# Patient Record
Sex: MALE | Race: WHITE | NOT HISPANIC OR LATINO | Employment: OTHER | URBAN - NONMETROPOLITAN AREA
[De-identification: names, ages, dates, MRNs, and addresses within clinical notes are randomized per-mention and may not be internally consistent; named-entity substitution may affect disease eponyms.]

---

## 2017-02-09 ENCOUNTER — APPOINTMENT (OUTPATIENT)
Dept: GENERAL RADIOLOGY | Facility: HOSPITAL | Age: 57
End: 2017-02-09

## 2017-02-09 ENCOUNTER — HOSPITAL ENCOUNTER (INPATIENT)
Facility: HOSPITAL | Age: 57
LOS: 2 days | Discharge: HOME | End: 2017-02-12
Attending: EMERGENCY MEDICINE | Admitting: INTERNAL MEDICINE

## 2017-02-09 DIAGNOSIS — R00.0 TACHYCARDIA: Primary | ICD-10-CM

## 2017-02-09 PROBLEM — B18.2 CHRONIC HEPATITIS C VIRUS INFECTION (HCC): Chronic | Status: ACTIVE | Noted: 2017-02-09

## 2017-02-09 PROBLEM — B18.2 CHRONIC HEPATITIS C VIRUS INFECTION (HCC): Status: ACTIVE | Noted: 2017-02-09

## 2017-02-09 LAB
ALBUMIN SERPL-MCNC: 3.5 G/DL (ref 3.5–5)
ALBUMIN SERPL-MCNC: 3.8 G/DL (ref 3.5–5)
ALBUMIN/GLOB SERPL: 0.9 G/DL (ref 1–2)
ALBUMIN/GLOB SERPL: 0.9 G/DL (ref 1–2)
ALP SERPL-CCNC: 135 U/L (ref 38–126)
ALP SERPL-CCNC: 149 U/L (ref 38–126)
ALT SERPL W P-5'-P-CCNC: 52 U/L (ref 13–69)
ALT SERPL W P-5'-P-CCNC: 54 U/L (ref 13–69)
ANION GAP SERPL CALCULATED.3IONS-SCNC: 10.8 MMOL/L
ANION GAP SERPL CALCULATED.3IONS-SCNC: 9.2 MMOL/L
AST SERPL-CCNC: 58 U/L (ref 15–46)
AST SERPL-CCNC: 65 U/L (ref 15–46)
BASOPHILS # BLD AUTO: 0.01 10*3/MM3 (ref 0–0.2)
BASOPHILS # BLD AUTO: 0.01 10*3/MM3 (ref 0–0.2)
BASOPHILS NFR BLD AUTO: 0.1 % (ref 0–2.5)
BASOPHILS NFR BLD AUTO: 0.2 % (ref 0–2.5)
BILIRUB SERPL-MCNC: 2.2 MG/DL (ref 0.2–1.3)
BILIRUB SERPL-MCNC: 2.6 MG/DL (ref 0.2–1.3)
BUN BLD-MCNC: 12 MG/DL (ref 7–20)
BUN BLD-MCNC: 13 MG/DL (ref 7–20)
BUN/CREAT SERPL: 18.6 (ref 6.3–21.9)
BUN/CREAT SERPL: 20 (ref 6.3–21.9)
CALCIUM SPEC-SCNC: 8.6 MG/DL (ref 8.4–10.2)
CALCIUM SPEC-SCNC: 9.1 MG/DL (ref 8.4–10.2)
CHLORIDE SERPL-SCNC: 103 MMOL/L (ref 98–107)
CHLORIDE SERPL-SCNC: 105 MMOL/L (ref 98–107)
CK MB SERPL-CCNC: 3.62 NG/ML (ref 0.2–2.4)
CK MB SERPL-RTO: 8 % (ref 0–2)
CK SERPL-CCNC: 45 U/L (ref 30–170)
CO2 SERPL-SCNC: 26 MMOL/L (ref 26–30)
CO2 SERPL-SCNC: 29 MMOL/L (ref 26–30)
CREAT BLD-MCNC: 0.6 MG/DL (ref 0.6–1.3)
CREAT BLD-MCNC: 0.7 MG/DL (ref 0.6–1.3)
D-DIMER, QUANTITATIVE (MAD,POW, STR): 728 NG/ML (FEU) (ref 0–500)
DEPRECATED RDW RBC AUTO: 50.3 FL (ref 37–54)
DEPRECATED RDW RBC AUTO: 52.8 FL (ref 37–54)
DIGOXIN SERPL-MCNC: <0.4 NG/ML (ref 0.8–2)
EOSINOPHIL # BLD AUTO: 0.25 10*3/MM3 (ref 0–0.7)
EOSINOPHIL # BLD AUTO: 0.29 10*3/MM3 (ref 0–0.7)
EOSINOPHIL NFR BLD AUTO: 3.5 % (ref 0–7)
EOSINOPHIL NFR BLD AUTO: 5.4 % (ref 0–7)
ERYTHROCYTE [DISTWIDTH] IN BLOOD BY AUTOMATED COUNT: 13.5 % (ref 11.5–14.5)
ERYTHROCYTE [DISTWIDTH] IN BLOOD BY AUTOMATED COUNT: 13.9 % (ref 11.5–14.5)
GFR SERPL CREATININE-BSD FRML MDRD: 116 ML/MIN/1.73
GFR SERPL CREATININE-BSD FRML MDRD: 138 ML/MIN/1.73
GLOBULIN UR ELPH-MCNC: 4.1 GM/DL
GLOBULIN UR ELPH-MCNC: 4.4 GM/DL
GLUCOSE BLD-MCNC: 176 MG/DL (ref 74–98)
GLUCOSE BLD-MCNC: 187 MG/DL (ref 74–98)
HCT VFR BLD AUTO: 36.1 % (ref 42–52)
HCT VFR BLD AUTO: 38.7 % (ref 42–52)
HGB BLD-MCNC: 12.2 G/DL (ref 14–18)
HGB BLD-MCNC: 13.3 G/DL (ref 14–18)
HOLD SPECIMEN: NORMAL
HOLD SPECIMEN: NORMAL
IMM GRANULOCYTES # BLD: 0.04 10*3/MM3 (ref 0–0.06)
IMM GRANULOCYTES # BLD: 0.05 10*3/MM3 (ref 0–0.06)
IMM GRANULOCYTES NFR BLD: 0.7 % (ref 0–0.6)
IMM GRANULOCYTES NFR BLD: 0.7 % (ref 0–0.6)
LYMPHOCYTES # BLD AUTO: 1.26 10*3/MM3 (ref 0.6–3.4)
LYMPHOCYTES # BLD AUTO: 1.44 10*3/MM3 (ref 0.6–3.4)
LYMPHOCYTES NFR BLD AUTO: 17.5 % (ref 10–50)
LYMPHOCYTES NFR BLD AUTO: 26.9 % (ref 10–50)
MCH RBC QN AUTO: 34.4 PG (ref 27–31)
MCH RBC QN AUTO: 35.4 PG (ref 27–31)
MCHC RBC AUTO-ENTMCNC: 33.8 G/DL (ref 30–37)
MCHC RBC AUTO-ENTMCNC: 34.4 G/DL (ref 30–37)
MCV RBC AUTO: 101.7 FL (ref 80–94)
MCV RBC AUTO: 102.9 FL (ref 80–94)
MONOCYTES # BLD AUTO: 0.27 10*3/MM3 (ref 0–0.9)
MONOCYTES # BLD AUTO: 0.38 10*3/MM3 (ref 0–0.9)
MONOCYTES NFR BLD AUTO: 5 % (ref 0–12)
MONOCYTES NFR BLD AUTO: 5.3 % (ref 0–12)
NEUTROPHILS # BLD AUTO: 3.31 10*3/MM3 (ref 2–6.9)
NEUTROPHILS # BLD AUTO: 5.24 10*3/MM3 (ref 2–6.9)
NEUTROPHILS NFR BLD AUTO: 61.8 % (ref 37–80)
NEUTROPHILS NFR BLD AUTO: 72.9 % (ref 37–80)
NRBC BLD MANUAL-RTO: 0 /100 WBC (ref 0–0)
NRBC BLD MANUAL-RTO: 0 /100 WBC (ref 0–0)
NT-PROBNP SERPL-MCNC: 2640 PG/ML (ref 0–125)
PLATELET # BLD AUTO: 84 10*3/MM3 (ref 130–400)
PLATELET # BLD AUTO: 99 10*3/MM3 (ref 130–400)
PMV BLD AUTO: 10.3 FL (ref 6–12)
PMV BLD AUTO: 10.4 FL (ref 6–12)
POTASSIUM BLD-SCNC: 3.2 MMOL/L (ref 3.5–5.1)
POTASSIUM BLD-SCNC: 3.8 MMOL/L (ref 3.5–5.1)
PROT SERPL-MCNC: 7.6 G/DL (ref 6.3–8.2)
PROT SERPL-MCNC: 8.2 G/DL (ref 6.3–8.2)
RBC # BLD AUTO: 3.55 10*6/MM3 (ref 4.7–6.1)
RBC # BLD AUTO: 3.76 10*6/MM3 (ref 4.7–6.1)
SODIUM BLD-SCNC: 137 MMOL/L (ref 137–145)
SODIUM BLD-SCNC: 139 MMOL/L (ref 137–145)
TROPONIN I SERPL-MCNC: 0.03 NG/ML (ref 0–0.03)
TROPONIN I SERPL-MCNC: 0.03 NG/ML (ref 0–0.03)
TROPONIN I SERPL-MCNC: 0.04 NG/ML (ref 0–0.03)
TSH SERPL DL<=0.05 MIU/L-ACNC: 1.01 MIU/ML (ref 0.47–4.68)
WBC NRBC COR # BLD: 5.36 10*3/MM3 (ref 4.8–10.8)
WBC NRBC COR # BLD: 7.19 10*3/MM3 (ref 4.8–10.8)
WHOLE BLOOD HOLD SPECIMEN: NORMAL
WHOLE BLOOD HOLD SPECIMEN: NORMAL

## 2017-02-09 PROCEDURE — 93010 ELECTROCARDIOGRAM REPORT: CPT | Performed by: INTERNAL MEDICINE

## 2017-02-09 PROCEDURE — 25010000002 AMIODARONE IN DEXTROSE 5% 360 MG/200ML SOLUTION: Performed by: INTERNAL MEDICINE

## 2017-02-09 PROCEDURE — 99291 CRITICAL CARE FIRST HOUR: CPT

## 2017-02-09 PROCEDURE — 85025 COMPLETE CBC W/AUTO DIFF WBC: CPT | Performed by: INTERNAL MEDICINE

## 2017-02-09 PROCEDURE — 96375 TX/PRO/DX INJ NEW DRUG ADDON: CPT

## 2017-02-09 PROCEDURE — 25010000002 ADENOSINE PER 6 MG: Performed by: EMERGENCY MEDICINE

## 2017-02-09 PROCEDURE — 25010000002 HYDROMORPHONE PER 4 MG: Performed by: HOSPITALIST

## 2017-02-09 PROCEDURE — 93005 ELECTROCARDIOGRAM TRACING: CPT | Performed by: EMERGENCY MEDICINE

## 2017-02-09 PROCEDURE — 80053 COMPREHEN METABOLIC PANEL: CPT | Performed by: INTERNAL MEDICINE

## 2017-02-09 PROCEDURE — 96365 THER/PROPH/DIAG IV INF INIT: CPT

## 2017-02-09 PROCEDURE — 84484 ASSAY OF TROPONIN QUANT: CPT | Performed by: EMERGENCY MEDICINE

## 2017-02-09 PROCEDURE — 96361 HYDRATE IV INFUSION ADD-ON: CPT

## 2017-02-09 PROCEDURE — 85025 COMPLETE CBC W/AUTO DIFF WBC: CPT | Performed by: EMERGENCY MEDICINE

## 2017-02-09 PROCEDURE — 84443 ASSAY THYROID STIM HORMONE: CPT | Performed by: INTERNAL MEDICINE

## 2017-02-09 PROCEDURE — 85379 FIBRIN DEGRADATION QUANT: CPT | Performed by: INTERNAL MEDICINE

## 2017-02-09 PROCEDURE — 71010 HC CHEST PA OR AP: CPT

## 2017-02-09 PROCEDURE — 93005 ELECTROCARDIOGRAM TRACING: CPT | Performed by: INTERNAL MEDICINE

## 2017-02-09 PROCEDURE — 36415 COLL VENOUS BLD VENIPUNCTURE: CPT

## 2017-02-09 PROCEDURE — 25010000002 DIGOXIN PER 500 MCG: Performed by: INTERNAL MEDICINE

## 2017-02-09 PROCEDURE — 25010000002 AMIODARONE IN DEXTROSE 5% 150 MG/100ML SOLUTION: Performed by: INTERNAL MEDICINE

## 2017-02-09 PROCEDURE — 94799 UNLISTED PULMONARY SVC/PX: CPT

## 2017-02-09 PROCEDURE — 96372 THER/PROPH/DIAG INJ SC/IM: CPT

## 2017-02-09 PROCEDURE — G0378 HOSPITAL OBSERVATION PER HR: HCPCS

## 2017-02-09 PROCEDURE — 82553 CREATINE MB FRACTION: CPT | Performed by: EMERGENCY MEDICINE

## 2017-02-09 PROCEDURE — 25010000002 AMIODARONE IN DEXTROSE 5% 150 MG/100ML SOLUTION: Performed by: EMERGENCY MEDICINE

## 2017-02-09 PROCEDURE — 83880 ASSAY OF NATRIURETIC PEPTIDE: CPT | Performed by: INTERNAL MEDICINE

## 2017-02-09 PROCEDURE — 99223 1ST HOSP IP/OBS HIGH 75: CPT | Performed by: INTERNAL MEDICINE

## 2017-02-09 PROCEDURE — 96376 TX/PRO/DX INJ SAME DRUG ADON: CPT

## 2017-02-09 PROCEDURE — 87081 CULTURE SCREEN ONLY: CPT | Performed by: HOSPITALIST

## 2017-02-09 PROCEDURE — 80053 COMPREHEN METABOLIC PANEL: CPT | Performed by: EMERGENCY MEDICINE

## 2017-02-09 PROCEDURE — 25010000002 AMIODARONE IN DEXTROSE 5% 360 MG/200ML SOLUTION

## 2017-02-09 PROCEDURE — 96366 THER/PROPH/DIAG IV INF ADDON: CPT

## 2017-02-09 PROCEDURE — 82550 ASSAY OF CK (CPK): CPT | Performed by: EMERGENCY MEDICINE

## 2017-02-09 PROCEDURE — 25010000002 ENOXAPARIN PER 10 MG: Performed by: INTERNAL MEDICINE

## 2017-02-09 PROCEDURE — 25010000002 AMIODARONE IN DEXTROSE 5% 360 MG/200ML SOLUTION: Performed by: EMERGENCY MEDICINE

## 2017-02-09 PROCEDURE — 80162 ASSAY OF DIGOXIN TOTAL: CPT | Performed by: INTERNAL MEDICINE

## 2017-02-09 RX ORDER — ATORVASTATIN CALCIUM 40 MG/1
40 TABLET, FILM COATED ORAL NIGHTLY
Status: DISCONTINUED | OUTPATIENT
Start: 2017-02-09 | End: 2017-02-12 | Stop reason: HOSPADM

## 2017-02-09 RX ORDER — HYDROCODONE BITARTRATE AND ACETAMINOPHEN 5; 325 MG/1; MG/1
1 TABLET ORAL EVERY 4 HOURS PRN
Status: DISCONTINUED | OUTPATIENT
Start: 2017-02-09 | End: 2017-02-12 | Stop reason: HOSPADM

## 2017-02-09 RX ORDER — DIGOXIN 0.25 MG/ML
0.5 INJECTION INTRAMUSCULAR; INTRAVENOUS ONCE
Status: COMPLETED | OUTPATIENT
Start: 2017-02-09 | End: 2017-02-09

## 2017-02-09 RX ORDER — DILTIAZEM HYDROCHLORIDE 240 MG/1
240 CAPSULE, COATED, EXTENDED RELEASE ORAL DAILY
Status: ON HOLD | COMMUNITY
End: 2017-02-10 | Stop reason: ALTCHOICE

## 2017-02-09 RX ORDER — DILTIAZEM HYDROCHLORIDE 5 MG/ML
30 INJECTION INTRAVENOUS ONCE
Status: DISCONTINUED | OUTPATIENT
Start: 2017-02-09 | End: 2017-02-09

## 2017-02-09 RX ORDER — DULOXETIN HYDROCHLORIDE 30 MG/1
30 CAPSULE, DELAYED RELEASE ORAL NIGHTLY
Status: ON HOLD | COMMUNITY
End: 2017-04-11

## 2017-02-09 RX ORDER — ASPIRIN 325 MG
325 TABLET ORAL DAILY
Status: ON HOLD | COMMUNITY
End: 2017-02-10 | Stop reason: ALTCHOICE

## 2017-02-09 RX ORDER — AMLODIPINE BESYLATE 5 MG/1
2.5 TABLET ORAL
Status: DISCONTINUED | OUTPATIENT
Start: 2017-02-09 | End: 2017-02-10

## 2017-02-09 RX ORDER — PREDNISONE 20 MG/1
10 TABLET ORAL DAILY PRN
Status: ON HOLD | COMMUNITY
End: 2017-06-21

## 2017-02-09 RX ORDER — ASPIRIN 81 MG/1
81 TABLET ORAL DAILY
Status: DISCONTINUED | OUTPATIENT
Start: 2017-02-09 | End: 2017-02-12 | Stop reason: HOSPADM

## 2017-02-09 RX ORDER — METOPROLOL SUCCINATE 100 MG/1
100 TABLET, EXTENDED RELEASE ORAL EVERY 24 HOURS
Status: DISCONTINUED | OUTPATIENT
Start: 2017-02-09 | End: 2017-02-12 | Stop reason: HOSPADM

## 2017-02-09 RX ORDER — LOSARTAN POTASSIUM AND HYDROCHLOROTHIAZIDE 12.5; 5 MG/1; MG/1
1 TABLET ORAL EVERY 24 HOURS
Status: DISCONTINUED | OUTPATIENT
Start: 2017-02-09 | End: 2017-02-12 | Stop reason: HOSPADM

## 2017-02-09 RX ORDER — ALLOPURINOL 300 MG/1
300 TABLET ORAL DAILY
Status: ON HOLD | COMMUNITY
End: 2017-02-10 | Stop reason: ALTCHOICE

## 2017-02-09 RX ORDER — ADENOSINE 3 MG/ML
6 INJECTION, SOLUTION INTRAVENOUS ONCE
Status: COMPLETED | OUTPATIENT
Start: 2017-02-09 | End: 2017-02-09

## 2017-02-09 RX ORDER — ACETAMINOPHEN 325 MG/1
650 TABLET ORAL ONCE
Status: COMPLETED | OUTPATIENT
Start: 2017-02-09 | End: 2017-02-09

## 2017-02-09 RX ORDER — ADENOSINE 3 MG/ML
18 INJECTION, SOLUTION INTRAVENOUS ONCE
Status: DISCONTINUED | OUTPATIENT
Start: 2017-02-09 | End: 2017-02-11

## 2017-02-09 RX ORDER — ADENOSINE 3 MG/ML
12 INJECTION, SOLUTION INTRAVENOUS ONCE
Status: COMPLETED | OUTPATIENT
Start: 2017-02-09 | End: 2017-02-09

## 2017-02-09 RX ORDER — SODIUM CHLORIDE 0.9 % (FLUSH) 0.9 %
1-10 SYRINGE (ML) INJECTION AS NEEDED
Status: DISCONTINUED | OUTPATIENT
Start: 2017-02-09 | End: 2017-02-12 | Stop reason: HOSPADM

## 2017-02-09 RX ORDER — LANOLIN ALCOHOL/MO/W.PET/CERES
6 CREAM (GRAM) TOPICAL NIGHTLY
Status: DISCONTINUED | OUTPATIENT
Start: 2017-02-09 | End: 2017-02-12 | Stop reason: HOSPADM

## 2017-02-09 RX ORDER — DIGOXIN 0.25 MG/ML
500 INJECTION INTRAMUSCULAR; INTRAVENOUS EVERY 6 HOURS
Status: DISCONTINUED | OUTPATIENT
Start: 2017-02-09 | End: 2017-02-10

## 2017-02-09 RX ORDER — DILTIAZEM HYDROCHLORIDE 5 MG/ML
20 INJECTION INTRAVENOUS ONCE
Status: COMPLETED | OUTPATIENT
Start: 2017-02-09 | End: 2017-02-09

## 2017-02-09 RX ADMIN — METOPROLOL SUCCINATE 100 MG: 100 TABLET, EXTENDED RELEASE ORAL at 17:46

## 2017-02-09 RX ADMIN — AMIODARONE HYDROCHLORIDE 1 MG/MIN: 1.8 INJECTION, SOLUTION INTRAVENOUS at 22:43

## 2017-02-09 RX ADMIN — DILTIAZEM HYDROCHLORIDE 20 MG: 5 INJECTION INTRAVENOUS at 12:43

## 2017-02-09 RX ADMIN — LOSARTAN POTASSIUM AND HYDROCHLOROTHIAZIDE 1 TABLET: 50; 12.5 TABLET, FILM COATED ORAL at 17:46

## 2017-02-09 RX ADMIN — ADENOSINE 6 MG: 3 INJECTION, SOLUTION INTRAVENOUS at 15:48

## 2017-02-09 RX ADMIN — ENOXAPARIN SODIUM 100 MG: 100 INJECTION SUBCUTANEOUS at 17:46

## 2017-02-09 RX ADMIN — HYDROCODONE BITARTRATE AND ACETAMINOPHEN 1 TABLET: 5; 325 TABLET ORAL at 20:48

## 2017-02-09 RX ADMIN — HYDROMORPHONE HYDROCHLORIDE 0.5 MG: 1 INJECTION, SOLUTION INTRAMUSCULAR; INTRAVENOUS; SUBCUTANEOUS at 23:54

## 2017-02-09 RX ADMIN — AMIODARONE HYDROCHLORIDE 150 MG: 1.5 INJECTION, SOLUTION INTRAVENOUS at 14:32

## 2017-02-09 RX ADMIN — ATORVASTATIN CALCIUM 40 MG: 40 TABLET, FILM COATED ORAL at 20:41

## 2017-02-09 RX ADMIN — AMIODARONE HYDROCHLORIDE 150 MG: 1.5 INJECTION, SOLUTION INTRAVENOUS at 17:48

## 2017-02-09 RX ADMIN — ADENOSINE 12 MG: 3 INJECTION, SOLUTION INTRAVENOUS at 15:52

## 2017-02-09 RX ADMIN — ADENOSINE 6 MG: 3 INJECTION, SOLUTION INTRAVENOUS at 15:49

## 2017-02-09 RX ADMIN — HYDROMORPHONE HYDROCHLORIDE 0.5 MG: 1 INJECTION, SOLUTION INTRAMUSCULAR; INTRAVENOUS; SUBCUTANEOUS at 19:51

## 2017-02-09 RX ADMIN — ASPIRIN 81 MG: 81 TABLET, COATED ORAL at 17:47

## 2017-02-09 RX ADMIN — MELATONIN TAB 3 MG 6 MG: 3 TAB at 20:41

## 2017-02-09 RX ADMIN — DIGOXIN 0.5 MG: 0.25 INJECTION INTRAMUSCULAR; INTRAVENOUS at 17:47

## 2017-02-09 RX ADMIN — AMIODARONE HYDROCHLORIDE 1 MG/MIN: 1.8 INJECTION, SOLUTION INTRAVENOUS at 15:00

## 2017-02-09 RX ADMIN — SODIUM CHLORIDE 250 ML: 9 INJECTION, SOLUTION INTRAVENOUS at 12:43

## 2017-02-09 RX ADMIN — DIGOXIN 500 MCG: 0.25 INJECTION INTRAMUSCULAR; INTRAVENOUS at 22:04

## 2017-02-09 RX ADMIN — ACETAMINOPHEN 650 MG: 325 TABLET, FILM COATED ORAL at 13:01

## 2017-02-09 RX ADMIN — AMIODARONE HYDROCHLORIDE 1.5 MG/MIN: 1.8 INJECTION, SOLUTION INTRAVENOUS at 18:03

## 2017-02-09 NOTE — H&P
Patient Care Team:  SHADI Hi as PCP - General    Chief complaint foot pain    Subjective   This is a 56-year-old white male patient with apparently known coronary artery disease who was sent to the emergency room on the advice of home health nurses who when taking his vital signs noticed that he was tachycardic.  On arrival in the emergency room the patient was noted to have a rapid wide complex regular tachycardia at 150-160 bpm.  He was hypertensive as well.  The patient denied having chest pain or shortness of breath.  There was no orthopnea PND.  He does have swelling of his feet and ankles due to ongoing infection.  He was complaining of pain in his feet and toes.  He had no dizziness palpitations or syncope.  The patient indicates that he was unaware that his heart was out of rhythm.  He has no prior history of documented supraventricular arrhythmia.  The patient is known to have coronary artery disease per his report and indicates that he had a stent placed in his heart 30 years ago at the age of 26.  This was done at [facility] but he has no recollection of the name of his cardiologist or the events leading up to or after his cardiac procedure.  He also has previously had an echocardiogram with an ejection fraction of 45-50%.  He has no history of stroke or TIA.  He has a history of hypertension as well as hypercholesterolemia.  He has a history of gout.  He has a history of infection involving his bilateral lower extremities particularly the distal aspect of the feet and numerous toes.  Complains of intense pain in his feet and toes bilaterally.  He indicates home health nursing has been trying to provide wound care but he does not have a primary care provider.  He is unaware of what physician ordered home health care.  The patient was initially given a bolus of IV diltiazem which did not affect his heart rate.  He was subsequently given a bolus of IV amiodarone at 150 mg  followed by 1 mg/m drip which had no effect on his heart rate over a one-hour period.  Only the patient was administered 6 mg of adenosine followed by 6 mg of adenosine followed by 12 mg of adenosine and which finally demonstrated AV node blockade and subsequent underlying flutter waves.  His initial troponin was negative.  There were no ischemic ST-T wave changes on his 12-lead electrocardiogram however, this is extremely difficult to interpret in the setting of rapid atrial flutter.  The patient indicates that he smokes one half to one pack of cigarettes per day but is trying to quit.  He denies drinking alcohol or using illicit substances.  His family history is strongly positive for premature coronary disease.  Hypertension   Pertinent negatives include no chest pain, headaches, neck pain, palpitations or shortness of breath.       Review of Systems   Constitutional: Positive for activity change. Negative for appetite change, chills, diaphoresis, fatigue, fever and unexpected weight change.   HENT: Negative for congestion, dental problem, ear pain, facial swelling, hearing loss, mouth sores, nosebleeds, postnasal drip, rhinorrhea, sinus pressure, sore throat, tinnitus and trouble swallowing.    Eyes: Negative for photophobia, pain, discharge, redness and itching.   Respiratory: Negative for apnea, cough, choking, chest tightness, shortness of breath, wheezing and stridor.    Cardiovascular: Negative for chest pain, palpitations and leg swelling.   Gastrointestinal: Negative for abdominal distention, abdominal pain, anal bleeding, blood in stool, constipation, diarrhea, nausea, rectal pain and vomiting.   Endocrine: Negative for cold intolerance, heat intolerance, polydipsia, polyphagia and polyuria.   Genitourinary: Negative for decreased urine volume, dysuria, flank pain, frequency, hematuria and urgency.   Musculoskeletal: Positive for joint swelling. Negative for arthralgias, back pain, gait problem,  myalgias, neck pain and neck stiffness.   Skin: Positive for color change and wound. Negative for pallor and rash.   Allergic/Immunologic: Negative for environmental allergies, food allergies and immunocompromised state.   Neurological: Negative for dizziness, tremors, seizures, syncope, facial asymmetry, weakness, light-headedness, numbness and headaches.   Hematological: Negative for adenopathy. Does not bruise/bleed easily.   Psychiatric/Behavioral: Negative for agitation, behavioral problems, confusion, decreased concentration, dysphoric mood, hallucinations and suicidal ideas. The patient is not nervous/anxious.           Past Medical History   Diagnosis Date   • Anemia    • Anxiety    • Arthritis    • Atrial fibrillation    • CAD (coronary artery disease)    • COPD (chronic obstructive pulmonary disease)    • CVA (cerebral infarction)    • Depression    • DVT (deep venous thrombosis)    • Hyperlipidemia    • Hypertension    • Thrombocytopenia      Past Surgical History   Procedure Laterality Date   • Total shoulder replacement Left    • Total hip arthroplasty Left      History reviewed. No pertinent family history.  Social History   Substance Use Topics   • Smoking status: Current Every Day Smoker     Packs/day: 1.00     Years: 30.00     Types: Cigarettes   • Smokeless tobacco: None   • Alcohol use No       Objective      Vital Signs  Temp:  [98.5 °F (36.9 °C)] 98.5 °F (36.9 °C)  Heart Rate:  [163-168] 163  Resp:  [20-22] 22  BP: (138-143)/() 142/100    Physical Exam   Constitutional: He is oriented to person, place, and time. He appears well-developed and well-nourished. No distress.   HENT:   Head: Normocephalic and atraumatic.   Mouth/Throat: No oropharyngeal exudate.   Eyes: EOM are normal. Pupils are equal, round, and reactive to light.   Neck: Normal range of motion. Neck supple. No JVD present. No tracheal deviation present. No thyromegaly present.   Cardiovascular: Regular rhythm, normal heart  sounds and intact distal pulses.  Exam reveals no gallop and no friction rub.    No murmur heard.  Pulmonary/Chest: Effort normal and breath sounds normal. No stridor. No respiratory distress. He has no wheezes. He has no rales. He exhibits no tenderness.   Abdominal: Soft. Bowel sounds are normal. He exhibits no distension and no mass. There is no tenderness. There is no guarding.   Musculoskeletal: Normal range of motion. He exhibits edema and tenderness. He exhibits no deformity.   Lymphadenopathy:     He has no cervical adenopathy.   Neurological: He is alert and oriented to person, place, and time. He has normal reflexes. He displays normal reflexes. No cranial nerve deficit. He exhibits normal muscle tone. Coordination normal.   Skin: Skin is warm and dry. Rash noted. He is not diaphoretic. There is erythema. No pallor.   Psychiatric: He has a normal mood and affect. His behavior is normal. Judgment and thought content normal.       Results Review:    I reviewed the patient's new clinical results.      Assessment/Plan     Active Problems:    Tachycardia   new-onset atrial flutter with rapid ventricular response and rate related right bundle branch block.  Review of the patient's previous 12-lead electrocardiogram demonstrated an incomplete right bundle branch block.  The presence of a right  BBB at heart rates greater than 150 bpm is indicative of a rate related bundle branch block.  Administration of IV adenosine bolus finally achieving AV node blockade confirms the presence of underlying flutter waves.  This explains why the patient was so difficult to initially rate control.  Amazingly the patient was completely asymptomatic despite heart rates of 150-160 bpm.  He has a chads 2 vascular score of 3.  He will go for require long-term anticoagulation.  We will continue his IV amiodarone with an additional bolus and increased drip rate.  In addition we will start IV Cardizem bolus and drip.  In addition we will  give him a single one-time IV dose of digoxin.  We will initiate a long-acting cardioselective beta blocker.  He will initially be started on Lovenox 1 mg/kg subcutaneous every 12 hours and will subsequently be transitioned to oral anticoagulation.   Coronary artery disease-native vessels without angina pectoris.  The patient had no angina despite heart rates of 160 bpm.  The patient has the unsubstantiated report of a previous coronary stent 30 years ago at [facility] in [facility].  These records are unavailable.   Mild chronic left ventricular systolic heart failure.  The patient's last ejection fraction was 45-50%.  He will be of no benefit to repeat an echocardiogram until his heart rate is less than 100 bpm.  He is currently New York Heart Association functional class I.  His heart failure appears to be well compensated despite very rapid atrial flutter.  He will be maintained on a fluid and sodium restricted diet.  He does not require loop diuretic therapy at this point.  If his ejection fraction is demonstrated to be less than 40% on repeat echocardiogram later in the hospitalization we will consider adding spironolactone.   Infection of his bilateral lower extremities feet and toes.  This may be a complication of gout.  There appears to be a bacterial infection.  I last the hospitalist to treat this aspect of his illness.  He may require consultation from the infectious disease specialist as well as wound care.  I will defer these decisions to the hospitalist.   Ongoing tobacco abuse.  The patient has been counseled regarding the essential need to discontinue cigarette smoking.    Assessment & Plan    I discussed the patients findings and my recommendations with patient    Kevin Landa MD  02/09/17  4:36 PM    Time: Critical care 45 min

## 2017-02-09 NOTE — ED PROVIDER NOTES
Subjective   History of Present Illness   56M w/ hx of afib, CAD, CHF w/ ef 40% sent from his home health nurse for further evaluation of dated heart rate and blood pressure.  Patient is at home in his usual state of health when his home health nurse noticed this.  He states that he has no symptoms currently.  He does state that he has not had his diltiazem for the last 3 days because he ran out and has not been refilled yet.    Review of Systems   Cardiovascular: Positive for palpitations.   All other systems reviewed and are negative.      Past Medical History   Diagnosis Date   • Anemia    • Anxiety    • Arthritis    • Atrial fibrillation    • CAD (coronary artery disease)    • COPD (chronic obstructive pulmonary disease)    • CVA (cerebral infarction)    • Depression    • DVT (deep venous thrombosis)    • Hyperlipidemia    • Hypertension    • Thrombocytopenia        No Known Allergies    No past surgical history on file.    No family history on file.    Social History     Social History   • Marital status: Single     Spouse name: N/A   • Number of children: N/A   • Years of education: N/A     Social History Main Topics   • Smoking status: Not on file   • Smokeless tobacco: Not on file   • Alcohol use Not on file   • Drug use: Not on file   • Sexual activity: Not on file     Other Topics Concern   • Not on file     Social History Narrative   • No narrative on file           Objective   Physical Exam   Constitutional: He is oriented to person, place, and time. He appears well-developed and well-nourished. No distress.   HENT:   Head: Normocephalic and atraumatic.   Mouth/Throat: Oropharynx is clear and moist.   Eyes: Conjunctivae are normal. No scleral icterus.   Neck: Normal range of motion. Neck supple. No tracheal deviation present.   Cardiovascular: Regular rhythm, normal heart sounds and intact distal pulses.  Exam reveals no gallop and no friction rub.    No murmur heard.  Tachycardic, regular    Pulmonary/Chest: Effort normal and breath sounds normal. No stridor. No respiratory distress. He has no wheezes. He has no rales. He exhibits no tenderness.   Abdominal: Soft. He exhibits no distension and no mass. There is no tenderness. There is no rebound and no guarding.   Musculoskeletal: Normal range of motion. He exhibits edema (On pitting edema bilateral lower surgeries.  Does have some erythema of the toes of the left lower extremity.). He exhibits no deformity.   Neurological: He is alert and oriented to person, place, and time.   Skin: Skin is warm and dry. No rash noted. He is not diaphoretic. No erythema. No pallor.   Psychiatric: He has a normal mood and affect. His behavior is normal.   Nursing note and vitals reviewed.      Procedures         ED Course  ED Course                  MDM   56-year-old male with history of heart failure here with tachycardia, hypertension in the setting of not been out of diltiazem 3-4 days.  Asymptomatic from this.  It is not entirely clear why he is on diltiazem however I suspect that he had some sort of a tachycardic rhythm that is been controlled with diltiazem and is now reappearing in the setting of being off diltiazem.  EKG shows sinus tachycardia or at least junctional tachycardia with slight right bundle-branch pattern and a heart rate of 165.  Does not appear to be ischemic.  Plan for labs, chest x-ray, small dose of IV fluids, bolus of diltiazem and reassessment.     EKG: Junctional tachycardia vs atrial fibrillation vs sinus tachycardia w/ RBBB vs less likely VT w/ retrograde p-waves. Does not appear ischemic.     1:41 PM Labs remarkable for elevated CK / CKMB and normal troponin. No response to diltiazem 20mg iv x1. Paged cardiology and sent copy of ekg, discussed case and awaiting recommendations. Pt remains asymptomatic.   2:20 PM After discussion with the cardiologist, will give bolus of amiodarone, and start on a drip.  4:38 PM Cardiologist came to  attempt chemical cardioversion w/ adenosine 6mg, 6mg, 12mg which was unsuccessful. Possible sawtooth waves seen w/ 12mg bolus. At this time, pt being admitted to ICU under cardiology for further management.     Final diagnoses:   Tachycardia            Adrian Palmer MD  02/09/17 0693

## 2017-02-10 ENCOUNTER — APPOINTMENT (OUTPATIENT)
Dept: CARDIOLOGY | Facility: HOSPITAL | Age: 57
End: 2017-02-10
Attending: INTERNAL MEDICINE

## 2017-02-10 ENCOUNTER — ANESTHESIA EVENT (OUTPATIENT)
Dept: ICU | Facility: HOSPITAL | Age: 57
End: 2017-02-10

## 2017-02-10 ENCOUNTER — ANESTHESIA (OUTPATIENT)
Dept: ICU | Facility: HOSPITAL | Age: 57
End: 2017-02-10

## 2017-02-10 PROBLEM — I25.10 CORONARY ARTERY DISEASE INVOLVING NATIVE CORONARY ARTERY OF NATIVE HEART WITHOUT ANGINA PECTORIS: Status: ACTIVE | Noted: 2017-02-10

## 2017-02-10 PROBLEM — I10 ESSENTIAL HYPERTENSION: Status: ACTIVE | Noted: 2017-02-10

## 2017-02-10 PROBLEM — I48.0 PAROXYSMAL ATRIAL FIBRILLATION (HCC): Status: ACTIVE | Noted: 2017-02-10

## 2017-02-10 LAB
ANION GAP SERPL CALCULATED.3IONS-SCNC: 7.5 MMOL/L
BH CV ECHO MEAS - % IVS THICK: 100 %
BH CV ECHO MEAS - % LVPW THICK: 11.5 %
BH CV ECHO MEAS - AO ACC SLOPE: 1246 CM/SEC^2
BH CV ECHO MEAS - AO ACC TIME: 0.07 SEC
BH CV ECHO MEAS - AO ROOT AREA (BSA CORRECTED): 1.6
BH CV ECHO MEAS - AO ROOT AREA: 9.1 CM^2
BH CV ECHO MEAS - AO ROOT DIAM: 3.4 CM
BH CV ECHO MEAS - BSA(HAYCOCK): 2.3 M^2
BH CV ECHO MEAS - BSA: 2.1 M^2
BH CV ECHO MEAS - BZI_BMI: 34.2 KILOGRAMS/M^2
BH CV ECHO MEAS - BZI_METRIC_HEIGHT: 172.7 CM
BH CV ECHO MEAS - BZI_METRIC_WEIGHT: 102.1 KG
BH CV ECHO MEAS - EDV(CUBED): 151.7 ML
BH CV ECHO MEAS - EDV(TEICH): 137.3 ML
BH CV ECHO MEAS - EF(CUBED): 76 %
BH CV ECHO MEAS - EF(TEICH): 67.5 %
BH CV ECHO MEAS - ESV(CUBED): 36.5 ML
BH CV ECHO MEAS - ESV(TEICH): 44.7 ML
BH CV ECHO MEAS - FS: 37.8 %
BH CV ECHO MEAS - IVS/LVPW: 0.88
BH CV ECHO MEAS - IVSD: 1 CM
BH CV ECHO MEAS - IVSS: 2.1 CM
BH CV ECHO MEAS - LA DIMENSION: 4.8 CM
BH CV ECHO MEAS - LA/AO: 1.4
BH CV ECHO MEAS - LV MASS(C)D: 229.4 GRAMS
BH CV ECHO MEAS - LV MASS(C)DI: 106.8 GRAMS/M^2
BH CV ECHO MEAS - LV MASS(C)S: 217.9 GRAMS
BH CV ECHO MEAS - LV MASS(C)SI: 101.4 GRAMS/M^2
BH CV ECHO MEAS - LV MAX PG: 3.8 MMHG
BH CV ECHO MEAS - LV MEAN PG: 1.7 MMHG
BH CV ECHO MEAS - LV V1 MAX: 97.1 CM/SEC
BH CV ECHO MEAS - LV V1 MEAN: 56.9 CM/SEC
BH CV ECHO MEAS - LV V1 VTI: 17.2 CM
BH CV ECHO MEAS - LVIDD: 5.3 CM
BH CV ECHO MEAS - LVIDS: 3.3 CM
BH CV ECHO MEAS - LVOT AREA (M): 3.5 CM^2
BH CV ECHO MEAS - LVOT AREA: 3.6 CM^2
BH CV ECHO MEAS - LVOT DIAM: 2.1 CM
BH CV ECHO MEAS - LVPWD: 1.2 CM
BH CV ECHO MEAS - LVPWS: 1.3 CM
BH CV ECHO MEAS - MV A MAX VEL: 81.4 CM/SEC
BH CV ECHO MEAS - MV E MAX VEL: 146.8 CM/SEC
BH CV ECHO MEAS - MV E/A: 1.8
BH CV ECHO MEAS - MV MAX PG: 7.2 MMHG
BH CV ECHO MEAS - MV MEAN PG: 2.4 MMHG
BH CV ECHO MEAS - MV V2 MAX: 134.5 CM/SEC
BH CV ECHO MEAS - MV V2 MEAN: 70.4 CM/SEC
BH CV ECHO MEAS - MV V2 VTI: 23.3 CM
BH CV ECHO MEAS - MVA(VTI): 2.7 CM^2
BH CV ECHO MEAS - PA ACC SLOPE: 1608 CM/SEC^2
BH CV ECHO MEAS - PA ACC TIME: 0.06 SEC
BH CV ECHO MEAS - PA MAX PG: 3.6 MMHG
BH CV ECHO MEAS - PA MEAN PG: 1.7 MMHG
BH CV ECHO MEAS - PA PR(ACCEL): 52.1 MMHG
BH CV ECHO MEAS - PA V2 MAX: 94.4 CM/SEC
BH CV ECHO MEAS - PA V2 MEAN: 59.8 CM/SEC
BH CV ECHO MEAS - PA V2 VTI: 20.8 CM
BH CV ECHO MEAS - SI(CUBED): 53.6 ML/M^2
BH CV ECHO MEAS - SI(LVOT): 28.9 ML/M^2
BH CV ECHO MEAS - SI(TEICH): 43.1 ML/M^2
BH CV ECHO MEAS - SV(CUBED): 115.2 ML
BH CV ECHO MEAS - SV(LVOT): 62.1 ML
BH CV ECHO MEAS - SV(TEICH): 92.6 ML
BH CV ECHO MEAS - TR MAX VEL: 213.5 CM/SEC
BUN BLD-MCNC: 13 MG/DL (ref 7–20)
BUN/CREAT SERPL: 16.3 (ref 6.3–21.9)
CALCIUM SPEC-SCNC: 8.6 MG/DL (ref 8.4–10.2)
CHLORIDE SERPL-SCNC: 105 MMOL/L (ref 98–107)
CO2 SERPL-SCNC: 29 MMOL/L (ref 26–30)
CREAT BLD-MCNC: 0.8 MG/DL (ref 0.6–1.3)
DEPRECATED RDW RBC AUTO: 51.8 FL (ref 37–54)
ERYTHROCYTE [DISTWIDTH] IN BLOOD BY AUTOMATED COUNT: 13.8 % (ref 11.5–14.5)
GFR SERPL CREATININE-BSD FRML MDRD: 99 ML/MIN/1.73
GLUCOSE BLD-MCNC: 168 MG/DL (ref 74–98)
HCT VFR BLD AUTO: 37.5 % (ref 42–52)
HGB BLD-MCNC: 12.8 G/DL (ref 14–18)
LV EF 2D ECHO EST: 50 %
MCH RBC QN AUTO: 35.1 PG (ref 27–31)
MCHC RBC AUTO-ENTMCNC: 34.1 G/DL (ref 30–37)
MCV RBC AUTO: 102.7 FL (ref 80–94)
PLATELET # BLD AUTO: 109 10*3/MM3 (ref 130–400)
PMV BLD AUTO: 11.1 FL (ref 6–12)
POTASSIUM BLD-SCNC: 3.5 MMOL/L (ref 3.5–5.1)
RBC # BLD AUTO: 3.65 10*6/MM3 (ref 4.7–6.1)
SODIUM BLD-SCNC: 138 MMOL/L (ref 137–145)
WBC NRBC COR # BLD: 7.9 10*3/MM3 (ref 4.8–10.8)

## 2017-02-10 PROCEDURE — 25010000002 PROPOFOL 10 MG/ML EMULSION: Performed by: NURSE ANESTHETIST, CERTIFIED REGISTERED

## 2017-02-10 PROCEDURE — 94799 UNLISTED PULMONARY SVC/PX: CPT

## 2017-02-10 PROCEDURE — 93306 TTE W/DOPPLER COMPLETE: CPT

## 2017-02-10 PROCEDURE — 93306 TTE W/DOPPLER COMPLETE: CPT | Performed by: INTERNAL MEDICINE

## 2017-02-10 PROCEDURE — 99231 SBSQ HOSP IP/OBS SF/LOW 25: CPT | Performed by: HOSPITALIST

## 2017-02-10 PROCEDURE — 80048 BASIC METABOLIC PNL TOTAL CA: CPT | Performed by: INTERNAL MEDICINE

## 2017-02-10 PROCEDURE — 93005 ELECTROCARDIOGRAM TRACING: CPT | Performed by: INTERNAL MEDICINE

## 2017-02-10 PROCEDURE — 5A2204Z RESTORATION OF CARDIAC RHYTHM, SINGLE: ICD-10-PCS | Performed by: INTERNAL MEDICINE

## 2017-02-10 PROCEDURE — 96376 TX/PRO/DX INJ SAME DRUG ADON: CPT

## 2017-02-10 PROCEDURE — 25010000002 HYDROMORPHONE PER 4 MG: Performed by: HOSPITALIST

## 2017-02-10 PROCEDURE — 93010 ELECTROCARDIOGRAM REPORT: CPT | Performed by: INTERNAL MEDICINE

## 2017-02-10 PROCEDURE — 25010000002 DIGOXIN PER 500 MCG: Performed by: INTERNAL MEDICINE

## 2017-02-10 PROCEDURE — 25010000002 AMIODARONE IN DEXTROSE 5% 360 MG/200ML SOLUTION

## 2017-02-10 PROCEDURE — 96372 THER/PROPH/DIAG INJ SC/IM: CPT

## 2017-02-10 PROCEDURE — 85027 COMPLETE CBC AUTOMATED: CPT | Performed by: INTERNAL MEDICINE

## 2017-02-10 PROCEDURE — 25010000002 ENOXAPARIN PER 10 MG: Performed by: INTERNAL MEDICINE

## 2017-02-10 PROCEDURE — 99291 CRITICAL CARE FIRST HOUR: CPT | Performed by: INTERNAL MEDICINE

## 2017-02-10 RX ORDER — PROPOFOL 10 MG/ML
VIAL (ML) INTRAVENOUS AS NEEDED
Status: DISCONTINUED | OUTPATIENT
Start: 2017-02-10 | End: 2017-02-10 | Stop reason: HOSPADM

## 2017-02-10 RX ORDER — GABAPENTIN 400 MG/1
400 CAPSULE ORAL 3 TIMES DAILY
Refills: 0 | COMMUNITY
Start: 2016-12-09 | End: 2017-06-17

## 2017-02-10 RX ORDER — COLCHICINE 0.6 MG/1
0.6 TABLET ORAL DAILY
Refills: 1 | COMMUNITY
Start: 2016-11-26 | End: 2017-06-17

## 2017-02-10 RX ADMIN — MELATONIN TAB 3 MG 6 MG: 3 TAB at 20:02

## 2017-02-10 RX ADMIN — HYDROCODONE BITARTRATE AND ACETAMINOPHEN 1 TABLET: 5; 325 TABLET ORAL at 15:41

## 2017-02-10 RX ADMIN — ASPIRIN 81 MG: 81 TABLET, COATED ORAL at 08:51

## 2017-02-10 RX ADMIN — LOSARTAN POTASSIUM AND HYDROCHLOROTHIAZIDE 1 TABLET: 50; 12.5 TABLET, FILM COATED ORAL at 17:13

## 2017-02-10 RX ADMIN — HYDROMORPHONE HYDROCHLORIDE 0.5 MG: 1 INJECTION, SOLUTION INTRAMUSCULAR; INTRAVENOUS; SUBCUTANEOUS at 19:26

## 2017-02-10 RX ADMIN — ATORVASTATIN CALCIUM 40 MG: 40 TABLET, FILM COATED ORAL at 20:02

## 2017-02-10 RX ADMIN — ENOXAPARIN SODIUM 100 MG: 100 INJECTION SUBCUTANEOUS at 05:24

## 2017-02-10 RX ADMIN — AMIODARONE HYDROCHLORIDE 1 MG/MIN: 1.8 INJECTION, SOLUTION INTRAVENOUS at 05:14

## 2017-02-10 RX ADMIN — PROPOFOL 50 MG: 10 INJECTION, EMULSION INTRAVENOUS at 07:59

## 2017-02-10 RX ADMIN — HYDROCODONE BITARTRATE AND ACETAMINOPHEN 1 TABLET: 5; 325 TABLET ORAL at 02:38

## 2017-02-10 RX ADMIN — DIGOXIN 500 MCG: 0.25 INJECTION INTRAMUSCULAR; INTRAVENOUS at 03:54

## 2017-02-10 RX ADMIN — HYDROCODONE BITARTRATE AND ACETAMINOPHEN 1 TABLET: 5; 325 TABLET ORAL at 10:17

## 2017-02-10 RX ADMIN — HYDROMORPHONE HYDROCHLORIDE 0.5 MG: 1 INJECTION, SOLUTION INTRAMUSCULAR; INTRAVENOUS; SUBCUTANEOUS at 05:24

## 2017-02-10 RX ADMIN — HYDROCODONE BITARTRATE AND ACETAMINOPHEN 1 TABLET: 5; 325 TABLET ORAL at 20:07

## 2017-02-10 RX ADMIN — HYDROMORPHONE HYDROCHLORIDE 0.5 MG: 1 INJECTION, SOLUTION INTRAMUSCULAR; INTRAVENOUS; SUBCUTANEOUS at 15:41

## 2017-02-10 RX ADMIN — ENOXAPARIN SODIUM 100 MG: 100 INJECTION SUBCUTANEOUS at 17:12

## 2017-02-10 RX ADMIN — HYDROMORPHONE HYDROCHLORIDE 0.5 MG: 1 INJECTION, SOLUTION INTRAMUSCULAR; INTRAVENOUS; SUBCUTANEOUS at 10:17

## 2017-02-10 NOTE — ANESTHESIA POSTPROCEDURE EVALUATION
Patient: Vasquez Marie    Procedure Summary     Date Anesthesia Start Anesthesia Stop Room / Location    02/10/17 0750         Procedure Diagnosis Scheduled Providers Provider    cardioversion No diagnosis on file.  Luciano Garcia CRNA          Anesthesia Type: MAC  Last vitals  BP      Temp      Pulse     Resp      SpO2        Post Anesthesia Care and Evaluation    Patient location during evaluation: ICU  Patient participation: complete - patient participated  Level of consciousness: awake and alert  Pain score: 0  Pain management: satisfactory to patient  Airway patency: patent  Anesthetic complications: No anesthetic complications  PONV Status: none  Cardiovascular status: acceptable and hemodynamically stable  Respiratory status: acceptable  Hydration status: acceptable

## 2017-02-10 NOTE — PLAN OF CARE
Problem: Pain, Chronic (Adult)  Goal: Identify Related Risk Factors and Signs and Symptoms  Outcome: Ongoing (interventions implemented as appropriate)    02/10/17 0457   Pain, Chronic   Related Risk Factors (Chronic Pain) nerve injury   Signs and Symptoms (Chronic Pain) verbalization of pain/discomfort for a prolonged time period;verbalization of pain descriptors       Goal: Acceptable Pain Control/Comfort Level  Outcome: Ongoing (interventions implemented as appropriate)    02/10/17 0457   Pain, Chronic (Adult)   Acceptable Pain Control/Comfort Level making progress toward outcome

## 2017-02-10 NOTE — PROCEDURES
Procedures      Synchronized external cardioversion    Indication: Atrial flutter    Procedure:  Informed consent was obtained.  The anesthesiology service provided conscious sedation.  Defibrillation patches were placed in the anterior posterior position.  The patient received 200 J of synchronized biphasic cardioverting energy.  The patient successfully cardioverted to normal sinus rhythm.  There were no other complications.    Recommendations:  Discontinue IV digoxin  Discontinue IV diltiazem  Continue IV Cardizem at 0.5 mg/m for an additional 12-24 hours  Continue low molecular weight heparin for the time being.  We will reinitiate Coumadin therapy within the next 24-48 hours.  Continue to adjust antihypertension and congestive heart failure medications.

## 2017-02-10 NOTE — PLAN OF CARE
Problem: Patient Care Overview (Adult)  Goal: Adult Individualization and Mutuality  Outcome: Ongoing (interventions implemented as appropriate)    02/10/17 0456   Individualization   Patient Specific Preferences likes Sprite zero         Problem: Arrhythmia/Dysrhythmia (Symptomatic) (Adult)  Goal: Signs and Symptoms of Listed Potential Problems Will be Absent or Manageable (Arrhythmia/Dysrhythmia)  Outcome: Ongoing (interventions implemented as appropriate)    02/10/17 0456   Arrhythmia/Dysrhythmia (Symptomatic)   Problems Assessed (Arrhythmia/Dysrhythmia) all   Problems Present (Arrhythmia/Dysrhythmia) electrophysiological conduction defect

## 2017-02-10 NOTE — PROGRESS NOTES
"[facility]-Cardiology Progress note     LOS: 0 days   Patient Care Team:  SHADI Hi as PCP - General    Chief Complaint:  Chest pain    Subjective     Interval History:   Overnight the patient had an abrupt improvement in his rate control.  However he did not cardiovert and remains in atrial flutter.  Surprisingly he has no cardiovascular symptoms specifically denying a sense of tachycardia or palpitations.  He indicates that he feels no different with a heart rate of 70 versus a heart rate of 160.  Patient Complaints: none  Patient Denies:  Chest pain shortness of breath orthopnea PND or palpitations  History taken from: patient    Review of Systems:   All systems were reviewed       Objective     Vital Sign Min/Max for last 24 hours  Temp  Min: 97.7 °F (36.5 °C)  Max: 99 °F (37.2 °C)   BP  Min: 93/73  Max: 151/106   Pulse  Min: 68  Max: 168   Resp  Min: 7  Max: 22   SpO2  Min: 94 %  Max: 100 %   Flow (L/min)  Min: 2  Max: 2   Weight  Min: 225 lb 1 oz (102 kg)  Max: 230 lb (104 kg)     Flowsheet Rows         First Filed Value    Admission Height  68\" (172.7 cm) Documented at 02/09/2017 1227    Admission Weight  230 lb (104 kg) Documented at 02/09/2017 1227          Physical Exam:     General Appearance:    Alert, cooperative, in no acute distress   Head:    Normocephalic, without obvious abnormality, atraumatic   Eyes:            Lids and lashes normal, conjunctivae and sclerae normal, no   icterus, no pallor, corneas clear, PERRLA   Ears:    Ears appear intact with no abnormalities noted   Throat:   No oral lesions, no thrush, oral mucosa moist   Neck:   No adenopathy, supple, trachea midline, no thyromegaly, no   carotid bruit, no JVD   Back:     No kyphosis present, no scoliosis present, no skin lesions,      erythema or scars, no tenderness to percussion or                   palpation,   range of motion normal   Lungs:     Clear to auscultation,respirations regular, even and                  unlabored "    Heart:    Regular rhythm and normal rate, normal S1 and S2, no            murmur, no gallop, no rub, no click   Chest Wall:    No abnormalities observed   Abdomen:     Normal bowel sounds, no masses, no organomegaly, soft        non-tender, non-distended, no guarding, no rebound                tenderness   Rectal:     Deferred   Extremities:   Moves all extremities well, no edema, no cyanosis, no             redness   Pulses:   Pulses palpable and equal bilaterally   Skin:   No bleeding, bruising or rash   Lymph nodes:   No palpable adenopathy   Neurologic:   Cranial nerves 2 - 12 grossly intact, sensation intact, DTR       present and equal bilaterally        Results Review:     I reviewed the patient's new clinical results.        Results from last 7 days  Lab Units 02/10/17  0436   SODIUM mmol/L 138   POTASSIUM mmol/L 3.5   CHLORIDE mmol/L 105   TOTAL CO2 mmol/L 29.0   BUN mg/dL 13   CREATININE mg/dL 0.80   GLUCOSE mg/dL 168*   CALCIUM mg/dL 8.6       Results from last 7 days  Lab Units 02/10/17  0436 02/09/17  1742 02/09/17  1237   WBC 10*3/mm3 7.90 5.36 7.19   HEMOGLOBIN g/dL 12.8* 12.2* 13.3*   HEMATOCRIT % 37.5* 36.1* 38.7*   PLATELETS 10*3/mm3 109* 84* 99*         Physical Exam    Medication Review: yes    Assessment/Plan     Principal Problem:    Paroxysmal atrial fibrillation  Active Problems:    Tachycardia- due to atrial flutter with right bundle branch block    Coronary artery disease involving native coronary artery of native heart without angina pectoris- no evidence of recurrent ischemic heart disease.  His serial cardiac enzymes remained negative.    Essential hypertension- improved blood pressure control  Paroxysmal atrial flutter- the patient achieved adequate rate control overnight but surprisingly did not cardiovert.  We have elected to proceed with synchronized external cardioversion this morning which was successful.  We will discontinue his IV digoxin and IV diltiazem.  We will not restart  oral diltiazem.  We will use long-acting metoprolol XL for both rate control and congestive heart failure measures.  We will review his echocardiogram later today.  We will continue subcutaneous Lovenox for the time being.  We will restart his warfarin anticoagulation later in this hospitalization.    Severe gout with possible secondary infection.  There is tissue breakdown.  The patient is being followed by the hospitalist service for this.            Kevin Landa MD  02/10/17  8:11 AM      Time: Critical care 60 min

## 2017-02-10 NOTE — PLAN OF CARE
Problem: Patient Care Overview (Adult)  Goal: Plan of Care Review  Outcome: Ongoing (interventions implemented as appropriate)    02/09/17 1936   Coping/Psychosocial Response Interventions   Plan Of Care Reviewed With patient       Goal: Adult Individualization and Mutuality  Outcome: Ongoing (interventions implemented as appropriate)    Problem: Arrhythmia/Dysrhythmia (Symptomatic) (Adult)  Goal: Signs and Symptoms of Listed Potential Problems Will be Absent or Manageable (Arrhythmia/Dysrhythmia)  Outcome: Ongoing (interventions implemented as appropriate)

## 2017-02-10 NOTE — PROGRESS NOTES
"Adult Nutrition  Assessment/PES    Patient Name:  Vasquez Marie  YOB: 1960  MRN: [id number]  Admit Date:  2/9/2017    Assessment Date:  2/10/2017        Reason for Assessment       02/10/17 1557    Reason for Assessment    Reason For Assessment/Visit admission assessment;identified at risk by screening criteria    Identified At Risk By Screening Criteria large or nonhealing wound, burn or pressure ulcer;weight status;BMI    Diagnosis Diagnosis    Skin Non healing wound   Wounds to bilateral extremities notes per NSG.                 Anthropometrics       02/10/17 1559    Anthropometrics    Height Method Stated    Height 172.7 cm (68\")    Weight Method Bed scale    Weight 103 kg (227 lb 1.2 oz)    Ideal Body Weight (IBW)    Ideal Body Weight (IBW), Male (kg) 70.89    % Ideal Body Weight 145.6    Body Mass Index (BMI)    BMI (kg/m2) 34.6    BMI Grade 30 - 34.9- obesity - grade I            Labs/Tests/Procedures/Meds       02/10/17 1602    Labs/Tests/Procedures/Meds    Labs/Tests Review Reviewed   High: Glucose    Medication Review Reviewed, pertinent              Estimated/Assessed Needs       02/10/17 1603    Calculation Measurements    Weight Used For Calculations 83.8 kg (184 lb 12.3 oz)    Height Used for Calculations 1.727 m (5' 8\")    Estimated/Assessed Energy Needs    Energy Need Method [place] or    Age 56    [facility] 1632.6    [facility]  Confined to bed  1.2    Estimated Kcal Range  ~7402-3290 Kcal    Estimated/Assessed Protein Needs    Weight Used for Protein Calculation 70.9 kg (156 lb 4.6 oz)    Protein (gm/kg) 2.5    2.5 Gm Protein (gm) 177.22    Estimated Protein Range ~106.34 - 177.22 gm    Estimated/Assessed Fluid Needs    Fluid Need Method RDA method    RDA Method (mL)  2100   ~6402-2681 ml/day    Estimated/Assessed Vitamin Needs    Vitamin Requirements Vitamin C   Vitamin C 500 mg BID and MVI with minerals daily          " "  Nutrition Prescription Ordered       02/10/17 1614    Nutrition Prescription PO    Current PO Diet Regular    Common Modifiers Cardiac            Evaluation of Received Nutrient/Fluid Intake       02/10/17 1615    Calculation Measurements    Weight Used For Calculations 83.8 kg (184 lb 12.3 oz)    Height Used for Calculations 1.727 m (5' 8\")    PO Evaluation    Number of Days PO Intake Evaluated 1 day    Number of Meals 2    % PO Intake 100              Problem/Interventions:        Problem 1       02/10/17 1616    Nutrition Diagnoses Problem 1    Problem 1 Increased Nutrient Needs    Macronutrient Kcal;Protein    Micronutrient Vitamin C    Etiology (related to) Medical Diagnosis    Skin Non healing wound   Wounds noted per NSG    Signs/Symptoms (evidenced by) Other (comment)   increased nutrient needs for wound healing                    Intervention Goal       02/10/17 1626    Intervention Goal    General Maintain nutrition    PO Meet estimated needs            Nutrition Intervention       02/10/17 1626    Nutrition Intervention    RD/Tech Action Encourage intake;Supplement provided;Follow Tx progress            Nutrition Prescription       02/10/17 1626    Nutrition Prescription PO    PO Prescription Begin/change supplement    Supplement Ensure Complete;Lon    Supplement Frequency 2 times a day;3 times a day    Other Orders    Supplement Vitamin mineral supplement   Vitamin C 500 mg BID            Education/Evaluation       02/10/17 1627    Education    Education Will Instruct as appropriate    Monitor/Evaluation    Monitor Per protocol;PO intake;Supplement intake;Pertinent labs;Weight        Comments:  Rec. #1: Continue with current diet; Encouraging p.o. Intake. Rec. #2: Consider adding MVI with minerals daily and Vitamin C 500 mg BID to promote wound healing. Pt. Receiving nutritional supplements Lon BID and Ensure TID. RD to follow pt. Consult RD PRN.     Electronically signed by:  Flori Lowery, " RD  02/10/17 4:28 PM

## 2017-02-10 NOTE — PROGRESS NOTES
Patient: Vasquez Marie  * No surgery found *  Anesthesia type: [unfilled]    Patient location: ICU  Last vitals:   Vitals:    02/10/17 1203   BP: 133/82   Pulse:    Resp:    Temp:    SpO2:      Level of consciousness: awake, alert and oriented    Post-anesthesia pain: adequate analgesia  Airway patency: patent  Respiratory: unassisted, spontaneous ventilation, room air  Cardiovascular: stable and blood pressure at baseline  Hydration: euvolemic    Anesthetic complications: no   Pt. Doing well after cardioversion earlier today.

## 2017-02-10 NOTE — CONSULTS
Hospitalist Consultation    Referring Provider:   No ref. provider found    Reason for Consultation:  Medical management    Subjective     Chief complaint   Chief Complaint   Patient presents with   • Rapid Heart Rate     Home Health nurse called ambulance for rapid heartrate and htn   • Hypertension       History of present illness:    Patient is 56-year-old white male with multiple medical problems as listed below in the past medical history.  Patient was sent to the emergency room by the home health nurse when they noticed that he had  tachycardia of 150-160.  In the emergency room Dr. Landa was consulted who admitted the patient after appropriate treatment for initially what appears to be supraventricular arrhythmia and later on the it appears that he may have A. Fib/flutter  also underlying it.  We were consulted to manage noncardiac issues including COPD history of stroke as well as pain of both lower extremities left more than the right.  Past Medical History   Diagnosis Date   • Anemia    • Anxiety    • Arthritis    • Atrial fibrillation    • CAD (coronary artery disease)    • COPD (chronic obstructive pulmonary disease)    • CVA (cerebral infarction)    • Depression    • DVT (deep venous thrombosis)    • Hyperlipidemia    • Hypertension    • Thrombocytopenia        Past Surgical History   Procedure Laterality Date   • Total shoulder replacement Left    • Total hip arthroplasty Left        History reviewed. No pertinent family history.      Social History   Substance Use Topics   • Smoking status: Current Every Day Smoker     Packs/day: 1.00     Years: 30.00     Types: Cigarettes   • Smokeless tobacco: None   • Alcohol use No     Prescriptions Prior to Admission   Medication Sig Dispense Refill Last Dose   • allopurinol (ZYLOPRIM) 300 MG tablet Take 300 mg by mouth Daily.      • aspirin 325 MG tablet Take 325 mg by mouth Daily.      • diltiaZEM CD (CARDIZEM CD) 240 MG 24 hr capsule Take 240 mg by mouth  "Daily.      • DULoxetine (CYMBALTA) 30 MG capsule Take 30 mg by mouth Every Night.      • predniSONE (DELTASONE) 20 MG tablet Take 20 mg by mouth Daily.        Allergies:  Review of patient's allergies indicates no known allergies.    Review of Systems  Constitutional: Negative for fever. Negative for chills, diaphoresis, positive fatigue.  HENT: Negative for congestion and hearing loss.   Eyes: Negative for redness and visual disturbance.   Respiratory: negative for shortness of breath. Negative for chest pain . Negative for cough and chest tightness.   Cardiovascular: Negative for chest pain and palpitations.  He never knew that he had such a fast heart rate until nurses came and evaluated him.  Gastrointestinal: Negative for abdominal distention, abdominal pain and blood in stool.  Did complain that day he was nauseous and had vomited one time and off and on had diarrhea as well.  Endocrine: Negative for cold intolerance and heat intolerance.   Genitourinary: Negative for difficulty urinating, dysuria and frequency.   Musculoskeletal: Negative for arthralgias, back pain and myalgias.   Skin: Negative for color change, rash and wound.  He has some issues with the left foot that was noted and that subsided.  He is complaining of pain along with superficial wound that are noted.  He did mention that he is being treated with antibiotics.  Neurological: Negative for syncope, weakness and headaches.   Hematological: Negative for adenopathy. Does  bruise/bleed easily.   Psychiatric/Behavioral: Negative for confusion. The patient is not nervous/anxious.     Objective     Vital Signs  Visit Vitals   • /97   • Pulse (!) 147   • Temp 99 °F (37.2 °C) (Oral)   • Resp (!) 7   • Ht 68\" (172.7 cm)   • Wt 225 lb 1 oz (102 kg)   • SpO2 97%   • BMI 34.22 kg/m2                 Intake/Output Summary (Last 24 hours) at 02/09/17 1904  Last data filed at 02/09/17 1803   Gross per 24 hour   Intake    572 ml   Output      0 ml   Net "    572 ml       Physical Exam:     General Appearance:   Alert, cooperative, in no acute distress.     Head:   Normocephalic, without obvious abnormality, atraumatic.     Eyes:       Normal, conjunctivae and sclerae, no icterus, no pallor, corneas clear, PERRLA        Throat:   Oral mucosa dry      Neck:  No adenopathy, supple, trachea midline, no thyromegaly, no carotid bruit, no JVD      Back:   No CVA tenderness on Percussion.     Lungs:    he does not have any significant rhonchi or wheezing.        Heart:   Regular rhythm and normal rate, normal S1 and S2. tachycardia noted        Abdomen:   Obese. Normal bowel sounds, no masses, no organomegaly, soft non-tender, non-distended, no guarding, no rebound tenderness        Extremities:  Moves all extremities, no edema, no cyanosis, no redness.     Pulses:  Pulses weak but palpable     Skin:  No bleeding, or rash I did notice some bruising        Neurologic:  Cranial nerves grossly intact, move all extremities             Results Review:  Results for orders placed or performed during the hospital encounter of 02/09/17   Comprehensive Metabolic Panel   Result Value Ref Range    Glucose 187 (H) 74 - 98 mg/dL    BUN 13 7 - 20 mg/dL    Creatinine 0.70 0.60 - 1.30 mg/dL    Sodium 139 137 - 145 mmol/L    Potassium 3.8 3.5 - 5.1 mmol/L    Chloride 103 98 - 107 mmol/L    CO2 29.0 26.0 - 30.0 mmol/L    Calcium 9.1 8.4 - 10.2 mg/dL    Total Protein 8.2 6.3 - 8.2 g/dL    Albumin 3.80 3.50 - 5.00 g/dL    ALT (SGPT) 54 13 - 69 U/L    AST (SGOT) 65 (H) 15 - 46 U/L    Alkaline Phosphatase 149 (H) 38 - 126 U/L    Total Bilirubin 2.6 (H) 0.2 - 1.3 mg/dL    eGFR Non African Amer 116 >60 mL/min/1.73    Globulin 4.4 gm/dL    A/G Ratio 0.9 (L) 1.0 - 2.0 g/dL    BUN/Creatinine Ratio 18.6 6.3 - 21.9    Anion Gap 10.8 mmol/L   CK   Result Value Ref Range    Creatine Kinase 45 30 - 170 U/L   Troponin   Result Value Ref Range    Troponin I 0.034 0.000 - 0.034 ng/mL   CK-MB   Result Value Ref  Range    CKMB 3.62 (C) 0.20 - 2.40 ng/mL   CBC Auto Differential   Result Value Ref Range    WBC 7.19 4.80 - 10.80 10*3/mm3    RBC 3.76 (L) 4.70 - 6.10 10*6/mm3    Hemoglobin 13.3 (L) 14.0 - 18.0 g/dL    Hematocrit 38.7 (L) 42.0 - 52.0 %    .9 (H) 80.0 - 94.0 fL    MCH 35.4 (H) 27.0 - 31.0 pg    MCHC 34.4 30.0 - 37.0 g/dL    RDW 13.9 11.5 - 14.5 %    RDW-SD 52.8 37.0 - 54.0 fl    MPV 10.4 6.0 - 12.0 fL    Platelets 99 (L) 130 - 400 10*3/mm3    Neutrophil % 72.9 37.0 - 80.0 %    Lymphocyte % 17.5 10.0 - 50.0 %    Monocyte % 5.3 0.0 - 12.0 %    Eosinophil % 3.5 0.0 - 7.0 %    Basophil % 0.1 0.0 - 2.5 %    Immature Grans % 0.7 (H) 0.0 - 0.6 %    Neutrophils, Absolute 5.24 2.00 - 6.90 10*3/mm3    Lymphocytes, Absolute 1.26 0.60 - 3.40 10*3/mm3    Monocytes, Absolute 0.38 0.00 - 0.90 10*3/mm3    Eosinophils, Absolute 0.25 0.00 - 0.70 10*3/mm3    Basophils, Absolute 0.01 0.00 - 0.20 10*3/mm3    Immature Grans, Absolute 0.05 0.00 - 0.06 10*3/mm3    nRBC 0.0 0.0 - 0.0 /100 WBC   CK-MB Index   Result Value Ref Range    CK-MB Index 8.0 (H) 0.0 - 2.0 %   Troponin   Result Value Ref Range    Troponin I 0.035 (H) 0.000 - 0.034 ng/mL   Comprehensive Metabolic Panel   Result Value Ref Range    Glucose 176 (H) 74 - 98 mg/dL    BUN 12 7 - 20 mg/dL    Creatinine 0.60 0.60 - 1.30 mg/dL    Sodium 137 137 - 145 mmol/L    Potassium 3.2 (L) 3.5 - 5.1 mmol/L    Chloride 105 98 - 107 mmol/L    CO2 26.0 26.0 - 30.0 mmol/L    Calcium 8.6 8.4 - 10.2 mg/dL    Total Protein 7.6 6.3 - 8.2 g/dL    Albumin 3.50 3.50 - 5.00 g/dL    ALT (SGPT) 52 13 - 69 U/L    AST (SGOT) 58 (H) 15 - 46 U/L    Alkaline Phosphatase 135 (H) 38 - 126 U/L    Total Bilirubin 2.2 (H) 0.2 - 1.3 mg/dL    eGFR Non African Amer 138 >60 mL/min/1.73    Globulin 4.1 gm/dL    A/G Ratio 0.9 (L) 1.0 - 2.0 g/dL    BUN/Creatinine Ratio 20.0 6.3 - 21.9    Anion Gap 9.2 mmol/L   BNP   Result Value Ref Range    proBNP 2640.0 (C) 0.0 - 125.0 pg/mL   D-dimer, Quantitative   Result  Value Ref Range    D-Dimer, Quantitative 728 (C) 0 - 500 ng/mL (FEU)   CBC Auto Differential   Result Value Ref Range    WBC 5.36 4.80 - 10.80 10*3/mm3    RBC 3.55 (L) 4.70 - 6.10 10*6/mm3    Hemoglobin 12.2 (L) 14.0 - 18.0 g/dL    Hematocrit 36.1 (L) 42.0 - 52.0 %    .7 (H) 80.0 - 94.0 fL    MCH 34.4 (H) 27.0 - 31.0 pg    MCHC 33.8 30.0 - 37.0 g/dL    RDW 13.5 11.5 - 14.5 %    RDW-SD 50.3 37.0 - 54.0 fl    MPV 10.3 6.0 - 12.0 fL    Platelets 84 (L) 130 - 400 10*3/mm3    Neutrophil % 61.8 37.0 - 80.0 %    Lymphocyte % 26.9 10.0 - 50.0 %    Monocyte % 5.0 0.0 - 12.0 %    Eosinophil % 5.4 0.0 - 7.0 %    Basophil % 0.2 0.0 - 2.5 %    Immature Grans % 0.7 (H) 0.0 - 0.6 %    Neutrophils, Absolute 3.31 2.00 - 6.90 10*3/mm3    Lymphocytes, Absolute 1.44 0.60 - 3.40 10*3/mm3    Monocytes, Absolute 0.27 0.00 - 0.90 10*3/mm3    Eosinophils, Absolute 0.29 0.00 - 0.70 10*3/mm3    Basophils, Absolute 0.01 0.00 - 0.20 10*3/mm3    Immature Grans, Absolute 0.04 0.00 - 0.06 10*3/mm3    nRBC 0.0 0.0 - 0.0 /100 WBC   Light Blue Top   Result Value Ref Range    Extra Tube hold for add-on    Lavender Top   Result Value Ref Range    Extra Tube hold for add-on    Gold Top - SST   Result Value Ref Range    Extra Tube Hold for add-ons.    Green Top (No Gel)   Result Value Ref Range    Extra Tube Hold for add-ons.      Imaging Results (last 72 hours)     Procedure Component Value Units Date/Time    XR Chest 1 View [[id number]] Collected:  02/09/17 1307     Updated:  02/09/17 1310    Narrative:       PORTABLE CHEST X-RAY     CLINICAL HISTORY: tachycardia     COMPARISON: February 25, 2016.     FINDINGS: Portable AP view of the chest was obtained with overlying  monitor leads in place. The lungs are well inflated. There are fibrotic  changes, much greater on the right side compared to the left. These  appear stable and chronic. There is no convincing evidence of active air  space disease process otherwise.     No edema or pleural fluid.  Normal heart size. There are vascular type  calculi.           Impression:          Stable appearance of the chest by portable radiography.  PA and lateral  views of the thorax are suggested for complete evaluation.           This report was finalized on 2/9/2017 1:08 PM by Aron Muñiz MD.              adenosine 18 mg Intravenous Once   aspirin 81 mg Oral Daily   atorvastatin 40 mg Oral Nightly   digoxin 500 mcg Intravenous Q6H   enoxaparin 100 mg Subcutaneous Q12H   losartan-hydrochlorothiazide 1 tablet Oral Q24H   melatonin 6 mg Oral Nightly   metoprolol succinate  mg Oral Q24H       amiodarone 0.5-1.5 mg/min Last Rate: 1.5 mg/min (02/09/17 1803)       Assessment/Plan   COPD: Currently he appears to be doing fairly well the does not appear to have any needs for nebulizer.  FOOT PAIN: He still complaining of foot pain go ahead and start Vicodin 5/325 one every 4 hours, if needed we'll increase the dose.  FOOT INFECTION: Patient did say that he's been taking some antibiotics not sure what it is we'll try to get more information from home health and see if he can make any sense out of for what was being done at home so it can be continued.  TACHYCARDIA / AFIB/AFLUTTER: Dr. Landa has seen and evaluated the patient is being treated by him.  CAD: Does not have any acute issues at this point I will continue to monitor.  HYPERTENSION: Blood pressure is slightly elevated to his home medications will be restarted and if needed may need to further adjust.  I will go ahead and give 1 dose of amlodipine 2.5 mg now beside his home medications.  I will leave it up to Dr. Landa to further adjust as needed.  DYSLIPIDEMIA: Treated  Hepatitis C: It has been noted in the chart that he is positive for hep C not sure if anything further that has been done.      Details discussed with the patient.    Rest as ordered    Benjamin Azul MD  02/09/17  7:04 PM

## 2017-02-10 NOTE — ANESTHESIA PREPROCEDURE EVALUATION
Anesthesia Evaluation     Patient summary reviewed and Nursing notes reviewed   NPO Status: > 8 hours   Airway   Mallampati: III  TM distance: <3 FB  Neck ROM: full  possible difficult intubation  Dental          Pulmonary - normal exam   (+) COPD,   Cardiovascular     ECG reviewed  Rhythm: regular  Rate: normal    (+) hypertension, CAD, dysrhythmias Atrial Flutter,       Neuro/Psych  (+) psychiatric history Anxiety,    GI/Hepatic/Renal/Endo    (+)  hepatitis, liver disease,     Musculoskeletal     Abdominal   (+) obese,    Substance History      OB/GYN          Other                                    Anesthesia Plan    ASA 3     MAC   total IV anesthesia  intravenous induction   Anesthetic plan and risks discussed with patient.    Plan discussed with attending.

## 2017-02-10 NOTE — PLAN OF CARE
Problem: Patient Care Overview (Adult)  Goal: Plan of Care Review  Outcome: Ongoing (interventions implemented as appropriate)    02/10/17 8415   Coping/Psychosocial Response Interventions   Plan Of Care Reviewed With patient   Patient Care Overview   Progress improving       Goal: Adult Individualization and Mutuality  Outcome: Ongoing (interventions implemented as appropriate)    Problem: Arrhythmia/Dysrhythmia (Symptomatic) (Adult)  Goal: Signs and Symptoms of Listed Potential Problems Will be Absent or Manageable (Arrhythmia/Dysrhythmia)  Outcome: Ongoing (interventions implemented as appropriate)    Problem: Pain, Chronic (Adult)  Goal: Identify Related Risk Factors and Signs and Symptoms  Outcome: Ongoing (interventions implemented as appropriate)  Goal: Acceptable Pain Control/Comfort Level  Outcome: Ongoing (interventions implemented as appropriate)

## 2017-02-11 LAB
CRP SERPL-MCNC: 3.7 MG/DL (ref 0–1)
DEPRECATED RDW RBC AUTO: 54.9 FL (ref 37–54)
EOSINOPHIL # BLD MANUAL: 0.24 10*3/MM3 (ref 0–0.7)
EOSINOPHIL NFR BLD MANUAL: 4 % (ref 0–7)
ERYTHROCYTE [DISTWIDTH] IN BLOOD BY AUTOMATED COUNT: 14.4 % (ref 11.5–14.5)
ERYTHROCYTE [SEDIMENTATION RATE] IN BLOOD: 50 MM/HR (ref 0–15)
FERRITIN SERPL-MCNC: 191 NG/ML (ref 17.9–464)
HCT VFR BLD AUTO: 36.4 % (ref 42–52)
HGB BLD-MCNC: 12.6 G/DL (ref 14–18)
INR PPP: 1.21 (ref 0.9–1.1)
LYMPHOCYTES # BLD MANUAL: 1.44 10*3/MM3 (ref 0.6–3.4)
LYMPHOCYTES NFR BLD MANUAL: 24 % (ref 10–50)
LYMPHOCYTES NFR BLD MANUAL: 6 % (ref 0–12)
MACROCYTES BLD QL SMEAR: NORMAL
MCH RBC QN AUTO: 36.2 PG (ref 27–31)
MCHC RBC AUTO-ENTMCNC: 34.6 G/DL (ref 30–37)
MCV RBC AUTO: 104.6 FL (ref 80–94)
MONOCYTES # BLD AUTO: 0.36 10*3/MM3 (ref 0–0.9)
NEUTROPHILS # BLD AUTO: 3.97 10*3/MM3 (ref 2–6.9)
NEUTROPHILS NFR BLD MANUAL: 62 % (ref 37–80)
NEUTS BAND NFR BLD MANUAL: 4 % (ref 0–6)
PLATELET # BLD AUTO: 111 10*3/MM3 (ref 130–400)
PMV BLD AUTO: 11.5 FL (ref 6–12)
PROTHROMBIN TIME: 13.3 SECONDS (ref 9.3–12.1)
RBC # BLD AUTO: 3.48 10*6/MM3 (ref 4.7–6.1)
RETICS #: 0.12 10*6/MM3 (ref 0.02–0.13)
RETICS/RBC NFR AUTO: 3.49 % (ref 0.5–1.5)
SMALL PLATELETS BLD QL SMEAR: NORMAL
WBC MORPH BLD: NORMAL
WBC NRBC COR # BLD: 6.01 10*3/MM3 (ref 4.8–10.8)

## 2017-02-11 PROCEDURE — 82728 ASSAY OF FERRITIN: CPT | Performed by: HOSPITALIST

## 2017-02-11 PROCEDURE — 25010000002 LORAZEPAM PER 2 MG: Performed by: INTERNAL MEDICINE

## 2017-02-11 PROCEDURE — 85045 AUTOMATED RETICULOCYTE COUNT: CPT | Performed by: HOSPITALIST

## 2017-02-11 PROCEDURE — 63710000001 PREDNISONE PER 5 MG: Performed by: INTERNAL MEDICINE

## 2017-02-11 PROCEDURE — 85007 BL SMEAR W/DIFF WBC COUNT: CPT | Performed by: HOSPITALIST

## 2017-02-11 PROCEDURE — 82607 VITAMIN B-12: CPT | Performed by: HOSPITALIST

## 2017-02-11 PROCEDURE — 99231 SBSQ HOSP IP/OBS SF/LOW 25: CPT | Performed by: HOSPITALIST

## 2017-02-11 PROCEDURE — 85651 RBC SED RATE NONAUTOMATED: CPT | Performed by: HOSPITALIST

## 2017-02-11 PROCEDURE — 25010000002 HYDROMORPHONE PER 4 MG: Performed by: HOSPITALIST

## 2017-02-11 PROCEDURE — 86140 C-REACTIVE PROTEIN: CPT | Performed by: HOSPITALIST

## 2017-02-11 PROCEDURE — 82746 ASSAY OF FOLIC ACID SERUM: CPT | Performed by: HOSPITALIST

## 2017-02-11 PROCEDURE — 25010000002 ENOXAPARIN PER 10 MG: Performed by: INTERNAL MEDICINE

## 2017-02-11 PROCEDURE — 85610 PROTHROMBIN TIME: CPT | Performed by: INTERNAL MEDICINE

## 2017-02-11 PROCEDURE — 96372 THER/PROPH/DIAG INJ SC/IM: CPT

## 2017-02-11 PROCEDURE — 96376 TX/PRO/DX INJ SAME DRUG ADON: CPT

## 2017-02-11 PROCEDURE — 99231 SBSQ HOSP IP/OBS SF/LOW 25: CPT | Performed by: INTERNAL MEDICINE

## 2017-02-11 PROCEDURE — 96375 TX/PRO/DX INJ NEW DRUG ADDON: CPT

## 2017-02-11 PROCEDURE — 85027 COMPLETE CBC AUTOMATED: CPT | Performed by: HOSPITALIST

## 2017-02-11 RX ORDER — WARFARIN SODIUM 5 MG/1
5 TABLET ORAL
Status: DISCONTINUED | OUTPATIENT
Start: 2017-02-11 | End: 2017-02-12 | Stop reason: HOSPADM

## 2017-02-11 RX ORDER — GABAPENTIN 400 MG/1
400 CAPSULE ORAL 3 TIMES DAILY
Status: DISCONTINUED | OUTPATIENT
Start: 2017-02-11 | End: 2017-02-12 | Stop reason: HOSPADM

## 2017-02-11 RX ORDER — LORAZEPAM 2 MG/ML
1 INJECTION INTRAMUSCULAR EVERY 4 HOURS PRN
Status: DISCONTINUED | OUTPATIENT
Start: 2017-02-11 | End: 2017-02-12 | Stop reason: HOSPADM

## 2017-02-11 RX ORDER — COLCHICINE 0.6 MG/1
0.6 TABLET ORAL DAILY
Status: DISCONTINUED | OUTPATIENT
Start: 2017-02-11 | End: 2017-02-12 | Stop reason: HOSPADM

## 2017-02-11 RX ORDER — PREDNISONE 20 MG/1
20 TABLET ORAL DAILY
Status: DISCONTINUED | OUTPATIENT
Start: 2017-02-11 | End: 2017-02-12 | Stop reason: HOSPADM

## 2017-02-11 RX ORDER — DULOXETIN HYDROCHLORIDE 30 MG/1
30 CAPSULE, DELAYED RELEASE ORAL NIGHTLY
Status: DISCONTINUED | OUTPATIENT
Start: 2017-02-11 | End: 2017-02-12 | Stop reason: HOSPADM

## 2017-02-11 RX ORDER — AMIODARONE HYDROCHLORIDE 200 MG/1
200 TABLET ORAL
Status: DISCONTINUED | OUTPATIENT
Start: 2017-02-11 | End: 2017-02-12 | Stop reason: HOSPADM

## 2017-02-11 RX ADMIN — HYDROCODONE BITARTRATE AND ACETAMINOPHEN 1 TABLET: 5; 325 TABLET ORAL at 09:04

## 2017-02-11 RX ADMIN — HYDROMORPHONE HYDROCHLORIDE 0.5 MG: 1 INJECTION, SOLUTION INTRAMUSCULAR; INTRAVENOUS; SUBCUTANEOUS at 18:53

## 2017-02-11 RX ADMIN — GABAPENTIN 400 MG: 400 CAPSULE ORAL at 16:42

## 2017-02-11 RX ADMIN — HYDROCODONE BITARTRATE AND ACETAMINOPHEN 1 TABLET: 5; 325 TABLET ORAL at 05:14

## 2017-02-11 RX ADMIN — HYDROMORPHONE HYDROCHLORIDE 0.5 MG: 1 INJECTION, SOLUTION INTRAMUSCULAR; INTRAVENOUS; SUBCUTANEOUS at 00:52

## 2017-02-11 RX ADMIN — HYDROMORPHONE HYDROCHLORIDE: 1 INJECTION, SOLUTION INTRAMUSCULAR; INTRAVENOUS; SUBCUTANEOUS at 13:33

## 2017-02-11 RX ADMIN — MELATONIN TAB 3 MG 6 MG: 3 TAB at 21:17

## 2017-02-11 RX ADMIN — LORAZEPAM 1 MG: 2 INJECTION INTRAMUSCULAR; INTRAVENOUS at 22:07

## 2017-02-11 RX ADMIN — HYDROCODONE BITARTRATE AND ACETAMINOPHEN 1 TABLET: 5; 325 TABLET ORAL at 17:50

## 2017-02-11 RX ADMIN — PREDNISONE 20 MG: 20 TABLET ORAL at 16:44

## 2017-02-11 RX ADMIN — HYDROCODONE BITARTRATE AND ACETAMINOPHEN 1 TABLET: 5; 325 TABLET ORAL at 00:52

## 2017-02-11 RX ADMIN — ENOXAPARIN SODIUM 100 MG: 100 INJECTION SUBCUTANEOUS at 05:14

## 2017-02-11 RX ADMIN — HYDROMORPHONE HYDROCHLORIDE: 1 INJECTION, SOLUTION INTRAMUSCULAR; INTRAVENOUS; SUBCUTANEOUS at 08:45

## 2017-02-11 RX ADMIN — ATORVASTATIN CALCIUM 40 MG: 40 TABLET, FILM COATED ORAL at 21:17

## 2017-02-11 RX ADMIN — LOSARTAN POTASSIUM AND HYDROCHLOROTHIAZIDE 1 TABLET: 50; 12.5 TABLET, FILM COATED ORAL at 16:42

## 2017-02-11 RX ADMIN — ENOXAPARIN SODIUM 100 MG: 100 INJECTION SUBCUTANEOUS at 16:42

## 2017-02-11 RX ADMIN — ASPIRIN 81 MG: 81 TABLET, COATED ORAL at 08:46

## 2017-02-11 RX ADMIN — GABAPENTIN 400 MG: 400 CAPSULE ORAL at 21:17

## 2017-02-11 RX ADMIN — AMIODARONE HYDROCHLORIDE 200 MG: 200 TABLET ORAL at 13:42

## 2017-02-11 RX ADMIN — WARFARIN SODIUM 5 MG: 5 TABLET ORAL at 17:42

## 2017-02-11 RX ADMIN — METOPROLOL SUCCINATE 100 MG: 100 TABLET, EXTENDED RELEASE ORAL at 16:43

## 2017-02-11 RX ADMIN — DULOXETINE HYDROCHLORIDE 30 MG: 30 CAPSULE, DELAYED RELEASE ORAL at 21:17

## 2017-02-11 RX ADMIN — COLCHICINE 0.6 MG: 0.6 TABLET, FILM COATED ORAL at 16:41

## 2017-02-11 NOTE — PROGRESS NOTES
"[facility]-Cardiology Progress note     LOS: 1 day   Patient Care Team:  SHADI Hi as PCP - General    Chief Complaint:  Foot pain    Subjective     Interval History:     Patient Complaints: none  Patient Denies:  Chest pain, shortness of breath, orthopnea, PND, palpitations, syncope  History taken from: patient    Review of Systems:   All systems were reviewed and negative       Objective     Vital Sign Min/Max for last 24 hours  Temp  Min: 97.8 °F (36.6 °C)  Max: 98.5 °F (36.9 °C)   BP  Min: 100/69  Max: 146/93   Pulse  Min: 57  Max: 82   Resp  Min: 16  Max: 20   SpO2  Min: 73 %  Max: 99 %   Flow (L/min)  Min: 2  Max: 2   Weight  Min: 226 lb 6.4 oz (103 kg)  Max: 227 lb 1.2 oz (103 kg)     Flowsheet Rows         First Filed Value    Admission Height  68\" (172.7 cm) Documented at 02/09/2017 1227    Admission Weight  230 lb (104 kg) Documented at 02/09/2017 1227          Physical Exam:     General Appearance:    Alert, cooperative, in no acute distress   Head:    Normocephalic, without obvious abnormality, atraumatic   Eyes:            Lids and lashes normal, conjunctivae and sclerae normal, no   icterus, no pallor, corneas clear, PERRLA   Ears:    Ears appear intact with no abnormalities noted   Throat:   No oral lesions, no thrush, oral mucosa moist   Neck:   No adenopathy, supple, trachea midline, no thyromegaly, no   carotid bruit, no JVD   Back:     No kyphosis present, no scoliosis present, no skin lesions,      erythema or scars, no tenderness to percussion or                   palpation,   range of motion normal   Lungs:     Clear to auscultation,respirations regular, even and                  unlabored    Heart:    Regular rhythm and normal rate, normal S1 and S2, no            murmur, no gallop, no rub, no click   Chest Wall:    No abnormalities observed   Abdomen:     Normal bowel sounds, no masses, no organomegaly, soft        non-tender, non-distended, no guarding, no rebound                " tenderness   Rectal:     Deferred   Extremities:   Moves all extremities well, no edema, no cyanosis, no             redness   Pulses:   Pulses palpable and equal bilaterally   Skin:   No bleeding, bruising or rash   Lymph nodes:   No palpable adenopathy   Neurologic:   Cranial nerves 2 - 12 grossly intact, sensation intact, DTR       present and equal bilaterally        Results Review:     I reviewed the patient's new clinical results.        Results from last 7 days  Lab Units 02/10/17  0436   SODIUM mmol/L 138   POTASSIUM mmol/L 3.5   CHLORIDE mmol/L 105   TOTAL CO2 mmol/L 29.0   BUN mg/dL 13   CREATININE mg/dL 0.80   GLUCOSE mg/dL 168*   CALCIUM mg/dL 8.6       Results from last 7 days  Lab Units 02/10/17  0436 02/09/17  1742 02/09/17  1237   WBC 10*3/mm3 7.90 5.36 7.19   HEMOGLOBIN g/dL 12.8* 12.2* 13.3*   HEMATOCRIT % 37.5* 36.1* 38.7*   PLATELETS 10*3/mm3 109* 84* 99*           Echo EF Estimated  Lab Results   Component Value Date    ECHOEFEST 50 02/10/2017       Physical Exam    Medication Review: yes    Assessment/Plan     Principal Problem:    Paroxysmal atrial fibrillation  Active Problems:    Tachycardia    Coronary artery disease involving native coronary artery of native heart without angina pectoris    Essential hypertension   has been no recurrence of his atrial flutter since cardioversion.  We will initiate oral amiodarone therapy today.  He transferred to the telemetry unit.  We will restart his Coumadin therapy today.  We will overlap Coumadin with his low molecular weight heparin for 48 hours.  I anticipate discharge to home Monday.        Kevin Landa MD  02/11/17  1:19 PM

## 2017-02-11 NOTE — PLAN OF CARE
Problem: Patient Care Overview (Adult)  Goal: Plan of Care Review  Outcome: Ongoing (interventions implemented as appropriate)    02/11/17 0256   Coping/Psychosocial Response Interventions   Plan Of Care Reviewed With patient   Patient Care Overview   Progress improving       Goal: Adult Individualization and Mutuality  Outcome: Ongoing (interventions implemented as appropriate)    Problem: Arrhythmia/Dysrhythmia (Symptomatic) (Adult)  Goal: Signs and Symptoms of Listed Potential Problems Will be Absent or Manageable (Arrhythmia/Dysrhythmia)  Outcome: Outcome(s) achieved Date Met:  02/11/17 02/11/17 0256   Arrhythmia/Dysrhythmia (Symptomatic)   Problems Assessed (Arrhythmia/Dysrhythmia) all   Problems Present (Arrhythmia/Dysrhythmia) none         Problem: Pain, Chronic (Adult)  Goal: Identify Related Risk Factors and Signs and Symptoms  Outcome: Ongoing (interventions implemented as appropriate)    02/11/17 0256   Pain, Chronic   Related Risk Factors (Chronic Pain) traumatic injury   Signs and Symptoms (Chronic Pain) pacing/restlessness;verbalization of pain/discomfort for a prolonged time period       Goal: Acceptable Pain Control/Comfort Level  Outcome: Ongoing (interventions implemented as appropriate)    02/11/17 0256   Pain, Chronic (Adult)   Acceptable Pain Control/Comfort Level making progress toward outcome

## 2017-02-11 NOTE — PLAN OF CARE
Problem: Patient Care Overview (Adult)  Goal: Plan of Care Review  Outcome: Ongoing (interventions implemented as appropriate)    02/11/17 1529   Coping/Psychosocial Response Interventions   Plan Of Care Reviewed With patient   Patient Care Overview   Progress improving       Goal: Adult Individualization and Mutuality  Outcome: Ongoing (interventions implemented as appropriate)    Problem: Pain, Chronic (Adult)  Goal: Identify Related Risk Factors and Signs and Symptoms  Outcome: Ongoing (interventions implemented as appropriate)  Goal: Acceptable Pain Control/Comfort Level  Outcome: Ongoing (interventions implemented as appropriate)

## 2017-02-11 NOTE — PLAN OF CARE
Problem: Patient Care Overview (Adult)  Goal: Plan of Care Review    02/11/17 1554   Coping/Psychosocial Response Interventions   Plan Of Care Reviewed With patient   Patient Care Overview   Progress improving   Outcome Evaluation   Outcome Summary/Follow up Plan pt transfer from ICU to tele this afternoon

## 2017-02-12 VITALS
SYSTOLIC BLOOD PRESSURE: 128 MMHG | OXYGEN SATURATION: 96 % | HEIGHT: 68 IN | HEART RATE: 69 BPM | DIASTOLIC BLOOD PRESSURE: 73 MMHG | WEIGHT: 226.2 LBS | TEMPERATURE: 98.4 F | RESPIRATION RATE: 16 BRPM | BODY MASS INDEX: 34.28 KG/M2

## 2017-02-12 PROBLEM — D69.6 THROMBOCYTOPENIA (HCC): Status: ACTIVE | Noted: 2017-02-12

## 2017-02-12 PROBLEM — D64.9 ANEMIA: Status: ACTIVE | Noted: 2017-02-12

## 2017-02-12 LAB
FOLATE SERPL-MCNC: 12.4 NG/ML
INR PPP: 1.18 (ref 0.9–1.1)
MRSA SPEC QL CULT: NORMAL
PROTHROMBIN TIME: 12.9 SECONDS (ref 9.3–12.1)
VIT B12 BLD-MCNC: 677 PG/ML (ref 239–931)

## 2017-02-12 PROCEDURE — 94799 UNLISTED PULMONARY SVC/PX: CPT

## 2017-02-12 PROCEDURE — 63710000001 PREDNISONE PER 5 MG: Performed by: INTERNAL MEDICINE

## 2017-02-12 PROCEDURE — 25010000002 ENOXAPARIN PER 10 MG: Performed by: INTERNAL MEDICINE

## 2017-02-12 PROCEDURE — 96372 THER/PROPH/DIAG INJ SC/IM: CPT

## 2017-02-12 PROCEDURE — 99238 HOSP IP/OBS DSCHRG MGMT 30/<: CPT | Performed by: INTERNAL MEDICINE

## 2017-02-12 PROCEDURE — 85610 PROTHROMBIN TIME: CPT | Performed by: INTERNAL MEDICINE

## 2017-02-12 RX ORDER — AMIODARONE HYDROCHLORIDE 200 MG/1
200 TABLET ORAL
Qty: 30 TABLET | Refills: 5 | Status: ON HOLD | OUTPATIENT
Start: 2017-02-12 | End: 2017-04-11

## 2017-02-12 RX ORDER — METOPROLOL SUCCINATE 100 MG/1
100 TABLET, EXTENDED RELEASE ORAL EVERY 24 HOURS
Qty: 30 TABLET | Refills: 6 | Status: SHIPPED | OUTPATIENT
Start: 2017-02-12 | End: 2017-04-11 | Stop reason: HOSPADM

## 2017-02-12 RX ORDER — ASPIRIN 81 MG/1
81 TABLET ORAL DAILY
Qty: 30 TABLET | Refills: 6 | Status: ON HOLD | OUTPATIENT
Start: 2017-02-12 | End: 2017-04-11

## 2017-02-12 RX ORDER — LOSARTAN POTASSIUM AND HYDROCHLOROTHIAZIDE 12.5; 5 MG/1; MG/1
1 TABLET ORAL EVERY 24 HOURS
Qty: 30 TABLET | Refills: 6 | Status: ON HOLD | OUTPATIENT
Start: 2017-02-12 | End: 2017-04-11

## 2017-02-12 RX ORDER — WARFARIN SODIUM 5 MG/1
TABLET ORAL
Qty: 30 TABLET | Refills: 5 | Status: SHIPPED | OUTPATIENT
Start: 2017-02-12 | End: 2017-04-11 | Stop reason: HOSPADM

## 2017-02-12 RX ORDER — HYDROCODONE BITARTRATE AND ACETAMINOPHEN 5; 325 MG/1; MG/1
1 TABLET ORAL EVERY 4 HOURS PRN
Qty: 30 TABLET | Refills: 0 | Status: SHIPPED | OUTPATIENT
Start: 2017-02-12 | End: 2017-02-19

## 2017-02-12 RX ORDER — LANOLIN ALCOHOL/MO/W.PET/CERES
6 CREAM (GRAM) TOPICAL NIGHTLY
Qty: 30 TABLET | Refills: 6 | Status: ON HOLD | OUTPATIENT
Start: 2017-02-12 | End: 2017-04-11

## 2017-02-12 RX ORDER — CEFUROXIME AXETIL 250 MG/1
250 TABLET ORAL 2 TIMES DAILY
Qty: 12 TABLET | Refills: 0 | Status: ON HOLD | OUTPATIENT
Start: 2017-02-12 | End: 2017-04-11

## 2017-02-12 RX ORDER — ATORVASTATIN CALCIUM 40 MG/1
40 TABLET, FILM COATED ORAL NIGHTLY
Qty: 30 TABLET | Refills: 6 | Status: ON HOLD | OUTPATIENT
Start: 2017-02-12 | End: 2017-04-11

## 2017-02-12 RX ADMIN — GABAPENTIN 400 MG: 400 CAPSULE ORAL at 08:36

## 2017-02-12 RX ADMIN — AMIODARONE HYDROCHLORIDE 200 MG: 200 TABLET ORAL at 08:36

## 2017-02-12 RX ADMIN — PREDNISONE 20 MG: 20 TABLET ORAL at 08:36

## 2017-02-12 RX ADMIN — COLCHICINE 0.6 MG: 0.6 TABLET, FILM COATED ORAL at 08:36

## 2017-02-12 RX ADMIN — ASPIRIN 81 MG: 81 TABLET, COATED ORAL at 08:36

## 2017-02-12 RX ADMIN — ENOXAPARIN SODIUM 100 MG: 100 INJECTION SUBCUTANEOUS at 06:23

## 2017-02-12 NOTE — DISCHARGE INSTR - LAB
Call for appointment Monday morning for a follow up appointment in one  Week with Dr. Miranda Casanova

## 2017-02-12 NOTE — PLAN OF CARE
Problem: Patient Care Overview (Adult)  Goal: Plan of Care Review  Outcome: Ongoing (interventions implemented as appropriate)    02/12/17 0513   Coping/Psychosocial Response Interventions   Plan Of Care Reviewed With patient   Patient Care Overview   Progress improving   Outcome Evaluation   Outcome Summary/Follow up Plan TELE SINUS RHYTHM       Goal: Discharge Needs Assessment  Outcome: Ongoing (interventions implemented as appropriate)    Problem: Pain, Chronic (Adult)  Goal: Identify Related Risk Factors and Signs and Symptoms  Outcome: Ongoing (interventions implemented as appropriate)  Goal: Acceptable Pain Control/Comfort Level  Outcome: Ongoing (interventions implemented as appropriate)    02/12/17 0513   Pain, Chronic (Adult)   Acceptable Pain Control/Comfort Level other (see comments)         02/12/17 0513   Pain, Chronic (Adult)   Acceptable Pain Control/Comfort Level other (s       02/12/17 0513   Pain, Chronic (Adult)   Acceptable Pain Control/Comfort Level other (see comments)  (CONTINUES TO NEED PAIN MEDICATION)   ee comments)

## 2017-02-12 NOTE — PROGRESS NOTES
INPATIENT PROGRESS NOTE    Date of Admission: 2/9/2017  Length of Stay: 2  Primary Care Physician: SHADI Galvan    Subjective   Chief Complaint: Tachycardia, medical management  HPI: Patient is 56-year-old white male with multiple medical problems as listed below in the past medical history.  Patient was sent to the emergency room by the home health nurse when they noticed that he had  tachycardia of 150-160.  In the emergency room Dr. Landa was consulted who admitted the patient after appropriate treatment for initially what appears to be supraventricular arrhythmia and later on the it appears that he may have A. Fib/flutter  also underlying it.  We were consulted to manage noncardiac issues including COPD history of stroke as well as pain of both lower extremities left more than the right.   Medical History         Interval History:  02/12/17 Patient seen and examined.  Chart reviewed.  No new complaints.  Eating okay, pain controlled.  No n/v/d.    Review Of Systems:   Review of Systems  Otherwise complete ROS is negative except as mentioned above    Objective      Temp:  [98.3 °F (36.8 °C)-100 °F (37.8 °C)] 98.4 °F (36.9 °C)  Heart Rate:  [69-82] 69  Resp:  [16-19] 16  BP: (127-130)/(72-80) 128/73  Gen: Alert, appropriate, pleasant and interactive  HEENT: EOMI, ATNC, MMM  Neck: Supple  Heart: S1S2, RRR  Lungs: CTA bilaterally, no wheezes, rales, or rhonchi  Abdomen: Soft, NTND, BS+  Extremities: Warm, well-perfused, + pulses  Skin: P/W/D  Neuro: A/O x3, speech clear        Results Review:    I have reviewed the labs, radiology results and diagnostic studies.      Results from last 7 days  Lab Units 02/11/17  1320   WBC 10*3/mm3 6.01   HEMOGLOBIN g/dL 12.6*   PLATELETS 10*3/mm3 111*       Results from last 7 days  Lab Units 02/10/17  0436   SODIUM mmol/L 138   POTASSIUM mmol/L 3.5   TOTAL CO2 mmol/L 29.0   CREATININE mg/dL 0.80   GLUCOSE mg/dL 168*       Culture Data:               MRSA SCREEN  CX   Date Value Ref Range Status   02/09/2017   Final    No Methicillin Resistant Staphylococcus aureus isolated                    I have reviewed the medications.      Assessment/Plan     Assessment/Problem List  Principal Problem:    Paroxysmal atrial fibrillation  Active Problems:    Tachycardia    Coronary artery disease involving native coronary artery of native heart without angina pectoris    Essential hypertension    Anemia    Thrombocytopenia    Plan      COPD: Currently he appears to be doing fairly well the does not appear to have any needs for nebulizer.  FOOT PAIN: He still complaining of foot pain go ahead and start Vicodin 5/325 one every 4 hours, if needed we'll increase the dose.  FOOT INFECTION: Patient did say that he's been taking some antibiotics not sure what it is we'll try to get more information from home health and see if he can make any sense out of for what was being done at home so it can be continued.  TACHYCARDIA / AFIB/AFLUTTER: Dr. Landa has seen and evaluated the patient is being treated by him.  CAD: Does not have any acute issues at this point I will continue to monitor.  HYPERTENSION: Blood pressure is slightly elevated to his home medications will be restarted and if needed may need to further adjust.  I will go ahead and give 1 dose of amlodipine 2.5 mg now beside his home medications.  I will leave it up to Dr. Landa to further adjust as needed.  DYSLIPIDEMIA: Treated  Hepatitis C: It has been noted in the chart that he is positive for hep C not sure if anything further that has been done.       AM labs  Antibiotics  Supportive Care         Artie Tesfaye MD 02/10/17 4:55 PM

## 2017-02-12 NOTE — PROGRESS NOTES
[facility]-Cardiology Discharge Summary    Date of Discharge:  2/12/2017    Discharge Diagnosis:   Atrial flutter with rapid ventricular response  Coronary artery disease-native vessels without angina pectoris  Chronic left ventricular systolic heart failure  Ischemic cardiomyopathy  Essential hypertension  Hypercholesterolemia  Gout  Presenting Problem/History of Present Illness  Tachycardia [R00.0]  Tachycardia [R00.0]        Hospital Course  Patient is a 56 y.o. male presented with rapid heart rate.  The patient was initially being evaluated by home healthcare providing wound care for severe gout.  When his vital signs were taken and it was realized that his heart rate was over 120 bpm.  The patient was unaware that his heart was out of rhythm experiencing no palpitations sense of tachycardia, chest discomfort shortness of breath or dizziness.  [facility] where he was found to be in atrial flutter with a rapid ventricular response.  Initial attempts at rate control with IV Cardizem was unsuccessful.  The patient was started on IV amiodarone in addition to IV Cardizem without improvement.  He was given IV adenosine demonstrating classic flutter waves.  The patient underwent successful synchronized external cardioversion the following morning after failure to rate control with a combination of IV amiodarone and IV diltiazem.  He continued to be loaded on amiodarone both orally and intravenously.  He had no recurrence of atrial arrhythmia.  The patient has a history of previous myocardial infarction.  An echocardiogram was performed which showed an ejection fraction of 50%.  His basal inferior wall was akinetic.  There were no valvular abnormalities.    Procedures Performed    02/10 0805 Note By: Kevin Landa MD    Consults:   Consults     Date and Time Order Name Status Description    2/9/2017 1635 Inpatient Consult to Hospitalist Completed           Pertinent Test  Results: cardiac graphics: Echocardiogram: LVEF 50%      Echo EF Estimated  Lab Results   Component Value Date    ECHOEFEST 50 02/10/2017         Condition on Discharge:  Stable and improved      Vital Signs  Temp:  [98.3 °F (36.8 °C)-100 °F (37.8 °C)] 98.4 °F (36.9 °C)  Heart Rate:  [69-82] 69  Resp:  [16-19] 16  BP: (127-130)/(72-80) 128/73    Physical Exam:     General Appearance:    Alert, cooperative, in no acute distress   Head:    Normocephalic, without obvious abnormality, atraumatic   Eyes:            Lids and lashes normal, conjunctivae and sclerae normal, no   icterus, no pallor, corneas clear, PERRLA   Ears:    Ears appear intact with no abnormalities noted   Throat:   No oral lesions, no thrush, oral mucosa moist   Neck:   No adenopathy, supple, trachea midline, no thyromegaly, no     carotid bruit, no JVD   Back:     No kyphosis present, no scoliosis present, no skin lesions,       erythema or scars, no tenderness to percussion or                   palpation,   range of motion normal   Lungs:     Clear to auscultation,respirations regular, even and                   unlabored    Heart:    Regular rhythm and normal rate, normal S1 and S2, no            murmur, no gallop, no rub, no click   Breast Exam:    Deferred   Abdomen:     Normal bowel sounds, no masses, no organomegaly, soft        non-tender, non-distended, no guarding, no rebound                 tenderness   Genitalia:    Deferred   Extremities:   Moves all extremities well, no edema, no cyanosis, no              redness   Pulses:   Pulses palpable and equal bilaterally   Skin:   No bleeding, bruising or rash   Lymph nodes:   No palpable adenopathy   Neurologic:   Cranial nerves 2 - 12 grossly intact, sensation intact, DTR        present and equal bilaterally       Discharge Disposition  Home or Self Care    Discharge Medications   Vasquez Marie   [place] Medication Instructions MARYURI:[id number]    Printed on:02/12/17 1047   Medication  Information                      amiodarone (PACERONE) 200 MG tablet  Take 1 tablet by mouth Daily.             aspirin 81 MG EC tablet  Take 1 tablet by mouth Daily.             atorvastatin (LIPITOR) 40 MG tablet  Take 1 tablet by mouth Every Night.             cefuroxime (CEFTIN) 250 MG tablet  Take 1 tablet by mouth 2 (Two) Times a Day.             colchicine 0.6 MG tablet  Take 0.6 mg by mouth Daily.             DULoxetine (CYMBALTA) 30 MG capsule  Take 30 mg by mouth Every Night.             gabapentin (NEURONTIN) 400 MG capsule  Take 400 mg by mouth 3 (Three) Times a Day.             HYDROcodone-acetaminophen (NORCO) 5-325 MG per tablet  Take 1 tablet by mouth Every 4 (Four) Hours As Needed for moderate pain (4-6) for up to 7 days.             losartan-hydrochlorothiazide (HYZAAR) 50-12.5 MG per tablet  Take 1 tablet by mouth Daily.             melatonin 3 MG tablet  Take 2 tablets by mouth Every Night.             metoprolol succinate XL (TOPROL-XL) 100 MG 24 hr tablet  Take 1 tablet by mouth Daily.             predniSONE (DELTASONE) 20 MG tablet  Take 20 mg by mouth Daily.             warfarin (COUMADIN) 5 MG tablet  5 mg orally once per day after 5 pm. PT/INR to be checked by home health service on Wednesday and results to my office in [facility].                 Discharge Diet:  cardiac    Activity at Discharge:  as tolerated    Follow-up Appointments  The patient will follow-up in my outpatient cardiology clinic on Friday February 18.  He is to call our office tomorrow morning and arrange for this follow-up appointment.    Test Results Pending at Discharge  The patient will have a PT/INR drawn on Wednesday, February 15 by the home health nursing service that is providing his wound care.  The results will be sent to our office for Coumadin dose adjustment.     Kevin Landa MD  02/12/17  10:47 AM    Time: Discharge 25 min

## 2017-02-12 NOTE — PROGRESS NOTES
INPATIENT PROGRESS NOTE    Date of Admission: 2/9/2017  Length of Stay: 2  Primary Care Physician: Miranda Casanova, SHADI    Subjective   Chief Complaint:   HPI:    Interval History:  02/11/17 - Patient seen and examined, chart reviewed.  He is doing well with no new complaints.  Denies chest pain, fever, soa, n/v/d.    Review Of Systems:   Review of Systems  Otherwise complete ROS is negative except as mentioned above    Objective      Temp:  [98.3 °F (36.8 °C)-100 °F (37.8 °C)] 98.3 °F (36.8 °C)  Heart Rate:  [71-82] 81  Resp:  [16-20] 18  BP: (109-130)/(53-85) 127/80  Gen: Alert, appropriate, pleasant and interactive  HEENT: perr, ATNC, MMM  Neck: Supple  Heart: S1S2, irreg irreg   Lungs: CTA bilaterally, no wheezes, rales, or rhonchi  Abdomen: Soft, NTND, BS+  Extremities: Warm, well-perfused, + pulses  Skin: P/W/D  Neuro: A/O x3, speech clear        Results Review:    I have reviewed the labs, radiology results and diagnostic studies.      Results from last 7 days  Lab Units 02/11/17  1320   WBC 10*3/mm3 6.01   HEMOGLOBIN g/dL 12.6*   PLATELETS 10*3/mm3 111*       Results from last 7 days  Lab Units 02/10/17  0436   SODIUM mmol/L 138   POTASSIUM mmol/L 3.5   TOTAL CO2 mmol/L 29.0   CREATININE mg/dL 0.80   GLUCOSE mg/dL 168*       Culture Data:         I have reviewed the medications.    Scheduled Meds:  amiodarone 200 mg Oral Q24H   aspirin 81 mg Oral Daily   atorvastatin 40 mg Oral Nightly   colchicine 0.6 mg Oral Daily   DULoxetine 30 mg Oral Nightly   enoxaparin 100 mg Subcutaneous Q12H   gabapentin 400 mg Oral TID   losartan-hydrochlorothiazide 1 tablet Oral Q24H   melatonin 6 mg Oral Nightly   metoprolol succinate  mg Oral Q24H   predniSONE 20 mg Oral Daily   warfarin 5 mg Oral Daily     Continuous Infusions:   PRN Meds:.HYDROcodone-acetaminophen  •  HYDROmorphone  •  LORazepam  •  sodium chloride    Assessment/Plan     Assessment/Problem List  Principal Problem:    Paroxysmal atrial  fibrillation  Active Problems:    Tachycardia    Coronary artery disease involving native coronary artery of native heart without angina pectoris    Essential hypertension    Plan  Pt converted and had a few PVCs on tele but no other issues. Will continue current management with amiodarone.    Patient is noted to have a macrocytic anemia and thrombocytopenia.  Work-up has been initiated.    Artie Tesfaye MD 02/11/17 11:49 AM

## 2017-02-12 NOTE — NURSING NOTE
2130 PT ANXIOUS,REFUSING TO TAKE MEDICATION,DESPITE TWO RNS REASSURING PT.  DR SILVERIO NOTIFIED AND TO ROOM TO SEE PT.ORDER NOTED FOR ATIVAN IV  2239 PT MUCH MORE RELAXED,TOOK PO MEDICATIONS.  WILL CONTINUE TO ASSESS.

## 2017-02-15 PROBLEM — M06.9 RHEUMATOID ARTHRITIS (HCC): Status: ACTIVE | Noted: 2017-02-15

## 2017-02-15 PROBLEM — G25.81 RLS (RESTLESS LEGS SYNDROME): Status: ACTIVE | Noted: 2017-02-15

## 2017-02-15 PROBLEM — J45.909 ASTHMA: Status: ACTIVE | Noted: 2017-02-15

## 2017-02-15 PROBLEM — M10.9 GOUT: Status: ACTIVE | Noted: 2017-02-15

## 2017-02-15 PROBLEM — I50.9 CONGESTIVE HEART DISEASE (HCC): Status: ACTIVE | Noted: 2017-02-15

## 2017-02-15 PROBLEM — J44.9 COPD (CHRONIC OBSTRUCTIVE PULMONARY DISEASE) (HCC): Status: ACTIVE | Noted: 2017-02-15

## 2017-02-15 PROBLEM — Z72.0 TOBACCO ABUSE: Status: ACTIVE | Noted: 2017-02-15

## 2017-02-15 PROBLEM — F41.8 DEPRESSION WITH ANXIETY: Status: ACTIVE | Noted: 2017-02-15

## 2017-02-15 PROBLEM — E78.5 HYPERLIPIDEMIA: Status: ACTIVE | Noted: 2017-02-15

## 2017-02-15 PROBLEM — K21.9 ESOPHAGEAL REFLUX: Status: ACTIVE | Noted: 2017-02-15

## 2017-02-16 ENCOUNTER — LAB REQUISITION (OUTPATIENT)
Dept: LAB | Facility: HOSPITAL | Age: 57
End: 2017-02-16

## 2017-02-16 DIAGNOSIS — Z86.73 PERSONAL HISTORY OF TRANSIENT ISCHEMIC ATTACK (TIA), AND CEREBRAL INFARCTION WITHOUT RESIDUAL DEFICITS: ICD-10-CM

## 2017-02-16 LAB
INR PPP: 2.38 (ref 0.9–1.1)
PROTHROMBIN TIME: 26.1 SECONDS (ref 9.3–12.1)

## 2017-02-16 PROCEDURE — 85610 PROTHROMBIN TIME: CPT | Performed by: INTERNAL MEDICINE

## 2017-02-17 ENCOUNTER — TELEPHONE (OUTPATIENT)
Dept: CARDIOLOGY | Facility: CLINIC | Age: 57
End: 2017-02-17

## 2017-02-17 NOTE — TELEPHONE ENCOUNTER
[facility] comes to the patients home to see them. They are his PCP. They will monitor the INR if this is ok with you and make the appropriate changes.

## 2017-02-23 ENCOUNTER — LAB REQUISITION (OUTPATIENT)
Dept: LAB | Facility: HOSPITAL | Age: 57
End: 2017-02-23

## 2017-02-23 DIAGNOSIS — Z86.73 PERSONAL HISTORY OF TRANSIENT ISCHEMIC ATTACK (TIA), AND CEREBRAL INFARCTION WITHOUT RESIDUAL DEFICITS: ICD-10-CM

## 2017-02-23 LAB
INR PPP: 6.55 (ref 0.9–1.1)
PROTHROMBIN TIME: 71.8 SECONDS (ref 9.3–12.1)

## 2017-02-23 PROCEDURE — 85610 PROTHROMBIN TIME: CPT | Performed by: INTERNAL MEDICINE

## 2017-03-24 ENCOUNTER — LAB REQUISITION (OUTPATIENT)
Dept: LAB | Facility: HOSPITAL | Age: 57
End: 2017-03-24

## 2017-03-24 DIAGNOSIS — N39.0 URINARY TRACT INFECTION: ICD-10-CM

## 2017-03-24 LAB
BILIRUB UR QL STRIP: NEGATIVE
CLARITY UR: CLEAR
COLOR UR: YELLOW
GLUCOSE UR STRIP-MCNC: ABNORMAL MG/DL
HGB UR QL STRIP.AUTO: NEGATIVE
KETONES UR QL STRIP: NEGATIVE
LEUKOCYTE ESTERASE UR QL STRIP.AUTO: NEGATIVE
NITRITE UR QL STRIP: NEGATIVE
PH UR STRIP.AUTO: 5.5 [PH] (ref 5–8)
PROT UR QL STRIP: NEGATIVE
SP GR UR STRIP: 1.02 (ref 1–1.03)
UROBILINOGEN UR QL STRIP: ABNORMAL

## 2017-03-24 PROCEDURE — 81003 URINALYSIS AUTO W/O SCOPE: CPT | Performed by: INTERNAL MEDICINE

## 2017-04-07 ENCOUNTER — APPOINTMENT (OUTPATIENT)
Dept: GENERAL RADIOLOGY | Facility: HOSPITAL | Age: 57
End: 2017-04-07

## 2017-04-07 ENCOUNTER — HOSPITAL ENCOUNTER (EMERGENCY)
Facility: HOSPITAL | Age: 57
Discharge: ANOTHER HOSPITAL | End: 2017-04-07
Attending: EMERGENCY MEDICINE | Admitting: EMERGENCY MEDICINE

## 2017-04-07 ENCOUNTER — HOSPITAL ENCOUNTER (INPATIENT)
Facility: HOSPITAL | Age: 57
LOS: 4 days | Discharge: HOME HEALTH | End: 2017-04-11
Attending: INTERNAL MEDICINE | Admitting: INTERNAL MEDICINE

## 2017-04-07 VITALS
BODY MASS INDEX: 36.37 KG/M2 | SYSTOLIC BLOOD PRESSURE: 167 MMHG | TEMPERATURE: 98.8 F | RESPIRATION RATE: 20 BRPM | WEIGHT: 240 LBS | OXYGEN SATURATION: 97 % | HEART RATE: 151 BPM | HEIGHT: 68 IN | DIASTOLIC BLOOD PRESSURE: 129 MMHG

## 2017-04-07 DIAGNOSIS — Z74.09 IMPAIRED FUNCTIONAL MOBILITY, BALANCE, GAIT, AND ENDURANCE: Primary | ICD-10-CM

## 2017-04-07 DIAGNOSIS — I10 POORLY-CONTROLLED HYPERTENSION: ICD-10-CM

## 2017-04-07 DIAGNOSIS — I48.92 ATRIAL FLUTTER WITH RAPID VENTRICULAR RESPONSE (HCC): Primary | ICD-10-CM

## 2017-04-07 PROBLEM — I48.3 TYPICAL ATRIAL FLUTTER (HCC): Status: ACTIVE | Noted: 2017-04-07

## 2017-04-07 LAB
ALBUMIN SERPL-MCNC: 4.3 G/DL (ref 3.5–5)
ALBUMIN/GLOB SERPL: 1 G/DL (ref 1–2)
ALP SERPL-CCNC: 120 U/L (ref 38–126)
ALT SERPL W P-5'-P-CCNC: 78 U/L (ref 13–69)
ANION GAP SERPL CALCULATED.3IONS-SCNC: 15.9 MMOL/L
AST SERPL-CCNC: 77 U/L (ref 15–46)
BASOPHILS # BLD AUTO: 0.03 10*3/MM3 (ref 0–0.2)
BASOPHILS NFR BLD AUTO: 0.4 % (ref 0–2.5)
BILIRUB SERPL-MCNC: 1.3 MG/DL (ref 0.2–1.3)
BUN BLD-MCNC: 9 MG/DL (ref 7–20)
BUN/CREAT SERPL: 10 (ref 6.3–21.9)
CALCIUM SPEC-SCNC: 9.5 MG/DL (ref 8.4–10.2)
CHLORIDE SERPL-SCNC: 104 MMOL/L (ref 98–107)
CK MB SERPL-CCNC: 3.4 NG/ML (ref 0.2–2.4)
CK MB SERPL-RTO: 2.6 % (ref 0–2)
CK SERPL-CCNC: 129 U/L (ref 30–170)
CO2 SERPL-SCNC: 27 MMOL/L (ref 26–30)
CREAT BLD-MCNC: 0.9 MG/DL (ref 0.6–1.3)
DEPRECATED RDW RBC AUTO: 57.3 FL (ref 37–54)
EOSINOPHIL # BLD AUTO: 0.36 10*3/MM3 (ref 0–0.7)
EOSINOPHIL NFR BLD AUTO: 4.8 % (ref 0–7)
ERYTHROCYTE [DISTWIDTH] IN BLOOD BY AUTOMATED COUNT: 14.7 % (ref 11.5–14.5)
GFR SERPL CREATININE-BSD FRML MDRD: 87 ML/MIN/1.73
GLOBULIN UR ELPH-MCNC: 4.5 GM/DL
GLUCOSE BLD-MCNC: 151 MG/DL (ref 74–98)
HCT VFR BLD AUTO: 38.4 % (ref 42–52)
HGB BLD-MCNC: 13.1 G/DL (ref 14–18)
HOLD SPECIMEN: NORMAL
HOLD SPECIMEN: NORMAL
IMM GRANULOCYTES # BLD: 0.05 10*3/MM3 (ref 0–0.06)
IMM GRANULOCYTES NFR BLD: 0.7 % (ref 0–0.6)
INR PPP: 1.19 (ref 0.9–1.1)
LYMPHOCYTES # BLD AUTO: 1.43 10*3/MM3 (ref 0.6–3.4)
LYMPHOCYTES NFR BLD AUTO: 19.1 % (ref 10–50)
MAGNESIUM SERPL-MCNC: 1.4 MG/DL (ref 1.6–2.3)
MCH RBC QN AUTO: 35.7 PG (ref 27–31)
MCHC RBC AUTO-ENTMCNC: 34.1 G/DL (ref 30–37)
MCV RBC AUTO: 104.6 FL (ref 80–94)
MONOCYTES # BLD AUTO: 0.49 10*3/MM3 (ref 0–0.9)
MONOCYTES NFR BLD AUTO: 6.6 % (ref 0–12)
NEUTROPHILS # BLD AUTO: 5.11 10*3/MM3 (ref 2–6.9)
NEUTROPHILS NFR BLD AUTO: 68.4 % (ref 37–80)
NRBC BLD MANUAL-RTO: 0 /100 WBC (ref 0–0)
PLATELET # BLD AUTO: 106 10*3/MM3 (ref 130–400)
PMV BLD AUTO: 11 FL (ref 6–12)
POTASSIUM BLD-SCNC: 3.9 MMOL/L (ref 3.5–5.1)
PROT SERPL-MCNC: 8.8 G/DL (ref 6.3–8.2)
PROTHROMBIN TIME: 13 SECONDS (ref 9.3–12.1)
RBC # BLD AUTO: 3.67 10*6/MM3 (ref 4.7–6.1)
SODIUM BLD-SCNC: 143 MMOL/L (ref 137–145)
TROPONIN I SERPL-MCNC: 0.02 NG/ML (ref 0–0.03)
TSH SERPL DL<=0.05 MIU/L-ACNC: 2.25 MIU/ML (ref 0.47–4.68)
WBC NRBC COR # BLD: 7.47 10*3/MM3 (ref 4.8–10.8)
WHOLE BLOOD HOLD SPECIMEN: NORMAL
WHOLE BLOOD HOLD SPECIMEN: NORMAL

## 2017-04-07 PROCEDURE — 99285 EMERGENCY DEPT VISIT HI MDM: CPT

## 2017-04-07 PROCEDURE — 71010 HC CHEST PA OR AP: CPT

## 2017-04-07 PROCEDURE — 82550 ASSAY OF CK (CPK): CPT | Performed by: EMERGENCY MEDICINE

## 2017-04-07 PROCEDURE — 96375 TX/PRO/DX INJ NEW DRUG ADDON: CPT

## 2017-04-07 PROCEDURE — 83735 ASSAY OF MAGNESIUM: CPT | Performed by: EMERGENCY MEDICINE

## 2017-04-07 PROCEDURE — 80053 COMPREHEN METABOLIC PANEL: CPT | Performed by: EMERGENCY MEDICINE

## 2017-04-07 PROCEDURE — 99222 1ST HOSP IP/OBS MODERATE 55: CPT | Performed by: HOSPITALIST

## 2017-04-07 PROCEDURE — 96376 TX/PRO/DX INJ SAME DRUG ADON: CPT

## 2017-04-07 PROCEDURE — 85610 PROTHROMBIN TIME: CPT | Performed by: EMERGENCY MEDICINE

## 2017-04-07 PROCEDURE — 93005 ELECTROCARDIOGRAM TRACING: CPT | Performed by: EMERGENCY MEDICINE

## 2017-04-07 PROCEDURE — 84443 ASSAY THYROID STIM HORMONE: CPT | Performed by: PHYSICIAN ASSISTANT

## 2017-04-07 PROCEDURE — 85025 COMPLETE CBC W/AUTO DIFF WBC: CPT | Performed by: EMERGENCY MEDICINE

## 2017-04-07 PROCEDURE — 84484 ASSAY OF TROPONIN QUANT: CPT | Performed by: EMERGENCY MEDICINE

## 2017-04-07 PROCEDURE — 82553 CREATINE MB FRACTION: CPT | Performed by: EMERGENCY MEDICINE

## 2017-04-07 PROCEDURE — 96361 HYDRATE IV INFUSION ADD-ON: CPT

## 2017-04-07 PROCEDURE — 25010000003 CEFTRIAXONE PER 250 MG: Performed by: PHYSICIAN ASSISTANT

## 2017-04-07 PROCEDURE — 96365 THER/PROPH/DIAG IV INF INIT: CPT

## 2017-04-07 PROCEDURE — 99222 1ST HOSP IP/OBS MODERATE 55: CPT | Performed by: INTERNAL MEDICINE

## 2017-04-07 RX ORDER — ATORVASTATIN CALCIUM 40 MG/1
40 TABLET, FILM COATED ORAL NIGHTLY
Status: DISCONTINUED | OUTPATIENT
Start: 2017-04-07 | End: 2017-04-11 | Stop reason: HOSPADM

## 2017-04-07 RX ORDER — DILTIAZEM HYDROCHLORIDE 5 MG/ML
10 INJECTION INTRAVENOUS ONCE
Status: COMPLETED | OUTPATIENT
Start: 2017-04-07 | End: 2017-04-07

## 2017-04-07 RX ORDER — SODIUM CHLORIDE 0.9 % (FLUSH) 0.9 %
1-10 SYRINGE (ML) INJECTION AS NEEDED
Status: DISCONTINUED | OUTPATIENT
Start: 2017-04-07 | End: 2017-04-11 | Stop reason: HOSPADM

## 2017-04-07 RX ORDER — SODIUM CHLORIDE 0.9 % (FLUSH) 0.9 %
10 SYRINGE (ML) INJECTION AS NEEDED
Status: DISCONTINUED | OUTPATIENT
Start: 2017-04-07 | End: 2017-04-07 | Stop reason: HOSPADM

## 2017-04-07 RX ORDER — SODIUM CHLORIDE 9 MG/ML
125 INJECTION, SOLUTION INTRAVENOUS CONTINUOUS
Status: DISCONTINUED | OUTPATIENT
Start: 2017-04-07 | End: 2017-04-07 | Stop reason: HOSPADM

## 2017-04-07 RX ORDER — GABAPENTIN 400 MG/1
400 CAPSULE ORAL 3 TIMES DAILY
Status: DISCONTINUED | OUTPATIENT
Start: 2017-04-07 | End: 2017-04-08

## 2017-04-07 RX ORDER — METOPROLOL SUCCINATE 100 MG/1
100 TABLET, EXTENDED RELEASE ORAL EVERY 24 HOURS
Status: DISCONTINUED | OUTPATIENT
Start: 2017-04-08 | End: 2017-04-11

## 2017-04-07 RX ORDER — DULOXETIN HYDROCHLORIDE 30 MG/1
30 CAPSULE, DELAYED RELEASE ORAL NIGHTLY
Status: DISCONTINUED | OUTPATIENT
Start: 2017-04-07 | End: 2017-04-11 | Stop reason: HOSPADM

## 2017-04-07 RX ORDER — HYDROCODONE BITARTRATE AND ACETAMINOPHEN 7.5; 325 MG/1; MG/1
1 TABLET ORAL ONCE
Status: COMPLETED | OUTPATIENT
Start: 2017-04-07 | End: 2017-04-07

## 2017-04-07 RX ORDER — CEFTRIAXONE SODIUM 1 G/50ML
1 INJECTION, SOLUTION INTRAVENOUS EVERY 24 HOURS
Status: DISCONTINUED | OUTPATIENT
Start: 2017-04-07 | End: 2017-04-11 | Stop reason: HOSPADM

## 2017-04-07 RX ORDER — PREDNISONE 20 MG/1
20 TABLET ORAL DAILY
Status: DISCONTINUED | OUTPATIENT
Start: 2017-04-08 | End: 2017-04-11 | Stop reason: HOSPADM

## 2017-04-07 RX ORDER — COLCHICINE 0.6 MG/1
0.6 TABLET ORAL DAILY
Status: DISCONTINUED | OUTPATIENT
Start: 2017-04-08 | End: 2017-04-11 | Stop reason: HOSPADM

## 2017-04-07 RX ORDER — AMIODARONE HYDROCHLORIDE 200 MG/1
200 TABLET ORAL
Status: DISCONTINUED | OUTPATIENT
Start: 2017-04-08 | End: 2017-04-11 | Stop reason: HOSPADM

## 2017-04-07 RX ORDER — ASPIRIN 81 MG/1
81 TABLET ORAL DAILY
Status: DISCONTINUED | OUTPATIENT
Start: 2017-04-08 | End: 2017-04-11 | Stop reason: HOSPADM

## 2017-04-07 RX ORDER — LOSARTAN POTASSIUM AND HYDROCHLOROTHIAZIDE 12.5; 5 MG/1; MG/1
1 TABLET ORAL EVERY 24 HOURS
Status: DISCONTINUED | OUTPATIENT
Start: 2017-04-08 | End: 2017-04-11 | Stop reason: HOSPADM

## 2017-04-07 RX ORDER — DILTIAZEM HCL/D5W 125 MG/125
5-15 PLASTIC BAG, INJECTION (ML) INTRAVENOUS
Status: DISCONTINUED | OUTPATIENT
Start: 2017-04-07 | End: 2017-04-08

## 2017-04-07 RX ORDER — OXYCODONE HYDROCHLORIDE AND ACETAMINOPHEN 5; 325 MG/1; MG/1
1 TABLET ORAL EVERY 6 HOURS PRN
Status: DISCONTINUED | OUTPATIENT
Start: 2017-04-07 | End: 2017-04-11 | Stop reason: HOSPADM

## 2017-04-07 RX ADMIN — RIVAROXABAN 20 MG: 20 TABLET, FILM COATED ORAL at 21:02

## 2017-04-07 RX ADMIN — Medication 10 MG/HR: at 19:52

## 2017-04-07 RX ADMIN — OXYCODONE AND ACETAMINOPHEN 1 TABLET: 5; 325 TABLET ORAL at 19:51

## 2017-04-07 RX ADMIN — GABAPENTIN 400 MG: 400 CAPSULE ORAL at 20:58

## 2017-04-07 RX ADMIN — DILTIAZEM HYDROCHLORIDE 10 MG: 5 INJECTION INTRAVENOUS at 14:08

## 2017-04-07 RX ADMIN — ATORVASTATIN CALCIUM 40 MG: 40 TABLET, FILM COATED ORAL at 20:57

## 2017-04-07 RX ADMIN — DILTIAZEM HYDROCHLORIDE 10 MG/HR: 5 INJECTION INTRAVENOUS at 14:46

## 2017-04-07 RX ADMIN — SODIUM CHLORIDE 125 ML/HR: 9 INJECTION, SOLUTION INTRAVENOUS at 14:41

## 2017-04-07 RX ADMIN — HYDROCODONE BITARTRATE AND ACETAMINOPHEN 1 TABLET: 7.5; 325 TABLET ORAL at 14:23

## 2017-04-07 RX ADMIN — DULOXETINE HYDROCHLORIDE 30 MG: 30 CAPSULE, DELAYED RELEASE ORAL at 20:58

## 2017-04-07 RX ADMIN — DILTIAZEM HYDROCHLORIDE 10 MG: 5 INJECTION INTRAVENOUS at 14:41

## 2017-04-07 RX ADMIN — CEFTRIAXONE SODIUM 1 G: 1 INJECTION, SOLUTION INTRAVENOUS at 19:51

## 2017-04-07 RX ADMIN — DILTIAZEM HYDROCHLORIDE 10 MG: 5 INJECTION INTRAVENOUS at 13:41

## 2017-04-07 RX ADMIN — SODIUM CHLORIDE 1000 ML: 9 INJECTION, SOLUTION INTRAVENOUS at 13:36

## 2017-04-07 NOTE — H&P
"Electrophysiology History & Physical    Vasquez Marie  N642/1  [id number]  1960    DATE OF ADMISSION: 4/7/2017    Marilyn K. Vermeesch, MD  Primary Cardiologist: Kevin Landa MD      Chief complaint: aflutter    History of Present Illness     Patient is a 56 year old male with a hx of CAD s/p stent at 26 and atrial flutter that has been transferred to [facility] from [facility] for aflutter w/ RVR. He was initially seen by Dr. Landa in early February when home health found his HR to be elevated and sent him to ER and was found to be in flutter. He was placed on an amiodarone gtt and underwent ECV with conversion to SR. He was discharged home on PO amiodarone. He was sent back to the ER today when his home health nurse found his BP to be very elevated and HR in 150s. Patient is asymptomatic in that he cannot feel any palpitations. He reports he has been feeling a headache and \"swimming\" in his head. His main complaint is that he has burning feeling in his feet and pain in his right shoulder. He told me he has been diagnosed with rheumatoid arthritis but no one treats him for it. Was receiving home health for wound management secondary to gout. Patient stated he has had stents placed in past, one when he was 26. Previous notes in chart have hx of stent in 2009 to occluded RCA. He has not had any chest pain.   Patient is having a lot of pain in his right UE with ecchymosis throughout and wounds on LE.   He can tell that his heart rate is increased his biggest complaint is pain in his arm and even legs.  Question will even diagnosed recently with rheumatoid arthritis however not being treated for this at the present time.  He does have gout which seems to be a majority of his issues in his lower extremity.  Has been treated for a bacterial infection in the lower extremity and currently seems to be on Ceftin.    Past Medical History:   Diagnosis Date   • Abdominal pain     History of epigastric abdominal " tenderness. Differentials include peptic ulcer disease,   pancreatobiliary disease.   • Abnormal LFTs    • Anemia    • Anxiety    • Arthritis    • Atrial fibrillation    • CAD (coronary artery disease)    • Calf cramp    • COPD (chronic obstructive pulmonary disease)    • CVA (cerebral infarction)    • Depression    • DVT (deep venous thrombosis)    • Electrocution    • Fatigue    • Hepatitis C    • History of allergy    • History of blood transfusion    • Hyperlipidemia    • Hypertension    • Leg pain    • Loss of appetite    • Stroke syndrome    • Tachycardia    • Thrombocytopenia    • Thrombophlebitis of deep femoral vein    • Vision problems          Past Surgical History:   Procedure Laterality Date   • ANKLE SURGERY     • CORONARY STENT PLACEMENT     • TONSILLECTOMY     • TOTAL HIP ARTHROPLASTY Left    • TOTAL SHOULDER REPLACEMENT Left        Prescriptions Prior to Admission   Medication Sig Dispense Refill Last Dose   • amiodarone (PACERONE) 200 MG tablet Take 1 tablet by mouth Daily. 30 tablet 5    • aspirin 81 MG EC tablet Take 1 tablet by mouth Daily. 30 tablet 6    • atorvastatin (LIPITOR) 40 MG tablet Take 1 tablet by mouth Every Night. 30 tablet 6    • cefuroxime (CEFTIN) 250 MG tablet Take 1 tablet by mouth 2 (Two) Times a Day. 12 tablet 0    • colchicine 0.6 MG tablet Take 0.6 mg by mouth Daily.  1 2/12/2017   • DULoxetine (CYMBALTA) 30 MG capsule Take 30 mg by mouth Every Night.   2/12/2017   • gabapentin (NEURONTIN) 400 MG capsule Take 400 mg by mouth 3 (Three) Times a Day.  0 2/12/2017   • losartan-hydrochlorothiazide (HYZAAR) 50-12.5 MG per tablet Take 1 tablet by mouth Daily. 30 tablet 6    • melatonin 3 MG tablet Take 2 tablets by mouth Every Night. 30 tablet 6    • metoprolol succinate XL (TOPROL-XL) 100 MG 24 hr tablet Take 1 tablet by mouth Daily. 30 tablet 6    • predniSONE (DELTASONE) 20 MG tablet Take 20 mg by mouth Daily.   2/12/2017   • warfarin (COUMADIN) 5 MG tablet 5 mg orally once  per day after 5 pm. PT/INR to be checked by home health service on Wednesday and results to my office in [facility]. 30 tablet 5        Current Meds  Current Facility-Administered Medications on File Prior to Encounter   Medication Dose Route Frequency Provider Last Rate Last Dose   • [COMPLETED] diltiaZEM (CARDIZEM) injection 10 mg  10 mg Intravenous Once Terrell Bowman MD   10 mg at 04/07/17 1341   • [COMPLETED] diltiaZEM (CARDIZEM) injection 10 mg  10 mg Intravenous Once Kyree Moncada PA-C   10 mg at 04/07/17 1408   • [COMPLETED] diltiaZEM (CARDIZEM) injection 10 mg  10 mg Intravenous Once Terrell Bowman MD   10 mg at 04/07/17 1441   • [COMPLETED] HYDROcodone-acetaminophen (NORCO) 7.5-325 MG per tablet 1 tablet  1 tablet Oral Once Terrell Bowman MD   1 tablet at 04/07/17 1423   • [COMPLETED] sodium chloride 0.9 % bolus 1,000 mL  1,000 mL Intravenous Once Kyree Moncada PA-C   Stopped at 04/07/17 1454   • [DISCONTINUED] diltiaZEM (CARDIZEM) 1 mg/mL in sodium chloride 0.9 % 125 mL infusion  5-15 mg/hr Intravenous Titrated Terrell Bowman MD   Stopped at 04/07/17 1610   • [DISCONTINUED] sodium chloride 0.9 % flush 10 mL  10 mL Intravenous PRN Terrell Bowman MD       • [DISCONTINUED] sodium chloride 0.9 % infusion  125 mL/hr Intravenous Continuous Terrell Bowman MD   Stopped at 04/07/17 1610     Current Outpatient Prescriptions on File Prior to Encounter   Medication Sig Dispense Refill   • amiodarone (PACERONE) 200 MG tablet Take 1 tablet by mouth Daily. 30 tablet 5   • aspirin 81 MG EC tablet Take 1 tablet by mouth Daily. 30 tablet 6   • atorvastatin (LIPITOR) 40 MG tablet Take 1 tablet by mouth Every Night. 30 tablet 6   • cefuroxime (CEFTIN) 250 MG tablet Take 1 tablet by mouth 2 (Two) Times a Day. 12 tablet 0   • colchicine 0.6 MG tablet Take 0.6 mg by mouth Daily.  1   • DULoxetine (CYMBALTA) 30 MG capsule Take 30 mg by mouth Every Night.     • gabapentin (NEURONTIN) 400 MG  capsule Take 400 mg by mouth 3 (Three) Times a Day.  0   • losartan-hydrochlorothiazide (HYZAAR) 50-12.5 MG per tablet Take 1 tablet by mouth Daily. 30 tablet 6   • melatonin 3 MG tablet Take 2 tablets by mouth Every Night. 30 tablet 6   • metoprolol succinate XL (TOPROL-XL) 100 MG 24 hr tablet Take 1 tablet by mouth Daily. 30 tablet 6   • predniSONE (DELTASONE) 20 MG tablet Take 20 mg by mouth Daily.     • warfarin (COUMADIN) 5 MG tablet 5 mg orally once per day after 5 pm. PT/INR to be checked by home health service on Wednesday and results to my office in [facility] 30 tablet 5         Social History     Social History   • Marital status: Single     Spouse name: N/A   • Number of children: N/A   • Years of education: N/A     Occupational History   • Not on file.     Social History Main Topics   • Smoking status: Current Every Day Smoker     Packs/day: 1.00     Years: 30.00     Types: Cigarettes   • Smokeless tobacco: Not on file   • Alcohol use No   • Drug use: No   • Sexual activity: Defer     Other Topics Concern   • Not on file     Social History Narrative   • No narrative on file     Family History   Problem Relation Age of Onset   • Breast cancer Mother    • Colon cancer Sister    • Heart attack Other    • Arthritis Other    • Diabetes Other    • Hypertension Other    • Kidney disease Other    • Stroke Other          REVIEW OF SYSTEMS:   14 point ROS was performed and is negative except as outlined in HPI           Objective:     Vitals:    04/07/17 1721 04/07/17 1735 04/07/17 1744   BP: (!) 159/114 (!) 168/110    BP Location: Right arm Left arm    Pulse: 115 (!) 129    Resp: 18     Temp: 98.5 °F (36.9 °C)     TempSrc: Oral     Weight:   234 lb 5 oz (106 kg)     Body mass index is 35.63 kg/(m^2).      General Appearance:    Alert, cooperative, in no acute distress. + pain   Head:    Normocephalic, without obvious abnormality, atraumatic   Eyes:            Lids and lashes normal, conjunctivae and sclerae  normal, no   icterus, no pallor, corneas clear, PERRLA   Ears:    Ears appear intact with no abnormalities noted   Throat:   No oral lesions, no thrush, oral mucosa moist   Neck:   No adenopathy, supple, trachea midline, no thyromegaly, no   carotid bruit, no JVD   Back:     No kyphosis present, no scoliosis present, no skin lesions, erythema or scars, no tenderness to percussion or palpation, range of motion normal   Lungs:     Clear to auscultation,respirations regular, even and unlabored    Heart:    tachycardic, normal S1 and S2, no murmur, no gallop, no rub, no click   Chest Wall:    No abnormalities observed   Abdomen:     Normal bowel sounds, no masses, no organomegaly, soft        non-tender, non-distended, no guarding, no rebound  tenderness   Extremities:   Moves all extremities well, no edema, no cyanosis, no redness   Pulses:   Pulses palpable and equal bilaterally. Normal radial, carotid, femoral, dorsalis pedis and posterior tibial pulses bilaterally. Normal abdominal aorta   Skin:   Multiple skin tears on bilateral LE and blister on left 3rd toe    Right arm pain with some swelling.  Lower extremity chronic changes with a wound noted in his right shin and also on his 2 toes on the left foot which are wrapped.             Lab Review:      Results Review:     I reviewed the patient's new clinical results.      Results from last 7 days  Lab Units 04/07/17  1318   WBC 10*3/mm3 7.47   HEMOGLOBIN g/dL 13.1*   HEMATOCRIT % 38.4*   PLATELETS 10*3/mm3 106*       Results from last 7 days  Lab Units 04/07/17  1318   SODIUM mmol/L 143   POTASSIUM mmol/L 3.9   CHLORIDE mmol/L 104   TOTAL CO2 mmol/L 27.0   BUN mg/dL 9   CREATININE mg/dL 0.90   CALCIUM mg/dL 9.5   BILIRUBIN mg/dL 1.3   ALK PHOS U/L 120   ALT (SGPT) U/L 78*   AST (SGOT) U/L 77*   GLUCOSE mg/dL 151*       Results from last 7 days  Lab Units 04/07/17  1318   SODIUM mmol/L 143   POTASSIUM mmol/L 3.9   CHLORIDE mmol/L 104   TOTAL CO2 mmol/L 27.0   BUN  mg/dL 9   CREATININE mg/dL 0.90   GLUCOSE mg/dL 151*   CALCIUM mg/dL 9.5       Results from last 7 days  Lab Units 04/07/17  1318   INR  1.19*     No results found for: TROPONINT    Results from last 7 days  Lab Units 04/07/17  1318   TSH mIU/mL 2.250       EKG: typical appearing atrial flutter 130s    I personally viewed and interpreted the patient's EKG/Telemetry data    Assessment:    Active Problems:    Coronary artery disease involving native coronary artery of native heart without angina pectoris    Essential hypertension    Tobacco abuse    Typical atrial flutter       Plan:       1. Typical appearing atrial flutter w/ recurrence despite amiodarone and seems to be asymptomatic presently with some SOB at times but uncertain if he is going in and out of Aflutter.   -will continue PO amio and start on cardizem gtt for rate control  - INR was subtherapeutic- will DC coumadin and start Xarelto 20mg daily as patient has normal Cr.   - plan will be for TATIANNA/ECV on Monday with continuation of Amiodarone  - LVEF 50% 2/2017   - I think in the long term the patient may benefit from a EP study plus or minus ablation of this typical appearing atrial flutter however I think we need to take care of the other acute illnesses including his severe pain in his right upper arm and also lower extremity wounds/bacterial infection.    2. HTN, elevated   - continue home meds and monitor.  I think a lot of the patient's blood pressure issues currently is due to him having significant amount of pain in his right upper arm.    3. CAD s/p PCI, stable. No anginal symptoms   - will likely benefit from ischemic eval once in SR     4. PVD w/ hx of bilateral stents  - check ABIs     5. Tobacco abuse   - counseled on cessation     6. LE ulcerations secondary to severe gout / bacterial infection in LE  - will consult hospitalists and wound care for recommendations.  - Will change Ceftin to Rocephin IV for now and have the hospitalist team help  us with treatment of what seems to be a bacterial infection or lower extremities.    7.  Pain noted in his right upper extremity and also lower extremities.  This seems to be his primary complaint.     Scribed for Seb Quintero DO by Maral Sosa PA-C. 4/7/2017  5:46 PM      JANIE Lopez  [facility]  04/07/17  5:46 PM     I, Seb Quintero, personally performed the services described in this documentation as scribed by the above named individual in my presence, and it is both accurate and complete.  4/7/2017  6:21 PM    Seb Quintero DO  6:21 PM  04/07/17        EMR Dragon/Transcription disclaimer:  Much of this encounter note is an electronic transcription/translation of spoken language to printed text. Electronic translation of spoken language may permit erroneous, or at times, nonsensical words or phrases to be inadvertently transcribed. Although I have reviewed the note for such errors, some may still exist.

## 2017-04-07 NOTE — ED PROVIDER NOTES
Subjective   HPI Comments: 56-year-old male sent here by his home health nurse for evaluation of elevated blood pressure to 200/120 at home.  He denies feeling his heart race but was noted to be tachycardic in the 150 range upon arrival here.  He is complaining of being mildly short of breath.  No chest pain or pressure.  No headache.  No illness currently other than a productive cough leading to posttussive vomiting.  Also complains of joint pain all over his body.  Has bruising to all of these extremities after a recent fall, he was evaluated for this fall and no fractures were noted.  Aggravating factors: None.  Alleviating factors: None.  Treatment prior to arrival: None.  The patient has not taken his medications in the past few days related to the pharmacy not bringing them to him, according to his report.  He is supposed to be on metoprolol and Coumadin.    I have reviewed the patient's old records.  He has history of atrial flutter with a rate-related right bundle-branch block per Dr. Landa's records. Left ventricular ejection fraction of 45-50%.      History provided by:  Patient and medical records  History limited by: The patient is a poor historian.   used: No        Review of Systems   Constitutional: Negative.    HENT: Negative.    Eyes: Negative.    Respiratory: Positive for cough.    Cardiovascular: Negative.  Negative for chest pain.        Elevated blood pressure   Gastrointestinal: Positive for nausea and vomiting. Negative for abdominal pain.   Endocrine: Negative.    Genitourinary: Negative for dysuria.   Musculoskeletal: Positive for arthralgias and joint swelling.   Skin: Negative.    Allergic/Immunologic: Negative.    Neurological: Negative.    Hematological: Negative.    Psychiatric/Behavioral: Negative.    All other systems reviewed and are negative.      Past Medical History:   Diagnosis Date   • Abdominal pain     History of epigastric abdominal tenderness.  Differentials include peptic ulcer disease,   pancreatobiliary disease.   • Abnormal LFTs    • Anemia    • Anxiety    • Arthritis    • Atrial fibrillation    • CAD (coronary artery disease)    • Calf cramp    • COPD (chronic obstructive pulmonary disease)    • CVA (cerebral infarction)    • Depression    • DVT (deep venous thrombosis)    • Electrocution    • Fatigue    • Hepatitis C    • History of allergy    • History of blood transfusion    • Hyperlipidemia    • Hypertension    • Leg pain    • Loss of appetite    • Stroke syndrome    • Tachycardia    • Thrombocytopenia    • Thrombophlebitis of deep femoral vein    • Vision problems        No Known Allergies    Past Surgical History:   Procedure Laterality Date   • ANKLE SURGERY     • CORONARY STENT PLACEMENT     • TONSILLECTOMY     • TOTAL HIP ARTHROPLASTY Left    • TOTAL SHOULDER REPLACEMENT Left        Family History   Problem Relation Age of Onset   • Breast cancer Mother    • Colon cancer Sister    • Heart attack Other    • Arthritis Other    • Diabetes Other    • Hypertension Other    • Kidney disease Other    • Stroke Other        Social History     Social History   • Marital status: Single     Spouse name: N/A   • Number of children: N/A   • Years of education: N/A     Social History Main Topics   • Smoking status: Current Every Day Smoker     Packs/day: 1.00     Years: 30.00     Types: Cigarettes   • Smokeless tobacco: None   • Alcohol use No   • Drug use: No   • Sexual activity: Defer     Other Topics Concern   • None     Social History Narrative   • None           Objective   Physical Exam   Constitutional: He is oriented to person, place, and time. He appears well-developed and well-nourished. No distress.   HENT:   Head: Normocephalic and atraumatic.   Right Ear: External ear normal.   Left Ear: External ear normal.   Eyes: EOM are normal. Pupils are equal, round, and reactive to light.   Neck: Normal range of motion. Neck supple.   Cardiovascular:  Regular rhythm and normal heart sounds.    The patient is tachycardic with a heart rate in the 150 range.   Pulmonary/Chest: Effort normal and breath sounds normal. No stridor. He has no wheezes. He exhibits no tenderness.   Abdominal: Soft. He exhibits no distension. There is no tenderness. There is no rebound and no guarding.   Musculoskeletal: Normal range of motion. He exhibits no edema.   Neurological: He is alert and oriented to person, place, and time.   Skin: Skin is warm and dry. No rash noted. He is not diaphoretic.   Psychiatric: He has a normal mood and affect. His behavior is normal. Judgment and thought content normal.   Nursing note and vitals reviewed.      ECG 12 Lead    Date/Time: 4/7/2017 2:02 PM  Performed by: PADMINI BARBER  Authorized by: SHELIA MULLEN   Comments: Probable atrial flutter with a right bundle-branch block.  Heart rate of 153.  Nonspecific ST and T-wave changes.  No ST elevation.  Rare PVC.               ED Course  ED Course   Comment By Time   Syeda:  Will be in to see the patient.  He is leaving [facility]. He'll likely need to be transferred to an EP doctor at [facility]. Padmini Barber PA-C 04/07 1440   Dr. Landa: In the room seeing the patient.  Transfer to Dr. Quintero in [place].  Cardizem drip at 10 mg per hour. Padmini Barber PA-C 04/07 1451                  MDM  Number of Diagnoses or Management Options  Atrial flutter with rapid ventricular response: new and requires workup  Poorly-controlled hypertension: new and requires workup  Diagnosis management comments: Patient still in atrial flutter at a rate of 150 despite 2 boluses of Cardizem and a Cardizem drip.  Seen by Dr. Landa and he requested transfer to [facility] with Dr. Quintero.  Refractory atrial flutter.       Amount and/or Complexity of Data Reviewed  Clinical lab tests: reviewed and ordered  Tests in the radiology section of CPT®: ordered and reviewed  Tests in the medicine section of CPT®:  ordered and reviewed  Decide to obtain previous medical records or to obtain history from someone other than the patient: yes  Discuss the patient with other providers: yes    Risk of Complications, Morbidity, and/or Mortality  Presenting problems: moderate  Diagnostic procedures: low  Management options: moderate    Patient Progress  Patient progress: stable      Final diagnoses:   Atrial flutter with rapid ventricular response   Poorly-controlled hypertension            Kyree Moncada PA-C  04/07/17 1458

## 2017-04-08 ENCOUNTER — APPOINTMENT (OUTPATIENT)
Dept: CARDIOLOGY | Facility: HOSPITAL | Age: 57
End: 2017-04-08

## 2017-04-08 PROBLEM — L03.90 CELLULITIS: Status: ACTIVE | Noted: 2017-04-08

## 2017-04-08 LAB
ALBUMIN SERPL-MCNC: 3.5 G/DL (ref 3.2–4.8)
ALBUMIN/GLOB SERPL: 1 G/DL (ref 1.5–2.5)
ALP SERPL-CCNC: 87 U/L (ref 25–100)
ALT SERPL W P-5'-P-CCNC: 53 U/L (ref 7–40)
ANION GAP SERPL CALCULATED.3IONS-SCNC: 6 MMOL/L (ref 3–11)
AST SERPL-CCNC: 59 U/L (ref 0–33)
BILIRUB SERPL-MCNC: 1.1 MG/DL (ref 0.3–1.2)
BUN BLD-MCNC: 6 MG/DL (ref 9–23)
BUN/CREAT SERPL: 8.6 (ref 7–25)
CALCIUM SPEC-SCNC: 8.4 MG/DL (ref 8.7–10.4)
CHLORIDE SERPL-SCNC: 104 MMOL/L (ref 99–109)
CO2 SERPL-SCNC: 26 MMOL/L (ref 20–31)
CREAT BLD-MCNC: 0.7 MG/DL (ref 0.6–1.3)
DEPRECATED RDW RBC AUTO: 56.4 FL (ref 37–54)
ERYTHROCYTE [DISTWIDTH] IN BLOOD BY AUTOMATED COUNT: 14.9 % (ref 11.3–14.5)
FOLATE SERPL-MCNC: >24 NG/ML (ref 3.2–20)
GFR SERPL CREATININE-BSD FRML MDRD: 116 ML/MIN/1.73
GLOBULIN UR ELPH-MCNC: 3.6 GM/DL
GLUCOSE BLD-MCNC: 190 MG/DL (ref 70–100)
HBA1C MFR BLD: 6.2 % (ref 4.8–5.6)
HCT VFR BLD AUTO: 34.4 % (ref 38.9–50.9)
HGB BLD-MCNC: 11.4 G/DL (ref 13.1–17.5)
MAGNESIUM SERPL-MCNC: 1.3 MG/DL (ref 1.3–2.7)
MCH RBC QN AUTO: 34.3 PG (ref 27–31)
MCHC RBC AUTO-ENTMCNC: 33.1 G/DL (ref 32–36)
MCV RBC AUTO: 103.6 FL (ref 80–99)
PLATELET # BLD AUTO: 95 10*3/MM3 (ref 150–450)
PMV BLD AUTO: 10.9 FL (ref 6–12)
POTASSIUM BLD-SCNC: 3.3 MMOL/L (ref 3.5–5.5)
POTASSIUM BLD-SCNC: 5.4 MMOL/L (ref 3.5–5.5)
PROT SERPL-MCNC: 7.1 G/DL (ref 5.7–8.2)
RBC # BLD AUTO: 3.32 10*6/MM3 (ref 4.2–5.76)
SODIUM BLD-SCNC: 136 MMOL/L (ref 132–146)
VIT B12 BLD-MCNC: 1009 PG/ML (ref 211–911)
WBC NRBC COR # BLD: 5.94 10*3/MM3 (ref 3.5–10.8)

## 2017-04-08 PROCEDURE — 80053 COMPREHEN METABOLIC PANEL: CPT | Performed by: PHYSICIAN ASSISTANT

## 2017-04-08 PROCEDURE — 93971 EXTREMITY STUDY: CPT | Performed by: INTERNAL MEDICINE

## 2017-04-08 PROCEDURE — 25010000003 CEFTRIAXONE PER 250 MG: Performed by: PHYSICIAN ASSISTANT

## 2017-04-08 PROCEDURE — 93010 ELECTROCARDIOGRAM REPORT: CPT | Performed by: INTERNAL MEDICINE

## 2017-04-08 PROCEDURE — 83036 HEMOGLOBIN GLYCOSYLATED A1C: CPT | Performed by: PHYSICIAN ASSISTANT

## 2017-04-08 PROCEDURE — 25010000002 MAGNESIUM SULFATE PER 500 MG OF MAGNESIUM: Performed by: PHYSICIAN ASSISTANT

## 2017-04-08 PROCEDURE — 93971 EXTREMITY STUDY: CPT

## 2017-04-08 PROCEDURE — 97162 PT EVAL MOD COMPLEX 30 MIN: CPT

## 2017-04-08 PROCEDURE — 82746 ASSAY OF FOLIC ACID SERUM: CPT | Performed by: NURSE PRACTITIONER

## 2017-04-08 PROCEDURE — 93005 ELECTROCARDIOGRAM TRACING: CPT | Performed by: INTERNAL MEDICINE

## 2017-04-08 PROCEDURE — 84132 ASSAY OF SERUM POTASSIUM: CPT | Performed by: INTERNAL MEDICINE

## 2017-04-08 PROCEDURE — 99233 SBSQ HOSP IP/OBS HIGH 50: CPT | Performed by: INTERNAL MEDICINE

## 2017-04-08 PROCEDURE — 99232 SBSQ HOSP IP/OBS MODERATE 35: CPT | Performed by: INTERNAL MEDICINE

## 2017-04-08 PROCEDURE — 83735 ASSAY OF MAGNESIUM: CPT | Performed by: PHYSICIAN ASSISTANT

## 2017-04-08 PROCEDURE — 85027 COMPLETE CBC AUTOMATED: CPT | Performed by: PHYSICIAN ASSISTANT

## 2017-04-08 PROCEDURE — 93925 LOWER EXTREMITY STUDY: CPT

## 2017-04-08 PROCEDURE — 93925 LOWER EXTREMITY STUDY: CPT | Performed by: INTERNAL MEDICINE

## 2017-04-08 PROCEDURE — 63710000001 PREDNISONE PER 1 MG: Performed by: PHYSICIAN ASSISTANT

## 2017-04-08 PROCEDURE — 82607 VITAMIN B-12: CPT | Performed by: NURSE PRACTITIONER

## 2017-04-08 RX ORDER — NICOTINE 21 MG/24HR
1 PATCH, TRANSDERMAL 24 HOURS TRANSDERMAL EVERY 24 HOURS
Status: DISCONTINUED | OUTPATIENT
Start: 2017-04-08 | End: 2017-04-11 | Stop reason: HOSPADM

## 2017-04-08 RX ORDER — POTASSIUM CHLORIDE 750 MG/1
40 CAPSULE, EXTENDED RELEASE ORAL AS NEEDED
Status: DISCONTINUED | OUTPATIENT
Start: 2017-04-08 | End: 2017-04-11 | Stop reason: HOSPADM

## 2017-04-08 RX ORDER — CHOLECALCIFEROL (VITAMIN D3) 125 MCG
5 CAPSULE ORAL NIGHTLY PRN
Status: DISCONTINUED | OUTPATIENT
Start: 2017-04-08 | End: 2017-04-11 | Stop reason: HOSPADM

## 2017-04-08 RX ORDER — GABAPENTIN 300 MG/1
600 CAPSULE ORAL 3 TIMES DAILY
Status: DISCONTINUED | OUTPATIENT
Start: 2017-04-08 | End: 2017-04-11 | Stop reason: HOSPADM

## 2017-04-08 RX ORDER — MAGNESIUM SULFATE HEPTAHYDRATE 40 MG/ML
2 INJECTION, SOLUTION INTRAVENOUS AS NEEDED
Status: DISCONTINUED | OUTPATIENT
Start: 2017-04-08 | End: 2017-04-11 | Stop reason: HOSPADM

## 2017-04-08 RX ORDER — POTASSIUM CHLORIDE 1.5 G/1.77G
40 POWDER, FOR SOLUTION ORAL AS NEEDED
Status: DISCONTINUED | OUTPATIENT
Start: 2017-04-08 | End: 2017-04-11 | Stop reason: HOSPADM

## 2017-04-08 RX ORDER — DOXYCYCLINE HYCLATE 100 MG/1
100 CAPSULE ORAL EVERY 12 HOURS SCHEDULED
Status: DISCONTINUED | OUTPATIENT
Start: 2017-04-08 | End: 2017-04-11 | Stop reason: HOSPADM

## 2017-04-08 RX ORDER — MAGNESIUM SULFATE HEPTAHYDRATE 40 MG/ML
4 INJECTION, SOLUTION INTRAVENOUS AS NEEDED
Status: DISCONTINUED | OUTPATIENT
Start: 2017-04-08 | End: 2017-04-11 | Stop reason: HOSPADM

## 2017-04-08 RX ADMIN — Medication 15 MG/HR: at 11:15

## 2017-04-08 RX ADMIN — Medication 5 MG: at 01:34

## 2017-04-08 RX ADMIN — PREDNISONE 20 MG: 20 TABLET ORAL at 09:29

## 2017-04-08 RX ADMIN — METOPROLOL SUCCINATE 100 MG: 100 TABLET, EXTENDED RELEASE ORAL at 09:28

## 2017-04-08 RX ADMIN — NICOTINE 1 PATCH: 21 PATCH, EXTENDED RELEASE TRANSDERMAL at 14:25

## 2017-04-08 RX ADMIN — COLCHICINE 0.6 MG: 0.6 TABLET, FILM COATED ORAL at 09:29

## 2017-04-08 RX ADMIN — RIVAROXABAN 20 MG: 20 TABLET, FILM COATED ORAL at 18:28

## 2017-04-08 RX ADMIN — POTASSIUM CHLORIDE 40 MEQ: 750 CAPSULE, EXTENDED RELEASE ORAL at 09:39

## 2017-04-08 RX ADMIN — DOXYCYCLINE HYCLATE 100 MG: 100 CAPSULE ORAL at 19:44

## 2017-04-08 RX ADMIN — MAGNESIUM SULFATE HEPTAHYDRATE 6 G: 500 INJECTION, SOLUTION INTRAMUSCULAR; INTRAVENOUS at 12:00

## 2017-04-08 RX ADMIN — DOXYCYCLINE HYCLATE 100 MG: 100 CAPSULE ORAL at 09:29

## 2017-04-08 RX ADMIN — DOXYCYCLINE HYCLATE 100 MG: 100 CAPSULE ORAL at 01:36

## 2017-04-08 RX ADMIN — CEFTRIAXONE SODIUM 1 G: 1 INJECTION, SOLUTION INTRAVENOUS at 19:44

## 2017-04-08 RX ADMIN — OXYCODONE AND ACETAMINOPHEN 1 TABLET: 5; 325 TABLET ORAL at 14:27

## 2017-04-08 RX ADMIN — GABAPENTIN 600 MG: 300 CAPSULE ORAL at 19:43

## 2017-04-08 RX ADMIN — DULOXETINE HYDROCHLORIDE 30 MG: 30 CAPSULE, DELAYED RELEASE ORAL at 19:44

## 2017-04-08 RX ADMIN — AMIODARONE HYDROCHLORIDE 200 MG: 200 TABLET ORAL at 09:28

## 2017-04-08 RX ADMIN — GABAPENTIN 600 MG: 300 CAPSULE ORAL at 16:18

## 2017-04-08 RX ADMIN — ASPIRIN 81 MG: 81 TABLET, COATED ORAL at 09:29

## 2017-04-08 RX ADMIN — OXYCODONE AND ACETAMINOPHEN 1 TABLET: 5; 325 TABLET ORAL at 09:38

## 2017-04-08 RX ADMIN — LOSARTAN POTASSIUM AND HYDROCHLOROTHIAZIDE 1 TABLET: 12.5; 5 TABLET ORAL at 09:29

## 2017-04-08 RX ADMIN — GABAPENTIN 400 MG: 400 CAPSULE ORAL at 09:29

## 2017-04-08 RX ADMIN — POTASSIUM CHLORIDE 40 MEQ: 750 CAPSULE, EXTENDED RELEASE ORAL at 16:14

## 2017-04-08 RX ADMIN — ATORVASTATIN CALCIUM 40 MG: 40 TABLET, FILM COATED ORAL at 19:43

## 2017-04-08 RX ADMIN — OXYCODONE AND ACETAMINOPHEN 1 TABLET: 5; 325 TABLET ORAL at 03:25

## 2017-04-08 NOTE — PROGRESS NOTES
"      HOSPITALIST DAILY PROGRESS NOTE    Chief Complaint: arm pain, leg pain    Subjective   SUBJECTIVE/OVERNIGHT EVENTS   Sitting on edge of bed. C/O leg pain and arm pain. No CP.    Review of Systems:  Gen-no fevers, no chills  CV-no chest pain, no palpitations  Resp-no cough, no dyspnea  GI-no N/V/D, no abd pain    Objective   OBJECTIVE   I have reviewed the vital signs.  /85  Pulse 75  Temp 98.3 °F (36.8 °C) (Oral)   Resp 20  Ht 68\" (172.7 cm)  Wt 234 lb 5 oz (106 kg)  SpO2 97%  BMI 35.63 kg/m2    Physical Exam:  Gen-no acute distress, chronically ill appearing  CV-tachy, irr, no murmur  Resp-CTAB, no wheezes  Abd-soft, NT, ND, +BS  Ext-changes c/w chronic arterial disease. bilateral LE with hair loss and toenail thickening.  Neuro-A&Ox3, no focal deficits  Psych-appropriate mood    Results:  I have reviewed the labs, culture data, radiology results, and diagnostic studies.      Results from last 7 days  Lab Units 04/08/17  0810 04/07/17  1318   WBC 10*3/mm3 5.94 7.47   HEMOGLOBIN g/dL 11.4* 13.1*   HEMATOCRIT % 34.4* 38.4*   PLATELETS 10*3/mm3 95* 106*       Results from last 7 days  Lab Units 04/08/17  0810   SODIUM mmol/L 136   POTASSIUM mmol/L 3.3*   CHLORIDE mmol/L 104   TOTAL CO2 mmol/L 26.0   BUN mg/dL 6*   CREATININE mg/dL 0.70   GLUCOSE mg/dL 190*   CALCIUM mg/dL 8.4*     Radiology Results: CXR personally reviewed with no acute infiltrate. Agree with interpretation.    I have reviewed the medications.      Assessment/Plan   ASSESSMENT/PLAN    Principal Problem:    Cellulitis  Active Problems:    Coronary artery disease involving native coronary artery of native heart without angina pectoris    Essential hypertension    Tobacco abuse    Typical atrial flutter    Atrial flutter with rapid ventricular response    Plan:  --Will continue antibiotics for LE cellulitis. Can transition to omnicef and doxy at time of d/c to complete 14 days abx.  --Check CASSIE's today. He was told previously that he " had a severe blockage that needed addressed. Discussed importance of tobacco cessation at length.  --His neuropathy is likely due to his severe PAD. Other studies including A1C, TSH, B12 unremarkable. Increase gabapentin today.  --Atrial fibrillation mgmt per cardiology. Planning for TATIANNA w/ DCCV on Monday.  --Labs in am.    Alexandria Zuñiga II, DO  04/08/17  12:07 PM

## 2017-04-08 NOTE — PROGRESS NOTES
[facility] at [facility]  Cardiovascular Progress Note  Vasquez Marie  [id number]  [id number]  1960    DATE OF ADMISSION: 4/7/2017  DATE OF FOLLOW UP:  4/8/17    Marilyn K. Vermeesch, MD    Subjective:     Patient ID: Vasquez Marie is a 56 y.o. male.    Chief Complaint: aflutter f/u     No Known Allergies    Current Facility-Administered Medications:   •  amiodarone (PACERONE) tablet 200 mg, 200 mg, Oral, Q24H, JANIE Hilario  •  aspirin EC tablet 81 mg, 81 mg, Oral, Daily, JANIE Hilario  •  atorvastatin (LIPITOR) tablet 40 mg, 40 mg, Oral, Nightly, JANIE Hilario, 40 mg at 04/07/17 2057  •  cefTRIAXone (ROCEPHIN) IVPB 1 g, 1 g, Intravenous, Q24H, JANIE Hilario, 1 g at 04/07/17 1951  •  colchicine tablet 0.6 mg, 0.6 mg, Oral, Daily, JANIE Hilario  •  diltiaZEM (CARDIZEM) 125mg/125 mL infusion, 5-15 mg/hr, Intravenous, Titrated, JANIE Hilario, Last Rate: 10 mL/hr at 04/07/17 1952, 10 mg/hr at 04/07/17 1952  •  diltiazem (CARDIZEM) bolus from bag 1 mg/mL 10 mg, 10 mg, Intravenous, Once, Seb Quintero DO, Stopped at 04/07/17 1945  •  doxycycline (VIBRAMYCIN) capsule 100 mg, 100 mg, Oral, Q12H, Iona Akbar MD, 100 mg at 04/08/17 0136  •  DULoxetine (CYMBALTA) DR capsule 30 mg, 30 mg, Oral, Nightly, JANIE Hilario, 30 mg at 04/07/17 2058  •  gabapentin (NEURONTIN) capsule 400 mg, 400 mg, Oral, TID, JANIE Hilario, 400 mg at 04/07/17 2058  •  losartan-hydrochlorothiazide (HYZAAR) 50-12.5 MG per tablet 1 tablet, 1 tablet, Oral, Q24H, JANIE Hilario  •  melatonin tablet 5 mg, 5 mg, Oral, Nightly PRN, Iona Akbar MD, 5 mg at 04/08/17 0134  •  metoprolol succinate XL (TOPROL-XL) 24 hr tablet 100 mg, 100 mg, Oral, Q24H, JANIE Hilario  •  oxyCODONE-acetaminophen (PERCOCET) 5-325 MG per tablet 1 tablet, 1 tablet, Oral, Q6H PRN, Seb Quintero DO, 1 tablet at 04/08/17 0325  •  predniSONE (DELTASONE) tablet 20 mg, 20 mg, Oral, Daily,  JANIE Hilario  •  rivaroxaban (XARELTO) tablet 20 mg, 20 mg, Oral, Daily With Dinner, JANIE Hilario, 20 mg at 04/07/17 2102  •  sodium chloride 0.9 % flush 1-10 mL, 1-10 mL, Intravenous, PRN, JANIE Hilario    History of Present Illness  Pain is better controlled today. Still in Aflutter. More comfortable today      ROS   14 point ROS negative except as outlined in problem list, HPI and other parts of the note.    Procedures       Objective:       Vitals:    04/08/17 0025 04/08/17 0401 04/08/17 0700 04/08/17 0800   BP: (!) 161/101 (!) 148/111 127/90 144/69   BP Location: Right arm Right arm     Patient Position: Lying Lying     Pulse: 96 84 (!) 152 106   Resp: 20 20     Temp: 97.9 °F (36.6 °C) 98.3 °F (36.8 °C)     TempSrc: Oral Oral     SpO2: 95% 94% 93% 96%   Weight:           Intake/Output Summary (Last 24 hours) at 04/08/17 0912  Last data filed at 04/08/17 0647   Gross per 24 hour   Intake                0 ml   Output              350 ml   Net             -350 ml       GENERAL: Well-developed, well-nourished patient in no acute distress.  HEENT: Normocephalic, atraumatic, PERRLA. Moist mucous membranes.  NECK: No JVD present at 30°. No carotid bruits auscultated.  LUNGS: Clear to auscultation.  CARDIOVASCULAR: reg tachy No murmurs, gallops or rubs noted.   ABDOMEN: Soft, nontender. Positive bowel sounds.  MUSCULOSKELETAL: No gross deformities. No clubbing, cyanosis  EXT: pulses intact, no swelling  SKIN: Pink, warm  Neuro: Nonfocal exam. Gait intact  LE lesions noted and right UE arm pain improving    The patient's old records including ambulatory rhythm recordings (ECGs, Holter/event monitor) were reviewed and discussed.      Lab Review:     Results from last 7 days  Lab Units 04/07/17  1318   SODIUM mmol/L 143   POTASSIUM mmol/L 3.9   CHLORIDE mmol/L 104   TOTAL CO2 mmol/L 27.0   BUN mg/dL 9   CREATININE mg/dL 0.90   GLUCOSE mg/dL 151*   CALCIUM mg/dL 9.5       Results from last 7 days  Lab Units  04/07/17  1318   CK TOTAL U/L 129   TROPONIN I ng/mL 0.023   CK MB INDEX % 2.6*       Results from last 7 days  Lab Units 04/08/17  0810 04/07/17  1318   WBC 10*3/mm3 5.94 7.47   HEMOGLOBIN g/dL 11.4* 13.1*   HEMATOCRIT % 34.4* 38.4*   PLATELETS 10*3/mm3 95* 106*       Results from last 7 days  Lab Units 04/07/17  1318   INR  1.19*           Results from last 7 days  Lab Units 04/07/17  1318   MAGNESIUM mg/dL 1.4*                 Assessment & Plan:     1. Typical appearing atrial flutter w/ recurrence despite amiodarone and seems to be asymptomatic presently with some SOB at times but uncertain if he is going in and out of Aflutter.   - will continue PO amio and start on cardizem gtt for rate control  - INR was subtherapeutic-  DC'd coumadin and start Xarelto 20mg daily as patient has normal Cr.   - plan will be for TATIANNA and then EP study / RFA vs ECV with continuation of Amiodarone. Depends on cellulitis. I think the patient would benefit from a ablation but have to make sure no active infections.   - LVEF 50% 2/2017   - mag replacement      2. HTN, elevated   - continue home meds and monitor.  I think a lot of the patient's blood pressure issues currently is due to him having significant amount of pain in his right upper arm.     3. CAD s/p PCI, stable. No anginal symptoms   - will likely benefit from ischemic eval once in SR      4. PVD w/ hx of bilateral stents  - check ABIs      5. Tobacco abuse   - counseled on cessation      6. LE ulcerations secondary to severe gout / bacterial infection in LE  - hospitalists have evaluated and recommended doxycycline for cellulitis   - we appreciate their assistance and recommendations.   - Seems medicine thinks infection is stable with only mild cellulited.      7.  Pain noted in his right upper extremity and also lower extremities. This seems to be his primary complaint. Venous duplex has been ordered. Pain improved on pain medications       Appreciate IM consult    Maral  JANIE Chambers  04/08/17  9:12 AM    I, Seb Quintero, have reviewed the note in full and agree with all aspects of the above including physical exam, assessment, labs and plan with changes made accordingly. Face to Face Time was spent with the patient.    Seb Quintero DO  04/08/17  11:01 AM        EMR Dragon/Transcription disclaimer:  Much of this encounter note is an electronic transcription/translation of spoken language to printed text. Electronic translation of spoken language may permit erroneous, or at times, nonsensical words or phrases to be inadvertently transcribed. Although I have reviewed the note for such errors, some may still exist.

## 2017-04-08 NOTE — PROGRESS NOTES
Acute Care - Physical Therapy Initial Evaluation  [facility]     Patient Name: Vasquez Marie  : 1960  MRN: [id number]  Today's Date: 2017   Onset of Illness/Injury or Date of Surgery Date: 17  Date of Referral to PT: 17  Referring Physician: DO Ingrid      Admit Date: 2017     Visit Dx:    ICD-10-CM ICD-9-CM   1. Impaired functional mobility, balance, gait, and endurance Z74.09 V49.89     Patient Active Problem List   Diagnosis   • Tachycardia   • Chronic hepatitis C virus infection   • Coronary artery disease involving native coronary artery of native heart without angina pectoris   • Paroxysmal atrial fibrillation   • Essential hypertension   • Anemia   • Thrombocytopenia   • Asthma   • Depression with anxiety   • Esophageal reflux   • Hyperlipidemia   • Tobacco abuse   • COPD (chronic obstructive pulmonary disease)   • Gout   • Congestive heart disease   • RLS (restless legs syndrome)   • Rheumatoid arthritis   • Typical atrial flutter   • Atrial flutter with rapid ventricular response   • Cellulitis     Past Medical History:   Diagnosis Date   • Abdominal pain     History of epigastric abdominal tenderness. Differentials include peptic ulcer disease,   pancreatobiliary disease.   • Abnormal LFTs    • Anemia    • Anxiety    • Arthritis    • Atrial fibrillation    • CAD (coronary artery disease)    • Calf cramp    • COPD (chronic obstructive pulmonary disease)    • CVA (cerebral infarction)    • Depression    • DVT (deep venous thrombosis)    • Electrocution    • Fatigue    • Hepatitis C    • History of allergy    • History of blood transfusion    • Hyperlipidemia    • Hypertension    • Leg pain    • Loss of appetite    • Stroke syndrome    • Tachycardia    • Thrombocytopenia    • Thrombophlebitis of deep femoral vein    • Vision problems      Past Surgical History:   Procedure Laterality Date   • ANKLE SURGERY     • CORONARY STENT PLACEMENT     • TONSILLECTOMY     • TOTAL HIP  ARTHROPLASTY Left    • TOTAL SHOULDER REPLACEMENT Left           PT ASSESSMENT (last 72 hours)      PT Evaluation       04/08/17 1323 04/08/17 1156    Rehab Evaluation    Document Type evaluation  -DR     Subjective Information agree to therapy;complains of;weakness;fatigue;pain  -DR     Evaluation Not Performed  patient unavailable for evaluation   Pt unavailable for treatment at 11:30am due to imaging in room; will follow up as time permits.  -DR    Patient Effort, Rehab Treatment good  -DR     Symptoms Noted Comment pt reports he recently fractured multiple toes in LLE; hypersensitive to touch lower LLE and foot  -DR     General Information    Patient Profile Review yes  -DR     Onset of Illness/Injury or Date of Surgery Date 04/07/17  -     Referring Physician DO Ingrid  -     General Observations Pt supine in bed upon entry of PT. Maintenance in room due to pt's telemetry and monitor not working correctly at this time.  -DR     Pertinent History Of Current Problem Pt transferred from [facility] to [facility] 4/7 to receive HLOC. He is being monitored for aflutter with RVR; reports he had a stent placed when he was 26. Per MD note pt has mild bilateral LE cellulitis.  -DR     Precautions/Limitations fall precautions  -DR     Prior Level of Function independent:;all household mobility;community mobility  -DR     Equipment Currently Used at Home cane, quad;walker, standard;wheelchair;grab bar;shower chair  -DR     Plans/Goals Discussed With patient;agreed upon  -DR     Risks Reviewed patient:;LOB;dizziness;nausea/vomiting;increased discomfort;change in vital signs  -DR     Benefits Reviewed patient:;improve function;increase independence;increase strength;decrease pain;increase balance  -DR     Living Environment    Lives With alone  -DR     Living Arrangements apartment  -DR     Home Accessibility bed and bath on same level;tub/shower is not walk in  -DR     Stair Railings at Home none  -DR     Living Environment  Comment Pt reports there are 2 flights of stairs to apartment, but he is able to use elevator. PTA he was not driving and his neighbors were helping him getting groceries.  -     Clinical Impression    Date of Referral to PT 04/08/17  -     PT Diagnosis Impaired functional mobility, gait and balance  -     Patient/Family Goals Statement return to PLOF  -DR     Rehab Potential good, to achieve stated therapy goals  -     Vital Signs    Pre Systolic BP Rehab --   telemetry/monitor not working; maintenance in room  -DR     Pre Patient Position Supine  -DR     Intra Patient Position Standing  -DR     Post Patient Position Sitting  -DR     Pain Assessment    Pain Assessment Urena-Vora FACES  -DR     Urena-Vora FACES Pain Rating 4  -DR     Pain Score 4  -DR     Post Pain Score 4  -DR     Pain Type Acute pain  -DR     Pain Location Leg  -DR     Pain Orientation Right;Left  -DR     Pain Intervention(s) Repositioned;Ambulation/increased activity  -     Cognitive Assessment/Intervention    Current Cognitive/Communication Assessment functional  -DR     Orientation Status oriented x 4  -DR     Follows Commands/Answers Questions able to follow single-step instructions;75% of the time;needs cueing;needs increased time;needs repetition  -DR     Personal Safety mild impairment;decreased awareness, need for assist;decreased awareness, need for safety;decreased insight to deficits;impulsive  -DR     Personal Safety Interventions fall prevention program maintained;gait belt;nonskid shoes/slippers when out of bed;elopement precautions initiated  -     ROM (Range of Motion)    General ROM no range of motion deficits identified  -     MMT (Manual Muscle Testing)    General MMT Assessment lower extremity strength deficits identified;upper extremity strength deficits identified  -     General MMT Assessment Detail Pt hypersensitive to touch lower LLE. He demonstrated 4/5 strength grossly BLE and 4-/5 BUE strength during  functional mobility tasks.  -DR     Bed Mobility, Assessment/Treatment    Bed Mobility, Assistive Device head of bed elevated;bed rails  -DR     Bed Mob, Supine to Sit, Ralls supervision required  -DR     Bed Mob, Sit to Supine, Ralls supervision required  -DR     Transfer Assessment/Treatment    Transfers, Sit-Stand Ralls contact guard assist;verbal cues required  -DR     Transfers, Stand-Sit Ralls contact guard assist;verbal cues required  -DR     Transfers, Sit-Stand-Sit, Assist Device rolling walker  -DR     Transfer, Safety Issues impulsivity;balance decreased during turns;step length decreased;sequencing ability decreased  -DR     Transfer, Impairments strength decreased;impaired balance;coordination impaired  -DR     Transfer, Comment VC for safety due to impulsivity  -DR     Gait Assessment/Treatment    Gait, Ralls Level contact guard assist;verbal cues required  -DR     Gait, Assistive Device rolling walker  -DR     Gait, Distance (Feet) 20  -DR     Gait, Gait Pattern Analysis swing-to gait  -DR     Gait, Gait Deviations bilateral:;michael decreased;step length decreased;toe-to-floor clearance decreased;weight-shifting ability decreased  -DR     Gait, Safety Issues balance decreased during turns;sequencing ability decreased;weight-shifting ability decreased;loses balance backward;step length decreased  -DR     Gait, Impairments strength decreased;impaired balance;coordination impaired  -DR     Gait, Comment VC to slow down due to impulsivity.  -DR     Motor Skills/Interventions    Additional Documentation Balance Skills Training (Group)  -     Balance Skills Training    Sitting-Level of Assistance Contact guard  -     Sitting-Balance Support Right upper extremity supported;Left upper extremity supported;Feet supported  -     Sitting-Balance Activities Lateral lean;Forward lean  -     Standing-Level of Assistance Contact guard  -     Static Standing Balance  Support assistive device  -     Standing-Balance Activities Weight Shift A-P;Weight Shift R-L  -DR     Sensory Assessment/Intervention    Light Touch LLE;RLE  -     LLE Light Touch mild impairment  -     RLCLARISSA Light Touch mild impairment  -     Positioning and Restraints    Pre-Treatment Position in bed  -     Post Treatment Position chair  -     In Chair notified nsg;reclined;call light within reach;encouraged to call for assist;exit alarm on;legs elevated  -       04/07/17 2141 04/07/17 2134    General Information    Equipment Currently Used at Home  cane, straight;wheelchair;walker, standard  -LM    Living Environment    Lives With alone  -LM     Living Arrangements apartment  -LM     Home Accessibility bed and bath on same level  -LM     Stair Railings at Home none  -LM     Type of Financial/Environmental Concern unemployed  -LM     Transportation Available public transportation  -LM       User Key  (r) = Recorded By, (t) = Taken By, (c) = Cosigned By    Initials Name Provider Type    LM Kelli Rocha RN Registered Nurse    DR Alen Cerna, PT Physical Therapist          Physical Therapy Education     Title: PT OT SLP Therapies (Active)     Topic: Physical Therapy (Active)     Point: Mobility training (Active)    Learning Progress Summary    Learner Readiness Method Response Comment Documented by Status   Patient Acceptance E NR   04/08/17 1400 Active               Point: Body mechanics (Active)    Learning Progress Summary    Learner Readiness Method Response Comment Documented by Status   Patient Acceptance E NR   04/08/17 1400 Active               Point: Precautions (Active)    Learning Progress Summary    Learner Readiness Method Response Comment Documented by Status   Patient Acceptance E NR   04/08/17 1400 Active                      User Key     Initials Effective Dates Name Provider Type Discipline     09/06/16 -  Alen Cerna, PT Physical Therapist PT                PT  Recommendation and Plan  Anticipated Discharge Disposition: skilled nursing facility (pending progression during inpatient stay)  PT Frequency: daily  Plan of Care Review  Plan Of Care Reviewed With: patient  Outcome Summary/Follow up Plan: Pt demonstrates decreased independence and safety re: functional mobility, gait and balance. He presents with evolving symptoms during PT evaluation today and would benefit from further skilled IPPT to improve functional independence. Recommend SNF stay upon discharge from facility pending progression during inpatient stay.          IP PT Goals       04/08/17 1400          Bed Mobility PT LTG    Bed Mobility PT LTG, Date Established 04/08/17  -DR      Bed Mobility PT LTG, Time to Achieve 2 wks  -DR      Bed Mobility PT LTG, Activity Type all bed mobility  -DR      Bed Mobility PT LTG, Breeding Level independent  -DR      Transfer Training PT LTG    Transfer Training PT LTG, Date Established 04/08/17  -DR      Transfer Training PT LTG, Time to Achieve 2 wks  -DR      Transfer Training PT LTG, Activity Type bed to chair /chair to bed;sit to stand/stand to sit  -DR      Transfer Training PT LTG, Breeding Level conditional independence  -DR      Transfer Training PT LTG, Assist Device cane, straight  -DR      Gait Training PT LTG    Gait Training Goal PT LTG, Date Established 04/08/17  -DR      Gait Training Goal PT LTG, Time to Achieve 2 wks  -DR      Gait Training Goal PT LTG, Breeding Level conditional independence  -DR      Gait Training Goal PT LTG, Assist Device cane, straight  -DR      Gait Training Goal PT LTG, Distance to Achieve 400  -DR        User Key  (r) = Recorded By, (t) = Taken By, (c) = Cosigned By    Initials Name Provider Type    DR Alen Cerna, PT Physical Therapist                Outcome Measures       04/08/17 1323          How much help from another person do you currently need...    Turning from your back to your side while in flat bed without  using bedrails? 3  -DR      Moving from lying on back to sitting on the side of a flat bed without bedrails? 3  -DR      Moving to and from a bed to a chair (including a wheelchair)? 3  -DR      Standing up from a chair using your arms (e.g., wheelchair, bedside chair)? 3  -DR      Climbing 3-5 steps with a railing? 1  -DR      To walk in hospital room? 3  -DR      AM-PAC 6 Clicks Score 16  -DR      Functional Assessment    Outcome Measure Options AM-PAC 6 Clicks Basic Mobility (PT)  -DR        User Key  (r) = Recorded By, (t) = Taken By, (c) = Cosigned By    Initials Name Provider Type    DR Alen Cerna, PT Physical Therapist           Time Calculation:         PT Charges       04/08/17 1404          Time Calculation    Start Time 1323  -DR      PT Received On 04/08/17  -      PT Goal Re-Cert Due Date 04/18/17  -        User Key  (r) = Recorded By, (t) = Taken By, (c) = Cosigned By    Initials Name Provider Type    DR Alen Cerna, PT Physical Therapist          Therapy Charges for Today     Code Description Service Date Service Provider Modifiers Qty    30407921328 HC PT EVAL MOD COMPLEXITY 4 4/8/2017 Alen Cerna, PT GP 1          PT G-Codes  Outcome Measure Options: AM-[facility] 6 Clicks Basic Mobility (PT)      Alen Cerna, PT  4/8/2017

## 2017-04-08 NOTE — PLAN OF CARE
Problem: Patient Care Overview (Adult)  Goal: Plan of Care Review  Outcome: Ongoing (interventions implemented as appropriate)  Aflutter rate in 110's decreased today to 60's and 40's, cardizem dc, getting magnesium and potassium replacements, pain r arm better this afternoon, up in chair with walker with PT    04/08/17 1400 04/08/17 1700   Coping/Psychosocial Response Interventions   Plan Of Care Reviewed With patient --    Patient Care Overview   Progress --  progress towards functional goals is fair       Goal: Adult Individualization and Mutuality  Outcome: Ongoing (interventions implemented as appropriate)  Goal: Discharge Needs Assessment  Outcome: Ongoing (interventions implemented as appropriate)    Problem: Cardiac Output, Decreased (Adult)  Goal: Identify Related Risk Factors and Signs and Symptoms  Outcome: Ongoing (interventions implemented as appropriate)  Goal: Adequate Cardiac Output/Effective Tissue Perfusion  Outcome: Ongoing (interventions implemented as appropriate)    Problem: Fall Risk (Adult)  Goal: Identify Related Risk Factors and Signs and Symptoms  Outcome: Ongoing (interventions implemented as appropriate)  Goal: Absence of Falls  Outcome: Ongoing (interventions implemented as appropriate)

## 2017-04-08 NOTE — PLAN OF CARE
Problem: Cardiac Output, Decreased (Adult)  Goal: Identify Related Risk Factors and Signs and Symptoms  Outcome: Ongoing (interventions implemented as appropriate)    04/07/17 2125   Cardiac Output, Decreased   Cardiac Output, Decreased: Related Risk Factors heart rate altered;heart rhythm altered   Signs and Symptoms (Cardiac Output Decreased) heart rate/rhythm alteration;fatigue       Goal: Adequate Cardiac Output/Effective Tissue Perfusion  Outcome: Ongoing (interventions implemented as appropriate)    Problem: Fall Risk (Adult)  Goal: Identify Related Risk Factors and Signs and Symptoms  Outcome: Ongoing (interventions implemented as appropriate)  Goal: Absence of Falls  Outcome: Ongoing (interventions implemented as appropriate)

## 2017-04-08 NOTE — PLAN OF CARE
Problem: Patient Care Overview (Adult)  Goal: Plan of Care Review  Outcome: Ongoing (interventions implemented as appropriate)    04/08/17 1400   Coping/Psychosocial Response Interventions   Plan Of Care Reviewed With patient   Outcome Evaluation   Outcome Summary/Follow up Plan Pt demonstrates decreased independence and safety re: functional mobility, gait and balance. He presents with evolving symptoms during PT evaluation today and would benefit from further skilled IPPT to improve functional independence. Recommend SNF stay upon discharge from facility pending progression during inpatient stay.         Problem: Inpatient Physical Therapy  Goal: Bed Mobility Goal LTG- PT  Outcome: Ongoing (interventions implemented as appropriate)    04/08/17 1400   Bed Mobility PT LTG   Bed Mobility PT LTG, Date Established 04/08/17   Bed Mobility PT LTG, Time to Achieve 2 wks   Bed Mobility PT LTG, Activity Type all bed mobility   Bed Mobility PT LTG, Manter Level independent       Goal: Transfer Training Goal 1 LTG- PT  Outcome: Ongoing (interventions implemented as appropriate)    04/08/17 1400   Transfer Training PT LTG   Transfer Training PT LTG, Date Established 04/08/17   Transfer Training PT LTG, Time to Achieve 2 wks   Transfer Training PT LTG, Activity Type bed to chair /chair to bed;sit to stand/stand to sit   Transfer Training PT LTG, Manter Level conditional independence   Transfer Training PT LTG, Assist Device cane, straight       Goal: Gait Training Goal LTG- PT  Outcome: Ongoing (interventions implemented as appropriate)    04/08/17 1400   Gait Training PT LTG   Gait Training Goal PT LTG, Date Established 04/08/17   Gait Training Goal PT LTG, Time to Achieve 2 wks   Gait Training Goal PT LTG, Manter Level conditional independence   Gait Training Goal PT LTG, Assist Device cane, straight   Gait Training Goal PT LTG, Distance to Achieve 400

## 2017-04-08 NOTE — CONSULTS
"Hospital Medicine Consult    Date of Admission: 4/7/2017  Date of Consult: 04/07/17    Primary Care Physician: Marilyn K. Vermeesch, MD  Referring Physician: Dr. Quintero    Chief complaint/Reason for consultation: medical mgmt of multi- morbidities    Subjective     History of Present Illness    Pt's main complaint is of pain Bilateral upper and lower extremities. Recently treated for BLE infection with abx Ceftin and epsom soaks (presumed cellulitis). Pt states the swelling in his feet is resolved but the burning, tingling, and feeling like his feet are asleep persists and has for a couple of years. Does not live with nor had contact with cats, dogs, or livestock. No outdoor or hunting exposure.     BLE: numerous scratch marks; Hemosiderian staining   + smoker  Not diabetic (labs on admission --- hyperglycemia, glucosuria)  Hx DVT (remote) - arm and both legs  Hx CAD with stents, first one at age 23    Review of Systems   Constitutional: Positive for activity change, chills and fever.   HENT: Negative for trouble swallowing.    Eyes: Negative.    Respiratory: Positive for cough, chest tightness and shortness of breath. Negative for apnea.    Cardiovascular: Positive for leg swelling.   Gastrointestinal: Negative for abdominal distention, abdominal pain, constipation, diarrhea, nausea and vomiting.   Endocrine: Negative for polyuria.   Genitourinary: Negative.  Negative for decreased urine volume.   Musculoskeletal: Positive for back pain, gait problem, joint swelling, myalgias and neck pain.        \"My legs and arms hurt so bad\". Pt c/o BLE burning, tingling, \"my feet are always asleep\", stabbing pain. States this has been going on for \"a couple of years\".    Skin: Positive for wound (bilateral upper and lower extremities).   Neurological: Positive for weakness. Negative for light-headedness, numbness and headaches.   Hematological: Bruises/bleeds easily.   Psychiatric/Behavioral: Positive for sleep disturbance " (difficulty sleeping at night). Negative for agitation and behavioral problems.      Otherwise complete ROS is negative except as mentioned above.    Past Medical History:  has a past medical history of Abdominal pain; Abnormal LFTs; Anemia; Anxiety; Arthritis; Atrial fibrillation; CAD (coronary artery disease); Calf cramp; COPD (chronic obstructive pulmonary disease); CVA (cerebral infarction); Depression; DVT (deep venous thrombosis); Electrocution; Fatigue; Hepatitis C; History of allergy; History of blood transfusion; Hyperlipidemia; Hypertension; Leg pain; Loss of appetite; Stroke syndrome; Tachycardia; Thrombocytopenia; Thrombophlebitis of deep femoral vein; and Vision problems.    Past Surgical History:  has a past surgical history that includes Total shoulder replacement (Left); Total hip arthroplasty (Left); Ankle surgery; Coronary stent placement; and Tonsillectomy.    Family History: family history includes Arthritis in his other; Breast cancer in his mother; Colon cancer in his sister; Diabetes in his other; Heart attack in his other; Hypertension in his other; Kidney disease in his other; Stroke in his other.    Social History:  reports that he has been smoking Cigarettes.  He has a 30.00 pack-year smoking history. He does not have any smokeless tobacco history on file. He reports that he does not drink alcohol or use illicit drugs.    Medications: Scheduled Meds:  [START ON 4/8/2017] amiodarone 200 mg Oral Q24H   [START ON 4/8/2017] aspirin 81 mg Oral Daily   atorvastatin 40 mg Oral Nightly   ceftriaxone 1 g Intravenous Q24H   [START ON 4/8/2017] colchicine 0.6 mg Oral Daily   diltiazem 10 mg Intravenous Once   DULoxetine 30 mg Oral Nightly   gabapentin 400 mg Oral TID   [START ON 4/8/2017] losartan-hydrochlorothiazide 1 tablet Oral Q24H   [START ON 4/8/2017] metoprolol succinate  mg Oral Q24H   [START ON 4/8/2017] predniSONE 20 mg Oral Daily   rivaroxaban 20 mg Oral Daily With Dinner      Continuous Infusions:  diltiaZEM 5-15 mg/hr Last Rate: 10 mg/hr (04/07/17 1952)     PRN Meds:.oxyCODONE-acetaminophen  •  sodium chloride  No Known Allergies    Objective    Physical Exam:   Vital Signs have been reviewed  Physical Exam   Constitutional: He is oriented to person, place, and time. He appears well-developed and well-nourished. No distress.   HENT:   Head: Normocephalic.   Eyes: Right eye exhibits no discharge. Left eye exhibits no discharge. No scleral icterus.   Neck: No tracheal deviation present.   Cardiovascular: An irregularly irregular rhythm present. Tachycardia present.  Exam reveals no gallop and no friction rub.    No murmur heard.  Palpable Right pedal pulse  ? Intermittent Left pedal pulse - pt states he has a known circulation problem in this leg and recently has an ultrasound on it ordered by his PCP, MD2U. Pt does not know results.    Abdominal: Soft. Bowel sounds are normal. He exhibits no distension. There is no tenderness.   Musculoskeletal: Normal range of motion. He exhibits tenderness (all extremities). He exhibits no edema or deformity.   Neurological: He is alert and oriented to person, place, and time.   Skin: Skin is warm. He is not diaphoretic.   Skin tear RUE  Multiple scratch marks on BLE  RLE scratch that is bleeding  BLE Hemosiderian staining  BLE normotemp        Results Reviewed:  I have personally reviewed current lab, radiology, and data and agree with results.    Results from last 7 days  Lab Units 04/07/17  1318   WBC 10*3/mm3 7.47   HEMOGLOBIN g/dL 13.1*   PLATELETS 10*3/mm3 106*       Results from last 7 days  Lab Units 04/07/17  1318   SODIUM mmol/L 143   POTASSIUM mmol/L 3.9   TOTAL CO2 mmol/L 27.0   BUN mg/dL 9   CREATININE mg/dL 0.90   GLUCOSE mg/dL 151*   CALCIUM mg/dL 9.5       Active Problems:    Coronary artery disease involving native coronary artery of native heart without angina pectoris    Essential hypertension    Tobacco abuse    Typical atrial  flutter    Atrial flutter with rapid ventricular response  Bicytopenia  Glucosuria  Elevated transaminase (Hx chronic Hep C)  Hyperglycemia  Hypomagnesemia  Anemia, macrocytic, hyperchromic  Thrombocytopenia  Obesity  Chronic pain 2/2 RA - shoulders, legs, elbows  ? Hx Venous Stasis      Assessment/Plan   Assessment & Plan   - assess blood sugars ac and hs -- may need postprandial too -- no SSI at this time -- A1c already ordered  - B12, folate add to labs already ordered  - wound care consult (alreay ordered)  - continue to monitor liver enzymes  - nicotine patch/smoking cessation education  - ? ID consult  - ? LLE doppler vs refer to cardiology for peripheral angiogram with runoff    I discussed the patients findings and my recommendations with: the patient.  SHADI Sanchez  04/07/17  8:17 PM        Brief Attending Note       I have seen and examined the patient, performing an independent face-to-face diagnostic evaluation with plan of care reviewed and developed with the advanced practice clinician (APC).      Brief Summary Statement/HPI:     Mr. Marie is a 56-year-old  gentleman with multiple past medical history, including  peripheral neuropathy (denies history of diabetes), chronic lower extremity cellulitis, chronic pain, obesity, CAD with stents, tobacco abuse, and atrial flutter, who is was transferred here from [facility] in [place] [id number] to cardiology service for evaluation of atrial flutter with RVR.  IM is consulted for medical management and evaluation of lower extremity infection.  Patient reports that he's been having chronic wounds over the right first to third toe ulceration, which had been managed by home health nurse.  He denies any fevers or chills.  No drainage is from the wound.  He has chronic pain, but it is not any worse than usual.    Attending Physical Exam:  General Assessment: No acute cardiopulmonary distress. Well developed and well nourished.    CVS: Irregularly  irregular, tachycardic, S1S2 normal    Resp: CTAB, no adventitious sound    Abd: soft, NT, ND, normal BS, no guarding or peritoneal signs    Ext: both lower extremities are mildly edematous, there is excoriation marks throughout both lower extremities, there is mild erythema associated with the excoriation marks, there is also chronic shallow ulceration over the dorsal aspect of his right first through third toe, no associated erythema or significant drainages.There is also minor abrasions on the right upper extremity.    Neuro: Nonfocal    Skin: W/D/I. No rash.    Psych: Affect is appropriate      Brief Assessment/Plan :   this is a 56-year-old  gentleman with significant cardiac history as mentioned above, he was transferred here under the cardiologist service from [facility] in [place] for evaluation of atrial flutter with RVR.  Currently he is on diltiazem drip and rate is controlled.  He denies any chest pain or shortness of air.  He has no history of diabetes per patient report, however, he does have peripheral neuropathy and is on gabapentin.  I reviewed the case with APRN and discussed panel care with patient.  I will put him on doxycycline for mild cellulitis of his lower extremity, associated with excoriations.  The shallow ulcerations on top of his toes are relatively stable and had been cared for by his home health nurse.  I will consult wound care for further evaluation.  Will check A1c to rule out diabetes.  Continue gabapentin and Cymbalta for pain.  Percocet was added earlier by primary team.  I don't see the need to order any duplex of the lower extremity, doubt DVT at this time.  Patient has good pedal pulse and both feet appears well perfused.  Will defer any further workup for peripheral arterial occlusive disease to cardiologist.  Will add duo neb when necessary.  No signs of any COPD exacerbation.  Tobacco cessation advised.  Patient was also counseled.      See above for further  detailed assessment and plan developed with APC which I have reviewed     Iona Akbar MD  04/08/17  12:04 AM       Consults

## 2017-04-08 NOTE — PLAN OF CARE
Problem: Cardiac Output, Decreased (Adult)  Goal: Adequate Cardiac Output/Effective Tissue Perfusion  Outcome: Ongoing (interventions implemented as appropriate)    Problem: Fall Risk (Adult)  Goal: Absence of Falls  Outcome: Ongoing (interventions implemented as appropriate)

## 2017-04-09 LAB — MAGNESIUM SERPL-MCNC: 2.1 MG/DL (ref 1.3–2.7)

## 2017-04-09 PROCEDURE — 93010 ELECTROCARDIOGRAM REPORT: CPT | Performed by: INTERNAL MEDICINE

## 2017-04-09 PROCEDURE — 25010000002 LORAZEPAM PER 2 MG: Performed by: HOSPITALIST

## 2017-04-09 PROCEDURE — 83735 ASSAY OF MAGNESIUM: CPT | Performed by: INTERNAL MEDICINE

## 2017-04-09 PROCEDURE — 25010000003 CEFTRIAXONE PER 250 MG: Performed by: PHYSICIAN ASSISTANT

## 2017-04-09 PROCEDURE — 63710000001 PREDNISONE PER 1 MG: Performed by: PHYSICIAN ASSISTANT

## 2017-04-09 PROCEDURE — 93005 ELECTROCARDIOGRAM TRACING: CPT | Performed by: PHYSICIAN ASSISTANT

## 2017-04-09 PROCEDURE — 99233 SBSQ HOSP IP/OBS HIGH 50: CPT | Performed by: INTERNAL MEDICINE

## 2017-04-09 PROCEDURE — 99232 SBSQ HOSP IP/OBS MODERATE 35: CPT | Performed by: INTERNAL MEDICINE

## 2017-04-09 RX ORDER — LORAZEPAM 2 MG/ML
0.5 INJECTION INTRAMUSCULAR ONCE
Status: COMPLETED | OUTPATIENT
Start: 2017-04-09 | End: 2017-04-09

## 2017-04-09 RX ADMIN — OXYCODONE AND ACETAMINOPHEN 1 TABLET: 5; 325 TABLET ORAL at 22:19

## 2017-04-09 RX ADMIN — ASPIRIN 81 MG: 81 TABLET, COATED ORAL at 09:01

## 2017-04-09 RX ADMIN — OXYCODONE AND ACETAMINOPHEN 1 TABLET: 5; 325 TABLET ORAL at 10:36

## 2017-04-09 RX ADMIN — ATORVASTATIN CALCIUM 40 MG: 40 TABLET, FILM COATED ORAL at 22:18

## 2017-04-09 RX ADMIN — OXYCODONE AND ACETAMINOPHEN 1 TABLET: 5; 325 TABLET ORAL at 16:46

## 2017-04-09 RX ADMIN — NICOTINE 1 PATCH: 21 PATCH, EXTENDED RELEASE TRANSDERMAL at 10:00

## 2017-04-09 RX ADMIN — GABAPENTIN 600 MG: 300 CAPSULE ORAL at 16:46

## 2017-04-09 RX ADMIN — AMIODARONE HYDROCHLORIDE 200 MG: 200 TABLET ORAL at 09:01

## 2017-04-09 RX ADMIN — COLCHICINE 0.6 MG: 0.6 TABLET, FILM COATED ORAL at 09:00

## 2017-04-09 RX ADMIN — LOSARTAN POTASSIUM AND HYDROCHLOROTHIAZIDE 1 TABLET: 12.5; 5 TABLET ORAL at 09:00

## 2017-04-09 RX ADMIN — OXYCODONE AND ACETAMINOPHEN 1 TABLET: 5; 325 TABLET ORAL at 00:11

## 2017-04-09 RX ADMIN — METOPROLOL SUCCINATE 100 MG: 100 TABLET, EXTENDED RELEASE ORAL at 09:00

## 2017-04-09 RX ADMIN — RIVAROXABAN 20 MG: 20 TABLET, FILM COATED ORAL at 16:45

## 2017-04-09 RX ADMIN — LORAZEPAM 0.5 MG: 2 INJECTION, SOLUTION INTRAMUSCULAR; INTRAVENOUS at 22:20

## 2017-04-09 RX ADMIN — GABAPENTIN 600 MG: 300 CAPSULE ORAL at 22:18

## 2017-04-09 RX ADMIN — GABAPENTIN 600 MG: 300 CAPSULE ORAL at 09:00

## 2017-04-09 RX ADMIN — CEFTRIAXONE SODIUM 1 G: 1 INJECTION, SOLUTION INTRAVENOUS at 22:18

## 2017-04-09 RX ADMIN — DOXYCYCLINE HYCLATE 100 MG: 100 CAPSULE ORAL at 22:36

## 2017-04-09 RX ADMIN — DOXYCYCLINE HYCLATE 100 MG: 100 CAPSULE ORAL at 09:00

## 2017-04-09 RX ADMIN — DULOXETINE HYDROCHLORIDE 30 MG: 30 CAPSULE, DELAYED RELEASE ORAL at 22:19

## 2017-04-09 RX ADMIN — PREDNISONE 20 MG: 20 TABLET ORAL at 09:01

## 2017-04-09 RX ADMIN — OXYCODONE AND ACETAMINOPHEN 1 TABLET: 5; 325 TABLET ORAL at 04:25

## 2017-04-09 NOTE — PROGRESS NOTES
"      HOSPITALIST DAILY PROGRESS NOTE    Chief Complaint: leg pain    Subjective   SUBJECTIVE/OVERNIGHT EVENTS   Up in chair. No chest pain. Still with leg pain.    Review of Systems:  Gen-no fevers, no chills  CV-no chest pain, no palpitations  Resp-no cough, no dyspnea  GI-no N/V/D, no abd pain    Objective   OBJECTIVE   I have reviewed the vital signs.  /96  Pulse 76  Temp 97.7 °F (36.5 °C) (Oral)   Resp 18  Ht 68\" (172.7 cm)  Wt 234 lb 5 oz (106 kg)  SpO2 95%  BMI 35.63 kg/m2    Physical Exam:  Gen-no acute distress, chronically ill appearing  CV-RRR, no murmur  Resp-CTAB, no wheezes  Abd-soft, NT, ND, +BS  Ext-changes c/w chronic arterial disease. bilateral LE with hair loss and toenail thickening.  Neuro-A&Ox3, no focal deficits  Psych-appropriate mood    Results:  I have reviewed the labs, culture data, radiology results, and diagnostic studies.      Results from last 7 days  Lab Units 04/08/17  0810 04/07/17  1318   WBC 10*3/mm3 5.94 7.47   HEMOGLOBIN g/dL 11.4* 13.1*   HEMATOCRIT % 34.4* 38.4*   PLATELETS 10*3/mm3 95* 106*       Results from last 7 days  Lab Units 04/08/17 2015 04/08/17  0810   SODIUM mmol/L  --  136   POTASSIUM mmol/L 5.4 3.3*   CHLORIDE mmol/L  --  104   TOTAL CO2 mmol/L  --  26.0   BUN mg/dL  --  6*   CREATININE mg/dL  --  0.70   GLUCOSE mg/dL  --  190*   CALCIUM mg/dL  --  8.4*     CASSIE's pending  Venous duplex without clot.    I have reviewed the medications.      Assessment/Plan   ASSESSMENT/PLAN    Principal Problem:    Cellulitis  Active Problems:    Coronary artery disease involving native coronary artery of native heart without angina pectoris    Essential hypertension    Tobacco abuse    Typical atrial flutter    Atrial flutter with rapid ventricular response    Plan:  --Will continue antibiotics for LE cellulitis. Can transition to omnicef and doxy at time of d/c to complete 14 days abx which would be treatment through 4/21.  --CASSIE's pending. He was told previously " that he had a severe blockage that needed addressed. Discussed importance of tobacco cessation at length.  --His neuropathy is likely due to his severe PAD. Other studies including A1C, TSH, B12 unremarkable. Gabapentin increased.  --Atrial fibrillation mgmt per cardiology. Planning for TATIANNA w/ DCCV on Monday.    Alexandria Zuñiga II,   04/09/17  12:30 PM

## 2017-04-09 NOTE — PLAN OF CARE
Problem: Patient Care Overview (Adult)  Goal: Plan of Care Review  Outcome: Ongoing (interventions implemented as appropriate)  Goal: Adult Individualization and Mutuality  Outcome: Ongoing (interventions implemented as appropriate)  Goal: Discharge Needs Assessment  Outcome: Ongoing (interventions implemented as appropriate)    Problem: Cardiac Output, Decreased (Adult)  Goal: Identify Related Risk Factors and Signs and Symptoms  Outcome: Ongoing (interventions implemented as appropriate)  Goal: Adequate Cardiac Output/Effective Tissue Perfusion  Outcome: Ongoing (interventions implemented as appropriate)    Problem: Fall Risk (Adult)  Goal: Identify Related Risk Factors and Signs and Symptoms  Outcome: Ongoing (interventions implemented as appropriate)  Goal: Absence of Falls  Outcome: Ongoing (interventions implemented as appropriate)

## 2017-04-09 NOTE — PROGRESS NOTES
[facility]  Cardiovascular Progress Note  Vasquez Marie  [id number]  [id number]  1960    DATE OF ADMISSION: 4/7/2017  DATE OF FOLLOW UP:  4/9/17    Marilyn K. Vermeesch, MD    Subjective:     Patient ID: Vasquez Marie is a 56 y.o. male.    Chief Complaint: aflutter f/u     No Known Allergies    Current Facility-Administered Medications:   •  amiodarone (PACERONE) tablet 200 mg, 200 mg, Oral, Q24H, JANIE Hilario, 200 mg at 04/08/17 0928  •  aspirin EC tablet 81 mg, 81 mg, Oral, Daily, JANIE Hilario, 81 mg at 04/08/17 0929  •  atorvastatin (LIPITOR) tablet 40 mg, 40 mg, Oral, Nightly, JANIE Hilario, 40 mg at 04/08/17 1943  •  cefTRIAXone (ROCEPHIN) IVPB 1 g, 1 g, Intravenous, Q24H, JANIE Hilario, 1 g at 04/08/17 1944  •  colchicine tablet 0.6 mg, 0.6 mg, Oral, Daily, JANIE Hilario, 0.6 mg at 04/08/17 0929  •  diltiazem (CARDIZEM) bolus from bag 1 mg/mL 10 mg, 10 mg, Intravenous, Once, Seb Quintero DO, Stopped at 04/07/17 1945  •  doxycycline (VIBRAMYCIN) capsule 100 mg, 100 mg, Oral, Q12H, Iona Akbar MD, 100 mg at 04/08/17 1944  •  DULoxetine (CYMBALTA) DR capsule 30 mg, 30 mg, Oral, Nightly, JANIE Hilario, 30 mg at 04/08/17 1944  •  gabapentin (NEURONTIN) capsule 600 mg, 600 mg, Oral, TID, Alexandria Zuñiga II, DO, 600 mg at 04/08/17 1943  •  losartan-hydrochlorothiazide (HYZAAR) 50-12.5 MG per tablet 1 tablet, 1 tablet, Oral, Q24H, JANIE Hilario, 1 tablet at 04/08/17 0929  •  Magnesium Sulfate 2 gram Bolus, followed by 8 gram infusion (total Mg dose 10 grams)- Mg less than or equal to 1mg/dL, 2 g, Intravenous, PRN **OR** Magnesium Sulfate 6 gram Infusion (2 gm x 3) -Mg 1.1 -1.5 mg/dL, 2 g, Intravenous, PRN **OR** magnesium sulfate 4 gram infusion- Mg 1.6-1.9 mg/dL, 4 g, Intravenous, PRN, JANIE Hilario  •  melatonin tablet 5 mg, 5 mg, Oral, Nightly PRN, Iona Akbar MD, 5 mg at 04/08/17 0134  •  metoprolol  succinate XL (TOPROL-XL) 24 hr tablet 100 mg, 100 mg, Oral, Q24H, JANIE Hilario, 100 mg at 04/08/17 0928  •  nicotine (NICODERM CQ) 21 MG/24HR patch 1 patch, 1 patch, Transdermal, Q24H, Seb Ingrid, DO, 1 patch at 04/08/17 1425  •  oxyCODONE-acetaminophen (PERCOCET) 5-325 MG per tablet 1 tablet, 1 tablet, Oral, Q6H PRN, Seb Ingrid, DO, 1 tablet at 04/09/17 0425  •  potassium chloride (KLOR-CON) packet 40 mEq, 40 mEq, Oral, PRN, JANIE Hilario  •  potassium chloride (MICRO-K) CR capsule 40 mEq, 40 mEq, Oral, PRN, JANIE Hilario, 40 mEq at 04/08/17 1614  •  predniSONE (DELTASONE) tablet 20 mg, 20 mg, Oral, Daily, JANIE Hilario, 20 mg at 04/08/17 0929  •  rivaroxaban (XARELTO) tablet 20 mg, 20 mg, Oral, Daily With Dinner, JANIE Hilario, 20 mg at 04/08/17 1828  •  sodium chloride 0.9 % flush 1-10 mL, 1-10 mL, Intravenous, PRN, JANIE Hilario    History of Present Illness    Main complaint is pain in feet and bleeding from small skin tears.     ROS   14 point ROS negative except as outlined in problem list, HPI and other parts of the note.    Procedures       Objective:       Vitals:    04/08/17 1850 04/08/17 1920 04/09/17 0005 04/09/17 0410   BP:  112/83 140/96 124/81   BP Location:  Right arm     Patient Position:  Sitting     Pulse: 112 77 66 74   Resp:  20 18 20   Temp:  98.3 °F (36.8 °C) 98 °F (36.7 °C) 98.3 °F (36.8 °C)   TempSrc:  Oral Oral Oral   SpO2:       Weight:       Height:           Intake/Output Summary (Last 24 hours) at 04/09/17 0814  Last data filed at 04/09/17 0410   Gross per 24 hour   Intake              480 ml   Output             1200 ml   Net             -720 ml       GENERAL: Well-developed, well-nourished patient in no acute distress.  HEENT: Normocephalic, atraumatic, PERRLA. Moist mucous membranes.  NECK: No JVD present at 30°. No carotid bruits auscultated.  LUNGS: Clear to auscultation.  CARDIOVASCULAR: reg No murmurs, gallops or rubs noted.   ABDOMEN: Soft,  nontender. Positive bowel sounds.  MUSCULOSKELETAL: No gross deformities. No clubbing, cyanosis  EXT: pulses intact, no swelling  SKIN: Pink, warm, ecchymosis noted UE LE  Neuro: Nonfocal exam. Gait intact    The patient's old records including ambulatory rhythm recordings (ECGs, Holter/event monitor) were reviewed and discussed.      Lab Review:     Results from last 7 days  Lab Units 04/08/17 2015 04/08/17  0810 04/07/17  1318   SODIUM mmol/L  --  136 143   POTASSIUM mmol/L 5.4 3.3* 3.9   CHLORIDE mmol/L  --  104 104   TOTAL CO2 mmol/L  --  26.0 27.0   BUN mg/dL  --  6* 9   CREATININE mg/dL  --  0.70 0.90   GLUCOSE mg/dL  --  190* 151*   CALCIUM mg/dL  --  8.4* 9.5       Results from last 7 days  Lab Units 04/07/17  1318   CK TOTAL U/L 129   TROPONIN I ng/mL 0.023   CK MB INDEX % 2.6*       Results from last 7 days  Lab Units 04/08/17  0810 04/07/17  1318   WBC 10*3/mm3 5.94 7.47   HEMOGLOBIN g/dL 11.4* 13.1*   HEMATOCRIT % 34.4* 38.4*   PLATELETS 10*3/mm3 95* 106*       Results from last 7 days  Lab Units 04/07/17  1318   INR  1.19*           Results from last 7 days  Lab Units 04/09/17  0620   MAGNESIUM mg/dL 2.1     Tele: Aflutter with rates controlled.         Assessment & Plan:     1. Typical appearing atrial flutter w/ recurrence despite amiodarone and seems to be asymptomatic presently with some SOB at times but uncertain if he is going in and out of Aflutter.   - will continue PO amio and off cardizem gtt now  - INR was subtherapeutic- DC'd coumadin and started Xarelto 20mg daily as patient has normal Cr.   - LVEF 50% 2/2017   - Plan for TATIANNA tomorrow + ECV. NPO after midnight.  We'll set the patient up for an outpatient EP study plus or minus ablation once lower extremity ulcerations/bacterial infection treated.  If he continues main sinus rhythm and may even just continue to monitor unless recurrent in nature.  He wants to def try medical management still prior to any more invasive therapy.      2. HTN,  elevated   - continue home meds and monitor. Now improved with pain taken care of      3. CAD s/p PCI, stable. No anginal symptoms   - Consider ischemia eval and follow up with Dr Landa      4. PVD w/ hx of bilateral stents  - arterial duplex of LEs completed yesterday. Awaiting results       5. Tobacco abuse   - counseled on cessation       6. LE ulcerations secondary to severe gout / bacterial infection in LE  - hospitalists have evaluated and recommended doxycycline and omnicef for cellulitis   - we appreciate their assistance and recommendations.   - Seems medicine thinks infection is stable with only mild cellulited.       7. Pain noted in his right upper extremity and also lower extremities. This seems to be his primary complaint. Venous duplex final pending but seems to be normal. Pain improved on pain medications     Possible DC tomorrow after TATIANNA, ECV and will continue amiodarone     JNAIE Lopez  04/09/17  8:14 AM    I, Seb Quintero, have reviewed the note in full and agree with all aspects of the above including physical exam, assessment, labs and plan with changes made accordingly. Face to Face Time was spent with the patient.    Seb Quintero DO  04/09/17  1:33 PM        EMR Dragon/Transcription disclaimer:  Much of this encounter note is an electronic transcription/translation of spoken language to printed text. Electronic translation of spoken language may permit erroneous, or at times, nonsensical words or phrases to be inadvertently transcribed. Although I have reviewed the note for such errors, some may still exist.

## 2017-04-10 ENCOUNTER — APPOINTMENT (OUTPATIENT)
Dept: CT IMAGING | Facility: HOSPITAL | Age: 57
End: 2017-04-10

## 2017-04-10 ENCOUNTER — APPOINTMENT (OUTPATIENT)
Dept: CARDIOLOGY | Facility: HOSPITAL | Age: 57
End: 2017-04-10

## 2017-04-10 LAB
BH CV UPPER VENOUS LEFT SUBCLAVIAN AUGMENT: NORMAL
BH CV UPPER VENOUS LEFT SUBCLAVIAN COMPRESS: NORMAL
BH CV UPPER VENOUS LEFT SUBCLAVIAN PHASIC: NORMAL
BH CV UPPER VENOUS LEFT SUBCLAVIAN SPONT: NORMAL
BH CV UPPER VENOUS RIGHT AXILLARY AUGMENT: NORMAL
BH CV UPPER VENOUS RIGHT AXILLARY COMPRESS: NORMAL
BH CV UPPER VENOUS RIGHT AXILLARY PHASIC: NORMAL
BH CV UPPER VENOUS RIGHT AXILLARY SPONT: NORMAL
BH CV UPPER VENOUS RIGHT BASILIC UPPER COMPRESS: NORMAL
BH CV UPPER VENOUS RIGHT BRACHIAL COMPRESS: NORMAL
BH CV UPPER VENOUS RIGHT CEPHALIC UPPER COMPRESS: NORMAL
BH CV UPPER VENOUS RIGHT INTERNAL JUGULAR AUGMENT: NORMAL
BH CV UPPER VENOUS RIGHT INTERNAL JUGULAR COMPRESS: NORMAL
BH CV UPPER VENOUS RIGHT INTERNAL JUGULAR PHASIC: NORMAL
BH CV UPPER VENOUS RIGHT INTERNAL JUGULAR SPONT: NORMAL
BH CV UPPER VENOUS RIGHT SUBCLAVIAN AUGMENT: NORMAL
BH CV UPPER VENOUS RIGHT SUBCLAVIAN COMPRESS: NORMAL
BH CV UPPER VENOUS RIGHT SUBCLAVIAN PHASIC: NORMAL
BH CV UPPER VENOUS RIGHT SUBCLAVIAN SPONT: NORMAL
BILIRUB UR QL STRIP: NEGATIVE
CLARITY UR: CLEAR
COLOR UR: YELLOW
DIST ATA PSV RIGHT: 45.8 CM/SEC
DIST POP A PSV LEFT: 17.1 CM/SEC
DIST POP A PSV RIGHT: -44.3 CM/SEC
DIST SFA PSV LEFT: -77.7 CM/SEC
DIST SFA PSV RIGHT: -97.6 CM/SEC
GLUCOSE UR STRIP-MCNC: NEGATIVE MG/DL
HGB UR QL STRIP.AUTO: NEGATIVE
KETONES UR QL STRIP: NEGATIVE
LEFT ANT TIBIAL SYS PSV: 19.9 CM/S
LEFT CFA PROX SYS PSV: 48 CM/SEC
LEFT PERONEAL SYS PSV: 21.6 CM/S
LEFT POPLITEAL DIST SYS PSV: 17 CM/S
LEFT POPLITEAL PROX SYS PSV: 38 CM/S
LEFT POST TIBIAL SYS PSV: 24.4 CM/S
LEFT PROFUNDA SYS PSV: 78.9 CM/S
LEFT SUPER FEMORAL DIST SYS PSV: 77.7 CM/S
LEFT SUPER FEMORAL MID SYS PSV: 31.9 CM/S
LEFT SUPER FEMORAL PROX SYS PSV: 40.5 CM/S
LEUKOCYTE ESTERASE UR QL STRIP.AUTO: NEGATIVE
MID ATA PSV LEFT: 10.9 CM/SEC
MID ATA PSV RIGHT: -24 CM/SEC
MID PERO A PSV LEFT: 21.6 CM/SEC
MID SFA PSV LEFT: -31.9 CM/SEC
MID SFA PSV RIGHT: -84.4 CM/SEC
NITRITE UR QL STRIP: NEGATIVE
PH UR STRIP.AUTO: 5.5 [PH] (ref 5–8)
PROT UR QL STRIP: NEGATIVE
PROX ATA PSV LEFT: 19.9 CM/SEC
PROX ATA PSV RIGHT: -181 CM/SEC
PROX PFA PSV LEFT: -78.9 CM/SEC
PROX PFA PSV RIGHT: -70 CM/SEC
PROX POP A PSV LEFT: 38 CM/SEC
PROX POP A PSV RIGHT: 86 CM/SEC
PROX PTA PSV LEFT: 24.4 CM/SEC
PROX PTA PSV RIGHT: -24.4 CM/SEC
PROX SFA PSV LEFT: 40.5 CM/SEC
PROX SFA PSV RIGHT: 106 CM/SEC
RIGHT ANT TIBEAL SYS PSV: 131 CM/S
RIGHT CFA PROX SYS PSV: 51 CM/SEC
RIGHT POPLITEAL DIST SYS PSV: 44.3 CM/S
RIGHT POPLITEAL PROX SYS PSV: 86 CM/S
RIGHT POST TIBIAL SYS PSV: 24.4 CM/S
RIGHT PROFUNDA SYS PSV: 70 CM/S
RIGHT SUPER FEMORAL DIST SYS PSV: 97.6 CM/S
RIGHT SUPER FEMORAL MID SYS PSV: 84 CM/S
RIGHT SUPER FEMORAL PROX SYS PSV: 106 CM/S
SP GR UR STRIP: 1.01 (ref 1–1.03)
UROBILINOGEN UR QL STRIP: NORMAL

## 2017-04-10 PROCEDURE — 81003 URINALYSIS AUTO W/O SCOPE: CPT | Performed by: INTERNAL MEDICINE

## 2017-04-10 PROCEDURE — 63710000001 PREDNISONE PER 1 MG: Performed by: PHYSICIAN ASSISTANT

## 2017-04-10 PROCEDURE — 25010000002 LORAZEPAM PER 2 MG: Performed by: HOSPITALIST

## 2017-04-10 PROCEDURE — 99233 SBSQ HOSP IP/OBS HIGH 50: CPT | Performed by: INTERNAL MEDICINE

## 2017-04-10 PROCEDURE — 99232 SBSQ HOSP IP/OBS MODERATE 35: CPT | Performed by: INTERNAL MEDICINE

## 2017-04-10 PROCEDURE — 70450 CT HEAD/BRAIN W/O DYE: CPT

## 2017-04-10 PROCEDURE — 25010000003 CEFTRIAXONE PER 250 MG: Performed by: PHYSICIAN ASSISTANT

## 2017-04-10 RX ORDER — LORAZEPAM 2 MG/ML
0.5 INJECTION INTRAMUSCULAR
Status: DISCONTINUED | OUTPATIENT
Start: 2017-04-10 | End: 2017-04-11 | Stop reason: HOSPADM

## 2017-04-10 RX ORDER — LORAZEPAM 0.5 MG/1
0.5 TABLET ORAL
Status: DISCONTINUED | OUTPATIENT
Start: 2017-04-10 | End: 2017-04-11 | Stop reason: HOSPADM

## 2017-04-10 RX ORDER — LORAZEPAM 2 MG/ML
1 INJECTION INTRAMUSCULAR EVERY 4 HOURS PRN
Status: DISCONTINUED | OUTPATIENT
Start: 2017-04-10 | End: 2017-04-10

## 2017-04-10 RX ORDER — LORAZEPAM 2 MG/ML
0.5 INJECTION INTRAMUSCULAR ONCE
Status: COMPLETED | OUTPATIENT
Start: 2017-04-10 | End: 2017-04-10

## 2017-04-10 RX ORDER — THIAMINE MONONITRATE (VIT B1) 100 MG
100 TABLET ORAL DAILY
Status: DISCONTINUED | OUTPATIENT
Start: 2017-04-10 | End: 2017-04-11 | Stop reason: HOSPADM

## 2017-04-10 RX ORDER — LORAZEPAM 2 MG/ML
1 INJECTION INTRAMUSCULAR
Status: DISCONTINUED | OUTPATIENT
Start: 2017-04-10 | End: 2017-04-11 | Stop reason: HOSPADM

## 2017-04-10 RX ORDER — LORAZEPAM 1 MG/1
1 TABLET ORAL
Status: DISCONTINUED | OUTPATIENT
Start: 2017-04-10 | End: 2017-04-11 | Stop reason: HOSPADM

## 2017-04-10 RX ADMIN — OXYCODONE AND ACETAMINOPHEN 1 TABLET: 5; 325 TABLET ORAL at 15:46

## 2017-04-10 RX ADMIN — AMIODARONE HYDROCHLORIDE 200 MG: 200 TABLET ORAL at 08:12

## 2017-04-10 RX ADMIN — ASPIRIN 81 MG: 81 TABLET, COATED ORAL at 08:12

## 2017-04-10 RX ADMIN — NICOTINE 1 PATCH: 21 PATCH, EXTENDED RELEASE TRANSDERMAL at 08:12

## 2017-04-10 RX ADMIN — LOSARTAN POTASSIUM AND HYDROCHLOROTHIAZIDE 1 TABLET: 12.5; 5 TABLET ORAL at 08:13

## 2017-04-10 RX ADMIN — LORAZEPAM 0.5 MG: 2 INJECTION, SOLUTION INTRAMUSCULAR; INTRAVENOUS at 05:35

## 2017-04-10 RX ADMIN — OXYCODONE AND ACETAMINOPHEN 1 TABLET: 5; 325 TABLET ORAL at 05:34

## 2017-04-10 RX ADMIN — LORAZEPAM 1 MG: 1 TABLET ORAL at 20:30

## 2017-04-10 RX ADMIN — GABAPENTIN 600 MG: 300 CAPSULE ORAL at 20:30

## 2017-04-10 RX ADMIN — CEFTRIAXONE SODIUM 1 G: 1 INJECTION, SOLUTION INTRAVENOUS at 20:30

## 2017-04-10 RX ADMIN — PREDNISONE 20 MG: 20 TABLET ORAL at 08:12

## 2017-04-10 RX ADMIN — OXYCODONE AND ACETAMINOPHEN 1 TABLET: 5; 325 TABLET ORAL at 21:32

## 2017-04-10 RX ADMIN — ATORVASTATIN CALCIUM 40 MG: 40 TABLET, FILM COATED ORAL at 20:30

## 2017-04-10 RX ADMIN — DOXYCYCLINE HYCLATE 100 MG: 100 CAPSULE ORAL at 08:12

## 2017-04-10 RX ADMIN — Medication 100 MG: at 15:46

## 2017-04-10 RX ADMIN — GABAPENTIN 600 MG: 300 CAPSULE ORAL at 08:12

## 2017-04-10 RX ADMIN — METOPROLOL SUCCINATE 100 MG: 100 TABLET, EXTENDED RELEASE ORAL at 08:12

## 2017-04-10 RX ADMIN — COLCHICINE 0.6 MG: 0.6 TABLET, FILM COATED ORAL at 08:12

## 2017-04-10 RX ADMIN — DOXYCYCLINE HYCLATE 100 MG: 100 CAPSULE ORAL at 20:30

## 2017-04-10 RX ADMIN — DULOXETINE HYDROCHLORIDE 30 MG: 30 CAPSULE, DELAYED RELEASE ORAL at 20:30

## 2017-04-10 RX ADMIN — GABAPENTIN 600 MG: 300 CAPSULE ORAL at 15:46

## 2017-04-10 RX ADMIN — RIVAROXABAN 20 MG: 20 TABLET, FILM COATED ORAL at 17:24

## 2017-04-10 NOTE — PROGRESS NOTES
Discharge Planning Assessment  [facility]     Patient Name: Vasquez Marie  MRN: [id number]  Today's Date: 4/10/2017    Admit Date: 4/7/2017          Discharge Needs Assessment       04/10/17 1500    Living Environment    Lives With alone    Living Arrangements apartment    Provides Primary Care For no one, unable/limited ability to care for self    Quality Of Family Relationships unable to assess    Able to Return to Prior Living Arrangements yes    Discharge Needs Assessment    Concerns To Be Addressed discharge planning concerns    Readmission Within The Last 30 Days no previous admission in last 30 days    Outpatient/Agency/Support Group Needs homecare agency (specify level of care)   Skilled nursing for dxs rheumatoid arthritis, COPD, chronic pain, falls, ETOH abuse    Community Agency Name(S) [facility], [phone]    Equipment Currently Used at Home cane, straight;shower chair;grab bar;walker, rolling;wheelchair    Transportation Available agency transportation    Discharge Disposition still a patient    Discharge Contact Information if Applicable Maral Marie, sister, [phone]            Discharge Plan       04/10/17 1504    Case Management/Social Work Plan    Plan Home with home health    Patient/Family In Agreement With Plan yes    Additional Comments Met with patient in the room to initiate discharge planning. Patient lives alone in an apartment with elevator access in [facility]. He is independent with ADLs and uses a wheelchair, rolling walker, and cane for mobility. Patient has home health for skilled nursing through [facility] in [place] and will need an order to resume home health at discharge. Please advise if PT/OT will need to be added to his home health. Patient will require help with transportation, and MSW will see if patient qualifies for Medicaid transportation. If not, CM will provide a taxi voucher. CM will continue to follow.         Discharge Placement     No  information found        Expected Discharge Date and Time     Expected Discharge Date Expected Discharge Time    Apr 11, 2017               Demographic Summary       04/10/17 1454    Referral Information    Referral Source admission list    Reason For Consult discharge planning    Record Reviewed history and physical;medical record    Contact Information    Permission Granted to Share Information With ;family/designee   sisterMaral    Primary Care Physician Information    Name Marilyn Vermeesch            Functional Status       04/10/17 1459    Employment/Financial    Employment/Finance Comments Medical and rx coverage through [employer]; additional medical coverage through [place] Medicaid; states his RA meds can be expensive; uses [facility] in [place]      04/10/17 1455    Functional Status Prior    Ambulation 1-->assistive equipment    Transferring 1-->assistive equipment    Toileting 1-->assistive equipment    Bathing 1-->assistive equipment    Eating 0-->independent    Communication 0-->understands/communicates without difficulty    Swallowing 0-->swallows foods/liquids without difficulty            Psychosocial     None            Abuse/Neglect     None            Legal     None            Substance Abuse     None            Patient Forms     None          Marisa Gutiérrez

## 2017-04-10 NOTE — PROGRESS NOTES
"      HOSPITALIST DAILY PROGRESS NOTE    Chief Complaint: leg pain    Subjective   SUBJECTIVE/OVERNIGHT EVENTS   In bed, no new complaints.   Per RN, he was a bit confused yesterday but became acute agitated and aggressive overnight.  Required ativan for safety.   Admits to Etoh    Review of Systems:  Gen-no fevers, no chills  CV-no chest pain, no palpitations  Resp-no cough, no dyspnea  GI-no N/V/D, no abd pain    Objective   OBJECTIVE   I have reviewed the vital signs.  /97  Pulse 103  Temp 97.7 °F (36.5 °C) (Oral)   Resp 18  Ht 68\" (172.7 cm)  Wt 234 lb 5 oz (106 kg)  SpO2 95%  BMI 35.63 kg/m2    Physical Exam:  Gen-no acute distress, chronically ill appearing, alert, NAD  CV-RRR, no murmur  Resp-CTAB, no wheezes  Abd-soft, NT, ND, +BS  Ext-changes c/w chronic arterial disease. bilateral LE with hair loss and toenail thickening.  Neuro-A&Ox3 currently, no focal deficits, no tremor noted  Psych- psychomotor agitation, ?anxiety, slightly hostile.     Results:  I have reviewed the labs, culture data, radiology results, and diagnostic studies.      Results from last 7 days  Lab Units 04/08/17  0810 04/07/17  1318   WBC 10*3/mm3 5.94 7.47   HEMOGLOBIN g/dL 11.4* 13.1*   HEMATOCRIT % 34.4* 38.4*   PLATELETS 10*3/mm3 95* 106*       Results from last 7 days  Lab Units 04/08/17 2015 04/08/17  0810   SODIUM mmol/L  --  136   POTASSIUM mmol/L 5.4 3.3*   CHLORIDE mmol/L  --  104   TOTAL CO2 mmol/L  --  26.0   BUN mg/dL  --  6*   CREATININE mg/dL  --  0.70   GLUCOSE mg/dL  --  190*   CALCIUM mg/dL  --  8.4*     CASSIE's pending  Venous duplex without clot.    I have reviewed the medications.      Assessment/Plan   ASSESSMENT/PLAN    Principal Problem:    Cellulitis  Active Problems:    Coronary artery disease involving native coronary artery of native heart without angina pectoris    Essential hypertension    Tobacco abuse    Typical atrial flutter    Atrial flutter with rapid ventricular response    Plan:  Le " cellulitis  --Will continue antibiotics  -- Can transition to omnicef and doxy at time of d/c to complete 14 days abx which would be treatment through 4/21    Agitation/delirium, acute  -- DDx is broad and includes etoh withdrawal as well as sundowning and hepatic enceph  -- treat prn for etoh withdrawal  -- day 3 hosp  -- CIWA protocol, add thiamine  -- check ammonia in AM    PAD.  -- left aorto-iliac and R distal LE disease by art US  -- He was told previously that he had a severe blockage that needed addressed.   -- partner discussed importance of tobacco cessation at length  -- defer timing of further workup to cardiol svc    neuropathy.  --His neuropathy is likely due to his severe PAD.   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 liver clinic is appropriate  -- Other studies including A1C, TSH, B12 unremarkable. Gabapentin increased.    Atrial fibrillation   -- mgmt per cardiology. Planning for TATIANNA w/ DCCV on Monday.    Probable cirrhosis by CT 4/16  -- hep C by history  -- chronic thrombocytopenia    R lung base nodule 7mm in 4/16  -- CXR stable  -- needs CT chest if he hasn't had one as outpt lately.  - will try to find out.    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

## 2017-04-10 NOTE — NURSING NOTE
RN notified [facility] of increased confusion and agitation overnight and this morning and concern of possible alcohol withdrawal symptoms noticed by night shift RN. CT of head ordered to rule out any neuro changes. Hospitalist, Dr. Gonzalez on unit and also made aware, will assess pt and place orders. Pt in bed with bed alarm on and safety precautions in place. Will monitor closely.

## 2017-04-10 NOTE — PROGRESS NOTES
[facility]  Cardiovascular Progress Note  Vasquez Marie  [id number]  [id number]  [unfilled]  1960    DATE OF ADMISSION: 4/7/2017  DATE OF FOLLOW UP:  4/10/17    Marilyn K. Vermeesch, MD    Subjective:     Patient ID: Vasquez Marie is a 56 y.o. male.    Chief Complaint: aflutter f/u     No Known Allergies    Current Facility-Administered Medications:   •  amiodarone (PACERONE) tablet 200 mg, 200 mg, Oral, Q24H, JANIE Hilario, 200 mg at 04/10/17 0812  •  aspirin EC tablet 81 mg, 81 mg, Oral, Daily, JANIE Hilario, 81 mg at 04/10/17 0812  •  atorvastatin (LIPITOR) tablet 40 mg, 40 mg, Oral, Nightly, JANIE Hilario, 40 mg at 04/09/17 2218  •  cefTRIAXone (ROCEPHIN) IVPB 1 g, 1 g, Intravenous, Q24H, JANIE Hilario, 1 g at 04/09/17 2218  •  colchicine tablet 0.6 mg, 0.6 mg, Oral, Daily, JANIE Hilario, 0.6 mg at 04/10/17 0812  •  diltiazem (CARDIZEM) bolus from bag 1 mg/mL 10 mg, 10 mg, Intravenous, Once, Seb Quintero DO, Stopped at 04/07/17 1945  •  doxycycline (VIBRAMYCIN) capsule 100 mg, 100 mg, Oral, Q12H, Iona Akbar MD, 100 mg at 04/10/17 0812  •  DULoxetine (CYMBALTA) DR capsule 30 mg, 30 mg, Oral, Nightly, JANIE Hilario, 30 mg at 04/09/17 2219  •  gabapentin (NEURONTIN) capsule 600 mg, 600 mg, Oral, TID, Alexandria Zuñiga II, DO, 600 mg at 04/10/17 0812  •  losartan-hydrochlorothiazide (HYZAAR) 50-12.5 MG per tablet 1 tablet, 1 tablet, Oral, Q24H, JANIE Hilario, 1 tablet at 04/10/17 0813  •  Magnesium Sulfate 2 gram Bolus, followed by 8 gram infusion (total Mg dose 10 grams)- Mg less than or equal to 1mg/dL, 2 g, Intravenous, PRN **OR** Magnesium Sulfate 6 gram Infusion (2 gm x 3) -Mg 1.1 -1.5 mg/dL, 2 g, Intravenous, PRN **OR** magnesium sulfate 4 gram infusion- Mg 1.6-1.9 mg/dL, 4 g, Intravenous, PRN, JANIE Hilario  •  melatonin tablet 5 mg, 5 mg, Oral, Nightly PRN, Iona Akbar MD, 5 mg at 04/08/17 0414  •   metoprolol succinate XL (TOPROL-XL) 24 hr tablet 100 mg, 100 mg, Oral, Q24H, JANIE Hilario, 100 mg at 04/10/17 0812  •  nicotine (NICODERM CQ) 21 MG/24HR patch 1 patch, 1 patch, Transdermal, Q24H, Seb Ingrid, DO, 1 patch at 04/10/17 0812  •  oxyCODONE-acetaminophen (PERCOCET) 5-325 MG per tablet 1 tablet, 1 tablet, Oral, Q6H PRN, Seb Ingrid, DO, 1 tablet at 04/10/17 0534  •  potassium chloride (KLOR-CON) packet 40 mEq, 40 mEq, Oral, PRN, JANIE Hilario  •  potassium chloride (MICRO-K) CR capsule 40 mEq, 40 mEq, Oral, PRN, JANIE Hilario, 40 mEq at 04/08/17 1614  •  predniSONE (DELTASONE) tablet 20 mg, 20 mg, Oral, Daily, JANIE Hilario, 20 mg at 04/10/17 0812  •  rivaroxaban (XARELTO) tablet 20 mg, 20 mg, Oral, Daily With Dinner, JANIE Hilario, 20 mg at 04/09/17 1645  •  sodium chloride 0.9 % flush 1-10 mL, 1-10 mL, Intravenous, PRN, JANIE Hilario    History of Present Illness    Patient very confused and hard to arouse. Nursing reports he has been this way throughout the night.     When i saw patient less confused but was agitated yesterday PM and given ativan.     ROS   14 point ROS negative except as outlined in problem list, HPI and other parts of the note.    Procedures       Objective:       Vitals:    04/09/17 2020 04/10/17 0440 04/10/17 0720 04/10/17 0812   BP: 131/87 163/93 139/97 139/97   BP Location: Right arm Right arm     Patient Position: Sitting Lying     Pulse: 92 72 89 103   Resp: 18 20 18    Temp: 97.9 °F (36.6 °C) 98.1 °F (36.7 °C) 97.7 °F (36.5 °C)    TempSrc: Oral Oral Oral    SpO2:       Weight:       Height:           Intake/Output Summary (Last 24 hours) at 04/10/17 0917  Last data filed at 04/09/17 1812   Gross per 24 hour   Intake              480 ml   Output                0 ml   Net              480 ml       GENERAL: Well-developed, well-nourished patient in no acute distress.  HEENT: Normocephalic, atraumatic, PERRLA. Moist mucous membranes.  NECK: No JVD  present at 30°. No carotid bruits auscultated.  LUNGS: Clear to auscultation.  CARDIOVASCULAR: irreg irreg No murmurs, gallops or rubs noted.   ABDOMEN: Soft, nontender. Positive bowel sounds.  MUSCULOSKELETAL: No gross deformities. No clubbing, cyanosis  EXT: pulses intact, no swelling  SKIN: Pink, warm  Neuro: Nonfocal exam. Gait intact  Multiple bruising and bleeding wounds on LE.  Ecchymosis    The patient's old records including ambulatory rhythm recordings (ECGs, Holter/event monitor) were reviewed and discussed.      Lab Review:     Results from last 7 days  Lab Units 04/08/17 2015 04/08/17 0810 04/07/17  1318   SODIUM mmol/L  --  136 143   POTASSIUM mmol/L 5.4 3.3* 3.9   CHLORIDE mmol/L  --  104 104   TOTAL CO2 mmol/L  --  26.0 27.0   BUN mg/dL  --  6* 9   CREATININE mg/dL  --  0.70 0.90   GLUCOSE mg/dL  --  190* 151*   CALCIUM mg/dL  --  8.4* 9.5       Results from last 7 days  Lab Units 04/07/17  1318   CK TOTAL U/L 129   TROPONIN I ng/mL 0.023   CK MB INDEX % 2.6*       Results from last 7 days  Lab Units 04/08/17  0810 04/07/17  1318   WBC 10*3/mm3 5.94 7.47   HEMOGLOBIN g/dL 11.4* 13.1*   HEMATOCRIT % 34.4* 38.4*   PLATELETS 10*3/mm3 95* 106*       Results from last 7 days  Lab Units 04/07/17  1318   INR  1.19*           Results from last 7 days  Lab Units 04/09/17  0620   MAGNESIUM mg/dL 2.1     Tele: Aflutter         Assessment & Plan:     1. Typical appearing atrial flutter w/ recurrence despite amiodarone and seems to be asymptomatic presently with some SOB at times but uncertain if he is going in and out of Aflutter.   - will continue PO amio and off cardizem gtt now  - INR was subtherapeutic- DC'd coumadin and started Xarelto 20mg daily as patient has normal Cr.   - LVEF 50% 2/2017   - Plan was for TATIANNA/ECV tomorrow with patient having confusion and being aggressive overnight. Medicine following.    - Long run consideration of EP study plus or minus ablation of this typical appearing atrial  flutter. Patient wants conservative management with meds now.     2. AMS- questionable CVA vs Withdrawals   - CT brain negative. PRN ativan for possible withdrawal.  Patient does consume significant alcohol a daily basis.  - Appreciate medicine help      2. HTN, elevated   - continue home meds and monitor. Now improved with pain taken care of      3. CAD s/p PCI, stable. No anginal symptoms   - Consider ischemia eval and follow up with Dr Landa      4. PVD w/ hx of bilateral stents  - arterial duplex of LEs completed yesterday. Awaiting results       5. Tobacco abuse   - counseled on cessation       6. LE ulcerations secondary to severe gout / bacterial infection in LE  - hospitalists have evaluated and recommended doxycycline and omnicef for cellulitis   - we appreciate their assistance and recommendations.   - Seems medicine thinks infection is stable with only mild cellulited.       7. Pain noted in his right upper extremity and also lower extremities. This seems to be his primary complaint. Venous duplex final pending but seems to be normal. Pain improved on pain          JANIE Lopez  04/10/17  9:17 AM    I, Seb Quintero, have reviewed the note in full and agree with all aspects of the above including physical exam, assessment, labs and plan with changes made accordingly. Face to Face Time was spent with the patient.    Seb Quintero DO  04/10/17  11:17 AM        EMR Dragon/Transcription disclaimer:  Much of this encounter note is an electronic transcription/translation of spoken language to printed text. Electronic translation of spoken language may permit erroneous, or at times, nonsensical words or phrases to be inadvertently transcribed. Although I have reviewed the note for such errors, some may still exist.

## 2017-04-11 ENCOUNTER — APPOINTMENT (OUTPATIENT)
Dept: CARDIOLOGY | Facility: HOSPITAL | Age: 57
End: 2017-04-11

## 2017-04-11 VITALS
SYSTOLIC BLOOD PRESSURE: 124 MMHG | HEART RATE: 79 BPM | BODY MASS INDEX: 35.51 KG/M2 | RESPIRATION RATE: 16 BRPM | DIASTOLIC BLOOD PRESSURE: 91 MMHG | TEMPERATURE: 98.6 F | HEIGHT: 68 IN | WEIGHT: 234.31 LBS | OXYGEN SATURATION: 98 %

## 2017-04-11 LAB
AMMONIA BLD-SCNC: 40 UMOL/L (ref 19–60)
FOLATE SERPL-MCNC: 18.94 NG/ML (ref 3.2–20)

## 2017-04-11 PROCEDURE — 92960 CARDIOVERSION ELECTRIC EXT: CPT

## 2017-04-11 PROCEDURE — 82746 ASSAY OF FOLIC ACID SERUM: CPT | Performed by: INTERNAL MEDICINE

## 2017-04-11 PROCEDURE — 92960 CARDIOVERSION ELECTRIC EXT: CPT | Performed by: INTERNAL MEDICINE

## 2017-04-11 PROCEDURE — 93005 ELECTROCARDIOGRAM TRACING: CPT | Performed by: INTERNAL MEDICINE

## 2017-04-11 PROCEDURE — 63710000001 PREDNISONE PER 1 MG: Performed by: PHYSICIAN ASSISTANT

## 2017-04-11 PROCEDURE — 82140 ASSAY OF AMMONIA: CPT | Performed by: INTERNAL MEDICINE

## 2017-04-11 PROCEDURE — 93325 DOPPLER ECHO COLOR FLOW MAPG: CPT | Performed by: INTERNAL MEDICINE

## 2017-04-11 PROCEDURE — 93312 ECHO TRANSESOPHAGEAL: CPT

## 2017-04-11 PROCEDURE — 93325 DOPPLER ECHO COLOR FLOW MAPG: CPT

## 2017-04-11 PROCEDURE — 99232 SBSQ HOSP IP/OBS MODERATE 35: CPT | Performed by: INTERNAL MEDICINE

## 2017-04-11 PROCEDURE — 93320 DOPPLER ECHO COMPLETE: CPT | Performed by: INTERNAL MEDICINE

## 2017-04-11 PROCEDURE — 93312 ECHO TRANSESOPHAGEAL: CPT | Performed by: INTERNAL MEDICINE

## 2017-04-11 PROCEDURE — 93320 DOPPLER ECHO COMPLETE: CPT

## 2017-04-11 PROCEDURE — 5A2204Z RESTORATION OF CARDIAC RHYTHM, SINGLE: ICD-10-PCS | Performed by: INTERNAL MEDICINE

## 2017-04-11 PROCEDURE — 93321 DOPPLER ECHO F-UP/LMTD STD: CPT | Performed by: PHYSICIAN ASSISTANT

## 2017-04-11 PROCEDURE — 93312 ECHO TRANSESOPHAGEAL: CPT | Performed by: PHYSICIAN ASSISTANT

## 2017-04-11 PROCEDURE — 25010000002 MIDAZOLAM PER 1 MG: Performed by: INTERNAL MEDICINE

## 2017-04-11 RX ORDER — LOSARTAN POTASSIUM AND HYDROCHLOROTHIAZIDE 12.5; 5 MG/1; MG/1
1 TABLET ORAL EVERY 24 HOURS
Qty: 30 TABLET | Refills: 6 | Status: SHIPPED | OUTPATIENT
Start: 2017-04-11 | End: 2017-05-30 | Stop reason: SDUPTHER

## 2017-04-11 RX ORDER — CEFUROXIME AXETIL 250 MG/1
250 TABLET ORAL 2 TIMES DAILY
Qty: 20 TABLET | Refills: 0 | Status: SHIPPED | OUTPATIENT
Start: 2017-04-11 | End: 2017-04-11

## 2017-04-11 RX ORDER — ASPIRIN 81 MG/1
81 TABLET ORAL DAILY
Qty: 30 TABLET | Refills: 6 | Status: SHIPPED | OUTPATIENT
Start: 2017-04-11 | End: 2017-05-30 | Stop reason: SDUPTHER

## 2017-04-11 RX ORDER — MIDAZOLAM HYDROCHLORIDE 1 MG/ML
INJECTION INTRAMUSCULAR; INTRAVENOUS
Status: COMPLETED | OUTPATIENT
Start: 2017-04-11 | End: 2017-04-11

## 2017-04-11 RX ORDER — DOXYCYCLINE HYCLATE 100 MG/1
100 TABLET, DELAYED RELEASE ORAL 2 TIMES DAILY
Qty: 20 TABLET | Refills: 0 | Status: SHIPPED | OUTPATIENT
Start: 2017-04-11 | End: 2017-04-21

## 2017-04-11 RX ORDER — AMIODARONE HYDROCHLORIDE 200 MG/1
200 TABLET ORAL
Qty: 30 TABLET | Refills: 5 | Status: SHIPPED | OUTPATIENT
Start: 2017-04-11 | End: 2017-05-30 | Stop reason: SDUPTHER

## 2017-04-11 RX ORDER — DOXYCYCLINE HYCLATE 100 MG/1
100 TABLET, DELAYED RELEASE ORAL 2 TIMES DAILY
Qty: 20 TABLET | Refills: 0 | Status: SHIPPED | OUTPATIENT
Start: 2017-04-11 | End: 2017-04-11

## 2017-04-11 RX ORDER — NALOXONE HYDROCHLORIDE 0.4 MG/ML
INJECTION, SOLUTION INTRAMUSCULAR; INTRAVENOUS; SUBCUTANEOUS
Status: DISCONTINUED
Start: 2017-04-11 | End: 2017-04-11 | Stop reason: WASHOUT

## 2017-04-11 RX ORDER — FLUMAZENIL 0.1 MG/ML
INJECTION INTRAVENOUS
Status: DISCONTINUED
Start: 2017-04-11 | End: 2017-04-11 | Stop reason: WASHOUT

## 2017-04-11 RX ORDER — CEFUROXIME AXETIL 250 MG/1
250 TABLET ORAL 2 TIMES DAILY
Qty: 20 TABLET | Refills: 0 | Status: SHIPPED | OUTPATIENT
Start: 2017-04-11 | End: 2017-04-21

## 2017-04-11 RX ORDER — LANOLIN ALCOHOL/MO/W.PET/CERES
6 CREAM (GRAM) TOPICAL NIGHTLY
Qty: 30 TABLET | Refills: 6 | Status: SHIPPED | OUTPATIENT
Start: 2017-04-11 | End: 2017-07-03

## 2017-04-11 RX ORDER — ATORVASTATIN CALCIUM 40 MG/1
40 TABLET, FILM COATED ORAL NIGHTLY
Qty: 30 TABLET | Refills: 6 | Status: SHIPPED | OUTPATIENT
Start: 2017-04-11 | End: 2017-05-30 | Stop reason: SDUPTHER

## 2017-04-11 RX ORDER — MIDAZOLAM HYDROCHLORIDE 1 MG/ML
INJECTION INTRAMUSCULAR; INTRAVENOUS
Status: DISCONTINUED
Start: 2017-04-11 | End: 2017-04-11 | Stop reason: HOSPADM

## 2017-04-11 RX ORDER — DULOXETIN HYDROCHLORIDE 30 MG/1
30 CAPSULE, DELAYED RELEASE ORAL NIGHTLY
Qty: 30 CAPSULE | Refills: 1 | Status: SHIPPED | OUTPATIENT
Start: 2017-04-11 | End: 2017-06-29

## 2017-04-11 RX ADMIN — METHOHEXITAL SODIUM 30 MG: 500 INJECTION, POWDER, LYOPHILIZED, FOR SOLUTION INTRAMUSCULAR; INTRAVENOUS; RECTAL at 09:33

## 2017-04-11 RX ADMIN — Medication 100 MG: at 08:01

## 2017-04-11 RX ADMIN — DOXYCYCLINE HYCLATE 100 MG: 100 CAPSULE ORAL at 08:00

## 2017-04-11 RX ADMIN — ASPIRIN 81 MG: 81 TABLET, COATED ORAL at 08:01

## 2017-04-11 RX ADMIN — METOPROLOL SUCCINATE 100 MG: 100 TABLET, EXTENDED RELEASE ORAL at 08:00

## 2017-04-11 RX ADMIN — OXYCODONE AND ACETAMINOPHEN 1 TABLET: 5; 325 TABLET ORAL at 03:30

## 2017-04-11 RX ADMIN — MIDAZOLAM HYDROCHLORIDE 2 MG: 1 INJECTION, SOLUTION INTRAMUSCULAR; INTRAVENOUS at 09:26

## 2017-04-11 RX ADMIN — NICOTINE 1 PATCH: 21 PATCH, EXTENDED RELEASE TRANSDERMAL at 08:01

## 2017-04-11 RX ADMIN — LORAZEPAM 1 MG: 1 TABLET ORAL at 01:29

## 2017-04-11 RX ADMIN — LOSARTAN POTASSIUM AND HYDROCHLOROTHIAZIDE 1 TABLET: 12.5; 5 TABLET ORAL at 08:00

## 2017-04-11 RX ADMIN — PREDNISONE 20 MG: 20 TABLET ORAL at 08:00

## 2017-04-11 RX ADMIN — METHOHEXITAL SODIUM 40 MG: 500 INJECTION, POWDER, LYOPHILIZED, FOR SOLUTION INTRAMUSCULAR; INTRAVENOUS; RECTAL at 09:27

## 2017-04-11 RX ADMIN — GABAPENTIN 600 MG: 300 CAPSULE ORAL at 08:00

## 2017-04-11 RX ADMIN — COLCHICINE 0.6 MG: 0.6 TABLET, FILM COATED ORAL at 08:00

## 2017-04-11 RX ADMIN — AMIODARONE HYDROCHLORIDE 200 MG: 200 TABLET ORAL at 08:00

## 2017-04-11 RX ADMIN — OXYCODONE AND ACETAMINOPHEN 1 TABLET: 5; 325 TABLET ORAL at 11:44

## 2017-04-11 NOTE — PROGRESS NOTES
Continued Stay Note  [facility]     Patient Name: Vasquez Marie  MRN: [id number]  Today's Date: 4/11/2017    Admit Date: 4/7/2017          Discharge Plan       04/11/17 1351    Case Management/Social Work Plan    Additional Comments [place] summary [place] [facility]. CM will follow.               Discharge Codes     None        Expected Discharge Date and Time     Expected Discharge Date Expected Discharge Time    Apr 11, 2017             Gena Gonzalez RN

## 2017-04-11 NOTE — PROGRESS NOTES
Continued Stay Note  [facility]     Patient Name: Vasquez Marie  MRN: [id number]  Today's Date: 4/11/2017    Admit Date: 4/7/2017          Discharge Plan       04/11/17 1501    Case Management/Social Work Plan    Additional Comments SW called Medicaid transportation [phone] and pt does qualify for transportation. SW set up transportation as everything had been arranged and pt is ready for discharge. [employer] transportation is set to transport pt home today and pt and pt's nurse have been updated of this.       04/11/17 1351    Case Management/Social Work Plan    Additional Comments DC summary faxed to [facility]. CM will follow.               Discharge Codes     None        Expected Discharge Date and Time     Expected Discharge Date Expected Discharge Time    Apr 11, 2017             DEISY Overton

## 2017-04-11 NOTE — PROGRESS NOTES
[facility] at [facility]  Cardiovascular Progress Note  Vasquez Marie  N642/1  [id number]  1960    DATE OF ADMISSION: 4/7/2017  DATE OF FOLLOW UP:  4/11/17    Marilyn K. Vermeesch, MD    Subjective:     Patient ID: Vasquez Marie is a 56 y.o. male.    Chief Complaint: aflutter f/u     No Known Allergies    Current Facility-Administered Medications:   •  amiodarone (PACERONE) tablet 200 mg, 200 mg, Oral, Q24H, JANIE Hilario, 200 mg at 04/11/17 0800  •  aspirin EC tablet 81 mg, 81 mg, Oral, Daily, JANIE Hilario, 81 mg at 04/11/17 0801  •  atorvastatin (LIPITOR) tablet 40 mg, 40 mg, Oral, Nightly, JANIE Hilario, 40 mg at 04/10/17 2030  •  cefTRIAXone (ROCEPHIN) IVPB 1 g, 1 g, Intravenous, Q24H, JANIE Hilario, 1 g at 04/10/17 2030  •  colchicine tablet 0.6 mg, 0.6 mg, Oral, Daily, JANIE Hilario, 0.6 mg at 04/11/17 0800  •  diltiazem (CARDIZEM) bolus from bag 1 mg/mL 10 mg, 10 mg, Intravenous, Once, Seb Quintero DO, Stopped at 04/07/17 1945  •  doxycycline (VIBRAMYCIN) capsule 100 mg, 100 mg, Oral, Q12H, Iona Akbar MD, 100 mg at 04/11/17 0800  •  DULoxetine (CYMBALTA) DR capsule 30 mg, 30 mg, Oral, Nightly, JANIE Hilario, 30 mg at 04/10/17 2030  •  gabapentin (NEURONTIN) capsule 600 mg, 600 mg, Oral, TID, Alexandria Zuñiga II, DO, 600 mg at 04/11/17 0800  •  LORazepam (ATIVAN) tablet 0.5 mg, 0.5 mg, Oral, Q2H PRN **OR** LORazepam (ATIVAN) injection 0.5 mg, 0.5 mg, Intravenous, Q2H PRN **OR** LORazepam (ATIVAN) tablet 1 mg, 1 mg, Oral, Q1H PRN, 1 mg at 04/11/17 0129 **OR** LORazepam (ATIVAN) injection 1 mg, 1 mg, Intravenous, Q1H PRN **OR** LORazepam (ATIVAN) injection 1 mg, 1 mg, Intravenous, Q15 Min PRN **OR** LORazepam (ATIVAN) injection 1 mg, 1 mg, Intramuscular, Q15 Min PRN, Fide Gonzalez MD  •  losartan-hydrochlorothiazide (HYZAAR) 50-12.5 MG per tablet 1 tablet, 1 tablet, Oral, Q24H, JANIE Hilario, 1 tablet at 04/11/17 0800  •   Magnesium Sulfate 2 gram Bolus, followed by 8 gram infusion (total Mg dose 10 grams)- Mg less than or equal to 1mg/dL, 2 g, Intravenous, PRN **OR** Magnesium Sulfate 6 gram Infusion (2 gm x 3) -Mg 1.1 -1.5 mg/dL, 2 g, Intravenous, PRN **OR** magnesium sulfate 4 gram infusion- Mg 1.6-1.9 mg/dL, 4 g, Intravenous, PRN, JANIE Hilario  •  melatonin tablet 5 mg, 5 mg, Oral, Nightly PRN, Iona Akbar MD, 5 mg at 04/08/17 0134  •  methohexital (BREVITAL SODIUM) injection, , , Code / Trauma / Sedation Medication, Quang Geiger MD, 30 mg at 04/11/17 0933  •  metoprolol succinate XL (TOPROL-XL) 24 hr tablet 100 mg, 100 mg, Oral, Q24H, JANIE Hilario, 100 mg at 04/11/17 0800  •  midazolam (VERSED) 2 MG/2ML injection  - ADS Override Pull, , , ,   •  nicotine (NICODERM CQ) 21 MG/24HR patch 1 patch, 1 patch, Transdermal, Q24H, Seb Fieldal, DO, 1 patch at 04/11/17 0801  •  oxyCODONE-acetaminophen (PERCOCET) 5-325 MG per tablet 1 tablet, 1 tablet, Oral, Q6H PRN, Seb Montesggal, DO, 1 tablet at 04/11/17 1144  •  potassium chloride (KLOR-CON) packet 40 mEq, 40 mEq, Oral, PRN, JANIE Hilario  •  potassium chloride (MICRO-K) CR capsule 40 mEq, 40 mEq, Oral, PRN, JANIE Hilario, 40 mEq at 04/08/17 1614  •  predniSONE (DELTASONE) tablet 20 mg, 20 mg, Oral, Daily, JANIE Hilario, 20 mg at 04/11/17 0800  •  rivaroxaban (XARELTO) tablet 20 mg, 20 mg, Oral, Daily With Dinner, JANIE Hilario, 20 mg at 04/10/17 1724  •  sodium chloride 0.9 % flush 1-10 mL, 1-10 mL, Intravenous, PRN, JANIE Hilario  •  thiamine (VITAMIN B-1) tablet 100 mg, 100 mg, Oral, Daily, Fide Gonzalez MD, 100 mg at 04/11/17 0801    History of Present Illness    Confusion has resolved today. Ready to go home. No complaints.     S/p TATIANNA and ECV now in NSR    ROS   14 point ROS negative except as outlined in problem list, HPI and other parts of the note.    Procedures       Objective:       Vitals:    04/11/17 1045 04/11/17 1100 04/11/17 1115  04/11/17 1145   BP: 99/78 112/74 110/82 124/91   BP Location:    Left arm   Patient Position:    Sitting   Pulse: 75 74 73 79   Resp: 14 14 16    Temp:       TempSrc:       SpO2: 95% 96% 97% 98%   Weight:       Height:           Intake/Output Summary (Last 24 hours) at 04/11/17 1237  Last data filed at 04/11/17 0000   Gross per 24 hour   Intake              480 ml   Output             1500 ml   Net            -1020 ml       GENERAL: Well-developed, well-nourished patient in no acute distress.  HEENT: Normocephalic, atraumatic, PERRLA. Moist mucous membranes.  NECK: No JVD present at 30°. No carotid bruits auscultated.  LUNGS: Clear to auscultation.  CARDIOVASCULAR: Heart has a regular rate and rhythm. No murmurs, gallops or rubs noted.   ABDOMEN: Soft, nontender. Positive bowel sounds.  MUSCULOSKELETAL: No gross deformities. No clubbing, cyanosis  EXT: pulses intact, no swelling  SKIN: Pink, warm  Neuro: Nonfocal exam. Gait intact  Ecchymosis throughout UE, LE. Lesions in LE    The patient's old records including ambulatory rhythm recordings (ECGs, Holter/event monitor) were reviewed and discussed.      Lab Review:     Results from last 7 days  Lab Units 04/08/17  2015 04/08/17  0810 04/07/17  1318   SODIUM mmol/L  --  136 143   POTASSIUM mmol/L 5.4 3.3* 3.9   CHLORIDE mmol/L  --  104 104   TOTAL CO2 mmol/L  --  26.0 27.0   BUN mg/dL  --  6* 9   CREATININE mg/dL  --  0.70 0.90   GLUCOSE mg/dL  --  190* 151*   CALCIUM mg/dL  --  8.4* 9.5       Results from last 7 days  Lab Units 04/07/17  1318   CK TOTAL U/L 129   TROPONIN I ng/mL 0.023   CK MB INDEX % 2.6*       Results from last 7 days  Lab Units 04/08/17  0810 04/07/17  1318   WBC 10*3/mm3 5.94 7.47   HEMOGLOBIN g/dL 11.4* 13.1*   HEMATOCRIT % 34.4* 38.4*   PLATELETS 10*3/mm3 95* 106*       Results from last 7 days  Lab Units 04/07/17  1318   INR  1.19*           Results from last 7 days  Lab Units 04/09/17  0620   MAGNESIUM mg/dL 2.1     Tele: NORA           Assessment & Plan:     1. Typical appearing atrial flutter w/ recurrence despite amiodarone and seems to be asymptomatic presently with some SOB at times but uncertain if he is going in and out of Aflutter.   - s/p TATIANNA and ECV now in sinus  - will continue PO amio and add Metoprolol 25 mg BID  - INR was subtherapeutic- DC'd coumadin and started Xarelto 20mg daily as patient has normal Cr.   - LVEF 50% 2/2017   - Long run consideration of EP study plus or minus ablation of this typical appearing atrial flutter. Patient wants conservative management with meds now per his request     2. AMS- ruled out CVA, likely withdrawals   - CT brain negative. PRN ativan for possible withdrawal. Patient does consume significant alcohol a daily basis.  - Appreciate medicine help  - Now back to baseline.       2. HTN, elevated   - continue home meds and monitor. Now improved with pain taken care of      3. CAD s/p PCI, stable. No anginal symptoms   - Consider ischemia eval and follow up with Dr Landa      4. PVD w/ hx of bilateral stents  · Severe left inflow ( aorto-iliac) disease.  · Severe distal right lower extremity trifurcation disease         5. Tobacco abuse   - counseled on cessation       6. LE ulcerations secondary to severe gout / bacterial infection in LE  - hospitalists have evaluated and recommended doxycycline and omnicef for cellulitis   - we appreciate their assistance and recommendations.   - Seems medicine thinks infection is stable with only mild cellulitus       7. Pain noted in his right upper extremity and also lower extremities. This seems to be his primary complaint. Venous duplex final pending but seems to be normal. Pain improved on pain      Ok to DC home today if no issues later this afternoon. FOllow up with Syeda in 3-4 weeks and with me in 8-10 weeks.     Seb Quintero DO  04/11/17  12:37 PM        EMR Dragon/Transcription disclaimer:  Much of this encounter note is an electronic  transcription/translation of spoken language to printed text. Electronic translation of spoken language may permit erroneous, or at times, nonsensical words or phrases to be inadvertently transcribed. Although I have reviewed the note for such errors, some may still exist.

## 2017-04-11 NOTE — SIGNIFICANT NOTE
D/C teaching, scripts and paperwork provided to pt. Verbalized understanding. [facility] pharmacy coordinated with pt home pharmacy to have meds delivered to pt at home today. No s/sx distress, transported out to car via w/c and transport. Pt belongings taken with him.

## 2017-04-11 NOTE — PLAN OF CARE
Problem: Patient Care Overview (Adult)  Goal: Plan of Care Review  Outcome: Outcome(s) achieved Date Met:  04/11/17  Goal: Adult Individualization and Mutuality  Outcome: Outcome(s) achieved Date Met:  04/11/17  Goal: Discharge Needs Assessment  Outcome: Outcome(s) achieved Date Met:  04/11/17    Problem: Cardiac Output, Decreased (Adult)  Goal: Identify Related Risk Factors and Signs and Symptoms  Outcome: Outcome(s) achieved Date Met:  04/11/17  Goal: Adequate Cardiac Output/Effective Tissue Perfusion  Outcome: Outcome(s) achieved Date Met:  04/11/17    Problem: Fall Risk (Adult)  Goal: Identify Related Risk Factors and Signs and Symptoms  Outcome: Outcome(s) achieved Date Met:  04/11/17  Goal: Absence of Falls  Outcome: Outcome(s) achieved Date Met:  04/11/17

## 2017-04-11 NOTE — DISCHARGE PLACEMENT REQUEST
"Vasquez Jefferson (56 y.o. Male)     Date of Birth Social Security Number Address Home Phone MRN    1960  [address]  [address]  [place] [id number] [phone] [id number]    Jain Marital Status          Unknown Single       Admission Date Admission Type Admitting Provider Attending Provider Department, Room/Bed    4/7/17 Elective Ingrid, Seb, Seb Calderon,  [facility] 6B, N642/1    Discharge Date Discharge Disposition Discharge Destination         Home or Self Care             Attending Provider: Seb Quintero DO     Allergies:  No Known Allergies    Isolation:  None   Infection:  None   Code Status:  FULL    Ht:  68\" (172.7 cm)   Wt:  234 lb 5 oz (106 kg)    Admission Cmt:  None   Principal Problem:  Cellulitis [L03.90]                 Active Insurance as of 4/7/2017     Primary Coverage     Payor Plan Insurance Group Employer/Plan Group    HUMANA HUMANA [id number]     Payor Plan Address Payor Plan Phone Number Effective From Effective To    [address] [address] [phone] 1/1/2017     [place] [address]       Subscriber Name Subscriber Birth Date Member ID       VASQUEZ JEFFERSON 1960 [id number]           Secondary Coverage     Payor Plan Insurance Group Employer/Plan Group    [place]      Payor Plan Address Payor Plan Phone Number Effective From Effective To    [address] [phone] 7/1/2016     [place] [address]       Subscriber Name Subscriber Birth Date Member ID       VASQUEZ JEFFERSON 1960 [id number]                 Emergency Contacts      (Rel.) Home Phone Work Phone Mobile Phone    Maral Jefferson (Sister) [phone] -- --              Discharge Summary     No notes of this type exist for this encounter.        "

## 2017-04-11 NOTE — PROGRESS NOTES
"      HOSPITALIST DAILY PROGRESS NOTE    Chief Complaint: leg pain    Subjective   SUBJECTIVE/OVERNIGHT EVENTS   In bed, no new complaints.  less agitated per RN  No complaints.  Asking if he is going to get \"medicine to get me through to the 28th\" when he sees a doctor in [place].  I explained I only prescribe his antibiotics    Review of Systems:  Gen-no fevers, no chills  CV-no chest pain, no palpitations  Resp-no cough, no dyspnea  GI-no N/V/D, no abd pain    Objective   OBJECTIVE   I have reviewed the vital signs.  /91 (BP Location: Left arm, Patient Position: Sitting)  Pulse 79  Temp 98.6 °F (37 °C) (Oral)   Resp 16  Ht 68\" (172.7 cm)  Wt 234 lb 5 oz (106 kg)  SpO2 98%  BMI 35.63 kg/m2    Physical Exam:  Gen-no acute distress, chronically ill appearing, alert, NAD  CV-RRR, no murmur  Resp-CTAB, no wheezes  Abd-soft, NT, ND, +BS  Ext-changes c/w chronic arterial disease. bilateral LE with hair loss and toenail thickening. Dressing on R shin not removed.  Neuro-A&Ox3 currently, no focal deficits, no tremor noted  Psych- psychomotor agitation, ?anxiety, slightly hostile.     Results:  I have reviewed the labs, culture data, radiology results, and diagnostic studies.      Results from last 7 days  Lab Units 04/08/17  0810 04/07/17  1318   WBC 10*3/mm3 5.94 7.47   HEMOGLOBIN g/dL 11.4* 13.1*   HEMATOCRIT % 34.4* 38.4*   PLATELETS 10*3/mm3 95* 106*       Results from last 7 days  Lab Units 04/08/17 2015 04/08/17  0810   SODIUM mmol/L  --  136   POTASSIUM mmol/L 5.4 3.3*   CHLORIDE mmol/L  --  104   TOTAL CO2 mmol/L  --  26.0   BUN mg/dL  --  6*   CREATININE mg/dL  --  0.70   GLUCOSE mg/dL  --  190*   CALCIUM mg/dL  --  8.4*     CASSIE's pending  Venous duplex without clot.    I have reviewed the medications.      Assessment/Plan   ASSESSMENT/PLAN    Principal Problem:    Cellulitis  Active Problems:    Coronary artery disease involving native coronary artery of native heart without angina pectoris    " Essential hypertension    Tobacco abuse    Typical atrial flutter    Atrial flutter with rapid ventricular response    Plan:  Le cellulitis  --Will continue antibiotics through 4/21 oral.       Agitation/delirium, acute  -- bettter  -- cd be etoh withdrawal vs sundowning    PAD.  -- left aorto-iliac and R distal LE disease by art US  -- He was told previously that he had a severe blockage that needed addressed.   -- partner discussed importance of tobacco cessation at length  -- defer timing of further workup to cardiol svc    neuropathy.  --His neuropathy is likely due to his severe PAD.   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 clinic is appropriate  -- Other studies including A1C, TSH, B12 unremarkable. Gabapentin increased.    Atrial fibrillation   -- mgmt per cardiology. Planning for TATIANNA w/ DCCV on Monday.    Probable cirrhosis by CT 4/16  -- hep C by history  -- chronic thrombocytopenia    R lung base nodule 7mm in 4/16  -- CXR stable  -- needs CT chest eventually  to follow up if he hasn't had one as outpt lately.    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

## 2017-04-11 NOTE — DISCHARGE INSTR - APPOINTMENTS
You will be called with a follow-up appointment to see Dr. Quintero in 6-8 weeks.   Seb Quintero, DO      Cardiology  Cardiac Electrophysiology [phone] [address] [address]

## 2017-04-12 LAB
BH CV ECHO MEAS - BSA(HAYCOCK): 2.3 M^2
BH CV ECHO MEAS - BSA: 2.2 M^2
BH CV ECHO MEAS - BZI_BMI: 35.6 KILOGRAMS/M^2
BH CV ECHO MEAS - BZI_METRIC_HEIGHT: 172.7 CM
BH CV ECHO MEAS - BZI_METRIC_WEIGHT: 106.1 KG
LV EF 2D ECHO EST: 55 %

## 2017-04-12 NOTE — THERAPY DISCHARGE NOTE
Acute Care - Physical Therapy Discharge Summary  [facility]       Patient Name: Vasquez Marie  : 1960  MRN: [id number]    Today's Date: 2017  Onset of Illness/Injury or Date of Surgery Date: 17    Date of Referral to PT: 17  Referring Physician: DO Ingrid      Admit Date: 2017      PT Recommendation and Plan    Visit Dx:    ICD-10-CM ICD-9-CM   1. Impaired functional mobility, balance, gait, and endurance Z74.09 V49.89                       IP PT Goals       17 1400          Bed Mobility PT LTG    Bed Mobility PT LTG, Date Established 17  -DR      Bed Mobility PT LTG, Time to Achieve 2 wks  -DR      Bed Mobility PT LTG, Activity Type all bed mobility  -DR      Bed Mobility PT LTG, Turner Level independent  -DR      Transfer Training PT LTG    Transfer Training PT LTG, Date Established 17  -DR      Transfer Training PT LTG, Time to Achieve 2 wks  -DR      Transfer Training PT LTG, Activity Type bed to chair /chair to bed;sit to stand/stand to sit  -DR      Transfer Training PT LTG, Turner Level conditional independence  -DR      Transfer Training PT LTG, Assist Device cane, straight  -DR      Gait Training PT LTG    Gait Training Goal PT LTG, Date Established 17  -DR      Gait Training Goal PT LTG, Time to Achieve 2 wks  -DR      Gait Training Goal PT LTG, Turner Level conditional independence  -DR      Gait Training Goal PT LTG, Assist Device cane, straight  -DR      Gait Training Goal PT LTG, Distance to Achieve 400  -DR        User Key  (r) = Recorded By, (t) = Taken By, (c) = Cosigned By    Initials Name Provider Type    DR Alen Cerna, PT Physical Therapist            Goals Status: Treatment plan discontinued secondary to discharge from acute facility.  PT Discharge Summary  Reason for Discharge: Discharge from facility  Outcomes Achieved: Refer to plan of care for updates on goals achieved  Discharge Destination: Home with home  health      Francesca Patel, PT   4/11/2017

## 2017-04-13 ENCOUNTER — TRANSITIONAL CARE MANAGEMENT TELEPHONE ENCOUNTER (OUTPATIENT)
Dept: INTERNAL MEDICINE | Facility: CLINIC | Age: 57
End: 2017-04-13

## 2017-04-20 NOTE — DISCHARGE SUMMARY
"Date of Discharge:  4/20/2017  Date of Admit: 4/7/2017    Marilyn K. Vermeesch, MD      Discharge Diagnosis:Principal Problem:    Cellulitis  Active Problems:    Coronary artery disease involving native coronary artery of native heart without angina pectoris    Essential hypertension    Tobacco abuse    Typical atrial flutter    Atrial flutter with rapid ventricular response    HPI:     Patient is a 56 year old male with a hx of CAD s/p stent at 26 and atrial flutter that has been transferred to [facility] from [place] for aflutter w/ RVR. He was initially seen by Dr. Landa in early February when home health found his HR to be elevated and sent him to ER and was found to be in flutter. He was placed on an amiodarone gtt and underwent ECV with conversion to SR. He was discharged home on PO amiodarone. He was sent back to the ER today when his home health nurse found his BP to be very elevated and HR in 150s. Patient is asymptomatic in that he cannot feel any palpitations. He reports he has been feeling a headache and \"swimming\" in his head. His main complaint is that he has burning feeling in his feet and pain in his right shoulder. He told me he has been diagnosed with rheumatoid arthritis but no one treats him for it. Was receiving home health for wound management secondary to gout. Patient stated he has had stents placed in past, one when he was 26. Previous notes in chart have hx of stent in 2009 to occluded RCA. He has not had any chest pain.   Patient is having a lot of pain in his right UE with ecchymosis throughout and wounds on LE.   He can tell that his heart rate is increased his biggest complaint is pain in his arm and even legs. Question will even diagnosed recently with rheumatoid arthritis however not being treated for this at the present time. He does have gout which seems to be a majority of his issues in his lower extremity.  Has been treated for a bacterial infection in the lower extremity and " currently seems to be on Ceftin.    Hospital Course:     Patient was admitted on Friday evening and placed on cardizem gtt for rate control. His INR was subtherapeutic so coumadin was discontinued and he was placed on Xarelto. Cellulitis was noted on bilateral LEs and hospitalists were consulted to assist with antibiotics. We also had arterial dopplers completed where were within normal limits. He was scheduled for TATIANNA/ECV on Monday but had AMS. Code 19 was called and CT head was negative. He was treated for alcohol withdrawals and AMS did resolved. TATIANNA/ECV was completed on Tuesday and he was discharged home on PO amio and Xarelto for a/c. Instructed to follow-up as listed below.     Procedures Performed  TATIANNA  ECV        Consults     Date and Time Order Name Status Description    4/7/2017 1819 Inpatient Consult to Hospitalist Completed     4/7/2017 1434 Cardiology (on-call MD unless specified) Completed           Discharge Physical Exam:    General Appearance No acute distress   Neck No adenopathy, supple, trachea midline, no thyromegaly, no carotid bruit, no JVD   Lungs Clear to auscultation,respirations regular, even and unlabored   Heart Regular rhythm and normal rate, normal S1 and S2, no murmur, no gallop, no rub, no click   Chest wall No abnormalities observed   Abdomen Normal bowel sounds, no masses, no hepatomegaly, soft   Extremities Moves all extremities well, no edema, no cyanosis, no redness   Neurological Alert and oriented x 3     Discharge Medications   Vasquez Marie   Home Medication Instructions MRAYURI:[id number]    Printed on:04/20/17 1251   Medication Information                      amiodarone (PACERONE) 200 MG tablet  Take 1 tablet by mouth Daily.             aspirin 81 MG EC tablet  Take 1 tablet by mouth Daily.             atorvastatin (LIPITOR) 40 MG tablet  Take 1 tablet by mouth Every Night.             cefuroxime (CEFTIN) 250 MG tablet  Take 1 tablet by mouth 2 (Two) Times a Day for  10 days.             colchicine 0.6 MG tablet  Take 0.6 mg by mouth Daily.             doxycycline (DORYX) 100 MG enteric coated tablet  Take 1 tablet by mouth 2 (Two) Times a Day for 10 days.             DULoxetine (CYMBALTA) 30 MG capsule  Take 1 capsule by mouth Every Night.             gabapentin (NEURONTIN) 400 MG capsule  Take 400 mg by mouth 3 (Three) Times a Day.             losartan-hydrochlorothiazide (HYZAAR) 50-12.5 MG per tablet  Take 1 tablet by mouth Daily.             melatonin 3 MG tablet  Take 2 tablets by mouth Every Night.             metoprolol tartrate (LOPRESSOR) 25 MG tablet  Take 1 tablet by mouth Every 12 (Twelve) Hours.             predniSONE (DELTASONE) 20 MG tablet  Take 20 mg by mouth Daily.             rivaroxaban (XARELTO) 20 MG tablet  Take 1 tablet by mouth Daily With Dinner.                 Discharge Diet: regular cardiac     Activity at Discharge: as tolerated     Discharge disposition: home    Condition on Discharge: stable    Follow-up Appointments  Future Appointments  Date Time Provider Department Center   5/5/2017 10:00 AM Kevin Landa MD [facility] None     Additional Instructions for the Follow-ups that You Need to Schedule     Discharge Follow-up with Specified Provider    As directed    To:  Kevin Landa   Follow Up Details:  2-4 weeks       Discharge Follow-up with Specified Provider    As directed    To:  Seb Quintero   Follow Up Details:  6-8 weeks                Scribed for Seb Quintero DO by Maral Sosa PA-C. 4/20/2017  12:55 PM       JANIE Lopez  04/20/17  12:51 PM    30 min spent in reviewing records, discussion and examination of the patient and discussion with other members of the patient's medical team.     I, Seb Quintero, personally performed the services described in this documentation as scribed by the above named individual in my presence, and it is both accurate and complete.  4/20/2017  1:35 PM    Seb Quintero DO  1:35  PM  04/20/17        EMR Dragon/Transcription disclaimer:  Much of this encounter note is an electronic transcription/translation of spoken language to printed text. Electronic translation of spoken language may permit erroneous, or at times, nonsensical words or phrases to be inadvertently transcribed. Although I have reviewed the note for such errors, some may still exist.

## 2017-04-21 ENCOUNTER — HOSPITAL ENCOUNTER (EMERGENCY)
Facility: HOSPITAL | Age: 57
Discharge: HOME | End: 2017-04-21
Attending: STUDENT IN AN ORGANIZED HEALTH CARE EDUCATION/TRAINING PROGRAM | Admitting: STUDENT IN AN ORGANIZED HEALTH CARE EDUCATION/TRAINING PROGRAM

## 2017-04-21 ENCOUNTER — APPOINTMENT (OUTPATIENT)
Dept: GENERAL RADIOLOGY | Facility: HOSPITAL | Age: 57
End: 2017-04-21

## 2017-04-21 VITALS
RESPIRATION RATE: 19 BRPM | BODY MASS INDEX: 34.86 KG/M2 | WEIGHT: 230 LBS | DIASTOLIC BLOOD PRESSURE: 81 MMHG | HEART RATE: 52 BPM | OXYGEN SATURATION: 98 % | TEMPERATURE: 98.6 F | HEIGHT: 68 IN | SYSTOLIC BLOOD PRESSURE: 124 MMHG

## 2017-04-21 DIAGNOSIS — T14.8XXA SKIN AVULSION: Primary | ICD-10-CM

## 2017-04-21 PROCEDURE — 99283 EMERGENCY DEPT VISIT LOW MDM: CPT

## 2017-04-21 PROCEDURE — 73630 X-RAY EXAM OF FOOT: CPT

## 2017-04-21 RX ORDER — LIDOCAINE HYDROCHLORIDE 10 MG/ML
10 INJECTION, SOLUTION INFILTRATION; PERINEURAL ONCE
Status: COMPLETED | OUTPATIENT
Start: 2017-04-21 | End: 2017-04-21

## 2017-04-21 RX ADMIN — LIDOCAINE HYDROCHLORIDE 10 ML: 10 INJECTION, SOLUTION INFILTRATION; PERINEURAL at 12:30

## 2017-04-21 NOTE — ED PROVIDER NOTES
Subjective   HPI Comments: 56-year-old male presents to emergency department with left great toe injury.  He states he injured it today while in the bathroom.  He had a scab came off in the shower and is now bleeding.  He is on Xarelto for coronary disease and irregular heart rate.  Had difficulty controlling the bleeding so he came in via EMS.      History provided by:  Patient   used: No        Review of Systems   Musculoskeletal:        Left great toe injury with bleeding   All other systems reviewed and are negative.      Past Medical History:   Diagnosis Date   • Abdominal pain     History of epigastric abdominal tenderness. Differentials include peptic ulcer disease,   pancreatobiliary disease.   • Abnormal LFTs    • Anemia    • Anxiety    • Arthritis    • Asthma    • Atrial fibrillation    • CAD (coronary artery disease)    • Calf cramp    • CKD (chronic kidney disease)    • COPD (chronic obstructive pulmonary disease)    • CVA (cerebral infarction)    • Depression    • DVT (deep venous thrombosis)    • Electrocution    • Fatigue    • Hepatitis C    • History of allergy    • History of blood transfusion    • Hyperlipidemia    • Hypertension    • Leg pain    • Loss of appetite    • PUD (peptic ulcer disease)    • Stroke syndrome    • Tachycardia    • Thrombocytopenia    • Thrombophlebitis of deep femoral vein    • Vision problems        No Known Allergies    Past Surgical History:   Procedure Laterality Date   • ANKLE SURGERY     • CARDIAC SURGERY     • CORONARY STENT PLACEMENT     • TONSILLECTOMY     • TOTAL HIP ARTHROPLASTY Left    • TOTAL SHOULDER REPLACEMENT Left        Family History   Problem Relation Age of Onset   • Breast cancer Mother    • Colon cancer Sister    • Heart attack Other    • Arthritis Other    • Diabetes Other    • Hypertension Other    • Kidney disease Other    • Stroke Other    • Heart disease Father        Social History     Social History   • Marital status:  Single     Spouse name: N/A   • Number of children: N/A   • Years of education: N/A     Social History Main Topics   • Smoking status: Current Every Day Smoker     Packs/day: 1.00     Years: 30.00     Types: Cigarettes   • Smokeless tobacco: None   • Alcohol use No   • Drug use: No   • Sexual activity: Defer     Other Topics Concern   • None     Social History Narrative           Objective   Physical Exam   Constitutional: He is oriented to person, place, and time. He appears well-developed and well-nourished.   HENT:   Head: Normocephalic and atraumatic.   Left Ear: External ear normal.   Eyes: EOM are normal. Pupils are equal, round, and reactive to light.   Neck: Normal range of motion.   Cardiovascular: Normal rate and regular rhythm.    Pulmonary/Chest: Effort normal and breath sounds normal.   Abdominal: Soft. Bowel sounds are normal.   Musculoskeletal: Normal range of motion.   Neurological: He is alert and oriented to person, place, and time.   Skin:   Left great toe abrasion on the medial side with bleeding   Psychiatric: He has a normal mood and affect. His behavior is normal. Judgment and thought content normal.   Nursing note and vitals reviewed.      Laceration Repair  Date/Time: 4/21/2017 12:45 PM  Performed by: LUIS ADAMS JR  Authorized by: SUHA VENTURA   Consent: Verbal consent obtained.  Body area: lower extremity  Location details: left big toe  Laceration length: 1 cm  Vascular damage: no  Irrigation solution: saline  Irrigation method: syringe  Amount of cleaning: standard  Skin closure: glue and Steri-Strips  Patient tolerance: Patient tolerated the procedure well with no immediate complications               ED Course  ED Course    bleeding stopped with direct pressure.  I secured the wound with Dermabond and Steri-Strips              MDM    Final diagnoses:   Skin avulsion            Luis Adams Jr., PA-C  04/21/17 9896

## 2017-05-30 ENCOUNTER — OFFICE VISIT (OUTPATIENT)
Dept: CARDIOLOGY | Facility: CLINIC | Age: 57
End: 2017-05-30

## 2017-05-30 VITALS
HEIGHT: 68 IN | BODY MASS INDEX: 34.86 KG/M2 | SYSTOLIC BLOOD PRESSURE: 100 MMHG | WEIGHT: 230 LBS | TEMPERATURE: 97.4 F | DIASTOLIC BLOOD PRESSURE: 60 MMHG | HEART RATE: 63 BPM | RESPIRATION RATE: 16 BRPM | OXYGEN SATURATION: 98 %

## 2017-05-30 DIAGNOSIS — I25.110 CORONARY ARTERY DISEASE INVOLVING NATIVE CORONARY ARTERY OF NATIVE HEART WITH UNSTABLE ANGINA PECTORIS (HCC): ICD-10-CM

## 2017-05-30 DIAGNOSIS — I63.9 CEREBROVASCULAR ACCIDENT (CVA), UNSPECIFIED MECHANISM (HCC): ICD-10-CM

## 2017-05-30 DIAGNOSIS — I48.0 PAROXYSMAL ATRIAL FIBRILLATION (HCC): Primary | ICD-10-CM

## 2017-05-30 PROCEDURE — 99214 OFFICE O/P EST MOD 30 MIN: CPT | Performed by: INTERNAL MEDICINE

## 2017-05-30 PROCEDURE — 93000 ELECTROCARDIOGRAM COMPLETE: CPT | Performed by: INTERNAL MEDICINE

## 2017-05-30 RX ORDER — ATORVASTATIN CALCIUM 40 MG/1
40 TABLET, FILM COATED ORAL NIGHTLY
Qty: 30 TABLET | Refills: 11 | Status: SHIPPED | OUTPATIENT
Start: 2017-05-30 | End: 2017-06-29 | Stop reason: SDUPTHER

## 2017-05-30 RX ORDER — LOSARTAN POTASSIUM AND HYDROCHLOROTHIAZIDE 12.5; 5 MG/1; MG/1
1 TABLET ORAL EVERY 24 HOURS
Qty: 30 TABLET | Refills: 11 | Status: SHIPPED | OUTPATIENT
Start: 2017-05-30 | End: 2017-06-29

## 2017-05-30 RX ORDER — AMIODARONE HYDROCHLORIDE 200 MG/1
200 TABLET ORAL
Qty: 30 TABLET | Refills: 11 | Status: SHIPPED | OUTPATIENT
Start: 2017-05-30 | End: 2017-09-11 | Stop reason: HOSPADM

## 2017-05-30 RX ORDER — ASPIRIN 81 MG/1
81 TABLET ORAL DAILY
Qty: 30 TABLET | Refills: 11 | Status: SHIPPED | OUTPATIENT
Start: 2017-05-30 | End: 2017-06-26 | Stop reason: HOSPADM

## 2017-06-14 ENCOUNTER — APPOINTMENT (OUTPATIENT)
Dept: NUCLEAR MEDICINE | Facility: HOSPITAL | Age: 57
End: 2017-06-14
Attending: INTERNAL MEDICINE

## 2017-06-14 ENCOUNTER — HOSPITAL ENCOUNTER (OUTPATIENT)
Dept: NUCLEAR MEDICINE | Facility: HOSPITAL | Age: 57
Discharge: HOME | End: 2017-06-14
Attending: INTERNAL MEDICINE

## 2017-06-15 ENCOUNTER — APPOINTMENT (OUTPATIENT)
Dept: NUCLEAR MEDICINE | Facility: HOSPITAL | Age: 57
End: 2017-06-15
Attending: INTERNAL MEDICINE

## 2017-06-15 ENCOUNTER — APPOINTMENT (OUTPATIENT)
Dept: GENERAL RADIOLOGY | Facility: HOSPITAL | Age: 57
End: 2017-06-15
Attending: INTERNAL MEDICINE

## 2017-06-19 ENCOUNTER — APPOINTMENT (OUTPATIENT)
Dept: ULTRASOUND IMAGING | Facility: HOSPITAL | Age: 57
End: 2017-06-19

## 2017-06-19 ENCOUNTER — HOSPITAL ENCOUNTER (INPATIENT)
Facility: HOSPITAL | Age: 57
LOS: 5 days | Discharge: HOME HEALTH | End: 2017-06-26
Attending: EMERGENCY MEDICINE | Admitting: INTERNAL MEDICINE

## 2017-06-19 ENCOUNTER — APPOINTMENT (OUTPATIENT)
Dept: GENERAL RADIOLOGY | Facility: HOSPITAL | Age: 57
End: 2017-06-19

## 2017-06-19 ENCOUNTER — APPOINTMENT (OUTPATIENT)
Dept: MRI IMAGING | Facility: HOSPITAL | Age: 57
End: 2017-06-19

## 2017-06-19 DIAGNOSIS — S92.502A CLOSED FRACTURE OF FOURTH TOE OF LEFT FOOT, INITIAL ENCOUNTER: Primary | ICD-10-CM

## 2017-06-19 DIAGNOSIS — D64.9 ANEMIA, UNSPECIFIED TYPE: ICD-10-CM

## 2017-06-19 DIAGNOSIS — Z74.09 IMPAIRED MOBILITY AND ADLS: ICD-10-CM

## 2017-06-19 DIAGNOSIS — L03.116 CELLULITIS OF FOOT, LEFT: ICD-10-CM

## 2017-06-19 DIAGNOSIS — I73.9 PERIPHERAL VASCULAR DISEASE OF LOWER EXTREMITY (HCC): ICD-10-CM

## 2017-06-19 DIAGNOSIS — Z78.9 IMPAIRED MOBILITY AND ADLS: ICD-10-CM

## 2017-06-19 DIAGNOSIS — Z74.09 IMPAIRED FUNCTIONAL MOBILITY, BALANCE, GAIT, AND ENDURANCE: ICD-10-CM

## 2017-06-19 DIAGNOSIS — Z72.0 TOBACCO ABUSE: ICD-10-CM

## 2017-06-19 LAB
ABO GROUP BLD: NORMAL
ABO GROUP BLD: NORMAL
ALBUMIN SERPL-MCNC: 3.5 G/DL (ref 3.5–5)
ALBUMIN/GLOB SERPL: 0.7 G/DL (ref 1–2)
ALP SERPL-CCNC: 124 U/L (ref 38–126)
ALT SERPL W P-5'-P-CCNC: 32 U/L (ref 13–69)
ANION GAP SERPL CALCULATED.3IONS-SCNC: 14.5 MMOL/L
AST SERPL-CCNC: 69 U/L (ref 15–46)
BASOPHILS # BLD AUTO: 0.01 10*3/MM3 (ref 0–0.2)
BASOPHILS NFR BLD AUTO: 0.1 % (ref 0–2.5)
BILIRUB SERPL-MCNC: 1.1 MG/DL (ref 0.2–1.3)
BLD GP AB SCN SERPL QL: NEGATIVE
BUN BLD-MCNC: 14 MG/DL (ref 7–20)
BUN/CREAT SERPL: 11.7 (ref 6.3–21.9)
CALCIUM SPEC-SCNC: 9.2 MG/DL (ref 8.4–10.2)
CHLORIDE SERPL-SCNC: 105 MMOL/L (ref 98–107)
CK MB SERPL-CCNC: 1.97 NG/ML (ref 0.2–2.4)
CO2 SERPL-SCNC: 29 MMOL/L (ref 26–30)
CREAT BLD-MCNC: 1.2 MG/DL (ref 0.6–1.3)
DEPRECATED RDW RBC AUTO: 52.5 FL (ref 37–54)
EOSINOPHIL # BLD AUTO: 1.1 10*3/MM3 (ref 0–0.7)
EOSINOPHIL NFR BLD AUTO: 14 % (ref 0–7)
ERYTHROCYTE [DISTWIDTH] IN BLOOD BY AUTOMATED COUNT: 15.7 % (ref 11.5–14.5)
GFR SERPL CREATININE-BSD FRML MDRD: 62 ML/MIN/1.73
GLOBULIN UR ELPH-MCNC: 4.8 GM/DL
GLUCOSE BLD-MCNC: 110 MG/DL (ref 74–98)
HCT VFR BLD AUTO: 28.1 % (ref 42–52)
HGB BLD-MCNC: 8.8 G/DL (ref 14–18)
IMM GRANULOCYTES # BLD: 0.02 10*3/MM3 (ref 0–0.06)
IMM GRANULOCYTES NFR BLD: 0.3 % (ref 0–0.6)
LYMPHOCYTES # BLD AUTO: 1.5 10*3/MM3 (ref 0.6–3.4)
LYMPHOCYTES NFR BLD AUTO: 19 % (ref 10–50)
MCH RBC QN AUTO: 28.5 PG (ref 27–31)
MCHC RBC AUTO-ENTMCNC: 31.3 G/DL (ref 30–37)
MCV RBC AUTO: 90.9 FL (ref 80–94)
MONOCYTES # BLD AUTO: 0.53 10*3/MM3 (ref 0–0.9)
MONOCYTES NFR BLD AUTO: 6.7 % (ref 0–12)
NEUTROPHILS # BLD AUTO: 4.72 10*3/MM3 (ref 2–6.9)
NEUTROPHILS NFR BLD AUTO: 59.9 % (ref 37–80)
NRBC BLD MANUAL-RTO: 0 /100 WBC (ref 0–0)
PLATELET # BLD AUTO: 201 10*3/MM3 (ref 130–400)
PMV BLD AUTO: 9.9 FL (ref 6–12)
POTASSIUM BLD-SCNC: 3.5 MMOL/L (ref 3.5–5.1)
PROT SERPL-MCNC: 8.3 G/DL (ref 6.3–8.2)
RBC # BLD AUTO: 3.09 10*6/MM3 (ref 4.7–6.1)
RH BLD: POSITIVE
RH BLD: POSITIVE
SODIUM BLD-SCNC: 145 MMOL/L (ref 137–145)
TROPONIN I SERPL-MCNC: <0.012 NG/ML (ref 0–0.03)
WBC NRBC COR # BLD: 7.88 10*3/MM3 (ref 4.8–10.8)

## 2017-06-19 PROCEDURE — 86901 BLOOD TYPING SEROLOGIC RH(D): CPT | Performed by: INTERNAL MEDICINE

## 2017-06-19 PROCEDURE — 86850 RBC ANTIBODY SCREEN: CPT | Performed by: INTERNAL MEDICINE

## 2017-06-19 PROCEDURE — G0378 HOSPITAL OBSERVATION PER HR: HCPCS

## 2017-06-19 PROCEDURE — 25010000002 LORAZEPAM PER 2 MG: Performed by: NURSE PRACTITIONER

## 2017-06-19 PROCEDURE — 73630 X-RAY EXAM OF FOOT: CPT

## 2017-06-19 PROCEDURE — 86920 COMPATIBILITY TEST SPIN: CPT

## 2017-06-19 PROCEDURE — 82553 CREATINE MB FRACTION: CPT | Performed by: PHYSICIAN ASSISTANT

## 2017-06-19 PROCEDURE — 73718 MRI LOWER EXTREMITY W/O DYE: CPT

## 2017-06-19 PROCEDURE — 86900 BLOOD TYPING SEROLOGIC ABO: CPT | Performed by: INTERNAL MEDICINE

## 2017-06-19 PROCEDURE — 25010000002 HYDROCORTISONE SODIUM SUCCINATE 100 MG RECONSTITUTED SOLUTION: Performed by: INTERNAL MEDICINE

## 2017-06-19 PROCEDURE — 93005 ELECTROCARDIOGRAM TRACING: CPT | Performed by: PHYSICIAN ASSISTANT

## 2017-06-19 PROCEDURE — 80053 COMPREHEN METABOLIC PANEL: CPT | Performed by: PHYSICIAN ASSISTANT

## 2017-06-19 PROCEDURE — 99219 PR INITIAL OBSERVATION CARE/DAY 50 MINUTES: CPT | Performed by: NURSE PRACTITIONER

## 2017-06-19 PROCEDURE — 93926 LOWER EXTREMITY STUDY: CPT

## 2017-06-19 PROCEDURE — 86901 BLOOD TYPING SEROLOGIC RH(D): CPT

## 2017-06-19 PROCEDURE — 84484 ASSAY OF TROPONIN QUANT: CPT | Performed by: PHYSICIAN ASSISTANT

## 2017-06-19 PROCEDURE — 63710000001 PREDNISONE PER 1 MG: Performed by: NURSE PRACTITIONER

## 2017-06-19 PROCEDURE — 25010000002 DIPHENHYDRAMINE PER 50 MG: Performed by: INTERNAL MEDICINE

## 2017-06-19 PROCEDURE — 99285 EMERGENCY DEPT VISIT HI MDM: CPT

## 2017-06-19 PROCEDURE — 85025 COMPLETE CBC W/AUTO DIFF WBC: CPT | Performed by: PHYSICIAN ASSISTANT

## 2017-06-19 PROCEDURE — 25010000002 MORPHINE PER 10 MG: Performed by: NURSE PRACTITIONER

## 2017-06-19 PROCEDURE — 93971 EXTREMITY STUDY: CPT

## 2017-06-19 PROCEDURE — 86900 BLOOD TYPING SEROLOGIC ABO: CPT

## 2017-06-19 RX ORDER — ONDANSETRON 2 MG/ML
4 INJECTION INTRAMUSCULAR; INTRAVENOUS EVERY 6 HOURS PRN
Status: DISCONTINUED | OUTPATIENT
Start: 2017-06-19 | End: 2017-06-22

## 2017-06-19 RX ORDER — ASPIRIN 81 MG/1
81 TABLET ORAL DAILY
Status: DISCONTINUED | OUTPATIENT
Start: 2017-06-19 | End: 2017-06-19

## 2017-06-19 RX ORDER — NICOTINE 21 MG/24HR
1 PATCH, TRANSDERMAL 24 HOURS TRANSDERMAL EVERY 24 HOURS
Status: DISCONTINUED | OUTPATIENT
Start: 2017-06-19 | End: 2017-06-26 | Stop reason: HOSPADM

## 2017-06-19 RX ORDER — POLYETHYLENE GLYCOL 3350 17 G/17G
17 POWDER, FOR SOLUTION ORAL DAILY PRN
COMMUNITY
End: 2017-06-26 | Stop reason: HOSPADM

## 2017-06-19 RX ORDER — SODIUM CHLORIDE 9 MG/ML
50 INJECTION, SOLUTION INTRAVENOUS CONTINUOUS
Status: DISCONTINUED | OUTPATIENT
Start: 2017-06-19 | End: 2017-06-21

## 2017-06-19 RX ORDER — PREDNISONE 20 MG/1
20 TABLET ORAL DAILY
Status: DISCONTINUED | OUTPATIENT
Start: 2017-06-19 | End: 2017-06-20

## 2017-06-19 RX ORDER — ONDANSETRON 4 MG/1
4 TABLET, ORALLY DISINTEGRATING ORAL ONCE
Status: COMPLETED | OUTPATIENT
Start: 2017-06-19 | End: 2017-06-19

## 2017-06-19 RX ORDER — LORAZEPAM 2 MG/ML
1 INJECTION INTRAMUSCULAR
Status: DISCONTINUED | OUTPATIENT
Start: 2017-06-19 | End: 2017-06-22

## 2017-06-19 RX ORDER — LORAZEPAM 2 MG/ML
2 INJECTION INTRAMUSCULAR
Status: DISCONTINUED | OUTPATIENT
Start: 2017-06-19 | End: 2017-06-22

## 2017-06-19 RX ORDER — PANTOPRAZOLE SODIUM 40 MG/1
40 TABLET, DELAYED RELEASE ORAL
Status: DISCONTINUED | OUTPATIENT
Start: 2017-06-19 | End: 2017-06-20

## 2017-06-19 RX ORDER — ACETAMINOPHEN 325 MG/1
650 TABLET ORAL EVERY 4 HOURS PRN
Status: DISCONTINUED | OUTPATIENT
Start: 2017-06-19 | End: 2017-06-22 | Stop reason: SDUPTHER

## 2017-06-19 RX ORDER — ONDANSETRON 2 MG/ML
4 INJECTION INTRAMUSCULAR; INTRAVENOUS EVERY 6 HOURS PRN
Status: DISCONTINUED | OUTPATIENT
Start: 2017-06-19 | End: 2017-06-19 | Stop reason: SDUPTHER

## 2017-06-19 RX ORDER — FOLIC ACID 1 MG/1
1 TABLET ORAL DAILY
Status: DISCONTINUED | OUTPATIENT
Start: 2017-06-19 | End: 2017-06-26 | Stop reason: HOSPADM

## 2017-06-19 RX ORDER — HYDROXYCHLOROQUINE SULFATE 200 MG/1
200 TABLET, FILM COATED ORAL DAILY
COMMUNITY
End: 2017-06-29

## 2017-06-19 RX ORDER — LORAZEPAM 0.5 MG/1
1 TABLET ORAL
Status: DISCONTINUED | OUTPATIENT
Start: 2017-06-19 | End: 2017-06-22

## 2017-06-19 RX ORDER — ONDANSETRON 4 MG/1
4 TABLET, ORALLY DISINTEGRATING ORAL EVERY 6 HOURS PRN
Status: DISCONTINUED | OUTPATIENT
Start: 2017-06-19 | End: 2017-06-22

## 2017-06-19 RX ORDER — LOSARTAN POTASSIUM AND HYDROCHLOROTHIAZIDE 12.5; 5 MG/1; MG/1
1 TABLET ORAL DAILY
Status: DISCONTINUED | OUTPATIENT
Start: 2017-06-19 | End: 2017-06-20

## 2017-06-19 RX ORDER — CLOPIDOGREL BISULFATE 75 MG/1
75 TABLET ORAL DAILY
Status: DISCONTINUED | OUTPATIENT
Start: 2017-06-19 | End: 2017-06-26 | Stop reason: HOSPADM

## 2017-06-19 RX ORDER — MORPHINE SULFATE 2 MG/ML
1 INJECTION, SOLUTION INTRAMUSCULAR; INTRAVENOUS EVERY 4 HOURS PRN
Status: DISCONTINUED | OUTPATIENT
Start: 2017-06-19 | End: 2017-06-26 | Stop reason: HOSPADM

## 2017-06-19 RX ORDER — SODIUM CHLORIDE 0.9 % (FLUSH) 0.9 %
1-10 SYRINGE (ML) INJECTION AS NEEDED
Status: DISCONTINUED | OUTPATIENT
Start: 2017-06-19 | End: 2017-06-26 | Stop reason: HOSPADM

## 2017-06-19 RX ORDER — AMIODARONE HYDROCHLORIDE 200 MG/1
200 TABLET ORAL
Status: DISCONTINUED | OUTPATIENT
Start: 2017-06-19 | End: 2017-06-26 | Stop reason: HOSPADM

## 2017-06-19 RX ORDER — ACETAMINOPHEN 325 MG/1
650 TABLET ORAL EVERY 4 HOURS PRN
Status: DISCONTINUED | OUTPATIENT
Start: 2017-06-19 | End: 2017-06-19 | Stop reason: SDUPTHER

## 2017-06-19 RX ORDER — ATORVASTATIN CALCIUM 40 MG/1
40 TABLET, FILM COATED ORAL NIGHTLY
Status: DISCONTINUED | OUTPATIENT
Start: 2017-06-19 | End: 2017-06-26 | Stop reason: HOSPADM

## 2017-06-19 RX ORDER — ALBUTEROL SULFATE 90 UG/1
2 AEROSOL, METERED RESPIRATORY (INHALATION) EVERY 4 HOURS PRN
COMMUNITY
End: 2017-09-25 | Stop reason: SDUPTHER

## 2017-06-19 RX ORDER — OXYCODONE HYDROCHLORIDE AND ACETAMINOPHEN 5; 325 MG/1; MG/1
1 TABLET ORAL ONCE
Status: COMPLETED | OUTPATIENT
Start: 2017-06-19 | End: 2017-06-19

## 2017-06-19 RX ORDER — HYDROCODONE BITARTRATE AND ACETAMINOPHEN 5; 325 MG/1; MG/1
1 TABLET ORAL EVERY 4 HOURS PRN
Status: DISCONTINUED | OUTPATIENT
Start: 2017-06-19 | End: 2017-06-20

## 2017-06-19 RX ORDER — PANTOPRAZOLE SODIUM 40 MG/1
40 TABLET, DELAYED RELEASE ORAL
Status: DISCONTINUED | OUTPATIENT
Start: 2017-06-20 | End: 2017-06-19

## 2017-06-19 RX ORDER — LORAZEPAM 0.5 MG/1
2 TABLET ORAL
Status: DISCONTINUED | OUTPATIENT
Start: 2017-06-19 | End: 2017-06-22

## 2017-06-19 RX ORDER — SODIUM CHLORIDE 0.9 % (FLUSH) 0.9 %
10 SYRINGE (ML) INJECTION AS NEEDED
Status: DISCONTINUED | OUTPATIENT
Start: 2017-06-19 | End: 2017-06-26 | Stop reason: HOSPADM

## 2017-06-19 RX ORDER — NALOXONE HCL 0.4 MG/ML
0.4 VIAL (ML) INJECTION
Status: DISCONTINUED | OUTPATIENT
Start: 2017-06-19 | End: 2017-06-26 | Stop reason: HOSPADM

## 2017-06-19 RX ORDER — ONDANSETRON 4 MG/1
4 TABLET, FILM COATED ORAL EVERY 6 HOURS PRN
Status: DISCONTINUED | OUTPATIENT
Start: 2017-06-19 | End: 2017-06-22

## 2017-06-19 RX ORDER — MULTIPLE VITAMINS W/ MINERALS TAB 9MG-400MCG
1 TAB ORAL DAILY
Status: DISCONTINUED | OUTPATIENT
Start: 2017-06-19 | End: 2017-06-26 | Stop reason: HOSPADM

## 2017-06-19 RX ORDER — FAMOTIDINE 10 MG/ML
20 INJECTION, SOLUTION INTRAVENOUS ONCE
Status: COMPLETED | OUTPATIENT
Start: 2017-06-19 | End: 2017-06-19

## 2017-06-19 RX ORDER — LANOLIN ALCOHOL/MO/W.PET/CERES
6 CREAM (GRAM) TOPICAL NIGHTLY
Status: DISCONTINUED | OUTPATIENT
Start: 2017-06-19 | End: 2017-06-26 | Stop reason: HOSPADM

## 2017-06-19 RX ORDER — THIAMINE MONONITRATE (VIT B1) 100 MG
100 TABLET ORAL DAILY
Status: DISCONTINUED | OUTPATIENT
Start: 2017-06-19 | End: 2017-06-26 | Stop reason: HOSPADM

## 2017-06-19 RX ORDER — COLCHICINE 0.6 MG/1
0.6 TABLET ORAL DAILY
COMMUNITY
End: 2017-08-17 | Stop reason: SDUPTHER

## 2017-06-19 RX ORDER — DIPHENHYDRAMINE HYDROCHLORIDE 50 MG/ML
50 INJECTION INTRAMUSCULAR; INTRAVENOUS ONCE
Status: COMPLETED | OUTPATIENT
Start: 2017-06-19 | End: 2017-06-19

## 2017-06-19 RX ORDER — DULOXETIN HYDROCHLORIDE 30 MG/1
30 CAPSULE, DELAYED RELEASE ORAL NIGHTLY
Status: DISCONTINUED | OUTPATIENT
Start: 2017-06-19 | End: 2017-06-26 | Stop reason: HOSPADM

## 2017-06-19 RX ADMIN — MORPHINE SULFATE 1 MG: 2 INJECTION, SOLUTION INTRAMUSCULAR; INTRAVENOUS at 23:46

## 2017-06-19 RX ADMIN — Medication 100 MG: at 18:27

## 2017-06-19 RX ADMIN — DULOXETINE HYDROCHLORIDE 30 MG: 30 CAPSULE, DELAYED RELEASE ORAL at 21:32

## 2017-06-19 RX ADMIN — HYDROCODONE BITARTRATE AND ACETAMINOPHEN 1 TABLET: 5; 325 TABLET ORAL at 21:32

## 2017-06-19 RX ADMIN — SODIUM CHLORIDE 600 MG: 9 INJECTION, SOLUTION INTRAVENOUS at 22:00

## 2017-06-19 RX ADMIN — ATORVASTATIN CALCIUM 40 MG: 40 TABLET, FILM COATED ORAL at 21:32

## 2017-06-19 RX ADMIN — MULTIPLE VITAMINS W/ MINERALS TAB 1 TABLET: TAB at 18:27

## 2017-06-19 RX ADMIN — CLOPIDOGREL BISULFATE 75 MG: 75 TABLET ORAL at 21:49

## 2017-06-19 RX ADMIN — MELATONIN TAB 3 MG 6 MG: 3 TAB at 21:32

## 2017-06-19 RX ADMIN — SODIUM CHLORIDE 125 ML/HR: 9 INJECTION, SOLUTION INTRAVENOUS at 13:38

## 2017-06-19 RX ADMIN — LORAZEPAM 2 MG: 2 INJECTION INTRAMUSCULAR; INTRAVENOUS at 21:32

## 2017-06-19 RX ADMIN — ONDANSETRON 4 MG: 4 TABLET, ORALLY DISINTEGRATING ORAL at 13:26

## 2017-06-19 RX ADMIN — OXYCODONE HYDROCHLORIDE AND ACETAMINOPHEN 1 TABLET: 5; 325 TABLET ORAL at 13:27

## 2017-06-19 RX ADMIN — SODIUM CHLORIDE 600 MG: 9 INJECTION, SOLUTION INTRAVENOUS at 14:25

## 2017-06-19 RX ADMIN — PREDNISONE 20 MG: 20 TABLET ORAL at 18:27

## 2017-06-19 RX ADMIN — FAMOTIDINE 20 MG: 10 INJECTION, SOLUTION INTRAVENOUS at 21:49

## 2017-06-19 RX ADMIN — METOPROLOL TARTRATE 25 MG: 25 TABLET ORAL at 21:49

## 2017-06-19 RX ADMIN — AMIODARONE HYDROCHLORIDE 200 MG: 200 TABLET ORAL at 18:27

## 2017-06-19 RX ADMIN — RIVAROXABAN 20 MG: 10 TABLET, FILM COATED ORAL at 18:27

## 2017-06-19 RX ADMIN — DIPHENHYDRAMINE HYDROCHLORIDE 50 MG: 50 INJECTION, SOLUTION INTRAMUSCULAR; INTRAVENOUS at 21:48

## 2017-06-19 RX ADMIN — FOLIC ACID 1 MG: 1 TABLET ORAL at 18:27

## 2017-06-19 RX ADMIN — LOSARTAN POTASSIUM AND HYDROCHLOROTHIAZIDE 1 TABLET: 50; 12.5 TABLET, FILM COATED ORAL at 18:27

## 2017-06-19 RX ADMIN — MORPHINE SULFATE 1 MG: 2 INJECTION, SOLUTION INTRAMUSCULAR; INTRAVENOUS at 18:37

## 2017-06-19 RX ADMIN — HYDROCORTISONE SODIUM SUCCINATE 100 MG: 100 INJECTION, POWDER, FOR SOLUTION INTRAMUSCULAR; INTRAVENOUS at 21:48

## 2017-06-19 RX ADMIN — PANTOPRAZOLE SODIUM 40 MG: 40 TABLET, DELAYED RELEASE ORAL at 19:30

## 2017-06-19 RX ADMIN — SODIUM CHLORIDE 125 ML/HR: 9 INJECTION, SOLUTION INTRAVENOUS at 18:33

## 2017-06-19 RX ADMIN — ASPIRIN 81 MG: 81 TABLET, COATED ORAL at 18:27

## 2017-06-19 RX ADMIN — NICOTINE 1 PATCH: 14 PATCH TRANSDERMAL at 21:49

## 2017-06-19 NOTE — ED NOTES
DR. ANGELES WAS CALLED AT 1333 AND TRANSFERRED TO [facility] AT THIS TIME.      Elias Servin  06/19/17 6296

## 2017-06-19 NOTE — H&P
[facility]      Name:  Vasquez Marie   Age:  56 y.o.  Sex:  male  :  1960  MRN:  [id number]   Visit Number:  [id number]  Admission Date:  2017  Date Of Service:  17  Primary Care Physician:  Marilyn K. Vermeesch, MD    Chief Complaint:   LLE pain with ischemic toe      History Of Presenting Illness:    This is a 56-year-old male with history significant for coronary artery disease status post stent placement, atrial fibrillation/flutter on anticoagulation, recent GI bleed status post EGD, peripheral vascular disease, hypertension, who presented to the emergency room with complaints of left lower extremity pain.  According to the patient almost 2 months ago he started having pain in his left lower extremity.  Patient was recently admitted to [facility] in [place] for atrial flutter with RVR.  He was placed on Cardizem drip for rate control.  He was already on Coumadin at that time however he was noted to be subtherapeutic.  The Coumadin was discontinued and he was placed on Xarelto.  He did undergo TATIANNA with electrocardioversion during that admission and discharged home on amiodarone and Xarelto in 2017.  That admission as well he was also noted to have cellulitis of his bilateral lower extremities hospitalist were consulted to assist with antibiotic management.  It was felt his neuropathy was likely secondary to peripheral arterial disease.  Hospitalist at that time recommended to transition him to Omnicef and doxycycline to complete a 14 day of antibiotic.  He did recommend that he follow up and have his arterial disease addressed.  However in May 2017 he was sent to  for GI bleed.  According to the patient he was having bright red blood in his stools.  He did undergo an upper endoscopy during that time for which he had a single nonbleeding angiectasia of the duodenum for which they recommended continuing PPI for 8 weeks  twice a day and continued recommended capsule endoscopy as an outpatient to evaluate for small intestinal angioectasias.  Patient has not had this arranged.  I have reviewed the admission from [facility].  Patient did have evidence of alcohol withdrawal while he was at [facility].  He also was noted on ultrasound of the abdomen to have evidence of cirrhosis of his liver.  He has a documented history of heavy alcohol use.  We will place him on telemetry to monitor.    According to the patient since his discharge and may have been doing fairly well but he continued to have left lower extremity pain redness and swelling.  He came into the emergency room on the date of admission for further evaluation.  Venous duplex was done which did not show any evidence of DVT.  Arterial duplex was done which showed occlusion of the distal superficial femoral artery with reconstitution of flow at the popliteal artery.  X-ray was also done in the emergency room which did show fracture of the head of the fourth proximal phalanx.  Patient was started on IV antibiotic's in the emergency room in the form of clindamycin and will be admitted to the hospitalist service.  Dr. Storm was consulted for interventional management.  We will get a consult with Dr. Dr. Campbell since his third digit does appear to be ischemic and I not sure that intervention at this point would restore any flow to this toe.  Possible amputation may be needed.      Review Of Systems:     General ROS: Patient denies any fevers, chills or loss of consciousness. Complains of generalized weakness.   Psychological ROS: Denies any hallucinations and delusions.  Ophthalmic ROS: Denies any diplopia or transient loss of vision.  ENT ROS: Denies sore throat, nasal congestion or ear pain.   Allergy and Immunology ROS: Denies rash or itching.  Hematological and Lymphatic ROS: Denies neck swelling or easy bleeding.  Endocrine ROS: Denies any recent unintentional weight gain  or loss.  Breast ROS: Denies any pain or swelling.  Respiratory ROS: Denies cough or shortness of breath.   Cardiovascular ROS: Denies chest pain or palpitations. No history of exertional chest pain.   Gastrointestinal ROS: Denies nausea and vomiting. Denies any abdominal pain. No diarrhea.   Genito-Urinary ROS: Denies dysuria or hematuria.  Musculoskeletal ROS: Complains of chronic back pain. No muscle pain. No calf pain.  LLE pain, erythema, swelling.   Neurological ROS: Denies any focal weakness. No loss of consciousness. Denies any numbness. Denies neck pain.   Dermatological ROS: Denies any redness or pruritis.     Past Medical History:    Past Medical History:   Diagnosis Date   • Abdominal pain     History of epigastric abdominal tenderness. Differentials include peptic ulcer disease,   pancreatobiliary disease.   • Abnormal LFTs    • Anemia    • Anxiety    • Arthritis    • Asthma    • Atrial fibrillation    • CAD (coronary artery disease)    • Calf cramp    • CKD (chronic kidney disease)    • COPD (chronic obstructive pulmonary disease)    • CVA (cerebral infarction)    • Depression    • DVT (deep venous thrombosis)    • Electrocution    • Fatigue    • Hepatitis C    • History of allergy    • History of blood transfusion    • Hyperlipidemia    • Hypertension    • Leg pain    • Loss of appetite    • PUD (peptic ulcer disease)    • Stroke syndrome    • Tachycardia    • Thrombocytopenia    • Thrombophlebitis of deep femoral vein    • Vision problems         Past Surgical history:    Past Surgical History:   Procedure Laterality Date   • ANKLE SURGERY     • CARDIAC SURGERY     • CORONARY STENT PLACEMENT     • TONSILLECTOMY     • TOTAL HIP ARTHROPLASTY Left    • TOTAL SHOULDER REPLACEMENT Left        Social History:    Social History     Social History   • Marital status: Single     Spouse name: N/A   • Number of children: N/A   • Years of education: N/A     Occupational History   • Not on file.     Social  History Main Topics   • Smoking status: Current Every Day Smoker     Packs/day: 1.00     Years: 30.00     Types: Cigarettes   • Smokeless tobacco: Not on file   • Alcohol use No   • Drug use: No   • Sexual activity: Defer     Other Topics Concern   • Not on file     Social History Narrative         Family History:    Family History   Problem Relation Age of Onset   • Breast cancer Mother    • Colon cancer Sister    • Heart attack Other    • Arthritis Other    • Diabetes Other    • Hypertension Other    • Kidney disease Other    • Stroke Other    • Heart disease Father        Allergies:      Review of patient's allergies indicates no known allergies.    Home Medications:    Prior to Admission Medications     Prescriptions Last Dose Informant Patient Reported? Taking?    amiodarone (PACERONE) 200 MG tablet   No No    Take 1 tablet by mouth Daily.    aspirin 81 MG EC tablet   No No    Take 1 tablet by mouth Daily.    atorvastatin (LIPITOR) 40 MG tablet   No No    Take 1 tablet by mouth Every Night.    DULoxetine (CYMBALTA) 30 MG capsule   No No    Take 1 capsule by mouth Every Night.    losartan-hydrochlorothiazide (HYZAAR) 50-12.5 MG per tablet   No No    Take 1 tablet by mouth Daily.    melatonin 3 MG tablet   No No    Take 2 tablets by mouth Every Night.    metoprolol tartrate (LOPRESSOR) 25 MG tablet   No No    Take 1 tablet by mouth Every 12 (Twelve) Hours.    predniSONE (DELTASONE) 20 MG tablet   Yes No    Take 20 mg by mouth Daily.    rivaroxaban (XARELTO) 20 MG tablet   No No    Take 1 tablet by mouth Daily With Dinner.               Vital Signs:    Temp:  [98 °F (36.7 °C)] 98 °F (36.7 °C)  Heart Rate:  [89] 89  Resp:  [16] 16  BP: (135)/(75) 135/75    Last 3 weights    06/19/17  1130   Weight: 220 lb (99.8 kg)       Body mass index is 33.45 kg/(m^2).    Physical Exam:      General Appearance:    Alert and cooperative, not in any acute distress.   Head:    Atraumatic and normocephalic, without obvious  abnormality.   Eyes:            PERRLA, conjunctivae and sclerae normal, no Icterus. No pallor. Extra-occular movements are within normal limits.   Ears:    Ears appear intact with no abnormalities noted.   Throat:   No oral lesions, no thrush, oral mucosa moist.   Neck:   Supple, trachea midline, no thyromegaly, no carotid bruit.   Respiratory:    Lungs clear, no rales, rhonchi or wheezing noted    Cardiac:    Normal S1 and S2, no murmur, no gallop, no rub. No JVD     Abdomen:     Normal bowel sounds, no masses, no organomegaly. Soft        non-tender, non-distended, no guarding, no rebound                tenderness   Extremities:   Moves all extremities well, no edema, no cyanosis, no             Clubbing.  LLE has erythema and edema.  His 3rd digit is completely necrotic with dry gangrene and the tip of the 4th digit is nectrotic as well.     Pulses:   Pulses palpable and equal bilaterally   Skin:   No bleeding, bruising or rash, no skin breakdown, pink, warm, dry, erythema and necrotic 3rd digit and tip of 4th digit.     Lymph nodes:   No palpable adenopathy   Psychiatric/Behavior:     Mood normal,    Neurologic:   Cranial nerves 2 - 12 grossly intact, sensation intact, Motor power is normal and equal bilaterally.             Labs:    Lab Results (last 24 hours)     Procedure Component Value Units Date/Time    CBC & Differential [[id number]] Collected:  06/19/17 1338    Specimen:  Blood Updated:  06/19/17 1401    Narrative:       The following orders were created for panel order CBC & Differential.  Procedure                               Abnormality         Status                     ---------                               -----------         ------                     CBC Auto Differential[[id number]]         Abnormal            Final result                 Please view results for these tests on the individual orders.    CBC Auto Differential [[id number]]  (Abnormal) Collected:  06/19/17 1338    Specimen:  Blood  Updated:  06/19/17 1401     WBC 7.88 10*3/mm3      RBC 3.09 (L) 10*6/mm3      Hemoglobin 8.8 (L) g/dL      Hematocrit 28.1 (L) %      MCV 90.9 fL      MCH 28.5 pg      MCHC 31.3 g/dL      RDW 15.7 (H) %      RDW-SD 52.5 fl      MPV 9.9 fL      Platelets 201 10*3/mm3      Neutrophil % 59.9 %      Lymphocyte % 19.0 %      Monocyte % 6.7 %      Eosinophil % 14.0 (H) %      Basophil % 0.1 %      Immature Grans % 0.3 %      Neutrophils, Absolute 4.72 10*3/mm3      Lymphocytes, Absolute 1.50 10*3/mm3      Monocytes, Absolute 0.53 10*3/mm3      Eosinophils, Absolute 1.10 (H) 10*3/mm3      Basophils, Absolute 0.01 10*3/mm3      Immature Grans, Absolute 0.02 10*3/mm3      nRBC 0.0 /100 WBC     Comprehensive Metabolic Panel [[id number]]  (Abnormal) Collected:  06/19/17 1338    Specimen:  Blood Updated:  06/19/17 1415     Glucose 110 (H) mg/dL      BUN 14 mg/dL      Creatinine 1.20 mg/dL      Sodium 145 mmol/L      Potassium 3.5 mmol/L      Chloride 105 mmol/L      CO2 29.0 mmol/L      Calcium 9.2 mg/dL      Total Protein 8.3 (H) g/dL      Albumin 3.50 g/dL      ALT (SGPT) 32 U/L      AST (SGOT) 69 (H) U/L      Alkaline Phosphatase 124 U/L      Total Bilirubin 1.1 mg/dL      eGFR Non African Amer 62 mL/min/1.73      Globulin 4.8 gm/dL      A/G Ratio 0.7 (L) g/dL      BUN/Creatinine Ratio 11.7     Anion Gap 14.5 mmol/L     Narrative:       Abnormal estimated GFR should be followed by more specific studies to confirm end stage chronic renal disease. The equation used for calculation may not be accurate for patients less than 19 years old, greater than 70 years old, patients at extremes of weight, malnutrition, or with acute renal dysfunction.    CK-MB [[id number]]  (Normal) Collected:  06/19/17 1338    Specimen:  Blood Updated:  06/19/17 1415     CKMB 1.97 ng/mL     Troponin [[id number]]  (Normal) Collected:  06/19/17 1338    Specimen:  Blood Updated:  06/19/17 1415     Troponin I <0.012 ng/mL     Narrative:       Normal Patient Upper  Reference Limit (URL) (99th Percentile)=0.03 ng/mL   Non-AMI Illness Reference Limit=0.03-0.11 ng/mL   AMI Confirmation=0.12 ng/mL and above          Radiology:    Imaging Results (last 72 hours)     Procedure Component Value Units Date/Time    XR Foot 3+ View Left [[id number]] Collected:  06/19/17 1246     Updated:  06/19/17 1251    Narrative:       PROCEDURE: XR FOOT 3+ VW LEFT-     History: pain     COMPARISON:April 21, 2017.     FINDINGS:  A 3 view exam demonstrates postsurgical changes with a wire  spanning the bases of the fourth and fifth metatarsals. There is a  fracture of the head of the fourth proximal phalanx. Advanced  degenerative changes are present at the tibiotalar and subtalar joints.  There is also joint space narrowing and osteophyte formation in the mid  foot. Diffuse soft tissue swelling is present.           Impression:       Fracture of the head of the fourth proximal phalanx.                 This report was finalized on 6/19/2017 12:48 PM by Padmini Swan M.D..    US Venous Doppler Lower Extremity Left (duplex) [[id number]] Collected:  06/19/17 1249     Updated:  06/19/17 1301    Narrative:       PROCEDURE: US VENOUS DOPPLER LOWER EXTREMITY LEFT (DUPLEX)-     HISTORY: pain     PROCEDURE: Multiple transverse and longitudinal scans were performed of  the femoropopliteal deep venous system, with augmentation and  compression maneuvers.     FINDINGS: Normal phasic flow was noted in the visualized deep venous  system. No intraluminal increased echogenicity is noted to suggest  thrombus. There is normal compression and augmentation of the venous  structures. No abnormal venous collaterals are seen. No venous reflux is  demonstrated.       Impression:       No evidence of deep venous thrombosis.     This report was finalized on 6/19/2017 12:49 PM by Padmini Swan M.D..    US Arterial Doppler Lower Extremity Left [[id number]] Collected:  06/19/17 1312     Updated:  06/19/17 1316    Narrative:        PROCEDURE: US ARTERIAL DOPPLER LOWER EXTREMITY  LEFT-     HISTORY: pain     PROCEDURE: Sonographic images of the left lower extremity was obtained  with color flow and pulse Doppler.     FINDINGS:      LEFT LOWER EXTREMITY.      Velocities cm/sec :      CFA: 84  SFA Mid: 92   SFA Dist: 0  POP: 39  PT: 31  JULI: 23  CASSIE: Not obtained  Waveforms are monophasic.          Impression:       Occlusion of the distal superficial femoral artery with  reconstitution of flow at the popliteal artery.     This report was finalized on 6/19/2017 1:14 PM by Padmini Swan M.D..          Assessment and Plan:  1.  Left lower extremity cellulitis:  Patient will be started on IV antibiotic in the form of Clindamycin  2.  Dry Gangrenous ischemic third digit of left lower extremity-Will get MRI to rule out osteomyelitis.  Dr. Campbell with podiatry has been consulted.  3.  Anemia-likely secondary acute blood loss.  Patient was recently admitted to  at the end of May, where he underwent an upper endoscopy for an upper GI bleed.  His hemoglobin may be a residual from that admission we'll try to get the records from .  Will check occult blood stool.  Patient denies any evidence of overt bleeding at this time.  4.  Alcohol abuse-patient has a history of alcohol abuse.  He did recently have withdrawals noted at [facility] when he was admitted there.  Will place him on alcohol withdrawal protocol.  5.  PVD- Dr. Storm with cardiology has been consulted for further evaluation.  6. Hypertension- Continue home meds  7.  Atrial flutter-patient is on anticoagulation which we will continue.  8.  Coronary artery disease status post stent placement-continue medical management  9.  Nicotine abuse-smoking cessation counseling      Mallory Orozco, APRN  06/19/17  3:59 PM

## 2017-06-19 NOTE — CONSULTS
Referring Provider: Mallory Escudero.  Reason for Consultation: Limb ischemia of the left foot.    Patient Care Team:  Marilyn K Vermeesch, MD as PCP - General (Internal Medicine)      History of present illness:  Middle-aged gentleman with known peripheral vascular disease has been having pain in his left lower extremity has been unable to sleep at night.  Has dusky is discoloration of the third toe at his been persistently getting worse.  Patient wanted to get it out the pain got so severe that he could not handle it anymore hence he came into the emergency room.  Was noted to have a cold left lower extremity subsequently has been admitted.    Abdomen the patient along.  Of time he is been noncompliant with respect to his nicotine intake.    Review of Systems   Pertinent items are noted in HPI  Review of Systems      History  #1 Baseline EKG sinus rhythm NE interval of 176 ms right bundle branch block old inferior myocardial infarction.    #2 LV function assessment EF 70% with mild LVH 2011.  Year 46% nuclear 2/13.    #3 coronary artery disease-1 stent in his heart pudding 2009 no further follow-up.  Last evaluation Lexiscan cardiac stress test to/13 fixed inferior and inferolateral defect with some reversibility along the edges cardiac catheterization 3/13 occluded RCA with left-to-right collaterals rest normal.    #4 Deep Vein Thrombosis-Recurrent of Both Lower Extremities and the Left Arm on Coumadin Therapy.    #5 Device Therapy-As an Optional Retrievable Filter since 2012.    #6 Aneurysm-Small Infrarenal.    #7 Peripheral Vascular Disease-SMART Stent to the Left SFA [facility] Angiogram 3/13 Stents Deployed to the Common Iliac External Iliac and Common Femoral Artery Bilaterally with Left SFA Ostial 90% Status Post Superior Stent 6 x 40 Mm Placed.  Last arterial Doppler done at [facility] for/17 review reveals occlusion of the left distal popliteal with minimal flow below.  Occluded  right posterior tibial.  Reduced velocities in both the common femoral suggestive of aorto iliac disease.    #8 Anticoagulation Therapy on Coumadin for Recurrent DVT Subsequent GI Bleed Has Been Presently Titrated on to Normal Oral Anticoagulant Drugs.    #9 Dyslipidemia #10 Hypertension    #12 Chronic Renal Insufficiency.    #13 Active Nicotine Abuse but    #14 Noncompliance but    #15 Chronic Venous Insufficiency.    #16 Paroxysmal Atrial Fibrillation Status Post Electrical Cardioversion.    #17 GI bleed status post therapy at [facility] no further details known.    #18 diabetes mellitus.    Personal History: Smokes up to One Pack per Day Used To Be a Smoker up to 3 Packs per day.  Drinks alcohol on a regular basis.  Gets drunk quite often.  No drug abuse.  No stress anxiety or depression function status of the patient is very limited.    Family history noncontributory     review of symptoms patient no cough cold fever chills nausea vomiting diarrhea and abdominal pain loss of consciousness PND orthopnea stroke weakness joint swelling rest of the review of symptoms are negative.      Past Surgical History:   Procedure Laterality Date   • ANKLE SURGERY     • CARDIAC SURGERY     • CORONARY STENT PLACEMENT     • TONSILLECTOMY     • TOTAL HIP ARTHROPLASTY Left    • TOTAL SHOULDER REPLACEMENT Left    , Family History   Problem Relation Age of Onset   • Breast cancer Mother    • Colon cancer Sister    • Heart attack Other    • Arthritis Other    • Diabetes Other    • Hypertension Other    • Kidney disease Other    • Stroke Other    • Heart disease Father    , Social History   Substance Use Topics   • Smoking status: Current Every Day Smoker     Packs/day: 1.00     Years: 30.00     Types: Cigarettes   • Smokeless tobacco: None   • Alcohol use No   , Prescriptions Prior to Admission   Medication Sig Dispense Refill Last Dose   • albuterol (PROVENTIL HFA;VENTOLIN HFA) 108 (90 BASE) MCG/ACT inhaler Inhale 2 puffs  Every 4 (Four) Hours As Needed for Wheezing.      • amiodarone (PACERONE) 200 MG tablet Take 1 tablet by mouth Daily. (Patient taking differently: Take 200 mg by mouth Daily.) 30 tablet 11    • aspirin 81 MG EC tablet Take 1 tablet by mouth Daily. 30 tablet 11    • atorvastatin (LIPITOR) 40 MG tablet Take 1 tablet by mouth Every Night. 30 tablet 11    • colchicine 0.6 MG tablet Take 0.6 mg by mouth Daily.      • hydroxychloroquine (PLAQUENIL) 200 MG tablet Take 200 mg by mouth Daily.      • losartan-hydrochlorothiazide (HYZAAR) 50-12.5 MG per tablet Take 1 tablet by mouth Daily. (Patient taking differently: Take 1 tablet by mouth Daily.) 30 tablet 11    • melatonin 3 MG tablet Take 2 tablets by mouth Every Night. 30 tablet 6 Taking   • metoprolol tartrate (LOPRESSOR) 25 MG tablet Take 1 tablet by mouth Every 12 (Twelve) Hours. (Patient taking differently: Take 25 mg by mouth 2 (Two) Times a Day.) 60 tablet 11    • polyethylene glycol (MIRALAX) packet Take 17 g by mouth Daily As Needed.      • predniSONE (DELTASONE) 20 MG tablet Take 10 mg by mouth Daily As Needed (take 1 tablet by mouth daily for 2 to 5 days for flare up, may repeat once weekly).   Taking   • rivaroxaban (XARELTO) 20 MG tablet Take 1 tablet by mouth Daily With Dinner. (Patient taking differently: Take 20 mg by mouth Daily.) 30 tablet 11    • DULoxetine (CYMBALTA) 30 MG capsule Take 1 capsule by mouth Every Night. 30 capsule 1 Taking   , Scheduled Meds:    amiodarone 200 mg Oral Q24H   aspirin 81 mg Oral Daily   atorvastatin 40 mg Oral Nightly   clindamycin 600 mg Intravenous Q8H   DULoxetine 30 mg Oral Nightly   folic acid 1 mg Oral Daily   losartan-hydrochlorothiazide 1 tablet Oral Daily   melatonin 6 mg Oral Nightly   metoprolol tartrate 25 mg Oral Q12H   multivitamin with minerals 1 tablet Oral Daily   [START ON 6/20/2017] pantoprazole 40 mg Oral Q AM   predniSONE 20 mg Oral Daily   rivaroxaban 20 mg Oral Daily With Dinner   thiamine 100 mg Oral  "Daily   , Continuous Infusions:    sodium chloride 125 mL/hr Last Rate: 125 mL/hr (06/19/17 1063)   , PRN Meds:  •  acetaminophen  •  LORazepam **OR** LORazepam **OR** LORazepam **OR** LORazepam **OR** LORazepam **OR** LORazepam  •  Morphine **AND** naloxone  •  ondansetron **OR** ondansetron ODT **OR** ondansetron  •  sodium chloride  •  Insert peripheral IV **AND** sodium chloride, Allergies:  Review of patient's allergies indicates no known allergies.     Objective     Vital Sign Min/Max for last 24 hours  Temp  Min: 97.9 °F (36.6 °C)  Max: 98 °F (36.7 °C)   BP  Min: 135/75  Max: 140/86   Pulse  Min: 70  Max: 89   Resp  Min: 16  Max: 20   SpO2  Min: 95 %  Max: 99 %   No Data Recorded   Weight  Min: 220 lb (99.8 kg)  Max: 220 lb (99.8 kg)     Flowsheet Rows         First Filed Value    Admission Height  68\" (172.7 cm) Documented at 06/19/2017 1130    Admission Weight  220 lb (99.8 kg) Documented at 06/19/2017 1130               Echo EF Estimated  Lab Results   Component Value Date    ECHOEFEST 55 04/11/2017         Physical Exam:     General Appearance:    Alert, cooperative, in no acute distress   Head:    Normocephalic, without obvious abnormality, atraumatic   Eyes:            Lids and lashes normal, conjunctivae and sclerae normal, no   icterus, no pallor, corneas clear, PERRLA   Ears:    Ears appear intact with no abnormalities noted   Throat:   No oral lesions, no thrush, oral mucosa moist   Neck:   No adenopathy, supple, trachea midline, no thyromegaly, no   carotid bruit, no JVD   Back:     No kyphosis present, no scoliosis present, no skin lesions,      erythema or scars, no tenderness to percussion or                   palpation,   range of motion normal   Lungs:     Clear to auscultation,respirations regular, even and                  unlabored    Heart:    Regular rhythm and normal rate, normal S1 and S2, no            murmur, no gallop, no rub, no click   Chest Wall:    No abnormalities observed "   Abdomen:     Normal bowel sounds, no masses, no organomegaly, soft        non-tender, non-distended, no guarding, no rebound                tenderness   Rectal:     Deferred   Extremities:   Left leg third toes ischemic blackish and unlabored.  Fourth 2 also has a blackish sore in it.  The second toe has loss of nail and a small ulcer on it.  Skin discoloration all over noted.     Pulses:  Bruits heard over both the femoral arteries.  Faint dopplerable the popliteal artery on the left side.  No Doppler's in the PTO the 80 on the left.  Good biphasic Doppler in the ED on the right side.     Skin:   No bleeding, bruising or rash   Lymph nodes:   No palpable adenopathy   Neurologic:   Cranial nerves 2 - 12 grossly intact, sensation intact, DTR       present and equal bilaterally       Results Review:   I reviewed the patient's new clinical results.    EKG: Sinus rhythm KS interval of 176 Place seconds right bundle branch block rate of 60 beats a minute no ischemic changes.  LAB DATA :           WBC   Date Value Ref Range Status   06/19/2017 7.88 4.80 - 10.80 10*3/mm3 Final     RBC   Date Value Ref Range Status   06/19/2017 3.09 (L) 4.70 - 6.10 10*6/mm3 Final     Hemoglobin   Date Value Ref Range Status   06/19/2017 8.8 (L) 14.0 - 18.0 g/dL Final     Hematocrit   Date Value Ref Range Status   06/19/2017 28.1 (L) 42.0 - 52.0 % Final     MCV   Date Value Ref Range Status   06/19/2017 90.9 80.0 - 94.0 fL Final     MCH   Date Value Ref Range Status   06/19/2017 28.5 27.0 - 31.0 pg Final     MCHC   Date Value Ref Range Status   06/19/2017 31.3 30.0 - 37.0 g/dL Final     RDW   Date Value Ref Range Status   06/19/2017 15.7 (H) 11.5 - 14.5 % Final     RDW-SD   Date Value Ref Range Status   06/19/2017 52.5 37.0 - 54.0 fl Final     MPV   Date Value Ref Range Status   06/19/2017 9.9 6.0 - 12.0 fL Final     Platelets   Date Value Ref Range Status   06/19/2017 201 130 - 400 10*3/mm3 Final     Neutrophil %   Date Value Ref Range  Status   06/19/2017 59.9 37.0 - 80.0 % Final     Lymphocyte %   Date Value Ref Range Status   06/19/2017 19.0 10.0 - 50.0 % Final     Monocyte %   Date Value Ref Range Status   06/19/2017 6.7 0.0 - 12.0 % Final     Eosinophil %   Date Value Ref Range Status   06/19/2017 14.0 (H) 0.0 - 7.0 % Final     Basophil %   Date Value Ref Range Status   06/19/2017 0.1 0.0 - 2.5 % Final     Immature Grans %   Date Value Ref Range Status   06/19/2017 0.3 0.0 - 0.6 % Final     Neutrophils, Absolute   Date Value Ref Range Status   06/19/2017 4.72 2.00 - 6.90 10*3/mm3 Final     Lymphocytes, Absolute   Date Value Ref Range Status   06/19/2017 1.50 0.60 - 3.40 10*3/mm3 Final     Monocytes, Absolute   Date Value Ref Range Status   06/19/2017 0.53 0.00 - 0.90 10*3/mm3 Final     Eosinophils, Absolute   Date Value Ref Range Status   06/19/2017 1.10 (H) 0.00 - 0.70 10*3/mm3 Final     Basophils, Absolute   Date Value Ref Range Status   06/19/2017 0.01 0.00 - 0.20 10*3/mm3 Final     Immature Grans, Absolute   Date Value Ref Range Status   06/19/2017 0.02 0.00 - 0.06 10*3/mm3 Final     nRBC   Date Value Ref Range Status   06/19/2017 0.0 0.0 - 0.0 /100 WBC Final       Lab Results   Component Value Date    GLUCOSE 110 (H) 06/19/2017    BUN 14 06/19/2017    CREATININE 1.20 06/19/2017    EGFRIFNONA 62 06/19/2017    BCR 11.7 06/19/2017    CO2 29.0 06/19/2017    CALCIUM 9.2 06/19/2017    ALBUMIN 3.50 06/19/2017    LABIL2 0.7 (L) 06/19/2017    AST 69 (H) 06/19/2017    ALT 32 06/19/2017       Lab Results   Component Value Date    CKTOTAL 129 04/07/2017    CKMB 1.97 06/19/2017    CKMBINDEX 2.6 (H) 04/07/2017    TROPONINI <0.012 06/19/2017       No results found for: DDIMER    No results found for: SITE, ALLENTEST, PHART, NAM8LRZ, PO2ART, HES2MYF, BASEEXCESS, V1EYJOTF, HGBBG, HCTABG, OXYHEMOGLOBI, METHHGBN, CARBOXYHGB, CO2CT, BAROMETRIC, MODALITY, FIO2  Lab Results   Component Value Date    HGBA1C 6.20 (H) 04/08/2017         Lab Results   Component  Value Date    LIPASE 413 (H) 04/20/2016       IMAGING DATA:     Xr Foot 3+ View Left    Result Date: 6/19/2017  Narrative: PROCEDURE: XR FOOT 3+ VW LEFT-  History: pain  COMPARISON:April 21, 2017.  FINDINGS:  A 3 view exam demonstrates postsurgical changes with a wire spanning the bases of the fourth and fifth metatarsals. There is a fracture of the head of the fourth proximal phalanx. Advanced degenerative changes are present at the tibiotalar and subtalar joints. There is also joint space narrowing and osteophyte formation in the mid foot. Diffuse soft tissue swelling is present.         Impression: Fracture of the head of the fourth proximal phalanx.       This report was finalized on 6/19/2017 12:48 PM by Padmini Swan M.D..    Us Arterial Doppler Lower Extremity Left    Result Date: 6/19/2017  Narrative: PROCEDURE: US ARTERIAL DOPPLER LOWER EXTREMITY  LEFT-  HISTORY: pain  PROCEDURE: Sonographic images of the left lower extremity was obtained with color flow and pulse Doppler.  FINDINGS:  LEFT LOWER EXTREMITY.      Velocities cm/sec :  CFA: 84 SFA Mid: 92 SFA Dist: 0 POP: 39 PT: 31 JULI: 23 CASSIE: Not obtained Waveforms are monophasic.       Impression: Occlusion of the distal superficial femoral artery with reconstitution of flow at the popliteal artery.  This report was finalized on 6/19/2017 1:14 PM by Padmini Swan M.D..    Mri Foot Left Without Contrast    Result Date: 6/19/2017  Narrative: FINAL REPORT CLINICAL HISTORY: ISCHEMIC LEFT 3RD TOE. HX OF SURGERY TO ANKLE. FINDINGS: No prior studies available for comparison. Multiplanar multisequence imaging of the foot was performed without the intravenous administration of contrast. Images are degraded by motion artifact. There is additional artifact from postsurgical hardware. There are prominent cystic degenerative changes diffusely. There is abnormal decreased T1 and increased T2 signal in the proximal and middle phalanx third and fourth digits with  adjacent edema. There prominent cystic degenerative changes of the first and fifth metatarsal heads. There is no discrete drainable fluid collection. Impression: Prominent degenerative changes with findings suggesting third and fourth digit osteomyelitis and cellulitis. Recommend appropriate clinical followup     Impression: Authenticated by Latrell Clemente MD on 06/19/2017 05:38:45 PM    Us Venous Doppler Lower Extremity Left (duplex)    Result Date: 6/19/2017  Narrative: PROCEDURE: US VENOUS DOPPLER LOWER EXTREMITY LEFT (DUPLEX)-  HISTORY: pain  PROCEDURE: Multiple transverse and longitudinal scans were performed of the femoropopliteal deep venous system, with augmentation and compression maneuvers.  FINDINGS: Normal phasic flow was noted in the visualized deep venous system. No intraluminal increased echogenicity is noted to suggest thrombus. There is normal compression and augmentation of the venous structures. No abnormal venous collaterals are seen. No venous reflux is demonstrated.      Impression: No evidence of deep venous thrombosis.  This report was finalized on 6/19/2017 12:49 PM by Padmini Swan M.D..        DIAGNOSIS   #1 critical limb ischemia of the left lower extremity: Patient has critical limb ischemia of the left leg.  The Doppler done in April has been very clear in showing that there is near total occlusion of the left popliteal artery.  Has been offered invasive workup with intervention to see if we can get blood supply down the left lower extremity after which he'll need amputation with respect to his stools as per podiatry.  He is in agreement with this plan.    #2 anemia with history of GI bleed: Patient has had history of GI bleed is been put on normal oral anticoagulation therapy.  Would strongly consider switching from 0-2 any other no oral oral anticoagulant given the history of GI bleed.  Would discontinue his Aldactone for now.  Would type and cross blood at this point in time  as there is a faint chance of blood loss during the procedure also.  Nevertheless it's needs An urgent evaluation with intervention to the leg for tissue salvage.    #3 nicotine abuse: Patient has been smoking up till 4-5 days ago will keep him on a nicotine patch during his hospital stay at this time.      #4 alcohol abuse: Patient has gone into withdrawal recently when he was in [facility] would initiate the therapy for alcohol withdrawal ASAP.    #5 metabolic abnormalities: Has been on statin therapy would continue the same.    Based on the findings of the angiography tomorrow will decide on further course of therapy.                Active Problems:    Cellulitis of foot, left          I discussed the patients findings and my recommendations with patient    Jonathan Strom MD  06/19/17  6:38 PM      Please note that portions of this note may have been completed with a voice recognition program. Efforts were made to edit the dictations, but occasionally words are mistranscribed.

## 2017-06-19 NOTE — ED NOTES
PT BROUGHT A BAGGED LUNCH AND A DRINK AFTER OKAYED BY DIMITRI RUTHERFORD PA-C.      [place], PETRA  06/19/17 5024

## 2017-06-19 NOTE — PLAN OF CARE
Problem: Infection, Risk/Actual (Adult)  Goal: Identify Related Risk Factors and Signs and Symptoms  Outcome: Outcome(s) achieved Date Met:  06/19/17

## 2017-06-19 NOTE — PROGRESS NOTES
Continued Stay Note  BRITTANI Espinal     Patient Name: Vasquez Marie  MRN: [id number]  Today's Date: 6/19/2017    Admit Date: 6/19/2017          Discharge Plan       06/19/17 1819    Case Management/Social Work Plan    Plan home, with home health    Additional Comments lives alone, uses walker and wheelchair, states has problems  with meal preparation, house keeping,  and shopping  pt states would like to have home health              Discharge Codes     None        Expected Discharge Date and Time     Expected Discharge Date Expected Discharge Time    Jun 22, 2017             Sonali Ferreira RN

## 2017-06-19 NOTE — ED NOTES
DR. WOODARD WAS CALLED AT 1447 AND TRANSFERRED TO [place] AT THIS TIME.      Elias Servin  06/19/17 8166

## 2017-06-19 NOTE — ED PROVIDER NOTES
Subjective   HPI Comments: Patient is here with complaint of some left foot pain for the past 2 months patient does report history of tobacco abuse and peripheral vascular disease patient presents today with apparently worsening discomfort to the left foot denies chest pain or shortness of air no fevers chills reported symptoms ongoing has patient mentions for the past 2 months  here for further evaluation      Review of Systems   Constitutional: Negative.  Negative for chills and fever.   HENT: Negative.    Respiratory: Negative.    Cardiovascular: Negative for chest pain and palpitations.   Gastrointestinal: Negative.  Negative for abdominal pain, anal bleeding, blood in stool and vomiting.   Genitourinary: Negative.    Musculoskeletal: Positive for arthralgias.   Skin: Positive for color change and rash.   Neurological: Negative.  Negative for weakness.   All other systems reviewed and are negative.      Past Medical History:   Diagnosis Date   • Abdominal pain     History of epigastric abdominal tenderness. Differentials include peptic ulcer disease,   pancreatobiliary disease.   • Abnormal LFTs    • Anemia    • Anxiety    • Arthritis    • Asthma    • Atrial fibrillation    • CAD (coronary artery disease)    • Calf cramp    • CKD (chronic kidney disease)    • COPD (chronic obstructive pulmonary disease)    • CVA (cerebral infarction)    • Depression    • DVT (deep venous thrombosis)    • Electrocution    • Fatigue    • Hepatitis C    • History of allergy    • History of blood transfusion    • Hyperlipidemia    • Hypertension    • Leg pain    • Loss of appetite    • PUD (peptic ulcer disease)    • Stroke syndrome    • Tachycardia    • Thrombocytopenia    • Thrombophlebitis of deep femoral vein    • Vision problems        No Known Allergies    Past Surgical History:   Procedure Laterality Date   • ANKLE SURGERY     • CARDIAC SURGERY     • CORONARY STENT PLACEMENT     • TONSILLECTOMY     • TOTAL HIP ARTHROPLASTY  Left    • TOTAL SHOULDER REPLACEMENT Left        Family History   Problem Relation Age of Onset   • Breast cancer Mother    • Colon cancer Sister    • Heart attack Other    • Arthritis Other    • Diabetes Other    • Hypertension Other    • Kidney disease Other    • Stroke Other    • Heart disease Father        Social History     Social History   • Marital status: Single     Spouse name: N/A   • Number of children: N/A   • Years of education: N/A     Social History Main Topics   • Smoking status: Current Every Day Smoker     Packs/day: 1.00     Years: 30.00     Types: Cigarettes   • Smokeless tobacco: None   • Alcohol use No   • Drug use: No   • Sexual activity: Defer     Other Topics Concern   • None     Social History Narrative           Objective   Physical Exam   Constitutional: He is oriented to person, place, and time. He appears well-developed.   Afebrile vital signs stable nontoxic   HENT:   Head: Normocephalic.   Eyes: Conjunctivae and EOM are normal. Pupils are equal, round, and reactive to light.   Neck: Neck supple.   Cardiovascular: Normal rate and regular rhythm.    Pulses:       Popliteal pulses are 1+ on the right side, and 1+ on the left side.        Dorsalis pedis pulses are 1+ on the right side, and 0 on the left side.   Pulmonary/Chest: Effort normal and breath sounds normal.   Genitourinary:   Genitourinary Comments: Rectal exam unremarkable some light brown stool no blood per rectum no melanotic stool   Musculoskeletal: Normal range of motion.   Neurological: He is alert and oriented to person, place, and time. No cranial nerve deficit or sensory deficit. He exhibits normal muscle tone. GCS eye subscore is 4. GCS verbal subscore is 5. GCS motor subscore is 6.   Skin: Skin is warm and dry.   Noted ulceration to the left great toe with some necrotic black left third toe affecting the left fourth toe as well but primarily the left third toe necrotic   Psychiatric: He has a normal mood and affect.  His behavior is normal. Judgment and thought content normal.   Nursing note and vitals reviewed.      Procedures         ED Course  ED Course   Comment By Time   Case labs management reviewed with Dr. Estela Guzmán PA-C 06/19 1214   Patient seen by myself and Dr. Estela Guzmán PA-C 06/19 1322   We'll discuss with Dr. landa patient sees Dr. ovidio Guzmán PA-C 06/19 1330   Discussed with Dr. Landa recommends discussing with Dr. Emeterio Guzmán PA-C 06/19 1336   Discussed with Dr. Storm agreeable to see patient Darius Guzmán PA-C 06/19 1451                  MDM    Final diagnoses:   Closed fracture of fourth toe of left foot, initial encounter   Cellulitis of foot, left   Peripheral vascular disease of lower extremity   Tobacco abuse            Darius Guzmán PA-C  06/19/17 1526

## 2017-06-20 LAB
AMPHET+METHAMPHET UR QL: NEGATIVE
AMPHETAMINES UR QL: NEGATIVE
ANION GAP SERPL CALCULATED.3IONS-SCNC: 17.6 MMOL/L
BARBITURATES UR QL SCN: NEGATIVE
BASOPHILS # BLD AUTO: 0.01 10*3/MM3 (ref 0–0.2)
BASOPHILS NFR BLD AUTO: 0.3 % (ref 0–2.5)
BENZODIAZ UR QL SCN: POSITIVE
BUN BLD-MCNC: 16 MG/DL (ref 7–20)
BUN/CREAT SERPL: 10 (ref 6.3–21.9)
BUPRENORPHINE SERPL-MCNC: NEGATIVE NG/ML
CALCIUM SPEC-SCNC: 8.2 MG/DL (ref 8.4–10.2)
CANNABINOIDS SERPL QL: POSITIVE
CHLORIDE SERPL-SCNC: 106 MMOL/L (ref 98–107)
CO2 SERPL-SCNC: 24 MMOL/L (ref 26–30)
COCAINE UR QL: NEGATIVE
CREAT BLD-MCNC: 1.6 MG/DL (ref 0.6–1.3)
DEPRECATED RDW RBC AUTO: 52.2 FL (ref 37–54)
DEPRECATED RDW RBC AUTO: 52.4 FL (ref 37–54)
EOSINOPHIL # BLD AUTO: 0.02 10*3/MM3 (ref 0–0.7)
EOSINOPHIL NFR BLD AUTO: 0.7 % (ref 0–7)
ERYTHROCYTE [DISTWIDTH] IN BLOOD BY AUTOMATED COUNT: 15.6 % (ref 11.5–14.5)
ERYTHROCYTE [DISTWIDTH] IN BLOOD BY AUTOMATED COUNT: 15.7 % (ref 11.5–14.5)
GFR SERPL CREATININE-BSD FRML MDRD: 45 ML/MIN/1.73
GLUCOSE BLD-MCNC: 226 MG/DL (ref 74–98)
HCT VFR BLD AUTO: 24.4 % (ref 42–52)
HCT VFR BLD AUTO: 25.9 % (ref 42–52)
HGB BLD-MCNC: 7.7 G/DL (ref 14–18)
HGB BLD-MCNC: 8 G/DL (ref 14–18)
IMM GRANULOCYTES # BLD: 0.02 10*3/MM3 (ref 0–0.06)
IMM GRANULOCYTES NFR BLD: 0.7 % (ref 0–0.6)
LYMPHOCYTES # BLD AUTO: 0.51 10*3/MM3 (ref 0.6–3.4)
LYMPHOCYTES # BLD MANUAL: 0.36 10*3/MM3 (ref 0.6–3.4)
LYMPHOCYTES NFR BLD AUTO: 17.1 % (ref 10–50)
LYMPHOCYTES NFR BLD MANUAL: 12 % (ref 10–50)
LYMPHOCYTES NFR BLD MANUAL: 5 % (ref 0–12)
MCH RBC QN AUTO: 28.3 PG (ref 27–31)
MCH RBC QN AUTO: 28.7 PG (ref 27–31)
MCHC RBC AUTO-ENTMCNC: 30.9 G/DL (ref 30–37)
MCHC RBC AUTO-ENTMCNC: 31.6 G/DL (ref 30–37)
MCV RBC AUTO: 91 FL (ref 80–94)
MCV RBC AUTO: 91.5 FL (ref 80–94)
METHADONE UR QL SCN: NEGATIVE
MONOCYTES # BLD AUTO: 0.1 10*3/MM3 (ref 0–0.9)
MONOCYTES # BLD AUTO: 0.15 10*3/MM3 (ref 0–0.9)
MONOCYTES NFR BLD AUTO: 3.3 % (ref 0–12)
NEUTROPHILS # BLD AUTO: 2.33 10*3/MM3 (ref 2–6.9)
NEUTROPHILS # BLD AUTO: 2.49 10*3/MM3 (ref 2–6.9)
NEUTROPHILS NFR BLD AUTO: 77.9 % (ref 37–80)
NEUTROPHILS NFR BLD MANUAL: 78 % (ref 37–80)
NEUTS BAND NFR BLD MANUAL: 5 % (ref 0–6)
NRBC BLD MANUAL-RTO: 0 /100 WBC (ref 0–0)
OPIATES UR QL: POSITIVE
OXYCODONE UR QL SCN: NEGATIVE
PCP UR QL SCN: NEGATIVE
PLATELET # BLD AUTO: 129 10*3/MM3 (ref 130–400)
PLATELET # BLD AUTO: 156 10*3/MM3 (ref 130–400)
PMV BLD AUTO: 10.6 FL (ref 6–12)
PMV BLD AUTO: 11 FL (ref 6–12)
POTASSIUM BLD-SCNC: 3.6 MMOL/L (ref 3.5–5.1)
PROPOXYPH UR QL: NEGATIVE
RBC # BLD AUTO: 2.68 10*6/MM3 (ref 4.7–6.1)
RBC # BLD AUTO: 2.83 10*6/MM3 (ref 4.7–6.1)
RBC MORPH BLD: NORMAL
SCAN SLIDE: NORMAL
SMALL PLATELETS BLD QL SMEAR: ADEQUATE
SODIUM BLD-SCNC: 144 MMOL/L (ref 137–145)
TRICYCLICS UR QL SCN: NEGATIVE
WBC MORPH BLD: NORMAL
WBC NRBC COR # BLD: 2.99 10*3/MM3 (ref 4.8–10.8)
WBC NRBC COR # BLD: 3 10*3/MM3 (ref 4.8–10.8)

## 2017-06-20 PROCEDURE — 75625 CONTRAST EXAM ABDOMINL AORTA: CPT | Performed by: INTERNAL MEDICINE

## 2017-06-20 PROCEDURE — G0378 HOSPITAL OBSERVATION PER HR: HCPCS

## 2017-06-20 PROCEDURE — 63710000001 PREDNISONE PER 1 MG: Performed by: NURSE PRACTITIONER

## 2017-06-20 PROCEDURE — 85025 COMPLETE CBC W/AUTO DIFF WBC: CPT | Performed by: INTERNAL MEDICINE

## 2017-06-20 PROCEDURE — 36430 TRANSFUSION BLD/BLD COMPNT: CPT

## 2017-06-20 PROCEDURE — C1894 INTRO/SHEATH, NON-LASER: HCPCS | Performed by: INTERNAL MEDICINE

## 2017-06-20 PROCEDURE — P9016 RBC LEUKOCYTES REDUCED: HCPCS

## 2017-06-20 PROCEDURE — 25010000002 MIDAZOLAM PER 1 MG: Performed by: INTERNAL MEDICINE

## 2017-06-20 PROCEDURE — C2623 CATH, TRANSLUMIN, DRUG-COAT: HCPCS | Performed by: INTERNAL MEDICINE

## 2017-06-20 PROCEDURE — 0 IOPAMIDOL PER 1 ML: Performed by: INTERNAL MEDICINE

## 2017-06-20 PROCEDURE — 80048 BASIC METABOLIC PNL TOTAL CA: CPT | Performed by: NURSE PRACTITIONER

## 2017-06-20 PROCEDURE — 047U3ZZ DILATION OF LEFT PERONEAL ARTERY, PERCUTANEOUS APPROACH: ICD-10-PCS | Performed by: INTERNAL MEDICINE

## 2017-06-20 PROCEDURE — 047L3Z1 DILATION OF LEFT FEMORAL ARTERY USING DRUG-COATED BALLOON, PERCUTANEOUS APPROACH: ICD-10-PCS | Performed by: INTERNAL MEDICINE

## 2017-06-20 PROCEDURE — 80306 DRUG TEST PRSMV INSTRMNT: CPT | Performed by: PHYSICIAN ASSISTANT

## 2017-06-20 PROCEDURE — 25010000002 ONDANSETRON PER 1 MG: Performed by: NURSE PRACTITIONER

## 2017-06-20 PROCEDURE — 25010000002 ONDANSETRON PER 1 MG: Performed by: INTERNAL MEDICINE

## 2017-06-20 PROCEDURE — 86900 BLOOD TYPING SEROLOGIC ABO: CPT

## 2017-06-20 PROCEDURE — 25010000002 MORPHINE PER 10 MG: Performed by: NURSE PRACTITIONER

## 2017-06-20 PROCEDURE — C1760 CLOSURE DEV, VASC: HCPCS | Performed by: INTERNAL MEDICINE

## 2017-06-20 PROCEDURE — 75716 ARTERY X-RAYS ARMS/LEGS: CPT | Performed by: INTERNAL MEDICINE

## 2017-06-20 PROCEDURE — 25010000002 FUROSEMIDE PER 20 MG: Performed by: NURSE PRACTITIONER

## 2017-06-20 PROCEDURE — 047N3Z1 DILATION OF LEFT POPLITEAL ARTERY USING DRUG-COATED BALLOON, PERCUTANEOUS APPROACH: ICD-10-PCS | Performed by: INTERNAL MEDICINE

## 2017-06-20 PROCEDURE — C1725 CATH, TRANSLUMIN NON-LASER: HCPCS | Performed by: INTERNAL MEDICINE

## 2017-06-20 PROCEDURE — 25010000002 LORAZEPAM PER 2 MG: Performed by: NURSE PRACTITIONER

## 2017-06-20 PROCEDURE — 99233 SBSQ HOSP IP/OBS HIGH 50: CPT | Performed by: NURSE PRACTITIONER

## 2017-06-20 PROCEDURE — 25010000002 HEPARIN (PORCINE) PER 1000 UNITS: Performed by: INTERNAL MEDICINE

## 2017-06-20 PROCEDURE — 85007 BL SMEAR W/DIFF WBC COUNT: CPT | Performed by: INTERNAL MEDICINE

## 2017-06-20 PROCEDURE — 99222 1ST HOSP IP/OBS MODERATE 55: CPT | Performed by: PODIATRIST

## 2017-06-20 PROCEDURE — C1769 GUIDE WIRE: HCPCS | Performed by: INTERNAL MEDICINE

## 2017-06-20 PROCEDURE — 25010000002 FENTANYL CITRATE (PF) 100 MCG/2ML SOLUTION: Performed by: INTERNAL MEDICINE

## 2017-06-20 PROCEDURE — C1887 CATHETER, GUIDING: HCPCS | Performed by: INTERNAL MEDICINE

## 2017-06-20 RX ORDER — SODIUM CHLORIDE 9 MG/ML
50 INJECTION, SOLUTION INTRAVENOUS CONTINUOUS
Status: DISCONTINUED | OUTPATIENT
Start: 2017-06-20 | End: 2017-06-21

## 2017-06-20 RX ORDER — HEPARIN SODIUM 1000 [USP'U]/ML
INJECTION, SOLUTION INTRAVENOUS; SUBCUTANEOUS AS NEEDED
Status: DISCONTINUED | OUTPATIENT
Start: 2017-06-20 | End: 2017-06-20 | Stop reason: HOSPADM

## 2017-06-20 RX ORDER — ONDANSETRON 4 MG/1
4 TABLET, ORALLY DISINTEGRATING ORAL EVERY 6 HOURS PRN
Status: DISCONTINUED | OUTPATIENT
Start: 2017-06-20 | End: 2017-06-26 | Stop reason: HOSPADM

## 2017-06-20 RX ORDER — FENTANYL CITRATE 50 UG/ML
INJECTION, SOLUTION INTRAMUSCULAR; INTRAVENOUS AS NEEDED
Status: DISCONTINUED | OUTPATIENT
Start: 2017-06-20 | End: 2017-06-20 | Stop reason: HOSPADM

## 2017-06-20 RX ORDER — CLOPIDOGREL BISULFATE 75 MG/1
TABLET ORAL AS NEEDED
Status: DISCONTINUED | OUTPATIENT
Start: 2017-06-20 | End: 2017-06-20 | Stop reason: HOSPADM

## 2017-06-20 RX ORDER — MIDAZOLAM HYDROCHLORIDE 1 MG/ML
INJECTION INTRAMUSCULAR; INTRAVENOUS AS NEEDED
Status: DISCONTINUED | OUTPATIENT
Start: 2017-06-20 | End: 2017-06-20 | Stop reason: HOSPADM

## 2017-06-20 RX ORDER — LIDOCAINE HYDROCHLORIDE 10 MG/ML
INJECTION, SOLUTION INFILTRATION; PERINEURAL AS NEEDED
Status: DISCONTINUED | OUTPATIENT
Start: 2017-06-20 | End: 2017-06-20 | Stop reason: HOSPADM

## 2017-06-20 RX ORDER — ONDANSETRON 4 MG/1
4 TABLET, FILM COATED ORAL EVERY 6 HOURS PRN
Status: DISCONTINUED | OUTPATIENT
Start: 2017-06-20 | End: 2017-06-26 | Stop reason: HOSPADM

## 2017-06-20 RX ORDER — ACETAMINOPHEN 325 MG/1
650 TABLET ORAL EVERY 4 HOURS PRN
Status: DISCONTINUED | OUTPATIENT
Start: 2017-06-20 | End: 2017-06-26 | Stop reason: HOSPADM

## 2017-06-20 RX ORDER — LOSARTAN POTASSIUM 50 MG/1
50 TABLET ORAL
Status: DISCONTINUED | OUTPATIENT
Start: 2017-06-21 | End: 2017-06-26 | Stop reason: HOSPADM

## 2017-06-20 RX ORDER — ONDANSETRON 2 MG/ML
4 INJECTION INTRAMUSCULAR; INTRAVENOUS EVERY 6 HOURS PRN
Status: DISCONTINUED | OUTPATIENT
Start: 2017-06-20 | End: 2017-06-26 | Stop reason: HOSPADM

## 2017-06-20 RX ORDER — FUROSEMIDE 10 MG/ML
20 INJECTION INTRAMUSCULAR; INTRAVENOUS ONCE
Status: COMPLETED | OUTPATIENT
Start: 2017-06-20 | End: 2017-06-20

## 2017-06-20 RX ORDER — HYDROCODONE BITARTRATE AND ACETAMINOPHEN 5; 325 MG/1; MG/1
1 TABLET ORAL EVERY 4 HOURS PRN
Status: DISCONTINUED | OUTPATIENT
Start: 2017-06-20 | End: 2017-06-26 | Stop reason: HOSPADM

## 2017-06-20 RX ORDER — PANTOPRAZOLE SODIUM 40 MG/1
40 TABLET, DELAYED RELEASE ORAL
Status: DISCONTINUED | OUTPATIENT
Start: 2017-06-20 | End: 2017-06-26 | Stop reason: HOSPADM

## 2017-06-20 RX ADMIN — DULOXETINE HYDROCHLORIDE 30 MG: 30 CAPSULE, DELAYED RELEASE ORAL at 20:42

## 2017-06-20 RX ADMIN — MORPHINE SULFATE 1 MG: 2 INJECTION, SOLUTION INTRAMUSCULAR; INTRAVENOUS at 16:48

## 2017-06-20 RX ADMIN — FUROSEMIDE 20 MG: 10 INJECTION, SOLUTION INTRAMUSCULAR; INTRAVENOUS at 17:36

## 2017-06-20 RX ADMIN — Medication 100 MG: at 10:04

## 2017-06-20 RX ADMIN — MULTIPLE VITAMINS W/ MINERALS TAB 1 TABLET: TAB at 10:03

## 2017-06-20 RX ADMIN — ATORVASTATIN CALCIUM 40 MG: 40 TABLET, FILM COATED ORAL at 20:41

## 2017-06-20 RX ADMIN — FOLIC ACID 1 MG: 1 TABLET ORAL at 10:03

## 2017-06-20 RX ADMIN — ONDANSETRON 4 MG: 2 INJECTION INTRAMUSCULAR; INTRAVENOUS at 20:43

## 2017-06-20 RX ADMIN — CLOPIDOGREL BISULFATE 75 MG: 75 TABLET ORAL at 10:08

## 2017-06-20 RX ADMIN — MELATONIN TAB 3 MG 6 MG: 3 TAB at 20:42

## 2017-06-20 RX ADMIN — LORAZEPAM 2 MG: 2 INJECTION INTRAMUSCULAR; INTRAVENOUS at 22:31

## 2017-06-20 RX ADMIN — LORAZEPAM 2 MG: 2 INJECTION INTRAMUSCULAR; INTRAVENOUS at 20:17

## 2017-06-20 RX ADMIN — SODIUM CHLORIDE 50 ML/HR: 9 INJECTION, SOLUTION INTRAVENOUS at 10:30

## 2017-06-20 RX ADMIN — LOSARTAN POTASSIUM AND HYDROCHLOROTHIAZIDE 1 TABLET: 50; 12.5 TABLET, FILM COATED ORAL at 10:04

## 2017-06-20 RX ADMIN — SODIUM CHLORIDE 600 MG: 9 INJECTION, SOLUTION INTRAVENOUS at 14:39

## 2017-06-20 RX ADMIN — SODIUM CHLORIDE 600 MG: 9 INJECTION, SOLUTION INTRAVENOUS at 05:59

## 2017-06-20 RX ADMIN — LORAZEPAM 2 MG: 2 INJECTION INTRAMUSCULAR; INTRAVENOUS at 19:40

## 2017-06-20 RX ADMIN — MORPHINE SULFATE 1 MG: 2 INJECTION, SOLUTION INTRAMUSCULAR; INTRAVENOUS at 07:55

## 2017-06-20 RX ADMIN — HYDROCODONE BITARTRATE AND ACETAMINOPHEN 1 TABLET: 5; 325 TABLET ORAL at 19:42

## 2017-06-20 RX ADMIN — PREDNISONE 20 MG: 20 TABLET ORAL at 10:04

## 2017-06-20 RX ADMIN — NICOTINE 1 PATCH: 14 PATCH TRANSDERMAL at 20:00

## 2017-06-20 RX ADMIN — LORAZEPAM 2 MG: 2 INJECTION INTRAMUSCULAR; INTRAVENOUS at 01:36

## 2017-06-20 RX ADMIN — PANTOPRAZOLE SODIUM 40 MG: 40 TABLET, DELAYED RELEASE ORAL at 17:36

## 2017-06-20 RX ADMIN — HYDROCODONE BITARTRATE AND ACETAMINOPHEN 1 TABLET: 5; 325 TABLET ORAL at 14:55

## 2017-06-20 RX ADMIN — AMIODARONE HYDROCHLORIDE 200 MG: 200 TABLET ORAL at 10:03

## 2017-06-20 RX ADMIN — MORPHINE SULFATE 1 MG: 2 INJECTION, SOLUTION INTRAMUSCULAR; INTRAVENOUS at 12:46

## 2017-06-20 RX ADMIN — SODIUM CHLORIDE 600 MG: 9 INJECTION, SOLUTION INTRAVENOUS at 22:00

## 2017-06-20 RX ADMIN — METOPROLOL TARTRATE 25 MG: 25 TABLET ORAL at 10:04

## 2017-06-20 RX ADMIN — METOPROLOL TARTRATE 25 MG: 25 TABLET ORAL at 05:59

## 2017-06-20 RX ADMIN — ONDANSETRON 4 MG: 2 INJECTION INTRAMUSCULAR; INTRAVENOUS at 20:18

## 2017-06-20 RX ADMIN — METOPROLOL TARTRATE 25 MG: 25 TABLET ORAL at 20:42

## 2017-06-20 NOTE — PLAN OF CARE
Problem: Cellulitis (Adult)  Goal: Signs and Symptoms of Listed Potential Problems Will be Absent or Manageable (Cellulitis)  Outcome: Ongoing (interventions implemented as appropriate)    06/20/17 1820   Cellulitis   Problems Assessed (Cellulitis) all   Problems Present (Cellulitis) pain         Problem: Infection, Risk/Actual (Adult)  Goal: Infection Prevention/Resolution  Outcome: Ongoing (interventions implemented as appropriate)    Problem: Patient Care Overview (Adult)  Goal: Plan of Care Review  Outcome: Ongoing (interventions implemented as appropriate)  Goal: Adult Individualization and Mutuality  Outcome: Ongoing (interventions implemented as appropriate)    06/20/17 1820   Individualization   Patient Specific Preferences patient likes to sit on edge of bed   Patient Specific Goals pain relief   Patient Specific Interventions dressing changes

## 2017-06-20 NOTE — DISCHARGE INSTR - OTHER ORDERS
If you have any questions about your recovery, please call [phone]. A registered nurse is available 24 hours a day 7 days a week to assist you.

## 2017-06-20 NOTE — PROGRESS NOTES
Continued Stay Note  BRITTANI Espinal     Patient Name: Vasquez Marie  MRN: [id number]  Today's Date: 6/20/2017    Admit Date: 6/19/2017          Discharge Plan       06/20/17 1549    Final Note    Final Note F/U with pt in room regarding DCP.  Pt had reported that he was having trouble with meal preparation.  Pt states he has used [employer] and [employer] in the past.  Additionally, he states that he used to have the [employer] Waiver and would like to have them back.  Pt states that he is not interested in [employer] or Adult Day programs.  He would like to be able to go to the gym, but needs transportation help.  He states he takes a cab and it costs $7.  He reports that he is eligible for medical transport and understands he needs to give them 72 hr notice. Resources discussed and contact information given for [employer]aiver, home meal delivery, and food assistance.                Discharge Codes     None        Expected Discharge Date and Time     Expected Discharge Date Expected Discharge Time    Jun 22, 2017             Sonali Mcintyre

## 2017-06-20 NOTE — CONSULTS
Referring Provider:Hospitalist  Reason for Consultation: left foot wounds, cellulitis    Patient Care Team:  Marilyn K Vermeesch, MD as PCP - General (Internal Medicine)    Chief complaint. Left foot pain    Subjective .     History of present illness: 56-year-old nondiabetic male admitted yesterday for a left foot cellulitis and third digit gangrene.  Patient seen at bedside today with nursing staff present.  They state he had a angiogram performed earlier today and his foot has showed increased signs of perfusion since.  He states that his foot started looking like this about 2 months ago.  He also states that the he had some nurses look at it a couple of times but his insurance changed and he lost his primary care physician so he's now had to start seeing all new doctors.  States he has rheumatoid arthritis but currently not taking any medications for it.  Complains of left foot pain.  Denies any constitutional symptoms.    Review of Systems  All systems were reviewed and negative except for chief complaint.    History  Past Medical History:   Diagnosis Date   • Abdominal pain     History of epigastric abdominal tenderness. Differentials include peptic ulcer disease,   pancreatobiliary disease.   • Abnormal LFTs    • Anemia    • Anxiety    • Arthritis    • Asthma    • Atrial fibrillation    • CAD (coronary artery disease)    • Calf cramp    • CKD (chronic kidney disease)    • COPD (chronic obstructive pulmonary disease)    • CVA (cerebral infarction)    • Depression    • DVT (deep venous thrombosis)    • Electrocution    • Fatigue    • Hepatitis C    • History of allergy    • History of blood transfusion    • Hyperlipidemia    • Hypertension    • Leg pain    • Loss of appetite    • PUD (peptic ulcer disease)    • Stroke syndrome    • Tachycardia    • Thrombocytopenia    • Thrombophlebitis of deep femoral vein    • Vision problems    , Past Surgical History:   Procedure Laterality Date   • ANKLE SURGERY     •  CARDIAC SURGERY     • CORONARY STENT PLACEMENT     • TONSILLECTOMY     • TOTAL HIP ARTHROPLASTY Left    • TOTAL SHOULDER REPLACEMENT Left    , Family History   Problem Relation Age of Onset   • Breast cancer Mother    • Colon cancer Sister    • Heart attack Other    • Arthritis Other    • Diabetes Other    • Hypertension Other    • Kidney disease Other    • Stroke Other    • Heart disease Father    , Social History   Substance Use Topics   • Smoking status: Current Every Day Smoker     Packs/day: 1.00     Years: 30.00     Types: Cigarettes   • Smokeless tobacco: None   • Alcohol use No   , Prescriptions Prior to Admission   Medication Sig Dispense Refill Last Dose   • albuterol (PROVENTIL HFA;VENTOLIN HFA) 108 (90 BASE) MCG/ACT inhaler Inhale 2 puffs Every 4 (Four) Hours As Needed for Wheezing.      • amiodarone (PACERONE) 200 MG tablet Take 1 tablet by mouth Daily. (Patient taking differently: Take 200 mg by mouth Daily.) 30 tablet 11    • aspirin 81 MG EC tablet Take 1 tablet by mouth Daily. 30 tablet 11    • atorvastatin (LIPITOR) 40 MG tablet Take 1 tablet by mouth Every Night. 30 tablet 11    • colchicine 0.6 MG tablet Take 0.6 mg by mouth Daily.      • hydroxychloroquine (PLAQUENIL) 200 MG tablet Take 200 mg by mouth Daily.      • losartan-hydrochlorothiazide (HYZAAR) 50-12.5 MG per tablet Take 1 tablet by mouth Daily. (Patient taking differently: Take 1 tablet by mouth Daily.) 30 tablet 11    • melatonin 3 MG tablet Take 2 tablets by mouth Every Night. 30 tablet 6 Taking   • metoprolol tartrate (LOPRESSOR) 25 MG tablet Take 1 tablet by mouth Every 12 (Twelve) Hours. (Patient taking differently: Take 25 mg by mouth 2 (Two) Times a Day.) 60 tablet 11    • polyethylene glycol (MIRALAX) packet Take 17 g by mouth Daily As Needed.      • predniSONE (DELTASONE) 20 MG tablet Take 10 mg by mouth Daily As Needed (take 1 tablet by mouth daily for 2 to 5 days for flare up, may repeat once weekly).   Taking   •  "rivaroxaban (XARELTO) 20 MG tablet Take 1 tablet by mouth Daily With Dinner. (Patient taking differently: Take 20 mg by mouth Daily.) 30 tablet 11    • DULoxetine (CYMBALTA) 30 MG capsule Take 1 capsule by mouth Every Night. 30 capsule 1 Taking   , Scheduled Meds:    amiodarone 200 mg Oral Q24H   atorvastatin 40 mg Oral Nightly   clindamycin 600 mg Intravenous Q8H   clopidogrel 75 mg Oral Daily   DULoxetine 30 mg Oral Nightly   folic acid 1 mg Oral Daily   furosemide 20 mg Intravenous Once   [START ON 6/21/2017] losartan 50 mg Oral Q24H   melatonin 6 mg Oral Nightly   metoprolol tartrate 25 mg Oral Q12H   multivitamin with minerals 1 tablet Oral Daily   nicotine 1 patch Transdermal Q24H   pantoprazole 40 mg Oral BID AC   thiamine 100 mg Oral Daily   , Continuous Infusions:    sodium chloride 50 mL/hr Last Rate: 50 mL/hr (06/19/17 1854)   sodium chloride 50 mL/hr Last Rate: 50 mL/hr (06/20/17 1030)   , PRN Meds:  •  acetaminophen  •  acetaminophen  •  HYDROcodone-acetaminophen  •  LORazepam **OR** LORazepam **OR** LORazepam **OR** LORazepam **OR** LORazepam **OR** LORazepam  •  Morphine **AND** naloxone  •  ondansetron **OR** ondansetron ODT **OR** ondansetron  •  ondansetron **OR** ondansetron ODT **OR** ondansetron  •  sodium chloride  •  Insert peripheral IV **AND** sodium chloride and Allergies:  Review of patient's allergies indicates no known allergies.    Objective     Vital Signs   /88  Pulse 71  Temp 97.5 °F (36.4 °C)  Resp 16  Ht 68\" (172.7 cm)  Wt 222 lb 10.6 oz (101 kg)  SpO2 98%  BMI 33.86 kg/m2    Physical Exam:  AAO ×3, NAD, AF VSS  PERRLA  Cranial nerves II through XII intact  Pulses the left lower extremity are faintly palpable, pulses to the right lower extremity also faintly palpable.  Gross sensation of bilateral lower extremities is intact.  The right leg is slightly edematous and compared to the left but the left foot is much more edematous and erythematous than the right side.  He " has multiple necrotic areas all over the left foot.  There is an area over the dorsal first MTPJ.  There is one over the dorsal medial hallux interphalangeal joint.  These are all black dry stable eschars.  There is an open fibrotic ulceration over the DIPJ of the second digit.  The entire third digit is black and gangrenous and is starting to develop some odor maceration around the metatarsal phalangeal joint area.  According to nursing there is more drainage from it now than compared to pre-angiogram.  His fourth digit is necrotic along its medial and distal borders but dorsally and laterally there is erythematous pink blanchable skin there.  The fifth digits unremarkable.  All his digits are very stiff and very tender and painful.  All digits show mottling and ecchymosis that blanches.     Results Review: All labs reviewed.  Patient is noted to be anemic.  Currently receiving unit of packed red blood cells.  uA/tox screen were positive for opioids, benzodiazepine, THC  Imaging Results: X-rays taken of the left foot show previous surgical intervention consistent with ORIF of what appears to be maybe an old on type fracture and/or talus fracture with multiple screws present.  There is osteoarthritic changes throughout the entire rear foot and ankle.  There is a smooth bent K wire across the fifth metatarsal base.  He shows what's probably an old healed transverse fracture through the proximal phalanx of the fifth digit and bony changes consistent with either transverse fracture possibly osteomyelitis of the fourth digit proximal phalanx.  No significant osseous changes noted on the third digit.  -year-old Doppler the left lower extremity yesterday showed a non-obtainable CASSIE and monophasic waveforms  Venous Doppler was negative for DVT.  MRI of the left foot was read as findings suggesting third and fourth digit osteomyelitis and cellulitis.  From my view point I can see bony destruction in the proximal phalanx of  the fourth digit with a reactive uptake that in this clinical setting is most likely related to osteomyelitis but could potentially also be from fracture.  I don't appreciate any significant bony changes other than osteoarthritis in the third digit but again in this clinical setting with the gangrene of the third digit osteomyelitis is more likely.  Assessment/Plan   Severe left lower extremity peripheral arterial disease, multiple left lower extremity ulcerations, third digit gangrene, probable third and fourth digit osteomyelitis  Active Problems:    Cellulitis of foot, left  Patient just had angiogram performed earlier today.  I explained to the patient that I'll have to discuss with Dr. Storm and evaluate his angiogram to determine where adequate blood flow and perfusion are.  I explained that while the third toe may become more of an emergent issue and having to amputate that due to risk of infection may become necessary if he does not have adequate perfusion that we'll also discussed potentially make a larger wound that would have to deal with.  I explained that all the other wounds may able to be debrided but we'll have to assess them for flow as well.  I explained that based off his blood flow this could mean amputation of the foot and leg or may be transmetatarsal amputation but I'll have to discuss with Dr. Storm first.  For now the main concern is to keep the toes clean and dry and prevent any spread of infection we'll do Betadine wet-to-dry dressings.  He can weight-bear as tolerated to bilateral lower extremities.  I'll follow up on him tomorrow and hopefully have a better treatment plan.      I discussed the patients findings and my recommendations with patient    Wolfgang Campbell DPM  06/20/17  4:57 PM

## 2017-06-20 NOTE — PLAN OF CARE
Problem: Patient Care Overview (Adult)  Goal: Discharge Needs Assessment  Outcome: Ongoing (interventions implemented as appropriate)    06/19/17 1753 06/19/17 1805 06/19/17 1818   Discharge Needs Assessment   Concerns To Be Addressed --  --  compliance issue concerns;discharge planning concerns   Equipment Needed After Discharge --  --  --    Discharge Facility/Level Of Care Needs --  --  --    Current Discharge Risk --  --  --    Discharge Disposition --  --  --    Living Environment   Transportation Available --  taxi  (patient may need transportation voucher) --    Self-Care   Equipment Currently Used at Home cane, straight;walker, standard;wheelchair  (patient brought own wheelchair at bedside) --  --      06/20/17 1815   Discharge Needs Assessment   Concerns To Be Addressed --    Equipment Needed After Discharge wound care supplies   Discharge Facility/Level Of Care Needs home with home health   Current Discharge Risk lives alone   Discharge Disposition still a patient   Living Environment   Transportation Available --    Self-Care   Equipment Currently Used at Home --

## 2017-06-20 NOTE — PROGRESS NOTES
PROGRESS NOTE        Date of Admission: 6/19/2017  Length of Stay: 0  Primary Care Physician: Marilyn K. Vermeesch, MD    Subjective   Chief Complaint:  Left lower extremity cellulitis follow up  HPI:  This is a 57-year-old male who is admitted for left lower extremity cellulitis and a gangrenous ischemic third digit.  He was seen and evaluated by Dr. Storm with cardiology for his peripheral vascular disease.  He had an arterial duplex done in the emergency room which did show occlusion of the distal superficial femoral artery with reconstitution of flow at the popliteal artery.  Patient did undergo intervention of his SFA and popliteal arteries.  Dr. Campbell with podiatry has been consulted where he will possibly need amputation of third digit if not more.  This gentleman is noted to have multiple medical problems as have reviewed his records from  and [facility].  He does have a liver mass that is concerning for malignancy for which did set him up with the liver clinic however I don't think that he is followed up I will discuss this with the patient.  He is also noted to have cirrhosis of the liver with a history of alcohol abuse and hepatitis C.  His hemoglobin has dropped for which we I have ordered a unit of packed red blood cells.  I did review his EGD that was done at  which she was noted to have portal hypertensive gastropathy and duodenitis with acute blood loss anemia his EGD showed mild duodenitis and one duodenal angiodysplasia.  His blood thinners were supposed to be held according to the records however patient had Xartelto at home but recently stopped taking it himself due to bruising.  He was unable to remember this information and tell me yesterday at the time of admission.  He could only tell me he was supposed to be taking it.  Those I have been currently put on hold.  He will continue the Plavix but not the aspirin nor the Xarelto      Review Of Systems:   Review of Systems   General ROS:  Patient denies any fevers, chills or loss of consciousness. Complains of generalized weakness.   Psychological ROS: Denies any hallucinations and delusions.  Ophthalmic ROS: Denies any diplopia or transient loss of vision.  ENT ROS: Denies sore throat, nasal congestion or ear pain.   Allergy and Immunology ROS: Denies rash or itching.  Hematological and Lymphatic ROS: Denies neck swelling or easy bleeding.  Endocrine ROS: Denies any recent unintentional weight gain or loss.  Breast ROS: Denies any pain or swelling.  Respiratory ROS: Denies cough or shortness of breath.   Cardiovascular ROS: Denies chest pain or palpitations. No history of exertional chest pain.   Gastrointestinal ROS: Denies nausea and vomiting. Denies any abdominal pain. No diarrhea.  Genito-Urinary ROS: Denies dysuria or hematuria.  Musculoskeletal ROS: Denies back pain. No muscle pain. No calf pain.   Neurological ROS: Denies any focal weakness. No loss of consciousness. Denies any numbness. Denies neck pain.   Dermatological ROS: Denies any redness or pruritis.    Objective      Temp:  [97.9 °F (36.6 °C)-98.6 °F (37 °C)] 98.6 °F (37 °C)  Heart Rate:  [69-75] 72  Resp:  [16-20] 16  BP: (121-159)/(66-94) 121/82  Physical Exam    General Appearance:  Alert and cooperative, not in any acute distress.   Head:  Atraumatic and normocephalic, without obvious abnormality.   Eyes:          PERRLA, conjunctivae and sclerae normal, no Icterus. No pallor. Extra-occular movements are within normal limits.   Ears:  Ears appear intact with no abnormalities noted.   Throat: No oral lesions, no thrush, oral mucosa moist.   Neck: Supple, trachea midline, no thyromegaly, no carotid bruit.   Back:   No kyphoscoliosis present. No tenderness to palpation,   range of motion normal.   Lungs:   Chest shape is normal. Breath sounds heard bilaterally equally.  No crackles or wheezing. No Pleural rub or bronchial breathing.   Heart:  Normal S1 and S2, no murmur, no gallop,  no rub. No JVD   Abdomen:   Normal bowel sounds, no masses, no organomegaly. Soft     non-tender, non-distended, no guarding, no rebound                tenderness   Extremities: Moves all extremities well, no edema, no cyanosis, no clubbing.   Pulses: Pulses palpable and equal bilaterally   Skin: No bleeding, bruising or rash   Lymph nodes: No palpable adenopathy   Neurologic:    Psychiatric/Behavior:     Cranial nerves 2 - 12 grossly intact, sensation intact, Motor power is normal and equal bilaterally.  Mood normal, behavior normal       Results Review:    I have reviewed the labs, radiology results and diagnostic studies.      Results from last 7 days  Lab Units 06/20/17  0525   WBC 10*3/mm3 3.00*   HEMOGLOBIN g/dL 8.0*   PLATELETS 10*3/mm3 156       Results from last 7 days  Lab Units 06/20/17  0525   SODIUM mmol/L 144   POTASSIUM mmol/L 3.6   TOTAL CO2 mmol/L 24.0*   CREATININE mg/dL 1.60*   GLUCOSE mg/dL 226*       Culture Data:    Radiology Data:   Cardiology Data:    I have reviewed the medications.    Assessment/Plan     Assessment and Plan:  1. Left lower extremity cellulitis: Patient will be started on IV antibiotic in the form of Clindamycin.  2. Dry Gangrenous ischemic third digit of left lower extremity-Will get MRI to rule out osteomyelitis. Dr. Campbell with podiatry has been consulted.  3.  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  Dr. Campbell consulted.    4. Anemia-likely secondary acute blood loss. Patient was recently admitted to  at the end of May, where he underwent an upper endoscopy for an upper GI bleed. His hemoglobin may be a residual from that admission we'll try to get the records from . Will check occult blood stool. Patient denies any evidence of overt bleeding at this time.  His hemoglobin has dropped further.  We will go ahead and transfuse 1 unit of packed red blood cells.  His aspirin and Xarelto has been discontinued.  Dr. Storm has recommended Plavix  only at this time.  His Hgb at the time of discharge from  was 8.8.  It was noted to trend from 7.7 to 8.8 on the day of discharge.  His anticoagulation was best to be held at the time of discharge from .  He went home taking it but stopped it when he was bruising.  He could not give me this information at the time of admission.    5. Alcohol abuse-patient has a history of alcohol abuse. He did recently have withdrawals noted at [facility] when he was admitted there. Will place him on alcohol withdrawal protocol.  6. PVD- Dr. Storm with cardiology has been consulted for further evaluation.  He did go down today for intervention.  He did open up the SFA and popliteal.  He does have a pulse at this time.  7. Hypertension- Continue home meds  8. Atrial flutter- Anticoagulation stopped.  9. Coronary artery disease status post stent placement-continue medical management  10. Nicotine abuse-smoking cessation counseling  11.  Liver mass noted at  concerning for malignancy .  Patient was instructed to schedule an appt for Hepatitis C and to follow up liver mass.  An MRI was done at  but pending at the time of discharge.  Will try to get this report to evaluate.  Patient will need to follow-up in the liver clinic at  as previously documented from the discharge because he will need further evaluation for a liver mass, hepatitis C as well as a capsule endoscopy if they have recommended.  He has an appt August 2nd.  He has already seen them once since his discharge from  but does not know if the liver mass is malignant.  He has to go back on August 2nd.    12.  Cirrhosis of liver in a patient with history of alcohol abuse and hepatitis C-  Appt at liver clinic at  August 2nd.    13.  Borderline Diabetes with A1C 6 noted in May at   14.  COPD- No evidence of exacerbation  15.   Obesity  16.  Rheumatoid arthritis  17.  Chronic kidney disease CKD III      DVT prophylaxis: SCDs due to recent bleed and current  drop in Hgb.    Discharge Planning:   SHADI Brown 06/20/17 12:50 PM

## 2017-06-20 NOTE — SIGNIFICANT NOTE
06/20/17 1737   PT Deferred Reason   PT Deferred Reason Patient unavailable for evaluation  (Hold per APRN)

## 2017-06-20 NOTE — SIGNIFICANT NOTE
06/20/17 0910   Rehab Treatment   Discipline occupational therapist   Rehab Evaluation   Evaluation Not Performed patient unavailable for evaluation  (Pt on hold today per SHADI Tejada as pt had an angiography today.  Will follow up with pt tomorrow.  )

## 2017-06-20 NOTE — PROGRESS NOTES
"Adult Nutrition  Assessment/PES    Patient Name:  Vasquez Marie  YOB: 1960  MRN: [id number]  Admit Date:  6/19/2017    Assessment Date:  6/20/2017        Reason for Assessment       06/20/17 1222    Reason for Assessment    Reason For Assessment/Visit diagnosis/disease state    Identified At Risk By Screening Criteria BMI    Diagnosis Diagnosis    Cardiac HTN;PVD    Hematological Acute blood loss;Anemia    Skin Cellulitis    Substance Use ETOH;Tobacco                Anthropometrics       06/20/17 1223    Anthropometrics    Height Method Stated    Height 172.7 cm (68\")    Weight Method Bed scale    Weight 101 kg (222 lb 10.6 oz)    Ideal Body Weight (IBW)    Ideal Body Weight (IBW), Male (kg) 70.89    % Ideal Body Weight 142.77    Body Mass Index (BMI)    BMI (kg/m2) 33.93    BMI Grade 30 - 34.9- obesity - grade I            Labs/Tests/Procedures/Meds       06/20/17 1224    Labs/Tests/Procedures/Meds    Labs/Tests Review Reviewed   High: Glu, Creat    Medication Review Reviewed, pertinent;Multivitamin;Other (comment);Steroid   Thiamin            Physical Findings       06/20/17 1226    Physical Findings/Assessment    Additional Documentation Physical Appearance (Group)    Physical Appearance    Skin cellulitis;non-healing wound(s)            Estimated/Assessed Needs       06/20/17 1227    Calculation Measurements    Weight Used For Calculations 78.5 kg (173 lb)   ABW    Height Used for Calculations 1.727 m (5' 8\")    Estimated/Assessed Energy Needs    [facility]    Age 56    RMR ([facility]) 1579.22    Activity Factors ([facility])  Confined to bed  1.2    Estimated Kcal Range  ~3240-4759 kcal    Estimated/Assessed Protein Needs    Weight Used for Protein Calculation 78.5 kg (173 lb)    Protein (gm/kg) 1.2    1.2 Gm Protein (gm) 94.17    Estimated Protein Range ~78-94 gm    Estimated/Assessed Fluid Needs    Fluid Need Method RDA method    RDA Method " (mL)  2200            Nutrition Prescription Ordered       06/20/17 1232    Nutrition Prescription PO    Current PO Diet Regular    Common Modifiers Cardiac            Evaluation of Received Nutrient/Fluid Intake       06/20/17 1227    PO Evaluation    Number of Days PO Intake Evaluated 1 day    Number of Meals 1    % PO Intake 100              Problem/Interventions:        Problem 1       06/20/17 1233    Nutrition Diagnoses Problem 1    Problem 1 Impaired Nutrient Utilization    Etiology (related to) Medical Diagnosis;Factors Affecting Nutrition    Endocrine Other (comment)   HgbA1C falling into prediabetic category    Signs/Symptoms (evidenced by) Biochemical    Specific Labs Noted HgbA1C;Glucose            Problem 2       06/20/17 1234    Nutrition Diagnoses Problem 2    Problem 2 Overweight/Obesity    Etiology (related to) Factors Affecting Nutrition    Food Habit/Preferences Large Meals    Signs/Symptoms (evidenced by) BMI    BMI 30 - 34.9                  Intervention Goal       06/20/17 1235    Intervention Goal    General Meet nutritional needs for age/condition    PO Meet estimated needs;Maintain intake    Weight No significant weight loss            Nutrition Intervention       06/20/17 1235    Nutrition Intervention    RD/Tech Action Follow Tx progress;Recommend/ordered;Care plan reviewd    Recommended/Ordered Supplement            Nutrition Prescription       06/20/17 1235    Nutrition Prescription PO    PO Prescription Begin/change supplement    Supplement Lon    Supplement Frequency 2 times a day    New PO Prescription Ordered? Yes    Other Orders    Obtain Weight Daily    Obtain Weight Ordered? No, recommended    Supplement Vitamin mineral supplement   Additional Vitamin C    Supplement Ordered? No, recommended    Labs Lipid Profile    Labs Ordered? No, recommended            Education/Evaluation       06/20/17 1236    Education    Education Will Instruct as appropriate    Monitor/Evaluation     Monitor Per protocol;I&O;PO intake;Pertinent labs;Supplement intake;Weight;Skin status        Comments:  Rec #1: Continue current diet order. PO intake at 100% over one meal. Nutritional supplements ordered Lon BID to promote wound healing.  Rec #2: Consider adding consistent carbohydrate to diet order secondary to elevated blood glucose and HgbA1C of 6.4 in April.  Rec #3: Continue MVI with minerals daily with consideration of adding Vitamin C 1000 mg BID to promote cellulitis healing and iron absorption. RD to follow pt. Consult RD PRN.     Electronically signed by:  Jamila Michel RD  06/20/17 12:38 PM

## 2017-06-21 PROBLEM — R73.03 BORDERLINE DIABETES: Status: ACTIVE | Noted: 2017-06-21

## 2017-06-21 PROBLEM — S92.502A CLOSED FRACTURE OF FOURTH TOE OF LEFT FOOT: Status: ACTIVE | Noted: 2017-06-21

## 2017-06-21 LAB
ABO + RH BLD: NORMAL
ALBUMIN SERPL-MCNC: 3.3 G/DL (ref 3.5–5)
ALBUMIN/GLOB SERPL: 0.7 G/DL (ref 1–2)
ALP SERPL-CCNC: 112 U/L (ref 38–126)
ALT SERPL W P-5'-P-CCNC: 31 U/L (ref 13–69)
ANION GAP SERPL CALCULATED.3IONS-SCNC: 17.5 MMOL/L
AST SERPL-CCNC: 43 U/L (ref 15–46)
BASOPHILS # BLD AUTO: 0.01 10*3/MM3 (ref 0–0.2)
BASOPHILS NFR BLD AUTO: 0.1 % (ref 0–2.5)
BH BB BLOOD EXPIRATION DATE: NORMAL
BH BB BLOOD TYPE BARCODE: 5100
BH BB DISPENSE STATUS: NORMAL
BH BB PRODUCT CODE: NORMAL
BH BB UNIT NUMBER: NORMAL
BILIRUB SERPL-MCNC: 0.6 MG/DL (ref 0.2–1.3)
BUN BLD-MCNC: 18 MG/DL (ref 7–20)
BUN/CREAT SERPL: 12 (ref 6.3–21.9)
CALCIUM SPEC-SCNC: 7.8 MG/DL (ref 8.4–10.2)
CHLORIDE SERPL-SCNC: 102 MMOL/L (ref 98–107)
CO2 SERPL-SCNC: 27 MMOL/L (ref 26–30)
CREAT BLD-MCNC: 1.5 MG/DL (ref 0.6–1.3)
CROSSMATCH INTERPRETATION: NORMAL
DEPRECATED RDW RBC AUTO: 50.4 FL (ref 37–54)
EOSINOPHIL # BLD AUTO: 0.05 10*3/MM3 (ref 0–0.7)
EOSINOPHIL NFR BLD AUTO: 0.7 % (ref 0–7)
ERYTHROCYTE [DISTWIDTH] IN BLOOD BY AUTOMATED COUNT: 15.5 % (ref 11.5–14.5)
GFR SERPL CREATININE-BSD FRML MDRD: 48 ML/MIN/1.73
GLOBULIN UR ELPH-MCNC: 4.5 GM/DL
GLUCOSE BLD-MCNC: 140 MG/DL (ref 74–98)
HCT VFR BLD AUTO: 28.3 % (ref 42–52)
HGB BLD-MCNC: 9.2 G/DL (ref 14–18)
IMM GRANULOCYTES # BLD: 0.04 10*3/MM3 (ref 0–0.06)
IMM GRANULOCYTES NFR BLD: 0.6 % (ref 0–0.6)
LYMPHOCYTES # BLD AUTO: 1.13 10*3/MM3 (ref 0.6–3.4)
LYMPHOCYTES NFR BLD AUTO: 16.1 % (ref 10–50)
MCH RBC QN AUTO: 29.5 PG (ref 27–31)
MCHC RBC AUTO-ENTMCNC: 32.5 G/DL (ref 30–37)
MCV RBC AUTO: 90.7 FL (ref 80–94)
MONOCYTES # BLD AUTO: 0.32 10*3/MM3 (ref 0–0.9)
MONOCYTES NFR BLD AUTO: 4.6 % (ref 0–12)
NEUTROPHILS # BLD AUTO: 5.45 10*3/MM3 (ref 2–6.9)
NEUTROPHILS NFR BLD AUTO: 77.9 % (ref 37–80)
NRBC BLD MANUAL-RTO: 0 /100 WBC (ref 0–0)
PLATELET # BLD AUTO: 184 10*3/MM3 (ref 130–400)
PMV BLD AUTO: 11 FL (ref 6–12)
POTASSIUM BLD-SCNC: 3.5 MMOL/L (ref 3.5–5.1)
PROT SERPL-MCNC: 7.8 G/DL (ref 6.3–8.2)
RBC # BLD AUTO: 3.12 10*6/MM3 (ref 4.7–6.1)
SODIUM BLD-SCNC: 143 MMOL/L (ref 137–145)
UNIT  ABO: NORMAL
UNIT  RH: NORMAL
WBC NRBC COR # BLD: 7 10*3/MM3 (ref 4.8–10.8)

## 2017-06-21 PROCEDURE — 85025 COMPLETE CBC W/AUTO DIFF WBC: CPT | Performed by: INTERNAL MEDICINE

## 2017-06-21 PROCEDURE — 99232 SBSQ HOSP IP/OBS MODERATE 35: CPT | Performed by: NURSE PRACTITIONER

## 2017-06-21 PROCEDURE — 99233 SBSQ HOSP IP/OBS HIGH 50: CPT | Performed by: PODIATRIST

## 2017-06-21 PROCEDURE — 25010000002 LORAZEPAM PER 2 MG: Performed by: NURSE PRACTITIONER

## 2017-06-21 PROCEDURE — 25010000002 VANCOMYCIN PER 500 MG: Performed by: NURSE PRACTITIONER

## 2017-06-21 PROCEDURE — 97165 OT EVAL LOW COMPLEX 30 MIN: CPT

## 2017-06-21 PROCEDURE — 80053 COMPREHEN METABOLIC PANEL: CPT | Performed by: INTERNAL MEDICINE

## 2017-06-21 PROCEDURE — 25010000002 PIPERACILLIN SOD-TAZOBACTAM PER 1 G: Performed by: NURSE PRACTITIONER

## 2017-06-21 PROCEDURE — 97162 PT EVAL MOD COMPLEX 30 MIN: CPT

## 2017-06-21 RX ORDER — SODIUM CHLORIDE, SODIUM LACTATE, POTASSIUM CHLORIDE, CALCIUM CHLORIDE 600; 310; 30; 20 MG/100ML; MG/100ML; MG/100ML; MG/100ML
100 INJECTION, SOLUTION INTRAVENOUS CONTINUOUS
Status: DISCONTINUED | OUTPATIENT
Start: 2017-06-21 | End: 2017-06-26 | Stop reason: HOSPADM

## 2017-06-21 RX ADMIN — LOSARTAN POTASSIUM 50 MG: 50 TABLET, FILM COATED ORAL at 08:36

## 2017-06-21 RX ADMIN — METOPROLOL TARTRATE 25 MG: 25 TABLET ORAL at 20:48

## 2017-06-21 RX ADMIN — VANCOMYCIN HYDROCHLORIDE 2000 MG: 500 INJECTION, POWDER, LYOPHILIZED, FOR SOLUTION INTRAVENOUS at 17:05

## 2017-06-21 RX ADMIN — LORAZEPAM 2 MG: 0.5 TABLET ORAL at 10:57

## 2017-06-21 RX ADMIN — METOPROLOL TARTRATE 25 MG: 25 TABLET ORAL at 08:36

## 2017-06-21 RX ADMIN — PIPERACILLIN SODIUM AND TAZOBACTAM SODIUM 3.38 G: 3; .375 INJECTION, POWDER, FOR SOLUTION INTRAVENOUS at 20:47

## 2017-06-21 RX ADMIN — FOLIC ACID 1 MG: 1 TABLET ORAL at 08:36

## 2017-06-21 RX ADMIN — LORAZEPAM 2 MG: 0.5 TABLET ORAL at 01:31

## 2017-06-21 RX ADMIN — CLOPIDOGREL BISULFATE 75 MG: 75 TABLET ORAL at 08:36

## 2017-06-21 RX ADMIN — HYDROCODONE BITARTRATE AND ACETAMINOPHEN 1 TABLET: 5; 325 TABLET ORAL at 01:31

## 2017-06-21 RX ADMIN — Medication 10 ML: at 15:33

## 2017-06-21 RX ADMIN — DULOXETINE HYDROCHLORIDE 30 MG: 30 CAPSULE, DELAYED RELEASE ORAL at 20:48

## 2017-06-21 RX ADMIN — LORAZEPAM 2 MG: 2 INJECTION INTRAMUSCULAR; INTRAVENOUS at 05:18

## 2017-06-21 RX ADMIN — ATORVASTATIN CALCIUM 40 MG: 40 TABLET, FILM COATED ORAL at 20:50

## 2017-06-21 RX ADMIN — HYDROCODONE BITARTRATE AND ACETAMINOPHEN 1 TABLET: 5; 325 TABLET ORAL at 09:49

## 2017-06-21 RX ADMIN — AMIODARONE HYDROCHLORIDE 200 MG: 200 TABLET ORAL at 08:36

## 2017-06-21 RX ADMIN — NICOTINE 1 PATCH: 14 PATCH TRANSDERMAL at 17:35

## 2017-06-21 RX ADMIN — MULTIPLE VITAMINS W/ MINERALS TAB 1 TABLET: TAB at 08:36

## 2017-06-21 RX ADMIN — PIPERACILLIN SODIUM AND TAZOBACTAM SODIUM 3.38 G: 3; .375 INJECTION, POWDER, FOR SOLUTION INTRAVENOUS at 15:32

## 2017-06-21 RX ADMIN — PANTOPRAZOLE SODIUM 40 MG: 40 TABLET, DELAYED RELEASE ORAL at 16:59

## 2017-06-21 RX ADMIN — Medication 100 MG: at 08:36

## 2017-06-21 RX ADMIN — LORAZEPAM 1 MG: 0.5 TABLET ORAL at 20:48

## 2017-06-21 RX ADMIN — SODIUM CHLORIDE 600 MG: 9 INJECTION, SOLUTION INTRAVENOUS at 05:14

## 2017-06-21 RX ADMIN — PANTOPRAZOLE SODIUM 40 MG: 40 TABLET, DELAYED RELEASE ORAL at 07:30

## 2017-06-21 RX ADMIN — HYDROCODONE BITARTRATE AND ACETAMINOPHEN 1 TABLET: 5; 325 TABLET ORAL at 17:55

## 2017-06-21 RX ADMIN — MELATONIN TAB 3 MG 6 MG: 3 TAB at 20:48

## 2017-06-21 NOTE — NURSING NOTE
Pt removed his iv at this time and has been taking off his leads pt stated he would let nurse re do iv

## 2017-06-21 NOTE — THERAPY EVALUATION
Acute Care - Physical Therapy Initial Evaluation   [facility]     Patient Name: Vasquez Marie  : 1960  MRN: [id number]  Today's Date: 2017   Onset of Illness/Injury or Date of Surgery Date: 17  Date of Referral to PT: 17  Referring Physician: Ernesto HSU      Admit Date: 2017     Visit Dx:    ICD-10-CM ICD-9-CM   1. Closed fracture of fourth toe of left foot, initial encounter S92.502A 826.0   2. Cellulitis of foot, left L03.116 682.7   3. Peripheral vascular disease of lower extremity I73.9 443.9   4. Tobacco abuse Z72.0 305.1   5. Impaired functional mobility, balance, gait, and endurance Z74.09 V49.89     Patient Active Problem List   Diagnosis   • Tachycardia   • Chronic hepatitis C virus infection   • Coronary artery disease involving native coronary artery of native heart with unstable angina pectoris   • Paroxysmal atrial fibrillation   • Essential hypertension   • Anemia   • Thrombocytopenia   • Asthma   • Depression with anxiety   • Esophageal reflux   • Hyperlipidemia   • Tobacco abuse   • COPD (chronic obstructive pulmonary disease)   • Gout   • Congestive heart disease   • RLS (restless legs syndrome)   • Rheumatoid arthritis   • Typical atrial flutter   • Atrial flutter with rapid ventricular response   • Cellulitis   • Cerebrovascular accident (CVA)   • Cellulitis of foot, left   • Borderline diabetes   • Closed fracture of fourth toe of left foot     Past Medical History:   Diagnosis Date   • Abdominal pain     History of epigastric abdominal tenderness. Differentials include peptic ulcer disease,   pancreatobiliary disease.   • Abnormal LFTs    • Anemia    • Anxiety    • Arthritis    • Asthma    • Atrial fibrillation    • CAD (coronary artery disease)    • Calf cramp    • CKD (chronic kidney disease)    • COPD (chronic obstructive pulmonary disease)    • CVA (cerebral infarction)    • Depression    • DVT (deep venous thrombosis)    • Electrocution    •  Fatigue    • Hepatitis C    • History of allergy    • History of blood transfusion    • Hyperlipidemia    • Hypertension    • Leg pain    • Loss of appetite    • PUD (peptic ulcer disease)    • Stroke syndrome    • Tachycardia    • Thrombocytopenia    • Thrombophlebitis of deep femoral vein    • Vision problems      Past Surgical History:   Procedure Laterality Date   • ANKLE SURGERY     • CARDIAC CATHETERIZATION N/A 6/20/2017    Procedure: Peripheral angiography;  Surgeon: Jonathan Storm MD;  Location: [facility] CATH INVASIVE LOCATION;  Service:    • CARDIAC CATHETERIZATION N/A 6/20/2017    Procedure: Angioplasty-peripheral;  Surgeon: Jonathan Storm MD;  Location: [facility] CATH INVASIVE LOCATION;  Service:    • CARDIAC SURGERY     • CORONARY STENT PLACEMENT     • TONSILLECTOMY     • TOTAL HIP ARTHROPLASTY Left    • TOTAL SHOULDER REPLACEMENT Left           PT ASSESSMENT (last 72 hours)      PT Evaluation       06/21/17 1316 06/20/17 0910    Rehab Evaluation    Document Type evaluation  -JR     Subjective Information agree to therapy;complains of;pain  -JR     Evaluation Not Performed  patient unavailable for evaluation   Pt on hold today per SHADI Tejada as pt had an angiography today.  Will follow up with pt tomorrow.    -AH    Patient Effort, Rehab Treatment good  -JR     Symptoms Noted During/After Treatment increased pain;fatigue  -JR     General Information    Patient Profile Review yes  -JR     Onset of Illness/Injury or Date of Surgery Date 06/19/17  -JR     Referring Physician Ernesto HSU  -JR     General Observations Pt received sitting EOB without post op shoe  -JR     Pertinent History Of Current Problem Cellulitis of L foot, Gangenous ischemic 3rd digit, Fx head of 4th prox phalanx, CAD, A-fib, PVD, HTN   -JR     Prior Level of Function independent:;all household mobility;w/c or scooter  -JR     Equipment Currently Used at Home wheelchair;walker, standard;cane, straight;shower chair   -JR     Plans/Goals Discussed With patient;agreed upon  -JR     Risks Reviewed patient:;increased discomfort  -JR     Benefits Reviewed patient:;improve function;increase independence;increase strength;increase balance;decrease pain  -JR     Barriers to Rehab medically complex  -JR     Living Environment    Lives With alone  -JR     Living Arrangements apartment  -JR     Home Accessibility no concerns  -JR     Clinical Impression    Date of Referral to PT 06/19/17  -JR     PT Diagnosis Impaired strength   -JR     Prognosis Fair  -JR     Patient/Family Goals Statement Return home   -JR     Criteria for Skilled Therapeutic Interventions Met yes;treatment indicated  -JR     Pathology/Pathophysiology Noted (Describe Specifically for Each System) musculoskeletal;cardiovascular;integumentary;pulmonary;endocrine/metabolic;neuromuscular  -JR     Impairments Found (describe specific impairments) aerobic capacity/endurance;gait, locomotion, and balance;integumentary integrity;posture;sensory Integrity  -JR     Rehab Potential fair, will monitor progress closely  -JR     Pain Assessment    Pain Assessment 0-10  -JR     Pain Score 9  -JR     Post Pain Score 8  -JR     Pain Type Acute pain;Surgical pain  -JR     Pain Location Foot  -JR     Pain Orientation Left;Outer  -JR     Pain Intervention(s) Repositioned;Ambulation/increased activity;Medication (See MAR)  -JR     Response to Interventions Pain improved with interventions  -JR     Transfer Assessment/Treatment    Transfers, Sit-Stand [place] contact guard assist;verbal cues required  -JR     Transfers, Stand-Sit [place] contact guard assist;verbal cues required  -JR     Transfer, Safety Issues impulsivity;weight-shifting ability decreased;balance decreased during turns;step length decreased  -JR     Transfer, Impairments strength decreased;impaired balance;sensation decreased;pain;postural control impaired  -JR     Transfer, Comment Pt requires VC's for safety with  all t/fs and Rwx abandonment  -JR     Gait Assessment/Treatment    Gait, [place] Level contact guard assist  -JR     Gait, Assistive Device rolling walker  -JR     Gait, Distance (Feet) 22   Pt ambulated a total of 44 ft with a rest break in w/c  -JR     Gait, Gait Pattern Analysis swing-through gait  -JR     Gait, Gait Deviations bilateral:;michael decreased;weight-shifting ability decreased;left:;antalgic;toe-to-floor clearance decreased;right:;step length decreased  -JR     Gait, Safety Issues balance decreased during turns;step length decreased;weight-shifting ability decreased;sequencing ability decreased  -JR     Gait, Impairments strength decreased;impaired balance;pain;postural control impaired  -JR     Positioning and Restraints    Pre-Treatment Position in bed   sitting at EOB   -JR     Post Treatment Position bed  -JR     In Bed sitting EOB;call light within reach;encouraged to call for assist;exit alarm on  -JR       06/19/17 1805 06/19/17 5019    General Information    Equipment Currently Used at Home  cane, straight;walker, standard;wheelchair   patient brought own wheelchair at bedside  -NC    Living Environment    Lives With alone  -NC     Living Arrangements apartment  -NC     Home Accessibility no concerns  -NC     Stair Railings at Home none  -NC     Type of Financial/Environmental Concern none  -NC     Transportation Available taxi   patient may need transportation voucher  -NC       User Key  (r) = Recorded By, (t) = Taken By, (c) = Cosigned By    Initials Name Provider Type     Nury Sullivan Occupational Therapist    JR Sia Whaley, PT Physical Therapist    VIVI Ash, RN Registered Nurse          Physical Therapy Education     Title: PT OT SLP Therapies (Done)     Topic: Physical Therapy (Done)     Point: Mobility training (Done)    Learning Progress Summary    Learner Readiness Method Response Comment Documented by Status   Patient Acceptance E VU Pt educated on the importance  of PT/POC. Reminded Pt importance of wearing post surgical boot when performing t/fs and gait. JR 06/21/17 1450 Done    Acceptance E VU  NO 06/20/17 0140 Done               Point: Home exercise program (Done)    Learning Progress Summary    Learner Readiness Method Response Comment Documented by Status   Patient Acceptance E VU  NO 06/20/17 0140 Done               Point: Body mechanics (Done)    Learning Progress Summary    Learner Readiness Method Response Comment Documented by Status   Patient Acceptance E VU  NO 06/20/17 0140 Done               Point: Precautions (Done)    Learning Progress Summary    Learner Readiness Method Response Comment Documented by Status   Patient Acceptance E VU Pt educated on the importance of PT/POC. Reminded Pt importance of wearing post surgical boot when performing t/fs and gait. JR 06/21/17 1450 Done    Acceptance E VU  NO 06/20/17 0140 Done                      User Key     Initials Effective Dates Name Provider Type Discipline     10/26/16 -  Sia Whaley, PT Physical Therapist PT    NO 12/13/16 -  Re Joseph, RN Registered Nurse Nurse                PT Recommendation and Plan  Planned Therapy Interventions: balance training, bed mobility training, gait training, home exercise program, patient/family education, strengthening, transfer training  PT Frequency: daily  Plan of Care Review  Plan Of Care Reviewed With: patient  Outcome Summary/Follow up Plan: Pt received sitting on EOB without post op shoe on. Pt able to perform t/fs and gait with CGA. Pt able to ambulate short distance gait with Rwx but was limited d/t fatigue. Pt required  multiple VC's for safety awareness and Rwx abandonment during approach for t/fs.  Pt  is expected  to improve with skilled PT intervention.               IP PT Goals       06/21/17 1500          Transfer Training PT LTG    Transfer Training PT LTG, Date Established 06/21/17  -JR      Transfer Training PT LTG, Time to Achieve 2 wks  -JR       Transfer Training PT LTG, Activity Type bed to chair /chair to bed;sit to stand/stand to sit  -JR      Transfer Training PT LTG, [place] Level supervision required  -JR      Transfer Training PT LTG, Assist Device walker, standard  -JR      Transfer Training PT LTG, Additional Goal demonstrating proper safety awareness and weight bearing equally through B LE.  -JR      Gait Training PT LTG    Gait Training Goal PT LTG, Date Established 06/21/17  -JR      Gait Training Goal PT LTG, Time to Achieve 2 wks  -JR      Gait Training Goal PT LTG, [place] Level --   Stand By Assist  -JR      Gait Training Goal PT LTG, Assist Device walker, standard  -JR      Gait Training Goal PT LTG, Distance to Achieve 75  -JR      Gait Training Goal PT LTG, Additional Goal without rest breaks, with equal step length and demonstrating proper safety with SW.   -JR      Gait Training Goal PT LTG, Outcome goal ongoing  -JR        User Key  (r) = Recorded By, (t) = Taken By, (c) = Cosigned By    Initials Name Provider Type    JR Sia Whaley PT Physical Therapist                Outcome Measures       06/21/17 1316          How much help from another person do you currently need...    Turning from your back to your side while in flat bed without using bedrails? 4  -JR      Moving from lying on back to sitting on the side of a flat bed without bedrails? 4  -JR      Moving to and from a bed to a chair (including a wheelchair)? 3  -JR      Standing up from a chair using your arms (e.g., wheelchair, bedside chair)? 3  -JR      Climbing 3-5 steps with a railing? 2  -JR      To walk in hospital room? 3  -JR      AM-PAC 6 Clicks Score 19  -      Functional Assessment    Outcome Measure Options AM-PAC 6 Clicks Basic Mobility (PT)  -        User Key  (r) = Recorded By, (t) = Taken By, (c) = Cosigned By    Initials Name Provider Type    JR Sia Whaley PT Physical Therapist           Time Calculation:         PT Charges       06/21/17 9099           Time Calculation    Start Time 1316  -JR      PT Received On 06/21/17  -      PT Goal Re-Cert Due Date 07/01/17  -JR        User Key  (r) = Recorded By, (t) = Taken By, (c) = Cosigned By    Initials Name Provider Type    JR Sia Whaley PT Physical Therapist          Therapy Charges for Today     Code Description Service Date Service Provider Modifiers Qty    71417435146 HC PT EVAL MOD COMPLEXITY 4 6/21/2017 Sia Whaley, PT GP 1          PT G-Codes  Outcome Measure Options: AM-PAC 6 Clicks Basic Mobility (PT)      Sia Whaley, PT  6/21/2017

## 2017-06-21 NOTE — PLAN OF CARE
Problem: Patient Care Overview (Adult)  Goal: Plan of Care Review  Outcome: Ongoing (interventions implemented as appropriate)    06/21/17 1600   Coping/Psychosocial Response Interventions   Plan Of Care Reviewed With patient   Patient Care Overview   Progress (OT evaluation completed today.)   Outcome Evaluation   Outcome Summary/Follow up Plan Pt seen for OT evaluation today. Pt presents with weakness and decreased independence with self care and mobility tasks. Pt will benefit from skilled OT to improve these problem areas.         Problem: Inpatient Occupational Therapy  Goal: Transfer Training Goal 1 LTG- OT  Outcome: Ongoing (interventions implemented as appropriate)    06/21/17 1600   Transfer Training OT LTG   Transfer Training OT LTG, Date Established 06/21/17   Transfer Training OT LTG, Time to Achieve 2 wks   Transfer Training OT LTG, Activity Type sit to stand/stand to sit   Transfer Training OT LTG, [place] Level supervision required   Transfer Training OT LTG, Outcome goal ongoing       Goal: LB Dressing Goal LTG- OT  Outcome: Ongoing (interventions implemented as appropriate)    06/21/17 1600   LB Dressing OT LTG   LB Dressing Goal OT LTG, Date Established 06/21/17   LB Dressing Goal OT LTG, Time to Achieve 2 wks   LB Dressing Goal OT LTG, [place] Level contact guard assist   LB Dressing Goal OT LTG, Outcome goal ongoing       Goal: Functional Mobility Goal LTG- OT  Outcome: Ongoing (interventions implemented as appropriate)    06/21/17 1600   Functional Mobility OT LTG   Functional Mobility Goal OT LTG, Date Established 06/21/17   Functional Mobility Goal OT LTG, Time to Achieve 2 wks   Functional Mobility Goal OT LTG, [place] Level contact guard   Functional Mobility Goal OT LTG, Assist Device standard walker   Functional Mobility Goal OT LTG, Distance to Achieve in hallway   Functional Mobility Goal OT LTG, Outcome goal ongoing

## 2017-06-21 NOTE — PAYOR COMM NOTE
"TO:HUMANA  FROM:BEULAH ESPINOSA PHONE [phone]  INPT CLINICALS    Vasquez Marie (56 y.o. Male)     Date of Birth Social Security Number Address Home Phone MRN    1960  [address] [place], [place] [id number] [phone] [id number]    Hindu Marital Status          Unknown Single       Admission Date Admission Type Admitting Provider Attending Provider Department, Room/Bed    6/19/17 Emergency Liang Polanco MD Pais, Roshan, MD [facility] SURG  3, 304/1    Discharge Date Discharge Disposition Discharge Destination                      Attending Provider: Liang Polanco MD     Allergies:  No Known Allergies    Isolation:  None   Infection:  None   Code Status:  FULL    Ht:  68\" (172.7 cm)   Wt:  222 lb 8 oz (101 kg)    Admission Cmt:  None   Principal Problem:  None                Active Insurance as of 6/19/2017     Primary Coverage     Payor Plan Insurance Group Employer/Plan Group    HUMANA MEDICARE REPLACEMENT HUMANA MEDICARE REPL [id number]     Payor Plan Address Payor Plan Phone Number Effective From Effective To    [address] [address] [phone] 1/1/2017     [place] [address]       Subscriber Name Subscriber Birth Date Member ID       VASQUEZ MARIE 1960 [id number]           Secondary Coverage     Payor Plan Insurance Group Employer/Plan Group    [place]      Payor Plan Address Payor Plan Phone Number Effective From Effective To    [address] [phone] 7/1/2016     [place] [address]       Subscriber Name Subscriber Birth Date Member ID       VASQUEZ MARIE 1960 [id number]                 Emergency Contacts      (Rel.) Home Phone Work Phone Mobile Phone    Maral Marie (Sister) [phone] -- --               History & Physical      SHADI Brown at 6/19/2017  3:58 PM     Attestation signed by Liang Polanco MD at 6/20/2017  3:44 PM        I have reviewed the above note " including labs, medications, radiology testing. I agree with the assessment and treatment plan.                                   [facility]   [facility]      Name:  Vasquez Marie   Age:  56 y.o.  Sex:  male  :  1960  MRN:  [id number]   Visit Number:  [id number]  Admission Date:  2017  Date Of Service:  17  Primary Care Physician:  Marilyn K. Vermeesch, MD    Chief Complaint:   LLE pain with ischemic toe      History Of Presenting Illness:    This is a 56-year-old male with history significant for coronary artery disease status post stent placement, atrial fibrillation/flutter on anticoagulation, recent GI bleed status post EGD, peripheral vascular disease, hypertension, who presented to the emergency room with complaints of left lower extremity pain.  According to the patient almost 2 months ago he started having pain in his left lower extremity.  Patient was recently admitted to [facility] in [place] for atrial flutter with RVR.  He was placed on Cardizem drip for rate control.  He was already on Coumadin at that time however he was noted to be subtherapeutic.  The Coumadin was discontinued and he was placed on Xarelto.  He did undergo TATIANNA with electrocardioversion during that admission and discharged home on amiodarone and Xarelto in 2017.  That admission as well he was also noted to have cellulitis of his bilateral lower extremities hospitalist were consulted to assist with antibiotic management.  It was felt his neuropathy was likely secondary to peripheral arterial disease.  Hospitalist at that time recommended to transition him to Omnicef and doxycycline to complete a 14 day of antibiotic.  He did recommend that he follow up and have his arterial disease addressed.  However in May 2017 he was sent to  for GI bleed.  According to the patient he was having bright red blood in his stools.  He did undergo an upper endoscopy during that  time for which he had a single nonbleeding angiectasia of the duodenum for which they recommended continuing PPI for 8 weeks twice a day and continued recommended capsule endoscopy as an outpatient to evaluate for small intestinal angioectasias.  Patient has not had this arranged.  I have reviewed the admission from [facility].  Patient did have evidence of alcohol withdrawal while he was at [facility].  He also was noted on ultrasound of the abdomen to have evidence of cirrhosis of his liver.  He has a documented history of heavy alcohol use.  We will place him on telemetry to monitor.    According to the patient since his discharge and may have been doing fairly well but he continued to have left lower extremity pain redness and swelling.  He came into the emergency room on the date of admission for further evaluation.  Venous duplex was done which did not show any evidence of DVT.  Arterial duplex was done which showed occlusion of the distal superficial femoral artery with reconstitution of flow at the popliteal artery.  X-ray was also done in the emergency room which did show fracture of the head of the fourth proximal phalanx.  Patient was started on IV antibiotic's in the emergency room in the form of clindamycin and will be admitted to the hospitalist service.  Dr. Storm was consulted for interventional management.  We will get a consult with Dr. Dr. Campbell since his third digit does appear to be ischemic and I not sure that intervention at this point would restore any flow to this toe.  Possible amputation may be needed.      Review Of Systems:     General ROS: Patient denies any fevers, chills or loss of consciousness. Complains of generalized weakness.   Psychological ROS: Denies any hallucinations and delusions.  Ophthalmic ROS: Denies any diplopia or transient loss of vision.  ENT ROS: Denies sore throat, nasal congestion or ear pain.   Allergy and Immunology ROS: Denies rash or  itching.  Hematological and Lymphatic ROS: Denies neck swelling or easy bleeding.  Endocrine ROS: Denies any recent unintentional weight gain or loss.  Breast ROS: Denies any pain or swelling.  Respiratory ROS: Denies cough or shortness of breath.   Cardiovascular ROS: Denies chest pain or palpitations. No history of exertional chest pain.   Gastrointestinal ROS: Denies nausea and vomiting. Denies any abdominal pain. No diarrhea.   Genito-Urinary ROS: Denies dysuria or hematuria.  Musculoskeletal ROS: Complains of chronic back pain. No muscle pain. No calf pain.  LLE pain, erythema, swelling.   Neurological ROS: Denies any focal weakness. No loss of consciousness. Denies any numbness. Denies neck pain.   Dermatological ROS: Denies any redness or pruritis.     Past Medical History:    Past Medical History:   Diagnosis Date   • Abdominal pain     History of epigastric abdominal tenderness. Differentials include peptic ulcer disease,   pancreatobiliary disease.   • Abnormal LFTs    • Anemia    • Anxiety    • Arthritis    • Asthma    • Atrial fibrillation    • CAD (coronary artery disease)    • Calf cramp    • CKD (chronic kidney disease)    • COPD (chronic obstructive pulmonary disease)    • CVA (cerebral infarction)    • Depression    • DVT (deep venous thrombosis)    • Electrocution    • Fatigue    • Hepatitis C    • History of allergy    • History of blood transfusion    • Hyperlipidemia    • Hypertension    • Leg pain    • Loss of appetite    • PUD (peptic ulcer disease)    • Stroke syndrome    • Tachycardia    • Thrombocytopenia    • Thrombophlebitis of deep femoral vein    • Vision problems         Past Surgical history:    Past Surgical History:   Procedure Laterality Date   • ANKLE SURGERY     • CARDIAC SURGERY     • CORONARY STENT PLACEMENT     • TONSILLECTOMY     • TOTAL HIP ARTHROPLASTY Left    • TOTAL SHOULDER REPLACEMENT Left        Social History:    Social History     Social History   • Marital status:  Single     Spouse name: N/A   • Number of children: N/A   • Years of education: N/A     Occupational History   • Not on file.     Social History Main Topics   • Smoking status: Current Every Day Smoker     Packs/day: 1.00     Years: 30.00     Types: Cigarettes   • Smokeless tobacco: Not on file   • Alcohol use No   • Drug use: No   • Sexual activity: Defer     Other Topics Concern   • Not on file     Social History Narrative         Family History:    Family History   Problem Relation Age of Onset   • Breast cancer Mother    • Colon cancer Sister    • Heart attack Other    • Arthritis Other    • Diabetes Other    • Hypertension Other    • Kidney disease Other    • Stroke Other    • Heart disease Father        Allergies:      Review of patient's allergies indicates no known allergies.    Home Medications:    Prior to Admission Medications     Prescriptions Last Dose Informant Patient Reported? Taking?    amiodarone (PACERONE) 200 MG tablet   No No    Take 1 tablet by mouth Daily.    aspirin 81 MG EC tablet   No No    Take 1 tablet by mouth Daily.    atorvastatin (LIPITOR) 40 MG tablet   No No    Take 1 tablet by mouth Every Night.    DULoxetine (CYMBALTA) 30 MG capsule   No No    Take 1 capsule by mouth Every Night.    losartan-hydrochlorothiazide (HYZAAR) 50-12.5 MG per tablet   No No    Take 1 tablet by mouth Daily.    melatonin 3 MG tablet   No No    Take 2 tablets by mouth Every Night.    metoprolol tartrate (LOPRESSOR) 25 MG tablet   No No    Take 1 tablet by mouth Every 12 (Twelve) Hours.    predniSONE (DELTASONE) 20 MG tablet   Yes No    Take 20 mg by mouth Daily.    rivaroxaban (XARELTO) 20 MG tablet   No No    Take 1 tablet by mouth Daily With Dinner.               Vital Signs:    Temp:  [98 °F (36.7 °C)] 98 °F (36.7 °C)  Heart Rate:  [89] 89  Resp:  [16] 16  BP: (135)/(75) 135/75    Last 3 weights    06/19/17  1130   Weight: 220 lb (99.8 kg)       Body mass index is 33.45 kg/(m^2).    Physical  Exam:      General Appearance:    Alert and cooperative, not in any acute distress.   Head:    Atraumatic and normocephalic, without obvious abnormality.   Eyes:            PERRLA, conjunctivae and sclerae normal, no Icterus. No pallor. Extra-occular movements are within normal limits.   Ears:    Ears appear intact with no abnormalities noted.   Throat:   No oral lesions, no thrush, oral mucosa moist.   Neck:   Supple, trachea midline, no thyromegaly, no carotid bruit.   Respiratory:    Lungs clear, no rales, rhonchi or wheezing noted    Cardiac:    Normal S1 and S2, no murmur, no gallop, no rub. No JVD     Abdomen:     Normal bowel sounds, no masses, no organomegaly. Soft        non-tender, non-distended, no guarding, no rebound                tenderness   Extremities:   Moves all extremities well, no edema, no cyanosis, no             Clubbing.  LLE has erythema and edema.  His 3rd digit is completely necrotic with dry gangrene and the tip of the 4th digit is nectrotic as well.     Pulses:   Pulses palpable and equal bilaterally   Skin:   No bleeding, bruising or rash, no skin breakdown, pink, warm, dry, erythema and necrotic 3rd digit and tip of 4th digit.     Lymph nodes:   No palpable adenopathy   Psychiatric/Behavior:     Mood normal,    Neurologic:   Cranial nerves 2 - 12 grossly intact, sensation intact, Motor power is normal and equal bilaterally.             Labs:    Lab Results (last 24 hours)     Procedure Component Value Units Date/Time    CBC & Differential [[id number]] Collected:  06/19/17 1338    Specimen:  Blood Updated:  06/19/17 1401    Narrative:       The following orders were created for panel order CBC & Differential.  Procedure                               Abnormality         Status                     ---------                               -----------         ------                     CBC Auto Differential[[id number]]         Abnormal            Final result                 Please view  results for these tests on the individual orders.    CBC Auto Differential [[id number]]  (Abnormal) Collected:  06/19/17 1338    Specimen:  Blood Updated:  06/19/17 1401     WBC 7.88 10*3/mm3      RBC 3.09 (L) 10*6/mm3      Hemoglobin 8.8 (L) g/dL      Hematocrit 28.1 (L) %      MCV 90.9 fL      MCH 28.5 pg      MCHC 31.3 g/dL      RDW 15.7 (H) %      RDW-SD 52.5 fl      MPV 9.9 fL      Platelets 201 10*3/mm3      Neutrophil % 59.9 %      Lymphocyte % 19.0 %      Monocyte % 6.7 %      Eosinophil % 14.0 (H) %      Basophil % 0.1 %      Immature Grans % 0.3 %      Neutrophils, Absolute 4.72 10*3/mm3      Lymphocytes, Absolute 1.50 10*3/mm3      Monocytes, Absolute 0.53 10*3/mm3      Eosinophils, Absolute 1.10 (H) 10*3/mm3      Basophils, Absolute 0.01 10*3/mm3      Immature Grans, Absolute 0.02 10*3/mm3      nRBC 0.0 /100 WBC     Comprehensive Metabolic Panel [[id number]]  (Abnormal) Collected:  06/19/17 1338    Specimen:  Blood Updated:  06/19/17 1415     Glucose 110 (H) mg/dL      BUN 14 mg/dL      Creatinine 1.20 mg/dL      Sodium 145 mmol/L      Potassium 3.5 mmol/L      Chloride 105 mmol/L      CO2 29.0 mmol/L      Calcium 9.2 mg/dL      Total Protein 8.3 (H) g/dL      Albumin 3.50 g/dL      ALT (SGPT) 32 U/L      AST (SGOT) 69 (H) U/L      Alkaline Phosphatase 124 U/L      Total Bilirubin 1.1 mg/dL      eGFR Non African Amer 62 mL/min/1.73      Globulin 4.8 gm/dL      A/G Ratio 0.7 (L) g/dL      BUN/Creatinine Ratio 11.7     Anion Gap 14.5 mmol/L     Narrative:       Abnormal estimated GFR should be followed by more specific studies to confirm end stage chronic renal disease. The equation used for calculation may not be accurate for patients less than 19 years old, greater than 70 years old, patients at extremes of weight, malnutrition, or with acute renal dysfunction.    CK-MB [[id number]]  (Normal) Collected:  06/19/17 1338    Specimen:  Blood Updated:  06/19/17 1415     CKMB 1.97 ng/mL     Troponin [[id number]]   (Normal) Collected:  06/19/17 1338    Specimen:  Blood Updated:  06/19/17 1415     Troponin I <0.012 ng/mL     Narrative:       Normal Patient Upper Reference Limit (URL) (99th Percentile)=0.03 ng/mL   Non-AMI Illness Reference Limit=0.03-0.11 ng/mL   AMI Confirmation=0.12 ng/mL and above          Radiology:    Imaging Results (last 72 hours)     Procedure Component Value Units Date/Time    XR Foot 3+ View Left [[id number]] Collected:  06/19/17 1246     Updated:  06/19/17 1251    Narrative:       PROCEDURE: XR FOOT 3+ VW LEFT-     History: pain     COMPARISON:April 21, 2017.     FINDINGS:  A 3 view exam demonstrates postsurgical changes with a wire  spanning the bases of the fourth and fifth metatarsals. There is a  fracture of the head of the fourth proximal phalanx. Advanced  degenerative changes are present at the tibiotalar and subtalar joints.  There is also joint space narrowing and osteophyte formation in the mid  foot. Diffuse soft tissue swelling is present.           Impression:       Fracture of the head of the fourth proximal phalanx.                 This report was finalized on 6/19/2017 12:48 PM by Padmini Swan M.D..    US Venous Doppler Lower Extremity Left (duplex) [[id number]] Collected:  06/19/17 1249     Updated:  06/19/17 1301    Narrative:       PROCEDURE: US VENOUS DOPPLER LOWER EXTREMITY LEFT (DUPLEX)-     HISTORY: pain     PROCEDURE: Multiple transverse and longitudinal scans were performed of  the femoropopliteal deep venous system, with augmentation and  compression maneuvers.     FINDINGS: Normal phasic flow was noted in the visualized deep venous  system. No intraluminal increased echogenicity is noted to suggest  thrombus. There is normal compression and augmentation of the venous  structures. No abnormal venous collaterals are seen. No venous reflux is  demonstrated.       Impression:       No evidence of deep venous thrombosis.     This report was finalized on 6/19/2017 12:49 PM by  Padmini Swan M.D..    US Arterial Doppler Lower Extremity Left [[id number]] Collected:  06/19/17 1312     Updated:  06/19/17 1316    Narrative:       PROCEDURE: US ARTERIAL DOPPLER LOWER EXTREMITY  LEFT-     HISTORY: pain     PROCEDURE: Sonographic images of the left lower extremity was obtained  with color flow and pulse Doppler.     FINDINGS:      LEFT LOWER EXTREMITY.      Velocities cm/sec :      CFA: 84  SFA Mid: 92   SFA Dist: 0  POP: 39  PT: 31  JULI: 23  CASSIE: Not obtained  Waveforms are monophasic.          Impression:       Occlusion of the distal superficial femoral artery with  reconstitution of flow at the popliteal artery.     This report was finalized on 6/19/2017 1:14 PM by Padmini Swan M.D..          Assessment and Plan:  1.  Left lower extremity cellulitis:  Patient will be started on IV antibiotic in the form of Clindamycin  2.  Dry Gangrenous ischemic third digit of left lower extremity-Will get MRI to rule out osteomyelitis.  Dr. Campbell with podiatry has been consulted.  3.  Anemia-likely secondary acute blood loss.  Patient was recently admitted to  at the end of May, where he underwent an upper endoscopy for an upper GI bleed.  His hemoglobin may be a residual from that admission we'll try to get the records from .  Will check occult blood stool.  Patient denies any evidence of overt bleeding at this time.  4.  Alcohol abuse-patient has a history of alcohol abuse.  He did recently have withdrawals noted at [facility] when he was admitted there.  Will place him on alcohol withdrawal protocol.  5.  PVD- Dr. Storm with cardiology has been consulted for further evaluation.  6. Hypertension- Continue home meds  7.  Atrial flutter-patient is on anticoagulation which we will continue.  8.  Coronary artery disease status post stent placement-continue medical management  9.  Nicotine abuse-smoking cessation counseling      Mallory Orozco, APRN  06/19/17  3:59 PM      Electronically signed by Liang Polanco MD at 6/20/2017  3:44 PM           Emergency Department Notes      Darius Guzmán PA-C at 6/19/2017 12:10 PM          Subjective   HPI Comments: Patient is here with complaint of some left foot pain for the past 2 months patient does report history of tobacco abuse and peripheral vascular disease patient presents today with apparently worsening discomfort to the left foot denies chest pain or shortness of air no fevers chills reported symptoms ongoing has patient mentions for the past 2 months  here for further evaluation      Review of Systems   Constitutional: Negative.  Negative for chills and fever.   HENT: Negative.    Respiratory: Negative.    Cardiovascular: Negative for chest pain and palpitations.   Gastrointestinal: Negative.  Negative for abdominal pain, anal bleeding, blood in stool and vomiting.   Genitourinary: Negative.    Musculoskeletal: Positive for arthralgias.   Skin: Positive for color change and rash.   Neurological: Negative.  Negative for weakness.   All other systems reviewed and are negative.      Past Medical History:   Diagnosis Date   • Abdominal pain     History of epigastric abdominal tenderness. Differentials include peptic ulcer disease,   pancreatobiliary disease.   • Abnormal LFTs    • Anemia    • Anxiety    • Arthritis    • Asthma    • Atrial fibrillation    • CAD (coronary artery disease)    • Calf cramp    • CKD (chronic kidney disease)    • COPD (chronic obstructive pulmonary disease)    • CVA (cerebral infarction)    • Depression    • DVT (deep venous thrombosis)    • Electrocution    • Fatigue    • Hepatitis C    • History of allergy    • History of blood transfusion    • Hyperlipidemia    • Hypertension    • Leg pain    • Loss of appetite    • PUD (peptic ulcer disease)    • Stroke syndrome    • Tachycardia    • Thrombocytopenia    • Thrombophlebitis of deep femoral vein    • Vision problems        No Known Allergies    Past  Surgical History:   Procedure Laterality Date   • ANKLE SURGERY     • CARDIAC SURGERY     • CORONARY STENT PLACEMENT     • TONSILLECTOMY     • TOTAL HIP ARTHROPLASTY Left    • TOTAL SHOULDER REPLACEMENT Left        Family History   Problem Relation Age of Onset   • Breast cancer Mother    • Colon cancer Sister    • Heart attack Other    • Arthritis Other    • Diabetes Other    • Hypertension Other    • Kidney disease Other    • Stroke Other    • Heart disease Father        Social History     Social History   • Marital status: Single     Spouse name: N/A   • Number of children: N/A   • Years of education: N/A     Social History Main Topics   • Smoking status: Current Every Day Smoker     Packs/day: 1.00     Years: 30.00     Types: Cigarettes   • Smokeless tobacco: None   • Alcohol use No   • Drug use: No   • Sexual activity: Defer     Other Topics Concern   • None     Social History Narrative           Objective   Physical Exam   Constitutional: He is oriented to person, place, and time. He appears well-developed.   Afebrile vital signs stable nontoxic   HENT:   Head: Normocephalic.   Eyes: Conjunctivae and EOM are normal. Pupils are equal, round, and reactive to light.   Neck: Neck supple.   Cardiovascular: Normal rate and regular rhythm.    Pulses:       Popliteal pulses are 1+ on the right side, and 1+ on the left side.        Dorsalis pedis pulses are 1+ on the right side, and 0 on the left side.   Pulmonary/Chest: Effort normal and breath sounds normal.   Genitourinary:   Genitourinary Comments: Rectal exam unremarkable some light brown stool no blood per rectum no melanotic stool   Musculoskeletal: Normal range of motion.   Neurological: He is alert and oriented to person, place, and time. No cranial nerve deficit or sensory deficit. He exhibits normal muscle tone. GCS eye subscore is 4. GCS verbal subscore is 5. GCS motor subscore is 6.   Skin: Skin is warm and dry.   Noted ulceration to the left great toe  with some necrotic black left third toe affecting the left fourth toe as well but primarily the left third toe necrotic   Psychiatric: He has a normal mood and affect. His behavior is normal. Judgment and thought content normal.   Nursing note and vitals reviewed.      Procedures        ED Course  ED Course   Comment By Time   Case labs management reviewed with Dr. Estela Rutherford PA-C 06/19 1214   Patient seen by myself and Dr. Estela Rutherford PA-C 06/19 1322   We'll discuss with Dr. nolan patient sees Dr. ovidio Rutherford PA-C 06/19 1330   Discussed with Dr. Nolan recommends discussing with Dr. Emeterio Rutherford PA-C 06/19 1336   Discussed with Dr. Storm agreeable to see patient Dimitri Rutherford PA-C 06/19 1451                  MDM    Final diagnoses:   Closed fracture of fourth toe of left foot, initial encounter   Cellulitis of foot, left   Peripheral vascular disease of lower extremity   Tobacco abuse            Dimitri Rutherford PA-C  06/19/17 1526       Electronically signed by Dimitri Rutherford PA-C at 6/19/2017  3:26 PM      Bell Queen RN at 6/19/2017  1:01 PM          PT IS STILL OFF THE FLOOR IN RADIOLOGY     Bell Queen RN  06/19/17 1301       Electronically signed by Bell Queen RN at 6/19/2017  1:01 PM      Elias Servin at 6/19/2017  1:34 PM          DR. NOLAN WAS CALLED AT 1333 AND TRANSFERRED TO [place] AT THIS TIME.      Elias Servin  06/19/17 1335       Electronically signed by Elias Servin at 6/19/2017  1:35 PM      Elias Servin at 6/19/2017  2:53 PM          DR. STORM WAS CALLED AT 1447 AND TRANSFERRED TO [place] AT THIS TIME.      Elias Servin  06/19/17 1455       Electronically signed by Elias Servin at 6/19/2017  2:55 PM      Bell Queen RN at 6/19/2017  3:07 PM          PT BROUGHT A BAGGED LUNCH AND A DRINK AFTER OKAYED BY DIMITRI RUTHERFORD PA-C.      Bell Queen RN  06/19/17  1508       Electronically signed by Bell uQeen RN at 6/19/2017  3:08 PM      Elias Servin at 6/19/2017  3:10 PM          EROS CALLED FOR DIMITRI AND TRANSFERRED THE CALL      Elias Servin  06/19/17 1511       Electronically signed by Elias Servin at 6/19/2017  3:11 PM      Bell Queen RN at 6/19/2017  4:39 PM          REPORT TO HUE.      Bell Queen RN  06/19/17 1709       Electronically signed by Bell Queen RN at 6/19/2017  5:09 PM        Hospital Medications (active)       Dose Frequency Start End    acetaminophen (TYLENOL) tablet 650 mg 650 mg Every 4 Hours PRN 6/19/2017     Sig - Route: Take 2 tablets by mouth Every 4 (Four) Hours As Needed for Mild Pain (1-3). - Oral    acetaminophen (TYLENOL) tablet 650 mg 650 mg Every 4 Hours PRN 6/20/2017     Sig - Route: Take 2 tablets by mouth Every 4 (Four) Hours As Needed for Mild Pain (1-3) or Fever (temperature greater than 101F). - Oral    amiodarone (PACERONE) tablet 200 mg 200 mg Every 24 Hours Scheduled 6/19/2017     Sig - Route: Take 1 tablet by mouth Daily. - Oral    atorvastatin (LIPITOR) tablet 40 mg 40 mg Nightly 6/19/2017     Sig - Route: Take 1 tablet by mouth Every Night. - Oral    clindamycin (CLEOCIN) 600 mg in sodium chloride 0.9 % 50 mL IVPB 600 mg Every 8 Hours 6/19/2017     Sig - Route: Infuse 600 mg into a venous catheter Every 8 (Eight) Hours. - Intravenous    clopidogrel (PLAVIX) tablet 75 mg 75 mg Daily 6/19/2017     Sig - Route: Take 1 tablet by mouth Daily. - Oral    DULoxetine (CYMBALTA) DR capsule 30 mg 30 mg Nightly 6/19/2017     Sig - Route: Take 1 capsule by mouth Every Night. - Oral    folic acid (FOLVITE) tablet 1 mg 1 mg Daily 6/19/2017     Sig - Route: Take 1 tablet by mouth Daily. - Oral    furosemide (LASIX) injection 20 mg 20 mg Once 6/20/2017 6/20/2017    Sig - Route: Infuse 2 mL into a venous catheter 1 (One) Time. - Intravenous    HYDROcodone-acetaminophen (NORCO) 5-325 MG per tablet 1 tablet 1 tablet  "Every 4 Hours PRN 6/20/2017 6/30/2017    Sig - Route: Take 1 tablet by mouth Every 4 (Four) Hours As Needed for Moderate Pain (4-6). - Oral    LORazepam (ATIVAN) injection 1 mg 1 mg Every 2 Hours PRN 6/19/2017 6/29/2017    Sig - Route: Infuse 0.5 mL into a venous catheter Every 2 (Two) Hours As Needed for Withdrawal (For CIWA score 8-10). - Intravenous    Linked Group 1:  \"Or\" Linked Group Details        LORazepam (ATIVAN) injection 2 mg 2 mg Every 1 Hour PRN 6/19/2017 6/29/2017    Sig - Route: Infuse 1 mL into a venous catheter Every 1 (One) Hour As Needed for Withdrawal (For CIWA score 11-15). - Intravenous    Linked Group 1:  \"Or\" Linked Group Details        LORazepam (ATIVAN) injection 2 mg 2 mg Every 15 Minutes PRN 6/19/2017 6/29/2017    Sig - Route: Infuse 1 mL into a venous catheter Every 15 (Fifteen) Minutes As Needed for Anxiety (Use IV route first.  If unable to give IV, use IM.  For CIWA-Ar greater than 15.  Repeat dose in 15 minutes if CIWA-Ar is not decreasing.). - Intravenous    Linked Group 1:  \"Or\" Linked Group Details        LORazepam (ATIVAN) injection 2 mg 2 mg Every 15 Minutes PRN 6/19/2017 6/29/2017    Sig - Route: Inject 1 mL into the shoulder, thigh, or buttocks Every 15 (Fifteen) Minutes As Needed for Withdrawal (Use IV route first.  If unable to give IV, use IM.  For CIWA-Ar greater than 15.  Repeat dose in 15 minutes if CIWA-Ar is not decreasing.). - Intramuscular    Linked Group 1:  \"Or\" Linked Group Details        LORazepam (ATIVAN) tablet 1 mg 1 mg Every 2 Hours PRN 6/19/2017 6/29/2017    Sig - Route: Take 2 tablets by mouth Every 2 (Two) Hours As Needed for Withdrawal (For CIWA score 8-10). - Oral    Linked Group 1:  \"Or\" Linked Group Details        LORazepam (ATIVAN) tablet 2 mg 2 mg Every 1 Hour PRN 6/19/2017 6/29/2017    Sig - Route: Take 4 tablets by mouth Every 1 (One) Hour As Needed for Withdrawal (For CIWA score 11-15). - Oral    Linked Group 1:  \"Or\" Linked Group Details        " "losartan (COZAAR) tablet 50 mg 50 mg Every 24 Hours Scheduled 6/21/2017     Sig - Route: Take 1 tablet by mouth Daily. - Oral    melatonin tablet 6 mg 6 mg Nightly 6/19/2017     Sig - Route: Take 2 tablets by mouth Every Night. - Oral    metoprolol tartrate (LOPRESSOR) tablet 25 mg 25 mg Every 12 Hours Scheduled 6/19/2017     Sig - Route: Take 1 tablet by mouth Every 12 (Twelve) Hours. - Oral    morphine injection 1 mg 1 mg Every 4 Hours PRN 6/19/2017 6/29/2017    Sig - Route: Infuse 0.5 mL into a venous catheter Every 4 (Four) Hours As Needed for Moderate Pain (4-6). - Intravenous    Linked Group 2:  \"And\" Linked Group Details        multivitamin with minerals 1 tablet 1 tablet Daily 6/19/2017     Sig - Route: Take 1 tablet by mouth Daily. - Oral    naloxone (NARCAN) injection 0.4 mg 0.4 mg Every 5 Minutes PRN 6/19/2017     Sig - Route: Infuse 1 mL into a venous catheter Every 5 (Five) Minutes As Needed for Respiratory Depression. - Intravenous    Linked Group 2:  \"And\" Linked Group Details        nicotine (NICODERM CQ) 14 MG/24HR patch 1 patch 1 patch Every 24 Hours 6/19/2017     Sig - Route: Place 1 patch on the skin Daily. - Transdermal    ondansetron (ZOFRAN) injection 4 mg 4 mg Every 6 Hours PRN 6/19/2017     Sig - Route: Infuse 2 mL into a venous catheter Every 6 (Six) Hours As Needed for Nausea or Vomiting. - Intravenous    Linked Group 3:  \"Or\" Linked Group Details        ondansetron (ZOFRAN) injection 4 mg 4 mg Every 6 Hours PRN 6/20/2017     Sig - Route: Infuse 2 mL into a venous catheter Every 6 (Six) Hours As Needed for Nausea or Vomiting. - Intravenous    Linked Group 4:  \"Or\" Linked Group Details        ondansetron (ZOFRAN) tablet 4 mg 4 mg Every 6 Hours PRN 6/19/2017     Sig - Route: Take 1 tablet by mouth Every 6 (Six) Hours As Needed for Nausea or Vomiting. - Oral    Linked Group 3:  \"Or\" Linked Group Details        ondansetron (ZOFRAN) tablet 4 mg 4 mg Every 6 Hours PRN 6/20/2017     Sig - Route: " "Take 1 tablet by mouth Every 6 (Six) Hours As Needed for Nausea or Vomiting. - Oral    Linked Group 4:  \"Or\" Linked Group Details        ondansetron ODT (ZOFRAN-ODT) disintegrating tablet 4 mg 4 mg Every 6 Hours PRN 6/19/2017     Sig - Route: Take 1 tablet by mouth Every 6 (Six) Hours As Needed for Nausea or Vomiting. - Oral    Linked Group 3:  \"Or\" Linked Group Details        ondansetron ODT (ZOFRAN-ODT) disintegrating tablet 4 mg 4 mg Every 6 Hours PRN 6/20/2017     Sig - Route: Take 1 tablet by mouth Every 6 (Six) Hours As Needed for Nausea or Vomiting. - Oral    Linked Group 4:  \"Or\" Linked Group Details        pantoprazole (PROTONIX) EC tablet 40 mg 40 mg 2 Times Daily Before Meals 6/20/2017     Sig - Route: Take 1 tablet by mouth 2 (Two) Times a Day Before Meals. - Oral    sodium chloride 0.9 % flush 1-10 mL 1-10 mL As Needed 6/19/2017     Sig - Route: Infuse 1-10 mL into a venous catheter As Needed for Line Care. - Intravenous    sodium chloride 0.9 % flush 10 mL 10 mL As Needed 6/19/2017     Sig - Route: Infuse 10 mL into a venous catheter As Needed for Line Care. - Intravenous    Cosign for Ordering: Accepted by Adrian Palmer MD on 6/19/2017  1:16 PM    Linked Group 5:  \"And\" Linked Group Details        thiamine (VITAMIN B-1) tablet 100 mg 100 mg Daily 6/19/2017     Sig - Route: Take 1 tablet by mouth Daily. - Oral    losartan-hydrochlorothiazide (HYZAAR) 50-12.5 MG per tablet 1 tablet (Discontinued) 1 tablet Daily 6/19/2017 6/20/2017    Sig - Route: Take 1 tablet by mouth Daily. - Oral    pantoprazole (PROTONIX) EC tablet 40 mg (Discontinued) 40 mg Every Early Morning 6/19/2017 6/20/2017    Sig - Route: Take 1 tablet by mouth Every Morning. - Oral    predniSONE (DELTASONE) tablet 15 mg (Discontinued) 15 mg Daily 6/21/2017 6/20/2017    Sig - Route: Take 15 mg by mouth Daily. - Oral    predniSONE (DELTASONE) tablet 20 mg (Discontinued) 20 mg Daily 6/19/2017 6/20/2017    Sig - Route: Take 1 tablet by " mouth Daily. - Oral    sodium chloride 0.9 % infusion (Discontinued) 50 mL/hr Continuous 6/19/2017 6/21/2017    Sig - Route: Infuse 50 mL/hr into a venous catheter Continuous. - Intravenous    Cosign for Ordering: Accepted by Adrian Palmer MD on 6/19/2017  1:16 PM    sodium chloride 0.9 % infusion (Discontinued) 50 mL/hr Continuous 6/20/2017 6/21/2017    Sig - Route: Infuse 50 mL/hr into a venous catheter Continuous. - Intravenous            Lab Results (last 24 hours)     Procedure Component Value Units Date/Time    CBC & Differential [[id number]] Collected:  06/20/17 1223    Specimen:  Blood Updated:  06/20/17 1251    Narrative:       The following orders were created for panel order CBC & Differential.  Procedure                               Abnormality         Status                     ---------                               -----------         ------                     CBC Auto Differential[[id number]]        Abnormal            Final result                 Please view results for these tests on the individual orders.    CBC Auto Differential [[id number]]  (Abnormal) Collected:  06/20/17 1223    Specimen:  Blood Updated:  06/20/17 1251     WBC 2.99 (L) 10*3/mm3      RBC 2.68 (L) 10*6/mm3      Hemoglobin 7.7 (C) g/dL      Hematocrit 24.4 (L) %      MCV 91.0 fL      MCH 28.7 pg      MCHC 31.6 g/dL      RDW 15.7 (H) %      RDW-SD 52.4 fl      MPV 10.6 fL      Platelets 129 (L) 10*3/mm3      Neutrophil % 77.9 %      Lymphocyte % 17.1 %      Monocyte % 3.3 %      Eosinophil % 0.7 %      Basophil % 0.3 %      Immature Grans % 0.7 (H) %      Neutrophils, Absolute 2.33 10*3/mm3      Lymphocytes, Absolute 0.51 (L) 10*3/mm3      Monocytes, Absolute 0.10 10*3/mm3      Eosinophils, Absolute 0.02 10*3/mm3      Basophils, Absolute 0.01 10*3/mm3      Immature Grans, Absolute 0.02 10*3/mm3      nRBC 0.0 /100 WBC     CBC & Differential [[id number]] Collected:  06/21/17 0510    Specimen:  Blood Updated:  06/21/17 0654     Narrative:       The following orders were created for panel order CBC & Differential.  Procedure                               Abnormality         Status                     ---------                               -----------         ------                     CBC Auto Differential[[id number]]        Abnormal            Final result                 Please view results for these tests on the individual orders.    CBC Auto Differential [[id number]]  (Abnormal) Collected:  06/21/17 0510    Specimen:  Blood Updated:  06/21/17 0605     WBC 7.00 10*3/mm3      RBC 3.12 (L) 10*6/mm3      Hemoglobin 9.2 (L) g/dL      Hematocrit 28.3 (L) %      MCV 90.7 fL      MCH 29.5 pg      MCHC 32.5 g/dL      RDW 15.5 (H) %      RDW-SD 50.4 fl      MPV 11.0 fL      Platelets 184 10*3/mm3      Neutrophil % 77.9 %      Lymphocyte % 16.1 %      Monocyte % 4.6 %      Eosinophil % 0.7 %      Basophil % 0.1 %      Immature Grans % 0.6 %      Neutrophils, Absolute 5.45 10*3/mm3      Lymphocytes, Absolute 1.13 10*3/mm3      Monocytes, Absolute 0.32 10*3/mm3      Eosinophils, Absolute 0.05 10*3/mm3      Basophils, Absolute 0.01 10*3/mm3      Immature Grans, Absolute 0.04 10*3/mm3      nRBC 0.0 /100 WBC     Comprehensive Metabolic Panel [[id number]]  (Abnormal) Collected:  06/21/17 0510    Specimen:  Blood Updated:  06/21/17 0638     Glucose 140 (H) mg/dL      BUN 18 mg/dL      Creatinine 1.50 (H) mg/dL      Sodium 143 mmol/L      Potassium 3.5 mmol/L      Chloride 102 mmol/L      CO2 27.0 mmol/L      Calcium 7.8 (L) mg/dL      Total Protein 7.8 g/dL      Albumin 3.30 (L) g/dL      ALT (SGPT) 31 U/L      AST (SGOT) 43 U/L      Alkaline Phosphatase 112 U/L      Total Bilirubin 0.6 mg/dL      eGFR Non African Amer 48 (L) mL/min/1.73      Globulin 4.5 gm/dL      A/G Ratio 0.7 (L) g/dL      BUN/Creatinine Ratio 12.0     Anion Gap 17.5 mmol/L     Narrative:       Abnormal estimated GFR should be followed by more specific studies to confirm end stage  chronic renal disease. The equation used for calculation may not be accurate for patients less than 19 years old, greater than 70 years old, patients at extremes of weight, malnutrition, or with acute renal dysfunction.        Operative/Procedure Notes (last 24 hours) (Notes from 6/20/2017 12:28 PM through 6/21/2017 12:28 PM)     No notes of this type exist for this encounter.           Physician Progress Notes (last 24 hours) (Notes from 6/20/2017 12:28 PM through 6/21/2017 12:28 PM)      SHADI Brown at 6/20/2017 12:50 PM  Version 2 of 2           PROGRESS NOTE        Date of Admission: 6/19/2017  Length of Stay: 0  Primary Care Physician: Marilyn K. Vermeesch, MD    Subjective   Chief Complaint:  Left lower extremity cellulitis follow up  HPI:  This is a 57-year-old male who is admitted for left lower extremity cellulitis and a gangrenous ischemic third digit.  He was seen and evaluated by Dr. Storm with cardiology for his peripheral vascular disease.  He had an arterial duplex done in the emergency room which did show occlusion of the distal superficial femoral artery with reconstitution of flow at the popliteal artery.  Patient did undergo intervention of his SFA and popliteal arteries.  Dr. Campbell with podiatry has been consulted where he will possibly need amputation of third digit if not more.  This gentleman is noted to have multiple medical problems as have reviewed his records from  and [facility].  He does have a liver mass that is concerning for malignancy for which did set him up with the liver clinic however I don't think that he is followed up I will discuss this with the patient.  He is also noted to have cirrhosis of the liver with a history of alcohol abuse and hepatitis C.  His hemoglobin has dropped for which we I have ordered a unit of packed red blood cells.  I did review his EGD that was done at  which she was noted to have portal hypertensive gastropathy and duodenitis  with acute blood loss anemia his EGD showed mild duodenitis and one duodenal angiodysplasia.  His blood thinners were supposed to be held according to the records however patient had Xartelto at home but recently stopped taking it himself due to bruising.  He was unable to remember this information and tell me yesterday at the time of admission.  He could only tell me he was supposed to be taking it.  Those I have been currently put on hold.  He will continue the Plavix but not the aspirin nor the Xarelto      Review Of Systems:   Review of Systems   General ROS: Patient denies any fevers, chills or loss of consciousness. Complains of generalized weakness.   Psychological ROS: Denies any hallucinations and delusions.  Ophthalmic ROS: Denies any diplopia or transient loss of vision.  ENT ROS: Denies sore throat, nasal congestion or ear pain.   Allergy and Immunology ROS: Denies rash or itching.  Hematological and Lymphatic ROS: Denies neck swelling or easy bleeding.  Endocrine ROS: Denies any recent unintentional weight gain or loss.  Breast ROS: Denies any pain or swelling.  Respiratory ROS: Denies cough or shortness of breath.   Cardiovascular ROS: Denies chest pain or palpitations. No history of exertional chest pain.   Gastrointestinal ROS: Denies nausea and vomiting. Denies any abdominal pain. No diarrhea.  Genito-Urinary ROS: Denies dysuria or hematuria.  Musculoskeletal ROS: Denies back pain. No muscle pain. No calf pain.   Neurological ROS: Denies any focal weakness. No loss of consciousness. Denies any numbness. Denies neck pain.   Dermatological ROS: Denies any redness or pruritis.    Objective      Temp:  [97.9 °F (36.6 °C)-98.6 °F (37 °C)] 98.6 °F (37 °C)  Heart Rate:  [69-75] 72  Resp:  [16-20] 16  BP: (121-159)/(66-94) 121/82  Physical Exam    General Appearance:  Alert and cooperative, not in any acute distress.   Head:  Atraumatic and normocephalic, without obvious abnormality.   Eyes:           PERRLA, conjunctivae and sclerae normal, no Icterus. No pallor. Extra-occular movements are within normal limits.   Ears:  Ears appear intact with no abnormalities noted.   Throat: No oral lesions, no thrush, oral mucosa moist.   Neck: Supple, trachea midline, no thyromegaly, no carotid bruit.   Back:   No kyphoscoliosis present. No tenderness to palpation,   range of motion normal.   Lungs:   Chest shape is normal. Breath sounds heard bilaterally equally.  No crackles or wheezing. No Pleural rub or bronchial breathing.   Heart:  Normal S1 and S2, no murmur, no gallop, no rub. No JVD   Abdomen:   Normal bowel sounds, no masses, no organomegaly. Soft     non-tender, non-distended, no guarding, no rebound                tenderness   Extremities: Moves all extremities well, no edema, no cyanosis, no clubbing.   Pulses: Pulses palpable and equal bilaterally   Skin: No bleeding, bruising or rash   Lymph nodes: No palpable adenopathy   Neurologic:    Psychiatric/Behavior:     Cranial nerves 2 - 12 grossly intact, sensation intact, Motor power is normal and equal bilaterally.  Mood normal, behavior normal       Results Review:    I have reviewed the labs, radiology results and diagnostic studies.      Results from last 7 days  Lab Units 06/20/17  0525   WBC 10*3/mm3 3.00*   HEMOGLOBIN g/dL 8.0*   PLATELETS 10*3/mm3 156       Results from last 7 days  Lab Units 06/20/17  0525   SODIUM mmol/L 144   POTASSIUM mmol/L 3.6   TOTAL CO2 mmol/L 24.0*   CREATININE mg/dL 1.60*   GLUCOSE mg/dL 226*       Culture Data:    Radiology Data:   Cardiology Data:    I have reviewed the medications.    Assessment/Plan     Assessment and Plan:  1. Left lower extremity cellulitis: Patient will be started on IV antibiotic in the form of Clindamycin.  2. Dry Gangrenous ischemic third digit of left lower extremity-Will get MRI to rule out osteomyelitis. Dr. Campbell with podiatry has been consulted.  3.  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  Dr. Campbell consulted.    4. Anemia-likely secondary acute blood loss. Patient was recently admitted to  at the end of May, where he underwent an upper endoscopy for an upper GI bleed. His hemoglobin may be a residual from that admission we'll try to get the records from . Will check occult blood stool. Patient denies any evidence of overt bleeding at this time.  His hemoglobin has dropped further.  We will go ahead and transfuse 1 unit of packed red blood cells.  His aspirin and Xarelto has been discontinued.  Dr. Storm has recommended Plavix only at this time.  His Hgb at the time of discharge from  was 8.8.  It was noted to trend from 7.7 to 8.8 on the day of discharge.  His anticoagulation was best to be held at the time of discharge from .  He went home taking it but stopped it when he was bruising.  He could not give me this information at the time of admission.    5. Alcohol abuse-patient has a history of alcohol abuse. He did recently have withdrawals noted at [facility] when he was admitted there. Will place him on alcohol withdrawal protocol.  6. PVD- Dr. Storm with cardiology has been consulted for further evaluation.  He did go down today for intervention.  He did open up the SFA and popliteal.  He does have a pulse at this time.  7. Hypertension- Continue home meds  8. Atrial flutter- Anticoagulation stopped.  9. Coronary artery disease status post stent placement-continue medical management  10. Nicotine abuse-smoking cessation counseling  11.  Liver mass noted at  concerning for malignancy .  Patient was instructed to schedule an appt for Hepatitis C and to follow up liver mass.  An MRI was done at  but pending at the time of discharge.  Will try to get this report to evaluate.  Patient will need to follow-up in the liver clinic at  as previously documented from the discharge because he will need further evaluation for a liver mass, hepatitis C as well as a  capsule endoscopy if they have recommended.  He has an appt August 2nd.  He has already seen them once since his discharge from  but does not know if the liver mass is malignant.  He has to go back on August 2nd.    12.  Cirrhosis of liver in a patient with history of alcohol abuse and hepatitis C-  Appt at liver clinic at  August 2nd.    13.  Borderline Diabetes with A1C 6 noted in May at   14.  COPD- No evidence of exacerbation  15.   Obesity  16.  Rheumatoid arthritis  17.  Chronic kidney disease CKD III      DVT prophylaxis: SCDs due to recent bleed and current drop in Hgb.    Discharge Planning:   SHADI Brown 06/20/17 12:50 PM         Electronically signed by SHADI Brown at 6/20/2017  2:34 PM      SHADI Brown at 6/20/2017 12:50 PM  Version 1 of 2           PROGRESS NOTE        Date of Admission: 6/19/2017  Length of Stay: 0  Primary Care Physician: Marilyn K. Vermeesch, MD    Subjective   Chief Complaint:  Left lower extremity cellulitis follow up  HPI:  This is a 57-year-old male who is admitted for left lower extremity cellulitis and a gangrenous ischemic third digit.  He was seen and evaluated by Dr. Storm with cardiology for his peripheral vascular disease.  He had an arterial duplex done in the emergency room which did show occlusion of the distal superficial femoral artery with reconstitution of flow at the popliteal artery.  Patient did undergo intervention of his SFA and popliteal arteries.  Dr. Campbell with podiatry has been consulted where he will possibly need amputation of third digit if not more.  This gentleman is noted to have multiple medical problems as have reviewed his records from  and [facility].  He does have a liver mass that is concerning for malignancy for which did set him up with the liver clinic however I don't think that he is followed up I will discuss this with the patient.  He is also noted to have cirrhosis of the liver with  a history of alcohol abuse and hepatitis C.  His hemoglobin has dropped for which we I have ordered a unit of packed red blood cells.  I did review his EGD that was done at  which she was noted to have portal hypertensive gastropathy and duodenitis with acute blood loss anemia his EGD showed mild duodenitis and one duodenal angiodysplasia.  His blood thinners were supposed to be held according to the patient however his been taking blood thinner.  Those I have been currently put on hold.  He will continue the Plavix but not the aspirin nor the Xarelto      Review Of Systems:   Review of Systems   General ROS: Patient denies any fevers, chills or loss of consciousness. Complains of generalized weakness.   Psychological ROS: Denies any hallucinations and delusions.  Ophthalmic ROS: Denies any diplopia or transient loss of vision.  ENT ROS: Denies sore throat, nasal congestion or ear pain.   Allergy and Immunology ROS: Denies rash or itching.  Hematological and Lymphatic ROS: Denies neck swelling or easy bleeding.  Endocrine ROS: Denies any recent unintentional weight gain or loss.  Breast ROS: Denies any pain or swelling.  Respiratory ROS: Denies cough or shortness of breath.   Cardiovascular ROS: Denies chest pain or palpitations. No history of exertional chest pain.   Gastrointestinal ROS: Denies nausea and vomiting. Denies any abdominal pain. No diarrhea.  Genito-Urinary ROS: Denies dysuria or hematuria.  Musculoskeletal ROS: Denies back pain. No muscle pain. No calf pain.   Neurological ROS: Denies any focal weakness. No loss of consciousness. Denies any numbness. Denies neck pain.   Dermatological ROS: Denies any redness or pruritis.    Objective      Temp:  [97.9 °F (36.6 °C)-98.6 °F (37 °C)] 98.6 °F (37 °C)  Heart Rate:  [69-75] 72  Resp:  [16-20] 16  BP: (121-159)/(66-94) 121/82  Physical Exam    General Appearance:  Alert and cooperative, not in any acute distress.   Head:  Atraumatic and normocephalic,  without obvious abnormality.   Eyes:          PERRLA, conjunctivae and sclerae normal, no Icterus. No pallor. Extra-occular movements are within normal limits.   Ears:  Ears appear intact with no abnormalities noted.   Throat: No oral lesions, no thrush, oral mucosa moist.   Neck: Supple, trachea midline, no thyromegaly, no carotid bruit.   Back:   No kyphoscoliosis present. No tenderness to palpation,   range of motion normal.   Lungs:   Chest shape is normal. Breath sounds heard bilaterally equally.  No crackles or wheezing. No Pleural rub or bronchial breathing.   Heart:  Normal S1 and S2, no murmur, no gallop, no rub. No JVD   Abdomen:   Normal bowel sounds, no masses, no organomegaly. Soft     non-tender, non-distended, no guarding, no rebound                tenderness   Extremities: Moves all extremities well, no edema, no cyanosis, no clubbing.   Pulses: Pulses palpable and equal bilaterally   Skin: No bleeding, bruising or rash   Lymph nodes: No palpable adenopathy   Neurologic:    Psychiatric/Behavior:     Cranial nerves 2 - 12 grossly intact, sensation intact, Motor power is normal and equal bilaterally.  Mood normal, behavior normal       Results Review:    I have reviewed the labs, radiology results and diagnostic studies.      Results from last 7 days  Lab Units 06/20/17  0525   WBC 10*3/mm3 3.00*   HEMOGLOBIN g/dL 8.0*   PLATELETS 10*3/mm3 156       Results from last 7 days  Lab Units 06/20/17  0525   SODIUM mmol/L 144   POTASSIUM mmol/L 3.6   TOTAL CO2 mmol/L 24.0*   CREATININE mg/dL 1.60*   GLUCOSE mg/dL 226*       Culture Data:    Radiology Data:   Cardiology Data:    I have reviewed the medications.    Assessment/Plan     Assessment and Plan:  1. Left lower extremity cellulitis: Patient will be started on IV antibiotic in the form of Clindamycin.  2. Dry Gangrenous ischemic third digit of left lower extremity-Will get MRI to rule out osteomyelitis. Dr. Campbell with podiatry has been consulted.  3.   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  Dr. Campbell consulted.    4. Anemia-likely secondary acute blood loss. Patient was recently admitted to  at the end of May, where he underwent an upper endoscopy for an upper GI bleed. His hemoglobin may be a residual from that admission we'll try to get the records from . Will check occult blood stool. Patient denies any evidence of overt bleeding at this time.  His hemoglobin has dropped further.  We will go ahead and transfuse 1 unit of packed red blood cells.  His aspirin and Xarelto has been discontinued.  Dr. Storm has recommended Plavix only at this time.  His Hgb at the time of discharge from  was 8.8.  It was noted to trend from 7.7 to 8.8 on the day of discharge.  His anticoagulation was best to be held at the time of discharge from .  He went home taking it but stopped it when he was bruising.  He could not give me this information at the time of admission.    5. Alcohol abuse-patient has a history of alcohol abuse. He did recently have withdrawals noted at [facility] when he was admitted there. Will place him on alcohol withdrawal protocol.  6. PVD- Dr. Storm with cardiology has been consulted for further evaluation.  He did go down today for intervention.  He did open up the SFA and popliteal.  He does have a pulse at this time.  7. Hypertension- Continue home meds  8. Atrial flutter- Anticoagulation stopped.  9. Coronary artery disease status post stent placement-continue medical management  10. Nicotine abuse-smoking cessation counseling  11.  Liver mass noted at  concerning for malignancy .  Patient was instructed to schedule an appt for Hepatitis C and to follow up liver mass.  An MRI was done at  but pending at the time of discharge.  Will try to get this report to evaluate.  Patient will need to follow-up in the liver clinic at  as previously documented from the discharge because he will need further evaluation  "for a liver mass, hepatitis C as well as a capsule endoscopy if they have recommended.  He has an appt August 2nd.  He has already seen them once since his discharge from  but does not know if the liver mass is malignant.  He has to go back on August 2nd.    12.  Cirrhosis of liver in a patient with history of alcohol abuse and hepatitis C-  Appt at liver clinic at  August 2nd.    13.  Borderline Diabetes with A1C 6 noted in May at   14.  COPD- No evidence of exacerbation  15.   Obesity  16.  Rheumatoid arthritis  17.  Chronic kidney disease CKD III      DVT prophylaxis: SCDs due to recent bleed and current drop in Hgb.    Discharge Planning:   SHADI Brown 06/20/17 12:50 PM         Electronically signed by SHADI Brown at 6/20/2017  2:27 PM      Jonathan Storm MD at 6/21/2017  7:12 AM  Version 2 of 2            LOS: 0 days   Patient Care Team:  Marilyn K Vermeesch, MD as PCP - General (Internal Medicine)        Interval History: Patient feels okay with respect to the left leg.  Has been confused through the night.  Wanting to go home right away.  Has pulled out 2 IVs up till now.        Review of Systems:   Pertinent items are noted in HPI.      Objective     Vital Sign Min/Max for last 24 hours  Temp  Min: 97.2 °F (36.2 °C)  Max: 98.5 °F (36.9 °C)   BP  Min: 121/82  Max: 166/87   Pulse  Min: 69  Max: 77   Resp  Min: 16  Max: 18   SpO2  Min: 98 %  Max: 99 %   Flow (L/min)  Min: 2  Max: 2   Weight  Min: 222 lb 8 oz (101 kg)  Max: 222 lb 10.6 oz (101 kg)     Flowsheet Rows         First Filed Value    Admission Height  68\" (172.7 cm) Documented at 06/19/2017 1130    Admission Weight  220 lb (99.8 kg) Documented at 06/19/2017 1130          Physical Exam:     General Appearance:    Alert, cooperative, in no acute distress   Head:    Normocephalic, without obvious abnormality, atraumatic   Eyes:            Lids and lashes normal, conjunctivae and sclerae normal, no   icterus, " no pallor, corneas clear, PERRLA   Ears:    Ears appear intact with no abnormalities noted   Throat:   No oral lesions, no thrush, oral mucosa moist   Neck:   No adenopathy, supple, trachea midline, no thyromegaly, no   carotid bruit, no JVD   Back:     No kyphosis present, no scoliosis present, no skin lesions,      erythema or scars, no tenderness to percussion or                   palpation,   range of motion normal   Lungs:     Clear to auscultation,respirations regular, even and                  unlabored    Heart:    Regular rhythm and normal rate, normal S1 and S2, no            murmur, no gallop, no rub, no click   Chest Wall:    No abnormalities observed   Abdomen:     Normal bowel sounds, no masses, no organomegaly, soft        non-tender, non-distended, no guarding, no rebound                tenderness   Rectal:     Deferred   Extremities: Wound in the left toes noted.  There is a good Doppler in the posterior tibial on the left side.  The anterior tibial could not be assessed because of the bandage covers.     Pulses:    Skin:   No bleeding, bruising or rash   Lymph nodes:   No palpable adenopathy   Neurologic:   Cranial nerves 2 - 12 grossly intact, sensation intact, DTR       present and equal bilaterally        Results Review:     I reviewed the patient's new clinical results.            LAB DATA :           Laboratory results:      Results from last 7 days  Lab Units 06/21/17  0510 06/20/17  0525 06/19/17  1338   SODIUM mmol/L 143 144 145   POTASSIUM mmol/L 3.5 3.6 3.5   CHLORIDE mmol/L 102 106 105   TOTAL CO2 mmol/L 27.0 24.0* 29.0   BUN mg/dL 18 16 14   CREATININE mg/dL 1.50* 1.60* 1.20   CALCIUM mg/dL 7.8* 8.2* 9.2   BILIRUBIN mg/dL 0.6  --  1.1   ALK PHOS U/L 112  --  124   ALT (SGPT) U/L 31  --  32   AST (SGOT) U/L 43  --  69*   GLUCOSE mg/dL 140* 226* 110*       Results from last 7 days  Lab Units 06/21/17  0510 06/20/17  1223 06/20/17  0525 06/19/17  1338   WBC 10*3/mm3 7.00 2.99* 3.00* 7.88    HEMOGLOBIN g/dL 9.2* 7.7* 8.0* 8.8*   HEMATOCRIT % 28.3* 24.4* 25.9* 28.1*   PLATELETS 10*3/mm3 184 129* 156 201                   Results from last 7 days  Lab Units 06/19/17  1338   CKMB ng/mL 1.97   TROPONIN I ng/mL <0.012                 Lab Results   Component Value Date    HGBA1C 6.20 (H) 04/08/2017               IMAGING DATA:     Xr Foot 3+ View Left    Result Date: 6/19/2017  Narrative: PROCEDURE: XR FOOT 3+ VW LEFT-  History: pain  COMPARISON:April 21, 2017.  FINDINGS:  A 3 view exam demonstrates postsurgical changes with a wire spanning the bases of the fourth and fifth metatarsals. There is a fracture of the head of the fourth proximal phalanx. Advanced degenerative changes are present at the tibiotalar and subtalar joints. There is also joint space narrowing and osteophyte formation in the mid foot. Diffuse soft tissue swelling is present.         Impression: Fracture of the head of the fourth proximal phalanx.       This report was finalized on 6/19/2017 12:48 PM by Padmini Swan M.D..    Us Arterial Doppler Lower Extremity Left    Result Date: 6/19/2017  Narrative: PROCEDURE: US ARTERIAL DOPPLER LOWER EXTREMITY  LEFT-  HISTORY: pain  PROCEDURE: Sonographic images of the left lower extremity was obtained with color flow and pulse Doppler.  FINDINGS:  LEFT LOWER EXTREMITY.      Velocities cm/sec :  CFA: 84 SFA Mid: 92 SFA Dist: 0 POP: 39 PT: 31 JULI: 23 CASSIE: Not obtained Waveforms are monophasic.       Impression: Occlusion of the distal superficial femoral artery with reconstitution of flow at the popliteal artery.  This report was finalized on 6/19/2017 1:14 PM by Padmini Swan M.D..    Mri Foot Left Without Contrast    Result Date: 6/19/2017  Narrative: FINAL REPORT CLINICAL HISTORY: ISCHEMIC LEFT 3RD TOE. HX OF SURGERY TO ANKLE. FINDINGS: No prior studies available for comparison. Multiplanar multisequence imaging of the foot was performed without the intravenous administration of  contrast. Images are degraded by motion artifact. There is additional artifact from postsurgical hardware. There are prominent cystic degenerative changes diffusely. There is abnormal decreased T1 and increased T2 signal in the proximal and middle phalanx third and fourth digits with adjacent edema. There prominent cystic degenerative changes of the first and fifth metatarsal heads. There is no discrete drainable fluid collection. Impression: Prominent degenerative changes with findings suggesting third and fourth digit osteomyelitis and cellulitis. Recommend appropriate clinical followup     Impression: Authenticated by Latrell Clemente MD on 06/19/2017 05:38:45 PM    Us Venous Doppler Lower Extremity Left (duplex)    Result Date: 6/19/2017  Narrative: PROCEDURE: US VENOUS DOPPLER LOWER EXTREMITY LEFT (DUPLEX)-  HISTORY: pain  PROCEDURE: Multiple transverse and longitudinal scans were performed of the femoropopliteal deep venous system, with augmentation and compression maneuvers.  FINDINGS: Normal phasic flow was noted in the visualized deep venous system. No intraluminal increased echogenicity is noted to suggest thrombus. There is normal compression and augmentation of the venous structures. No abnormal venous collaterals are seen. No venous reflux is demonstrated.      Impression: No evidence of deep venous thrombosis.  This report was finalized on 6/19/2017 12:49 PM by Padmini Swan M.D..            Assessment/Plan    #1 critical limb ischemia: Patient is status post intervention to the left lower ex 20.  There is a reasonable Doppler in the posterior tibial of the left leg.  The rest of the foot is under bandage at this point.  Would continue present therapy would keep Plavix and statins on board.    #2 confusion: Appears to be confused this morning is bronchial breath sounds in the right base noted.  Further workup as was the hospitalist team.    #3 chronic renal failure: Renal function appears to be  "stable after the dye ingestion.  Would discontinue the IV fluids at this point in time.    #4 anemia: Patient's hemoglobin has stayed stable post intervention.  Has history of GI bleed would not resume this after an aspirin at this time would continue the Plavix.  Once her hemoglobin gets stable would consider apixaban as anticoagulant.        Active Problems:    Cellulitis of foot, left            Jonathan Storm MD  06/21/17  7:13 AM           Electronically signed by Jonathan Storm MD at 6/21/2017  7:15 AM      Jonathan Storm MD at 6/21/2017  7:12 AM  Version 1 of 2            LOS: 0 days   Patient Care Team:  Marilyn K Vermeesch, MD as PCP - General (Internal Medicine)        Interval History: Patient feels okay with respect to the left leg.  Has been confused through the night.  Wanting to go home right away.  Has pulled out 2 IVs up till now.        Review of Systems:   Pertinent items are noted in HPI.      Objective     Vital Sign Min/Max for last 24 hours  Temp  Min: 97.2 °F (36.2 °C)  Max: 98.5 °F (36.9 °C)   BP  Min: 121/82  Max: 166/87   Pulse  Min: 69  Max: 77   Resp  Min: 16  Max: 18   SpO2  Min: 98 %  Max: 99 %   Flow (L/min)  Min: 2  Max: 2   Weight  Min: 222 lb 8 oz (101 kg)  Max: 222 lb 10.6 oz (101 kg)     Flowsheet Rows         First Filed Value    Admission Height  68\" (172.7 cm) Documented at 06/19/2017 1130    Admission Weight  220 lb (99.8 kg) Documented at 06/19/2017 1130          Physical Exam:     General Appearance:    Alert, cooperative, in no acute distress   Head:    Normocephalic, without obvious abnormality, atraumatic   Eyes:            Lids and lashes normal, conjunctivae and sclerae normal, no   icterus, no pallor, corneas clear, PERRLA   Ears:    Ears appear intact with no abnormalities noted   Throat:   No oral lesions, no thrush, oral mucosa moist   Neck:   No adenopathy, supple, trachea midline, no thyromegaly, no   carotid bruit, no JVD   Back:     " No kyphosis present, no scoliosis present, no skin lesions,      erythema or scars, no tenderness to percussion or                   palpation,   range of motion normal   Lungs:     Clear to auscultation,respirations regular, even and                  unlabored    Heart:    Regular rhythm and normal rate, normal S1 and S2, no            murmur, no gallop, no rub, no click   Chest Wall:    No abnormalities observed   Abdomen:     Normal bowel sounds, no masses, no organomegaly, soft        non-tender, non-distended, no guarding, no rebound                tenderness   Rectal:     Deferred   Extremities: Wound in the left toes noted.  There is a good Doppler in the posterior tibial on the left side.  The anterior tibial could not be assessed because of the bandage covers.     Pulses:    Skin:   No bleeding, bruising or rash   Lymph nodes:   No palpable adenopathy   Neurologic:   Cranial nerves 2 - 12 grossly intact, sensation intact, DTR       present and equal bilaterally        Results Review:     I reviewed the patient's new clinical results.            LAB DATA :           Laboratory results:      Results from last 7 days  Lab Units 06/21/17  0510 06/20/17  0525 06/19/17  1338   SODIUM mmol/L 143 144 145   POTASSIUM mmol/L 3.5 3.6 3.5   CHLORIDE mmol/L 102 106 105   TOTAL CO2 mmol/L 27.0 24.0* 29.0   BUN mg/dL 18 16 14   CREATININE mg/dL 1.50* 1.60* 1.20   CALCIUM mg/dL 7.8* 8.2* 9.2   BILIRUBIN mg/dL 0.6  --  1.1   ALK PHOS U/L 112  --  124   ALT (SGPT) U/L 31  --  32   AST (SGOT) U/L 43  --  69*   GLUCOSE mg/dL 140* 226* 110*       Results from last 7 days  Lab Units 06/21/17  0510 06/20/17  1223 06/20/17  0525 06/19/17  1338   WBC 10*3/mm3 7.00 2.99* 3.00* 7.88   HEMOGLOBIN g/dL 9.2* 7.7* 8.0* 8.8*   HEMATOCRIT % 28.3* 24.4* 25.9* 28.1*   PLATELETS 10*3/mm3 184 129* 156 201                   Results from last 7 days  Lab Units 06/19/17  1338   CKMB ng/mL 1.97   TROPONIN I ng/mL <0.012                 Lab Results    Component Value Date    HGBA1C 6.20 (H) 04/08/2017               IMAGING DATA:     Xr Foot 3+ View Left    Result Date: 6/19/2017  Narrative: PROCEDURE: XR FOOT 3+ VW LEFT-  History: pain  COMPARISON:April 21, 2017.  FINDINGS:  A 3 view exam demonstrates postsurgical changes with a wire spanning the bases of the fourth and fifth metatarsals. There is a fracture of the head of the fourth proximal phalanx. Advanced degenerative changes are present at the tibiotalar and subtalar joints. There is also joint space narrowing and osteophyte formation in the mid foot. Diffuse soft tissue swelling is present.         Impression: Fracture of the head of the fourth proximal phalanx.       This report was finalized on 6/19/2017 12:48 PM by Padmini Swan M.D..    Us Arterial Doppler Lower Extremity Left    Result Date: 6/19/2017  Narrative: PROCEDURE: US ARTERIAL DOPPLER LOWER EXTREMITY  LEFT-  HISTORY: pain  PROCEDURE: Sonographic images of the left lower extremity was obtained with color flow and pulse Doppler.  FINDINGS:  LEFT LOWER EXTREMITY.      Velocities cm/sec :  CFA: 84 SFA Mid: 92 SFA Dist: 0 POP: 39 PT: 31 JULI: 23 CASSIE: Not obtained Waveforms are monophasic.       Impression: Occlusion of the distal superficial femoral artery with reconstitution of flow at the popliteal artery.  This report was finalized on 6/19/2017 1:14 PM by Padmini Swan M.D..    Mri Foot Left Without Contrast    Result Date: 6/19/2017  Narrative: FINAL REPORT CLINICAL HISTORY: ISCHEMIC LEFT 3RD TOE. HX OF SURGERY TO ANKLE. FINDINGS: No prior studies available for comparison. Multiplanar multisequence imaging of the foot was performed without the intravenous administration of contrast. Images are degraded by motion artifact. There is additional artifact from postsurgical hardware. There are prominent cystic degenerative changes diffusely. There is abnormal decreased T1 and increased T2 signal in the proximal and middle phalanx third  and fourth digits with adjacent edema. There prominent cystic degenerative changes of the first and fifth metatarsal heads. There is no discrete drainable fluid collection. Impression: Prominent degenerative changes with findings suggesting third and fourth digit osteomyelitis and cellulitis. Recommend appropriate clinical followup     Impression: Authenticated by Latrell Clemente MD on 06/19/2017 05:38:45 PM    Us Venous Doppler Lower Extremity Left (duplex)    Result Date: 6/19/2017  Narrative: PROCEDURE: US VENOUS DOPPLER LOWER EXTREMITY LEFT (DUPLEX)-  HISTORY: pain  PROCEDURE: Multiple transverse and longitudinal scans were performed of the femoropopliteal deep venous system, with augmentation and compression maneuvers.  FINDINGS: Normal phasic flow was noted in the visualized deep venous system. No intraluminal increased echogenicity is noted to suggest thrombus. There is normal compression and augmentation of the venous structures. No abnormal venous collaterals are seen. No venous reflux is demonstrated.      Impression: No evidence of deep venous thrombosis.  This report was finalized on 6/19/2017 12:49 PM by Padmini Swan M.D..            Assessment/Plan    #1 critical limb ischemia: Patient is status post intervention to the left lower ex 20.  There is a reasonable Doppler in the posterior tibial of the left leg.  The rest of the foot is under bandage at this point.  Would continue present therapy would keep Plavix and statins on board.    #2 confusion: Appears to be confused this morning is bronchial breath sounds in the right base noted.  Further workup as was the hospitalist team.    #3 chronic renal failure: Renal function appears to be stable after the dye ingestion.  Would discontinue the IV fluids at this point in time.        Active Problems:    Cellulitis of foot, left            Jonathan Storm MD  06/21/17  7:13 AM           Electronically signed by Jonathan Storm MD at  6/21/2017  7:15 AM           Consult Notes (last 24 hours) (Notes from 6/20/2017 12:28 PM through 6/21/2017 12:28 PM)      Wolfgang Campbell DPM at 6/20/2017  4:56 PM  Version 1 of 1     Consult Orders:    1. Inpatient Consult to Podiatry [[id number]] ordered by SHADI Brown at 06/19/17 5150                    Referring Provider:Hospitalist  Reason for Consultation: left foot wounds, cellulitis    Patient Care Team:  Marilyn K Vermeesch, MD as PCP - General (Internal Medicine)    Chief complaint. Left foot pain    Subjective .     History of present illness: 56-year-old nondiabetic male admitted yesterday for a left foot cellulitis and third digit gangrene.  Patient seen at bedside today with nursing staff present.  They state he had a angiogram performed earlier today and his foot has showed increased signs of perfusion since.  He states that his foot started looking like this about 2 months ago.  He also states that the he had some nurses look at it a couple of times but his insurance changed and he lost his primary care physician so he's now had to start seeing all new doctors.  States he has rheumatoid arthritis but currently not taking any medications for it.  Complains of left foot pain.  Denies any constitutional symptoms.    Review of Systems  All systems were reviewed and negative except for chief complaint.    History  Past Medical History:   Diagnosis Date   • Abdominal pain     History of epigastric abdominal tenderness. Differentials include peptic ulcer disease,   pancreatobiliary disease.   • Abnormal LFTs    • Anemia    • Anxiety    • Arthritis    • Asthma    • Atrial fibrillation    • CAD (coronary artery disease)    • Calf cramp    • CKD (chronic kidney disease)    • COPD (chronic obstructive pulmonary disease)    • CVA (cerebral infarction)    • Depression    • DVT (deep venous thrombosis)    • Electrocution    • Fatigue    • Hepatitis C    • History of allergy    • History of blood  transfusion    • Hyperlipidemia    • Hypertension    • Leg pain    • Loss of appetite    • PUD (peptic ulcer disease)    • Stroke syndrome    • Tachycardia    • Thrombocytopenia    • Thrombophlebitis of deep femoral vein    • Vision problems    , Past Surgical History:   Procedure Laterality Date   • ANKLE SURGERY     • CARDIAC SURGERY     • CORONARY STENT PLACEMENT     • TONSILLECTOMY     • TOTAL HIP ARTHROPLASTY Left    • TOTAL SHOULDER REPLACEMENT Left    , Family History   Problem Relation Age of Onset   • Breast cancer Mother    • Colon cancer Sister    • Heart attack Other    • Arthritis Other    • Diabetes Other    • Hypertension Other    • Kidney disease Other    • Stroke Other    • Heart disease Father    , Social History   Substance Use Topics   • Smoking status: Current Every Day Smoker     Packs/day: 1.00     Years: 30.00     Types: Cigarettes   • Smokeless tobacco: None   • Alcohol use No   , Prescriptions Prior to Admission   Medication Sig Dispense Refill Last Dose   • albuterol (PROVENTIL HFA;VENTOLIN HFA) 108 (90 BASE) MCG/ACT inhaler Inhale 2 puffs Every 4 (Four) Hours As Needed for Wheezing.      • amiodarone (PACERONE) 200 MG tablet Take 1 tablet by mouth Daily. (Patient taking differently: Take 200 mg by mouth Daily.) 30 tablet 11    • aspirin 81 MG EC tablet Take 1 tablet by mouth Daily. 30 tablet 11    • atorvastatin (LIPITOR) 40 MG tablet Take 1 tablet by mouth Every Night. 30 tablet 11    • colchicine 0.6 MG tablet Take 0.6 mg by mouth Daily.      • hydroxychloroquine (PLAQUENIL) 200 MG tablet Take 200 mg by mouth Daily.      • losartan-hydrochlorothiazide (HYZAAR) 50-12.5 MG per tablet Take 1 tablet by mouth Daily. (Patient taking differently: Take 1 tablet by mouth Daily.) 30 tablet 11    • melatonin 3 MG tablet Take 2 tablets by mouth Every Night. 30 tablet 6 Taking   • metoprolol tartrate (LOPRESSOR) 25 MG tablet Take 1 tablet by mouth Every 12 (Twelve) Hours. (Patient taking  "differently: Take 25 mg by mouth 2 (Two) Times a Day.) 60 tablet 11    • polyethylene glycol (MIRALAX) packet Take 17 g by mouth Daily As Needed.      • predniSONE (DELTASONE) 20 MG tablet Take 10 mg by mouth Daily As Needed (take 1 tablet by mouth daily for 2 to 5 days for flare up, may repeat once weekly).   Taking   • rivaroxaban (XARELTO) 20 MG tablet Take 1 tablet by mouth Daily With Dinner. (Patient taking differently: Take 20 mg by mouth Daily.) 30 tablet 11    • DULoxetine (CYMBALTA) 30 MG capsule Take 1 capsule by mouth Every Night. 30 capsule 1 Taking   , Scheduled Meds:    amiodarone 200 mg Oral Q24H   atorvastatin 40 mg Oral Nightly   clindamycin 600 mg Intravenous Q8H   clopidogrel 75 mg Oral Daily   DULoxetine 30 mg Oral Nightly   folic acid 1 mg Oral Daily   furosemide 20 mg Intravenous Once   [START ON 6/21/2017] losartan 50 mg Oral Q24H   melatonin 6 mg Oral Nightly   metoprolol tartrate 25 mg Oral Q12H   multivitamin with minerals 1 tablet Oral Daily   nicotine 1 patch Transdermal Q24H   pantoprazole 40 mg Oral BID AC   thiamine 100 mg Oral Daily   , Continuous Infusions:    sodium chloride 50 mL/hr Last Rate: 50 mL/hr (06/19/17 1854)   sodium chloride 50 mL/hr Last Rate: 50 mL/hr (06/20/17 1030)   , PRN Meds:  •  acetaminophen  •  acetaminophen  •  HYDROcodone-acetaminophen  •  LORazepam **OR** LORazepam **OR** LORazepam **OR** LORazepam **OR** LORazepam **OR** LORazepam  •  Morphine **AND** naloxone  •  ondansetron **OR** ondansetron ODT **OR** ondansetron  •  ondansetron **OR** ondansetron ODT **OR** ondansetron  •  sodium chloride  •  Insert peripheral IV **AND** sodium chloride and Allergies:  Review of patient's allergies indicates no known allergies.    Objective     Vital Signs   /88  Pulse 71  Temp 97.5 °F (36.4 °C)  Resp 16  Ht 68\" (172.7 cm)  Wt 222 lb 10.6 oz (101 kg)  SpO2 98%  BMI 33.86 kg/m2    Physical Exam:  AAO ×3, NAD, AF VSS  PERRLA  Cranial nerves II through XII " intact  Pulses the left lower extremity are faintly palpable, pulses to the right lower extremity also faintly palpable.  Gross sensation of bilateral lower extremities is intact.  The right leg is slightly edematous and compared to the left but the left foot is much more edematous and erythematous than the right side.  He has multiple necrotic areas all over the left foot.  There is an area over the dorsal first MTPJ.  There is one over the dorsal medial hallux interphalangeal joint.  These are all black dry stable eschars.  There is an open fibrotic ulceration over the DIPJ of the second digit.  The entire third digit is black and gangrenous and is starting to develop some odor maceration around the metatarsal phalangeal joint area.  According to nursing there is more drainage from it now than compared to pre-angiogram.  His fourth digit is necrotic along its medial and distal borders but dorsally and laterally there is erythematous pink blanchable skin there.  The fifth digits unremarkable.  All his digits are very stiff and very tender and painful.  All digits show mottling and ecchymosis that blanches.     Results Review: All labs reviewed.  Patient is noted to be anemic.  Currently receiving unit of packed red blood cells.  uA/tox screen were positive for opioids, benzodiazepine, THC  Imaging Results: X-rays taken of the left foot show previous surgical intervention consistent with ORIF of what appears to be maybe an old on type fracture and/or talus fracture with multiple screws present.  There is osteoarthritic changes throughout the entire rear foot and ankle.  There is a smooth bent K wire across the fifth metatarsal base.  He shows what's probably an old healed transverse fracture through the proximal phalanx of the fifth digit and bony changes consistent with either transverse fracture possibly osteomyelitis of the fourth digit proximal phalanx.  No significant osseous changes noted on the third  digit.  -year-old Doppler the left lower extremity yesterday showed a non-obtainable CASSIE and monophasic waveforms  Venous Doppler was negative for DVT.  MRI of the left foot was read as findings suggesting third and fourth digit osteomyelitis and cellulitis.  From my view point I can see bony destruction in the proximal phalanx of the fourth digit with a reactive uptake that in this clinical setting is most likely related to osteomyelitis but could potentially also be from fracture.  I don't appreciate any significant bony changes other than osteoarthritis in the third digit but again in this clinical setting with the gangrene of the third digit osteomyelitis is more likely.  Assessment/Plan   Severe left lower extremity peripheral arterial disease, multiple left lower extremity ulcerations, third digit gangrene, probable third and fourth digit osteomyelitis  Active Problems:    Cellulitis of foot, left  Patient just had angiogram performed earlier today.  I explained to the patient that I'll have to discuss with Dr. Storm and evaluate his angiogram to determine where adequate blood flow and perfusion are.  I explained that while the third toe may become more of an emergent issue and having to amputate that due to risk of infection may become necessary if he does not have adequate perfusion that we'll also discussed potentially make a larger wound that would have to deal with.  I explained that all the other wounds may able to be debrided but we'll have to assess them for flow as well.  I explained that based off his blood flow this could mean amputation of the foot and leg or may be transmetatarsal amputation but I'll have to discuss with Dr. Storm first.  For now the main concern is to keep the toes clean and dry and prevent any spread of infection we'll do Betadine wet-to-dry dressings.  He can weight-bear as tolerated to bilateral lower extremities.  I'll follow up on him tomorrow and hopefully have a better  treatment plan.      I discussed the patients findings and my recommendations with patient    Wolfgang Campbell DPM  06/20/17  4:57 PM             Electronically signed by Wolfgang Campbell DPM at 6/20/2017  5:09 PM

## 2017-06-21 NOTE — PLAN OF CARE
Problem: Fall Risk (Adult)  Goal: Absence of Falls  Outcome: Ongoing (interventions implemented as appropriate)    06/21/17 3308   Fall Risk (Adult)   Absence of Falls making progress toward outcome

## 2017-06-21 NOTE — PROGRESS NOTES
Patient Care Team:  Marilyn K Vermeesch, MD as PCP - General (Internal Medicine)    Chief complaint left foot wounds, gangrene    Subjective .   56-year-old male seen at bedside today for follow-up on his left foot wounds.  He is sitting up eating dinner says that the food taste very good to him.  States his legs feel a little bit better.  He currently denies any constitutional symptoms.      Review of Systems  All systems were reviewed and negative except for chief complaint.    History  Past Medical History:   Diagnosis Date   • Abdominal pain     History of epigastric abdominal tenderness. Differentials include peptic ulcer disease,   pancreatobiliary disease.   • Abnormal LFTs    • Anemia    • Anxiety    • Arthritis    • Asthma    • Atrial fibrillation    • CAD (coronary artery disease)    • Calf cramp    • CKD (chronic kidney disease)    • COPD (chronic obstructive pulmonary disease)    • CVA (cerebral infarction)    • Depression    • DVT (deep venous thrombosis)    • Electrocution    • Fatigue    • Hepatitis C    • History of allergy    • History of blood transfusion    • Hyperlipidemia    • Hypertension    • Leg pain    • Loss of appetite    • PUD (peptic ulcer disease)    • Stroke syndrome    • Tachycardia    • Thrombocytopenia    • Thrombophlebitis of deep femoral vein    • Vision problems    ,   Past Surgical History:   Procedure Laterality Date   • ANKLE SURGERY     • CARDIAC CATHETERIZATION N/A 6/20/2017    Procedure: Peripheral angiography;  Surgeon: Jonathan Storm MD;  Location: [facility] CATH INVASIVE LOCATION;  Service:    • CARDIAC CATHETERIZATION N/A 6/20/2017    Procedure: Angioplasty-peripheral;  Surgeon: Jonathan Storm MD;  Location: [facility] CATH INVASIVE LOCATION;  Service:    • CARDIAC SURGERY     • CORONARY STENT PLACEMENT     • TONSILLECTOMY     • TOTAL HIP ARTHROPLASTY Left    • TOTAL SHOULDER REPLACEMENT Left    ,   Family History   Problem Relation Age of Onset   •  Breast cancer Mother    • Colon cancer Sister    • Heart attack Other    • Arthritis Other    • Diabetes Other    • Hypertension Other    • Kidney disease Other    • Stroke Other    • Heart disease Father    ,   Social History   Substance Use Topics   • Smoking status: Current Every Day Smoker     Packs/day: 1.00     Years: 30.00     Types: Cigarettes   • Smokeless tobacco: None   • Alcohol use No   ,   Prescriptions Prior to Admission   Medication Sig Dispense Refill Last Dose   • albuterol (PROVENTIL HFA;VENTOLIN HFA) 108 (90 BASE) MCG/ACT inhaler Inhale 2 puffs Every 4 (Four) Hours As Needed for Wheezing.      • aspirin 81 MG EC tablet Take 1 tablet by mouth Daily. 30 tablet 11    • atorvastatin (LIPITOR) 40 MG tablet Take 1 tablet by mouth Every Night. 30 tablet 11    • colchicine 0.6 MG tablet Take 0.6 mg by mouth Daily.      • losartan-hydrochlorothiazide (HYZAAR) 50-12.5 MG per tablet Take 1 tablet by mouth Daily. (Patient taking differently: Take 1 tablet by mouth Daily.) 30 tablet 11 6/18/2017   • melatonin 3 MG tablet Take 2 tablets by mouth Every Night. 30 tablet 6 6/18/2017   • metoprolol tartrate (LOPRESSOR) 25 MG tablet Take 1 tablet by mouth Every 12 (Twelve) Hours. (Patient taking differently: Take 25 mg by mouth 2 (Two) Times a Day.) 60 tablet 11    • polyethylene glycol (MIRALAX) packet Take 17 g by mouth Daily As Needed.      • amiodarone (PACERONE) 200 MG tablet Take 1 tablet by mouth Daily. (Patient not taking: Reported on 6/21/2017) 30 tablet 11 Not Taking at Unknown time   • DULoxetine (CYMBALTA) 30 MG capsule Take 1 capsule by mouth Every Night. (Patient not taking: Reported on 6/21/2017) 30 capsule 1 Not Taking at Unknown time   • hydroxychloroquine (PLAQUENIL) 200 MG tablet Take 200 mg by mouth Daily.   Unknown at Unknown time   • rivaroxaban (XARELTO) 20 MG tablet Take 1 tablet by mouth Daily With Dinner. (Patient not taking: Reported on 6/21/2017) 30 tablet 11 Not Taking at Unknown time  "  , Scheduled Meds:      amiodarone 200 mg Oral Q24H   atorvastatin 40 mg Oral Nightly   clopidogrel 75 mg Oral Daily   DULoxetine 30 mg Oral Nightly   folic acid 1 mg Oral Daily   losartan 50 mg Oral Q24H   melatonin 6 mg Oral Nightly   metoprolol tartrate 25 mg Oral Q12H   multivitamin with minerals 1 tablet Oral Daily   nicotine 1 patch Transdermal Q24H   pantoprazole 40 mg Oral BID AC   piperacillin-tazobactam 3.375 g Intravenous Q6H   thiamine 100 mg Oral Daily   [START ON 6/22/2017] vancomycin 1,500 mg Intravenous Q18H   vancomycin 2,000 mg Intravenous Once   , Continuous Infusions:      Pharmacy to dose vancomycin    , PRN Meds:  •  acetaminophen  •  acetaminophen  •  HYDROcodone-acetaminophen  •  LORazepam **OR** LORazepam **OR** LORazepam **OR** LORazepam **OR** LORazepam **OR** LORazepam  •  Morphine **AND** naloxone  •  ondansetron **OR** ondansetron ODT **OR** ondansetron  •  ondansetron **OR** ondansetron ODT **OR** ondansetron  •  Pharmacy to dose vancomycin  •  sodium chloride  •  Insert peripheral IV **AND** sodium chloride and Allergies:  Review of patient's allergies indicates no known allergies.    Objective     Vital Signs   /77 (BP Location: Right arm, Patient Position: Lying)  Pulse 68  Temp 97.9 °F (36.6 °C) (Oral)   Resp 18  Ht 68\" (172.7 cm)  Wt 222 lb 8 oz (101 kg)  SpO2 95%  BMI 33.83 kg/m2    Physical Exam:AAO ×3, NAD, AF VSS  His left foot wrapped in a Betadine wet-to-dry dressing.       Results Review: All labs reviewed      Assessment/Plan   Left foot wounds with gangrene of the third digit, probable osteomyelitis of the third and fourth digits  Active Problems:    Cellulitis of foot, left    Borderline diabetes    Closed fracture of fourth toe of left foot  I was able to discuss with Dr. Storm this morning and he seems to think the patient has adequate perfusion to heal the digital amputation.  He did also, it and agree with my thoughts that the patient also may end up " needing the fourth digit amputated and depending on his microvascular flow to the digits ultimately may end up with a transmetatarsal amputation.  I set down this evening and had a lengthy discussion with the patient about all this and explained to him that Dr. Storm nor myself can give him any definitive answers but our recommendation for now would be to at least take the third digit off so that that helps to prevent any further spread of infection and we can I debrided the other wounds and see how they bleed.  We can see how he responds to this and he looked type of healing potential he shows.  I explained to him that if this does well for hopeful that at all he will need but if it doesn't show good signs of healing then he may need further amputation.  He seems to express understanding of this.  I explained him that due to his vasculature as well as all his other health issues this could potentially result in multiple more surgeries but will definitely need close follow-up with local wound care.  He seems to be agreeable to our surgical plan and will plan on going to the operating room Friday morning for surgical intervention.            Wolfgang Campbell DPM  06/21/17  5:23 PM

## 2017-06-21 NOTE — PROGRESS NOTES
"   LOS: 0 days   Patient Care Team:  Marilyn K Vermeesch, MD as PCP - General (Internal Medicine)        Interval History: Patient feels okay with respect to the left leg.  Has been confused through the night.  Wanting to go home right away.  Has pulled out 2 IVs up till now.        Review of Systems:   Pertinent items are noted in HPI.      Objective     Vital Sign Min/Max for last 24 hours  Temp  Min: 97.2 °F (36.2 °C)  Max: 98.5 °F (36.9 °C)   BP  Min: 121/82  Max: 166/87   Pulse  Min: 69  Max: 77   Resp  Min: 16  Max: 18   SpO2  Min: 98 %  Max: 99 %   Flow (L/min)  Min: 2  Max: 2   Weight  Min: 222 lb 8 oz (101 kg)  Max: 222 lb 10.6 oz (101 kg)     Flowsheet Rows         First Filed Value    Admission Height  68\" (172.7 cm) Documented at 06/19/2017 1130    Admission Weight  220 lb (99.8 kg) Documented at 06/19/2017 1130          Physical Exam:     General Appearance:    Alert, cooperative, in no acute distress   Head:    Normocephalic, without obvious abnormality, atraumatic   Eyes:            Lids and lashes normal, conjunctivae and sclerae normal, no   icterus, no pallor, corneas clear, PERRLA   Ears:    Ears appear intact with no abnormalities noted   Throat:   No oral lesions, no thrush, oral mucosa moist   Neck:   No adenopathy, supple, trachea midline, no thyromegaly, no   carotid bruit, no JVD   Back:     No kyphosis present, no scoliosis present, no skin lesions,      erythema or scars, no tenderness to percussion or                   palpation,   range of motion normal   Lungs:     Clear to auscultation,respirations regular, even and                  unlabored    Heart:    Regular rhythm and normal rate, normal S1 and S2, no            murmur, no gallop, no rub, no click   Chest Wall:    No abnormalities observed   Abdomen:     Normal bowel sounds, no masses, no organomegaly, soft        non-tender, non-distended, no guarding, no rebound                tenderness   Rectal:     Deferred   Extremities: " Wound in the left toes noted.  There is a good Doppler in the posterior tibial on the left side.  The anterior tibial could not be assessed because of the bandage covers.     Pulses:    Skin:   No bleeding, bruising or rash   Lymph nodes:   No palpable adenopathy   Neurologic:   Cranial nerves 2 - 12 grossly intact, sensation intact, DTR       present and equal bilaterally        Results Review:     I reviewed the patient's new clinical results.            LAB DATA :           Laboratory results:      Results from last 7 days  Lab Units 06/21/17  0510 06/20/17  0525 06/19/17  1338   SODIUM mmol/L 143 144 145   POTASSIUM mmol/L 3.5 3.6 3.5   CHLORIDE mmol/L 102 106 105   TOTAL CO2 mmol/L 27.0 24.0* 29.0   BUN mg/dL 18 16 14   CREATININE mg/dL 1.50* 1.60* 1.20   CALCIUM mg/dL 7.8* 8.2* 9.2   BILIRUBIN mg/dL 0.6  --  1.1   ALK PHOS U/L 112  --  124   ALT (SGPT) U/L 31  --  32   AST (SGOT) U/L 43  --  69*   GLUCOSE mg/dL 140* 226* 110*       Results from last 7 days  Lab Units 06/21/17  0510 06/20/17  1223 06/20/17  0525 06/19/17  1338   WBC 10*3/mm3 7.00 2.99* 3.00* 7.88   HEMOGLOBIN g/dL 9.2* 7.7* 8.0* 8.8*   HEMATOCRIT % 28.3* 24.4* 25.9* 28.1*   PLATELETS 10*3/mm3 184 129* 156 201                   Results from last 7 days  Lab Units 06/19/17  1338   CKMB ng/mL 1.97   TROPONIN I ng/mL <0.012                 Lab Results   Component Value Date    HGBA1C 6.20 (H) 04/08/2017               IMAGING DATA:     Xr Foot 3+ View Left    Result Date: 6/19/2017  Narrative: PROCEDURE: XR FOOT 3+ VW LEFT-  History: pain  COMPARISON:April 21, 2017.  FINDINGS:  A 3 view exam demonstrates postsurgical changes with a wire spanning the bases of the fourth and fifth metatarsals. There is a fracture of the head of the fourth proximal phalanx. Advanced degenerative changes are present at the tibiotalar and subtalar joints. There is also joint space narrowing and osteophyte formation in the mid foot. Diffuse soft tissue swelling is  present.         Impression: Fracture of the head of the fourth proximal phalanx.       This report was finalized on 6/19/2017 12:48 PM by Padmini Swan M.D..    Us Arterial Doppler Lower Extremity Left    Result Date: 6/19/2017  Narrative: PROCEDURE: US ARTERIAL DOPPLER LOWER EXTREMITY  LEFT-  HISTORY: pain  PROCEDURE: Sonographic images of the left lower extremity was obtained with color flow and pulse Doppler.  FINDINGS:  LEFT LOWER EXTREMITY.      Velocities cm/sec :  CFA: 84 SFA Mid: 92 SFA Dist: 0 POP: 39 PT: 31 JULI: 23 CASSIE: Not obtained Waveforms are monophasic.       Impression: Occlusion of the distal superficial femoral artery with reconstitution of flow at the popliteal artery.  This report was finalized on 6/19/2017 1:14 PM by Padmini Swan M.D..    Mri Foot Left Without Contrast    Result Date: 6/19/2017  Narrative: FINAL REPORT CLINICAL HISTORY: ISCHEMIC LEFT 3RD TOE. HX OF SURGERY TO ANKLE. FINDINGS: No prior studies available for comparison. Multiplanar multisequence imaging of the foot was performed without the intravenous administration of contrast. Images are degraded by motion artifact. There is additional artifact from postsurgical hardware. There are prominent cystic degenerative changes diffusely. There is abnormal decreased T1 and increased T2 signal in the proximal and middle phalanx third and fourth digits with adjacent edema. There prominent cystic degenerative changes of the first and fifth metatarsal heads. There is no discrete drainable fluid collection. Impression: Prominent degenerative changes with findings suggesting third and fourth digit osteomyelitis and cellulitis. Recommend appropriate clinical followup     Impression: Authenticated by Latrell Clemente MD on 06/19/2017 05:38:45 PM    Us Venous Doppler Lower Extremity Left (duplex)    Result Date: 6/19/2017  Narrative: PROCEDURE: US VENOUS DOPPLER LOWER EXTREMITY LEFT (DUPLEX)-  HISTORY: pain  PROCEDURE: Multiple  transverse and longitudinal scans were performed of the femoropopliteal deep venous system, with augmentation and compression maneuvers.  FINDINGS: Normal phasic flow was noted in the visualized deep venous system. No intraluminal increased echogenicity is noted to suggest thrombus. There is normal compression and augmentation of the venous structures. No abnormal venous collaterals are seen. No venous reflux is demonstrated.      Impression: No evidence of deep venous thrombosis.  This report was finalized on 6/19/2017 12:49 PM by Padmini Swan M.D..            Assessment/Plan    #1 critical limb ischemia: Patient is status post intervention to the left lower ex 20.  There is a reasonable Doppler in the posterior tibial of the left leg.  The rest of the foot is under bandage at this point.  Would continue present therapy would keep Plavix and statins on board.    #2 confusion: Appears to be confused this morning is bronchial breath sounds in the right base noted.  Further workup as was the hospitalist team.    #3 chronic renal failure: Renal function appears to be stable after the dye ingestion.  Would discontinue the IV fluids at this point in time.    #4 anemia: Patient's hemoglobin has stayed stable post intervention.  Has history of GI bleed would not resume this after an aspirin at this time would continue the Plavix.  Once her hemoglobin gets stable would consider apixaban as anticoagulant.        Active Problems:    Cellulitis of foot, left            Jonathan Phillip Storm MD  06/21/17  7:13 AM

## 2017-06-21 NOTE — PLAN OF CARE
Problem: Cellulitis (Adult)  Goal: Signs and Symptoms of Listed Potential Problems Will be Absent or Manageable (Cellulitis)  Outcome: Ongoing (interventions implemented as appropriate)    06/21/17 0864   Cellulitis   Problems Assessed (Cellulitis) gangrene/necrotizing fasciitis;pain;infection   Problems Present (Cellulitis) pain;gangrene/necrotizing fasciitis;infection         Problem: Infection, Risk/Actual (Adult)  Goal: Infection Prevention/Resolution  Outcome: Ongoing (interventions implemented as appropriate)    Problem: Patient Care Overview (Adult)  Goal: Plan of Care Review  Outcome: Ongoing (interventions implemented as appropriate)  Goal: Adult Individualization and Mutuality  Outcome: Ongoing (interventions implemented as appropriate)  Goal: Discharge Needs Assessment  Outcome: Ongoing (interventions implemented as appropriate)    Problem: Fall Risk (Adult)  Goal: Identify Related Risk Factors and Signs and Symptoms  Outcome: Ongoing (interventions implemented as appropriate)  Goal: Absence of Falls  Outcome: Ongoing (interventions implemented as appropriate)

## 2017-06-21 NOTE — PLAN OF CARE
Problem: Infection, Risk/Actual (Adult)  Goal: Infection Prevention/Resolution  Outcome: Ongoing (interventions implemented as appropriate)    06/21/17 1840   Infection, Risk/Actual (Adult)   Infection Prevention/Resolution making progress toward outcome  (patient receiving iv antibiot       06/21/17 1840   Infection, Risk/Actual (Adult)   Infection Prevention/Resolution making progress toward outcome  (patient receiving iv antibiotics)   ics)         Problem: Patient Care Overview (Adult)  Goal: Discharge Needs Assessment  Outcome: Ongoing (interventions implemented as appropriate)    06/20/17 1815 06/21/17 1316 06/21/17 1827   Discharge Needs Assessment   Concerns To Be Addressed --  --  compliance issue concerns;transportation   Readmission Within The Last 30 Days --  --  no previous admission in last 30 days   Equipment Needed After Discharge wound care supplies --  --    Discharge Facility/Level Of Care Needs --  --  home with home health   Current Discharge Risk --  --  lives alone   Current Health   Outpatient/Agency/Support Group Needs --  --  homecare agency (specify level of care)   Self-Care   Equipment Currently Used at Home --  wheelchair;walker, standard;cane, straight;shower chair --
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
## 2017-06-21 NOTE — PLAN OF CARE
Problem: Patient Care Overview (Adult)  Goal: Plan of Care Review  Outcome: Ongoing (interventions implemented as appropriate)    06/21/17 1500   Coping/Psychosocial Response Interventions   Plan Of Care Reviewed With patient   Outcome Evaluation   Outcome Summary/Follow up Plan Pt received sitting on EOB without post op shoe on. Pt able to perform t/fs and gait with CGA. Pt required multiple VC's for safety awareness and Rwx abandonment during approach for t/fs. Pt is expected to improve with skilled PT intervention.          Problem: Inpatient Physical Therapy  Goal: Transfer Training Goal 1 LTG- PT  Outcome: Ongoing (interventions implemented as appropriate)    06/21/17 1500   Transfer Training PT LTG   Transfer Training PT LTG, Date Established 06/21/17   Transfer Training PT LTG, Time to Achieve 2 wks   Transfer Training PT LTG, Activity Type bed to chair /chair to bed;sit to stand/stand to sit   Transfer Training PT LTG, [employer] Level supervision required   Transfer Training PT LTG, Assist Device walker, standard   Transfer Training PT LTG, Additional Goal demonstrating proper safety awareness and weight bearing equally through B LE.       Goal: Gait Training Goal LTG- PT  Outcome: Ongoing (interventions implemented as appropriate)    06/21/17 1500   Gait Training PT LTG   Gait Training Goal PT LTG, Date Established 06/21/17   Gait Training Goal PT LTG, Time to Achieve 2 wks   Gait Training Goal PT LTG, [employer] Level (Stand By Assist)   Gait Training Goal PT LTG, Assist Device walker, standard   Gait Training Goal PT LTG, Distance to Achieve 75   Gait Training Goal PT LTG, Additional Goal without rest breaks, with equal step length and demonstrating proper safety with SW.    Gait Training Goal PT LTG, Outcome goal ongoing

## 2017-06-21 NOTE — CONSULTS
"Diabetes Education  Assessment/Teaching    Patient Name:  Vasquez Marie  YOB: 1960  MRN: [id number]  Admit Date:  6/19/2017      Assessment Date:  6/21/2017       Most Recent Value    General Information      Referral From:  MD holder    Height  5' 8\" (1.727 m)    Height Method  Stated    Weight  222 lb 8 oz (101 kg)    Weight Method  Bed scale    Pregnancy Assessment     Diabetes History     What type of diabetes do you have?  Other (comment) [Pre-diabetes per A1C]    Education Preferences     What areas of diabetes would you like to learn about?  understanding diabetes [Reviewed basic pathophysiology of pre-diabetes and given ADA pre-diabetes handout.]    Barriers to Learning  -- [Does not have eyeglasses with him.]    Nutrition Information     Assessment Topics     DM Goals                Most Recent Value    DM Education Needs     Meter  -- [Given Accuchek Samantha and taught to use per included, illustrated, step-by-step instructions. Mallory, APRN, and patient informed that prescription needed for additional strips and lancets. Also informed of availability of storebrand glucometers .]    Meter Type  Accuchek    Frequency of Testing  Other (comment) [Discussed monitoring once a day and varying the times. Instructed to log results and give to PCP. Given extra logs to use.]    Blood Glucose Target  -- [Reviewed current ADA target goals for diabetes and advised that he discuss the idea of target goals with his PCP to individualize these for him with pre-diabetes.Also reviewed expected lab results for dx. of pre-diabetes and diabetes as compared to norm]    Blood Glucose Target Range  -- [Reviewed importance of controlling blood glucose to assist with wound healing.]    Medication  -- [Not currently taking medication for blood glucose control. Discussed taking meds as directed and keeping PCP up-to-date on how they are working for him.]    Problem Solving  Hypoglycemia, Hyperglycemia, Sick days, " Signs, Symptoms, Treatment [Discussed s/s and tx. of above. Basic sick day care reviewed such as more frequent monitoring, guarding against dehydration, notifying health care, s/s infection, etc.]    Reducing Risks  A1C testing, Eye exam, Dental exam, Foot care [Discussed how A1C test works. Reviewed importance of daily foot care, regular eye/dental exams and other preventive healthcare measures. Advised to follow advice of providers carefully on how to care for feet.]    Healthy Eating  -- [Healthy eating via My Plate reviewed. The concept of basic carb counting introduced in Living Well with Diabetes book.]    Physical Activity  -- [Benefits of physical activity reviewed with advice to discuss with healthcare providers to determine what is appropriate for him and follow their directions carefully.]    Healthy Coping  -- [Effect on blood glucose of both emotional and physical stress discussed. Some healthy coping measures covered in Living Well with Diabetes book.]    Motivation  Engaged    Teaching Method  Explanation, Discussion, Demonstration, Handouts, Teach back    Patient Response  Verbalized understanding, Demonstrates adequately, Needs reinforcement            Other Comments:  Given ADA handout on pre-diabetes, [facility] diabetes ed. Handout and also stated he would like to have a copy of Living Well with Diabetes even though he is aware that his diagnosis is pre-diabetes at this point. Contents reviewed together. As above, patient given Accuchek and taught to use it. Also instructed him to wash hands with warm, soapy water prior to testing; to check strips for expiration and cap tightly inbetween uses; to avoid exposing glucometer to extreme temperatures; to rotate fingers and use new lancet with each stick; how to use control solution and how to dispose of used lancets. Patient checked his own blood glucose (=151 at 9:30 am) and appeared fairly comfortable with procedure but given our office contact number if  questions. I also informed him of the availability of diabetes classes/nutrition counseling here at [facility].        Electronically signed by:  Ninoska Middleton RN  06/21/17 11:48 AM

## 2017-06-21 NOTE — NURSING NOTE
PT has been non compliant with staying in bed and has threatened to leave multiple times. Pt medicated to reduce agitation

## 2017-06-22 ENCOUNTER — TELEPHONE (OUTPATIENT)
Dept: CARDIOLOGY | Facility: CLINIC | Age: 57
End: 2017-06-22

## 2017-06-22 ENCOUNTER — ANESTHESIA EVENT (OUTPATIENT)
Dept: PERIOP | Facility: HOSPITAL | Age: 57
End: 2017-06-22

## 2017-06-22 LAB
ANION GAP SERPL CALCULATED.3IONS-SCNC: 12.3 MMOL/L
APTT PPP: 35.4 SECONDS (ref 25–36)
BASOPHILS # BLD AUTO: 0.01 10*3/MM3 (ref 0–0.2)
BASOPHILS NFR BLD AUTO: 0.3 % (ref 0–2.5)
BUN BLD-MCNC: 22 MG/DL (ref 7–20)
BUN/CREAT SERPL: 18.3 (ref 6.3–21.9)
CALCIUM SPEC-SCNC: 7.3 MG/DL (ref 8.4–10.2)
CHLORIDE SERPL-SCNC: 106 MMOL/L (ref 98–107)
CO2 SERPL-SCNC: 27 MMOL/L (ref 26–30)
CREAT BLD-MCNC: 1.2 MG/DL (ref 0.6–1.3)
DEPRECATED RDW RBC AUTO: 53.6 FL (ref 37–54)
EOSINOPHIL # BLD AUTO: 0.35 10*3/MM3 (ref 0–0.7)
EOSINOPHIL NFR BLD AUTO: 9 % (ref 0–7)
ERYTHROCYTE [DISTWIDTH] IN BLOOD BY AUTOMATED COUNT: 15.8 % (ref 11.5–14.5)
GFR SERPL CREATININE-BSD FRML MDRD: 62 ML/MIN/1.73
GLUCOSE BLD-MCNC: 107 MG/DL (ref 74–98)
HCT VFR BLD AUTO: 26.7 % (ref 42–52)
HEMOCCULT STL QL: POSITIVE
HGB BLD-MCNC: 8.3 G/DL (ref 14–18)
IMM GRANULOCYTES # BLD: 0.01 10*3/MM3 (ref 0–0.06)
IMM GRANULOCYTES NFR BLD: 0.3 % (ref 0–0.6)
INR PPP: 1.91 (ref 0.9–1.1)
LYMPHOCYTES # BLD AUTO: 1.01 10*3/MM3 (ref 0.6–3.4)
LYMPHOCYTES NFR BLD AUTO: 26 % (ref 10–50)
MCH RBC QN AUTO: 28.9 PG (ref 27–31)
MCHC RBC AUTO-ENTMCNC: 31.1 G/DL (ref 30–37)
MCV RBC AUTO: 93 FL (ref 80–94)
MONOCYTES # BLD AUTO: 0.22 10*3/MM3 (ref 0–0.9)
MONOCYTES NFR BLD AUTO: 5.7 % (ref 0–12)
NEUTROPHILS # BLD AUTO: 2.29 10*3/MM3 (ref 2–6.9)
NEUTROPHILS NFR BLD AUTO: 58.7 % (ref 37–80)
NRBC BLD MANUAL-RTO: 0 /100 WBC (ref 0–0)
PLATELET # BLD AUTO: 120 10*3/MM3 (ref 130–400)
PMV BLD AUTO: 11.1 FL (ref 6–12)
POTASSIUM BLD-SCNC: 3.3 MMOL/L (ref 3.5–5.1)
PROTHROMBIN TIME: 20.9 SECONDS (ref 9.3–12.1)
RBC # BLD AUTO: 2.87 10*6/MM3 (ref 4.7–6.1)
SODIUM BLD-SCNC: 142 MMOL/L (ref 137–145)
WBC NRBC COR # BLD: 3.89 10*3/MM3 (ref 4.8–10.8)

## 2017-06-22 PROCEDURE — 82270 OCCULT BLOOD FECES: CPT | Performed by: NURSE PRACTITIONER

## 2017-06-22 PROCEDURE — 97110 THERAPEUTIC EXERCISES: CPT

## 2017-06-22 PROCEDURE — 85610 PROTHROMBIN TIME: CPT | Performed by: NURSE PRACTITIONER

## 2017-06-22 PROCEDURE — 87070 CULTURE OTHR SPECIMN AEROBIC: CPT | Performed by: INTERNAL MEDICINE

## 2017-06-22 PROCEDURE — 85730 THROMBOPLASTIN TIME PARTIAL: CPT | Performed by: NURSE PRACTITIONER

## 2017-06-22 PROCEDURE — 25010000002 PIPERACILLIN SOD-TAZOBACTAM PER 1 G: Performed by: NURSE PRACTITIONER

## 2017-06-22 PROCEDURE — 85025 COMPLETE CBC W/AUTO DIFF WBC: CPT | Performed by: INTERNAL MEDICINE

## 2017-06-22 PROCEDURE — 97116 GAIT TRAINING THERAPY: CPT

## 2017-06-22 PROCEDURE — 80048 BASIC METABOLIC PNL TOTAL CA: CPT | Performed by: INTERNAL MEDICINE

## 2017-06-22 PROCEDURE — 99232 SBSQ HOSP IP/OBS MODERATE 35: CPT | Performed by: INTERNAL MEDICINE

## 2017-06-22 PROCEDURE — 25010000002 VANCOMYCIN PER 500 MG: Performed by: NURSE PRACTITIONER

## 2017-06-22 PROCEDURE — 99232 SBSQ HOSP IP/OBS MODERATE 35: CPT | Performed by: PODIATRIST

## 2017-06-22 RX ADMIN — HYDROCODONE BITARTRATE AND ACETAMINOPHEN 1 TABLET: 5; 325 TABLET ORAL at 09:50

## 2017-06-22 RX ADMIN — SODIUM CHLORIDE, POTASSIUM CHLORIDE, SODIUM LACTATE AND CALCIUM CHLORIDE 100 ML/HR: 600; 310; 30; 20 INJECTION, SOLUTION INTRAVENOUS at 01:52

## 2017-06-22 RX ADMIN — MULTIPLE VITAMINS W/ MINERALS TAB 1 TABLET: TAB at 08:54

## 2017-06-22 RX ADMIN — NICOTINE 1 PATCH: 14 PATCH TRANSDERMAL at 21:36

## 2017-06-22 RX ADMIN — FOLIC ACID 1 MG: 1 TABLET ORAL at 08:54

## 2017-06-22 RX ADMIN — ATORVASTATIN CALCIUM 40 MG: 40 TABLET, FILM COATED ORAL at 20:59

## 2017-06-22 RX ADMIN — PIPERACILLIN SODIUM AND TAZOBACTAM SODIUM 3.38 G: 3; .375 INJECTION, POWDER, FOR SOLUTION INTRAVENOUS at 01:52

## 2017-06-22 RX ADMIN — VANCOMYCIN HYDROCHLORIDE 1500 MG: 500 INJECTION, POWDER, LYOPHILIZED, FOR SOLUTION INTRAVENOUS at 10:49

## 2017-06-22 RX ADMIN — PIPERACILLIN SODIUM AND TAZOBACTAM SODIUM 3.38 G: 3; .375 INJECTION, POWDER, FOR SOLUTION INTRAVENOUS at 14:28

## 2017-06-22 RX ADMIN — PANTOPRAZOLE SODIUM 40 MG: 40 TABLET, DELAYED RELEASE ORAL at 06:01

## 2017-06-22 RX ADMIN — AMIODARONE HYDROCHLORIDE 200 MG: 200 TABLET ORAL at 08:54

## 2017-06-22 RX ADMIN — PANTOPRAZOLE SODIUM 40 MG: 40 TABLET, DELAYED RELEASE ORAL at 17:43

## 2017-06-22 RX ADMIN — METOPROLOL TARTRATE 25 MG: 25 TABLET ORAL at 20:58

## 2017-06-22 RX ADMIN — CLOPIDOGREL BISULFATE 75 MG: 75 TABLET ORAL at 08:54

## 2017-06-22 RX ADMIN — METOPROLOL TARTRATE 25 MG: 25 TABLET ORAL at 08:53

## 2017-06-22 RX ADMIN — HYDROCODONE BITARTRATE AND ACETAMINOPHEN 1 TABLET: 5; 325 TABLET ORAL at 06:01

## 2017-06-22 RX ADMIN — SODIUM CHLORIDE, POTASSIUM CHLORIDE, SODIUM LACTATE AND CALCIUM CHLORIDE 100 ML/HR: 600; 310; 30; 20 INJECTION, SOLUTION INTRAVENOUS at 23:28

## 2017-06-22 RX ADMIN — DULOXETINE HYDROCHLORIDE 30 MG: 30 CAPSULE, DELAYED RELEASE ORAL at 20:59

## 2017-06-22 RX ADMIN — HYDROCODONE BITARTRATE AND ACETAMINOPHEN 1 TABLET: 5; 325 TABLET ORAL at 18:40

## 2017-06-22 RX ADMIN — HYDROCODONE BITARTRATE AND ACETAMINOPHEN 1 TABLET: 5; 325 TABLET ORAL at 14:27

## 2017-06-22 RX ADMIN — PIPERACILLIN SODIUM AND TAZOBACTAM SODIUM 3.38 G: 3; .375 INJECTION, POWDER, FOR SOLUTION INTRAVENOUS at 21:45

## 2017-06-22 RX ADMIN — LOSARTAN POTASSIUM 50 MG: 50 TABLET, FILM COATED ORAL at 08:54

## 2017-06-22 RX ADMIN — PIPERACILLIN SODIUM AND TAZOBACTAM SODIUM 3.38 G: 3; .375 INJECTION, POWDER, FOR SOLUTION INTRAVENOUS at 08:20

## 2017-06-22 RX ADMIN — Medication 100 MG: at 08:53

## 2017-06-22 RX ADMIN — HYDROCODONE BITARTRATE AND ACETAMINOPHEN 1 TABLET: 5; 325 TABLET ORAL at 22:54

## 2017-06-22 RX ADMIN — MELATONIN TAB 3 MG 6 MG: 3 TAB at 20:58

## 2017-06-22 NOTE — PROGRESS NOTES
Patient Care Team:  Marilyn K Vermeesch, MD as PCP - General (Internal Medicine)    Chief complaint left foot gangrene    Subjective .   The patient seen today at bedside sitting up in his recliner eating his putting from watch.  Complains of his foot bothering him.  Denies any constitutional symptoms.      Review of Systems  All systems were reviewed and negative except for chief complaint.    History  Past Medical History:   Diagnosis Date   • Abdominal pain     History of epigastric abdominal tenderness. Differentials include peptic ulcer disease,   pancreatobiliary disease.   • Abnormal LFTs    • Anemia    • Anxiety    • Arthritis    • Asthma    • Atrial fibrillation    • CAD (coronary artery disease)    • Calf cramp    • CKD (chronic kidney disease)    • COPD (chronic obstructive pulmonary disease)    • CVA (cerebral infarction)    • Depression    • DVT (deep venous thrombosis)    • Electrocution    • Fatigue    • Hepatitis C    • History of allergy    • History of blood transfusion    • Hyperlipidemia    • Hypertension    • Leg pain    • Loss of appetite    • PUD (peptic ulcer disease)    • Stroke syndrome    • Tachycardia    • Thrombocytopenia    • Thrombophlebitis of deep femoral vein    • Vision problems    , Past Surgical History:   Procedure Laterality Date   • ANKLE SURGERY     • CARDIAC CATHETERIZATION N/A 6/20/2017    Procedure: Peripheral angiography;  Surgeon: Jonathan Storm MD;  Location:  [facility] CATH INVASIVE LOCATION;  Service:    • CARDIAC CATHETERIZATION N/A 6/20/2017    Procedure: Angioplasty-peripheral;  Surgeon: Jonathan Storm MD;  Location: [facility] CATH INVASIVE LOCATION;  Service:    • CARDIAC SURGERY     • CORONARY STENT PLACEMENT     • TONSILLECTOMY     • TOTAL HIP ARTHROPLASTY Left    • TOTAL SHOULDER REPLACEMENT Left    , Family History   Problem Relation Age of Onset   • Breast cancer Mother    • Colon cancer Sister    • Heart attack Other    • Arthritis Other    •  Diabetes Other    • Hypertension Other    • Kidney disease Other    • Stroke Other    • Heart disease Father    , Social History   Substance Use Topics   • Smoking status: Current Every Day Smoker     Packs/day: 1.00     Years: 30.00     Types: Cigarettes   • Smokeless tobacco: None   • Alcohol use No   , Prescriptions Prior to Admission   Medication Sig Dispense Refill Last Dose   • albuterol (PROVENTIL HFA;VENTOLIN HFA) 108 (90 BASE) MCG/ACT inhaler Inhale 2 puffs Every 4 (Four) Hours As Needed for Wheezing.      • aspirin 81 MG EC tablet Take 1 tablet by mouth Daily. 30 tablet 11    • atorvastatin (LIPITOR) 40 MG tablet Take 1 tablet by mouth Every Night. 30 tablet 11    • colchicine 0.6 MG tablet Take 0.6 mg by mouth Daily.      • losartan-hydrochlorothiazide (HYZAAR) 50-12.5 MG per tablet Take 1 tablet by mouth Daily. (Patient taking differently: Take 1 tablet by mouth Daily.) 30 tablet 11 6/18/2017   • melatonin 3 MG tablet Take 2 tablets by mouth Every Night. 30 tablet 6 6/18/2017   • metoprolol tartrate (LOPRESSOR) 25 MG tablet Take 1 tablet by mouth Every 12 (Twelve) Hours. (Patient taking differently: Take 25 mg by mouth 2 (Two) Times a Day.) 60 tablet 11    • polyethylene glycol (MIRALAX) packet Take 17 g by mouth Daily As Needed.      • amiodarone (PACERONE) 200 MG tablet Take 1 tablet by mouth Daily. (Patient not taking: Reported on 6/21/2017) 30 tablet 11 Not Taking at Unknown time   • DULoxetine (CYMBALTA) 30 MG capsule Take 1 capsule by mouth Every Night. (Patient not taking: Reported on 6/21/2017) 30 capsule 1 Not Taking at Unknown time   • hydroxychloroquine (PLAQUENIL) 200 MG tablet Take 200 mg by mouth Daily.   Unknown at Unknown time   • rivaroxaban (XARELTO) 20 MG tablet Take 1 tablet by mouth Daily With Dinner. (Patient not taking: Reported on 6/21/2017) 30 tablet 11 Not Taking at Unknown time   , Scheduled Meds:    amiodarone 200 mg Oral Q24H   atorvastatin 40 mg Oral Nightly   clopidogrel 75  "mg Oral Daily   DULoxetine 30 mg Oral Nightly   folic acid 1 mg Oral Daily   losartan 50 mg Oral Q24H   melatonin 6 mg Oral Nightly   metoprolol tartrate 25 mg Oral Q12H   multivitamin with minerals 1 tablet Oral Daily   nicotine 1 patch Transdermal Q24H   pantoprazole 40 mg Oral BID AC   piperacillin-tazobactam 3.375 g Intravenous Q6H   thiamine 100 mg Oral Daily   vancomycin 1,500 mg Intravenous Q18H   [START ON 6/23/2017] Pharmacy Consult  Does not apply Once   , Continuous Infusions:    lactated ringers 100 mL/hr Last Rate: 100 mL/hr (06/22/17 0152)   Pharmacy to dose vancomycin     , PRN Meds:  •  acetaminophen  •  HYDROcodone-acetaminophen  •  Morphine **AND** naloxone  •  ondansetron **OR** ondansetron ODT **OR** ondansetron  •  Pharmacy to dose vancomycin  •  sodium chloride  •  Insert peripheral IV **AND** sodium chloride and Allergies:  Review of patient's allergies indicates no known allergies.    Objective     Vital Signs   /79 (BP Location: Left arm, Patient Position: Lying)  Pulse 73  Temp 98.3 °F (36.8 °C) (Oral)   Resp 18  Ht 68\" (172.7 cm)  Wt 222 lb 7 oz (101 kg)  SpO2 97%  BMI 33.82 kg/m2    Physical Exam:  Left foot still dressed with Betadine wet-to-dry dressing.  AAO ×3, NAD, AF VSS  Cranial nerves II through XII intact  Heart: Regular rhythm  Lungs clear to auscultation       Results Review: All labs reviewed.  His PT and INR are elevated.  INR 1.9 PT 20.9.      Assessment/Plan   56-year-old male with a left foot gangrene  Active Problems:    Cellulitis of foot, left    Borderline diabetes    Closed fracture of fourth toe of left foot  I again briefly discussed the surgery and answered all patient's questions.  He seems agreeable and still plans to proceed.  Surgical result in place.  The patient be placed nothing by mouth after midnight and I reminded him of that.  Plan will be for surgery midday tomorrow.            Wolfgang Campbell, ADRIANO  06/22/17  12:47 PM      "

## 2017-06-22 NOTE — THERAPY TREATMENT NOTE
Acute Care - Physical Therapy Treatment Note  [facility]     Patient Name: Vasquez Marie  : 1960  MRN: [id number]  Today's Date: 2017  Onset of Illness/Injury or Date of Surgery Date: 17  Date of Referral to PT: 17  Referring Physician: Ernesto HSU    Admit Date: 2017    Visit Dx:    ICD-10-CM ICD-9-CM   1. Closed fracture of fourth toe of left foot, initial encounter S92.502A 826.0   2. Cellulitis of foot, left L03.116 682.7   3. Peripheral vascular disease of lower extremity I73.9 443.9   4. Tobacco abuse Z72.0 305.1   5. Impaired functional mobility, balance, gait, and endurance Z74.09 V49.89   6. Impaired mobility and ADLs Z74.09 799.89     Patient Active Problem List   Diagnosis   • Tachycardia   • Chronic hepatitis C virus infection   • Coronary artery disease involving native coronary artery of native heart with unstable angina pectoris   • Paroxysmal atrial fibrillation   • Essential hypertension   • Anemia   • Thrombocytopenia   • Asthma   • Depression with anxiety   • Esophageal reflux   • Hyperlipidemia   • Tobacco abuse   • COPD (chronic obstructive pulmonary disease)   • Gout   • Congestive heart disease   • RLS (restless legs syndrome)   • Rheumatoid arthritis   • Typical atrial flutter   • Atrial flutter with rapid ventricular response   • Cellulitis   • Cerebrovascular accident (CVA)   • Cellulitis of foot, left   • Borderline diabetes   • Closed fracture of fourth toe of left foot               Adult Rehabilitation Note       17 1418          Rehab Assessment/Intervention    Discipline physical therapy assistant  -RM      Document Type therapy note (daily note)  -RM      Subjective Information agree to therapy;complains of;pain  -RM      Patient Effort, Rehab Treatment adequate  -RM      Symptoms Noted During/After Treatment none  -RM      Precautions/Limitations fall precautions  -RM      Recorded by [RM] Dipak Scott PTA      Pain Assessment     Pain Assessment 0-10  -RM      Pain Score 8  -RM      Post Pain Score 8  -RM      Pain Type Acute pain;Surgical pain  -RM      Pain Location Foot  -RM      Pain Orientation Left;Outer  -RM      Pain Intervention(s) Repositioned;Ambulation/increased activity  -RM      Response to Interventions tolerated   -RM      Recorded by [RM] Dipak Scott PTA      Transfer Assessment/Treatment    Transfers, Sit-Stand [place] verbal cues required;stand by assist;contact guard assist  -RM      Transfers, Stand-Sit [place] verbal cues required;stand by assist;contact guard assist  -RM      Transfers, Sit-Stand-Sit, Assist Device standard walker  -RM      Transfer, Safety Issues impulsivity;steps too close front assistive device;weight-shifting ability decreased;step length decreased  -RM      Transfer, Impairments strength decreased;impaired balance;pain;sensation decreased  -RM      Transfer, Comment Vc's for safety  -RM      Recorded by [RM] Dipak Scott PTA      Gait Assessment/Treatment    Gait, [place] Level contact guard assist;verbal cues required  -RM      Gait, Assistive Device standard walker  -RM      Gait, Distance (Feet) 64  -RM      Gait, Gait Pattern Analysis swing-through gait  -RM      Gait, Gait Deviations antalgic;left:;bilateral:;michael decreased;double stance time increased;step length decreased;toe-to-floor clearance decreased;weight-shifting ability decreased  -RM      Gait, Safety Issues balance decreased during turns;step length decreased  -RM      Gait, Impairments strength decreased;impaired balance;pain  -RM      Recorded by [RM] Dipak Scott PTA      Therapy Exercises    Bilateral Lower Extremities AROM:;10 reps;sitting;ankle pumps/circles;hip abduction/adduction;hip flexion;hip extension;LAQ  -RM      Recorded by [RM] Dipak Scott PTA      Positioning and Restraints    Pre-Treatment Position sitting in chair/recliner  -RM      Post Treatment Position chair  -RM       In Chair sitting;call light within reach;encouraged to call for assist;notified nsg  -RM      Recorded by [] Dipak Scott PTA        User Key  (r) = Recorded By, (t) = Taken By, (c) = Cosigned By    Initials Name Effective Dates    RM Dipak Scott PTA 10/26/16 -                 IP PT Goals       06/22/17 1523 06/21/17 1500       Transfer Training PT LTG    Transfer Training PT LTG, Date Established  06/21/17  -JR     Transfer Training PT LTG, Time to Achieve  2 wks  -JR     Transfer Training PT LTG, Activity Type  bed to chair /chair to bed;sit to stand/stand to sit  -JR     Transfer Training PT LTG, [place] Level  supervision required  -JR     Transfer Training PT LTG, Assist Device  walker, standard  -JR     Transfer Training PT LTG, Additional Goal  demonstrating proper safety awareness and weight bearing equally through B LE.  -JR     Transfer Training PT LTG, Outcome goal ongoing  -RM      Gait Training PT LTG    Gait Training Goal PT LTG, Date Established  06/21/17  -JR     Gait Training Goal PT LTG, Time to Achieve  2 wks  -JR     Gait Training Goal PT LTG, [place] Level  --   Stand By Assist  -JR     Gait Training Goal PT LTG, Assist Device  walker, standard  -JR     Gait Training Goal PT LTG, Distance to Achieve  75  -JR     Gait Training Goal PT LTG, Additional Goal  without rest breaks, with equal step length and demonstrating proper safety with SW.   -JR     Gait Training Goal PT LTG, Outcome goal partially met  -RM goal ongoing  -JR       User Key  (r) = Recorded By, (t) = Taken By, (c) = Cosigned By    Initials Name Provider Type    JR Sia Whaley, PT Physical Therapist     Dipak Scott PTA Physical Therapy Assistant          Physical Therapy Education     Title: PT OT SLP Therapies (Done)     Topic: Physical Therapy (Done)     Point: Mobility training (Done)    Learning Progress Summary    Learner Readiness Method Response Comment Documented by Status   Patient  Acceptance E,D VU,NR Ther ex tech and importance of activity to maintain or improve functional mobility.  06/22/17 1522 Done    Acceptance E VU  AC 06/21/17 1825 Done    Acceptance E VU Pt educated on the importance of PT/POC. Reminded Pt importance of wearing post surgical boot when performing t/fs and gait.  06/21/17 1450 Done    Acceptance E VU  NO 06/20/17 0140 Done               Point: Home exercise program (Done)    Learning Progress Summary    Learner Readiness Method Response Comment Documented by Status   Patient Acceptance E,D VU,NR Ther ex tech and importance of activity to maintain or improve functional mobility.  06/22/17 1522 Done    Acceptance E VU  AC 06/21/17 1825 Done    Acceptance E VU  NO 06/20/17 0140 Done               Point: Body mechanics (Done)    Learning Progress Summary    Learner Readiness Method Response Comment Documented by Status   Patient Acceptance E,D VU,NR Ther ex tech and importance of activity to maintain or improve functional mobility.  06/22/17 1522 Done    Acceptance E VU  AC 06/21/17 1825 Done    Acceptance E VU  NO 06/20/17 0140 Done               Point: Precautions (Done)    Learning Progress Summary    Learner Readiness Method Response Comment Documented by Status   Patient Acceptance E,D VU,NR Ther ex tech and importance of activity to maintain or improve functional mobility.  06/22/17 1522 Done    Acceptance E VU  AC 06/21/17 1825 Done    Acceptance E VU Pt educated on the importance of PT/POC. Reminded Pt importance of wearing post surgical boot when performing t/fs and gait.  06/21/17 1450 Done    Acceptance E VU  NO 06/20/17 0140 Done                      User Key     Initials Effective Dates Name Provider Type Discipline     10/26/16 -  Sia Whaley, PT Physical Therapist PT     10/26/16 -  Dipak Scott PTA Physical Therapy Assistant PT    NO 12/13/16 -  Re Joseph, RN Registered Nurse Nurse     05/30/17 -  Nury Corea, RN Registered  Nurse Nurse                    PT Recommendation and Plan  Planned Therapy Interventions: balance training, bed mobility training, gait training, home exercise program, patient/family education, strengthening, transfer training  PT Frequency: daily  Plan of Care Review  Plan Of Care Reviewed With: patient  Progress: improving  Outcome Summary/Follow up Plan: Pt tolerated increased ambulation distance as well as tolerating B LE seated ther ex .           Outcome Measures       06/22/17 1418 06/21/17 1316 06/21/17 1314    How much help from another person do you currently need...    Turning from your back to your side while in flat bed without using bedrails? 4  -RM 4  -JR     Moving from lying on back to sitting on the side of a flat bed without bedrails? 4  -RM 4  -JR     Moving to and from a bed to a chair (including a wheelchair)? 3  -RM 3  -JR     Standing up from a chair using your arms (e.g., wheelchair, bedside chair)? 3  -RM 3  -JR     Climbing 3-5 steps with a railing? 2  -RM 2  -JR     To walk in hospital room? 3  -RM 3  -JR     AM-PAC 6 Clicks Score 19  -RM 19  -JR     How much help from another is currently needed...    Putting on and taking off regular lower body clothing?   3  -AH    Bathing (including washing, rinsing, and drying)   3  -AH    Toileting (which includes using toilet bed pan or urinal)   3  -AH    Putting on and taking off regular upper body clothing   4  -AH    Taking care of personal grooming (such as brushing teeth)   4  -AH    Eating meals   4  -AH    Score   21  -AH    Functional Assessment    Outcome Measure Options AM-PAC 6 Clicks Basic Mobility (PT)  -RM AM-PAC 6 Clicks Basic Mobility (PT)  -JR AM-PAC 6 Clicks Daily Activity (OT)  -AH      User Key  (r) = Recorded By, (t) = Taken By, (c) = Cosigned By    Initials Name Provider Type    VAHID Sullivan Occupational Therapist    JR Sia Whaley, PT Physical Therapist    TREY Scott, PTA Physical Therapy Assistant            Time Calculation:         PT Charges       06/22/17 1525          Time Calculation    Start Time 1418  -RM      PT Received On 06/22/17  -RM      PT Goal Re-Cert Due Date 07/01/17  -RM      Time Calculation- PT    Total Timed Code Minutes- PT 26 minute(s)  -RM        User Key  (r) = Recorded By, (t) = Taken By, (c) = Cosigned By    Initials Name Provider Type     Dipak Scott PTA Physical Therapy Assistant          Therapy Charges for Today     Code Description Service Date Service Provider Modifiers Qty    83329245293 HC GAIT TRAINING EA 15 MIN 6/22/2017 Dipak Scott PTA GP 1    47079332392 HC PT THER PROC EA 15 MIN 6/22/2017 Dipak Scott PTA GP 1          PT G-Codes  Outcome Measure Options: AM-PAC 6 Clicks Basic Mobility (PT)    Dipak Scott PTA  6/22/2017

## 2017-06-22 NOTE — PLAN OF CARE
Problem: Patient Care Overview (Adult)  Goal: Plan of Care Review  Outcome: Ongoing (interventions implemented as appropriate)    06/22/17 1523   Coping/Psychosocial Response Interventions   Plan Of Care Reviewed With patient   Patient Care Overview   Progress improving   Outcome Evaluation   Outcome Summary/Follow up Plan Pt tolerated increased ambulation distance as well as tolerating B LE seated ther ex .          Problem: Inpatient Physical Therapy  Goal: Transfer Training Goal 1 LTG- PT  Outcome: Ongoing (interventions implemented as appropriate)    06/21/17 1500 06/22/17 1523   Transfer Training PT LTG   Transfer Training PT LTG, Date Established 06/21/17 --    Transfer Training PT LTG, Time to Achieve 2 wks --    Transfer Training PT LTG, Activity Type bed to chair /chair to bed;sit to stand/stand to sit --    Transfer Training PT LTG, [place] Level supervision required --    Transfer Training PT LTG, Assist Device walker, standard --    Transfer Training PT LTG, Additional Goal demonstrating proper safety awareness and weight bearing equally through B LE. --    Transfer Training PT LTG, Outcome --  goal ongoing       Goal: Gait Training Goal LTG- PT  Outcome: Ongoing (interventions implemented as appropriate)    06/21/17 1500 06/22/17 1523   Gait Training PT LTG   Gait Training Goal PT LTG, Date Established 06/21/17 --    Gait Training Goal PT LTG, Time to Achieve 2 wks --    Gait Training Goal PT LTG, [place] Level (Stand By Assist) --    Gait Training Goal PT LTG, Assist Device walker, standard --    Gait Training Goal PT LTG, Distance to Achieve 75 --    Gait Training Goal PT LTG, Additional Goal without rest breaks, with equal step length and demonstrating proper safety with SW.  --    Gait Training Goal PT LTG, Outcome --  goal partially met

## 2017-06-22 NOTE — PLAN OF CARE
Problem: Patient Care Overview (Adult)  Goal: Plan of Care Review  Outcome: Ongoing (interventions implemented as appropriate)    06/22/17 1520   Coping/Psychosocial Response Interventions   Plan Of Care Reviewed With patient   Patient Care Overview   Progress no change   Outcome Evaluation   Outcome Summary/Follow up Plan PT A/O TODAY WITH PERIODS OF CONFUSION TO SITUATION. PAIN CONTROLLED WITH PRN MEDS. NO S/S ALCOHOL WITHDRAWAL AT THIS TIME. PT CALM/COOPERATIVE & PLEASANT. UP WITH PT TODAY.

## 2017-06-22 NOTE — PLAN OF CARE
Problem: Cellulitis (Adult)  Goal: Signs and Symptoms of Listed Potential Problems Will be Absent or Manageable (Cellulitis)  Outcome: Ongoing (interventions implemented as appropriate)    06/22/17 0352   Cellulitis   Problems Assessed (Cellulitis) all   Problems Present (Cellulitis) gangrene/necrotizing fasciitis;pain;infection         Problem: Infection, Risk/Actual (Adult)  Goal: Infection Prevention/Resolution  Outcome: Ongoing (interventions implemented as appropriate)    Problem: Patient Care Overview (Adult)  Goal: Plan of Care Review  Outcome: Ongoing (interventions implemented as appropriate)  Goal: Adult Individualization and Mutuality  Outcome: Ongoing (interventions implemented as appropriate)  Goal: Discharge Needs Assessment  Outcome: Ongoing (interventions implemented as appropriate)    Problem: Fall Risk (Adult)  Goal: Identify Related Risk Factors and Signs and Symptoms  Outcome: Ongoing (interventions implemented as appropriate)  Goal: Absence of Falls  Outcome: Ongoing (interventions implemented as appropriate)

## 2017-06-22 NOTE — PROGRESS NOTES
"   LOS: 1 day   Patient Care Team:  Marilyn K Vermeesch, MD as PCP - General (Internal Medicine)      Interval History: Vision seems to be doing reasonably well his sleeping peacefully in bed at this point.  Has no pain at rest.        Review of Systems:   Pertinent items are noted in HPI.      Objective     Vital Sign Min/Max for last 24 hours  Temp  Min: 97.9 °F (36.6 °C)  Max: 98.1 °F (36.7 °C)   BP  Min: 122/77  Max: 143/83   Pulse  Min: 68  Max: 78   Resp  Min: 18  Max: 20   SpO2  Min: 95 %  Max: 98 %   No Data Recorded   Weight  Min: 222 lb 7 oz (101 kg)  Max: 222 lb 7 oz (101 kg)     Flowsheet Rows         First Filed Value    Admission Height  68\" (172.7 cm) Documented at 06/19/2017 1130    Admission Weight  220 lb (99.8 kg) Documented at 06/19/2017 1130          Physical Exam:     General Appearance:    Alert, cooperative, in no acute distress   Head:    Normocephalic, without obvious abnormality, atraumatic   Eyes:            Lids and lashes normal, conjunctivae and sclerae normal, no   icterus, no pallor, corneas clear, PERRLA   Ears:    Ears appear intact with no abnormalities noted   Throat:   No oral lesions, no thrush, oral mucosa moist   Neck:   No adenopathy, supple, trachea midline, no thyromegaly, no   carotid bruit, no JVD   Back:     No kyphosis present, no scoliosis present, no skin lesions,      erythema or scars, no tenderness to percussion or                   palpation,   range of motion normal   Lungs:     Clear to auscultation,respirations regular, even and                  unlabored    Heart:    Regular rhythm and normal rate, normal S1 and S2, no            murmur, no gallop, no rub, no click   Chest Wall:    No abnormalities observed   Abdomen:     Normal bowel sounds, no masses, no organomegaly, soft        non-tender, non-distended, no guarding, no rebound                tenderness   Rectal:     Deferred   Extremities:   Moves all extremities well, no edema, no cyanosis, no           "   redness   Pulses:   Good Doppler both in the DP as well as the PDA territory noted.  The wound is in the covers have not evaluated today.     Skin:   No bleeding, bruising or rash   Lymph nodes:   No palpable adenopathy   Neurologic:   Cranial nerves 2 - 12 grossly intact, sensation intact, DTR       present and equal bilaterally        Results Review:     I reviewed the patient's new clinical results.    Results Review:   I reviewed the patient's new clinical results.        LAB DATA :           Laboratory results:      Results from last 7 days  Lab Units 06/22/17  0520 06/21/17  0510 06/20/17  0525 06/19/17  1338   SODIUM mmol/L 142 143 144 145   POTASSIUM mmol/L 3.3* 3.5 3.6 3.5   CHLORIDE mmol/L 106 102 106 105   TOTAL CO2 mmol/L 27.0 27.0 24.0* 29.0   BUN mg/dL 22* 18 16 14   CREATININE mg/dL 1.20 1.50* 1.60* 1.20   CALCIUM mg/dL 7.3* 7.8* 8.2* 9.2   BILIRUBIN mg/dL  --  0.6  --  1.1   ALK PHOS U/L  --  112  --  124   ALT (SGPT) U/L  --  31  --  32   AST (SGOT) U/L  --  43  --  69*   GLUCOSE mg/dL 107* 140* 226* 110*       Results from last 7 days  Lab Units 06/22/17  0520 06/21/17  0510 06/20/17  1223 06/20/17  0525 06/19/17  1338   WBC 10*3/mm3 3.89* 7.00 2.99* 3.00* 7.88   HEMOGLOBIN g/dL 8.3* 9.2* 7.7* 8.0* 8.8*   HEMATOCRIT % 26.7* 28.3* 24.4* 25.9* 28.1*   PLATELETS 10*3/mm3 120* 184 129* 156 201       Results from last 7 days  Lab Units 06/22/17  0520   INR  1.91*               Results from last 7 days  Lab Units 06/19/17  1338   CKMB ng/mL 1.97   TROPONIN I ng/mL <0.012                 Lab Results   Component Value Date    HGBA1C 6.20 (H) 04/08/2017               IMAGING DATA:     Xr Foot 3+ View Left    Result Date: 6/19/2017  Narrative: PROCEDURE: XR FOOT 3+ VW LEFT-  History: pain  COMPARISON:April 21, 2017.  FINDINGS:  A 3 view exam demonstrates postsurgical changes with a wire spanning the bases of the fourth and fifth metatarsals. There is a fracture of the head of the fourth proximal phalanx.  Advanced degenerative changes are present at the tibiotalar and subtalar joints. There is also joint space narrowing and osteophyte formation in the mid foot. Diffuse soft tissue swelling is present.         Impression: Fracture of the head of the fourth proximal phalanx.       This report was finalized on 6/19/2017 12:48 PM by Padmini Swan M.D..    Us Arterial Doppler Lower Extremity Left    Result Date: 6/19/2017  Narrative: PROCEDURE: US ARTERIAL DOPPLER LOWER EXTREMITY  LEFT-  HISTORY: pain  PROCEDURE: Sonographic images of the left lower extremity was obtained with color flow and pulse Doppler.  FINDINGS:  LEFT LOWER EXTREMITY.      Velocities cm/sec :  CFA: 84 SFA Mid: 92 SFA Dist: 0 POP: 39 PT: 31 JULI: 23 CASSIE: Not obtained Waveforms are monophasic.       Impression: Occlusion of the distal superficial femoral artery with reconstitution of flow at the popliteal artery.  This report was finalized on 6/19/2017 1:14 PM by Padmini Swan M.D..    Mri Foot Left Without Contrast    Result Date: 6/19/2017  Narrative: FINAL REPORT CLINICAL HISTORY: ISCHEMIC LEFT 3RD TOE. HX OF SURGERY TO ANKLE. FINDINGS: No prior studies available for comparison. Multiplanar multisequence imaging of the foot was performed without the intravenous administration of contrast. Images are degraded by motion artifact. There is additional artifact from postsurgical hardware. There are prominent cystic degenerative changes diffusely. There is abnormal decreased T1 and increased T2 signal in the proximal and middle phalanx third and fourth digits with adjacent edema. There prominent cystic degenerative changes of the first and fifth metatarsal heads. There is no discrete drainable fluid collection. Impression: Prominent degenerative changes with findings suggesting third and fourth digit osteomyelitis and cellulitis. Recommend appropriate clinical followup     Impression: Authenticated by Latrell Clemente MD on 06/19/2017 05:38:45  PM    Us Venous Doppler Lower Extremity Left (duplex)    Result Date: 6/19/2017  Narrative: PROCEDURE: US VENOUS DOPPLER LOWER EXTREMITY LEFT (DUPLEX)-  HISTORY: pain  PROCEDURE: Multiple transverse and longitudinal scans were performed of the femoropopliteal deep venous system, with augmentation and compression maneuvers.  FINDINGS: Normal phasic flow was noted in the visualized deep venous system. No intraluminal increased echogenicity is noted to suggest thrombus. There is normal compression and augmentation of the venous structures. No abnormal venous collaterals are seen. No venous reflux is demonstrated.      Impression: No evidence of deep venous thrombosis.  This report was finalized on 6/19/2017 12:49 PM by Padmini Swan M.D..            Assessment/Plan    #1 peripheral vascular disease: Patient has done well post intervention.  Has a good Doppler in both in the posterior tibial artery and the dorsalis pedis segment.  Anticipate good healing after any surgery including transmetatarsal for his left foot.  Has been counseled on the need to keep on the present medications and follow-up on a regular basis to avoid catastrophes like this.    #2 anemia: Patient has history of GI bleed.  He is not a candidate for Xyzal to an aspirin date would keep him on Plavix given his peripheral vascular disease.  He is been explained the risks and benefits.  On outpatient basis once the hemoglobin stabilizes could try the Eliquis.    #3 nicotine abuse: Needs to come off the cigarettes right away.  Has been kept on nicotine patch here.    #4 dyslipidemia: On high-dose statin therapy would continue the same.    #5 renal insufficiency: No evidence of the same after the dye ingestion.  From a renal standpoint or from a procedure standpoint would not want to continue further IV fluids.        Active Problems:    Cellulitis of foot, left    Borderline diabetes    Closed fracture of fourth toe of left foot            Jonathan  Phillip Storm MD  06/22/17  7:10 AM

## 2017-06-22 NOTE — PROGRESS NOTES
[facility]    PROGRESS NOTE    Name:  Vasquez Marie   Age:  56 y.o.  Sex:  male  :  1960  MRN:  [id number]   Visit Number:  [id number]  Admission Date:  2017  Date Of Service:  17  Primary Care Physician:  Marilyn K. Vermeesch, MD     LOS: 1 day :  Patient Care Team:  Marilyn K Vermeesch, MD as PCP - General (Internal Medicine):    History taken from:     patient    Chief Complaint:      Left foot pain  Subjective     Interval History:     Patient is a 57-year-old male seen today who has left lower extremity cellulitis and gangrenous ischemic third digit.  He has osteomyelitis in the third and fourth digits.  He recently had intervention with Dr. Storm as he had occlusion of the SFA and popliteal arteries.  This is improved.  Dr. Campbell from podiatry has been consulted for amputation of the third digit.  Patient denies any chest pressure, shortness breath, nausea, vomiting, or diarrhea.  He does have pain in the third digit.  He is on morphine when necessary.  He has had no signs or symptoms of alcohol withdrawal, and claims he has not drank for 2 months.    Review of Systems:     All systems were reviewed and negative except for:  Musculoskeletal: positive for  bone pain    Objective     Vital Signs:    Temp:  [97.9 °F (36.6 °C)-98.3 °F (36.8 °C)] 98.3 °F (36.8 °C)  Heart Rate:  [68-81] 73  Resp:  [18-20] 18  BP: (121-142)/(72-86) 121/79    Physical Exam:      General Appearance:    Alert, cooperative, in no acute distress   Head:    Normocephalic, without obvious abnormality, atraumatic   Eyes:            Lids and lashes normal, conjunctivae and sclerae normal, no   icterus, no pallor, corneas clear, PERRLA   Ears:    Ears appear intact with no abnormalities noted   Throat:   No oral lesions, no thrush, oral mucosa moist   Neck:   No adenopathy, supple, trachea midline, no thyromegaly, no   carotid bruit, no JVD   Back:     No kyphosis present, no scoliosis  present, no skin lesions,      erythema or scars, no tenderness to percussion or                   palpation,   range of motion normal   Lungs:     Clear to auscultation,respirations regular, even and                  unlabored    Heart:    Regular rhythm and normal rate, normal S1 and S2, no            murmur, no gallop, no rub, no click   Chest Wall:    No abnormalities observed   Abdomen:     Normal bowel sounds, no masses, no organomegaly, soft        non-tender, non-distended, no guarding, no rebound                tenderness   Rectal:     Deferred   Extremities:   4/5 strength Work from his bilaterally.  Left foot is wrapped at present.     Pulses:   Pulses palpable and equal bilaterally   Skin:   No bleeding, bruising or rash   Lymph nodes:   No palpable adenopathy   Neurologic:   Cranial nerves 2 - 12 grossly intact, sensation intact, DTR       present and equal bilaterally        Results Review:      I reviewed the patient's new clinical results.    Labs:    Lab Results (last 24 hours)     Procedure Component Value Units Date/Time    CBC & Differential [[id number]] Collected:  06/22/17 0520    Specimen:  Blood Updated:  06/22/17 0600    Narrative:       The following orders were created for panel order CBC & Differential.  Procedure                               Abnormality         Status                     ---------                               -----------         ------                     CBC Auto Differential[[id number]]        Abnormal            Final result                 Please view results for these tests on the individual orders.    CBC Auto Differential [[id number]]  (Abnormal) Collected:  06/22/17 0520    Specimen:  Blood Updated:  06/22/17 0600     WBC 3.89 (L) 10*3/mm3      RBC 2.87 (L) 10*6/mm3      Hemoglobin 8.3 (L) g/dL      Hematocrit 26.7 (L) %      MCV 93.0 fL      MCH 28.9 pg      MCHC 31.1 g/dL      RDW 15.8 (H) %      RDW-SD 53.6 fl      MPV 11.1 fL      Platelets 120 (L) 10*3/mm3       Neutrophil % 58.7 %      Lymphocyte % 26.0 %      Monocyte % 5.7 %      Eosinophil % 9.0 (H) %      Basophil % 0.3 %      Immature Grans % 0.3 %      Neutrophils, Absolute 2.29 10*3/mm3      Lymphocytes, Absolute 1.01 10*3/mm3      Monocytes, Absolute 0.22 10*3/mm3      Eosinophils, Absolute 0.35 10*3/mm3      Basophils, Absolute 0.01 10*3/mm3      Immature Grans, Absolute 0.01 10*3/mm3      nRBC 0.0 /100 WBC     Basic Metabolic Panel [[id number]]  (Abnormal) Collected:  06/22/17 0520    Specimen:  Blood Updated:  06/22/17 0610     Glucose 107 (H) mg/dL      BUN 22 (H) mg/dL      Creatinine 1.20 mg/dL      Sodium 142 mmol/L      Potassium 3.3 (L) mmol/L      Chloride 106 mmol/L      CO2 27.0 mmol/L      Calcium 7.3 (L) mg/dL      eGFR Non African Amer 62 mL/min/1.73      BUN/Creatinine Ratio 18.3     Anion Gap 12.3 mmol/L     Narrative:       Abnormal estimated GFR should be followed by more specific studies to confirm end stage chronic renal disease. The equation used for calculation may not be accurate for patients less than 19 years old, greater than 70 years old, patients at extremes of weight, malnutrition, or with acute renal dysfunction.    aPTT [[id number]]  (Normal) Collected:  06/22/17 0520    Specimen:  Blood Updated:  06/22/17 0614     PTT 35.4 seconds     Protime-INR [[id number]]  (Abnormal) Collected:  06/22/17 0520    Specimen:  Blood Updated:  06/22/17 0614     Protime 20.9 (H) Seconds      INR 1.91 (H)    Occult Blood X 1, Stool [[id number]]  (Abnormal) Collected:  06/22/17 1025    Specimen:  Stool from Per Rectum Updated:  06/22/17 1052     Fecal Occult Blood Positive (A)           Radiology:    Imaging Results (last 24 hours)     Procedure Component Value Units Date/Time    SCANNED - IMAGING [[id number]] Resulted:  06/19/17      Updated:  06/22/17 1036          Medication Review:       amiodarone 200 mg Oral Q24H   atorvastatin 40 mg Oral Nightly   clopidogrel 75 mg Oral Daily   DULoxetine 30 mg  Oral Nightly   folic acid 1 mg Oral Daily   losartan 50 mg Oral Q24H   melatonin 6 mg Oral Nightly   metoprolol tartrate 25 mg Oral Q12H   multivitamin with minerals 1 tablet Oral Daily   nicotine 1 patch Transdermal Q24H   pantoprazole 40 mg Oral BID AC   piperacillin-tazobactam 3.375 g Intravenous Q6H   thiamine 100 mg Oral Daily   vancomycin 1,500 mg Intravenous Q18H   [START ON 6/23/2017] Pharmacy Consult  Does not apply Once         lactated ringers 100 mL/hr Last Rate: 100 mL/hr (06/22/17 0152)   Pharmacy to dose vancomycin         Assessment/Plan     Problem List Items Addressed This Visit     Tobacco abuse    Cellulitis of foot, left    Relevant Orders    Case Request Cath Lab: Peripheral angiography (Completed)    Cardiac Catheterization/Vascular Study (Completed)    Case Request (Completed)    Closed fracture of fourth toe of left foot - Primary      Other Visit Diagnoses     Peripheral vascular disease of lower extremity              1.  Left lower extremity cellulitis on IV vancomycin and Zosyn  2.  Dry gangrene with ischemic third digit of the left lower extremity  3.  Osteomyelitis of the third and fourth digit on the left lower extremity  4.  Anemia suspect due to chronic blood loss.  He did have a GI bleed in May at .  5.  Alcohol abuse, patient claims he has not drank for 2 months.  He has no signs or symptoms of withdrawal.  Discontinue alcohol withdrawal protocol  6.  Peripheral arterial disease, Dr. Storm performed intervention on the left SFA and popliteal arteries.  7.  Hypertension controlled  8.  Atrial flutter  9.  Coronary artery disease status post stent placement  10.  Tobacco abuse  11.  Liver mass with follow-up scheduled at the liver clinic at  August 2  12.  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  Borderline diabetes  14.  COPD  15.  Obesity  16.  Rheumatoid arthritis  17.  Chronic kidney disease stage III  18.  Renal insufficiency  resolved.      Plan:    Patient is to proceed to surgery tomorrow with Dr. Campbell for amputation of the third digit.  Check lab work in the a.m.  Further recommendations will depend on clinical course.    Murali Garber DO  06/22/17  11:56 AM

## 2017-06-23 ENCOUNTER — APPOINTMENT (OUTPATIENT)
Dept: CT IMAGING | Facility: HOSPITAL | Age: 57
End: 2017-06-23
Attending: INTERNAL MEDICINE

## 2017-06-23 ENCOUNTER — ANESTHESIA (OUTPATIENT)
Dept: PERIOP | Facility: HOSPITAL | Age: 57
End: 2017-06-23

## 2017-06-23 LAB
ABO GROUP BLD: NORMAL
ALBUMIN SERPL-MCNC: 2.6 G/DL (ref 3.5–5)
ANION GAP SERPL CALCULATED.3IONS-SCNC: 11.5 MMOL/L
BASOPHILS # BLD AUTO: 0.01 10*3/MM3 (ref 0–0.2)
BASOPHILS NFR BLD AUTO: 0.3 % (ref 0–2.5)
BLD GP AB SCN SERPL QL: NEGATIVE
BUN BLD-MCNC: 16 MG/DL (ref 7–20)
BUN/CREAT SERPL: 16 (ref 6.3–21.9)
CALCIUM SPEC-SCNC: 7.1 MG/DL (ref 8.4–10.2)
CHLORIDE SERPL-SCNC: 110 MMOL/L (ref 98–107)
CO2 SERPL-SCNC: 27 MMOL/L (ref 26–30)
CREAT BLD-MCNC: 1 MG/DL (ref 0.6–1.3)
DEPRECATED RDW RBC AUTO: 54.4 FL (ref 37–54)
EOSINOPHIL # BLD AUTO: 0.35 10*3/MM3 (ref 0–0.7)
EOSINOPHIL NFR BLD AUTO: 10.2 % (ref 0–7)
ERYTHROCYTE [DISTWIDTH] IN BLOOD BY AUTOMATED COUNT: 16 % (ref 11.5–14.5)
GFR SERPL CREATININE-BSD FRML MDRD: 77 ML/MIN/1.73
GLUCOSE BLD-MCNC: 135 MG/DL (ref 74–98)
GRAM STN SPEC: NORMAL
GRAM STN SPEC: NORMAL
HCT VFR BLD AUTO: 24 % (ref 42–52)
HCT VFR BLD AUTO: 24 % (ref 42–52)
HGB BLD-MCNC: 7.5 G/DL (ref 14–18)
HGB BLD-MCNC: 7.7 G/DL (ref 14–18)
IMM GRANULOCYTES # BLD: 0.02 10*3/MM3 (ref 0–0.06)
IMM GRANULOCYTES NFR BLD: 0.6 % (ref 0–0.6)
IRON 24H UR-MRATE: 25 MCG/DL (ref 37–181)
IRON SATN MFR SERPL: 7 % (ref 11–46)
LYMPHOCYTES # BLD AUTO: 0.99 10*3/MM3 (ref 0.6–3.4)
LYMPHOCYTES NFR BLD AUTO: 28.9 % (ref 10–50)
MAGNESIUM SERPL-MCNC: 1.1 MG/DL (ref 1.6–2.3)
MCH RBC QN AUTO: 30.3 PG (ref 27–31)
MCHC RBC AUTO-ENTMCNC: 32.1 G/DL (ref 30–37)
MCV RBC AUTO: 94.5 FL (ref 80–94)
MONOCYTES # BLD AUTO: 0.3 10*3/MM3 (ref 0–0.9)
MONOCYTES NFR BLD AUTO: 8.7 % (ref 0–12)
NEUTROPHILS # BLD AUTO: 1.76 10*3/MM3 (ref 2–6.9)
NEUTROPHILS NFR BLD AUTO: 51.3 % (ref 37–80)
NRBC BLD MANUAL-RTO: 0 /100 WBC (ref 0–0)
PHOSPHATE SERPL-MCNC: 2.5 MG/DL (ref 2.5–4.5)
PLATELET # BLD AUTO: 111 10*3/MM3 (ref 130–400)
PMV BLD AUTO: 11.2 FL (ref 6–12)
POTASSIUM BLD-SCNC: 3.5 MMOL/L (ref 3.5–5.1)
RBC # BLD AUTO: 2.54 10*6/MM3 (ref 4.7–6.1)
RH BLD: POSITIVE
SODIUM BLD-SCNC: 145 MMOL/L (ref 137–145)
TIBC SERPL-MCNC: 364 MCG/DL (ref 261–497)
VANCOMYCIN TROUGH SERPL-MCNC: 10.01 MCG/ML (ref 5–15)
WBC NRBC COR # BLD: 3.43 10*3/MM3 (ref 4.8–10.8)

## 2017-06-23 PROCEDURE — 83735 ASSAY OF MAGNESIUM: CPT | Performed by: INTERNAL MEDICINE

## 2017-06-23 PROCEDURE — 25010000002 MIDAZOLAM PER 1 MG: Performed by: NURSE ANESTHETIST, CERTIFIED REGISTERED

## 2017-06-23 PROCEDURE — 0JBR0ZZ EXCISION OF LEFT FOOT SUBCUTANEOUS TISSUE AND FASCIA, OPEN APPROACH: ICD-10-PCS | Performed by: PODIATRIST

## 2017-06-23 PROCEDURE — 80069 RENAL FUNCTION PANEL: CPT | Performed by: INTERNAL MEDICINE

## 2017-06-23 PROCEDURE — 25010000002 VANCOMYCIN PER 500 MG: Performed by: NURSE PRACTITIONER

## 2017-06-23 PROCEDURE — 85018 HEMOGLOBIN: CPT | Performed by: INTERNAL MEDICINE

## 2017-06-23 PROCEDURE — 28820 AMPUTATION OF TOE: CPT | Performed by: PODIATRIST

## 2017-06-23 PROCEDURE — 86901 BLOOD TYPING SEROLOGIC RH(D): CPT | Performed by: INTERNAL MEDICINE

## 2017-06-23 PROCEDURE — 86920 COMPATIBILITY TEST SPIN: CPT

## 2017-06-23 PROCEDURE — 83550 IRON BINDING TEST: CPT | Performed by: INTERNAL MEDICINE

## 2017-06-23 PROCEDURE — P9016 RBC LEUKOCYTES REDUCED: HCPCS

## 2017-06-23 PROCEDURE — 87186 SC STD MICRODIL/AGAR DIL: CPT | Performed by: PODIATRIST

## 2017-06-23 PROCEDURE — 0HRNXJZ REPLACEMENT OF LEFT FOOT SKIN WITH SYNTHETIC SUBSTITUTE, EXTERNAL APPROACH: ICD-10-PCS | Performed by: PODIATRIST

## 2017-06-23 PROCEDURE — 36430 TRANSFUSION BLD/BLD COMPNT: CPT

## 2017-06-23 PROCEDURE — 83540 ASSAY OF IRON: CPT | Performed by: INTERNAL MEDICINE

## 2017-06-23 PROCEDURE — 25010000002 MAGNESIUM SULFATE 2 GM/50ML SOLUTION: Performed by: INTERNAL MEDICINE

## 2017-06-23 PROCEDURE — 25010000002 PIPERACILLIN SOD-TAZOBACTAM PER 1 G: Performed by: NURSE PRACTITIONER

## 2017-06-23 PROCEDURE — 25010000002 MEPERIDINE PER 100 MG: Performed by: NURSE ANESTHETIST, CERTIFIED REGISTERED

## 2017-06-23 PROCEDURE — 70450 CT HEAD/BRAIN W/O DYE: CPT

## 2017-06-23 PROCEDURE — 80202 ASSAY OF VANCOMYCIN: CPT | Performed by: NURSE PRACTITIONER

## 2017-06-23 PROCEDURE — 88304 TISSUE EXAM BY PATHOLOGIST: CPT | Performed by: PODIATRIST

## 2017-06-23 PROCEDURE — 87070 CULTURE OTHR SPECIMN AEROBIC: CPT | Performed by: PODIATRIST

## 2017-06-23 PROCEDURE — 28002 TREATMENT OF FOOT INFECTION: CPT | Performed by: PODIATRIST

## 2017-06-23 PROCEDURE — 86900 BLOOD TYPING SEROLOGIC ABO: CPT | Performed by: INTERNAL MEDICINE

## 2017-06-23 PROCEDURE — 15275 SKIN SUB GRAFT FACE/NK/HF/G: CPT | Performed by: PODIATRIST

## 2017-06-23 PROCEDURE — 87205 SMEAR GRAM STAIN: CPT | Performed by: PODIATRIST

## 2017-06-23 PROCEDURE — 0Y6U0Z0 DETACHMENT AT LEFT 3RD TOE, COMPLETE, OPEN APPROACH: ICD-10-PCS | Performed by: PODIATRIST

## 2017-06-23 PROCEDURE — 87077 CULTURE AEROBIC IDENTIFY: CPT | Performed by: PODIATRIST

## 2017-06-23 PROCEDURE — 86850 RBC ANTIBODY SCREEN: CPT | Performed by: INTERNAL MEDICINE

## 2017-06-23 PROCEDURE — 25010000002 MORPHINE PER 10 MG: Performed by: INTERNAL MEDICINE

## 2017-06-23 PROCEDURE — 85014 HEMATOCRIT: CPT | Performed by: INTERNAL MEDICINE

## 2017-06-23 PROCEDURE — 25010000002 PROPOFOL 1000 MG/ML EMULSION: Performed by: NURSE ANESTHETIST, CERTIFIED REGISTERED

## 2017-06-23 PROCEDURE — 25010000002 PROPOFOL 10 MG/ML EMULSION: Performed by: NURSE ANESTHETIST, CERTIFIED REGISTERED

## 2017-06-23 PROCEDURE — 25010000002 MORPHINE PER 10 MG: Performed by: NURSE PRACTITIONER

## 2017-06-23 PROCEDURE — 86900 BLOOD TYPING SEROLOGIC ABO: CPT

## 2017-06-23 PROCEDURE — 99233 SBSQ HOSP IP/OBS HIGH 50: CPT | Performed by: INTERNAL MEDICINE

## 2017-06-23 PROCEDURE — 85025 COMPLETE CBC W/AUTO DIFF WBC: CPT | Performed by: INTERNAL MEDICINE

## 2017-06-23 PROCEDURE — 87075 CULTR BACTERIA EXCEPT BLOOD: CPT | Performed by: PODIATRIST

## 2017-06-23 DEVICE — ALLOGRFT AMNIO AMNIOFIX 4X4CM: Type: IMPLANTABLE DEVICE | Site: FOOT | Status: FUNCTIONAL

## 2017-06-23 RX ORDER — MORPHINE SULFATE 2 MG/ML
2 INJECTION, SOLUTION INTRAMUSCULAR; INTRAVENOUS
Status: DISCONTINUED | OUTPATIENT
Start: 2017-06-23 | End: 2017-06-26 | Stop reason: HOSPADM

## 2017-06-23 RX ORDER — LIDOCAINE HYDROCHLORIDE 10 MG/ML
INJECTION, SOLUTION INFILTRATION; PERINEURAL AS NEEDED
Status: DISCONTINUED | OUTPATIENT
Start: 2017-06-23 | End: 2017-06-23 | Stop reason: HOSPADM

## 2017-06-23 RX ORDER — BUPIVACAINE HYDROCHLORIDE 5 MG/ML
INJECTION, SOLUTION EPIDURAL; INTRACAUDAL AS NEEDED
Status: DISCONTINUED | OUTPATIENT
Start: 2017-06-23 | End: 2017-06-23 | Stop reason: HOSPADM

## 2017-06-23 RX ORDER — MEPERIDINE HYDROCHLORIDE 50 MG/ML
INJECTION INTRAMUSCULAR; INTRAVENOUS; SUBCUTANEOUS AS NEEDED
Status: DISCONTINUED | OUTPATIENT
Start: 2017-06-23 | End: 2017-06-23 | Stop reason: SURG

## 2017-06-23 RX ORDER — MAGNESIUM SULFATE HEPTAHYDRATE 40 MG/ML
2 INJECTION, SOLUTION INTRAVENOUS ONCE
Status: COMPLETED | OUTPATIENT
Start: 2017-06-23 | End: 2017-06-23

## 2017-06-23 RX ORDER — BACITRACIN 50000 [IU]/1
INJECTION, POWDER, FOR SOLUTION INTRAMUSCULAR AS NEEDED
Status: DISCONTINUED | OUTPATIENT
Start: 2017-06-23 | End: 2017-06-23 | Stop reason: HOSPADM

## 2017-06-23 RX ORDER — MEPERIDINE HYDROCHLORIDE 50 MG/ML
50 INJECTION INTRAMUSCULAR; INTRAVENOUS; SUBCUTANEOUS ONCE
Status: DISCONTINUED | OUTPATIENT
Start: 2017-06-23 | End: 2017-06-23 | Stop reason: HOSPADM

## 2017-06-23 RX ORDER — PROPOFOL 10 MG/ML
VIAL (ML) INTRAVENOUS AS NEEDED
Status: DISCONTINUED | OUTPATIENT
Start: 2017-06-23 | End: 2017-06-23 | Stop reason: SURG

## 2017-06-23 RX ORDER — MIDAZOLAM HYDROCHLORIDE 1 MG/ML
INJECTION INTRAMUSCULAR; INTRAVENOUS AS NEEDED
Status: DISCONTINUED | OUTPATIENT
Start: 2017-06-23 | End: 2017-06-23 | Stop reason: SURG

## 2017-06-23 RX ORDER — KETAMINE HYDROCHLORIDE 50 MG/ML
INJECTION, SOLUTION, CONCENTRATE INTRAMUSCULAR; INTRAVENOUS AS NEEDED
Status: DISCONTINUED | OUTPATIENT
Start: 2017-06-23 | End: 2017-06-23 | Stop reason: SURG

## 2017-06-23 RX ADMIN — MORPHINE SULFATE 2 MG: 2 INJECTION, SOLUTION INTRAMUSCULAR; INTRAVENOUS at 11:27

## 2017-06-23 RX ADMIN — HYDROCODONE BITARTRATE AND ACETAMINOPHEN 1 TABLET: 5; 325 TABLET ORAL at 21:08

## 2017-06-23 RX ADMIN — MORPHINE SULFATE 2 MG: 2 INJECTION, SOLUTION INTRAMUSCULAR; INTRAVENOUS at 03:47

## 2017-06-23 RX ADMIN — FOLIC ACID 1 MG: 1 TABLET ORAL at 15:46

## 2017-06-23 RX ADMIN — PROPOFOL 10 MG: 10 INJECTION, EMULSION INTRAVENOUS at 14:11

## 2017-06-23 RX ADMIN — PIPERACILLIN SODIUM AND TAZOBACTAM SODIUM 3.38 G: 3; .375 INJECTION, POWDER, FOR SOLUTION INTRAVENOUS at 02:32

## 2017-06-23 RX ADMIN — DULOXETINE HYDROCHLORIDE 30 MG: 30 CAPSULE, DELAYED RELEASE ORAL at 20:16

## 2017-06-23 RX ADMIN — MIDAZOLAM HYDROCHLORIDE 2 MG: 1 INJECTION, SOLUTION INTRAMUSCULAR; INTRAVENOUS at 14:02

## 2017-06-23 RX ADMIN — Medication 100 MG: at 15:46

## 2017-06-23 RX ADMIN — MAGNESIUM SULFATE HEPTAHYDRATE 2 G: 40 INJECTION, SOLUTION INTRAVENOUS at 05:45

## 2017-06-23 RX ADMIN — Medication: at 03:00

## 2017-06-23 RX ADMIN — VANCOMYCIN HYDROCHLORIDE 1500 MG: 500 INJECTION, POWDER, LYOPHILIZED, FOR SOLUTION INTRAVENOUS at 22:32

## 2017-06-23 RX ADMIN — MAGNESIUM SULFATE IN WATER 2 G: 40 INJECTION, SOLUTION INTRAVENOUS at 15:39

## 2017-06-23 RX ADMIN — METOPROLOL TARTRATE 25 MG: 25 TABLET ORAL at 04:42

## 2017-06-23 RX ADMIN — CLOPIDOGREL BISULFATE 75 MG: 75 TABLET ORAL at 15:46

## 2017-06-23 RX ADMIN — MORPHINE SULFATE 2 MG: 2 INJECTION, SOLUTION INTRAMUSCULAR; INTRAVENOUS at 07:51

## 2017-06-23 RX ADMIN — METOPROLOL TARTRATE 25 MG: 25 TABLET ORAL at 20:16

## 2017-06-23 RX ADMIN — MORPHINE SULFATE 1 MG: 2 INJECTION, SOLUTION INTRAMUSCULAR; INTRAVENOUS at 00:31

## 2017-06-23 RX ADMIN — MEPERIDINE HYDROCHLORIDE 50 MG: 50 INJECTION INTRAMUSCULAR; INTRAVENOUS; SUBCUTANEOUS at 14:04

## 2017-06-23 RX ADMIN — KETAMINE HYDROCHLORIDE 25 MG: 50 INJECTION, SOLUTION INTRAMUSCULAR; INTRAVENOUS at 14:05

## 2017-06-23 RX ADMIN — ATORVASTATIN CALCIUM 40 MG: 40 TABLET, FILM COATED ORAL at 21:47

## 2017-06-23 RX ADMIN — LOSARTAN POTASSIUM 50 MG: 50 TABLET, FILM COATED ORAL at 15:46

## 2017-06-23 RX ADMIN — PROPOFOL 10 MG: 10 INJECTION, EMULSION INTRAVENOUS at 14:06

## 2017-06-23 RX ADMIN — PANTOPRAZOLE SODIUM 40 MG: 40 TABLET, DELAYED RELEASE ORAL at 17:05

## 2017-06-23 RX ADMIN — MULTIPLE VITAMINS W/ MINERALS TAB 1 TABLET: TAB at 15:46

## 2017-06-23 RX ADMIN — KETAMINE HYDROCHLORIDE 25 MG: 50 INJECTION, SOLUTION INTRAMUSCULAR; INTRAVENOUS at 14:10

## 2017-06-23 RX ADMIN — MELATONIN TAB 3 MG 6 MG: 3 TAB at 20:16

## 2017-06-23 RX ADMIN — VANCOMYCIN HYDROCHLORIDE 1500 MG: 500 INJECTION, POWDER, LYOPHILIZED, FOR SOLUTION INTRAVENOUS at 04:43

## 2017-06-23 RX ADMIN — PIPERACILLIN SODIUM AND TAZOBACTAM SODIUM 3.38 G: 3; .375 INJECTION, POWDER, FOR SOLUTION INTRAVENOUS at 20:17

## 2017-06-23 RX ADMIN — PIPERACILLIN SODIUM AND TAZOBACTAM SODIUM 3.38 G: 3; .375 INJECTION, POWDER, FOR SOLUTION INTRAVENOUS at 14:00

## 2017-06-23 RX ADMIN — PROPOFOL 100 MCG/KG/MIN: 10 INJECTION, EMULSION INTRAVENOUS at 14:15

## 2017-06-23 RX ADMIN — NICOTINE 1 PATCH: 14 PATCH TRANSDERMAL at 20:16

## 2017-06-23 RX ADMIN — NYSTATIN 500000 UNITS: 100000 SUSPENSION ORAL at 17:05

## 2017-06-23 RX ADMIN — HYDROCODONE BITARTRATE AND ACETAMINOPHEN 1 TABLET: 5; 325 TABLET ORAL at 17:05

## 2017-06-23 RX ADMIN — NYSTATIN 500000 UNITS: 100000 SUSPENSION ORAL at 20:15

## 2017-06-23 RX ADMIN — MORPHINE SULFATE 2 MG: 2 INJECTION, SOLUTION INTRAMUSCULAR; INTRAVENOUS at 05:45

## 2017-06-23 RX ADMIN — ACETAMINOPHEN 650 MG: 325 TABLET, FILM COATED ORAL at 01:45

## 2017-06-23 NOTE — SIGNIFICANT NOTE
06/23/17 1019   Rehab Treatment   Discipline physical therapy assistant   Treatment Not Performed other (see comments)  (Nurse requested to hold treatment this date. Pt sleeping.  Therapy will f/u with pt at a later time.  )

## 2017-06-23 NOTE — PLAN OF CARE
Problem: Cellulitis (Adult)  Goal: Signs and Symptoms of Listed Potential Problems Will be Absent or Manageable (Cellulitis)  Outcome: Ongoing (interventions implemented as appropriate)    Problem: Infection, Risk/Actual (Adult)  Goal: Infection Prevention/Resolution  Outcome: Ongoing (interventions implemented as appropriate)    Problem: Patient Care Overview (Adult)  Goal: Plan of Care Review  Outcome: Ongoing (interventions implemented as appropriate)  Goal: Adult Individualization and Mutuality  Outcome: Ongoing (interventions implemented as appropriate)  Goal: Discharge Needs Assessment  Outcome: Ongoing (interventions implemented as appropriate)    Problem: Fall Risk (Adult)  Goal: Identify Related Risk Factors and Signs and Symptoms  Outcome: Ongoing (interventions implemented as appropriate)  Goal: Absence of Falls  Outcome: Ongoing (interventions implemented as appropriate)

## 2017-06-23 NOTE — PROGRESS NOTES
[facility]    PROGRESS NOTE    Name:  Vasquez Marie   Age:  56 y.o.  Sex:  male  :  1960  MRN:  [id number]   Visit Number:  [id number]  Admission Date:  2017  Date Of Service:  17  Primary Care Physician:  Marilyn K. Vermeesch, MD     LOS: 2 days :  Patient Care Team:  Marilyn K Vermeesch, MD as PCP - General (Internal Medicine):    History taken from:     patient    Chief Complaint:      Headache  Subjective     Interval History:     Patient seen today.  Patient is a 57-year-old male who has left lower extremity cellulitis and ischemic third digit.  He is due to go to surgery today.  He has osteomyelitis, he is on vancomycin and Zosyn.  He had intervention with Dr. Storm has had occlusion of the SFA and popliteal arteries.  He is on Plavix.  Dr. Campbell is taking him to surgery today.  He claims he has a headache.  CT of brain was checked which was negative.  He also has oral pain which is diffuse.  We will start him on nystatin swish and spit.  He is requiring 1 unit of PRBCs this morning as his hemoglobin 7.5.  He does have heme positive stool.  He had a GI bleed within the past couple weeks for which she had a colonoscopy and EGD, we will attempt to obtain results of that.  He denies any chest pressure, shortness of breath, nausea, or vomiting.  He claims to have extreme pain in his foot.  Morphine was increased last night.    Review of Systems:     All systems were reviewed and negative except for:  ENT:  positive for sore mouth  Musculoskeletal: positive for  bone pain  Neurological: positive for  headaches    Objective     Vital Signs:    Temp:  [97.5 °F (36.4 °C)-98.2 °F (36.8 °C)] 97.5 °F (36.4 °C)  Heart Rate:  [71-82] 76  Resp:  [18-20] 18  BP: ()/(56-88) 149/88    Physical Exam:      General Appearance:    Alert, cooperative, in no acute distress   Head:    Normocephalic, without obvious abnormality, atraumatic   Eyes:            Lids and lashes  normal, conjunctivae and sclerae normal, no   icterus, no pallor, corneas clear, PERRLA   Ears:    Ears appear intact with no abnormalities noted   Throat:   No oral lesions, no thrush, oral mucosa moist   Neck:   No adenopathy, supple, trachea midline, no thyromegaly, no   carotid bruit, no JVD   Back:     No kyphosis present, no scoliosis present, no skin lesions,      erythema or scars, no tenderness to percussion or                   palpation,   range of motion normal   Lungs:     Clear to auscultation,respirations regular, even and                  unlabored    Heart:    Regular rhythm and normal rate, normal S1 and S2, no            murmur, no gallop, no rub, no click   Chest Wall:    No abnormalities observed   Abdomen:     Normal bowel sounds, no masses, no organomegaly, soft        non-tender, non-distended, no guarding, no rebound                tenderness   Rectal:     Deferred   Extremities:   Moves all extremities well, no edema, no cyanosis, Left foot is swollen and wrapped and draining.     Pulses:   Pulses palpable and equal bilaterally   Skin:   No bleeding, bruising or rash   Lymph nodes:   No palpable adenopathy   Neurologic:   Cranial nerves 2 - 12 grossly intact, sensation intact, DTR       present and equal bilaterally        Results Review:      I reviewed the patient's new clinical results.    Labs:    Lab Results (last 24 hours)     Procedure Component Value Units Date/Time    Wound Culture [[id number]] Collected:  06/22/17 1620    Specimen:  Wound from Foot, Left Updated:  06/22/17 1738    Renal Function Panel [[id number]]  (Abnormal) Collected:  06/23/17 0342    Specimen:  Blood Updated:  06/23/17 0456     Glucose 135 (H) mg/dL      BUN 16 mg/dL      Creatinine 1.00 mg/dL      Sodium 145 mmol/L      Potassium 3.5 mmol/L      Chloride 110 (H) mmol/L      CO2 27.0 mmol/L      Calcium 7.1 (L) mg/dL      Albumin 2.60 (L) g/dL      Phosphorus 2.5 mg/dL      Anion Gap 11.5 mmol/L       BUN/Creatinine Ratio 16.0     eGFR Non African Amer 77 mL/min/1.73     Narrative:       Abnormal estimated GFR should be followed by more specific studies to confirm end stage chronic renal disease. The equation used for calculation may not be accurate for patients less than 19 years old, greater than 70 years old, patients at extremes of weight, malnutrition, or with acute renal dysfunction.    Vancomycin, Trough [[id number]]  (Normal) Collected:  06/23/17 0342    Specimen:  Blood Updated:  06/23/17 0502     Vancomycin Trough 10.01 mcg/mL     Magnesium [[id number]]  (Abnormal) Collected:  06/23/17 0342    Specimen:  Blood Updated:  06/23/17 0504     Magnesium 1.1 (C) mg/dL     CBC & Differential [[id number]] Collected:  06/23/17 0342    Specimen:  Blood Updated:  06/23/17 0533    Narrative:       The following orders were created for panel order CBC & Differential.  Procedure                               Abnormality         Status                     ---------                               -----------         ------                     CBC Auto Differential[[id number]]        Abnormal            Final result                 Please view results for these tests on the individual orders.    CBC Auto Differential [[id number]]  (Abnormal) Collected:  06/23/17 0342    Specimen:  Blood Updated:  06/23/17 0533     WBC 3.43 (L) 10*3/mm3      RBC 2.54 (L) 10*6/mm3      Hemoglobin 7.7 (C) g/dL      Hematocrit 24.0 (L) %      MCV 94.5 (H) fL      MCH 30.3 pg      MCHC 32.1 g/dL      RDW 16.0 (H) %      RDW-SD 54.4 (H) fl      MPV 11.2 fL      Platelets 111 (L) 10*3/mm3      Neutrophil % 51.3 %      Lymphocyte % 28.9 %      Monocyte % 8.7 %      Eosinophil % 10.2 (H) %      Basophil % 0.3 %      Immature Grans % 0.6 %      Neutrophils, Absolute 1.76 (L) 10*3/mm3      Lymphocytes, Absolute 0.99 10*3/mm3      Monocytes, Absolute 0.30 10*3/mm3      Eosinophils, Absolute 0.35 10*3/mm3      Basophils, Absolute 0.01 10*3/mm3       Immature Grans, Absolute 0.02 10*3/mm3      nRBC 0.0 /100 WBC     Iron Profile [[id number]]  (Abnormal) Collected:  06/23/17 0342    Specimen:  Blood Updated:  06/23/17 0748     Iron 25 (L) mcg/dL      TIBC 364 mcg/dL      Iron Saturation 7 (L) %     Hemoglobin & Hematocrit, Blood [[id number]]  (Abnormal) Collected:  06/23/17 0742    Specimen:  Blood Updated:  06/23/17 0805     Hemoglobin 7.5 (C) g/dL      Hematocrit 24.0 (L) %            Radiology:    Imaging Results (last 24 hours)     Procedure Component Value Units Date/Time    CT Head Without Contrast [[id number]] Collected:  06/23/17 1125     Updated:  06/23/17 1128    Narrative:       PROCEDURE: CT HEAD WO CONTRAST-     HISTORY: headache; S92.502A-Displaced unspecified fracture of left  lesser toe(s), initial encounter for closed fracture; L03.116-Cellulitis  of left lower limb; I73.9-Peripheral vascular disease, unspecified;  Z72.0-Tobacco use; Z74.09-Other reduced mobility; Z74.09-Other reduced  mobility     COMPARISON:  None .     TECHNIQUE: Multiple axial CT images were performed from the foramen  magnum to the vertex without enhancement.      FINDINGS: There is no CT evidence of hemorrhage. There is no mass, mass  effect or midline shift.  There is no hydrocephalus. The paranasal  sinuses are clear. Bone windows reveal no acute osseous abnormalities.       Impression:       No acute intracranial process.             979.48 mGy.cm          This study was performed with techniques to keep radiation doses as low  as reasonably achievable (ALARA). Individualized dose reduction  techniques using automated exposure control or adjustment of mA and/or  kV according to the patient size were employed.         This report was finalized on 6/23/2017 11:26 AM by Padmini Swan M.D..          Medication Review:       amiodarone 200 mg Oral Q24H   atorvastatin 40 mg Oral Nightly   clopidogrel 75 mg Oral Daily   DULoxetine 30 mg Oral Nightly   folic acid 1 mg Oral  Daily   losartan 50 mg Oral Q24H   melatonin 6 mg Oral Nightly   metoprolol tartrate 25 mg Oral Q12H   multivitamin with minerals 1 tablet Oral Daily   nicotine 1 patch Transdermal Q24H   nystatin 5 mL Oral 4x Daily   pantoprazole 40 mg Oral BID AC   piperacillin-tazobactam 3.375 g Intravenous Q6H   thiamine 100 mg Oral Daily   vancomycin 1,500 mg Intravenous Q18H         lactated ringers 100 mL/hr Last Rate: 100 mL/hr (06/22/17 1720)   Pharmacy to dose vancomycin         Assessment/Plan     Problem List Items Addressed This Visit     Tobacco abuse    Cellulitis of foot, left    Relevant Orders    Case Request Cath Lab: Peripheral angiography (Completed)    Cardiac Catheterization/Vascular Study (Completed)    Case Request (Completed)    Closed fracture of fourth toe of left foot - Primary      Other Visit Diagnoses     Peripheral vascular disease of lower extremity                1. Left lower extremity cellulitis on IV vancomycin and Zosyn  2. Dry gangrene with ischemic third digit of the left lower extremity  3. Osteomyelitis of the third and fourth digit on the left lower extremity  4. Anemia suspect due to chronic blood loss. He did have a GI bleed in May at .We'll transfuse 1 unit of PRBCs.  Obtain results of EGD and colonoscopy.  5. Alcohol abuse, patient claims he has not drank for 2 months. He has no signs or symptoms of withdrawal. Discontinue alcohol withdrawal protocol  6. Peripheral arterial disease, Dr. Storm performed intervention on the left SFA and popliteal arteries.  Currently on Plavix.  7. Hypertension controlled  8. Atrial flutter  9. Coronary artery disease status post stent placement  10. Tobacco abuse  11. Liver mass with follow-up scheduled at the liver clinic at  August 2  12. 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 Borderline diabetes  14. COPD  15. Obesity  16. Rheumatoid arthritis  17. Chronic kidney disease stage III  18. Renal insufficiency  resolved.  19. Oral thrush  20. Headache  21.  Hypomagnesemia  Plan:    We'll add nystatin swish and spit.  Check a CT of the brain, this showed no intracranial process.  Transfuse 1 unit of PRBCs.  Obtain results of EGD and colonoscopy done at .  Continue on Plavix for now.  Patient to have surgery with Dr. Campbell today.  Continue with vancomycin and Zosyn.  Check lab work in the a.m.  We'll give 4 g of magnesium.  Recheck lab work in the a.m.  Further recommendations will depend on clinical course.    Murali Garber,   06/23/17  12:49 PM

## 2017-06-23 NOTE — PLAN OF CARE
Problem: Patient Care Overview (Adult)  Goal: Plan of Care Review  Outcome: Ongoing (interventions implemented as appropriate)    06/22/17 1523 06/23/17 0351   Coping/Psychosocial Response Interventions   Plan Of Care Reviewed With patient --    Patient Care Overview   Progress --  progress toward functional goals as expected   Outcome Evaluation   Outcome Summary/Follow up Plan --  PT. UNABLE TO SLEEP DESPITE MELATONIN DOSE GIVEN. OBTAINED AN ORDER FROM MD FOR MORHINE 2MG, Q2H PRN FOR PAIN . PT. READY FOR SURGERY IN THE MORNING       Goal: Adult Individualization and Mutuality  Outcome: Ongoing (interventions implemented as appropriate)  Goal: Discharge Needs Assessment  Outcome: Ongoing (interventions implemented as appropriate)    Problem: Fall Risk (Adult)  Goal: Identify Related Risk Factors and Signs and Symptoms  Outcome: Ongoing (interventions implemented as appropriate)  Goal: Absence of Falls  Outcome: Ongoing (interventions implemented as appropriate)

## 2017-06-23 NOTE — SIGNIFICANT NOTE
06/23/17 1020   Rehab Treatment   Discipline occupational therapist   Treatment Not Performed other (see comments)  (Pt on hold per RN as pt is scheduled for surgery later today and is resting right now.  Will follow up with pt another time.)

## 2017-06-23 NOTE — H&P (VIEW-ONLY)
[facility]    Name:  Vasquez Maire   Age:  56 y.o.  Sex:  male  :  1960  MRN:  [id number]   Visit Number:  [id number]  Admission Date:  2017  Date Of Service:  17  Primary Care Physician:  Marilyn K. Vermeesch, MD     LOS: 2 days :  Patient Care Team:  Marilyn K Vermeesch, MD as PCP - General (Internal Medicine):    History taken from:     patient    Chief Complaint:      Headache  Subjective     Interval History:     Patient seen today.  Patient is a 57-year-old male who has left lower extremity cellulitis and ischemic third digit.  He is due to go to surgery today.  He has osteomyelitis, he is on vancomycin and Zosyn.  He had intervention with Dr. Storm has had occlusion of the SFA and popliteal arteries.  He is on Plavix.  Dr. Campbell is taking him to surgery today.  He claims he has a headache.  CT of brain was checked which was negative.  He also has oral pain which is diffuse.  We will start him on nystatin swish and spit.  He is requiring 1 unit of PRBCs this morning as his hemoglobin 7.5.  He does have heme positive stool.  He had a GI bleed within the past couple weeks for which she had a colonoscopy and EGD, we will attempt to obtain results of that.  He denies any chest pressure, shortness of breath, nausea, or vomiting.  He claims to have extreme pain in his foot.  Morphine was increased last night.    Review of Systems:     All systems were reviewed and negative except for:  ENT:  positive for sore mouth  Musculoskeletal: positive for  bone pain  Neurological: positive for  headaches    Objective     Vital Signs:    Temp:  [97.5 °F (36.4 °C)-98.2 °F (36.8 °C)] 97.5 °F (36.4 °C)  Heart Rate:  [71-82] 76  Resp:  [18-20] 18  BP: ()/(56-88) 149/88    Physical Exam:      General Appearance:    Alert, cooperative, in no acute distress   Head:    Normocephalic, without obvious abnormality, atraumatic   Eyes:            Lids and lashes  normal, conjunctivae and sclerae normal, no   icterus, no pallor, corneas clear, PERRLA   Ears:    Ears appear intact with no abnormalities noted   Throat:   No oral lesions, no thrush, oral mucosa moist   Neck:   No adenopathy, supple, trachea midline, no thyromegaly, no   carotid bruit, no JVD   Back:     No kyphosis present, no scoliosis present, no skin lesions,      erythema or scars, no tenderness to percussion or                   palpation,   range of motion normal   Lungs:     Clear to auscultation,respirations regular, even and                  unlabored    Heart:    Regular rhythm and normal rate, normal S1 and S2, no            murmur, no gallop, no rub, no click   Chest Wall:    No abnormalities observed   Abdomen:     Normal bowel sounds, no masses, no organomegaly, soft        non-tender, non-distended, no guarding, no rebound                tenderness   Rectal:     Deferred   Extremities:   Moves all extremities well, no edema, no cyanosis, Left foot is swollen and wrapped and draining.     Pulses:   Pulses palpable and equal bilaterally   Skin:   No bleeding, bruising or rash   Lymph nodes:   No palpable adenopathy   Neurologic:   Cranial nerves 2 - 12 grossly intact, sensation intact, DTR       present and equal bilaterally        Results Review:      I reviewed the patient's new clinical results.    Labs:    Lab Results (last 24 hours)     Procedure Component Value Units Date/Time    Wound Culture [[id number]] Collected:  06/22/17 1620    Specimen:  Wound from Foot, Left Updated:  06/22/17 1738    Renal Function Panel [[id number]]  (Abnormal) Collected:  06/23/17 0342    Specimen:  Blood Updated:  06/23/17 0456     Glucose 135 (H) mg/dL      BUN 16 mg/dL      Creatinine 1.00 mg/dL      Sodium 145 mmol/L      Potassium 3.5 mmol/L      Chloride 110 (H) mmol/L      CO2 27.0 mmol/L      Calcium 7.1 (L) mg/dL      Albumin 2.60 (L) g/dL      Phosphorus 2.5 mg/dL      Anion Gap 11.5 mmol/L       BUN/Creatinine Ratio 16.0     eGFR Non African Amer 77 mL/min/1.73     Narrative:       Abnormal estimated GFR should be followed by more specific studies to confirm end stage chronic renal disease. The equation used for calculation may not be accurate for patients less than 19 years old, greater than 70 years old, patients at extremes of weight, malnutrition, or with acute renal dysfunction.    Vancomycin, Trough [[id number]]  (Normal) Collected:  06/23/17 0342    Specimen:  Blood Updated:  06/23/17 0502     Vancomycin Trough 10.01 mcg/mL     Magnesium [[id number]]  (Abnormal) Collected:  06/23/17 0342    Specimen:  Blood Updated:  06/23/17 0504     Magnesium 1.1 (C) mg/dL     CBC & Differential [[id number]] Collected:  06/23/17 0342    Specimen:  Blood Updated:  06/23/17 0533    Narrative:       The following orders were created for panel order CBC & Differential.  Procedure                               Abnormality         Status                     ---------                               -----------         ------                     CBC Auto Differential[[id number]]        Abnormal            Final result                 Please view results for these tests on the individual orders.    CBC Auto Differential [[id number]]  (Abnormal) Collected:  06/23/17 0342    Specimen:  Blood Updated:  06/23/17 0533     WBC 3.43 (L) 10*3/mm3      RBC 2.54 (L) 10*6/mm3      Hemoglobin 7.7 (C) g/dL      Hematocrit 24.0 (L) %      MCV 94.5 (H) fL      MCH 30.3 pg      MCHC 32.1 g/dL      RDW 16.0 (H) %      RDW-SD 54.4 (H) fl      MPV 11.2 fL      Platelets 111 (L) 10*3/mm3      Neutrophil % 51.3 %      Lymphocyte % 28.9 %      Monocyte % 8.7 %      Eosinophil % 10.2 (H) %      Basophil % 0.3 %      Immature Grans % 0.6 %      Neutrophils, Absolute 1.76 (L) 10*3/mm3      Lymphocytes, Absolute 0.99 10*3/mm3      Monocytes, Absolute 0.30 10*3/mm3      Eosinophils, Absolute 0.35 10*3/mm3      Basophils, Absolute 0.01 10*3/mm3       Immature Grans, Absolute 0.02 10*3/mm3      nRBC 0.0 /100 WBC     Iron Profile [[id number]]  (Abnormal) Collected:  06/23/17 0342    Specimen:  Blood Updated:  06/23/17 0748     Iron 25 (L) mcg/dL      TIBC 364 mcg/dL      Iron Saturation 7 (L) %     Hemoglobin & Hematocrit, Blood [[id number]]  (Abnormal) Collected:  06/23/17 0742    Specimen:  Blood Updated:  06/23/17 0805     Hemoglobin 7.5 (C) g/dL      Hematocrit 24.0 (L) %            Radiology:    Imaging Results (last 24 hours)     Procedure Component Value Units Date/Time    CT Head Without Contrast [[id number]] Collected:  06/23/17 1125     Updated:  06/23/17 1128    Narrative:       PROCEDURE: CT HEAD WO CONTRAST-     HISTORY: headache; S92.502A-Displaced unspecified fracture of left  lesser toe(s), initial encounter for closed fracture; L03.116-Cellulitis  of left lower limb; I73.9-Peripheral vascular disease, unspecified;  Z72.0-Tobacco use; Z74.09-Other reduced mobility; Z74.09-Other reduced  mobility     COMPARISON:  None .     TECHNIQUE: Multiple axial CT images were performed from the foramen  magnum to the vertex without enhancement.      FINDINGS: There is no CT evidence of hemorrhage. There is no mass, mass  effect or midline shift.  There is no hydrocephalus. The paranasal  sinuses are clear. Bone windows reveal no acute osseous abnormalities.       Impression:       No acute intracranial process.             979.48 mGy.cm          This study was performed with techniques to keep radiation doses as low  as reasonably achievable (ALARA). Individualized dose reduction  techniques using automated exposure control or adjustment of mA and/or  kV according to the patient size were employed.         This report was finalized on 6/23/2017 11:26 AM by Padmini Swan M.D..          Medication Review:       amiodarone 200 mg Oral Q24H   atorvastatin 40 mg Oral Nightly   clopidogrel 75 mg Oral Daily   DULoxetine 30 mg Oral Nightly   folic acid 1 mg Oral  Daily   losartan 50 mg Oral Q24H   melatonin 6 mg Oral Nightly   metoprolol tartrate 25 mg Oral Q12H   multivitamin with minerals 1 tablet Oral Daily   nicotine 1 patch Transdermal Q24H   nystatin 5 mL Oral 4x Daily   pantoprazole 40 mg Oral BID AC   piperacillin-tazobactam 3.375 g Intravenous Q6H   thiamine 100 mg Oral Daily   vancomycin 1,500 mg Intravenous Q18H         lactated ringers 100 mL/hr Last Rate: 100 mL/hr (06/22/17 7389)   Pharmacy to dose vancomycin         Assessment/Plan     Problem List Items Addressed This Visit     Tobacco abuse    Cellulitis of foot, left    Relevant Orders    Case Request Cath Lab: Peripheral angiography (Completed)    Cardiac Catheterization/Vascular Study (Completed)    Case Request (Completed)    Closed fracture of fourth toe of left foot - Primary      Other Visit Diagnoses     Peripheral vascular disease of lower extremity                1. Left lower extremity cellulitis on IV vancomycin and Zosyn  2. Dry gangrene with ischemic third digit of the left lower extremity  3. Osteomyelitis of the third and fourth digit on the left lower extremity  4. Anemia suspect due to chronic blood loss. He did have a GI bleed in May at .We'll transfuse 1 unit of PRBCs.  Obtain results of EGD and colonoscopy.  5. Alcohol abuse, patient claims he has not drank for 2 months. He has no signs or symptoms of withdrawal. Discontinue alcohol withdrawal protocol  6. Peripheral arterial disease, Dr. Storm performed intervention on the left SFA and popliteal arteries.  Currently on Plavix.  7. Hypertension controlled  8. Atrial flutter  9. Coronary artery disease status post stent placement  10. Tobacco abuse  11. Liver mass with follow-up scheduled at the liver clinic at  August 2  12. 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 Borderline diabetes  14. COPD  15. Obesity  16. Rheumatoid arthritis  17. Chronic kidney disease stage III  18. Renal insufficiency  resolved.  19. Oral thrush  20. Headache  21.  Hypomagnesemia  Plan:    We'll add nystatin swish and spit.  Check a CT of the brain, this showed no intracranial process.  Transfuse 1 unit of PRBCs.  Obtain results of EGD and colonoscopy done at .  Continue on Plavix for now.  Patient to have surgery with Dr. Campbell today.  Continue with vancomycin and Zosyn.  Check lab work in the a.m.  We'll give 4 g of magnesium.  Recheck lab work in the a.m.  Further recommendations will depend on clinical course.    Murali Garber,   06/23/17  12:49 PM

## 2017-06-23 NOTE — PHARMACY RECOMMENDATION
"Pharmacokinetic Follow-up Note - Vancomycin     Vasquez Marie is a 56 y.o. male  68\" (172.7 cm) 227 lb 12.8 oz (103 kg)     Indication for use: Cellulitis; poss.osteomyelitis.      Results from last 7 days     Lab Units 06/23/17  0342 06/22/17  0520 06/21/17  0510   WBC 10*3/mm3 3.43* 3.89* 7.00   CREATININE mg/dL 1.00 1.20 1.50*      Estimated Creatinine Clearance: 93.6 mL/min (by C-G formula based on Cr of 1).  Temp Readings from Last 1 Encounters:   06/23/17 98.2 °F (36.8 °C) (Oral)     Lab Results   Component Value Date    [facility] 10.01 06/23/2017       Microbiology:  Microbiology Results (last 10 days)       ** No results found for the last 240 hours. **            Current Vancomycin Dose:  1500 mg IV q 18 hrs, day 3 of therapy.    Other Antimicrobials: Piperacillin/Tazobactam (Zosyn) 3.375 gm IV q 6 hrs      Assessment/Plan:  Vancomycin trough (6/23 0330) = 10.01 mcg/mL.  Im view of upcoming aputation of gangrenous toe(s) with poss.osteomyelitis, did not change dose and/or frequency.    Pharmacy will continue to monitor renal function and adjust dose accordingly.    Genaro Atkins [facility]   06/23/17 8:44 AM  "

## 2017-06-23 NOTE — PLAN OF CARE
Problem: Patient Care Overview (Adult)  Goal: Plan of Care Review  Outcome: Ongoing (interventions implemented as appropriate)    06/23/17 1525   Coping/Psychosocial Response Interventions   Plan Of Care Reviewed With patient   Outcome Evaluation   Outcome Summary/Follow up Plan pacu discharge criteria met.

## 2017-06-23 NOTE — ANESTHESIA PREPROCEDURE EVALUATION
Anesthesia Evaluation     Patient summary reviewed and Nursing notes reviewed   NPO Solid Status: > 8 hours  NPO Liquid Status: > 8 hours     Airway   Mallampati: III  TM distance: <3 FB  Neck ROM: full  difficult intubation highly probable and possible difficult intubation  Dental          Pulmonary    (+) a smoker Current, COPD, asthma, sleep apnea, rhonchi, decreased breath sounds,     ROS comment: 30 Pack years smoking  Cardiovascular     ECG reviewed  PT is on anticoagulation therapy  Patient on routine beta blocker and Beta blocker given within 24 hours of surgery  Rhythm: regular  Rate: normal    (+) hypertension, CAD, cardiac stents within the past 12 months dysrhythmias Atrial Flutter, angina, CHF, PVD, DVT resolved, hyperlipidemia    ROS comment: SR, right axis deviation,  probable old inferior infarct    Neuro/Psych  (+) CVA, psychiatric history Anxiety and Depression, poor historian.,    GI/Hepatic/Renal/Endo    (+) obesity, morbid obesity, GERD, PUD, hepatitis, liver disease,     Musculoskeletal     (+) arthralgias, back pain, chronic pain,   Abdominal   (+) obese,    Substance History      OB/GYN          Other   (+) arthritis     ROS/Med Hx Other: H/H 7.5/24  PT 20.9 INR 1.91  Gluc 135 K 3.5  Probable tolerance to anes meds  Transesophageal Echo  Interpretation Summary   · Left ventricular function is normal. Estimated EF = 55%.  · Left atrial cavity size is mildly dilated.  · There is aortic sclerosis without aortic stenosis or regurgitation.  · Moderate mitral valve regurgitation is present  · Mild to moderate tricuspid valve regurgitation is present.  · No evidence of intracardiac thrombus or mass, clear left atrial appendage.                              Anesthesia Plan    ASA 4     MAC   (Pt told that intravenous sedation will be used as the primary anesthetic along with local anesthesia if necessary. Every effort will be made to make sure the patient is comfortable.     The patient was told  they may or may not have recall for the procedure. It was further explained that if the MAC was not adequate that a general anesthetic with either an LMA or endotracheal tube would be required.   Will proceed with the plan of care.)  intravenous induction   Anesthetic plan and risks discussed with patient.

## 2017-06-23 NOTE — ANESTHESIA POSTPROCEDURE EVALUATION
Patient: Vasquez Marie    Procedure Summary     Date Anesthesia Start Anesthesia Stop Room / Location    06/23/17 1400   [facility]       Procedure Diagnosis Surgeon Provider    amputation of left foot third digit, left foot wound debridments and grafting (Left Ankle) Cellulitis of foot, left  (Cellulitis of foot, left [L03.116]) ADRIANO Mckeon CRNA          Anesthesia Type: MAC  Last vitals  BP      Temp      Pulse     Resp      SpO2        Post Anesthesia Care and Evaluation    Patient location during evaluation: PHASE II  Patient participation: complete - patient participated  Level of consciousness: awake  Pain score: 0  Pain management: adequate  Airway patency: patent  Anesthetic complications: No anesthetic complications  PONV Status: none  Cardiovascular status: acceptable  Respiratory status: acceptable and face mask  Hydration status: acceptable    Comments: vsss resp spont, reflexes intact, responsive, report given to pacu nurse, on o2 fm

## 2017-06-23 NOTE — PLAN OF CARE
Problem: Perioperative Period (Adult)  Goal: Signs and Symptoms of Listed Potential Problems Will be Absent or Manageable (Perioperative Period)  Outcome: Ongoing (interventions implemented as appropriate)    06/23/17 1340   Perioperative Period   Problems Assessed (Perioperative Period) all

## 2017-06-23 NOTE — PLAN OF CARE
Problem: Perioperative Period (Adult)  Intervention: Promote Pulmonary Hygiene and Secretion Clearance    06/23/17 1451   Promote Aggressive Pulmonary Hygiene/Secretion Management   Cough And Deep Breathing done independently per patient   Positioning   Head Of Bed (HOB) Position HOB at 30-45 degrees         06/23/17 1451   Promote Aggressive Pulmonary Hygiene/Secretion Management   Cough And Deep Breathing done independently per patient   Positioning   Head Of Bed (HOB) Position HOB at 30-45 degrees       Intervention: Monitor/Manage Pain    06/23/17 1451   Safety Interventions   Medication Review/Management medications reviewed   Manage Acute Burn Pain   Pain Management Interventions pillow support       Intervention: Monitor/Manage Postoperative Bleeding    06/23/17 1451   Safety Interventions   Bleeding Management dressing monitored       Intervention: Promote Normothermia    06/23/17 1451   Cardiac Interventions   Warming Thermoregulation Maintenance skin exposure time minimized

## 2017-06-23 NOTE — OP NOTE
DATE OF PROCEDURE:   06/23/2017     PREOPERATIVE DIAGNOSES:   1.  Left 3rd digit gangrene.    2.  Left foot multiple wounds.   3.  Left lower extremity peripheral arterial disease.    4.  Left 4th digit possible osteomyelitis.      POSTOPERATIVE DIAGNOSES:   1.  Left 3rd digit gangrene.    2.  Left foot multiple wounds.   3.  Left lower extremity peripheral arterial disease.    4.  Left 4th digit possible osteomyelitis.      PROCEDURES PERFORMED:   1.  Left foot 3rd digit amputation (CPT code 76152).   2.  Left foot incision and drainage (CPT code 38224).   3.  Left foot wound debridement, CPT code 20973 (wounds starting with the hallux measured 1.5 x 1.5, wound on the dorsal interphalangeal joint of the 2nd digit measured 0.8 x 0.7, the wound on the medial aspect of the 4th digit measured 1.6 x 1.2, and the wound at the 3rd digit amputation site measured 1 x 0.6).  Debridement on all wounds was down into the subcutaneous tissue layer.    4.  Application of skin graft substitute, CPT code 16530 (4 x 4 AmnioFix graft was used).     SURGEON: Wolfgang Campbell DPM     HEMOSTASIS: None.     ESTIMATED BLOOD LOSS: Less than 10 mL.      SPECIMENS: The 3rd toe that was amputated was sent to pathology for permanent specimen.  Tissue swabs from the 3rd digit amputation site at the metatarsophalangeal joint were taken and a chalky, milky, white substance which was expressed from the 4th digit as well as the interphalangeal joint of the 2nd digit was removed and sent to pathology for analysis.      COMPLICATIONS: None.     MATERIALS:  3-0 nylon and 4 x 4 AmnioFix graft.      ANESTHESIA: MAC with local.  Preoperative local consisted of 20 mL of 1:1 mixture of  0.5% Marcaine and 1% lidocaine plain.      INDICATIONS FOR PROCEDURE: The patient is a 56-year-old male with multiple other medical comorbidities including peripheral arterial disease to the left lower extremity.  He was admitted to [facility] for multiple wounds  on the left foot as well as gangrene of the 3rd digit.  Multiple services were consulted including Infectious Disease and Dr. Storm to evaluate him for his vascular status and cardiac status.      Patient states he has rheumatoid arthritis among many other health issues, but has not been on any medications lately.  He explained to me on his first consultation that all of his doctors have been changed due to his insurance and he has yet to establish care with a rheumatologist.  He also has medical issues such as coronary artery disease, atrial flutter, COPD, chronic kidney disease, and cirrhosis of the liver with hepatitis C.  He was admitted for full workup.  Dr. Storm performed vascular intervention earlier this week and felt the patient had adequate flow to all of his digits now.      X-ray examination of the left foot showed some bony changes or erosion to the proximal phalanx of the 4th digit.  The 3rd digit on x-ray appeared unremarkable.  MRI was also performed though which showed findings consistent with osteomyelitis of the proximal phalanx of both 3rd and 4th digits.      Clinically, he had faintly palpable pulses to the left lower extremity.  He had a large gangrenous necrotic ulceration to the dorsomedial aspect of the left hallux with 2 smaller wounds proximal to this.  He had a fibrotic wound over the 2nd interphalangeal joint.  He also had complete necrosis to the metatarsophalangeal joint level of the 3rd digit which was consistent with dry gangrene, but had been progressing to wet gangrene at the metatarsophalangeal joint since perfusion had been restored.  He had a necrotic ulcer to the medial aspect of the 4th digit.  It showed atrophic skin changes with brawny edema and thin waxy skin of the entire left lower extremity.      Dr. Storm and other specialists as well as myself discussed with the patient the need for surgical intervention in regards to the foot.  I discussed with Dr. Storm and he felt  that he had adequate perfusion to potentially heal the digital amputation site, but due to all of the other wounds, we had discussed that there may need to be further amputation in the future, especially considering the 4th digit had the potential for osteomyelitis.  I had even discussed TMA with the patient may become a necessary evil in the future.     I did discuss with the patient for this time for now though, my recommendation would be amputation of the 3rd digit to ensure there was no remaining spread of infection.  I discussed debridement of all of the other wounds and grafting them, and advised him that we would just have to monitor these and see how he did, and if he showed good healing potential, then we would hopefully be able to continue healing them with local wound care and potentially grafting and be able to prevent any further amputation.  He has been getting antibiotics on the floor.  He is on multiple blood thinners per Dr. Storm.  He is otherwise deemed stable for surgery today. He was seen preoperatively and the correct surgical site was marked.  He was given antibiotics preoperatively per protocol and had been receiving them on the floor.      DESCRIPTION OF PROCEDURE: Patient was brought to the operating room and placed on the operating room table in the supine position.  Monitored anesthesia was administered.  The left foot was anesthetized with 20 mL of a 1:1 mixture of 0.5% Marcaine and 1% lidocaine plain.  A calf tourniquet was placed about the left calf, but not used throughout the procedure.  The left foot was scrubbed, prepped and draped in the usual aseptic manner.  A timeout was performed identifying the correct surgical patient, procedure, and side.      Attention was then directed to the left foot where a #15 blade and pickup were used to excisionally debride all of the necrotic wounds.  All of the wounds were debrided into the subcutaneous tissue layer.  The necrotic area on the  dorsomedial interphalangeal joint of the left hallux once debrided measured 1.5 x 1.5.  The fibrotic wound on the 2nd digit interphalangeal joint measured 0.8 x 0.7.  The necrotic area on the medial aspect of the 4th digit measured 1.6 x 1.2.  Once these wounds were excisionally debrided, removing all of this necrotic and fibrotic tissue, all were noted to bleed and have a more healthy granular base.      Attention was directed to the 3rd digit where the digit was disarticulated at the metatarsophalangeal joint utilizing a #15 blade.  The digit was passed off the operating room table and sent to pathology for permanent specimen.  Upon exsanguinating a portion of this area, there was a chalky, milky, dry appearing substance noted from the area of the 3rd metatarsophalangeal joint and potentially the 4th digit.  There was a portion of this also noted over the middle phalanx of the 2nd digit that was able to be visualized through the 2nd digit wound.  I removed a portion of this substance with a curette and sent this to pathology for analysis, but this appeared to be clinically similar to gouty tophi.      The 4th digit then had a 1 cm incision made along it medially to identify any more of this substance.  We were able to exsanguinate probably 2 or 3 mL of this chalky, milky, white substance from this area, and an additional maybe half mL from the 2nd digit.  There was no obvious identifiable pus or purulence or ezequiel sign of abscess, only this white substance.  All of the wounds including the amputation site and 4th digit incision were then irrigated with 3L of bacitracin mixed with normal sterile saline.  Due to the necrosis of the 3rd digit, there was inadequate soft tissue to primarily close the wound without resecting more of the metatarsal which I was trying to avoid doing at this time.  So vertical mattress retention sutures were used to approximate this wound as best as possible, but it was left slightly open  and measured roughly 1 cm x 0.6 cm.  It was noted to be healthy and bleeding though.  Once the entire foot was clean and all of the chalky substance irrigated, the 4th digit wound was also closed loosely with 3-0 nylon in a simple interrupted suture fashion.  A 4 x 4 AmnioFix graft was cut into quarters and placed over all of the wounds and then dressed with Adaptic, 4 x 4s, Kerlix, and Ace wrap.       Patient tolerated the procedure and anesthesia well.  He was transferred from the operating room to the recovery room with vital signs stable and neurovascular status intact to the left lower extremity.         Wolfgang Campbell DPM  D: SKYLER 06/23/2017 14:53:44  T: [email or web address] 06/23/2017 16:58:23  Job ID: [id number]  Document ID: [id number]   None known

## 2017-06-23 NOTE — BRIEF OP NOTE
AMPUTATION DIGIT  Procedure Note    Vasquez Marie  6/19/2017 - 6/23/2017    Pre-op Diagnosis:   Cellulitis of foot, left [L03.116]    Post-op Diagnosis:     Post-Op Diagnosis Codes:     * Cellulitis of foot, left [L03.116]    Procedure/CPT® Codes:  1. Left foot 3rd digit amputation cpt 22290  2. Incision and drainage left foot, cpt 19019  3. Left foot wound debridements cpt 25753  4. Left foot graft application cpt 51405    Procedure(s):  amputation of left foot third digit, left foot wound debridments and grafting    Surgeon(s):  Wolfgang Campbell DPM    Anesthesia: Monitor Anesthesia Care    Staff:   Circulator: Matthew Freedman RN  Scrub Person: Javed Fonseca; Marisela Brady    Estimated Blood Loss: <10ml  Urine Voided: * No values recorded between 6/23/2017  2:00 PM and 6/23/2017  2:29 PM *    Specimens:                  ID Type Source Tests Collected by Time Destination   1 :  Wound Foot, Left WOUND CULTURE Wolfgang Campbell DPM 6/23/2017 1421    2 :  Wound Foot, Left ANAEROBIC CULTURE, GRAM STAIN Wolfgang Campbell DPM 6/23/2017 1421    A :  Tissue Foot, Left TISSUE EXAM Wolfgang Campbell DPM 6/23/2017 1426          Drains:    none       Findings: per dictation    Complications: none      Wolfgang Campbell DPM     Date: 6/23/2017  Time: 2:38 PM

## 2017-06-24 LAB
ABO + RH BLD: NORMAL
ALBUMIN SERPL-MCNC: 2.9 G/DL (ref 3.5–5)
ANION GAP SERPL CALCULATED.3IONS-SCNC: 9.6 MMOL/L
BASOPHILS # BLD AUTO: 0.01 10*3/MM3 (ref 0–0.2)
BASOPHILS NFR BLD AUTO: 0.3 % (ref 0–2.5)
BH BB BLOOD EXPIRATION DATE: NORMAL
BH BB BLOOD TYPE BARCODE: 5100
BH BB DISPENSE STATUS: NORMAL
BH BB PRODUCT CODE: NORMAL
BH BB UNIT NUMBER: NORMAL
BUN BLD-MCNC: 10 MG/DL (ref 7–20)
BUN/CREAT SERPL: 12.5 (ref 6.3–21.9)
CALCIUM SPEC-SCNC: 7.5 MG/DL (ref 8.4–10.2)
CHLORIDE SERPL-SCNC: 111 MMOL/L (ref 98–107)
CO2 SERPL-SCNC: 27 MMOL/L (ref 26–30)
CREAT BLD-MCNC: 0.8 MG/DL (ref 0.6–1.3)
CROSSMATCH INTERPRETATION: NORMAL
DEPRECATED RDW RBC AUTO: 55.7 FL (ref 37–54)
EOSINOPHIL # BLD AUTO: 0.4 10*3/MM3 (ref 0–0.7)
EOSINOPHIL NFR BLD AUTO: 12.4 % (ref 0–7)
ERYTHROCYTE [DISTWIDTH] IN BLOOD BY AUTOMATED COUNT: 16.5 % (ref 11.5–14.5)
GFR SERPL CREATININE-BSD FRML MDRD: 99 ML/MIN/1.73
GLUCOSE BLD-MCNC: 115 MG/DL (ref 74–98)
HCT VFR BLD AUTO: 28.1 % (ref 42–52)
HGB BLD-MCNC: 8.7 G/DL (ref 14–18)
IMM GRANULOCYTES # BLD: 0.02 10*3/MM3 (ref 0–0.06)
IMM GRANULOCYTES NFR BLD: 0.6 % (ref 0–0.6)
LYMPHOCYTES # BLD AUTO: 0.82 10*3/MM3 (ref 0.6–3.4)
LYMPHOCYTES NFR BLD AUTO: 25.4 % (ref 10–50)
MCH RBC QN AUTO: 28.9 PG (ref 27–31)
MCHC RBC AUTO-ENTMCNC: 31 G/DL (ref 30–37)
MCV RBC AUTO: 93.4 FL (ref 80–94)
MONOCYTES # BLD AUTO: 0.22 10*3/MM3 (ref 0–0.9)
MONOCYTES NFR BLD AUTO: 6.8 % (ref 0–12)
NEUTROPHILS # BLD AUTO: 1.76 10*3/MM3 (ref 2–6.9)
NEUTROPHILS NFR BLD AUTO: 54.5 % (ref 37–80)
NRBC BLD MANUAL-RTO: 0 /100 WBC (ref 0–0)
PHOSPHATE SERPL-MCNC: 2.6 MG/DL (ref 2.5–4.5)
PLATELET # BLD AUTO: 102 10*3/MM3 (ref 130–400)
PMV BLD AUTO: 11.6 FL (ref 6–12)
POTASSIUM BLD-SCNC: 3.6 MMOL/L (ref 3.5–5.1)
RBC # BLD AUTO: 3.01 10*6/MM3 (ref 4.7–6.1)
SODIUM BLD-SCNC: 144 MMOL/L (ref 137–145)
UNIT  ABO: NORMAL
UNIT  RH: NORMAL
WBC NRBC COR # BLD: 3.23 10*3/MM3 (ref 4.8–10.8)

## 2017-06-24 PROCEDURE — 99232 SBSQ HOSP IP/OBS MODERATE 35: CPT | Performed by: INTERNAL MEDICINE

## 2017-06-24 PROCEDURE — 25010000002 PIPERACILLIN SOD-TAZOBACTAM PER 1 G: Performed by: NURSE PRACTITIONER

## 2017-06-24 PROCEDURE — 80069 RENAL FUNCTION PANEL: CPT | Performed by: INTERNAL MEDICINE

## 2017-06-24 PROCEDURE — 25010000002 MORPHINE PER 10 MG: Performed by: INTERNAL MEDICINE

## 2017-06-24 PROCEDURE — 85025 COMPLETE CBC W/AUTO DIFF WBC: CPT | Performed by: INTERNAL MEDICINE

## 2017-06-24 PROCEDURE — 25010000002 VANCOMYCIN PER 500 MG: Performed by: NURSE PRACTITIONER

## 2017-06-24 RX ADMIN — HYDROCODONE BITARTRATE AND ACETAMINOPHEN 1 TABLET: 5; 325 TABLET ORAL at 15:40

## 2017-06-24 RX ADMIN — VANCOMYCIN HYDROCHLORIDE 1500 MG: 500 INJECTION, POWDER, LYOPHILIZED, FOR SOLUTION INTRAVENOUS at 15:57

## 2017-06-24 RX ADMIN — PIPERACILLIN SODIUM AND TAZOBACTAM SODIUM 3.38 G: 3; .375 INJECTION, POWDER, FOR SOLUTION INTRAVENOUS at 21:18

## 2017-06-24 RX ADMIN — HYDROCODONE BITARTRATE AND ACETAMINOPHEN 1 TABLET: 5; 325 TABLET ORAL at 06:30

## 2017-06-24 RX ADMIN — PIPERACILLIN SODIUM AND TAZOBACTAM SODIUM 3.38 G: 3; .375 INJECTION, POWDER, FOR SOLUTION INTRAVENOUS at 15:56

## 2017-06-24 RX ADMIN — ATORVASTATIN CALCIUM 40 MG: 40 TABLET, FILM COATED ORAL at 19:55

## 2017-06-24 RX ADMIN — PANTOPRAZOLE SODIUM 40 MG: 40 TABLET, DELAYED RELEASE ORAL at 06:30

## 2017-06-24 RX ADMIN — PIPERACILLIN SODIUM AND TAZOBACTAM SODIUM 3.38 G: 3; .375 INJECTION, POWDER, FOR SOLUTION INTRAVENOUS at 02:25

## 2017-06-24 RX ADMIN — DULOXETINE HYDROCHLORIDE 30 MG: 30 CAPSULE, DELAYED RELEASE ORAL at 19:54

## 2017-06-24 RX ADMIN — METOPROLOL TARTRATE 25 MG: 25 TABLET ORAL at 19:55

## 2017-06-24 RX ADMIN — CLOPIDOGREL BISULFATE 75 MG: 75 TABLET ORAL at 09:04

## 2017-06-24 RX ADMIN — AMIODARONE HYDROCHLORIDE 200 MG: 200 TABLET ORAL at 09:04

## 2017-06-24 RX ADMIN — FOLIC ACID 1 MG: 1 TABLET ORAL at 09:05

## 2017-06-24 RX ADMIN — NYSTATIN 500000 UNITS: 100000 SUSPENSION ORAL at 17:43

## 2017-06-24 RX ADMIN — HYDROCODONE BITARTRATE AND ACETAMINOPHEN 1 TABLET: 5; 325 TABLET ORAL at 11:16

## 2017-06-24 RX ADMIN — ACETAMINOPHEN 650 MG: 325 TABLET, FILM COATED ORAL at 00:03

## 2017-06-24 RX ADMIN — PANTOPRAZOLE SODIUM 40 MG: 40 TABLET, DELAYED RELEASE ORAL at 17:43

## 2017-06-24 RX ADMIN — HYDROCODONE BITARTRATE AND ACETAMINOPHEN 1 TABLET: 5; 325 TABLET ORAL at 19:54

## 2017-06-24 RX ADMIN — NYSTATIN 500000 UNITS: 100000 SUSPENSION ORAL at 11:16

## 2017-06-24 RX ADMIN — MORPHINE SULFATE 2 MG: 2 INJECTION, SOLUTION INTRAMUSCULAR; INTRAVENOUS at 21:32

## 2017-06-24 RX ADMIN — METOPROLOL TARTRATE 25 MG: 25 TABLET ORAL at 09:04

## 2017-06-24 RX ADMIN — Medication 100 MG: at 09:04

## 2017-06-24 RX ADMIN — MELATONIN TAB 3 MG 6 MG: 3 TAB at 19:54

## 2017-06-24 RX ADMIN — MULTIPLE VITAMINS W/ MINERALS TAB 1 TABLET: TAB at 09:04

## 2017-06-24 RX ADMIN — NICOTINE 1 PATCH: 14 PATCH TRANSDERMAL at 19:55

## 2017-06-24 RX ADMIN — HYDROCODONE BITARTRATE AND ACETAMINOPHEN 1 TABLET: 5; 325 TABLET ORAL at 02:25

## 2017-06-24 RX ADMIN — LOSARTAN POTASSIUM 50 MG: 50 TABLET, FILM COATED ORAL at 09:04

## 2017-06-24 RX ADMIN — PIPERACILLIN SODIUM AND TAZOBACTAM SODIUM 3.38 G: 3; .375 INJECTION, POWDER, FOR SOLUTION INTRAVENOUS at 09:01

## 2017-06-24 RX ADMIN — NYSTATIN 500000 UNITS: 100000 SUSPENSION ORAL at 09:03

## 2017-06-24 RX ADMIN — NYSTATIN 500000 UNITS: 100000 SUSPENSION ORAL at 19:54

## 2017-06-24 NOTE — PLAN OF CARE
Problem: Cellulitis (Adult)  Goal: Signs and Symptoms of Listed Potential Problems Will be Absent or Manageable (Cellulitis)  Outcome: Ongoing (interventions implemented as appropriate)    Problem: Infection, Risk/Actual (Adult)  Goal: Infection Prevention/Resolution  Outcome: Ongoing (interventions implemented as appropriate)    Problem: Patient Care Overview (Adult)  Goal: Plan of Care Review  Outcome: Ongoing (interventions implemented as appropriate)  Goal: Adult Individualization and Mutuality  Outcome: Ongoing (interventions implemented as appropriate)  Goal: Discharge Needs Assessment  Outcome: Ongoing (interventions implemented as appropriate)    Problem: Fall Risk (Adult)  Goal: Identify Related Risk Factors and Signs and Symptoms  Outcome: Ongoing (interventions implemented as appropriate)  Goal: Absence of Falls  Outcome: Ongoing (interventions implemented as appropriate)    Problem: Revascularization/PAD, Lower Extremity (Adult)  Goal: Signs and Symptoms of Listed Potential Problems Will be Absent or Manageable (Revascularization/PAD, Lower Extremity)  Outcome: Ongoing (interventions implemented as appropriate)    Problem: Perioperative Period (Adult)  Goal: Signs and Symptoms of Listed Potential Problems Will be Absent or Manageable (Perioperative Period)  Outcome: Ongoing (interventions implemented as appropriate)  Goal: Signs and Symptoms of Listed Potential Problems Will be Absent or Manageable (Perioperative Period)  Outcome: Ongoing (interventions implemented as appropriate)    Problem: Skin Integrity Impairment, Risk/Actual (Adult)  Goal: Identify Related Risk Factors and Signs and Symptoms  Outcome: Ongoing (interventions implemented as appropriate)  Goal: Skin Integrity/Wound Healing  Outcome: Ongoing (interventions implemented as appropriate)

## 2017-06-24 NOTE — PROGRESS NOTES
[facility]    [facility]    Name:  Vasquez Marie   Age:  56 y.o.  Sex:  male  :  1960  MRN:  [id number]   Visit Number:  [id number]  Admission Date:  2017  Date Of Service:  17  Primary Care Physician:  Marilyn K. Vermeesch, MD     LOS: 3 days :  Patient Care Team:  Marilyn K Vermeesch, MD as PCP - General (Internal Medicine):      Subjective / Interval History:     Patient's chart reviewed and events noted.  The he has been admitted with peripheral vascular disease needing surgery and amputation of the left third toe and having cellulitis.  Currently on IV antibiotic and his pain is still the still there.  He states he has not walked margin is a scaly due to pain.      Vital Signs:    Temp:  [97.3 °F (36.3 °C)-98.3 °F (36.8 °C)] 97.9 °F (36.6 °C)  Heart Rate:  [65-81] 78  Resp:  [12-18] 18  BP: (114-157)/(43-93) 157/93    Intake and output:    I/O last 3 completed shifts:  In: 7379.6 [I.V.:7054.6; Blood:325]  Out: 900 [Urine:900]  I/O this shift:  In: 580 [P.O.:480; IV Piggyback:100]  Out: -     Physical Examination:    General Appearance:    Alert and cooperative, not in any acute distress.   Head:    Atraumatic and normocephalic, without obvious abnormality.   Eyes:            PERRLA,  No pallor. Extra-occular movements are within normal limits.   Neck:   Supple,  No lymphglands, no bruit   Lungs:     Chest shape is normal. Breath sounds heard bilaterally equally.  No crackles or wheezing.     Heart:    Normal S1 and S2, no murmur,  No JVD   Abdomen:     Normal bowel sounds, no masses, no organomegaly. Soft        non-tender, no guarding, no rebound                tenderness   Extremities:   Moves all extremities well, no edema, no cyanosis, Left leg cellulitis is slowly improving and amputation of the third toe with clean dressing.     Skin:   No  bruising or rash.   Neurologic:   Grossly non focal and moves all extrimities equally.    Laboratory  results:    Results from last 7 days  Lab Units 06/24/17  0552 06/23/17  0342 06/22/17  0520 06/21/17  0510  06/19/17  1338   SODIUM mmol/L 144 145 142 143  < > 145   POTASSIUM mmol/L 3.6 3.5 3.3* 3.5  < > 3.5   CHLORIDE mmol/L 111* 110* 106 102  < > 105   TOTAL CO2 mmol/L 27.0 27.0 27.0 27.0  < > 29.0   BUN mg/dL 10 16 22* 18  < > 14   CREATININE mg/dL 0.80 1.00 1.20 1.50*  < > 1.20   CALCIUM mg/dL 7.5* 7.1* 7.3* 7.8*  < > 9.2   BILIRUBIN mg/dL  --   --   --  0.6  --  1.1   ALK PHOS U/L  --   --   --  112  --  124   ALT (SGPT) U/L  --   --   --  31  --  32   AST (SGOT) U/L  --   --   --  43  --  69*   GLUCOSE mg/dL 115* 135* 107* 140*  < > 110*   < > = values in this interval not displayed.    Results from last 7 days  Lab Units 06/24/17  0552 06/23/17  0742 06/23/17  0342 06/22/17  0520   WBC 10*3/mm3 3.23*  --  3.43* 3.89*   HEMOGLOBIN g/dL 8.7* 7.5* 7.7* 8.3*   HEMATOCRIT % 28.1* 24.0* 24.0* 26.7*   PLATELETS 10*3/mm3 102*  --  111* 120*       Results from last 7 days  Lab Units 06/22/17  0520   INR  1.91*       Results from last 7 days  Lab Units 06/19/17  1338   TROPONIN I ng/mL <0.012       Results from last 7 days  Lab Units 06/23/17  1421 06/22/17  1620   WOUNDCX  Scant growth (1+) Gram Negative Bacilli* No growth       Radiology results:    Imaging Results (last 24 hours)     Procedure Component Value Units Date/Time    CT Head Without Contrast [[id number]] Collected:  06/23/17 1125     Updated:  06/23/17 1128    Narrative:       PROCEDURE: CT HEAD WO CONTRAST-     HISTORY: headache; S92.502A-Displaced unspecified fracture of left  lesser toe(s), initial encounter for closed fracture; L03.116-Cellulitis  of left lower limb; I73.9-Peripheral vascular disease, unspecified;  Z72.0-Tobacco use; Z74.09-Other reduced mobility; Z74.09-Other reduced  mobility     COMPARISON:  None .     TECHNIQUE: Multiple axial CT images were performed from the foramen  magnum to the vertex without enhancement.      FINDINGS:  There is no CT evidence of hemorrhage. There is no mass, mass  effect or midline shift.  There is no hydrocephalus. The paranasal  sinuses are clear. Bone windows reveal no acute osseous abnormalities.       Impression:       No acute intracranial process.             979.48 mGy.cm          This study was performed with techniques to keep radiation doses as low  as reasonably achievable (ALARA). Individualized dose reduction  techniques using automated exposure control or adjustment of mA and/or  kV according to the patient size were employed.         This report was finalized on 6/23/2017 11:26 AM by Padmini Swan M.D..          I have reviewed the patient's radiology reports.    Medication Review:     I have reviewed the patients active and prn medications.     Assessment:    Active Problems:    Cellulitis of foot, left    Borderline diabetes    Closed fracture of fourth toe of left foot      Next her amputation of the left third toe due to osteomyelitis  COPD  Hematocrit arthritis  Cigarette history 3  Hypomagnesemia status post resolution    Plan:    Patient with cellulitis of the foot with peripheral vascular disease and osteomyelitis needing amputation of the left third toe.  We'll continue with current IV antibiotic and the Plavix status post stent placement which has been done.  Keep a close watch her for further signs of further bleeding has had history in the past and close follow-up.    Joey Heck MD  06/24/17  11:00 AM      Please note that portions of this note may have been completed with a voice recognition program. Efforts were made to edit the dictations, but occasionally words are mistranscribed.

## 2017-06-24 NOTE — PROGRESS NOTES
[facility]    Nerve Cath Post Op Call    Patient Name: Vasquez Marie  :  1960  MRN:  [id number]  Date of Discharge:     Nerve Cath Post Op Call:    Analgesia:Good

## 2017-06-25 LAB
ALBUMIN SERPL-MCNC: 2.8 G/DL (ref 3.5–5)
ALBUMIN/GLOB SERPL: 0.7 G/DL (ref 1–2)
ALP SERPL-CCNC: 108 U/L (ref 38–126)
ALT SERPL W P-5'-P-CCNC: 37 U/L (ref 13–69)
ANION GAP SERPL CALCULATED.3IONS-SCNC: 10.4 MMOL/L
AST SERPL-CCNC: 46 U/L (ref 15–46)
BACTERIA SPEC AEROBE CULT: ABNORMAL
BILIRUB SERPL-MCNC: 0.7 MG/DL (ref 0.2–1.3)
BUN BLD-MCNC: 6 MG/DL (ref 7–20)
BUN/CREAT SERPL: 7.5 (ref 6.3–21.9)
CALCIUM SPEC-SCNC: 7.8 MG/DL (ref 8.4–10.2)
CHLORIDE SERPL-SCNC: 109 MMOL/L (ref 98–107)
CO2 SERPL-SCNC: 25 MMOL/L (ref 26–30)
CREAT BLD-MCNC: 0.8 MG/DL (ref 0.6–1.3)
DEPRECATED RDW RBC AUTO: 55.7 FL (ref 37–54)
ERYTHROCYTE [DISTWIDTH] IN BLOOD BY AUTOMATED COUNT: 16.4 % (ref 11.5–14.5)
GFR SERPL CREATININE-BSD FRML MDRD: 99 ML/MIN/1.73
GLOBULIN UR ELPH-MCNC: 4.1 GM/DL
GLUCOSE BLD-MCNC: 118 MG/DL (ref 74–98)
HCT VFR BLD AUTO: 26.2 % (ref 42–52)
HGB BLD-MCNC: 8.3 G/DL (ref 14–18)
MCH RBC QN AUTO: 29.5 PG (ref 27–31)
MCHC RBC AUTO-ENTMCNC: 31.7 G/DL (ref 30–37)
MCV RBC AUTO: 93.2 FL (ref 80–94)
PLATELET # BLD AUTO: 107 10*3/MM3 (ref 130–400)
PMV BLD AUTO: 11.6 FL (ref 6–12)
POTASSIUM BLD-SCNC: 3.4 MMOL/L (ref 3.5–5.1)
PROT SERPL-MCNC: 6.9 G/DL (ref 6.3–8.2)
RBC # BLD AUTO: 2.81 10*6/MM3 (ref 4.7–6.1)
SODIUM BLD-SCNC: 141 MMOL/L (ref 137–145)
WBC NRBC COR # BLD: 3.05 10*3/MM3 (ref 4.8–10.8)

## 2017-06-25 PROCEDURE — 25010000002 ONDANSETRON PER 1 MG: Performed by: INTERNAL MEDICINE

## 2017-06-25 PROCEDURE — 80053 COMPREHEN METABOLIC PANEL: CPT | Performed by: INTERNAL MEDICINE

## 2017-06-25 PROCEDURE — 85027 COMPLETE CBC AUTOMATED: CPT | Performed by: INTERNAL MEDICINE

## 2017-06-25 PROCEDURE — 25010000002 MORPHINE PER 10 MG: Performed by: NURSE PRACTITIONER

## 2017-06-25 PROCEDURE — 99024 POSTOP FOLLOW-UP VISIT: CPT | Performed by: PODIATRIST

## 2017-06-25 PROCEDURE — 25010000002 VANCOMYCIN PER 500 MG: Performed by: NURSE PRACTITIONER

## 2017-06-25 PROCEDURE — 99232 SBSQ HOSP IP/OBS MODERATE 35: CPT | Performed by: INTERNAL MEDICINE

## 2017-06-25 PROCEDURE — 25010000002 PIPERACILLIN SOD-TAZOBACTAM PER 1 G: Performed by: NURSE PRACTITIONER

## 2017-06-25 RX ORDER — POTASSIUM CHLORIDE 750 MG/1
20 CAPSULE, EXTENDED RELEASE ORAL ONCE
Status: COMPLETED | OUTPATIENT
Start: 2017-06-25 | End: 2017-06-25

## 2017-06-25 RX ADMIN — FOLIC ACID 1 MG: 1 TABLET ORAL at 08:23

## 2017-06-25 RX ADMIN — HYDROCODONE BITARTRATE AND ACETAMINOPHEN 1 TABLET: 5; 325 TABLET ORAL at 16:55

## 2017-06-25 RX ADMIN — HYDROCODONE BITARTRATE AND ACETAMINOPHEN 1 TABLET: 5; 325 TABLET ORAL at 05:51

## 2017-06-25 RX ADMIN — Medication 100 MG: at 08:23

## 2017-06-25 RX ADMIN — LOSARTAN POTASSIUM 50 MG: 50 TABLET, FILM COATED ORAL at 08:23

## 2017-06-25 RX ADMIN — ONDANSETRON 4 MG: 2 INJECTION INTRAMUSCULAR; INTRAVENOUS at 11:55

## 2017-06-25 RX ADMIN — NYSTATIN 500000 UNITS: 100000 SUSPENSION ORAL at 21:50

## 2017-06-25 RX ADMIN — SODIUM CHLORIDE, POTASSIUM CHLORIDE, SODIUM LACTATE AND CALCIUM CHLORIDE 100 ML/HR: 600; 310; 30; 20 INJECTION, SOLUTION INTRAVENOUS at 08:20

## 2017-06-25 RX ADMIN — PIPERACILLIN SODIUM AND TAZOBACTAM SODIUM 3.38 G: 3; .375 INJECTION, POWDER, FOR SOLUTION INTRAVENOUS at 14:45

## 2017-06-25 RX ADMIN — NYSTATIN 500000 UNITS: 100000 SUSPENSION ORAL at 08:22

## 2017-06-25 RX ADMIN — AMIODARONE HYDROCHLORIDE 200 MG: 200 TABLET ORAL at 08:23

## 2017-06-25 RX ADMIN — VANCOMYCIN HYDROCHLORIDE 1500 MG: 500 INJECTION, POWDER, LYOPHILIZED, FOR SOLUTION INTRAVENOUS at 11:44

## 2017-06-25 RX ADMIN — MULTIPLE VITAMINS W/ MINERALS TAB 1 TABLET: TAB at 08:23

## 2017-06-25 RX ADMIN — PIPERACILLIN SODIUM AND TAZOBACTAM SODIUM 3.38 G: 3; .375 INJECTION, POWDER, FOR SOLUTION INTRAVENOUS at 02:43

## 2017-06-25 RX ADMIN — PIPERACILLIN SODIUM AND TAZOBACTAM SODIUM 3.38 G: 3; .375 INJECTION, POWDER, FOR SOLUTION INTRAVENOUS at 21:34

## 2017-06-25 RX ADMIN — METOPROLOL TARTRATE 25 MG: 25 TABLET ORAL at 21:38

## 2017-06-25 RX ADMIN — DULOXETINE HYDROCHLORIDE 30 MG: 30 CAPSULE, DELAYED RELEASE ORAL at 21:38

## 2017-06-25 RX ADMIN — MELATONIN TAB 3 MG 6 MG: 3 TAB at 21:38

## 2017-06-25 RX ADMIN — ATORVASTATIN CALCIUM 40 MG: 40 TABLET, FILM COATED ORAL at 21:38

## 2017-06-25 RX ADMIN — NYSTATIN 500000 UNITS: 100000 SUSPENSION ORAL at 11:55

## 2017-06-25 RX ADMIN — PIPERACILLIN SODIUM AND TAZOBACTAM SODIUM 3.38 G: 3; .375 INJECTION, POWDER, FOR SOLUTION INTRAVENOUS at 08:20

## 2017-06-25 RX ADMIN — NICOTINE 1 PATCH: 14 PATCH TRANSDERMAL at 21:50

## 2017-06-25 RX ADMIN — PANTOPRAZOLE SODIUM 40 MG: 40 TABLET, DELAYED RELEASE ORAL at 18:14

## 2017-06-25 RX ADMIN — PANTOPRAZOLE SODIUM 40 MG: 40 TABLET, DELAYED RELEASE ORAL at 06:43

## 2017-06-25 RX ADMIN — POTASSIUM CHLORIDE 20 MEQ: 750 CAPSULE, EXTENDED RELEASE ORAL at 11:45

## 2017-06-25 RX ADMIN — METOPROLOL TARTRATE 25 MG: 25 TABLET ORAL at 08:23

## 2017-06-25 RX ADMIN — MORPHINE SULFATE 1 MG: 2 INJECTION, SOLUTION INTRAMUSCULAR; INTRAVENOUS at 21:34

## 2017-06-25 RX ADMIN — NYSTATIN 500000 UNITS: 100000 SUSPENSION ORAL at 18:14

## 2017-06-25 RX ADMIN — HYDROCODONE BITARTRATE AND ACETAMINOPHEN 1 TABLET: 5; 325 TABLET ORAL at 11:55

## 2017-06-25 RX ADMIN — CLOPIDOGREL BISULFATE 75 MG: 75 TABLET ORAL at 08:23

## 2017-06-25 RX ADMIN — HYDROCODONE BITARTRATE AND ACETAMINOPHEN 1 TABLET: 5; 325 TABLET ORAL at 01:41

## 2017-06-25 RX ADMIN — ACETAMINOPHEN 650 MG: 325 TABLET, FILM COATED ORAL at 18:42

## 2017-06-25 RX ADMIN — SODIUM CHLORIDE, POTASSIUM CHLORIDE, SODIUM LACTATE AND CALCIUM CHLORIDE 100 ML/HR: 600; 310; 30; 20 INJECTION, SOLUTION INTRAVENOUS at 21:38

## 2017-06-25 NOTE — PLAN OF CARE
Problem: Fall Risk (Adult)  Goal: Absence of Falls    06/25/17 1554   Fall Risk (Adult)   Absence of Falls achieves outcome

## 2017-06-25 NOTE — PLAN OF CARE
Problem: Cellulitis (Adult)  Goal: Signs and Symptoms of Listed Potential Problems Will be Absent or Manageable (Cellulitis)  Outcome: Ongoing (interventions implemented as appropriate)    Problem: Infection, Risk/Actual (Adult)  Goal: Infection Prevention/Resolution  Outcome: Ongoing (interventions implemented as appropriate)    Problem: Patient Care Overview (Adult)  Goal: Plan of Care Review  Outcome: Ongoing (interventions implemented as appropriate)    06/24/17 0307 06/24/17 0800 06/25/17 0429   Coping/Psychosocial Response Interventions   Plan Of Care Reviewed With --  patient --    Patient Care Overview   Progress progress toward functional goals as expected --  --    Outcome Evaluation   Outcome Summary/Follow up Plan --  --  SLOW PROGRESS TOWARDS THE GOAL. PT. HAS NOT BEEN SLEEPING FOR THE LAST 2 NIGHTS EVEN AFTER 60MG OF MELATONIN. NOTIFY HOSPITALIST AND NO NEW ORDER RECEIVED. PAIN HAS BEEN BETWEEN 7-8 DISPITE OF Q4 HYDROCODONE 5MG AND MORPHINE Q2 HRS. WOUND SITE SHOWS NO SIGNS AND SYMPTOMS OF INFECTIONS. DRESSING CHANGES TWICE THIS SHIFT.        Goal: Adult Individualization and Mutuality  Outcome: Ongoing (interventions implemented as appropriate)  Goal: Discharge Needs Assessment  Outcome: Ongoing (interventions implemented as appropriate)    Problem: Fall Risk (Adult)  Goal: Identify Related Risk Factors and Signs and Symptoms  Outcome: Ongoing (interventions implemented as appropriate)  Goal: Absence of Falls  Outcome: Ongoing (interventions implemented as appropriate)    Problem: Skin Integrity Impairment, Risk/Actual (Adult)  Goal: Identify Related Risk Factors and Signs and Symptoms  Outcome: Ongoing (interventions implemented as appropriate)  Goal: Skin Integrity/Wound Healing  Outcome: Ongoing (interventions implemented as appropriate)

## 2017-06-25 NOTE — PLAN OF CARE
Problem: Cellulitis (Adult)  Goal: Signs and Symptoms of Listed Potential Problems Will be Absent or Manageable (Cellulitis)    06/24/17 0307 06/25/17 0429   Cellulitis   Problems Assessed (Cellulitis) --  all   Problems Present (Cellulitis) gangrene/necrotizing fasciitis --

## 2017-06-25 NOTE — PLAN OF CARE
Problem: Patient Care Overview (Adult)  Goal: Adult Individualization and Mutuality    06/20/17 1820 06/22/17 0352   Individualization   Patient Specific Preferences patient likes to sit on edge of bed --    Patient Specific Goals pain relief --    Patient Specific Interventions --  keep educating pt regarding need for continued antibiotics and surgery   Mutuality/Individual Preferences   What Anxieties, Fears or Concerns Do You Have About Your Health or Care? --  surgery   What Questions Do You Have About Your Health or Care? --  none   What Information Would Help Us Give You More Personalized Care? --  denies

## 2017-06-25 NOTE — PLAN OF CARE
Problem: Perioperative Period (Adult)  Goal: Signs and Symptoms of Listed Potential Problems Will be Absent or Manageable (Perioperative Period)    06/23/17 1340   Perioperative Period   Problems Assessed (Perioperative Period) all

## 2017-06-25 NOTE — PLAN OF CARE
Problem: Patient Care Overview (Adult)  Goal: Plan of Care Review    06/24/17 0307 06/25/17 0429 06/25/17 0601   Coping/Psychosocial Response Interventions   Plan Of Care Reviewed With --  --  patient   Patient Care Overview   Progress progress toward functional goals as expected --  --    Outcome Evaluation   Outcome Summary/Follow up Plan --  SLOW PROGRESS TOWARDS THE GOAL. PT. HAS NOT BEEN SLEEPING FOR THE LAST 2 NIGHTS EVEN AFTER 60MG OF MELATONIN. NOTIFY HOSPITALIST AND NO NEW ORDER RECEIVED. PAIN HAS BEEN BETWEEN 7-8 DISPITE OF Q4 HYDROCODONE 5MG AND MORPHINE Q2 HRS. WOUND SITE SHOWS NO SIGNS AND SYMPTOMS OF INFECTIONS. DRESSING CHANGES TWICE THIS SHIFT.  --

## 2017-06-25 NOTE — SIGNIFICANT NOTE
Late entry for PT missed visit.  Patient declines working with PT today.  PT informed nursing and was in turn instructed that the patient had been up and moving around in his room this morning.  He was wearing the Darco shoe.  PT will attempt again tomorrow.

## 2017-06-25 NOTE — PROGRESS NOTES
[facility]    Name:  Vasquez Marie   Age:  56 y.o.  Sex:  male  :  1960  MRN:  [id number]   Visit Number:  [id number]  Admission Date:  2017  Date Of Service:  17  Primary Care Physician:  Marilyn K. Vermeesch, MD     LOS: 4 days :  Patient Care Team:  Marilyn K Vermeesch, MD as PCP - General (Internal Medicine):      Subjective / Interval History:     Patient has been admitted with cellulitis of the left foot with osteomyelitis status post amputation of the left third toe is doing some better.  His pain is controlled with medications.  Though he has been scared of walking.  The patient did not sleep at all and seems to be little anxious.      Vital Signs:    Temp:  [97.6 °F (36.4 °C)-98.2 °F (36.8 °C)] 97.6 °F (36.4 °C)  Heart Rate:  [75-99] 99  Resp:  [18-20] 18  BP: (144-158)/(78-95) 153/86    Intake and output:    I/O last 3 completed shifts:  In: 3199 [P.O.:960; I.V.:2139; IV Piggyback:100]  Out: 600 [Urine:600]       Physical Examination:    General Appearance:    Alert and cooperative, not in any acute distress.   Head:    Atraumatic and normocephalic, without obvious abnormality.   Eyes:            PERRLA,  No pallor. Extra-occular movements are within normal limits.   Neck:   Supple,  No lymphglands, no bruit   Lungs:     Chest shape is normal. Breath sounds heard bilaterally equally.  No crackles or wheezing.     Heart:    Normal S1 and S2, no murmur,  No JVD   Abdomen:     Normal bowel sounds, no masses, no organomegaly. Soft        non-tender, no guarding, no rebound                tenderness   Extremities:   Moves all extremities well, no edema, no cyanosis, Left foot the dressing which is clean with her amputation of the third toe status post.  Osteoarthritis with surrounding cellulitis and department which is improving    Skin:   No  bruising or rash.   Neurologic:   Grossly non focal and moves all extrimities equally.     Laboratory results:    Results from last 7 days  Lab Units 06/25/17  0519 06/24/17  0552 06/23/17  0342  06/21/17  0510  06/19/17  1338   SODIUM mmol/L 141 144 145  < > 143  < > 145   POTASSIUM mmol/L 3.4* 3.6 3.5  < > 3.5  < > 3.5   CHLORIDE mmol/L 109* 111* 110*  < > 102  < > 105   TOTAL CO2 mmol/L 25.0* 27.0 27.0  < > 27.0  < > 29.0   BUN mg/dL 6* 10 16  < > 18  < > 14   CREATININE mg/dL 0.80 0.80 1.00  < > 1.50*  < > 1.20   CALCIUM mg/dL 7.8* 7.5* 7.1*  < > 7.8*  < > 9.2   BILIRUBIN mg/dL 0.7  --   --   --  0.6  --  1.1   ALK PHOS U/L 108  --   --   --  112  --  124   ALT (SGPT) U/L 37  --   --   --  31  --  32   AST (SGOT) U/L 46  --   --   --  43  --  69*   GLUCOSE mg/dL 118* 115* 135*  < > 140*  < > 110*   < > = values in this interval not displayed.    Results from last 7 days  Lab Units 06/25/17  0519 06/24/17  0552 06/23/17  0742 06/23/17  0342   WBC 10*3/mm3 3.05* 3.23*  --  3.43*   HEMOGLOBIN g/dL 8.3* 8.7* 7.5* 7.7*   HEMATOCRIT % 26.2* 28.1* 24.0* 24.0*   PLATELETS 10*3/mm3 107* 102*  --  111*       Results from last 7 days  Lab Units 06/22/17  0520   INR  1.91*       Results from last 7 days  Lab Units 06/19/17  1338   TROPONIN I ng/mL <0.012       Results from last 7 days  Lab Units 06/23/17  1421 06/22/17  1620   WOUNDCX  Scant growth (1+) Enterobacter cloacae* No growth       Radiology results:    Imaging Results (last 24 hours)     ** No results found for the last 24 hours. **          I have reviewed the patient's radiology reports.    Medication Review:     I have reviewed the patients active and prn medications.     Assessment:    Active Problems:    Cellulitis of foot, left    Borderline diabetes    Closed fracture of fourth toe of left foot    Osteomyelitis of the left third toe status post amputation  Peripheral vascular disease status post her intervention     Plan:    1.  Patient with cellulitis of the left foot along with a cemented left toe status post amputation and surgical  intervention.  The continue with current IV antibiotic management as per Dr. Campbell and the would switch over to oral antibiotic after the 24 more hours a day and switched to oral and consider discharge planning.  2.  PAD status post intervention currently on Plavix and statin.  3.  A anemia with the currently on anticoagulant and would keep her close watch for the signs of bleeding and would need workup as an outpatient    Joey Heck MD  06/25/17  9:42 AM      Please note that portions of this note may have been completed with a voice recognition program. Efforts were made to edit the dictations, but occasionally words are mistranscribed.

## 2017-06-25 NOTE — PROGRESS NOTES
"   LOS: 4 days   Patient Care Team:  Marilyn K Vermeesch, MD as PCP - General (Internal Medicine)      Interval History: Patient feels okay.  His stools are not hurting.  There is bleeding from this left foot when the dressing changes are done.  Has no pain at rest    Review of Systems:   Pertinent items are noted in HPI.      Objective     Vital Sign Min/Max for last 24 hours  Temp  Min: 97.6 °F (36.4 °C)  Max: 98.2 °F (36.8 °C)   BP  Min: 144/78  Max: 158/95   Pulse  Min: 75  Max: 99   Resp  Min: 18  Max: 20   SpO2  Min: 94 %  Max: 98 %   No Data Recorded   No Data Recorded     Flowsheet Rows         First Filed Value    Admission Height  68\" (172.7 cm) Documented at 06/19/2017 1130    Admission Weight  220 lb (99.8 kg) Documented at 06/19/2017 1130          Physical Exam:     General Appearance:    Alert, cooperative, in no acute distress   Head:    Normocephalic, without obvious abnormality, atraumatic   Eyes:            Lids and lashes normal, conjunctivae and sclerae normal, no   icterus, no pallor, corneas clear, PERRLA   Ears:    Ears appear intact with no abnormalities noted   Throat:   No oral lesions, no thrush, oral mucosa moist   Neck:   No adenopathy, supple, trachea midline, no thyromegaly, no   carotid bruit, no JVD   Back:     No kyphosis present, no scoliosis present, no skin lesions,      erythema or scars, no tenderness to percussion or                   palpation,   range of motion normal   Lungs:     Clear to auscultation,respirations regular, even and                  unlabored    Heart:    Regular rhythm and normal rate, normal S1 and S2, no            murmur, no gallop, no rub, no click   Chest Wall:    No abnormalities observed   Abdomen:     Normal bowel sounds, no masses, no organomegaly, soft        non-tender, non-distended, no guarding, no rebound                tenderness   Rectal:     Deferred   Extremities:   Moves all extremities well, no edema, no cyanosis, no             redness "   Pulses:  Good Doppler's of the posterior tibial artery on the left side.  The people who Doppler is very faint.  ly   Skin:   No bleeding, bruising or rash   Lymph nodes:   No palpable adenopathy   Neurologic:   Cranial nerves 2 - 12 grossly intact, sensation intact, DTR       present and equal bilaterally        Results Review:     I reviewed the patient's new clinical results.    Results Review:   I reviewed the patient's new clinical results.        LAB DATA :           Laboratory results:      Results from last 7 days  Lab Units 06/25/17  0519 06/24/17  0552 06/23/17  0342 06/22/17  0520 06/21/17  0510 06/20/17  0525 06/19/17  1338   SODIUM mmol/L 141 144 145 142 143 144 145   POTASSIUM mmol/L 3.4* 3.6 3.5 3.3* 3.5 3.6 3.5   CHLORIDE mmol/L 109* 111* 110* 106 102 106 105   TOTAL CO2 mmol/L 25.0* 27.0 27.0 27.0 27.0 24.0* 29.0   BUN mg/dL 6* 10 16 22* 18 16 14   CREATININE mg/dL 0.80 0.80 1.00 1.20 1.50* 1.60* 1.20   CALCIUM mg/dL 7.8* 7.5* 7.1* 7.3* 7.8* 8.2* 9.2   BILIRUBIN mg/dL 0.7  --   --   --  0.6  --  1.1   ALK PHOS U/L 108  --   --   --  112  --  124   ALT (SGPT) U/L 37  --   --   --  31  --  32   AST (SGOT) U/L 46  --   --   --  43  --  69*   GLUCOSE mg/dL 118* 115* 135* 107* 140* 226* 110*       Results from last 7 days  Lab Units 06/25/17  0519 06/24/17  0552 06/23/17  0742 06/23/17  0342 06/22/17  0520 06/21/17  0510 06/20/17  1223 06/20/17  0525   WBC 10*3/mm3 3.05* 3.23*  --  3.43* 3.89* 7.00 2.99* 3.00*   HEMOGLOBIN g/dL 8.3* 8.7* 7.5* 7.7* 8.3* 9.2* 7.7* 8.0*   HEMATOCRIT % 26.2* 28.1* 24.0* 24.0* 26.7* 28.3* 24.4* 25.9*   PLATELETS 10*3/mm3 107* 102*  --  111* 120* 184 129* 156       Results from last 7 days  Lab Units 06/22/17  0520   INR  1.91*       Results from last 7 days  Lab Units 06/23/17  1421 06/22/17  1620   WOUNDCX  Scant growth (1+) Gram Negative Bacilli* No growth           Results from last 7 days  Lab Units 06/19/17  1338   CKMB ng/mL 1.97   TROPONIN I ng/mL <0.012                  Lab Results   Component Value Date    HGBA1C 6.20 (H) 04/08/2017               IMAGING DATA:     Xr Foot 3+ View Left    Result Date: 6/19/2017  Narrative: PROCEDURE: XR FOOT 3+ VW LEFT-  History: pain  COMPARISON:April 21, 2017.  FINDINGS:  A 3 view exam demonstrates postsurgical changes with a wire spanning the bases of the fourth and fifth metatarsals. There is a fracture of the head of the fourth proximal phalanx. Advanced degenerative changes are present at the tibiotalar and subtalar joints. There is also joint space narrowing and osteophyte formation in the mid foot. Diffuse soft tissue swelling is present.         Impression: Fracture of the head of the fourth proximal phalanx.       This report was finalized on 6/19/2017 12:48 PM by Padmini Swan M.D..    Ct Head Without Contrast    Result Date: 6/23/2017  Narrative: PROCEDURE: CT HEAD WO CONTRAST-  HISTORY: headache; S92.502A-Displaced unspecified fracture of left lesser toe(s), initial encounter for closed fracture; L03.116-Cellulitis of left lower limb; I73.9-Peripheral vascular disease, unspecified; Z72.0-Tobacco use; Z74.09-Other reduced mobility; Z74.09-Other reduced mobility  COMPARISON:  None .  TECHNIQUE: Multiple axial CT images were performed from the foramen magnum to the vertex without enhancement.  FINDINGS: There is no CT evidence of hemorrhage. There is no mass, mass effect or midline shift.  There is no hydrocephalus. The paranasal sinuses are clear. Bone windows reveal no acute osseous abnormalities.      Impression: No acute intracranial process.      979.48 mGy.cm     This study was performed with techniques to keep radiation doses as low as reasonably achievable (ALARA). Individualized dose reduction techniques using automated exposure control or adjustment of mA and/or kV according to the patient size were employed.   This report was finalized on 6/23/2017 11:26 AM by Padmini Swan M.D..    Us Arterial Doppler Lower  Extremity Left    Result Date: 6/19/2017  Narrative: PROCEDURE: US ARTERIAL DOPPLER LOWER EXTREMITY  LEFT-  HISTORY: pain  PROCEDURE: Sonographic images of the left lower extremity was obtained with color flow and pulse Doppler.  FINDINGS:  LEFT LOWER EXTREMITY.      Velocities cm/sec :  CFA: 84 SFA Mid: 92 SFA Dist: 0 POP: 39 PT: 31 JULI: 23 CASSIE: Not obtained Waveforms are monophasic.       Impression: Occlusion of the distal superficial femoral artery with reconstitution of flow at the popliteal artery.  This report was finalized on 6/19/2017 1:14 PM by Padmini Swan M.D..    Mri Foot Left Without Contrast    Result Date: 6/19/2017  Narrative: FINAL REPORT CLINICAL HISTORY: ISCHEMIC LEFT 3RD TOE. HX OF SURGERY TO ANKLE. FINDINGS: No prior studies available for comparison. Multiplanar multisequence imaging of the foot was performed without the intravenous administration of contrast. Images are degraded by motion artifact. There is additional artifact from postsurgical hardware. There are prominent cystic degenerative changes diffusely. There is abnormal decreased T1 and increased T2 signal in the proximal and middle phalanx third and fourth digits with adjacent edema. There prominent cystic degenerative changes of the first and fifth metatarsal heads. There is no discrete drainable fluid collection. Impression: Prominent degenerative changes with findings suggesting third and fourth digit osteomyelitis and cellulitis. Recommend appropriate clinical followup     Impression: Authenticated by Latrell Clemente MD on 06/19/2017 05:38:45 PM    Us Venous Doppler Lower Extremity Left (duplex)    Result Date: 6/19/2017  Narrative: PROCEDURE: US VENOUS DOPPLER LOWER EXTREMITY LEFT (DUPLEX)-  HISTORY: pain  PROCEDURE: Multiple transverse and longitudinal scans were performed of the femoropopliteal deep venous system, with augmentation and compression maneuvers.  FINDINGS: Normal phasic flow was noted in the visualized  deep venous system. No intraluminal increased echogenicity is noted to suggest thrombus. There is normal compression and augmentation of the venous structures. No abnormal venous collaterals are seen. No venous reflux is demonstrated.      Impression: No evidence of deep venous thrombosis.  This report was finalized on 6/19/2017 12:49 PM by Padmini Swan M.D..          Assessment/Plan    #1 peripheral vascular disease status post intervention to the left lower extremity.  There is a good Doppler in the posterior tibial of the left leg still.  He is also had amputation of this toe.  This seems to be reasonable bleeding also.  Needs to keep the Plavix and statins on board.  Needs to have a close follow-up on outpatient basis.  If indeed being discharged would follow-up with him in about 2-3 weeks time.    #2 chronic renal failure.  Patient's renal function is significantly improved in the normal range now.  Has been on IV fluids for over 6 days now would discontinue the IV fluids.    #3 anemia: Has had guaiac positive stools he is not a candidate for normal oral anticoagulation therapy.  Would discontinue the aspirin and keep him on Plavix on the long run     #4 atrial fibrillation: Patient has known this for some time now.  He understands the risk of thromboembolic phenomena versus GI bleed.  And will be kept off anti-graduation therapy due to recurrent bleeds.        Active Problems:    Cellulitis of foot, left    Borderline diabetes    Closed fracture of fourth toe of left foot            Jonathan Storm MD  06/25/17  7:36 AM

## 2017-06-25 NOTE — PLAN OF CARE
Problem: Patient Care Overview (Adult)  Goal: Discharge Needs Assessment    06/19/17 1818 06/20/17 1815 06/21/17 1316   Discharge Needs Assessment   Concerns To Be Addressed --  --  --    Readmission Within The Last 30 Days --  --  --    Community Agency Name(S) none --  --    Equipment Needed After Discharge --  wound care supplies --    Discharge Facility/Level Of Care Needs --  --  --    Current Discharge Risk --  --  --    Discharge Disposition --  --  --    Current Health   Outpatient/Agency/Support Group Needs --  --  --    Anticipated Changes Related to Illness --  --  --    Living Environment   Transportation Available --  --  --    Self-Care   Equipment Currently Used at Home --  --  wheelchair;walker, standard;cane, straight;shower chair     06/21/17 1827 06/22/17 0352 06/25/17 0429   Discharge Needs Assessment   Concerns To Be Addressed --  --  no discharge needs identified   Readmission Within The Last 30 Days no previous admission in last 30 days --  --    Community Agency Name(S) --  --  --    Equipment Needed After Discharge --  --  --    Discharge Facility/Level Of Care Needs home with home health --  --    Current Discharge Risk --  chronically ill;dependent with mobility/activities of daily living;lack of support system/caregiver;lives alone;physical impairment;substance abuse --    Discharge Disposition still a patient --  --    Current Health   Outpatient/Agency/Support Group Needs homecare agency (specify level of care) --  --    Anticipated Changes Related to Illness --  inability to care for self --    Living Environment   Transportation Available none --  --    Self-Care   Equipment Currently Used at Home --  --  --

## 2017-06-25 NOTE — PROGRESS NOTES
Patient Care Team:  Marilyn K Vermeesch, MD as PCP - General (Internal Medicine)    Chief complaint left foot wound, gangrene    Subjective .   56-year-old male 2 days status post left foot wound debridements and grafting, third digit amputation, I&D.  Patient seen at bedside with nursing.  Still complains with pain but this seems to be controlled on medication.  I come in today in his postoperative dressing has been removed and a dressing is loosely wrapped around his toes.  When I inquired about his dressing the patient and his nurse today say that his dressing supposedly or evidently came off last night while he got up out of the bathroom and nursing redressed this.  I was not notified her called about this and the patient had orders placed to keep the dressing clean dry and intact and not change it and not remove it.  It was to be reinforced only.  He denies any constitutional symptoms.      Review of Systems  All systems were reviewed and negative except for chief complaint.    History  Past Medical History:   Diagnosis Date   • Abdominal pain     History of epigastric abdominal tenderness. Differentials include peptic ulcer disease,   pancreatobiliary disease.   • Abnormal LFTs    • Anemia    • Anxiety    • Arthritis    • Asthma    • Atrial fibrillation    • CAD (coronary artery disease)    • Calf cramp    • CKD (chronic kidney disease)    • COPD (chronic obstructive pulmonary disease)    • CVA (cerebral infarction)    • Depression    • DVT (deep venous thrombosis)    • Electrocution    • Fatigue    • Hepatitis C    • History of allergy    • History of blood transfusion    • Hyperlipidemia    • Hypertension    • Leg pain    • Loss of appetite    • PUD (peptic ulcer disease)    • Stroke syndrome    • Tachycardia    • Thrombocytopenia    • Thrombophlebitis of deep femoral vein    • Vision problems    , Past Surgical History:   Procedure Laterality Date   • ANKLE SURGERY     • CARDIAC CATHETERIZATION N/A  6/20/2017    Procedure: Peripheral angiography;  Surgeon: Jonathan Storm MD;  Location: [facility] CATH INVASIVE LOCATION;  Service:    • CARDIAC CATHETERIZATION N/A 6/20/2017    Procedure: Angioplasty-peripheral;  Surgeon: Jonathan Storm MD;  Location: [facility] CATH INVASIVE LOCATION;  Service:    • CARDIAC SURGERY     • CORONARY STENT PLACEMENT     • TONSILLECTOMY     • TOTAL HIP ARTHROPLASTY Left    • TOTAL SHOULDER REPLACEMENT Left    , Family History   Problem Relation Age of Onset   • Breast cancer Mother    • Colon cancer Sister    • Heart attack Other    • Arthritis Other    • Diabetes Other    • Hypertension Other    • Kidney disease Other    • Stroke Other    • Heart disease Father    , Social History   Substance Use Topics   • Smoking status: Current Every Day Smoker     Packs/day: 1.00     Years: 30.00     Types: Cigarettes   • Smokeless tobacco: None   • Alcohol use No   , Prescriptions Prior to Admission   Medication Sig Dispense Refill Last Dose   • albuterol (PROVENTIL HFA;VENTOLIN HFA) 108 (90 BASE) MCG/ACT inhaler Inhale 2 puffs Every 4 (Four) Hours As Needed for Wheezing.      • aspirin 81 MG EC tablet Take 1 tablet by mouth Daily. 30 tablet 11    • atorvastatin (LIPITOR) 40 MG tablet Take 1 tablet by mouth Every Night. 30 tablet 11    • colchicine 0.6 MG tablet Take 0.6 mg by mouth Daily.      • losartan-hydrochlorothiazide (HYZAAR) 50-12.5 MG per tablet Take 1 tablet by mouth Daily. (Patient taking differently: Take 1 tablet by mouth Daily.) 30 tablet 11 6/18/2017   • melatonin 3 MG tablet Take 2 tablets by mouth Every Night. 30 tablet 6 6/18/2017   • metoprolol tartrate (LOPRESSOR) 25 MG tablet Take 1 tablet by mouth Every 12 (Twelve) Hours. (Patient taking differently: Take 25 mg by mouth 2 (Two) Times a Day.) 60 tablet 11    • polyethylene glycol (MIRALAX) packet Take 17 g by mouth Daily As Needed.      • amiodarone (PACERONE) 200 MG tablet Take 1 tablet by mouth Daily. (Patient  "not taking: Reported on 6/21/2017) 30 tablet 11 Not Taking at Unknown time   • DULoxetine (CYMBALTA) 30 MG capsule Take 1 capsule by mouth Every Night. (Patient not taking: Reported on 6/21/2017) 30 capsule 1 Not Taking at Unknown time   • hydroxychloroquine (PLAQUENIL) 200 MG tablet Take 200 mg by mouth Daily.   Unknown at Unknown time   • rivaroxaban (XARELTO) 20 MG tablet Take 1 tablet by mouth Daily With Dinner. (Patient not taking: Reported on 6/21/2017) 30 tablet 11 Not Taking at Unknown time   , Scheduled Meds:    amiodarone 200 mg Oral Q24H   atorvastatin 40 mg Oral Nightly   clopidogrel 75 mg Oral Daily   DULoxetine 30 mg Oral Nightly   folic acid 1 mg Oral Daily   losartan 50 mg Oral Q24H   melatonin 6 mg Oral Nightly   metoprolol tartrate 25 mg Oral Q12H   multivitamin with minerals 1 tablet Oral Daily   nicotine 1 patch Transdermal Q24H   nystatin 5 mL Oral 4x Daily   pantoprazole 40 mg Oral BID AC   piperacillin-tazobactam 3.375 g Intravenous Q6H   thiamine 100 mg Oral Daily   vancomycin 1,500 mg Intravenous Q18H   , Continuous Infusions:    lactated ringers 100 mL/hr Last Rate: 100 mL/hr (06/25/17 0820)   Pharmacy to dose vancomycin     , PRN Meds:  •  acetaminophen  •  HYDROcodone-acetaminophen  •  Morphine **AND** naloxone  •  Morphine  •  ondansetron **OR** ondansetron ODT **OR** ondansetron  •  Pharmacy to dose vancomycin  •  sodium chloride  •  Insert peripheral IV **AND** sodium chloride and Allergies:  Review of patient's allergies indicates no known allergies.    Objective     Vital Signs   /86 (BP Location: Left arm, Patient Position: Lying)  Pulse 99  Temp 97.9 °F (36.6 °C)  Resp 18  Ht 68\" (172.7 cm)  Wt 227 lb 12.8 oz (103 kg)  SpO2 96%  BMI 34.64 kg/m2    Physical Exam:AAO ×3, NAD, AF VSS  There still appears to be graft of dried over the dorsal second digit and over the third digit amputation site.  The hallux wound debridement area is dry and crusted and is difficult to tell " if this graft is still intact it appears that it was most likely ripped off.  The graft on the fourth digit appears to be gone as well.  Of all digits are still warm.  Capillary refill times less than 5 seconds.  Sensations intact.  No pus or purulence or drainage from any wounds.  Everything appears to be dry and stable and healthy.       Results Review: All labs reviewed.  Wound cultures from operating room have grown Enterobacter      Assessment/Plan   I redressed his wound and discussed with his nurse today that his dressing is not to be touched or removed.  I reminded the patient that he supposed to be wearing his shoe when he is weightbearing.  He told me he had it on when he went to the bathroom.  He had a very large bulky postoperative dressing on his left foot and I find this very hard to believe that this just came off on its own when he got up and out of the bathroom.  He needs to stay off of this as much as he can.  Elevate as needed.  No ice to the left lower extremity.  I should be contacted and notified of any nursing questions.  From my standpoint I don't anticipate the patient needing any further surgical intervention during this hospital stay and wants it 16 fit by the primary services it's okay for my standpoint to discharge him.  We will just have to make sure he gets close follow-up in wound care upon discharge.  Active Problems:    Cellulitis of foot, left    Borderline diabetes    Closed fracture of fourth toe of left foot              Wolfgang Campbell DPM  06/25/17  12:06 PM

## 2017-06-25 NOTE — PLAN OF CARE
Problem: Infection, Risk/Actual (Adult)  Goal: Infection Prevention/Resolution    06/25/17 0429   Infection, Risk/Actual (Adult)   Infection Prevention/Resolution making progress toward outcome

## 2017-06-25 NOTE — PLAN OF CARE
Problem: Fall Risk (Adult)  Goal: Identify Related Risk Factors and Signs and Symptoms    06/21/17 1827 06/25/17 0429   Fall Risk   Fall Risk: Related Risk Factors --  age-related changes;gait/mobility problems   Fall Risk: Signs and Symptoms presence of risk factors --

## 2017-06-25 NOTE — SIGNIFICANT NOTE
"   06/25/17 1620   PT Deferred Reason   PT Deferred Reason Patient unavailable for evaluation;Patient unavailable for treatment  (Patient states that he is 'too tired and has been through a lot today.\"  PT to attempt again tomorrow.)     "

## 2017-06-26 VITALS
HEIGHT: 68 IN | TEMPERATURE: 98.1 F | WEIGHT: 227.8 LBS | OXYGEN SATURATION: 99 % | HEART RATE: 77 BPM | DIASTOLIC BLOOD PRESSURE: 97 MMHG | BODY MASS INDEX: 34.53 KG/M2 | RESPIRATION RATE: 17 BRPM | SYSTOLIC BLOOD PRESSURE: 163 MMHG

## 2017-06-26 PROBLEM — I73.9 PERIPHERAL VASCULAR DISEASE (HCC): Status: ACTIVE | Noted: 2017-06-26

## 2017-06-26 PROBLEM — M86.172 ACUTE OSTEOMYELITIS OF LEFT FOOT (HCC): Status: ACTIVE | Noted: 2017-06-26

## 2017-06-26 PROBLEM — D64.9 ANEMIA: Status: ACTIVE | Noted: 2017-06-26

## 2017-06-26 PROBLEM — I96 DRY GANGRENE (HCC): Status: ACTIVE | Noted: 2017-06-26

## 2017-06-26 LAB
ALBUMIN SERPL-MCNC: 2.8 G/DL (ref 3.5–5)
ALBUMIN/GLOB SERPL: 0.7 G/DL (ref 1–2)
ALP SERPL-CCNC: 72 U/L (ref 38–126)
ALT SERPL W P-5'-P-CCNC: 37 U/L (ref 13–69)
ANION GAP SERPL CALCULATED.3IONS-SCNC: 11.6 MMOL/L
AST SERPL-CCNC: 48 U/L (ref 15–46)
BACTERIA SPEC AEROBE CULT: NO GROWTH
BILIRUB SERPL-MCNC: 0.9 MG/DL (ref 0.2–1.3)
BUN BLD-MCNC: 7 MG/DL (ref 7–20)
BUN/CREAT SERPL: 8.8 (ref 6.3–21.9)
CALCIUM SPEC-SCNC: 8 MG/DL (ref 8.4–10.2)
CHLORIDE SERPL-SCNC: 109 MMOL/L (ref 98–107)
CO2 SERPL-SCNC: 25 MMOL/L (ref 26–30)
CREAT BLD-MCNC: 0.8 MG/DL (ref 0.6–1.3)
DEPRECATED RDW RBC AUTO: 55.2 FL (ref 37–54)
ERYTHROCYTE [DISTWIDTH] IN BLOOD BY AUTOMATED COUNT: 16.3 % (ref 11.5–14.5)
GFR SERPL CREATININE-BSD FRML MDRD: 99 ML/MIN/1.73
GLOBULIN UR ELPH-MCNC: 4.1 GM/DL
GLUCOSE BLD-MCNC: 99 MG/DL (ref 74–98)
HCT VFR BLD AUTO: 26.1 % (ref 42–52)
HGB BLD-MCNC: 8.2 G/DL (ref 14–18)
MCH RBC QN AUTO: 29.2 PG (ref 27–31)
MCHC RBC AUTO-ENTMCNC: 31.4 G/DL (ref 30–37)
MCV RBC AUTO: 92.9 FL (ref 80–94)
PLATELET # BLD AUTO: 116 10*3/MM3 (ref 130–400)
PMV BLD AUTO: 11.1 FL (ref 6–12)
POTASSIUM BLD-SCNC: 3.6 MMOL/L (ref 3.5–5.1)
PROT SERPL-MCNC: 6.9 G/DL (ref 6.3–8.2)
RBC # BLD AUTO: 2.81 10*6/MM3 (ref 4.7–6.1)
SODIUM BLD-SCNC: 142 MMOL/L (ref 137–145)
WBC NRBC COR # BLD: 3.65 10*3/MM3 (ref 4.8–10.8)

## 2017-06-26 PROCEDURE — 85027 COMPLETE CBC AUTOMATED: CPT | Performed by: INTERNAL MEDICINE

## 2017-06-26 PROCEDURE — 80053 COMPREHEN METABOLIC PANEL: CPT | Performed by: INTERNAL MEDICINE

## 2017-06-26 PROCEDURE — 25010000002 PIPERACILLIN SOD-TAZOBACTAM PER 1 G: Performed by: NURSE PRACTITIONER

## 2017-06-26 PROCEDURE — 99239 HOSP IP/OBS DSCHRG MGMT >30: CPT | Performed by: NURSE PRACTITIONER

## 2017-06-26 PROCEDURE — 25010000002 VANCOMYCIN PER 500 MG: Performed by: NURSE PRACTITIONER

## 2017-06-26 PROCEDURE — 25010000002 MORPHINE PER 10 MG: Performed by: INTERNAL MEDICINE

## 2017-06-26 RX ORDER — DOXYCYCLINE HYCLATE 100 MG/1
100 CAPSULE ORAL 2 TIMES DAILY
Qty: 28 CAPSULE | Refills: 0 | Status: SHIPPED | OUTPATIENT
Start: 2017-06-26 | End: 2017-08-17

## 2017-06-26 RX ORDER — HYDROCODONE BITARTRATE AND ACETAMINOPHEN 5; 325 MG/1; MG/1
1-2 TABLET ORAL EVERY 6 HOURS PRN
Qty: 24 TABLET | Refills: 0 | Status: SHIPPED | OUTPATIENT
Start: 2017-06-26 | End: 2017-06-29

## 2017-06-26 RX ORDER — HYDROCODONE BITARTRATE AND ACETAMINOPHEN 5; 325 MG/1; MG/1
1 TABLET ORAL EVERY 4 HOURS PRN
Qty: 10 TABLET | Refills: 0 | Status: SHIPPED | OUTPATIENT
Start: 2017-06-26 | End: 2017-06-26

## 2017-06-26 RX ORDER — CLOPIDOGREL BISULFATE 75 MG/1
75 TABLET ORAL DAILY
Qty: 30 TABLET | Refills: 0 | Status: SHIPPED | OUTPATIENT
Start: 2017-06-26 | End: 2017-08-17 | Stop reason: SDUPTHER

## 2017-06-26 RX ADMIN — SODIUM CHLORIDE, POTASSIUM CHLORIDE, SODIUM LACTATE AND CALCIUM CHLORIDE 100 ML/HR: 600; 310; 30; 20 INJECTION, SOLUTION INTRAVENOUS at 13:37

## 2017-06-26 RX ADMIN — HYDROCODONE BITARTRATE AND ACETAMINOPHEN 1 TABLET: 5; 325 TABLET ORAL at 05:00

## 2017-06-26 RX ADMIN — VANCOMYCIN HYDROCHLORIDE 1500 MG: 500 INJECTION, POWDER, LYOPHILIZED, FOR SOLUTION INTRAVENOUS at 05:00

## 2017-06-26 RX ADMIN — PIPERACILLIN SODIUM AND TAZOBACTAM SODIUM 3.38 G: 3; .375 INJECTION, POWDER, FOR SOLUTION INTRAVENOUS at 13:37

## 2017-06-26 RX ADMIN — MORPHINE SULFATE 2 MG: 2 INJECTION, SOLUTION INTRAMUSCULAR; INTRAVENOUS at 10:54

## 2017-06-26 RX ADMIN — FOLIC ACID 1 MG: 1 TABLET ORAL at 09:27

## 2017-06-26 RX ADMIN — METOPROLOL TARTRATE 25 MG: 25 TABLET ORAL at 09:27

## 2017-06-26 RX ADMIN — PIPERACILLIN SODIUM AND TAZOBACTAM SODIUM 3.38 G: 3; .375 INJECTION, POWDER, FOR SOLUTION INTRAVENOUS at 09:26

## 2017-06-26 RX ADMIN — MORPHINE SULFATE 2 MG: 2 INJECTION, SOLUTION INTRAMUSCULAR; INTRAVENOUS at 01:35

## 2017-06-26 RX ADMIN — HYDROCODONE BITARTRATE AND ACETAMINOPHEN 1 TABLET: 5; 325 TABLET ORAL at 14:02

## 2017-06-26 RX ADMIN — LOSARTAN POTASSIUM 50 MG: 50 TABLET, FILM COATED ORAL at 09:27

## 2017-06-26 RX ADMIN — HYDROCODONE BITARTRATE AND ACETAMINOPHEN 1 TABLET: 5; 325 TABLET ORAL at 00:04

## 2017-06-26 RX ADMIN — MULTIPLE VITAMINS W/ MINERALS TAB 1 TABLET: TAB at 09:27

## 2017-06-26 RX ADMIN — CLOPIDOGREL BISULFATE 75 MG: 75 TABLET ORAL at 09:27

## 2017-06-26 RX ADMIN — NYSTATIN 500000 UNITS: 100000 SUSPENSION ORAL at 09:26

## 2017-06-26 RX ADMIN — Medication 100 MG: at 09:27

## 2017-06-26 RX ADMIN — AMIODARONE HYDROCHLORIDE 200 MG: 200 TABLET ORAL at 09:27

## 2017-06-26 RX ADMIN — NYSTATIN 500000 UNITS: 100000 SUSPENSION ORAL at 10:55

## 2017-06-26 RX ADMIN — HYDROCODONE BITARTRATE AND ACETAMINOPHEN 1 TABLET: 5; 325 TABLET ORAL at 09:37

## 2017-06-26 RX ADMIN — PANTOPRAZOLE SODIUM 40 MG: 40 TABLET, DELAYED RELEASE ORAL at 09:27

## 2017-06-26 RX ADMIN — PIPERACILLIN SODIUM AND TAZOBACTAM SODIUM 3.38 G: 3; .375 INJECTION, POWDER, FOR SOLUTION INTRAVENOUS at 03:11

## 2017-06-26 NOTE — PLAN OF CARE
Problem: Cellulitis (Adult)  Goal: Signs and Symptoms of Listed Potential Problems Will be Absent or Manageable (Cellulitis)  Outcome: Ongoing (interventions implemented as appropriate)    Problem: Infection, Risk/Actual (Adult)  Goal: Infection Prevention/Resolution  Outcome: Ongoing (interventions implemented as appropriate)    Problem: Patient Care Overview (Adult)  Goal: Plan of Care Review  Outcome: Ongoing (interventions implemented as appropriate)  Goal: Discharge Needs Assessment  Outcome: Ongoing (interventions implemented as appropriate)    Problem: Fall Risk (Adult)  Goal: Identify Related Risk Factors and Signs and Symptoms  Outcome: Ongoing (interventions implemented as appropriate)  Goal: Absence of Falls  Outcome: Ongoing (interventions implemented as appropriate)    Problem: Revascularization/PAD, Lower Extremity (Adult)  Goal: Signs and Symptoms of Listed Potential Problems Will be Absent or Manageable (Revascularization/PAD, Lower Extremity)  Outcome: Ongoing (interventions implemented as appropriate)    Problem: Skin Integrity Impairment, Risk/Actual (Adult)  Goal: Identify Related Risk Factors and Signs and Symptoms  Outcome: Ongoing (interventions implemented as appropriate)  Goal: Skin Integrity/Wound Healing  Outcome: Ongoing (interventions implemented as appropriate)

## 2017-06-26 NOTE — PLAN OF CARE
Problem: Patient Care Overview (Adult)  Goal: Plan of Care Review  Outcome: Unable to achieve outcome(s) by discharge Date Met:  06/26/17 06/26/17 1529   Coping/Psychosocial Response Interventions   Plan Of Care Reviewed With patient   Outcome Evaluation   Outcome Summary/Follow up Plan Pt is able to ambulate utilizing boot on L LE throughout his room without assistance per nsg and patient report. Pt reports he is at his baseline for what he is able to do while in inpatient services and does not need skilled PT intervention at this time.          Problem: Inpatient Physical Therapy  Goal: Transfer Training Goal 1 LTG- PT  Outcome: Unable to achieve outcome(s) by discharge Date Met:  06/26/17 06/21/17 1500 06/26/17 1529   Transfer Training PT LTG   Transfer Training PT LTG, Date Established 06/21/17 --    Transfer Training PT LTG, Time to Achieve 2 wks --    Transfer Training PT LTG, Activity Type bed to chair /chair to bed;sit to stand/stand to sit --    Transfer Training PT LTG, [place] Level supervision required --    Transfer Training PT LTG, Assist Device walker, standard --    Transfer Training PT LTG, Additional Goal demonstrating proper safety awareness and weight bearing equally through B LE. --    Transfer Training PT LTG, Outcome --  goal not met   Transfer Training PT LTG, Reason Goal Not Met --  (Pt states he is at baseline & does not want skilled PT)       Goal: Gait Training Goal LTG- PT  Outcome: Unable to achieve outcome(s) by discharge Date Met:  06/26/17 06/21/17 1500 06/26/17 1529   Gait Training PT LTG   Gait Training Goal PT LTG, Date Established 06/21/17 --    Gait Training Goal PT LTG, Time to Achieve 2 wks --    Gait Training Goal PT LTG, [place] Level (Stand By Assist) --    Gait Training Goal PT LTG, Assist Device walker, standard --    Gait Training Goal PT LTG, Distance to Achieve 75 --    Gait Training Goal PT LTG, Additional Goal without rest breaks, with equal step  length and demonstrating proper safety with SW.  --    Gait Training Goal PT LTG, Outcome --  goal partially met   Gait Training Goal PT LTG, Reason Goal Not Met --  other (see comments)  (Pt states he is at baseline and does not want skilled PT )

## 2017-06-26 NOTE — THERAPY DISCHARGE NOTE
Acute Care - Occupational Therapy Discharge Summary  [facility]     Patient Name: Vasquez Marie  : 1960  MRN: [id number]    Today's Date: 2017  Onset of Illness/Injury or Date of Surgery Date: 17    Date of Referral to OT: 17  Referring Physician: Ernesto HSU      Admit Date: 2017        OT Recommendation and Plan    Visit Dx:    ICD-10-CM ICD-9-CM   1. Closed fracture of fourth toe of left foot, initial encounter S92.502A 826.0   2. Cellulitis of foot, left L03.116 682.7   3. Peripheral vascular disease of lower extremity I73.9 443.9   4. Tobacco abuse Z72.0 305.1   5. Impaired functional mobility, balance, gait, and endurance Z74.09 V49.89   6. Impaired mobility and ADLs Z74.09 799.89   7. Anemia, unspecified type D64.9 285.9                     OT Goals       17 1600          Transfer Training OT LTG    Transfer Training OT LTG, Date Established 17  -      Transfer Training OT LTG, Time to Achieve 2 wks  -      Transfer Training OT LTG, Activity Type sit to stand/stand to sit  -      Transfer Training OT LTG, [place] Level supervision required  -      Transfer Training OT LTG, Outcome goal ongoing  -      LB Dressing OT LTG    LB Dressing Goal OT LTG, Date Established 17  -      LB Dressing Goal OT LTG, Time to Achieve 2 wks  -AH      LB Dressing Goal OT LTG, [place] Level contact guard assist  -      LB Dressing Goal OT LTG, Outcome goal ongoing  -      Functional Mobility OT LTG    Functional Mobility Goal OT LTG, Date Established 17  -      Functional Mobility Goal OT LTG, Time to Achieve 2 wks  -AH      Functional Mobility Goal OT LTG, [place] Level contact guard  -      Functional Mobility Goal OT LTG, Assist Device standard walker  -      Functional Mobility Goal OT LTG, Distance to Achieve in hallway  -      Functional Mobility Goal OT LTG, Outcome goal ongoing  -        User Key  (r) = Recorded By,  (t) = Taken By, (c) = Cosigned By    Initials Name Provider Type    VAHID Sullivan Occupational Therapist                  OT Discharge Summary  Anticipated Discharge Disposition: home with home health  Reason for Discharge: Discharge from facility  Outcomes Achieved: Unable to make functional progress toward goals at this time (Pt seen for OT evaluation only.)  Discharge Destination: Home with home health      Nury Sullivan  6/26/2017

## 2017-06-26 NOTE — THERAPY DISCHARGE NOTE
Acute Care - Physical Therapy Discharge Summary   [facility]       Patient Name: Vasquez Marie  : 1960  MRN: [id number]    Today's Date: 2017  Onset of Illness/Injury or Date of Surgery Date: 17    Date of Referral to PT: 17  Referring Physician: Ernesto HSU      Admit Date: 2017      PT Recommendation and Plan    Visit Dx:    ICD-10-CM ICD-9-CM   1. Closed fracture of fourth toe of left foot, initial encounter S92.502A 826.0   2. Cellulitis of foot, left L03.116 682.7   3. Peripheral vascular disease of lower extremity I73.9 443.9   4. Tobacco abuse Z72.0 305.1   5. Impaired functional mobility, balance, gait, and endurance Z74.09 V49.89   6. Impaired mobility and ADLs Z74.09 799.89   7. Anemia, unspecified type D64.9 285.9                       IP PT Goals       17 1529 17 1523 17 1500    Transfer Training PT LTG    Transfer Training PT LTG, Date Established   17  -JR    Transfer Training PT LTG, Time to Achieve   2 wks  -JR    Transfer Training PT LTG, Activity Type   bed to chair /chair to bed;sit to stand/stand to sit  -JR    Transfer Training PT LTG, [place] Level   supervision required  -JR    Transfer Training PT LTG, Assist Device   walker, standard  -JR    Transfer Training PT LTG, Additional Goal   demonstrating proper safety awareness and weight bearing equally through B LE.  -JR    Transfer Training PT LTG, Outcome (P)  goal not met  - goal ongoing  -RM     Transfer Training PT LTG, Reason Goal Not Met (P)  --   Pt states he is at baseline & does not want skilled PT  -      Gait Training PT LTG    Gait Training Goal PT LTG, Date Established   17  -JR    Gait Training Goal PT LTG, Time to Achieve   2 wks  -JR    Gait Training Goal PT LTG, [place] Level   --   Stand By Assist  -JR    Gait Training Goal PT LTG, Assist Device   walker, standard  -JR    Gait Training Goal PT LTG, Distance to Achieve   75  -JR    Gait  Training Goal PT LTG, Additional Goal   without rest breaks, with equal step length and demonstrating proper safety with SW.   -JR    Gait Training Goal PT LTG, Outcome (P)  goal partially met  - goal partially met  -RM goal ongoing  -JR    Gait Training Goal PT LTG, Reason Goal Not Met (P)  other (see comments)   Pt states he is at baseline and does not want skilled PT   -MC        User Key  (r) = Recorded By, (t) = Taken By, (c) = Cosigned By    Initials Name Provider Type    JR Sia Whaley, PT Physical Therapist     Dipak Scott, PTA Physical Therapy Assistant    SOUMYA Cancino, PT Student PT Student                     Yohan Cancino, PT Student   6/26/2017

## 2017-06-26 NOTE — PROGRESS NOTES
Continued Stay Note   [facility]     Patient Name: Vasquez Marie  MRN: [id number]  Today's Date: 6/26/2017    Admit Date: 6/19/2017          Discharge Plan       06/26/17 1029    Case Management/Social Work Plan    Plan Spoke to pt in room, has a WC, Cane and a walker.  Wants [facility] when discharged. Denies further discharge needs.               Discharge Codes     None        Expected Discharge Date and Time     Expected Discharge Date Expected Discharge Time    Jun 22, 2017             Paty Marion

## 2017-06-26 NOTE — SIGNIFICANT NOTE
06/26/17 1202   PT Deferred Reason   PT Deferred Reason Patient/family declined evaluation  (Pt declined participation in PT tx)

## 2017-06-26 NOTE — DISCHARGE SUMMARY
[facility]   DISCHARGE SUMMARY    Name:  Vasquez Marie   Age:  56 y.o.  Sex:  male  :  1960  MRN:  [id number]   Visit Number:  [id number]  Primary Care Physician:  Marilyn K. Vermeesch, MD  Date of Discharge:  2017  Admission Date:  2017      Presenting Problem:    Cellulitis of foot, left [L03.116]  Closed fracture of fourth toe of left foot, initial encounter [S92.502A]       Discharge Diagnosis:     Principal Problem:    Cellulitis of foot, left  Active Problems:    Coronary artery disease involving native coronary artery of native heart with unstable angina pectoris    Tobacco abuse    Gout    Atrial flutter    Cerebrovascular accident (CVA)    Borderline diabetes    Closed fracture of fourth toe of left foot    Dry gangrene    Acute osteomyelitis of left foot    Anemia    Peripheral vascular disease        Past Medical History:  Past Medical History:   Diagnosis Date   • Abdominal pain     History of epigastric abdominal tenderness. Differentials include peptic ulcer disease,   pancreatobiliary disease.   • Abnormal LFTs    • Anemia    • Anxiety    • Arthritis    • Asthma    • Atrial fibrillation    • CAD (coronary artery disease)    • Calf cramp    • CKD (chronic kidney disease)    • COPD (chronic obstructive pulmonary disease)    • CVA (cerebral infarction)    • Depression    • DVT (deep venous thrombosis)    • Electrocution    • Fatigue    • Hepatitis C    • History of allergy    • History of blood transfusion    • Hyperlipidemia    • Hypertension    • Leg pain    • Loss of appetite    • PUD (peptic ulcer disease)    • Stroke syndrome    • Tachycardia    • Thrombocytopenia    • Thrombophlebitis of deep femoral vein    • Vision problems          Consults:     Consults     Date and Time Order Name Status Description    2017 7242 Inpatient Consult to Podiatry Completed     2017 5997 Inpatient Consult to Cardiology      2017 1524 Cardiology -  Interventional (on-call MD unless specified)            Procedures Performed:    Procedure(s):  amputation of left foot third digit, left foot wound debridments and grafting         History of presenting illness:  This is a 56-year-old male with history significant for coronary artery disease status post stent placement, atrial fibrillation/flutter on anticoagulation, recent GI bleed status post EGD, peripheral vascular disease, hypertension, who presented to the emergency room with complaints of left lower extremity pain. According to the patient almost 2 months ago he started having pain in his left lower extremity. Patient was recently admitted to [facility] in [place] for atrial flutter with RVR. He was placed on Cardizem drip for rate control. He was already on Coumadin at that time however he was noted to be subtherapeutic. The Coumadin was discontinued and he was placed on Xarelto. He did undergo TATIANNA with electrocardioversion during that admission and discharged home on amiodarone and Xarelto in April 2017. That admission as well he was also noted to have cellulitis of his bilateral lower extremities hospitalist were consulted to assist with antibiotic management. It was felt his neuropathy was likely secondary to peripheral arterial disease. Hospitalist at that time recommended to transition him to Omnicef and doxycycline to complete a 14 day of antibiotic. He did recommend that he follow up and have his arterial disease addressed. However in May 2017 he was sent to  for GI bleed. According to the patient he was having bright red blood in his stools. He did undergo an upper endoscopy during that time for which he had a single nonbleeding angiectasia of the duodenum for which they recommended continuing PPI for 8 weeks twice a day and continued recommended capsule endoscopy as an outpatient to evaluate for small intestinal angioectasias. Patient has not had this arranged. I have reviewed the  admission from [facility]. Patient did have evidence of alcohol withdrawal while he was at [facility]. He also was noted on ultrasound of the abdomen to have evidence of cirrhosis of his liver. He has a documented history of heavy alcohol use. We will place him on telemetry to monitor.     According to the patient since his discharge and may have been doing fairly well but he continued to have left lower extremity pain redness and swelling. He came into the emergency room on the date of admission for further evaluation. Venous duplex was done which did not show any evidence of DVT. Arterial duplex was done which showed occlusion of the distal superficial femoral artery with reconstitution of flow at the popliteal artery. X-ray was also done in the emergency room which did show fracture of the head of the fourth proximal phalanx. Patient was started on IV antibiotic's in the emergency room in the form of clindamycin and will be admitted to the hospitalist service. Dr. Storm was consulted for interventional management.  Podiatry was also consulted for possible amputation.    Hospital Course:  He was seen and evaluated by Dr. Storm with cardiology for his peripheral vascular disease. He had an arterial duplex done in the emergency room which did show occlusion of the distal superficial femoral artery with reconstitution of flow at the popliteal artery. Patient did undergo intervention of his SFA and popliteal arteries. Dr. Campbell with podiatry has been consulted where he will possibly need amputation of third digit if not more.   His blood thinners were supposed to be held after his discharge from , according to the records however patient had Xartelto at home but recently stopped taking it himself due to bruising. His Xarelto has been discontinued as well as aspirin per recommendations of cardiology. He is to continue Plavix only. He did get 1 unit of packed red blood cells yesterday. He denies any evidence  of GI bleed at this time he denies any hematemesis, melanotic stools, or hematochezia. Also been no report of any black tarry stools.  Patient has an appointment to follow-up in the GI clinic where they have recommended a capsule endoscopy.  Patient prefers to keep this appointment and follow-up.  He denies any evidence of overt bleeding.  I have instructed the patient that he is to remain off anticoagulation.  He will need a follow-up CBC in 1 week he can follow-up with his primary care provider.  His hemoglobin has however remained stable since his blood transfusion.  There has been no further significant drop in his hemoglobin.  He does have a liver mass that is concerning for malignancy for which did set him up with the liver GI clinic He is also noted to have cirrhosis of the liver with a history of alcohol abuse and hepatitis C. her into the patient he has an appointment in August with the liver clinic. He has seen them once since his discharge from [facility] in April. Will defer further workup for his cirrhosis and liver mass to the GI clinic.    Patient was seen and evaluated by Dr. Campbell with podiatry and he did take them down where he underwent amputation of his left third digit, incision and drainage of the left foot, left foot wound debridement, and left foot graft application.  Patient tolerated the procedure without any complications.  He does have a dressing intact that is not to be removed until he follows up with Dr. Campbell.  Wound cultures were done which is growing Enterobacter Cloacae.  He cannot do Levaquin due to being on the amiodarone.  I did speak with pharmacy as well as infectious disease who has recommended doxycycline.  He will need close follow-up on an outpatient basis.  I will arrange for him to follow-up with infectious disease as well as Dr. Campbell within the next week.  PT OT has been consulted working with the patient.  He does have a boot on the left lower extremity and he is  not to ambulate without the boot.  Case management has been consulted and we will arrange for him to go home with home health.  At this time patient is sitting up on the side of the bed.  He denies any chest pain shortness of breath abdominal pain nausea or vomiting.  I have stressed the importance of keeping his appointment in the GI clinic.  If he sees any bright red blood or black tarry stools he is to return to the emergency room.  He is also been again instructed to hold off on the aspirin and Xarelto.  He has an appointment on Friday to follow-up with his cardiologist.  He is otherwise stable from the hospitalist standpoint for discharge home.        Vital Signs:    Temp:  [98.1 °F (36.7 °C)-98.6 °F (37 °C)] 98.1 °F (36.7 °C)  Heart Rate:  [71-77] 77  Resp:  [17-18] 17  BP: (140-173)/(81-99) 163/97    Physical Exam:  General Appearance:    Alert and cooperative, not in any acute distress.   Head:    Atraumatic and normocephalic, without obvious abnormality.   Eyes:            PERRLA, conjunctivae and sclerae normal, no Icterus. No pallor. Extra-occular movements are within normal limits.   Ears:    Ears appear intact with no abnormalities noted.   Throat:   No oral lesions, no thrush, oral mucosa moist.   Neck:   Supple, trachea midline, no thyromegaly, no carotid bruit.   Back:     No kyphoscoliosis present. No tenderness to palpation,   range of motion normal.   Lungs:     Chest shape is normal. Breath sounds heard bilaterally equally.  No crackles or wheezing. No Pleural rub or bronchial breathing.    Heart:    Normal S1 and S2, no murmur, no gallop, no rub. No JVD   Abdomen:     Normal bowel sounds, no masses, no organomegaly. Soft        non-tender, non-distended, no guarding, no rebound                tenderness   Extremities:   Moves all extremities well, no edema, no cyanosis, no             Clubbing, Left foot with dressing that is intact and instructions not to be removed until follow up with   [facility]   Pulses:   Pulses palpable and equal bilaterally   Skin:   No bleeding, bruising or rash   Lymph nodes:  Psychiatric/Behavior:    No palpable adenopathy    Normal mood, normal behavior   Neurologic:   Cranial nerves 2 - 12 grossly intact, sensation intact, Motor power is normal and equal bilaterally.           Pertinent Lab Results:       Results from last 7 days  Lab Units 06/26/17  0516 06/25/17  0519 06/24/17  0552  06/21/17  0510   SODIUM mmol/L 142 141 144  < > 143   POTASSIUM mmol/L 3.6 3.4* 3.6  < > 3.5   CHLORIDE mmol/L 109* 109* 111*  < > 102   TOTAL CO2 mmol/L 25.0* 25.0* 27.0  < > 27.0   BUN mg/dL 7 6* 10  < > 18   CREATININE mg/dL 0.80 0.80 0.80  < > 1.50*   CALCIUM mg/dL 8.0* 7.8* 7.5*  < > 7.8*   BILIRUBIN mg/dL 0.9 0.7  --   --  0.6   ALK PHOS U/L 72 108  --   --  112   ALT (SGPT) U/L 37 37  --   --  31   AST (SGOT) U/L 48* 46  --   --  43   GLUCOSE mg/dL 99* 118* 115*  < > 140*   < > = values in this interval not displayed.    Results from last 7 days  Lab Units 06/26/17  0516 06/25/17  0519 06/24/17  0552   WBC 10*3/mm3 3.65* 3.05* 3.23*   HEMOGLOBIN g/dL 8.2* 8.3* 8.7*   HEMATOCRIT % 26.1* 26.2* 28.1*   PLATELETS 10*3/mm3 116* 107* 102*       Results from last 7 days  Lab Units 06/22/17  0520   INR  1.91*     Wound Culture   Date Value Ref Range Status   06/23/2017 Scant growth (1+) Enterobacter cloacae (A)  Final     Comment:     Identification and MYRA to follow.     06/22/2017 No growth  Final         Pertinent Radiology Results:    Imaging Results (all)     Procedure Component Value Units Date/Time    XR Foot 3+ View Left [[id number]] Collected:  06/19/17 1246     Updated:  06/19/17 1251    Narrative:       PROCEDURE: XR FOOT 3+ VW LEFT-     History: pain     COMPARISON:April 21, 2017.     FINDINGS:  A 3 view exam demonstrates postsurgical changes with a wire  spanning the bases of the fourth and fifth metatarsals. There is a  fracture of the head of the fourth proximal phalanx.  Advanced  degenerative changes are present at the tibiotalar and subtalar joints.  There is also joint space narrowing and osteophyte formation in the mid  foot. Diffuse soft tissue swelling is present.           Impression:       Fracture of the head of the fourth proximal phalanx.                 This report was finalized on 6/19/2017 12:48 PM by Padmini Swan M.D..    US Venous Doppler Lower Extremity Left (duplex) [[id number]] Collected:  06/19/17 1249     Updated:  06/19/17 1301    Narrative:       PROCEDURE: US VENOUS DOPPLER LOWER EXTREMITY LEFT (DUPLEX)-     HISTORY: pain     PROCEDURE: Multiple transverse and longitudinal scans were performed of  the femoropopliteal deep venous system, with augmentation and  compression maneuvers.     FINDINGS: Normal phasic flow was noted in the visualized deep venous  system. No intraluminal increased echogenicity is noted to suggest  thrombus. There is normal compression and augmentation of the venous  structures. No abnormal venous collaterals are seen. No venous reflux is  demonstrated.       Impression:       No evidence of deep venous thrombosis.     This report was finalized on 6/19/2017 12:49 PM by Padmini Swan M.D..    US Arterial Doppler Lower Extremity Left [[id number]] Collected:  06/19/17 1312     Updated:  06/19/17 1316    Narrative:       PROCEDURE: US ARTERIAL DOPPLER LOWER EXTREMITY  LEFT-     HISTORY: pain     PROCEDURE: Sonographic images of the left lower extremity was obtained  with color flow and pulse Doppler.     FINDINGS:      LEFT LOWER EXTREMITY.      Velocities cm/sec :      CFA: 84  SFA Mid: 92   SFA Dist: 0  POP: 39  PT: 31  JULI: 23  CASSIE: Not obtained  Waveforms are monophasic.          Impression:       Occlusion of the distal superficial femoral artery with  reconstitution of flow at the popliteal artery.     This report was finalized on 6/19/2017 1:14 PM by Padmini Swan M.D..    MRI Foot Left Without Contrast [[id number]]  Collected:  06/19/17 1738     Updated:  06/19/17 1741    Narrative:       FINAL REPORT    CLINICAL HISTORY:  ISCHEMIC LEFT 3RD TOE. HX OF SURGERY TO ANKLE.    FINDINGS:  No prior studies available for comparison.    Multiplanar multisequence imaging of the foot was performed without the intravenous administration of contrast. Images are degraded by motion artifact. There is additional artifact from postsurgical hardware. There are prominent cystic degenerative   changes diffusely. There is abnormal decreased T1 and increased T2 signal in the proximal and middle phalanx third and fourth digits with adjacent edema. There prominent cystic degenerative changes of the first and fifth metatarsal heads. There is no   discrete drainable fluid collection.    Impression:    Prominent degenerative changes with findings suggesting third and fourth digit osteomyelitis and cellulitis. Recommend appropriate clinical followup      Impression:       Authenticated by Latrell Clemente MD on 06/19/2017 05:38:45 PM    SCANNED - IMAGING [[id number]] Resulted:  06/19/17      Updated:  06/22/17 1036    CT Head Without Contrast [[id number]] Collected:  06/23/17 1125     Updated:  06/23/17 1128    Narrative:       PROCEDURE: CT HEAD WO CONTRAST-     HISTORY: headache; S92.502A-Displaced unspecified fracture of left  lesser toe(s), initial encounter for closed fracture; L03.116-Cellulitis  of left lower limb; I73.9-Peripheral vascular disease, unspecified;  Z72.0-Tobacco use; Z74.09-Other reduced mobility; Z74.09-Other reduced  mobility     COMPARISON:  None .     TECHNIQUE: Multiple axial CT images were performed from the foramen  magnum to the vertex without enhancement.      FINDINGS: There is no CT evidence of hemorrhage. There is no mass, mass  effect or midline shift.  There is no hydrocephalus. The paranasal  sinuses are clear. Bone windows reveal no acute osseous abnormalities.       Impression:       No acute intracranial process.              979.48 mGy.cm          This study was performed with techniques to keep radiation doses as low  as reasonably achievable (ALARA). Individualized dose reduction  techniques using automated exposure control or adjustment of mA and/or  kV according to the patient size were employed.         This report was finalized on 6/23/2017 11:26 AM by Padmini Swan M.D..          Condition on Discharge:    Stable        Discharge Disposition:    Home-Health Care [facility]    Discharge Medication:     Vasquez Marie   Home Medication Instructions MARYURI:[id number]    Printed on:06/26/17 9352   Medication Information                      albuterol (PROVENTIL HFA;VENTOLIN HFA) 108 (90 BASE) MCG/ACT inhaler  Inhale 2 puffs Every 4 (Four) Hours As Needed for Wheezing.             amiodarone (PACERONE) 200 MG tablet  Take 1 tablet by mouth Daily.             atorvastatin (LIPITOR) 40 MG tablet  Take 1 tablet by mouth Every Night.             clopidogrel (PLAVIX) 75 MG tablet  Take 1 tablet by mouth Daily.             colchicine 0.6 MG tablet  Take 0.6 mg by mouth Daily.             doxycycline (VIBRAMYCIN) 100 MG capsule  Take 1 capsule by mouth 2 (Two) Times a Day.             DULoxetine (CYMBALTA) 30 MG capsule  Take 1 capsule by mouth Every Night.             HYDROcodone-acetaminophen (NORCO) 5-325 MG per tablet  Take 1-2 tablets by mouth Every 6 (Six) Hours As Needed for Moderate Pain (4-6) for up to 3 days.             hydroxychloroquine (PLAQUENIL) 200 MG tablet  Take 200 mg by mouth Daily.             losartan-hydrochlorothiazide (HYZAAR) 50-12.5 MG per tablet  Take 1 tablet by mouth Daily.             melatonin 3 MG tablet  Take 2 tablets by mouth Every Night.             metoprolol tartrate (LOPRESSOR) 25 MG tablet  Take 1 tablet by mouth Every 12 (Twelve) Hours.                 Discharge Diet:     Diet Instructions     Diet: Cardiac; Thin Liquids, No Restrictions       Discharge Diet:  Cardiac   Fluid  Consistency:  Thin Liquids, No Restrictions                 Activity at Discharge:     Activity Instructions     Discharge Activity       Must have boot on for walking                 Follow-up Appointments:    Future Appointments  Date Time Provider Department Center   6/30/2017 11:45 AM MD [facility] None   7/3/2017 1:15 PM MD [facility] None     Additional Instructions for the Follow-ups that You Need to Schedule     Ambulatory Referral to Home Health    As directed    Face to Face Visit Date:  6/26/2017   Follow-up Provider for Plan of Care?:  I treated the patient in an acute care facility and will not continue treatment after discharge.   Follow-up Provider:  VERMEESCH, MARILYN K   Reason/Clinical Findings:  Amputation 3rd digit   Describe mobility limitations that make leaving home difficult:  amputation 3rd digit   Nursing/Therapeutic Services Requested:   Physical Therapy  Occupational Therapy  Skilled Nursing      Skilled nursing orders:  Wound care dressing/changes   Instructions:  Dressing not to be changed until follow up with Dr. Campbell   PT orders:   Transfer training  Strengthening      Occupational orders:   Activities of daily living  Strengthening      Frequency:  1 Week 1                 Test Results Pending at Discharge:     Order Current Status    Anaerobic Culture In process    Tissue Exam In process        Additional Instructions:  CBC on Thursday  Do not remove dressing until follow up with Dr. Adrian Orozco, SHADI  06/26/17  2:27 PM    Time: 40 minutes were spent reviewing labs, history, evaluating patient and discharge planning.

## 2017-06-27 ENCOUNTER — TELEPHONE (OUTPATIENT)
Dept: INTERNAL MEDICINE | Facility: CLINIC | Age: 57
End: 2017-06-27

## 2017-06-27 ENCOUNTER — TRANSITIONAL CARE MANAGEMENT TELEPHONE ENCOUNTER (OUTPATIENT)
Dept: INTERNAL MEDICINE | Facility: CLINIC | Age: 57
End: 2017-06-27

## 2017-06-27 LAB — BACTERIA SPEC ANAEROBE CULT: NORMAL

## 2017-06-27 NOTE — OUTREACH NOTE
MIKAYLA call completed.  Please refer to TCM call flowsheet for call documentation.  Patient would like a different pain medication called in. He states that current pain regimen isn't helping control his pain.  Please advise.

## 2017-06-27 NOTE — TELEPHONE ENCOUNTER
Allie Barrera LPN       MIKAYLA call completed. Please refer to TCM call flowsheet for call documentation. Patient would like a different pain medication called in. He states that current pain regimen isn't helping control his pain. Please advise.

## 2017-06-27 NOTE — TELEPHONE ENCOUNTER
Spoke with Pt in regards to this. Advised Pt that we are unable to prescribe controlled substances without him being seen, as this is the law. Pt rescheduled from July appt to Thursday appt

## 2017-06-27 NOTE — TELEPHONE ENCOUNTER
TCM PATIENT WAS D/C 06/26/2017 [facility] NEEDS 1WKF/U WITH DR VERMEESCH. I SCHEDULED PT ON 07/06/2017 @ 9:15. PT WAS ADMITTED WITH CELLULITIS OF THE LEFT FOOT. D/C WITH AMPUTATION OF FOOT.

## 2017-06-29 ENCOUNTER — OFFICE VISIT (OUTPATIENT)
Dept: INTERNAL MEDICINE | Facility: CLINIC | Age: 57
End: 2017-06-29

## 2017-06-29 ENCOUNTER — TELEPHONE (OUTPATIENT)
Dept: CARDIOLOGY | Facility: CLINIC | Age: 57
End: 2017-06-29

## 2017-06-29 VITALS
OXYGEN SATURATION: 96 % | SYSTOLIC BLOOD PRESSURE: 152 MMHG | TEMPERATURE: 99.6 F | DIASTOLIC BLOOD PRESSURE: 78 MMHG | HEART RATE: 89 BPM

## 2017-06-29 DIAGNOSIS — M05.731 RHEUMATOID ARTHRITIS INVOLVING RIGHT WRIST WITH POSITIVE RHEUMATOID FACTOR (HCC): ICD-10-CM

## 2017-06-29 DIAGNOSIS — M86.172 ACUTE OSTEOMYELITIS OF LEFT FOOT (HCC): ICD-10-CM

## 2017-06-29 DIAGNOSIS — J43.8 OTHER EMPHYSEMA (HCC): ICD-10-CM

## 2017-06-29 DIAGNOSIS — F41.8 DEPRESSION WITH ANXIETY: ICD-10-CM

## 2017-06-29 DIAGNOSIS — I10 ESSENTIAL HYPERTENSION: Primary | ICD-10-CM

## 2017-06-29 DIAGNOSIS — R73.03 BORDERLINE DIABETES: ICD-10-CM

## 2017-06-29 PROBLEM — L03.116 CELLULITIS OF FOOT, LEFT: Status: RESOLVED | Noted: 2017-06-19 | Resolved: 2017-06-29

## 2017-06-29 LAB
LAB AP CASE REPORT: NORMAL
Lab: NORMAL
PATH REPORT.FINAL DX SPEC: NORMAL

## 2017-06-29 PROCEDURE — 99496 TRANSJ CARE MGMT HIGH F2F 7D: CPT | Performed by: INTERNAL MEDICINE

## 2017-06-29 RX ORDER — HYDROXYCHLOROQUINE SULFATE 200 MG/1
200 TABLET, FILM COATED ORAL DAILY
Qty: 30 TABLET | Refills: 5 | Status: SHIPPED | OUTPATIENT
Start: 2017-06-29 | End: 2017-06-29 | Stop reason: SDUPTHER

## 2017-06-29 RX ORDER — CEPHALEXIN 500 MG/1
500 CAPSULE ORAL 2 TIMES DAILY
COMMUNITY
End: 2017-06-29

## 2017-06-29 RX ORDER — HYDROXYCHLOROQUINE SULFATE 200 MG/1
TABLET, FILM COATED ORAL DAILY
COMMUNITY
End: 2017-06-29 | Stop reason: SDUPTHER

## 2017-06-29 RX ORDER — HYDROXYCHLOROQUINE SULFATE 200 MG/1
200 TABLET, FILM COATED ORAL DAILY
Qty: 30 TABLET | Refills: 5 | Status: SHIPPED | OUTPATIENT
Start: 2017-06-29 | End: 2017-09-25 | Stop reason: SDUPTHER

## 2017-06-29 RX ORDER — ATORVASTATIN CALCIUM 40 MG/1
40 TABLET, FILM COATED ORAL NIGHTLY
Qty: 30 TABLET | Refills: 11 | Status: SHIPPED | OUTPATIENT
Start: 2017-06-29 | End: 2017-09-25 | Stop reason: SDUPTHER

## 2017-06-29 RX ORDER — GABAPENTIN 300 MG/1
300 CAPSULE ORAL 3 TIMES DAILY
Qty: 90 CAPSULE | Refills: 3 | Status: SHIPPED | OUTPATIENT
Start: 2017-06-29 | End: 2017-08-25

## 2017-06-29 RX ORDER — HYDROCODONE BITARTRATE AND ACETAMINOPHEN 7.5; 325 MG/1; MG/1
1 TABLET ORAL EVERY 8 HOURS PRN
Qty: 90 TABLET | Refills: 0 | Status: SHIPPED | OUTPATIENT
Start: 2017-06-29 | End: 2017-08-17 | Stop reason: SDUPTHER

## 2017-06-29 NOTE — PROGRESS NOTES
Chief Complaint   Patient presents with   • Transitional Care Management     Pt went to [facility] [place] [facility] and was admitted with complaints of left lower extremity pain     Subjective   Vasquez Marie is a 56 y.o. male.     HPI Comments: Pt is here to be established and for TCM.   Was admitted to [facility] on 6/19/2017 and discharged on 6/26/2017.  Discharge diagnosis closed fracture of fourth toe left foot and cellulitis of left foot, with resultant amputation of left third digit, incision and drainage of left foot, left foot wound debridement and left foot graft application.  This is a complicated case. He presented to emergency room with complaints of left lower extremity pain.  He states he has had pain for approximately 2 months.  While hospitalized at [facility] 2 months ago he was on Omnicef and doxycycline for 14 days treatment.  He was felt to have underlying arterial disease and it was recommended that he have this addressed at that time.  He had multiple other problems since that time and did not have evaluation of his legs done and pain and redness worsened.  Venous duplex was done in the emergency room and did not show evidence of DVT.  Arterial duplex was done and showed occlusion of the distal superficial femoral artery with reconstitution of flow at the popliteal artery, x-ray showed fracture at the head of the fourth proximal phalanx.  Patient was started on IV antibiotics consisting of clindamycin and was admitted to hospitalist service.  Dr. Storm was consulted for interventional management and podiatry was consulted for possible amputation.  Patient was seen by Dr. Storm for his peripheral vascular disease.  He underwent intervention of his superficial femoral artery and popliteal arteries.  Dr. Campbell of podiatry was consulted due to abnormality of his foot as noted above.  Patient underwent amputation of left third digit with incision and drainage of left foot and left foot  wound debridement with left foot graft application.  He tolerated procedure well without any complications.  Wound cultures were done and are growing Enterobacter cloacae.  Patient was discharged home on doxycycline.  He will need close follow-up with infectious disease and Dr. Campbell.  Patient was placed in a walking boot and is not supposed to walk without the boot.    Since his discharge home he has had a good appetite.   He has not been drinking alcohol.  Has cut back his smoking to 5-6 a day.  He is using a patch.  He will see Dr Campbell again next week.   He has seen Home Health.  They are coming to do PT and OT.  Nurses are coming to perform wound care also.  His wound has still not been unwrapped per instruction of Dr. Campbell until his follow-up with him.  He is having pain in left foot that is keeping him awake at night.  He ran out of pain pills given to him at time of discharge a few days ago.  No CP.  He does have SOB.  Denies any palpitations.   He complains of severe pain over his left shoulder and left elbow due to gout.  He is very depressed.       Cardiac history: Patient has a past medical history of coronary artery disease, atrial fibrillation/flutter, congestive heart failure and peripheral vascular disease.  He was admitted to [facility] in April this year.  He was admitted for atrial flutter with rapid ventricular rate and was placed on Cardizem drip.  At that time he was are ready on Coumadin however was subtherapeutic.  Coumadin was discontinued and he was placed on Xarelto.  He underwent TATIANNA with electrocardioversion and was discharged home on amiodarone with Xarelto.  Patient discontinued his Xarelto at home due to bruising.  He is currently on Plavix for anticoagulation.    GI history: Patient was recently seen at  for GI bleed.  According to patient he was having bright red blood in his stools.  He underwent an upper endoscopy at which time he had a single nonbleeding  angiectasia of the duodenum for which they recommended continued proton pump inhibitor for 8 weeks twice daily and a capsule endoscopy as outpatient to evaluate for small intestinal angioectasias.  He was also noted on ultrasound of abdomen to have evidence of cirrhosis of his liver.  He has documented history of heavy alcohol use.  There is also a documented liver mass that is concerning for malignancy for which he is to be seen at liver clinic at .  He also has underlying history of hepatitis C.  Apparently he has appointment with liver clinic arranged in August.    Rheumatological history: Chart states patient has history of rheumatoid arthritis and he has been on Plaquenil in the past.  He also states he has history of gout and his been on colchicine in the past.  He has significant tophi noted over left elbow and left shoulder.  We will arrange for eye exam, however he may need to see rheumatologist.  He has seen Dr. Bentley on the past for chronic hip and shoulder pain.    Psychiatric history: Patient currently complains of depression and anxiety.  He states that these medical problems all came on rather suddenly and now he is unable to do anything work related.  He was previously an .  He now lives alone and is unable to do anything work related or enjoyable.    Endocrine history.  Patient has been diagnosed as a borderline diabetic.  He does not follow a healthy diet.  Eats mainly processed foods and is unable to exercise at this time.                       The following portions of the patient's history were reviewed and updated as appropriate: allergies, current medications, past family history, past medical history, past social history, past surgical history and problem list.    Review of Systems   Constitutional: Positive for activity change and fatigue.   Respiratory: Positive for shortness of breath.    Cardiovascular: Positive for leg swelling. Negative for chest pain and palpitations.    Gastrointestinal: Negative.  Negative for abdominal pain and blood in stool.   Musculoskeletal: Positive for arthralgias and gait problem.   Skin: Positive for wound.   Neurological: Positive for weakness.   Psychiatric/Behavioral: Positive for dysphoric mood and sleep disturbance. The patient is nervous/anxious.    All other systems reviewed and are negative.      Objective   /78  Pulse 89  Temp 99.6 °F (37.6 °C)  SpO2 96%  There is no height or weight on file to calculate BMI.  Physical Exam   Constitutional: He is oriented to person, place, and time. He appears well-developed and well-nourished.   Chronically ill gentleman sitting in wheelchair, in no apparent distress   HENT:   Head: Normocephalic and atraumatic.   Right Ear: External ear normal.   Left Ear: External ear normal.   Mouth/Throat: Oropharynx is clear and moist.   Eyes: Conjunctivae and EOM are normal. Pupils are equal, round, and reactive to light.   Neck: Normal range of motion. Neck supple. No thyromegaly present.   Cardiovascular: Normal rate, normal heart sounds and intact distal pulses.    No murmur heard.  Irregularly irregular   Pulmonary/Chest: Effort normal. No respiratory distress.   Coarse scattered rhonchi throughout all lung fields   Abdominal: Soft. Bowel sounds are normal.   Musculoskeletal: He exhibits no edema.   Left foot in walking shoe  Left upper posterior shoulder with 4 cm mobile cystic lesion  Left olecranon process with gouty tophi approximately 5 cm in size noted   Lymphadenopathy:     He has no cervical adenopathy.   Neurological: He is alert and oriented to person, place, and time. No cranial nerve deficit.   Psychiatric: Judgment normal.   Flat affect, depressed   Nursing note and vitals reviewed.      Assessment/Plan   Vasquez Marie is here today and the following problems have been addressed:      Vasquez was seen today for transitional care management.    Diagnoses and all orders for this  visit:    Essential hypertension    Borderline diabetes    Acute osteomyelitis of left foot    Other emphysema    Rheumatoid arthritis involving right wrist with positive rheumatoid factor  -     Ambulatory Referral to Ophthalmology    Other orders  -     gabapentin (NEURONTIN) 300 MG capsule; Take 1 capsule by mouth 3 (Three) Times a Day.  -     HYDROcodone-acetaminophen (NORCO) 7.5-325 MG per tablet; Take 1 tablet by mouth Every 8 (Eight) Hours As Needed for Moderate Pain (4-6).  -     atorvastatin (LIPITOR) 40 MG tablet; Take 1 tablet by mouth Every Night.  -     Discontinue: hydroxychloroquine (PLAQUENIL) 200 MG tablet; Take 1 tablet by mouth Daily.  -     hydroxychloroquine (PLAQUENIL) 200 MG tablet; Take 1 tablet by mouth Daily.    Controlled drug contract signed by patient  Start gabapentin 300 mg daily at bedtime for 3 days then twice a day for 3 days then 3 times daily  Given hydrocodone 7.5-325 take 1 tablet 3 times daily as needed for pain, with plan to titrate this down over the next month, as acute pain from surgery should be improving and gabapentin should be helping pain at that point in time  Resume plaquenil for rheumatoid arthritis pain-if liver functions are worsening, may need to discontinue this medication  Referred to ophthalmology for eye exam due to use of Plaquenil  Finish doxycycline for left foot infection  Follow-up with Dr. Campbell regarding recent left foot surgery  Patient will need follow up with liver clinic regarding recent upper GI bleed as well as chronic hepatitis C and possible liver mass-Will make sure these appointments have been arranged for patient  Will address depression next week as there were multiple things to address today  Labs will be done next week, including CBC, CMP, uric acid level, rheumatoid factor and CCP, A1c    Greater than 60 minutes were spent with this patient and reviewing chart, and greater than 40 minutes were spent counseling and discussing history with  patient  RTC next week as scheduled  Please note that portions of this note were completed with a voice recognition program.  Efforts were made to edit dictation, but occasionally words are mistranscribed.

## 2017-07-03 ENCOUNTER — OFFICE VISIT (OUTPATIENT)
Dept: FAMILY MEDICINE CLINIC | Facility: CLINIC | Age: 57
End: 2017-07-03

## 2017-07-03 VITALS
DIASTOLIC BLOOD PRESSURE: 45 MMHG | SYSTOLIC BLOOD PRESSURE: 98 MMHG | HEIGHT: 68 IN | HEART RATE: 80 BPM | TEMPERATURE: 98.1 F | OXYGEN SATURATION: 96 %

## 2017-07-03 DIAGNOSIS — M86.172 ACUTE OSTEOMYELITIS OF LEFT FOOT (HCC): Primary | ICD-10-CM

## 2017-07-03 PROCEDURE — 99213 OFFICE O/P EST LOW 20 MIN: CPT | Performed by: INTERNAL MEDICINE

## 2017-07-03 RX ORDER — DOXYCYCLINE 100 MG/1
100 CAPSULE ORAL 2 TIMES DAILY
Qty: 40 CAPSULE | Refills: 1 | Status: SHIPPED | OUTPATIENT
Start: 2017-07-03 | End: 2017-08-17

## 2017-07-03 RX ORDER — LOSARTAN POTASSIUM AND HYDROCHLOROTHIAZIDE 12.5; 5 MG/1; MG/1
1 TABLET ORAL DAILY
COMMUNITY
End: 2017-08-17 | Stop reason: SDUPTHER

## 2017-07-04 NOTE — PROGRESS NOTES
Elio Marie is a 56 y.o. male.     Chief Complaint   Patient presents with   • [facility]     Hospital follow up; left foot infection; also requesting a sleep aid       History of Present Illness   Patient comes in after hospital discharge. Patient was admitted with left foot ulcer/osteomyelitis with wound cultures growing Enterobacter cloacae. Patient underwent 3rd toe amputation.  He is on Doxycycline currently.  The following portions of the patient's history were reviewed and updated as appropriate: allergies, current medications, past family history, past medical history, past social history, past surgical history and problem list.    Past Medical History:   Diagnosis Date   • Abdominal pain     History of epigastric abdominal tenderness. Differentials include peptic ulcer disease,   pancreatobiliary disease.   • Abnormal LFTs    • Anemia    • Anxiety    • Arthritis    • Asthma    • Atrial fibrillation    • CAD (coronary artery disease)    • Calf cramp    • CKD (chronic kidney disease)    • COPD (chronic obstructive pulmonary disease)    • CVA (cerebral infarction)    • Depression    • DVT (deep venous thrombosis)    • Electrocution    • Fatigue    • Hepatitis C    • History of allergy    • History of blood transfusion    • Hyperlipidemia    • Hypertension    • Leg pain    • Loss of appetite    • PUD (peptic ulcer disease)    • Stroke syndrome    • Tachycardia    • Thrombocytopenia    • Thrombophlebitis of deep femoral vein    • Vision problems        Past Surgical History:   Procedure Laterality Date   • AMPUTATION DIGIT Left 6/23/2017    Procedure: amputation of left foot third digit, left foot wound debridments and grafting;  Surgeon: Wolfgang Campbell DPM;  Location: [facility] OR;  Service:    • ANKLE SURGERY     • CARDIAC CATHETERIZATION N/A 6/20/2017    Procedure: Peripheral angiography;  Surgeon: Jonathan Storm MD;  Location: [facility] CATH INVASIVE LOCATION;  Service:    •  CARDIAC CATHETERIZATION N/A 6/20/2017    Procedure: Angioplasty-peripheral;  Surgeon: Jonathan Storm MD;  Location: [facility] CATH INVASIVE LOCATION;  Service:    • CARDIAC SURGERY     • CORONARY STENT PLACEMENT     • TONSILLECTOMY     • TOTAL HIP ARTHROPLASTY Left    • TOTAL SHOULDER REPLACEMENT Left        Current Outpatient Prescriptions on File Prior to Visit   Medication Sig Dispense Refill   • albuterol (PROVENTIL HFA;VENTOLIN HFA) 108 (90 BASE) MCG/ACT inhaler Inhale 2 puffs Every 4 (Four) Hours As Needed for Wheezing.     • amiodarone (PACERONE) 200 MG tablet Take 1 tablet by mouth Daily. 30 tablet 11   • atorvastatin (LIPITOR) 40 MG tablet Take 1 tablet by mouth Every Night. 30 tablet 11   • clopidogrel (PLAVIX) 75 MG tablet Take 1 tablet by mouth Daily. 30 tablet 0   • colchicine 0.6 MG tablet Take 0.6 mg by mouth Daily.     • doxycycline (VIBRAMYCIN) 100 MG capsule Take 1 capsule by mouth 2 (Two) Times a Day. 28 capsule 0   • gabapentin (NEURONTIN) 300 MG capsule Take 1 capsule by mouth 3 (Three) Times a Day. 90 capsule 3   • HYDROcodone-acetaminophen (NORCO) 7.5-325 MG per tablet Take 1 tablet by mouth Every 8 (Eight) Hours As Needed for Moderate Pain (4-6). 90 tablet 0   • hydroxychloroquine (PLAQUENIL) 200 MG tablet Take 1 tablet by mouth Daily. 30 tablet 5   • metoprolol tartrate (LOPRESSOR) 25 MG tablet Take 1 tablet by mouth Every 12 (Twelve) Hours. (Patient taking differently: Take 25 mg by mouth 2 (Two) Times a Day.) 60 tablet 11     No current facility-administered medications on file prior to visit.        Social History     Social History   • Marital status: Single     Spouse name: N/A   • Number of children: N/A   • Years of education: N/A     Occupational History   • Not on file.     Social History Main Topics   • Smoking status: Current Every Day Smoker     Packs/day: 1.00     Years: 30.00     Types: Cigarettes   • Smokeless tobacco: Not on file   • Alcohol use No   • Drug use: No   •  "Sexual activity: Defer     Other Topics Concern   • Not on file     Social History Narrative       Review of Systems   Constitutional: Negative for chills, fatigue and fever.   HENT: Negative for congestion, ear pain, rhinorrhea, sinus pressure and sore throat.    Eyes: Negative for visual disturbance.   Respiratory: Negative for cough, chest tightness, shortness of breath and wheezing.    Cardiovascular: Negative for chest pain, palpitations and leg swelling.   Gastrointestinal: Negative for abdominal pain, blood in stool, constipation, diarrhea, nausea and vomiting.   Endocrine: Negative for polydipsia and polyuria.   Genitourinary: Negative for dysuria and hematuria.   Musculoskeletal: Negative for back pain.   Skin:        Left foot ulcer   Neurological: Negative for dizziness, light-headedness, numbness and headaches.   Psychiatric/Behavioral: Negative for dysphoric mood and sleep disturbance. The patient is not nervous/anxious.        Objective   Blood pressure 98/45, pulse 80, temperature 98.1 °F (36.7 °C), height 68\" (172.7 cm), SpO2 96 %.    Physical Exam   Constitutional: He is oriented to person, place, and time. He appears well-nourished. No distress.   HENT:   Head: Atraumatic.   Right Ear: External ear normal.   Left Ear: External ear normal.   Eyes: Conjunctivae are normal. Right eye exhibits no discharge. Left eye exhibits no discharge.   Cardiovascular: Normal rate and regular rhythm.    No murmur heard.  Pulmonary/Chest: Effort normal and breath sounds normal. He has no wheezes. He has no rales.   Abdominal: Soft. Bowel sounds are normal. He exhibits no distension. There is no tenderness.   Neurological: He is alert and oriented to person, place, and time.   Skin: He is not diaphoretic.   Left foot in dressing   Psychiatric: He has a normal mood and affect.   Nursing note and vitals reviewed.    Assessment/Plan   Vasquez was seen today for establish care.    Diagnoses and all orders for this " visit:    Acute osteomyelitis of left foot    Other orders  -     doxycycline (MONODOX) 100 MG capsule; Take 1 capsule by mouth 2 (Two) Times a Day.    Patient will need IV antibiotic therapy for treatment of Left foot osteomyelitis. Will arrange for PICC line placement and start of IV Ertapenem for 6 weeks. Patient is agreeable with the plan.   F/U with podiatry.             Return in about 4 weeks (around 7/31/2017).    There are no Patient Instructions on file for this visit.

## 2017-07-05 ENCOUNTER — OFFICE VISIT (OUTPATIENT)
Dept: ORTHOPEDIC SURGERY | Facility: CLINIC | Age: 57
End: 2017-07-05

## 2017-07-05 ENCOUNTER — TELEPHONE (OUTPATIENT)
Dept: INTERNAL MEDICINE | Facility: CLINIC | Age: 57
End: 2017-07-05

## 2017-07-05 VITALS — RESPIRATION RATE: 16 BRPM | BODY MASS INDEX: 34.4 KG/M2 | HEIGHT: 68 IN | WEIGHT: 227 LBS

## 2017-07-05 DIAGNOSIS — L97.524 ULCER OF FOOT, LEFT, WITH NECROSIS OF BONE (HCC): ICD-10-CM

## 2017-07-05 DIAGNOSIS — E11.621 DIABETIC ULCER OF LEFT FOOT ASSOCIATED WITH TYPE 2 DIABETES MELLITUS (HCC): Primary | ICD-10-CM

## 2017-07-05 DIAGNOSIS — L97.529 DIABETIC ULCER OF LEFT FOOT ASSOCIATED WITH TYPE 2 DIABETES MELLITUS (HCC): Primary | ICD-10-CM

## 2017-07-05 PROCEDURE — 11043 DBRDMT MUSC&/FSCA 1ST 20/<: CPT | Performed by: PODIATRIST

## 2017-07-05 PROCEDURE — 99024 POSTOP FOLLOW-UP VISIT: CPT | Performed by: PODIATRIST

## 2017-07-05 NOTE — PROGRESS NOTES
Subjective   Patient ID: Vasquez Marie is a 56 y.o. male STATUS POST:  Post-op of the Left Foot (status post left foot wound debridements and grafting, third digit amputation 6/23/17)  Comes in today 2 weeks after surgery for his first follow-up after being discharged from the hospital.  He comes in with the significant swelling to the legs.  Has tape on his skin.  Does have a postop shoe on the left foot.  Still complains of pain and hurting and aching.  He is sitting in a wheelchair moaning and groaning the entire time is in the office.    History of Present Illness    Review of Systems   Constitutional: Negative for diaphoresis, fever and unexpected weight change.   HENT: Negative for dental problem and sore throat.    Eyes: Negative for visual disturbance.   Respiratory: Negative for shortness of breath.    Cardiovascular: Negative for chest pain.   Gastrointestinal: Negative for abdominal pain, constipation, diarrhea, nausea and vomiting.   Genitourinary: Negative for difficulty urinating and frequency.   Neurological: Negative for headaches.   Hematological: Does not bruise/bleed easily.   All other systems reviewed and are negative.      Past Medical History:   Diagnosis Date   • Abdominal pain     History of epigastric abdominal tenderness. Differentials include peptic ulcer disease,   pancreatobiliary disease.   • Abnormal LFTs    • Anemia    • Anxiety    • Arthritis    • Asthma    • Atrial fibrillation    • CAD (coronary artery disease)    • Calf cramp    • CKD (chronic kidney disease)    • COPD (chronic obstructive pulmonary disease)    • CVA (cerebral infarction)    • Depression    • DVT (deep venous thrombosis)    • Electrocution    • Fatigue    • Hepatitis C    • History of allergy    • History of blood transfusion    • Hyperlipidemia    • Hypertension    • Leg pain    • Loss of appetite    • PUD (peptic ulcer disease)    • Stroke syndrome    • Tachycardia    • Thrombocytopenia    •  Thrombophlebitis of deep femoral vein    • Vision problems         Past Surgical History:   Procedure Laterality Date   • AMPUTATION DIGIT Left 6/23/2017    Procedure: amputation of left foot third digit, left foot wound debridments and grafting;  Surgeon: Wolfgang Campbell DPM;  Location: [facility] OR;  Service:    • ANKLE SURGERY     • CARDIAC CATHETERIZATION N/A 6/20/2017    Procedure: Peripheral angiography;  Surgeon: Jonathan Storm MD;  Location: [facility] CATH INVASIVE LOCATION;  Service:    • CARDIAC CATHETERIZATION N/A 6/20/2017    Procedure: Angioplasty-peripheral;  Surgeon: Jonathan Storm MD;  Location: [facility] CATH INVASIVE LOCATION;  Service:    • CARDIAC SURGERY     • CORONARY STENT PLACEMENT     • TONSILLECTOMY     • TOTAL HIP ARTHROPLASTY Left    • TOTAL SHOULDER REPLACEMENT Left        No Known Allergies    Objective   Physical Exam   Constitutional: He is oriented to person, place, and time. He appears well-developed and well-nourished.   HENT:   Head: Normocephalic.   Eyes: Pupils are equal, round, and reactive to light.   Neurological: He is alert and oriented to person, place, and time.   Skin: Skin is warm.   Psychiatric: He has a normal mood and affect. His behavior is normal.   Vitals reviewed.    Ortho Exam  Ortho Exam  Physical exam on the left lower extremity shows a pitting edema with blanchable erythema to the left lower extremity.  There is weeping from the distal leg swelling.  His left foot swollen and erythematous and edematous as well.  Sensations intact.  Protective and gross sensations are intact.  All his wounds on all toes are now very dry and fibrotic.  The wound on the left great toe measures 2 x 2 and is 100% yellow and brown and fibrotic.  The wound over the distal second digit IPJ's open roughly half a centimeter in diameter and has a same appearance but still has some of the odd appearing white tissue.  His third digit amputation site has sutures loosely intact  but the entire wound area is dead brown yellow fibrotic tissue of the does not appear very healthy.  He still has some the eschar on the medial aspect of the fourth digit as well.  Now has an open area on the IPJ of the fifth digit with some of the white inflammatory-appearing substance.      Assessment/Plan  status post 2 weeks from amputation of the left third digit, multiple left foot wounds, diabetes, edema of the left lower extremity, multiple medical co-morbidities, gout  Independent Review of Radiographic Studies:      Laboratory and Other Studies:     Medical Decision Making:        Procedures with a pickups and scissors I excisionally debrided the great toe wound and a portion of the third digit amputation site wound.  I removed some of the fibrous tissue and slough to try to help stimulate some bleeding and the wound bed.  Great toe wound measured 2 x 2 and the third digit wound measured 2.5 x 1.  Agreement was performed down into deep muscle and fascia over the periosteum of the great toe.  tHe third digit amputation site was debrided and the subcutaneous tissue layer.  [] No procedures were performed in office today.    Vasquez was seen today for post-op.    Diagnoses and all orders for this visit:    Diabetic ulcer of left foot associated with type 2 diabetes mellitus  -     Ambulatory Referral to Home Health    Ulcer of foot, left, with necrosis of bone    Other orders  -     collagenase (SANTYL) 250 UNIT/GM ointment; Apply  topically Daily.          Recommendations/Plan:  I will try to prescribe him Santyl and hope that home health can get this and use it on his right great toe wound as well as the second toe wound.  I think Betadine is the best thing for his third digit amputation site as well as the interspace between the fourth and fifth digits right now to help dry this up.  He should continue his antibiotics from his discharge.  Overall the patient shows a very questionable healing potential not  only due to body habitus and hygiene but from his peripheral arterial disease.  He still has a pack of cigarettes on him today and continues to smoke as well.  See him back in 2 weeks.      Return in about 2 weeks (around 7/19/2017).  Patient agreeable to call or return sooner for any concerns.

## 2017-07-06 ENCOUNTER — TELEPHONE (OUTPATIENT)
Dept: INTERNAL MEDICINE | Facility: CLINIC | Age: 57
End: 2017-07-06

## 2017-07-06 NOTE — TELEPHONE ENCOUNTER
Did you tell patient to take gabapentin 300 mg for 3 days in a row then twice a day for 3 days then up to 3 times a day?  I do not see that documented in the chart.  I see that you called him yesterday.  If he went right up to 3 times a day this may   be causing edema.  Please ask him how he is taking gabapentin.

## 2017-07-07 NOTE — TELEPHONE ENCOUNTER
I did speak to the patient in regards to this. Called Pt again today and stated he has been taking more of the gabapentin in the last few days. Pt states he's taken 2 tabs in the AM and then 2 tabs in PM. Advised Pt that he was to slowly increase medication and that it's prescribed for up to 3 times daily.   Pt states he is very swollen and will down back down on medication if it will help.

## 2017-07-07 NOTE — TELEPHONE ENCOUNTER
Please tell patient to take medication as prescribed or I will discontinue medication.  This is a controlled substance and it can cause swelling.

## 2017-07-14 ENCOUNTER — TELEPHONE (OUTPATIENT)
Dept: INTERNAL MEDICINE | Facility: CLINIC | Age: 57
End: 2017-07-14

## 2017-07-14 NOTE — TELEPHONE ENCOUNTER
Alma with  called stating she seen the patient for the first time yesterday. States he went over Pt's medications and there was no gabapentin. States he wasn't aware that he was even taking gabapentin and that explains why he is so swollen, especially since he hasn't been taking it as directed. Also states that the patient is very swollen in his hands, arms, and right leg.     Alma states she went to visit the Pt earlier this week and could not get him to answer the phone. States she was only able to see him yesterday because she caught a resident coming out of the building and got in. When she told the Pt that she tried coming to see him, his response was he didn't want to see anyone that's why he didn't answer. Alma advised him that he can not do that as his wounds have to be treated.     Alma states she will be going back out to see the Pt today but she's not sure that he will answer and let her in.     Alma also states the Pt's BP has been running high, it was 186/98        [phone]

## 2017-07-14 NOTE — TELEPHONE ENCOUNTER
Alma left  that she was able to see Pt today for wound care. States Pt was yelling out when she was changing his bandage like he is in really bad pain. Stated BP was 118/68 and pulse was 73. Pt told Alma that he has taken all of the gabapentin due to being in pain.    Alma stated she is unsure how much HH can help the Patient due to him being non compliant. She would like to know if you have any advice for her as to what to do?

## 2017-07-14 NOTE — TELEPHONE ENCOUNTER
Attempted contacting Pt, again, no answer.       Notified Alma. Asked her to even give us a call while with Pt, if possible to get him scheduled. Alma stated she would.

## 2017-07-16 ENCOUNTER — APPOINTMENT (OUTPATIENT)
Dept: GENERAL RADIOLOGY | Facility: HOSPITAL | Age: 57
End: 2017-07-16

## 2017-07-16 ENCOUNTER — HOSPITAL ENCOUNTER (EMERGENCY)
Facility: HOSPITAL | Age: 57
Discharge: HOME | End: 2017-07-16
Attending: EMERGENCY MEDICINE | Admitting: EMERGENCY MEDICINE

## 2017-07-16 VITALS
WEIGHT: 230 LBS | SYSTOLIC BLOOD PRESSURE: 140 MMHG | TEMPERATURE: 98.6 F | HEART RATE: 85 BPM | OXYGEN SATURATION: 95 % | DIASTOLIC BLOOD PRESSURE: 78 MMHG | RESPIRATION RATE: 18 BRPM | HEIGHT: 68 IN | BODY MASS INDEX: 34.86 KG/M2

## 2017-07-16 DIAGNOSIS — M86.172 ACUTE OSTEOMYELITIS OF LEFT FOOT (HCC): Primary | ICD-10-CM

## 2017-07-16 DIAGNOSIS — R52 PAIN: ICD-10-CM

## 2017-07-16 LAB
ALBUMIN SERPL-MCNC: 3.1 G/DL (ref 3.5–5)
ALBUMIN/GLOB SERPL: 0.6 G/DL (ref 1–2)
ALP SERPL-CCNC: 135 U/L (ref 38–126)
ALT SERPL W P-5'-P-CCNC: 29 U/L (ref 13–69)
ANION GAP SERPL CALCULATED.3IONS-SCNC: 14.1 MMOL/L
AST SERPL-CCNC: 55 U/L (ref 15–46)
BASOPHILS # BLD AUTO: 0.02 10*3/MM3 (ref 0–0.2)
BASOPHILS NFR BLD AUTO: 0.3 % (ref 0–2.5)
BILIRUB SERPL-MCNC: 0.8 MG/DL (ref 0.2–1.3)
BUN BLD-MCNC: 5 MG/DL (ref 7–20)
BUN/CREAT SERPL: 6.3 (ref 6.3–21.9)
CALCIUM SPEC-SCNC: 8.1 MG/DL (ref 8.4–10.2)
CHLORIDE SERPL-SCNC: 103 MMOL/L (ref 98–107)
CO2 SERPL-SCNC: 24 MMOL/L (ref 26–30)
CREAT BLD-MCNC: 0.8 MG/DL (ref 0.6–1.3)
CRP SERPL-MCNC: 4.9 MG/DL (ref 0–1)
D-LACTATE SERPL-SCNC: 1.2 MMOL/L (ref 0.5–2)
D-LACTATE SERPL-SCNC: 2.3 MMOL/L (ref 0.5–2)
DEPRECATED RDW RBC AUTO: 59.5 FL (ref 37–54)
EOSINOPHIL # BLD AUTO: 1.55 10*3/MM3 (ref 0–0.7)
EOSINOPHIL NFR BLD AUTO: 21.1 % (ref 0–7)
ERYTHROCYTE [DISTWIDTH] IN BLOOD BY AUTOMATED COUNT: 18.1 % (ref 11.5–14.5)
GFR SERPL CREATININE-BSD FRML MDRD: 99 ML/MIN/1.73
GLOBULIN UR ELPH-MCNC: 4.8 GM/DL
GLUCOSE BLD-MCNC: 93 MG/DL (ref 74–98)
HCT VFR BLD AUTO: 26.7 % (ref 42–52)
HGB BLD-MCNC: 8.6 G/DL (ref 14–18)
IMM GRANULOCYTES # BLD: 0.04 10*3/MM3 (ref 0–0.06)
IMM GRANULOCYTES NFR BLD: 0.5 % (ref 0–0.6)
LYMPHOCYTES # BLD AUTO: 1.64 10*3/MM3 (ref 0.6–3.4)
LYMPHOCYTES NFR BLD AUTO: 22.3 % (ref 10–50)
MCH RBC QN AUTO: 29 PG (ref 27–31)
MCHC RBC AUTO-ENTMCNC: 32.2 G/DL (ref 30–37)
MCV RBC AUTO: 89.9 FL (ref 80–94)
MONOCYTES # BLD AUTO: 0.38 10*3/MM3 (ref 0–0.9)
MONOCYTES NFR BLD AUTO: 5.2 % (ref 0–12)
NEUTROPHILS # BLD AUTO: 3.73 10*3/MM3 (ref 2–6.9)
NEUTROPHILS NFR BLD AUTO: 50.6 % (ref 37–80)
NRBC BLD MANUAL-RTO: 0 /100 WBC (ref 0–0)
PLATELET # BLD AUTO: 221 10*3/MM3 (ref 130–400)
PMV BLD AUTO: 9.6 FL (ref 6–12)
POTASSIUM BLD-SCNC: 3.1 MMOL/L (ref 3.5–5.1)
PROT SERPL-MCNC: 7.9 G/DL (ref 6.3–8.2)
RBC # BLD AUTO: 2.97 10*6/MM3 (ref 4.7–6.1)
SODIUM BLD-SCNC: 138 MMOL/L (ref 137–145)
WBC NRBC COR # BLD: 7.36 10*3/MM3 (ref 4.8–10.8)

## 2017-07-16 PROCEDURE — 96376 TX/PRO/DX INJ SAME DRUG ADON: CPT

## 2017-07-16 PROCEDURE — 85025 COMPLETE CBC W/AUTO DIFF WBC: CPT | Performed by: NURSE PRACTITIONER

## 2017-07-16 PROCEDURE — 80053 COMPREHEN METABOLIC PANEL: CPT | Performed by: NURSE PRACTITIONER

## 2017-07-16 PROCEDURE — 96375 TX/PRO/DX INJ NEW DRUG ADDON: CPT

## 2017-07-16 PROCEDURE — 73630 X-RAY EXAM OF FOOT: CPT

## 2017-07-16 PROCEDURE — 96361 HYDRATE IV INFUSION ADD-ON: CPT

## 2017-07-16 PROCEDURE — 25010000002 MORPHINE PER 10 MG: Performed by: NURSE PRACTITIONER

## 2017-07-16 PROCEDURE — 25010000002 KETOROLAC TROMETHAMINE PER 15 MG: Performed by: NURSE PRACTITIONER

## 2017-07-16 PROCEDURE — 99284 EMERGENCY DEPT VISIT MOD MDM: CPT

## 2017-07-16 PROCEDURE — 25010000002 ONDANSETRON PER 1 MG: Performed by: NURSE PRACTITIONER

## 2017-07-16 PROCEDURE — 83605 ASSAY OF LACTIC ACID: CPT | Performed by: NURSE PRACTITIONER

## 2017-07-16 PROCEDURE — 86140 C-REACTIVE PROTEIN: CPT | Performed by: NURSE PRACTITIONER

## 2017-07-16 PROCEDURE — 96374 THER/PROPH/DIAG INJ IV PUSH: CPT

## 2017-07-16 RX ORDER — POTASSIUM CHLORIDE 750 MG/1
40 CAPSULE, EXTENDED RELEASE ORAL ONCE
Status: COMPLETED | OUTPATIENT
Start: 2017-07-16 | End: 2017-07-16

## 2017-07-16 RX ORDER — KETOROLAC TROMETHAMINE 30 MG/ML
30 INJECTION, SOLUTION INTRAMUSCULAR; INTRAVENOUS ONCE
Status: COMPLETED | OUTPATIENT
Start: 2017-07-16 | End: 2017-07-16

## 2017-07-16 RX ORDER — MORPHINE SULFATE 4 MG/ML
4 INJECTION, SOLUTION INTRAMUSCULAR; INTRAVENOUS ONCE
Status: COMPLETED | OUTPATIENT
Start: 2017-07-16 | End: 2017-07-16

## 2017-07-16 RX ORDER — SODIUM CHLORIDE 0.9 % (FLUSH) 0.9 %
10 SYRINGE (ML) INJECTION AS NEEDED
Status: DISCONTINUED | OUTPATIENT
Start: 2017-07-16 | End: 2017-07-16 | Stop reason: HOSPADM

## 2017-07-16 RX ORDER — ONDANSETRON 2 MG/ML
4 INJECTION INTRAMUSCULAR; INTRAVENOUS ONCE
Status: COMPLETED | OUTPATIENT
Start: 2017-07-16 | End: 2017-07-16

## 2017-07-16 RX ORDER — HYDROCODONE BITARTRATE AND ACETAMINOPHEN 7.5; 325 MG/1; MG/1
1 TABLET ORAL EVERY 6 HOURS PRN
Qty: 12 TABLET | Refills: 0 | Status: SHIPPED | OUTPATIENT
Start: 2017-07-16 | End: 2017-08-17

## 2017-07-16 RX ADMIN — MORPHINE SULFATE 4 MG: 4 INJECTION, SOLUTION INTRAMUSCULAR; INTRAVENOUS at 15:21

## 2017-07-16 RX ADMIN — SODIUM CHLORIDE 1000 ML: 9 INJECTION, SOLUTION INTRAVENOUS at 13:31

## 2017-07-16 RX ADMIN — ONDANSETRON 4 MG: 2 INJECTION INTRAMUSCULAR; INTRAVENOUS at 13:32

## 2017-07-16 RX ADMIN — KETOROLAC TROMETHAMINE 30 MG: 30 INJECTION, SOLUTION INTRAMUSCULAR; INTRAVENOUS at 15:20

## 2017-07-16 RX ADMIN — SODIUM CHLORIDE 1000 ML: 9 INJECTION, SOLUTION INTRAVENOUS at 17:11

## 2017-07-16 RX ADMIN — MORPHINE SULFATE 4 MG: 4 INJECTION, SOLUTION INTRAMUSCULAR; INTRAVENOUS at 13:33

## 2017-07-16 RX ADMIN — POTASSIUM CHLORIDE 40 MEQ: 750 CAPSULE, EXTENDED RELEASE ORAL at 15:20

## 2017-07-16 NOTE — ED NOTES
Dr. Azul was called at 1702. He called back at 1708 and was transferred to [facility] at this time.      Elias Servin  07/16/17 1717

## 2017-07-16 NOTE — ED PROVIDER NOTES
Subjective   History of Present Illness  This is a 56 year old male who was sent here today by his home health provider who reported his wound was draining more and was now colored yellow. He reports having his 3rd toe removed on his left foot by Dr. Campbell 6 days ago. He reports increase in pain to the foot.   Review of Systems   Constitutional: Negative.    HENT: Negative.    Eyes: Negative.    Respiratory: Negative.    Cardiovascular: Negative.    Gastrointestinal: Negative.    Genitourinary: Negative.    Skin: Positive for wound.   Neurological: Negative.    Psychiatric/Behavioral: Negative.    All other systems reviewed and are negative.      Past Medical History:   Diagnosis Date   • Abdominal pain     History of epigastric abdominal tenderness. Differentials include peptic ulcer disease,   pancreatobiliary disease.   • Abnormal LFTs    • Anemia    • Anxiety    • Arthritis    • Asthma    • Atrial fibrillation    • CAD (coronary artery disease)    • Calf cramp    • CKD (chronic kidney disease)    • COPD (chronic obstructive pulmonary disease)    • CVA (cerebral infarction)    • Depression    • DVT (deep venous thrombosis)    • Electrocution    • Fatigue    • Hepatitis C    • History of allergy    • History of blood transfusion    • Hyperlipidemia    • Hypertension    • Leg pain    • Loss of appetite    • PUD (peptic ulcer disease)    • Stroke syndrome    • Tachycardia    • Thrombocytopenia    • Thrombophlebitis of deep femoral vein    • Vision problems        No Known Allergies    Past Surgical History:   Procedure Laterality Date   • AMPUTATION DIGIT Left 6/23/2017    Procedure: amputation of left foot third digit, left foot wound debridments and grafting;  Surgeon: Wolfgang Campbell DPM;  Location: [facility] OR;  Service:    • ANKLE SURGERY     • CARDIAC CATHETERIZATION N/A 6/20/2017    Procedure: Peripheral angiography;  Surgeon: Jonathan Storm MD;  Location: [facility] CATH INVASIVE LOCATION;  Service:    •  CARDIAC CATHETERIZATION N/A 6/20/2017    Procedure: Angioplasty-peripheral;  Surgeon: Jonathan Storm MD;  Location: [facility] CATH INVASIVE LOCATION;  Service:    • CARDIAC SURGERY     • CORONARY STENT PLACEMENT     • TONSILLECTOMY     • TOTAL HIP ARTHROPLASTY Left    • TOTAL SHOULDER REPLACEMENT Left        Family History   Problem Relation Age of Onset   • Breast cancer Mother    • Colon cancer Sister    • Heart attack Other    • Arthritis Other    • Diabetes Other    • Hypertension Other    • Kidney disease Other    • Stroke Other    • Heart disease Father        Social History     Social History   • Marital status: Single     Spouse name: N/A   • Number of children: N/A   • Years of education: N/A     Social History Main Topics   • Smoking status: Current Every Day Smoker     Packs/day: 1.00     Years: 30.00     Types: Cigarettes   • Smokeless tobacco: None   • Alcohol use No   • Drug use: No   • Sexual activity: Defer     Other Topics Concern   • None     Social History Narrative           Objective   Physical Exam   Constitutional: He appears well-developed and well-nourished.        Nursing note and vitals reviewed.  GEN: No acute distress  Head: Normocephalic, atraumatic  Eyes: Pupils equal round reactive to light  ENT: Posterior pharynx normal in appearance, oral mucosa is moist  Chest: Nontender to palpation  Cardiovascular: Regular rate. Doppler pulses obtained on bilat feet positive DP/PT  Lungs: Clear to auscultation bilaterally  Abdomen: Soft, nontender, nondistended, no peritoneal signs  Extremities: edema to bilat lower extremites pitting from just above ankle, left greater than right. Left foot warm with purple hue. See wound explanation under skin assessment.   Neuro: GCS 15  Psych: Mood and affect are appropriate      Procedures         ED Course  ED Course   Comment By Time   Complain of worsening pain. Will retreat. Rema Jones, APRN 07/16 1523   Consult with Dr. Campbell who feels that  this is where Mr. Marie's foot looked whenever he discharged him.  He does not feel that he has an infection that warrants hospitalization at this time his white count is normal his lactic acid was elevated however a repeat lactic acid shows that it was 1.2 after we give some IV fluids.  He is wanted me to consult hospitalist to see if they have any input on his other chronic medical conditions.  Consult to Dr. Azul who agrees that as long as his lactic acid comes back up to normal but feels confident that he can go home and continue his antibiotics that he takes by mouth and follow up with Dr. Campbell on outpatient basis and continue to get his wound care done by homehealth. Dr Blackburn did suggest to give him some pain meds since he is out at home.  Discussed the plan of care with the patient he is agreeable to this plan of care we will also give him enough pain medicine to get him till he sees Dr. Campbell this next week.  I have given him return to care instructions and he is agreeable to this plan Rema Jones, APRBETTY 07/16 1740   [facility] [id number] Rema Jones, APRBETTY 07/16 1742                  MDM  Number of Diagnoses or Management Options  Diagnosis management comments: After reviewing medical records it is noted that he does have osteomyelitis of the third and fourth toe of his left foot according to the most recent MRI.  He did have the third digit amputated and was sent home and looks like he is taking doxycycline 100 mg 2 times a day and receiving home health therapy.  I will go ahead today and get a CBC to look for any worsening infection, and a x-ray of his foot to look for any gas on the x-ray.  Then we'll go ahead and proceed call Dr. Campbell for any further instruction he may add      Final diagnoses:   Acute osteomyelitis of left foot   Pain            Rema Jones, SHADI  07/16/17 2172

## 2017-08-14 ENCOUNTER — TELEPHONE (OUTPATIENT)
Dept: INTERNAL MEDICINE | Facility: CLINIC | Age: 57
End: 2017-08-14

## 2017-08-14 ENCOUNTER — HOSPITAL ENCOUNTER (EMERGENCY)
Facility: HOSPITAL | Age: 57
Discharge: HOME | End: 2017-08-14
Attending: EMERGENCY MEDICINE | Admitting: EMERGENCY MEDICINE

## 2017-08-14 VITALS
RESPIRATION RATE: 18 BRPM | HEART RATE: 74 BPM | TEMPERATURE: 97.9 F | OXYGEN SATURATION: 98 % | WEIGHT: 230 LBS | DIASTOLIC BLOOD PRESSURE: 80 MMHG | SYSTOLIC BLOOD PRESSURE: 124 MMHG | HEIGHT: 68 IN | BODY MASS INDEX: 34.86 KG/M2

## 2017-08-14 DIAGNOSIS — G89.18 POST-OPERATIVE PAIN: Primary | ICD-10-CM

## 2017-08-14 PROCEDURE — 99283 EMERGENCY DEPT VISIT LOW MDM: CPT

## 2017-08-14 NOTE — DISCHARGE INSTRUCTIONS
Please change the patient's wound dressing at least once daily.  Please put a wet dressing on and remove it when it is dry or after 24 hours.  Please come back if any foul smell, drainage of pus, worsening redness or swelling, or other concerns.    You may take Tylenol 650mg every 6-8 hours as well as ibuprofen 600mg every 6-8 hours as needed for pain or fever.

## 2017-08-14 NOTE — TELEPHONE ENCOUNTER
Alma with  called stating she went to visit Pt this morning and he had removed his dressing from his leg and tossed it in the trash. Alma called earlier and spoke with Lissette. Was advised for Pt to go to ER. Alma states she called ambulance to get Pt.

## 2017-08-14 NOTE — ED PROVIDER NOTES
Subjective   History of Present Illness   56M who is 2 weeks s/p L midfoot amputation p/w redness, swelling, pain after wound vac came off two days ago. Denies fever/chills, n/v, or other specific symptoms. States he has nothing at home for pain control.     Review of Systems   Skin: Positive for wound.   All other systems reviewed and are negative.      Past Medical History:   Diagnosis Date   • Abdominal pain     History of epigastric abdominal tenderness. Differentials include peptic ulcer disease,   pancreatobiliary disease.   • Abnormal LFTs    • Anemia    • Anxiety    • Arthritis    • Asthma    • Atrial fibrillation    • CAD (coronary artery disease)    • Calf cramp    • CKD (chronic kidney disease)    • COPD (chronic obstructive pulmonary disease)    • CVA (cerebral infarction)    • Depression    • DVT (deep venous thrombosis)    • Electrocution    • Fatigue    • Hepatitis C    • History of allergy    • History of blood transfusion    • Hyperlipidemia    • Hypertension    • Leg pain    • Loss of appetite    • PUD (peptic ulcer disease)    • Stroke syndrome    • Tachycardia    • Thrombocytopenia    • Thrombophlebitis of deep femoral vein    • Vision problems        No Known Allergies    Past Surgical History:   Procedure Laterality Date   • AMPUTATION DIGIT Left 6/23/2017    Procedure: amputation of left foot third digit, left foot wound debridments and grafting;  Surgeon: Wolfgang Campbell DPM;  Location: [facility] OR;  Service:    • ANKLE SURGERY     • CARDIAC CATHETERIZATION N/A 6/20/2017    Procedure: Peripheral angiography;  Surgeon: Jonathan Storm MD;  Location: [facility] CATH INVASIVE LOCATION;  Service:    • CARDIAC CATHETERIZATION N/A 6/20/2017    Procedure: Angioplasty-peripheral;  Surgeon: Jonathan Storm MD;  Location: [facility] CATH INVASIVE LOCATION;  Service:    • CARDIAC SURGERY     • CORONARY STENT PLACEMENT     • TONSILLECTOMY     • TOTAL HIP ARTHROPLASTY Left    • TOTAL SHOULDER  REPLACEMENT Left        Family History   Problem Relation Age of Onset   • Breast cancer Mother    • Colon cancer Sister    • Heart attack Other    • Arthritis Other    • Diabetes Other    • Hypertension Other    • Kidney disease Other    • Stroke Other    • Heart disease Father        Social History     Social History   • Marital status: Single     Spouse name: N/A   • Number of children: N/A   • Years of education: N/A     Social History Main Topics   • Smoking status: Current Every Day Smoker     Packs/day: 0.50     Years: 30.00     Types: Cigarettes   • Smokeless tobacco: None   • Alcohol use No   • Drug use: No   • Sexual activity: Defer     Other Topics Concern   • None     Social History Narrative   • None           Objective   Physical Exam   Constitutional: He is oriented to person, place, and time. He appears well-developed and well-nourished. No distress.   HENT:   Head: Normocephalic.   Mouth/Throat: Oropharynx is clear and moist.   Eyes: No scleral icterus.   Neck: No tracheal deviation present.   Cardiovascular: Normal rate, regular rhythm, normal heart sounds and intact distal pulses.  Exam reveals no gallop and no friction rub.    No murmur heard.  Pulmonary/Chest: Effort normal and breath sounds normal. No stridor. No respiratory distress. He has no wheezes. He has no rales.   Abdominal: Soft. There is no tenderness.   Musculoskeletal: Normal range of motion. He exhibits deformity.   Neurological: He is alert and oriented to person, place, and time.   Skin: Skin is warm and dry. No rash noted. He is not diaphoretic. No erythema. No pallor.   L midfoot amputation w/o any surrounding / streaking erythema, swelling.    Psychiatric: He has a normal mood and affect. His behavior is normal.   Nursing note and vitals reviewed.      Procedures         ED Course  ED Course                  MDM   56M here w/ pain, subjective erythema surrounding L foot at site of midfoot amputation. Wound appears clean and  w/o e/o infection. Discussed pain control w/ ibuprofen / tylenol and importance of follow up with his surgeon. Discussed wet to dry wound changes and will send information for this for his home health team. Discussed return to care precautions.      Final diagnoses:   Post-operative pain            Adrian Palmer MD  08/14/17 4967

## 2017-08-15 ENCOUNTER — TELEPHONE (OUTPATIENT)
Dept: INTERNAL MEDICINE | Facility: CLINIC | Age: 57
End: 2017-08-15

## 2017-08-15 NOTE — TELEPHONE ENCOUNTER
PLEASE RETURN CALL. PATIENT SAID HE HAD PART OF HIS FOOT AMPUTATED BY [facility] A FEW DAYS AGO, AND HE WOULD LIKE TO KNOW WHAT CARE DR. VERMEESCH RECOMMENDS FOR HIM UNTIL HE SEES HER TO FOLLOW UP NEXT WEEK?

## 2017-08-15 NOTE — TELEPHONE ENCOUNTER
I just logged onto [facility] portal to search for the Pt, so you could review everything from there but was unable to find anything.

## 2017-08-15 NOTE — TELEPHONE ENCOUNTER
If he has home health, ask home health which physician referred him and then call their office and ask for discharge summary and paperwork.

## 2017-08-16 ENCOUNTER — TELEPHONE (OUTPATIENT)
Dept: INTERNAL MEDICINE | Facility: CLINIC | Age: 57
End: 2017-08-16

## 2017-08-16 NOTE — TELEPHONE ENCOUNTER
Spoke with Pt this morning, states he was taken to  the other day when the ambulance picked him up. There is nothing in  portal. I feel the Patient may be confused, as he was taken to [facility] ER. Pt was concerned about his dressing being changed. Advised Pt that  nurse will be taking care of this for him.

## 2017-08-16 NOTE — TELEPHONE ENCOUNTER
Alma with  left VM that Pt has a fever of 100.0, he's in a lot of pain, and feels like he needs to be seen sooner than his schedule appt next week for his wound.       We have two same days opened tomorrow, one right before lunch and the other right at the end of the day. Friday is about the same except we have another same day opened a little earlier in the day.

## 2017-08-17 ENCOUNTER — OFFICE VISIT (OUTPATIENT)
Dept: INTERNAL MEDICINE | Facility: CLINIC | Age: 57
End: 2017-08-17

## 2017-08-17 VITALS
HEART RATE: 108 BPM | DIASTOLIC BLOOD PRESSURE: 100 MMHG | SYSTOLIC BLOOD PRESSURE: 164 MMHG | RESPIRATION RATE: 18 BRPM | HEIGHT: 68 IN | TEMPERATURE: 99.4 F | OXYGEN SATURATION: 97 %

## 2017-08-17 DIAGNOSIS — Z89.432 PARTIAL NONTRAUMATIC AMPUTATION OF FOOT, LEFT (HCC): ICD-10-CM

## 2017-08-17 DIAGNOSIS — Z87.39 HX OF OSTEOMYELITIS: ICD-10-CM

## 2017-08-17 DIAGNOSIS — I73.9 PERIPHERAL VASCULAR DISEASE (HCC): ICD-10-CM

## 2017-08-17 DIAGNOSIS — I10 ESSENTIAL HYPERTENSION: ICD-10-CM

## 2017-08-17 DIAGNOSIS — M1A.0290 CHRONIC GOUT OF ELBOW, UNSPECIFIED CAUSE, UNSPECIFIED LATERALITY: ICD-10-CM

## 2017-08-17 DIAGNOSIS — Z72.0 TOBACCO ABUSE: Primary | ICD-10-CM

## 2017-08-17 DIAGNOSIS — F41.8 DEPRESSION WITH ANXIETY: ICD-10-CM

## 2017-08-17 DIAGNOSIS — J43.8 OTHER EMPHYSEMA (HCC): ICD-10-CM

## 2017-08-17 DIAGNOSIS — M05.731 RHEUMATOID ARTHRITIS INVOLVING RIGHT WRIST WITH POSITIVE RHEUMATOID FACTOR (HCC): ICD-10-CM

## 2017-08-17 PROCEDURE — 99214 OFFICE O/P EST MOD 30 MIN: CPT | Performed by: NURSE PRACTITIONER

## 2017-08-17 RX ORDER — CLOPIDOGREL BISULFATE 75 MG/1
75 TABLET ORAL DAILY
Qty: 30 TABLET | Refills: 3 | Status: SHIPPED | OUTPATIENT
Start: 2017-08-17 | End: 2017-09-25 | Stop reason: SDUPTHER

## 2017-08-17 RX ORDER — HYDROCODONE BITARTRATE AND ACETAMINOPHEN 7.5; 325 MG/1; MG/1
1 TABLET ORAL 2 TIMES DAILY PRN
Qty: 12 TABLET | Refills: 0 | Status: SHIPPED | OUTPATIENT
Start: 2017-08-17 | End: 2017-08-25

## 2017-08-17 RX ORDER — HYDROCODONE BITARTRATE AND ACETAMINOPHEN 7.5; 325 MG/1; MG/1
1 TABLET ORAL 2 TIMES DAILY PRN
Qty: 12 TABLET | Refills: 0 | Status: CANCELLED | OUTPATIENT
Start: 2017-08-17

## 2017-08-17 RX ORDER — LOSARTAN POTASSIUM AND HYDROCHLOROTHIAZIDE 12.5; 5 MG/1; MG/1
1 TABLET ORAL DAILY
Qty: 30 TABLET | Refills: 3 | Status: SHIPPED | OUTPATIENT
Start: 2017-08-17 | End: 2017-09-25 | Stop reason: SDUPTHER

## 2017-08-17 RX ORDER — COLCHICINE 0.6 MG/1
0.6 TABLET ORAL DAILY
Qty: 30 TABLET | Refills: 0 | Status: SHIPPED | OUTPATIENT
Start: 2017-08-17 | End: 2017-09-25 | Stop reason: SDUPTHER

## 2017-08-17 NOTE — PROGRESS NOTES
Chief Complaint / Reason:      Chief Complaint   Patient presents with   • Foot Problem     hospital f/u   • Elbow Pain     bilateral, swelling   • Hand Problem       Subjective     HPI  Patient presents today for hospital follow-up and to discuss foot wound, elbow pain and hand pain.  Patient has an extensive history regarding cardiovascular and pulmonary issues.  He recently was discharged from  regarding metatarsal amputation which initially began with toe removal and progressively worsened related to osteomyelitis.  He is a smoker and states he is down to 7 cigarettes per day. Patient was suppose to see  Dr. Campbell on outpatient basis but patient does not wish to continue with Dr. Campbell.  Patient states that he will continue to get his wound care done by homehealth but with further discuss patient needs a more skilled facility and higher level of care for proper healing and mobility. He is high risk for falls and decreased healing time as he does not have any support or transportation at home. He has to use a taxi to get to and from appointments. He has chronic conditions that need to appropriate management.   History taken from: patient    PMH/FH/Social History were reviewed and updated appropriately in the electronic medical record.     Review of Systems:   Review of Systems   Constitutional: Positive for activity change, appetite change and fatigue.   Respiratory: Positive for cough, shortness of breath and wheezing.    Cardiovascular: Positive for leg swelling. Negative for chest pain and palpitations.   Gastrointestinal: Negative.  Negative for abdominal pain and blood in stool.   Musculoskeletal: Positive for arthralgias, back pain and gait problem.   Skin: Positive for color change and wound.   Neurological: Positive for weakness.   Psychiatric/Behavioral: Positive for dysphoric mood and sleep disturbance. Negative for self-injury and suicidal ideas. The patient is nervous/anxious.    All other systems  reviewed and are negative.    All other systems were reviewed and are negative.  Exceptions are noted in the subjective or above.      Objective     Vital Signs  Vitals:    08/17/17 1511   BP: 164/100   Pulse: 108   Resp: 18   Temp: 99.4 °F (37.4 °C)   SpO2: 97%       There is no height or weight on file to calculate BMI.    Physical Exam   Constitutional: He is oriented to person, place, and time. He appears distressed.   Patent is ill appearing and is currently in wheelchair and walking boot but is very unstable when asked to ambulate to exam table for further assessment.   Cardiovascular:   Murmur heard.  Pulses weak bilaterally   Pulmonary/Chest: Effort normal. He has wheezes.   Abdominal: Soft. Bowel sounds are normal.   Neurological: He is alert and oriented to person, place, and time.   Skin:   Assessed patient's backside since he lives alone, buttocks was pink and blanchable. Bilateral groin incisions. Left incision slightly unapproximated where dermabond was.     Psychiatric:   Tearful at times and indicates he will do whatever we ask of him so he doesn't lose his leg   Nursing note and vitals reviewed.       Results Review:    I reviewed the patient's new clinical results.     Medication Review:     Current Outpatient Prescriptions:   •  albuterol (PROVENTIL HFA;VENTOLIN HFA) 108 (90 BASE) MCG/ACT inhaler, Inhale 2 puffs Every 4 (Four) Hours As Needed for Wheezing., Disp: , Rfl:   •  amiodarone (PACERONE) 200 MG tablet, Take 1 tablet by mouth Daily., Disp: 30 tablet, Rfl: 11  •  atorvastatin (LIPITOR) 40 MG tablet, Take 1 tablet by mouth Every Night., Disp: 30 tablet, Rfl: 11  •  clopidogrel (PLAVIX) 75 MG tablet, Take 1 tablet by mouth Daily., Disp: 30 tablet, Rfl: 3  •  colchicine 0.6 MG tablet, Take 1 tablet by mouth Daily., Disp: 30 tablet, Rfl: 0  •  collagenase (SANTYL) 250 UNIT/GM ointment, Apply  topically Daily., Disp: 90 g, Rfl: 0  •  gabapentin (NEURONTIN) 300 MG capsule, Take 1 capsule by mouth  3 (Three) Times a Day., Disp: 90 capsule, Rfl: 3  •  HYDROcodone-acetaminophen (NORCO) 7.5-325 MG per tablet, Take 1 tablet by mouth 2 (Two) Times a Day As Needed for Moderate Pain ., Disp: 12 tablet, Rfl: 0  •  hydroxychloroquine (PLAQUENIL) 200 MG tablet, Take 1 tablet by mouth Daily., Disp: 30 tablet, Rfl: 5  •  losartan-hydrochlorothiazide (HYZAAR) 50-12.5 MG per tablet, Take 1 tablet by mouth Daily., Disp: 30 tablet, Rfl: 3  •  metoprolol tartrate (LOPRESSOR) 25 MG tablet, Take 1 tablet by mouth Every 12 (Twelve) Hours. (Patient taking differently: Take 25 mg by mouth 2 (Two) Times a Day.), Disp: 60 tablet, Rfl: 11    Assessment/Plan   Vasquez was seen today for foot problem, elbow pain and hand problem.    Diagnoses and all orders for this visit:    Tobacco abuse  Recommend smoking cessation  Peripheral vascular disease  -     clopidogrel (PLAVIX) 75 MG tablet; Take 1 tablet by mouth Daily.  Advised patient to take meds as prescribed and contact office if he is out.   Essential hypertension  -     losartan-hydrochlorothiazide (HYZAAR) 50-12.5 MG per tablet; Take 1 tablet by mouth Daily.  Initiate lifestyle modifications.   DASH Diet and exercise.   Compliance with medication regimen and discussed ways to prevent of long-term complications from high blood pressure.  Discussed when to seek medical attention.  Encouraged patient to take blood pressure daily and keep a log.    Chronic gout of elbow, unspecified cause, unspecified laterality  -     colchicine 0.6 MG tablet; Take 1 tablet by mouth Daily.  Avoid alcohol and discussed dietary modifications  Other emphysema    Depression with anxiety    Partial nontraumatic amputation of foot, left  Cleaned and changed dressing and instructed patient regarding wound care and infection prevention.   Hx of osteomyelitis    Rheumatoid arthritis involving right wrist with positive rheumatoid factor  -     HYDROcodone-acetaminophen (NORCO) 7.5-325 MG per tablet; Take 1  tablet by mouth 2 (Two) Times a Day As Needed for Moderate Pain .    Discussed with patient the importance of compliance and the need for a higher level of care to promote healing and prevent further complications.   Collaborated with Dr. vermeesch and she recommended [facility]. I agree this would be in patient's best interest and safety  Follow up with Dr. Vermeesch Elizabeth H Briggs, APRN  08/17/2017

## 2017-08-23 ENCOUNTER — TELEPHONE (OUTPATIENT)
Dept: INTERNAL MEDICINE | Facility: CLINIC | Age: 57
End: 2017-08-23

## 2017-08-23 NOTE — TELEPHONE ENCOUNTER
Spoke with Alma from . Pt has been discharged from  due to non compliance. Stated Pt removed wound vac over the weekend. Was seen by a different nurse that applied a wet to dry dressing and gave Pt instructions on care. Alma states she went to Pt's place without notice. Pt had company over that was chain smoking and there was a liquor bottle on the table, half full. Alma states Pt and company were lit. She asked Pt how his pain was, stated he was really hurting. She asked if he still had pain medication and Pt said no, that he had to take it twice daily instead of once because it wasn't helping him. Stated he was drinking to help with the pain. Also stated Pt's dressing hadn't been changed/touched at all. Pt did mention to Alma that he had a doctors appt with us yesterday. Advised Alma Pt did not come.

## 2017-08-23 NOTE — TELEPHONE ENCOUNTER
Alma is going to call patient about one more visit and his noncompliance.  If he does not come to next visit then we will work on possibly discharging patient from clinic due to his noncompliance with medication and follow-up visits.

## 2017-08-25 ENCOUNTER — OFFICE VISIT (OUTPATIENT)
Dept: INTERNAL MEDICINE | Facility: CLINIC | Age: 57
End: 2017-08-25

## 2017-08-25 ENCOUNTER — APPOINTMENT (OUTPATIENT)
Dept: MRI IMAGING | Facility: HOSPITAL | Age: 57
End: 2017-08-25

## 2017-08-25 ENCOUNTER — HOSPITAL ENCOUNTER (INPATIENT)
Facility: HOSPITAL | Age: 57
LOS: 17 days | Discharge: HOME HEALTH | End: 2017-09-11
Attending: EMERGENCY MEDICINE | Admitting: INTERNAL MEDICINE

## 2017-08-25 VITALS
SYSTOLIC BLOOD PRESSURE: 134 MMHG | HEART RATE: 53 BPM | OXYGEN SATURATION: 93 % | TEMPERATURE: 97.9 F | DIASTOLIC BLOOD PRESSURE: 90 MMHG

## 2017-08-25 DIAGNOSIS — J43.8 OTHER EMPHYSEMA (HCC): ICD-10-CM

## 2017-08-25 DIAGNOSIS — I10 ESSENTIAL HYPERTENSION: ICD-10-CM

## 2017-08-25 DIAGNOSIS — Z74.09 IMPAIRED FUNCTIONAL MOBILITY, BALANCE, GAIT, AND ENDURANCE: ICD-10-CM

## 2017-08-25 DIAGNOSIS — I96 DRY GANGRENE (HCC): Primary | ICD-10-CM

## 2017-08-25 DIAGNOSIS — I73.9 PERIPHERAL VASCULAR DISEASE (HCC): ICD-10-CM

## 2017-08-25 DIAGNOSIS — I25.10 CORONARY ARTERY DISEASE INVOLVING NATIVE CORONARY ARTERY OF NATIVE HEART WITHOUT ANGINA PECTORIS: Chronic | ICD-10-CM

## 2017-08-25 DIAGNOSIS — L03.116 CELLULITIS OF LEFT LOWER EXTREMITY: ICD-10-CM

## 2017-08-25 DIAGNOSIS — M86.272 SUBACUTE OSTEOMYELITIS OF LEFT FOOT (HCC): Primary | ICD-10-CM

## 2017-08-25 DIAGNOSIS — K21.00 GASTROESOPHAGEAL REFLUX DISEASE WITH ESOPHAGITIS: Chronic | ICD-10-CM

## 2017-08-25 LAB
ALBUMIN SERPL-MCNC: 3.8 G/DL (ref 3.5–5)
ALBUMIN/GLOB SERPL: 0.6 G/DL (ref 1–2)
ALP SERPL-CCNC: 160 U/L (ref 38–126)
ALT SERPL W P-5'-P-CCNC: 25 U/L (ref 13–69)
ANION GAP SERPL CALCULATED.3IONS-SCNC: 19.3 MMOL/L
AST SERPL-CCNC: 82 U/L (ref 15–46)
BASOPHILS # BLD AUTO: 0.04 10*3/MM3 (ref 0–0.2)
BASOPHILS NFR BLD AUTO: 0.5 % (ref 0–2.5)
BILIRUB SERPL-MCNC: 1.3 MG/DL (ref 0.2–1.3)
BUN BLD-MCNC: 13 MG/DL (ref 7–20)
BUN/CREAT SERPL: 10.8 (ref 6.3–21.9)
CALCIUM SPEC-SCNC: 9.7 MG/DL (ref 8.4–10.2)
CHLORIDE SERPL-SCNC: 100 MMOL/L (ref 98–107)
CO2 SERPL-SCNC: 22 MMOL/L (ref 26–30)
CREAT BLD-MCNC: 1.2 MG/DL (ref 0.6–1.3)
D-LACTATE SERPL-SCNC: 1.9 MMOL/L (ref 0.5–2)
DEPRECATED RDW RBC AUTO: 53.6 FL (ref 37–54)
EOSINOPHIL # BLD AUTO: 1.78 10*3/MM3 (ref 0–0.7)
EOSINOPHIL NFR BLD AUTO: 20.6 % (ref 0–7)
ERYTHROCYTE [DISTWIDTH] IN BLOOD BY AUTOMATED COUNT: 16.5 % (ref 11.5–14.5)
GFR SERPL CREATININE-BSD FRML MDRD: 62 ML/MIN/1.73
GLOBULIN UR ELPH-MCNC: 6 GM/DL
GLUCOSE BLD-MCNC: 123 MG/DL (ref 74–98)
HBA1C MFR BLD: 4.9 % (ref 3–6)
HCT VFR BLD AUTO: 29.3 % (ref 42–52)
HGB BLD-MCNC: 9.7 G/DL (ref 14–18)
IMM GRANULOCYTES # BLD: 0.03 10*3/MM3 (ref 0–0.06)
IMM GRANULOCYTES NFR BLD: 0.3 % (ref 0–0.6)
LYMPHOCYTES # BLD AUTO: 1.62 10*3/MM3 (ref 0.6–3.4)
LYMPHOCYTES NFR BLD AUTO: 18.8 % (ref 10–50)
MCH RBC QN AUTO: 31 PG (ref 27–31)
MCHC RBC AUTO-ENTMCNC: 33.1 G/DL (ref 30–37)
MCV RBC AUTO: 93.6 FL (ref 80–94)
MONOCYTES # BLD AUTO: 0.57 10*3/MM3 (ref 0–0.9)
MONOCYTES NFR BLD AUTO: 6.6 % (ref 0–12)
NEUTROPHILS # BLD AUTO: 4.58 10*3/MM3 (ref 2–6.9)
NEUTROPHILS NFR BLD AUTO: 53.2 % (ref 37–80)
NRBC BLD MANUAL-RTO: 0 /100 WBC (ref 0–0)
PLATELET # BLD AUTO: 257 10*3/MM3 (ref 130–400)
PMV BLD AUTO: 9.5 FL (ref 6–12)
POTASSIUM BLD-SCNC: 4.3 MMOL/L (ref 3.5–5.1)
PROT SERPL-MCNC: 9.8 G/DL (ref 6.3–8.2)
RBC # BLD AUTO: 3.13 10*6/MM3 (ref 4.7–6.1)
SODIUM BLD-SCNC: 137 MMOL/L (ref 137–145)
WBC NRBC COR # BLD: 8.62 10*3/MM3 (ref 4.8–10.8)

## 2017-08-25 PROCEDURE — 99284 EMERGENCY DEPT VISIT MOD MDM: CPT

## 2017-08-25 PROCEDURE — 85025 COMPLETE CBC W/AUTO DIFF WBC: CPT | Performed by: NURSE PRACTITIONER

## 2017-08-25 PROCEDURE — 83036 HEMOGLOBIN GLYCOSYLATED A1C: CPT | Performed by: INTERNAL MEDICINE

## 2017-08-25 PROCEDURE — 99214 OFFICE O/P EST MOD 30 MIN: CPT | Performed by: INTERNAL MEDICINE

## 2017-08-25 PROCEDURE — 83605 ASSAY OF LACTIC ACID: CPT | Performed by: NURSE PRACTITIONER

## 2017-08-25 PROCEDURE — 73718 MRI LOWER EXTREMITY W/O DYE: CPT

## 2017-08-25 PROCEDURE — 87070 CULTURE OTHR SPECIMN AEROBIC: CPT | Performed by: INTERNAL MEDICINE

## 2017-08-25 PROCEDURE — 87186 SC STD MICRODIL/AGAR DIL: CPT | Performed by: INTERNAL MEDICINE

## 2017-08-25 PROCEDURE — 99223 1ST HOSP IP/OBS HIGH 75: CPT | Performed by: INTERNAL MEDICINE

## 2017-08-25 PROCEDURE — 80053 COMPREHEN METABOLIC PANEL: CPT | Performed by: NURSE PRACTITIONER

## 2017-08-25 PROCEDURE — 82962 GLUCOSE BLOOD TEST: CPT

## 2017-08-25 PROCEDURE — 25010000002 ERTAPENEM PER 500 MG: Performed by: INTERNAL MEDICINE

## 2017-08-25 PROCEDURE — 87077 CULTURE AEROBIC IDENTIFY: CPT | Performed by: INTERNAL MEDICINE

## 2017-08-25 RX ORDER — LORAZEPAM 2 MG/ML
2 INJECTION INTRAMUSCULAR
Status: DISCONTINUED | OUTPATIENT
Start: 2017-08-25 | End: 2017-09-01

## 2017-08-25 RX ORDER — SODIUM CHLORIDE 0.9 % (FLUSH) 0.9 %
1-10 SYRINGE (ML) INJECTION AS NEEDED
Status: DISCONTINUED | OUTPATIENT
Start: 2017-08-25 | End: 2017-09-11 | Stop reason: HOSPADM

## 2017-08-25 RX ORDER — DULOXETIN HYDROCHLORIDE 30 MG/1
30 CAPSULE, DELAYED RELEASE ORAL DAILY
Status: DISCONTINUED | OUTPATIENT
Start: 2017-08-26 | End: 2017-09-11 | Stop reason: HOSPADM

## 2017-08-25 RX ORDER — MORPHINE SULFATE 2 MG/ML
2 INJECTION, SOLUTION INTRAMUSCULAR; INTRAVENOUS EVERY 4 HOURS PRN
Status: DISCONTINUED | OUTPATIENT
Start: 2017-08-25 | End: 2017-08-26

## 2017-08-25 RX ORDER — DEXTROSE MONOHYDRATE 25 G/50ML
25 INJECTION, SOLUTION INTRAVENOUS
Status: DISCONTINUED | OUTPATIENT
Start: 2017-08-25 | End: 2017-09-02

## 2017-08-25 RX ORDER — ACETAMINOPHEN 325 MG/1
650 TABLET ORAL EVERY 4 HOURS PRN
Status: DISCONTINUED | OUTPATIENT
Start: 2017-08-25 | End: 2017-09-11 | Stop reason: HOSPADM

## 2017-08-25 RX ORDER — HYDROXYCHLOROQUINE SULFATE 200 MG/1
200 TABLET, FILM COATED ORAL DAILY
Status: DISCONTINUED | OUTPATIENT
Start: 2017-08-26 | End: 2017-09-11 | Stop reason: HOSPADM

## 2017-08-25 RX ORDER — PANTOPRAZOLE SODIUM 40 MG/1
40 TABLET, DELAYED RELEASE ORAL
Status: DISCONTINUED | OUTPATIENT
Start: 2017-08-26 | End: 2017-09-11 | Stop reason: HOSPADM

## 2017-08-25 RX ORDER — LOSARTAN POTASSIUM AND HYDROCHLOROTHIAZIDE 12.5; 5 MG/1; MG/1
1 TABLET ORAL DAILY
Status: DISCONTINUED | OUTPATIENT
Start: 2017-08-26 | End: 2017-09-11 | Stop reason: HOSPADM

## 2017-08-25 RX ORDER — LORAZEPAM 0.5 MG/1
2 TABLET ORAL
Status: DISCONTINUED | OUTPATIENT
Start: 2017-08-25 | End: 2017-09-01

## 2017-08-25 RX ORDER — PANTOPRAZOLE SODIUM 40 MG/1
40 TABLET, DELAYED RELEASE ORAL DAILY
Qty: 30 TABLET | Refills: 5 | Status: SHIPPED | OUTPATIENT
Start: 2017-08-25 | End: 2017-09-25 | Stop reason: SDUPTHER

## 2017-08-25 RX ORDER — ONDANSETRON 2 MG/ML
4 INJECTION INTRAMUSCULAR; INTRAVENOUS EVERY 6 HOURS PRN
Status: DISCONTINUED | OUTPATIENT
Start: 2017-08-25 | End: 2017-09-11 | Stop reason: HOSPADM

## 2017-08-25 RX ORDER — CLOPIDOGREL BISULFATE 75 MG/1
75 TABLET ORAL DAILY
Status: DISCONTINUED | OUTPATIENT
Start: 2017-08-26 | End: 2017-09-11 | Stop reason: HOSPADM

## 2017-08-25 RX ORDER — SODIUM CHLORIDE 0.9 % (FLUSH) 0.9 %
10 SYRINGE (ML) INJECTION AS NEEDED
Status: DISCONTINUED | OUTPATIENT
Start: 2017-08-25 | End: 2017-09-11 | Stop reason: HOSPADM

## 2017-08-25 RX ORDER — HYDROCODONE BITARTRATE AND ACETAMINOPHEN 5; 325 MG/1; MG/1
1 TABLET ORAL EVERY 4 HOURS PRN
Status: DISCONTINUED | OUTPATIENT
Start: 2017-08-25 | End: 2017-08-26

## 2017-08-25 RX ORDER — DULOXETIN HYDROCHLORIDE 30 MG/1
30 CAPSULE, DELAYED RELEASE ORAL DAILY
Qty: 30 CAPSULE | Refills: 3 | Status: SHIPPED | OUTPATIENT
Start: 2017-08-25 | End: 2017-09-25 | Stop reason: SDUPTHER

## 2017-08-25 RX ORDER — ALBUTEROL SULFATE 2.5 MG/3ML
2.5 SOLUTION RESPIRATORY (INHALATION)
Status: DISCONTINUED | OUTPATIENT
Start: 2017-08-26 | End: 2017-09-11 | Stop reason: HOSPADM

## 2017-08-25 RX ORDER — NALOXONE HCL 0.4 MG/ML
0.4 VIAL (ML) INJECTION
Status: DISCONTINUED | OUTPATIENT
Start: 2017-08-25 | End: 2017-08-26

## 2017-08-25 RX ORDER — HYDROCODONE BITARTRATE AND ACETAMINOPHEN 7.5; 325 MG/1; MG/1
1 TABLET ORAL ONCE
Status: COMPLETED | OUTPATIENT
Start: 2017-08-25 | End: 2017-08-25

## 2017-08-25 RX ORDER — LORAZEPAM 0.5 MG/1
1 TABLET ORAL
Status: DISCONTINUED | OUTPATIENT
Start: 2017-08-25 | End: 2017-09-01

## 2017-08-25 RX ORDER — NICOTINE POLACRILEX 4 MG
1 LOZENGE BUCCAL
Status: DISCONTINUED | OUTPATIENT
Start: 2017-08-25 | End: 2017-09-02

## 2017-08-25 RX ORDER — AMIODARONE HYDROCHLORIDE 200 MG/1
200 TABLET ORAL
Status: DISCONTINUED | OUTPATIENT
Start: 2017-08-26 | End: 2017-09-11 | Stop reason: HOSPADM

## 2017-08-25 RX ORDER — NICOTINE 21 MG/24HR
1 PATCH, TRANSDERMAL 24 HOURS TRANSDERMAL EVERY 24 HOURS
Status: DISCONTINUED | OUTPATIENT
Start: 2017-08-26 | End: 2017-09-11 | Stop reason: HOSPADM

## 2017-08-25 RX ORDER — LORAZEPAM 2 MG/ML
1 INJECTION INTRAMUSCULAR
Status: DISCONTINUED | OUTPATIENT
Start: 2017-08-25 | End: 2017-09-01

## 2017-08-25 RX ORDER — ATORVASTATIN CALCIUM 40 MG/1
40 TABLET, FILM COATED ORAL NIGHTLY
Status: DISCONTINUED | OUTPATIENT
Start: 2017-08-25 | End: 2017-09-11 | Stop reason: HOSPADM

## 2017-08-25 RX ORDER — COLCHICINE 0.6 MG/1
0.6 TABLET ORAL DAILY
Status: DISCONTINUED | OUTPATIENT
Start: 2017-08-26 | End: 2017-09-11 | Stop reason: HOSPADM

## 2017-08-25 RX ADMIN — HYDROCODONE BITARTRATE AND ACETAMINOPHEN 1 TABLET: 7.5; 325 TABLET ORAL at 19:56

## 2017-08-25 RX ADMIN — SODIUM CHLORIDE 1 G: 9 INJECTION, SOLUTION INTRAVENOUS at 23:50

## 2017-08-25 RX ADMIN — ATORVASTATIN CALCIUM 40 MG: 40 TABLET, FILM COATED ORAL at 23:50

## 2017-08-25 RX ADMIN — VANCOMYCIN HYDROCHLORIDE 2000 MG: 500 INJECTION, POWDER, LYOPHILIZED, FOR SOLUTION INTRAVENOUS at 23:57

## 2017-08-25 NOTE — PROGRESS NOTES
Chief Complaint   Patient presents with   • Follow-up     for Partial nontraumatic amputation of foot, left. Pt states he's been having alot of pain.      Subjective   Vasquez Marie is a 56 y.o. male.     HPI Comments: Pt is here for left foot partial amputation follow up.   He has a PMH of CAD, paroxysmal Afib, HTN, HLD, PVD, COPD, Hep C, GERD, RA, anemia, depression/anxiety, gout.   He denies any CP, SOB or palpitations.   He is not having any GI symptoms.  He has a poor appetite.  Has to use his albuterol inhaler a few times a day.  He is smoking about 5 cigarettes a day.  Last yr his RF was positive with high titer of 320.  Uric acid level then was over 12.2.  He has been very depressed lately.   I have been called multiple times by home health regarding patient's wound.  He has had wound VAC placement to left foot twice and they have found wound VAC in trash upon their arrival both times. Home health has been applying wet-to-dry dressings to wound, however they state patient has been noncompliant and they have discharged him from their services.  Earlier in the week on their arrival patient was found with friends drinking alcohol and told them he did not want to be seen for wound dressing change.  Upon confronting patient with these issues, he states he did not remove wound VAC.  He also tells me that he did not tell nurse he did not want dressing changed, however requested her to come back at a later time.  Patient is complaining of severe pain in his left foot.  He denies any fevers or chills.  He states that dressing has not been changed since Monday of this week.  There is discharge coming from dressing.  He is ambulatory only with his wheelchair at this time.  He does have a walker but has difficulty with using walker.  He is having difficulty preparing his meals at home.  He is having difficulties with other ADLs and caring for himself also.             The following portions of the patient's  history were reviewed and updated as appropriate: allergies, current medications, past family history, past medical history, past social history, past surgical history and problem list.    Review of Systems   Constitutional: Negative for appetite change and fever.   HENT: Negative.    Respiratory: Negative for shortness of breath.    Cardiovascular: Negative for chest pain, palpitations and leg swelling.   Gastrointestinal: Negative.    Musculoskeletal: Positive for arthralgias and gait problem.   Skin: Positive for wound.       Objective   /90  Pulse 53  Temp 97.9 °F (36.6 °C)  SpO2 93%  There is no height or weight on file to calculate BMI.  Physical Exam   Constitutional: He is oriented to person, place, and time. He appears well-developed and well-nourished.   Chronically ill gentleman, seated in wheelchair   HENT:   Head: Normocephalic and atraumatic.   Mouth/Throat: Oropharynx is clear and moist.   Eyes: Conjunctivae and EOM are normal. Pupils are equal, round, and reactive to light.   Neck: Normal range of motion. Neck supple. No thyromegaly present.   Cardiovascular: Normal rate and regular rhythm.    No murmur heard.  Distant heart sounds   Pulmonary/Chest: Effort normal. No respiratory distress.   Decreased BS     Abdominal: Soft. Bowel sounds are normal.   Musculoskeletal: He exhibits no edema.   Severe synovial thickening of all MCP joints noted with multiple tophi noted over elbows and other extensor surfaces   Lymphadenopathy:     He has no cervical adenopathy.   Neurological: He is alert and oriented to person, place, and time. No cranial nerve deficit.   Skin:   Left foot distal amputation.  Yellow pustular discharge overlying granulation tissue, very tender with removal of dressing, no palpable pulses   Psychiatric: He has a normal mood and affect. Judgment normal.   Nursing note and vitals reviewed.      Assessment/Plan   Vasquez Marie is here today and the following problems have  been addressed:      Vasquez was seen today for follow-up.    Diagnoses and all orders for this visit:    Dry gangrene    Cellulitis of left lower extremity    Other orders  -     pantoprazole (PROTONIX) 40 MG EC tablet; Take 1 tablet by mouth Daily.  -     DULoxetine (CYMBALTA) 30 MG capsule; Take 1 capsule by mouth Daily.    Patient is being admitted to hospitalist service  Discussed case with Dr. Henley  Hopefully patient can [facility] after discharge from hospital  Please note that portions of this note were completed with a voice recognition program.  Efforts were made to edit dictation, but occasionally words are mistranscribed.

## 2017-08-26 PROBLEM — R00.0 TACHYCARDIA: Status: RESOLVED | Noted: 2017-02-09 | Resolved: 2017-08-26

## 2017-08-26 PROBLEM — M86.172 ACUTE OSTEOMYELITIS OF LEFT FOOT (HCC): Status: RESOLVED | Noted: 2017-06-26 | Resolved: 2017-08-26

## 2017-08-26 PROBLEM — L03.90 CELLULITIS: Status: RESOLVED | Noted: 2017-04-08 | Resolved: 2017-08-26

## 2017-08-26 PROBLEM — S92.502A CLOSED FRACTURE OF FOURTH TOE OF LEFT FOOT: Status: RESOLVED | Noted: 2017-06-21 | Resolved: 2017-08-26

## 2017-08-26 PROBLEM — D64.9 ANEMIA: Status: RESOLVED | Noted: 2017-02-12 | Resolved: 2017-08-26

## 2017-08-26 PROBLEM — I25.10 CORONARY ARTERY DISEASE INVOLVING NATIVE CORONARY ARTERY OF NATIVE HEART WITHOUT ANGINA PECTORIS: Chronic | Status: ACTIVE | Noted: 2017-02-10

## 2017-08-26 PROBLEM — J45.909 ASTHMA: Status: RESOLVED | Noted: 2017-02-15 | Resolved: 2017-08-26

## 2017-08-26 PROBLEM — K21.00 GASTROESOPHAGEAL REFLUX DISEASE WITH ESOPHAGITIS: Chronic | Status: ACTIVE | Noted: 2017-02-15

## 2017-08-26 PROBLEM — I48.20 CHRONIC ATRIAL FIBRILLATION (HCC): Chronic | Status: ACTIVE | Noted: 2017-02-10

## 2017-08-26 LAB
ANION GAP SERPL CALCULATED.3IONS-SCNC: 16.6 MMOL/L
BASOPHILS # BLD AUTO: 0.03 10*3/MM3 (ref 0–0.2)
BASOPHILS NFR BLD AUTO: 0.5 % (ref 0–2.5)
BUN BLD-MCNC: 13 MG/DL (ref 7–20)
BUN/CREAT SERPL: 9.3 (ref 6.3–21.9)
CALCIUM SPEC-SCNC: 8.9 MG/DL (ref 8.4–10.2)
CHLORIDE SERPL-SCNC: 107 MMOL/L (ref 98–107)
CO2 SERPL-SCNC: 20 MMOL/L (ref 26–30)
CREAT BLD-MCNC: 1.4 MG/DL (ref 0.6–1.3)
DEPRECATED RDW RBC AUTO: 54.9 FL (ref 37–54)
EOSINOPHIL # BLD AUTO: 1.26 10*3/MM3 (ref 0–0.7)
EOSINOPHIL NFR BLD AUTO: 22.6 % (ref 0–7)
ERYTHROCYTE [DISTWIDTH] IN BLOOD BY AUTOMATED COUNT: 16.3 % (ref 11.5–14.5)
GFR SERPL CREATININE-BSD FRML MDRD: 52 ML/MIN/1.73
GLUCOSE BLD-MCNC: 130 MG/DL (ref 74–98)
GLUCOSE BLDC GLUCOMTR-MCNC: 131 MG/DL (ref 70–130)
GLUCOSE BLDC GLUCOMTR-MCNC: 141 MG/DL (ref 70–130)
GLUCOSE BLDC GLUCOMTR-MCNC: 141 MG/DL (ref 70–130)
GLUCOSE BLDC GLUCOMTR-MCNC: 154 MG/DL (ref 70–130)
GLUCOSE BLDC GLUCOMTR-MCNC: 156 MG/DL (ref 70–130)
HCT VFR BLD AUTO: 28.7 % (ref 42–52)
HGB BLD-MCNC: 9.1 G/DL (ref 14–18)
IMM GRANULOCYTES # BLD: 0.01 10*3/MM3 (ref 0–0.06)
IMM GRANULOCYTES NFR BLD: 0.2 % (ref 0–0.6)
LYMPHOCYTES # BLD AUTO: 0.96 10*3/MM3 (ref 0.6–3.4)
LYMPHOCYTES NFR BLD AUTO: 17.2 % (ref 10–50)
MAGNESIUM SERPL-MCNC: 1 MG/DL (ref 1.6–2.3)
MCH RBC QN AUTO: 29.3 PG (ref 27–31)
MCHC RBC AUTO-ENTMCNC: 31.7 G/DL (ref 30–37)
MCV RBC AUTO: 92.3 FL (ref 80–94)
MONOCYTES # BLD AUTO: 0.43 10*3/MM3 (ref 0–0.9)
MONOCYTES NFR BLD AUTO: 7.7 % (ref 0–12)
NEUTROPHILS # BLD AUTO: 2.88 10*3/MM3 (ref 2–6.9)
NEUTROPHILS NFR BLD AUTO: 51.8 % (ref 37–80)
NRBC BLD MANUAL-RTO: 0 /100 WBC (ref 0–0)
PLATELET # BLD AUTO: 176 10*3/MM3 (ref 130–400)
PMV BLD AUTO: 8.9 FL (ref 6–12)
POTASSIUM BLD-SCNC: 3.6 MMOL/L (ref 3.5–5.1)
RBC # BLD AUTO: 3.11 10*6/MM3 (ref 4.7–6.1)
SODIUM BLD-SCNC: 140 MMOL/L (ref 137–145)
WBC NRBC COR # BLD: 5.57 10*3/MM3 (ref 4.8–10.8)

## 2017-08-26 PROCEDURE — 82962 GLUCOSE BLOOD TEST: CPT

## 2017-08-26 PROCEDURE — 80048 BASIC METABOLIC PNL TOTAL CA: CPT | Performed by: INTERNAL MEDICINE

## 2017-08-26 PROCEDURE — 25010000002 MAGNESIUM SULFATE 2 GM/50ML SOLUTION: Performed by: FAMILY MEDICINE

## 2017-08-26 PROCEDURE — 25010000002 VANCOMYCIN PER 500 MG: Performed by: INTERNAL MEDICINE

## 2017-08-26 PROCEDURE — 99233 SBSQ HOSP IP/OBS HIGH 50: CPT | Performed by: FAMILY MEDICINE

## 2017-08-26 PROCEDURE — 94799 UNLISTED PULMONARY SVC/PX: CPT

## 2017-08-26 PROCEDURE — 25010000002 MORPHINE PER 10 MG: Performed by: INTERNAL MEDICINE

## 2017-08-26 PROCEDURE — 83735 ASSAY OF MAGNESIUM: CPT | Performed by: INTERNAL MEDICINE

## 2017-08-26 PROCEDURE — 25010000002 ENOXAPARIN PER 10 MG: Performed by: INTERNAL MEDICINE

## 2017-08-26 PROCEDURE — 94640 AIRWAY INHALATION TREATMENT: CPT

## 2017-08-26 PROCEDURE — 25010000002 MAGNESIUM SULFATE PER 500 MG OF MAGNESIUM: Performed by: INTERNAL MEDICINE

## 2017-08-26 PROCEDURE — 63710000001 INSULIN ASPART PER 5 UNITS: Performed by: INTERNAL MEDICINE

## 2017-08-26 PROCEDURE — 85025 COMPLETE CBC W/AUTO DIFF WBC: CPT | Performed by: INTERNAL MEDICINE

## 2017-08-26 PROCEDURE — 25010000002 THIAMINE PER 100 MG: Performed by: INTERNAL MEDICINE

## 2017-08-26 RX ORDER — MAGNESIUM SULFATE HEPTAHYDRATE 40 MG/ML
2 INJECTION, SOLUTION INTRAVENOUS ONCE
Status: COMPLETED | OUTPATIENT
Start: 2017-08-26 | End: 2017-08-26

## 2017-08-26 RX ORDER — HYDROCODONE BITARTRATE AND ACETAMINOPHEN 7.5; 325 MG/1; MG/1
1 TABLET ORAL EVERY 4 HOURS PRN
Status: DISCONTINUED | OUTPATIENT
Start: 2017-08-26 | End: 2017-08-30

## 2017-08-26 RX ADMIN — FOLIC ACID 100 ML/HR: 5 INJECTION, SOLUTION INTRAMUSCULAR; INTRAVENOUS; SUBCUTANEOUS at 10:06

## 2017-08-26 RX ADMIN — NICOTINE 1 PATCH: 14 PATCH TRANSDERMAL at 23:46

## 2017-08-26 RX ADMIN — DULOXETINE HYDROCHLORIDE 30 MG: 30 CAPSULE, DELAYED RELEASE ORAL at 08:27

## 2017-08-26 RX ADMIN — MORPHINE SULFATE 2 MG: 2 INJECTION, SOLUTION INTRAMUSCULAR; INTRAVENOUS at 04:10

## 2017-08-26 RX ADMIN — HYDROCODONE BITARTRATE AND ACETAMINOPHEN 1 TABLET: 7.5; 325 TABLET ORAL at 15:17

## 2017-08-26 RX ADMIN — ALBUTEROL SULFATE 2.5 MG: 2.5 SOLUTION RESPIRATORY (INHALATION) at 19:26

## 2017-08-26 RX ADMIN — VANCOMYCIN HYDROCHLORIDE 1750 MG: 500 INJECTION, POWDER, LYOPHILIZED, FOR SOLUTION INTRAVENOUS at 17:20

## 2017-08-26 RX ADMIN — NICOTINE 1 PATCH: 14 PATCH TRANSDERMAL at 00:11

## 2017-08-26 RX ADMIN — LOSARTAN POTASSIUM AND HYDROCHLOROTHIAZIDE 1 TABLET: 50; 12.5 TABLET, FILM COATED ORAL at 15:55

## 2017-08-26 RX ADMIN — ATORVASTATIN CALCIUM 40 MG: 40 TABLET, FILM COATED ORAL at 20:10

## 2017-08-26 RX ADMIN — ALBUTEROL SULFATE 2.5 MG: 2.5 SOLUTION RESPIRATORY (INHALATION) at 07:06

## 2017-08-26 RX ADMIN — HYDROCODONE BITARTRATE AND ACETAMINOPHEN 1 TABLET: 5; 325 TABLET ORAL at 00:11

## 2017-08-26 RX ADMIN — METOPROLOL TARTRATE 25 MG: 25 TABLET ORAL at 08:27

## 2017-08-26 RX ADMIN — HYDROXYCHLOROQUINE SULFATE 200 MG: 200 TABLET, FILM COATED ORAL at 08:27

## 2017-08-26 RX ADMIN — HYDROCODONE BITARTRATE AND ACETAMINOPHEN 1 TABLET: 5; 325 TABLET ORAL at 08:27

## 2017-08-26 RX ADMIN — MORPHINE SULFATE 2 MG: 2 INJECTION, SOLUTION INTRAMUSCULAR; INTRAVENOUS at 12:21

## 2017-08-26 RX ADMIN — ENOXAPARIN SODIUM 40 MG: 40 INJECTION SUBCUTANEOUS at 08:27

## 2017-08-26 RX ADMIN — HYDROCODONE BITARTRATE AND ACETAMINOPHEN 1 TABLET: 7.5; 325 TABLET ORAL at 20:10

## 2017-08-26 RX ADMIN — MAGNESIUM SULFATE HEPTAHYDRATE 2 G: 40 INJECTION, SOLUTION INTRAVENOUS at 09:07

## 2017-08-26 RX ADMIN — COLCHICINE 0.6 MG: 0.6 TABLET, FILM COATED ORAL at 08:27

## 2017-08-26 RX ADMIN — AMIODARONE HYDROCHLORIDE 200 MG: 200 TABLET ORAL at 08:27

## 2017-08-26 RX ADMIN — CLOPIDOGREL BISULFATE 75 MG: 75 TABLET ORAL at 08:27

## 2017-08-26 RX ADMIN — METOPROLOL TARTRATE 25 MG: 25 TABLET ORAL at 17:20

## 2017-08-26 RX ADMIN — ALBUTEROL SULFATE 2.5 MG: 2.5 SOLUTION RESPIRATORY (INHALATION) at 00:34

## 2017-08-26 RX ADMIN — INSULIN ASPART 2 UNITS: 100 INJECTION, SOLUTION INTRAVENOUS; SUBCUTANEOUS at 11:46

## 2017-08-27 LAB
GLUCOSE BLDC GLUCOMTR-MCNC: 103 MG/DL (ref 70–130)
GLUCOSE BLDC GLUCOMTR-MCNC: 108 MG/DL (ref 70–130)
GLUCOSE BLDC GLUCOMTR-MCNC: 110 MG/DL (ref 70–130)
GLUCOSE BLDC GLUCOMTR-MCNC: 118 MG/DL (ref 70–130)

## 2017-08-27 PROCEDURE — 94799 UNLISTED PULMONARY SVC/PX: CPT

## 2017-08-27 PROCEDURE — 25010000002 ENOXAPARIN PER 10 MG: Performed by: INTERNAL MEDICINE

## 2017-08-27 PROCEDURE — 25010000002 VANCOMYCIN PER 500 MG: Performed by: INTERNAL MEDICINE

## 2017-08-27 PROCEDURE — 25010000002 MAGNESIUM SULFATE PER 500 MG OF MAGNESIUM: Performed by: INTERNAL MEDICINE

## 2017-08-27 PROCEDURE — 25010000002 THIAMINE PER 100 MG: Performed by: INTERNAL MEDICINE

## 2017-08-27 PROCEDURE — 82962 GLUCOSE BLOOD TEST: CPT

## 2017-08-27 PROCEDURE — 25010000002 ERTAPENEM PER 500 MG: Performed by: INTERNAL MEDICINE

## 2017-08-27 PROCEDURE — 99233 SBSQ HOSP IP/OBS HIGH 50: CPT | Performed by: FAMILY MEDICINE

## 2017-08-27 RX ORDER — LOPERAMIDE HYDROCHLORIDE 2 MG/1
4 CAPSULE ORAL ONCE
Status: COMPLETED | OUTPATIENT
Start: 2017-08-27 | End: 2017-08-27

## 2017-08-27 RX ORDER — TRAZODONE HYDROCHLORIDE 50 MG/1
50 TABLET ORAL NIGHTLY PRN
Status: DISCONTINUED | OUTPATIENT
Start: 2017-08-27 | End: 2017-09-11 | Stop reason: HOSPADM

## 2017-08-27 RX ORDER — LOPERAMIDE HYDROCHLORIDE 2 MG/1
2 CAPSULE ORAL 4 TIMES DAILY PRN
Status: DISCONTINUED | OUTPATIENT
Start: 2017-08-27 | End: 2017-09-11 | Stop reason: HOSPADM

## 2017-08-27 RX ADMIN — HYDROCODONE BITARTRATE AND ACETAMINOPHEN 1 TABLET: 7.5; 325 TABLET ORAL at 15:48

## 2017-08-27 RX ADMIN — COLCHICINE 0.6 MG: 0.6 TABLET, FILM COATED ORAL at 09:51

## 2017-08-27 RX ADMIN — HYDROXYCHLOROQUINE SULFATE 200 MG: 200 TABLET, FILM COATED ORAL at 09:51

## 2017-08-27 RX ADMIN — PANTOPRAZOLE SODIUM 40 MG: 40 TABLET, DELAYED RELEASE ORAL at 05:17

## 2017-08-27 RX ADMIN — HYDROCODONE BITARTRATE AND ACETAMINOPHEN 1 TABLET: 7.5; 325 TABLET ORAL at 10:02

## 2017-08-27 RX ADMIN — ATORVASTATIN CALCIUM 40 MG: 40 TABLET, FILM COATED ORAL at 20:19

## 2017-08-27 RX ADMIN — ALBUTEROL SULFATE 2.5 MG: 2.5 SOLUTION RESPIRATORY (INHALATION) at 00:20

## 2017-08-27 RX ADMIN — HYDROCODONE BITARTRATE AND ACETAMINOPHEN 1 TABLET: 7.5; 325 TABLET ORAL at 04:05

## 2017-08-27 RX ADMIN — METOPROLOL TARTRATE 25 MG: 25 TABLET ORAL at 09:51

## 2017-08-27 RX ADMIN — DULOXETINE HYDROCHLORIDE 30 MG: 30 CAPSULE, DELAYED RELEASE ORAL at 09:53

## 2017-08-27 RX ADMIN — HYDROCODONE BITARTRATE AND ACETAMINOPHEN 1 TABLET: 7.5; 325 TABLET ORAL at 20:18

## 2017-08-27 RX ADMIN — SODIUM CHLORIDE 1 G: 9 INJECTION, SOLUTION INTRAVENOUS at 23:25

## 2017-08-27 RX ADMIN — METOPROLOL TARTRATE 25 MG: 25 TABLET ORAL at 18:11

## 2017-08-27 RX ADMIN — CLOPIDOGREL BISULFATE 75 MG: 75 TABLET ORAL at 09:51

## 2017-08-27 RX ADMIN — AMIODARONE HYDROCHLORIDE 200 MG: 200 TABLET ORAL at 09:51

## 2017-08-27 RX ADMIN — ALBUTEROL SULFATE 2.5 MG: 2.5 SOLUTION RESPIRATORY (INHALATION) at 19:43

## 2017-08-27 RX ADMIN — LOPERAMIDE HYDROCHLORIDE 4 MG: 2 CAPSULE ORAL at 15:49

## 2017-08-27 RX ADMIN — LOSARTAN POTASSIUM AND HYDROCHLOROTHIAZIDE 1 TABLET: 50; 12.5 TABLET, FILM COATED ORAL at 09:54

## 2017-08-27 RX ADMIN — VANCOMYCIN HYDROCHLORIDE 1750 MG: 500 INJECTION, POWDER, LYOPHILIZED, FOR SOLUTION INTRAVENOUS at 12:00

## 2017-08-27 RX ADMIN — FOLIC ACID 100 ML/HR: 5 INJECTION, SOLUTION INTRAMUSCULAR; INTRAVENOUS; SUBCUTANEOUS at 11:00

## 2017-08-27 RX ADMIN — TRAZODONE HYDROCHLORIDE 50 MG: 50 TABLET ORAL at 21:38

## 2017-08-27 RX ADMIN — SODIUM CHLORIDE 1 G: 9 INJECTION, SOLUTION INTRAVENOUS at 00:03

## 2017-08-27 RX ADMIN — ENOXAPARIN SODIUM 40 MG: 40 INJECTION SUBCUTANEOUS at 09:51

## 2017-08-28 LAB
BACTERIA SPEC AEROBE CULT: ABNORMAL
GLUCOSE BLDC GLUCOMTR-MCNC: 100 MG/DL (ref 70–130)
GLUCOSE BLDC GLUCOMTR-MCNC: 101 MG/DL (ref 70–130)
GLUCOSE BLDC GLUCOMTR-MCNC: 115 MG/DL (ref 70–130)
GLUCOSE BLDC GLUCOMTR-MCNC: 99 MG/DL (ref 70–130)
VANCOMYCIN TROUGH SERPL-MCNC: 22.21 MCG/ML (ref 5–15)

## 2017-08-28 PROCEDURE — 99024 POSTOP FOLLOW-UP VISIT: CPT | Performed by: PODIATRIST

## 2017-08-28 PROCEDURE — 25010000002 VANCOMYCIN PER 500 MG: Performed by: FAMILY MEDICINE

## 2017-08-28 PROCEDURE — 82962 GLUCOSE BLOOD TEST: CPT

## 2017-08-28 PROCEDURE — 80202 ASSAY OF VANCOMYCIN: CPT | Performed by: INTERNAL MEDICINE

## 2017-08-28 PROCEDURE — 25010000002 THIAMINE PER 100 MG: Performed by: INTERNAL MEDICINE

## 2017-08-28 PROCEDURE — 25010000002 ENOXAPARIN PER 10 MG: Performed by: INTERNAL MEDICINE

## 2017-08-28 PROCEDURE — 25010000002 ERTAPENEM PER 500 MG: Performed by: INTERNAL MEDICINE

## 2017-08-28 PROCEDURE — 99232 SBSQ HOSP IP/OBS MODERATE 35: CPT | Performed by: INTERNAL MEDICINE

## 2017-08-28 PROCEDURE — 25010000002 MAGNESIUM SULFATE PER 500 MG OF MAGNESIUM: Performed by: INTERNAL MEDICINE

## 2017-08-28 RX ADMIN — HYDROCODONE BITARTRATE AND ACETAMINOPHEN 1 TABLET: 7.5; 325 TABLET ORAL at 01:54

## 2017-08-28 RX ADMIN — NICOTINE 1 PATCH: 14 PATCH TRANSDERMAL at 00:16

## 2017-08-28 RX ADMIN — AMIODARONE HYDROCHLORIDE 200 MG: 200 TABLET ORAL at 08:59

## 2017-08-28 RX ADMIN — METOPROLOL TARTRATE 25 MG: 25 TABLET ORAL at 09:00

## 2017-08-28 RX ADMIN — METOPROLOL TARTRATE 25 MG: 25 TABLET ORAL at 17:10

## 2017-08-28 RX ADMIN — HYDROCODONE BITARTRATE AND ACETAMINOPHEN 1 TABLET: 7.5; 325 TABLET ORAL at 05:59

## 2017-08-28 RX ADMIN — HYDROCODONE BITARTRATE AND ACETAMINOPHEN 1 TABLET: 7.5; 325 TABLET ORAL at 17:11

## 2017-08-28 RX ADMIN — HYDROCODONE BITARTRATE AND ACETAMINOPHEN 1 TABLET: 7.5; 325 TABLET ORAL at 23:02

## 2017-08-28 RX ADMIN — DULOXETINE HYDROCHLORIDE 30 MG: 30 CAPSULE, DELAYED RELEASE ORAL at 08:59

## 2017-08-28 RX ADMIN — HYDROXYCHLOROQUINE SULFATE 200 MG: 200 TABLET, FILM COATED ORAL at 09:00

## 2017-08-28 RX ADMIN — PANTOPRAZOLE SODIUM 40 MG: 40 TABLET, DELAYED RELEASE ORAL at 05:15

## 2017-08-28 RX ADMIN — COLCHICINE 0.6 MG: 0.6 TABLET, FILM COATED ORAL at 08:59

## 2017-08-28 RX ADMIN — VANCOMYCIN HYDROCHLORIDE 1500 MG: 500 INJECTION, POWDER, LYOPHILIZED, FOR SOLUTION INTRAVENOUS at 12:13

## 2017-08-28 RX ADMIN — HYDROCODONE BITARTRATE AND ACETAMINOPHEN 1 TABLET: 7.5; 325 TABLET ORAL at 12:49

## 2017-08-28 RX ADMIN — ATORVASTATIN CALCIUM 40 MG: 40 TABLET, FILM COATED ORAL at 21:01

## 2017-08-28 RX ADMIN — CLOPIDOGREL BISULFATE 75 MG: 75 TABLET ORAL at 09:00

## 2017-08-28 RX ADMIN — LOSARTAN POTASSIUM AND HYDROCHLOROTHIAZIDE 1 TABLET: 50; 12.5 TABLET, FILM COATED ORAL at 09:00

## 2017-08-28 RX ADMIN — Medication: at 12:15

## 2017-08-28 RX ADMIN — TRAZODONE HYDROCHLORIDE 50 MG: 50 TABLET ORAL at 23:02

## 2017-08-28 RX ADMIN — ENOXAPARIN SODIUM 40 MG: 40 INJECTION SUBCUTANEOUS at 08:59

## 2017-08-28 RX ADMIN — FOLIC ACID 100 ML/HR: 5 INJECTION, SOLUTION INTRAMUSCULAR; INTRAVENOUS; SUBCUTANEOUS at 09:50

## 2017-08-28 RX ADMIN — LOPERAMIDE HYDROCHLORIDE 2 MG: 2 CAPSULE ORAL at 12:49

## 2017-08-28 RX ADMIN — SODIUM CHLORIDE 1 G: 9 INJECTION, SOLUTION INTRAVENOUS at 23:02

## 2017-08-29 ENCOUNTER — ANESTHESIA EVENT (OUTPATIENT)
Dept: PERIOP | Facility: HOSPITAL | Age: 57
End: 2017-08-29

## 2017-08-29 ENCOUNTER — ANESTHESIA (OUTPATIENT)
Dept: PERIOP | Facility: HOSPITAL | Age: 57
End: 2017-08-29

## 2017-08-29 PROBLEM — R16.0 LIVER MASS: Status: ACTIVE | Noted: 2017-08-29

## 2017-08-29 LAB
ANION GAP SERPL CALCULATED.3IONS-SCNC: 14.4 MMOL/L
ANISOCYTOSIS BLD QL: ABNORMAL
ANISOCYTOSIS BLD QL: NORMAL
BASOPHILS # BLD AUTO: 0.02 10*3/MM3 (ref 0–0.2)
BASOPHILS NFR BLD AUTO: 0.5 % (ref 0–2.5)
BUN BLD-MCNC: 10 MG/DL (ref 7–20)
BUN/CREAT SERPL: 8.3 (ref 6.3–21.9)
CALCIUM SPEC-SCNC: 8.8 MG/DL (ref 8.4–10.2)
CHLORIDE SERPL-SCNC: 110 MMOL/L (ref 98–107)
CO2 SERPL-SCNC: 19 MMOL/L (ref 26–30)
CREAT BLD-MCNC: 1.2 MG/DL (ref 0.6–1.3)
DEPRECATED RDW RBC AUTO: 54.2 FL (ref 37–54)
EOSINOPHIL # BLD AUTO: 0.97 10*3/MM3 (ref 0–0.7)
EOSINOPHIL # BLD MANUAL: 0.88 10*3/MM3 (ref 0–0.7)
EOSINOPHIL NFR BLD AUTO: 26.6 % (ref 0–7)
EOSINOPHIL NFR BLD MANUAL: 24 % (ref 0–7)
ERYTHROCYTE [DISTWIDTH] IN BLOOD BY AUTOMATED COUNT: 15.8 % (ref 11.5–14.5)
GFR SERPL CREATININE-BSD FRML MDRD: 62 ML/MIN/1.73
GLUCOSE BLD-MCNC: 87 MG/DL (ref 74–98)
GLUCOSE BLDC GLUCOMTR-MCNC: 92 MG/DL (ref 70–130)
GRAM STN SPEC: NORMAL
GRAM STN SPEC: NORMAL
HCT VFR BLD AUTO: 27 % (ref 42–52)
HGB BLD-MCNC: 8.3 G/DL (ref 14–18)
IMM GRANULOCYTES # BLD: 0.01 10*3/MM3 (ref 0–0.06)
IMM GRANULOCYTES NFR BLD: 0.3 % (ref 0–0.6)
LYMPHOCYTES # BLD AUTO: 0.85 10*3/MM3 (ref 0.6–3.4)
LYMPHOCYTES # BLD MANUAL: 0.84 10*3/MM3 (ref 0.6–3.4)
LYMPHOCYTES NFR BLD AUTO: 23.3 % (ref 10–50)
LYMPHOCYTES NFR BLD MANUAL: 10 % (ref 0–12)
LYMPHOCYTES NFR BLD MANUAL: 23 % (ref 10–50)
MCH RBC QN AUTO: 28.9 PG (ref 27–31)
MCHC RBC AUTO-ENTMCNC: 30.7 G/DL (ref 30–37)
MCV RBC AUTO: 94.1 FL (ref 80–94)
MONOCYTES # BLD AUTO: 0.24 10*3/MM3 (ref 0–0.9)
MONOCYTES # BLD AUTO: 0.37 10*3/MM3 (ref 0–0.9)
MONOCYTES NFR BLD AUTO: 6.6 % (ref 0–12)
NEUTROPHILS # BLD AUTO: 1.56 10*3/MM3 (ref 2–6.9)
NEUTROPHILS # BLD AUTO: 1.57 10*3/MM3 (ref 2–6.9)
NEUTROPHILS NFR BLD AUTO: 42.7 % (ref 37–80)
NEUTROPHILS NFR BLD MANUAL: 39 % (ref 37–80)
NEUTS BAND NFR BLD MANUAL: 4 % (ref 0–6)
NRBC BLD MANUAL-RTO: 0 /100 WBC (ref 0–0)
PLATELET # BLD AUTO: 136 10*3/MM3 (ref 130–400)
PMV BLD AUTO: 9.5 FL (ref 6–12)
POTASSIUM BLD-SCNC: 4.4 MMOL/L (ref 3.5–5.1)
RBC # BLD AUTO: 2.87 10*6/MM3 (ref 4.7–6.1)
SMALL PLATELETS BLD QL SMEAR: ADEQUATE
SMALL PLATELETS BLD QL SMEAR: ADEQUATE
SODIUM BLD-SCNC: 139 MMOL/L (ref 137–145)
WBC MORPH BLD: NORMAL
WBC MORPH BLD: NORMAL
WBC NRBC COR # BLD: 3.65 10*3/MM3 (ref 4.8–10.8)

## 2017-08-29 PROCEDURE — 87076 CULTURE ANAEROBE IDENT EACH: CPT | Performed by: PODIATRIST

## 2017-08-29 PROCEDURE — 25010000002 MEPERIDINE PER 100 MG: Performed by: NURSE ANESTHETIST, CERTIFIED REGISTERED

## 2017-08-29 PROCEDURE — 87075 CULTR BACTERIA EXCEPT BLOOD: CPT | Performed by: PODIATRIST

## 2017-08-29 PROCEDURE — 25010000002 DEXAMETHASONE PER 1 MG: Performed by: NURSE ANESTHETIST, CERTIFIED REGISTERED

## 2017-08-29 PROCEDURE — 87077 CULTURE AEROBIC IDENTIFY: CPT | Performed by: PODIATRIST

## 2017-08-29 PROCEDURE — 63710000001 INSULIN ASPART PER 5 UNITS: Performed by: INTERNAL MEDICINE

## 2017-08-29 PROCEDURE — 80048 BASIC METABOLIC PNL TOTAL CA: CPT | Performed by: INTERNAL MEDICINE

## 2017-08-29 PROCEDURE — 0HRNXJZ REPLACEMENT OF LEFT FOOT SKIN WITH SYNTHETIC SUBSTITUTE, EXTERNAL APPROACH: ICD-10-PCS | Performed by: PODIATRIST

## 2017-08-29 PROCEDURE — 87205 SMEAR GRAM STAIN: CPT | Performed by: PODIATRIST

## 2017-08-29 PROCEDURE — 85025 COMPLETE CBC W/AUTO DIFF WBC: CPT | Performed by: INTERNAL MEDICINE

## 2017-08-29 PROCEDURE — 82962 GLUCOSE BLOOD TEST: CPT

## 2017-08-29 PROCEDURE — 25010000002 ONDANSETRON PER 1 MG: Performed by: NURSE ANESTHETIST, CERTIFIED REGISTERED

## 2017-08-29 PROCEDURE — 25010000002 VANCOMYCIN PER 500 MG: Performed by: FAMILY MEDICINE

## 2017-08-29 PROCEDURE — 0QBP0ZZ EXCISION OF LEFT METATARSAL, OPEN APPROACH: ICD-10-PCS | Performed by: PODIATRIST

## 2017-08-29 PROCEDURE — 25010000002 PROPOFOL 1000 MG/ML EMULSION: Performed by: NURSE ANESTHETIST, CERTIFIED REGISTERED

## 2017-08-29 PROCEDURE — 15275 SKIN SUB GRAFT FACE/NK/HF/G: CPT | Performed by: PODIATRIST

## 2017-08-29 PROCEDURE — 25010000002 ERTAPENEM PER 500 MG: Performed by: INTERNAL MEDICINE

## 2017-08-29 PROCEDURE — 25010000002 ENOXAPARIN PER 10 MG: Performed by: INTERNAL MEDICINE

## 2017-08-29 PROCEDURE — 15276 SKIN SUB GRAFT F/N/HF/G ADDL: CPT | Performed by: PODIATRIST

## 2017-08-29 PROCEDURE — 25010000003 CEFAZOLIN PER 500 MG: Performed by: PODIATRIST

## 2017-08-29 PROCEDURE — 87186 SC STD MICRODIL/AGAR DIL: CPT | Performed by: PODIATRIST

## 2017-08-29 PROCEDURE — 25010000002 MIDAZOLAM PER 1 MG: Performed by: NURSE ANESTHETIST, CERTIFIED REGISTERED

## 2017-08-29 PROCEDURE — 85007 BL SMEAR W/DIFF WBC COUNT: CPT | Performed by: INTERNAL MEDICINE

## 2017-08-29 PROCEDURE — 94799 UNLISTED PULMONARY SVC/PX: CPT

## 2017-08-29 PROCEDURE — 87070 CULTURE OTHR SPECIMN AEROBIC: CPT | Performed by: PODIATRIST

## 2017-08-29 PROCEDURE — 99232 SBSQ HOSP IP/OBS MODERATE 35: CPT | Performed by: INTERNAL MEDICINE

## 2017-08-29 DEVICE — ALLOGRFT AMNIO AMNIOFIX 4X6CM: Type: IMPLANTABLE DEVICE | Site: FOOT | Status: FUNCTIONAL

## 2017-08-29 RX ORDER — BUPIVACAINE HYDROCHLORIDE 5 MG/ML
INJECTION, SOLUTION EPIDURAL; INTRACAUDAL
Status: DISPENSED
Start: 2017-08-29 | End: 2017-08-30

## 2017-08-29 RX ORDER — MIDAZOLAM HYDROCHLORIDE 1 MG/ML
INJECTION INTRAMUSCULAR; INTRAVENOUS AS NEEDED
Status: DISCONTINUED | OUTPATIENT
Start: 2017-08-29 | End: 2017-08-29 | Stop reason: SURG

## 2017-08-29 RX ORDER — ONDANSETRON 2 MG/ML
INJECTION INTRAMUSCULAR; INTRAVENOUS AS NEEDED
Status: DISCONTINUED | OUTPATIENT
Start: 2017-08-29 | End: 2017-08-29 | Stop reason: SURG

## 2017-08-29 RX ORDER — DEXAMETHASONE SODIUM PHOSPHATE 4 MG/ML
INJECTION, SOLUTION INTRA-ARTICULAR; INTRALESIONAL; INTRAMUSCULAR; INTRAVENOUS; SOFT TISSUE AS NEEDED
Status: DISCONTINUED | OUTPATIENT
Start: 2017-08-29 | End: 2017-08-29 | Stop reason: SURG

## 2017-08-29 RX ORDER — MEPERIDINE HYDROCHLORIDE 50 MG/ML
50 INJECTION INTRAMUSCULAR; INTRAVENOUS; SUBCUTANEOUS ONCE
Status: DISCONTINUED | OUTPATIENT
Start: 2017-08-29 | End: 2017-08-29 | Stop reason: HOSPADM

## 2017-08-29 RX ORDER — SODIUM CHLORIDE, SODIUM LACTATE, POTASSIUM CHLORIDE, CALCIUM CHLORIDE 600; 310; 30; 20 MG/100ML; MG/100ML; MG/100ML; MG/100ML
1000 INJECTION, SOLUTION INTRAVENOUS CONTINUOUS PRN
Status: DISCONTINUED | OUTPATIENT
Start: 2017-08-29 | End: 2017-08-29 | Stop reason: HOSPADM

## 2017-08-29 RX ORDER — LIDOCAINE HYDROCHLORIDE 10 MG/ML
INJECTION, SOLUTION INFILTRATION; PERINEURAL
Status: DISPENSED
Start: 2017-08-29 | End: 2017-08-30

## 2017-08-29 RX ORDER — MEPERIDINE HYDROCHLORIDE 50 MG/ML
INJECTION INTRAMUSCULAR; INTRAVENOUS; SUBCUTANEOUS AS NEEDED
Status: DISCONTINUED | OUTPATIENT
Start: 2017-08-29 | End: 2017-08-29 | Stop reason: SURG

## 2017-08-29 RX ADMIN — PANTOPRAZOLE SODIUM 40 MG: 40 TABLET, DELAYED RELEASE ORAL at 06:40

## 2017-08-29 RX ADMIN — Medication 10 MG: at 17:13

## 2017-08-29 RX ADMIN — Medication 10 MG: at 17:20

## 2017-08-29 RX ADMIN — HYDROCODONE BITARTRATE AND ACETAMINOPHEN 1 TABLET: 7.5; 325 TABLET ORAL at 18:39

## 2017-08-29 RX ADMIN — CLOPIDOGREL BISULFATE 75 MG: 75 TABLET ORAL at 08:15

## 2017-08-29 RX ADMIN — COLCHICINE 0.6 MG: 0.6 TABLET, FILM COATED ORAL at 08:15

## 2017-08-29 RX ADMIN — Medication 10 MG: at 17:08

## 2017-08-29 RX ADMIN — HYDROCODONE BITARTRATE AND ACETAMINOPHEN 1 TABLET: 7.5; 325 TABLET ORAL at 03:44

## 2017-08-29 RX ADMIN — HYDROCODONE BITARTRATE AND ACETAMINOPHEN 1 TABLET: 7.5; 325 TABLET ORAL at 13:03

## 2017-08-29 RX ADMIN — ATORVASTATIN CALCIUM 40 MG: 40 TABLET, FILM COATED ORAL at 21:04

## 2017-08-29 RX ADMIN — ONDANSETRON 4 MG: 2 INJECTION INTRAMUSCULAR; INTRAVENOUS at 17:17

## 2017-08-29 RX ADMIN — SODIUM CHLORIDE 1 G: 9 INJECTION, SOLUTION INTRAVENOUS at 23:25

## 2017-08-29 RX ADMIN — Medication 25 MG: at 17:06

## 2017-08-29 RX ADMIN — Medication 25 MG: at 17:12

## 2017-08-29 RX ADMIN — LOSARTAN POTASSIUM AND HYDROCHLOROTHIAZIDE 1 TABLET: 50; 12.5 TABLET, FILM COATED ORAL at 08:15

## 2017-08-29 RX ADMIN — DEXAMETHASONE SODIUM PHOSPHATE 8 MG: 4 INJECTION, SOLUTION INTRAMUSCULAR; INTRAVENOUS at 17:17

## 2017-08-29 RX ADMIN — MEPERIDINE HYDROCHLORIDE 50 MG: 50 INJECTION INTRAMUSCULAR; INTRAVENOUS; SUBCUTANEOUS at 17:06

## 2017-08-29 RX ADMIN — VANCOMYCIN HYDROCHLORIDE 1500 MG: 500 INJECTION, POWDER, LYOPHILIZED, FOR SOLUTION INTRAVENOUS at 06:40

## 2017-08-29 RX ADMIN — PROPOFOL 50 MCG/KG/MIN: 10 INJECTION, EMULSION INTRAVENOUS at 17:08

## 2017-08-29 RX ADMIN — CEFAZOLIN SODIUM 2 G: 2 SOLUTION INTRAVENOUS at 17:01

## 2017-08-29 RX ADMIN — MIDAZOLAM HYDROCHLORIDE 2 MG: 1 INJECTION, SOLUTION INTRAMUSCULAR; INTRAVENOUS at 17:15

## 2017-08-29 RX ADMIN — SODIUM CHLORIDE, POTASSIUM CHLORIDE, SODIUM LACTATE AND CALCIUM CHLORIDE 500 ML: 600; 310; 30; 20 INJECTION, SOLUTION INTRAVENOUS at 16:58

## 2017-08-29 RX ADMIN — INSULIN ASPART 2 UNITS: 100 INJECTION, SOLUTION INTRAVENOUS; SUBCUTANEOUS at 21:04

## 2017-08-29 RX ADMIN — Medication 10 MG: at 17:04

## 2017-08-29 RX ADMIN — HYDROXYCHLOROQUINE SULFATE 200 MG: 200 TABLET, FILM COATED ORAL at 08:15

## 2017-08-29 RX ADMIN — AMIODARONE HYDROCHLORIDE 200 MG: 200 TABLET ORAL at 08:15

## 2017-08-29 RX ADMIN — MIDAZOLAM HYDROCHLORIDE 2 MG: 1 INJECTION, SOLUTION INTRAMUSCULAR; INTRAVENOUS at 17:04

## 2017-08-29 RX ADMIN — Medication 10 MG: at 17:16

## 2017-08-29 RX ADMIN — DULOXETINE HYDROCHLORIDE 30 MG: 30 CAPSULE, DELAYED RELEASE ORAL at 08:15

## 2017-08-29 RX ADMIN — HYDROCODONE BITARTRATE AND ACETAMINOPHEN 1 TABLET: 7.5; 325 TABLET ORAL at 08:14

## 2017-08-29 RX ADMIN — ENOXAPARIN SODIUM 40 MG: 40 INJECTION SUBCUTANEOUS at 08:14

## 2017-08-29 RX ADMIN — LORAZEPAM 1 MG: 0.5 TABLET ORAL at 13:09

## 2017-08-29 RX ADMIN — LOPERAMIDE HYDROCHLORIDE 2 MG: 2 CAPSULE ORAL at 10:49

## 2017-08-29 RX ADMIN — METOPROLOL TARTRATE 25 MG: 25 TABLET ORAL at 08:16

## 2017-08-29 NOTE — ANESTHESIA PREPROCEDURE EVALUATION
Anesthesia Evaluation     Patient summary reviewed and Nursing notes reviewed   no history of anesthetic complications:  NPO Solid Status: > 8 hours  NPO Liquid Status: > 8 hours     Airway   Mallampati: I  TM distance: >3 FB  Neck ROM: full  no difficulty expected  Dental - normal exam   (+) poor dentition    Pulmonary - normal exam   (+) a smoker (1/4 ppd x 40 years. Abstained today) Current, COPD moderate, asthma, shortness of breath,   Cardiovascular - normal exam    PT is on anticoagulation therapy  Patient on routine beta blocker  Rhythm: regular  Rate: normal    (+) hypertension well controlled, CAD, cardiac stents Bare metal stent more than 12 months ago dysrhythmias (Post cardiac ablation) Atrial Fib, Atrial Flutter, CHF, PVD, DVT resolved, hyperlipidemia    ROS comment: Echo report in epic WNL    Neuro/Psych  (+) CVA, tremors, numbness, psychiatric history Anxiety and Depression,    GI/Hepatic/Renal/Endo    (+) obesity (BMI 32),  GERD well controlled, PUD, hepatitis C, liver disease, diabetes mellitus type 2 poorly controlled,     Musculoskeletal     (+) arthralgias, back pain, chronic pain, gait problem,   Abdominal  - normal exam    Abdomen: soft.  Bowel sounds: normal.   Substance History   (+) drug use     OB/GYN negative ob/gyn ROS         Other   (+) arthritis (RA)     ROS/Med Hx Other: +Gout  Chronic anemia  Old ekg sr rad, rbbb  Lab reviewed  8/27nk 4.4 gluc 92                                Anesthesia Plan    ASA 4     MAC   (Risks and benefits discussed including risk of aspiration, recall and dental damage. All patient questions answered. Will continue with POC.    Sedation with local field infiltration    Postoperative popliteal pnb if needed  Risk vs Benefits discussed with patient to include infection, bleeding at the site, paresthesia, and incomplete analgesia.  )  intravenous induction   Anesthetic plan and risks discussed with patient.

## 2017-08-30 LAB
ANION GAP SERPL CALCULATED.3IONS-SCNC: 15.3 MMOL/L
BASOPHILS # BLD AUTO: 0 10*3/MM3 (ref 0–0.2)
BASOPHILS NFR BLD AUTO: 0 % (ref 0–2.5)
BUN BLD-MCNC: 14 MG/DL (ref 7–20)
BUN/CREAT SERPL: 10.8 (ref 6.3–21.9)
CALCIUM SPEC-SCNC: 8.6 MG/DL (ref 8.4–10.2)
CHLORIDE SERPL-SCNC: 113 MMOL/L (ref 98–107)
CO2 SERPL-SCNC: 17 MMOL/L (ref 26–30)
CREAT BLD-MCNC: 1.3 MG/DL (ref 0.6–1.3)
DEPRECATED RDW RBC AUTO: 54 FL (ref 37–54)
EOSINOPHIL # BLD AUTO: 0.02 10*3/MM3 (ref 0–0.7)
EOSINOPHIL NFR BLD AUTO: 0.9 % (ref 0–7)
ERYTHROCYTE [DISTWIDTH] IN BLOOD BY AUTOMATED COUNT: 15.5 % (ref 11.5–14.5)
GFR SERPL CREATININE-BSD FRML MDRD: 57 ML/MIN/1.73
GLUCOSE BLD-MCNC: 216 MG/DL (ref 74–98)
GLUCOSE BLDC GLUCOMTR-MCNC: 111 MG/DL (ref 70–130)
GLUCOSE BLDC GLUCOMTR-MCNC: 123 MG/DL (ref 70–130)
GLUCOSE BLDC GLUCOMTR-MCNC: 185 MG/DL (ref 70–130)
GLUCOSE BLDC GLUCOMTR-MCNC: 193 MG/DL (ref 70–130)
GLUCOSE BLDC GLUCOMTR-MCNC: 210 MG/DL (ref 70–130)
HCT VFR BLD AUTO: 28.7 % (ref 42–52)
HGB BLD-MCNC: 8.8 G/DL (ref 14–18)
IMM GRANULOCYTES # BLD: 0.08 10*3/MM3 (ref 0–0.06)
IMM GRANULOCYTES NFR BLD: 3.8 % (ref 0–0.6)
LYMPHOCYTES # BLD AUTO: 0.38 10*3/MM3 (ref 0.6–3.4)
LYMPHOCYTES NFR BLD AUTO: 17.9 % (ref 10–50)
MCH RBC QN AUTO: 29.5 PG (ref 27–31)
MCHC RBC AUTO-ENTMCNC: 30.7 G/DL (ref 30–37)
MCV RBC AUTO: 96.3 FL (ref 80–94)
MONOCYTES # BLD AUTO: 0.04 10*3/MM3 (ref 0–0.9)
MONOCYTES NFR BLD AUTO: 1.9 % (ref 0–12)
NEUTROPHILS # BLD AUTO: 1.6 10*3/MM3 (ref 2–6.9)
NEUTROPHILS NFR BLD AUTO: 75.5 % (ref 37–80)
NRBC BLD MANUAL-RTO: 0 /100 WBC (ref 0–0)
PLAT MORPH BLD: NORMAL
PLATELET # BLD AUTO: 100 10*3/MM3 (ref 130–400)
PMV BLD AUTO: 10.7 FL (ref 6–12)
POTASSIUM BLD-SCNC: 5.3 MMOL/L (ref 3.5–5.1)
RBC # BLD AUTO: 2.98 10*6/MM3 (ref 4.7–6.1)
RBC MORPH BLD: NORMAL
SODIUM BLD-SCNC: 140 MMOL/L (ref 137–145)
WBC MORPH BLD: NORMAL
WBC NRBC COR # BLD: 2.12 10*3/MM3 (ref 4.8–10.8)

## 2017-08-30 PROCEDURE — 25010000002 CEFTRIAXONE: Performed by: INTERNAL MEDICINE

## 2017-08-30 PROCEDURE — 25010000002 ENOXAPARIN PER 10 MG: Performed by: INTERNAL MEDICINE

## 2017-08-30 PROCEDURE — 25010000002 VANCOMYCIN PER 500 MG: Performed by: FAMILY MEDICINE

## 2017-08-30 PROCEDURE — 97162 PT EVAL MOD COMPLEX 30 MIN: CPT

## 2017-08-30 PROCEDURE — 94799 UNLISTED PULMONARY SVC/PX: CPT

## 2017-08-30 PROCEDURE — 85025 COMPLETE CBC W/AUTO DIFF WBC: CPT | Performed by: INTERNAL MEDICINE

## 2017-08-30 PROCEDURE — 63710000001 INSULIN ASPART PER 5 UNITS: Performed by: INTERNAL MEDICINE

## 2017-08-30 PROCEDURE — 82962 GLUCOSE BLOOD TEST: CPT

## 2017-08-30 PROCEDURE — 80048 BASIC METABOLIC PNL TOTAL CA: CPT | Performed by: INTERNAL MEDICINE

## 2017-08-30 PROCEDURE — 99232 SBSQ HOSP IP/OBS MODERATE 35: CPT | Performed by: INTERNAL MEDICINE

## 2017-08-30 PROCEDURE — 85007 BL SMEAR W/DIFF WBC COUNT: CPT | Performed by: INTERNAL MEDICINE

## 2017-08-30 RX ORDER — HYDROCODONE BITARTRATE AND ACETAMINOPHEN 10; 325 MG/1; MG/1
1 TABLET ORAL EVERY 6 HOURS PRN
Status: DISCONTINUED | OUTPATIENT
Start: 2017-08-30 | End: 2017-08-31

## 2017-08-30 RX ADMIN — NICOTINE 1 PATCH: 14 PATCH TRANSDERMAL at 04:26

## 2017-08-30 RX ADMIN — HYDROCODONE BITARTRATE AND ACETAMINOPHEN 1 TABLET: 10; 325 TABLET ORAL at 12:59

## 2017-08-30 RX ADMIN — LOSARTAN POTASSIUM AND HYDROCHLOROTHIAZIDE 1 TABLET: 50; 12.5 TABLET, FILM COATED ORAL at 09:24

## 2017-08-30 RX ADMIN — METOPROLOL TARTRATE 25 MG: 25 TABLET ORAL at 09:26

## 2017-08-30 RX ADMIN — INSULIN ASPART 2 UNITS: 100 INJECTION, SOLUTION INTRAVENOUS; SUBCUTANEOUS at 20:46

## 2017-08-30 RX ADMIN — INSULIN ASPART 2 UNITS: 100 INJECTION, SOLUTION INTRAVENOUS; SUBCUTANEOUS at 17:05

## 2017-08-30 RX ADMIN — ATORVASTATIN CALCIUM 40 MG: 40 TABLET, FILM COATED ORAL at 20:46

## 2017-08-30 RX ADMIN — ENOXAPARIN SODIUM 40 MG: 40 INJECTION SUBCUTANEOUS at 09:22

## 2017-08-30 RX ADMIN — INSULIN ASPART 3 UNITS: 100 INJECTION, SOLUTION INTRAVENOUS; SUBCUTANEOUS at 06:46

## 2017-08-30 RX ADMIN — HYDROCODONE BITARTRATE AND ACETAMINOPHEN 1 TABLET: 10; 325 TABLET ORAL at 19:19

## 2017-08-30 RX ADMIN — PANTOPRAZOLE SODIUM 40 MG: 40 TABLET, DELAYED RELEASE ORAL at 06:46

## 2017-08-30 RX ADMIN — METOPROLOL TARTRATE 25 MG: 25 TABLET ORAL at 18:06

## 2017-08-30 RX ADMIN — INSULIN ASPART 2 UNITS: 100 INJECTION, SOLUTION INTRAVENOUS; SUBCUTANEOUS at 11:33

## 2017-08-30 RX ADMIN — COLCHICINE 0.6 MG: 0.6 TABLET, FILM COATED ORAL at 09:26

## 2017-08-30 RX ADMIN — AMIODARONE HYDROCHLORIDE 200 MG: 200 TABLET ORAL at 09:23

## 2017-08-30 RX ADMIN — DULOXETINE HYDROCHLORIDE 30 MG: 30 CAPSULE, DELAYED RELEASE ORAL at 09:26

## 2017-08-30 RX ADMIN — HYDROCODONE BITARTRATE AND ACETAMINOPHEN 1 TABLET: 7.5; 325 TABLET ORAL at 04:25

## 2017-08-30 RX ADMIN — HYDROXYCHLOROQUINE SULFATE 200 MG: 200 TABLET, FILM COATED ORAL at 09:24

## 2017-08-30 RX ADMIN — CLOPIDOGREL BISULFATE 75 MG: 75 TABLET ORAL at 09:22

## 2017-08-30 RX ADMIN — TRAZODONE HYDROCHLORIDE 50 MG: 50 TABLET ORAL at 23:03

## 2017-08-30 RX ADMIN — VANCOMYCIN HYDROCHLORIDE 1500 MG: 500 INJECTION, POWDER, LYOPHILIZED, FOR SOLUTION INTRAVENOUS at 00:15

## 2017-08-30 RX ADMIN — CEFTRIAXONE 2 G: 2 INJECTION, POWDER, FOR SOLUTION INTRAMUSCULAR; INTRAVENOUS at 11:26

## 2017-08-31 ENCOUNTER — TELEPHONE (OUTPATIENT)
Dept: INTERNAL MEDICINE | Facility: CLINIC | Age: 57
End: 2017-08-31

## 2017-08-31 LAB
ANION GAP SERPL CALCULATED.3IONS-SCNC: 12.9 MMOL/L
BASOPHILS # BLD AUTO: 0.01 10*3/MM3 (ref 0–0.2)
BASOPHILS NFR BLD AUTO: 0.3 % (ref 0–2.5)
BUN BLD-MCNC: 21 MG/DL (ref 7–20)
BUN/CREAT SERPL: 17.5 (ref 6.3–21.9)
CALCIUM SPEC-SCNC: 8.6 MG/DL (ref 8.4–10.2)
CHLORIDE SERPL-SCNC: 111 MMOL/L (ref 98–107)
CO2 SERPL-SCNC: 20 MMOL/L (ref 26–30)
CREAT BLD-MCNC: 1.2 MG/DL (ref 0.6–1.3)
DEPRECATED RDW RBC AUTO: 53.1 FL (ref 37–54)
EOSINOPHIL # BLD AUTO: 0.08 10*3/MM3 (ref 0–0.7)
EOSINOPHIL NFR BLD AUTO: 2.5 % (ref 0–7)
ERYTHROCYTE [DISTWIDTH] IN BLOOD BY AUTOMATED COUNT: 15.5 % (ref 11.5–14.5)
GFR SERPL CREATININE-BSD FRML MDRD: 62 ML/MIN/1.73
GLUCOSE BLD-MCNC: 126 MG/DL (ref 74–98)
GLUCOSE BLDC GLUCOMTR-MCNC: 131 MG/DL (ref 70–130)
GLUCOSE BLDC GLUCOMTR-MCNC: 133 MG/DL (ref 70–130)
GLUCOSE BLDC GLUCOMTR-MCNC: 136 MG/DL (ref 70–130)
HCT VFR BLD AUTO: 26.1 % (ref 42–52)
HGB BLD-MCNC: 8.3 G/DL (ref 14–18)
IMM GRANULOCYTES # BLD: 0.03 10*3/MM3 (ref 0–0.06)
IMM GRANULOCYTES NFR BLD: 0.9 % (ref 0–0.6)
LYMPHOCYTES # BLD AUTO: 0.75 10*3/MM3 (ref 0.6–3.4)
LYMPHOCYTES NFR BLD AUTO: 23.2 % (ref 10–50)
MCH RBC QN AUTO: 29.7 PG (ref 27–31)
MCHC RBC AUTO-ENTMCNC: 31.8 G/DL (ref 30–37)
MCV RBC AUTO: 93.5 FL (ref 80–94)
MONOCYTES # BLD AUTO: 0.16 10*3/MM3 (ref 0–0.9)
MONOCYTES NFR BLD AUTO: 5 % (ref 0–12)
NEUTROPHILS # BLD AUTO: 2.2 10*3/MM3 (ref 2–6.9)
NEUTROPHILS NFR BLD AUTO: 68.1 % (ref 37–80)
NRBC BLD MANUAL-RTO: 0 /100 WBC (ref 0–0)
PLATELET # BLD AUTO: 121 10*3/MM3 (ref 130–400)
PMV BLD AUTO: 9.5 FL (ref 6–12)
POTASSIUM BLD-SCNC: 3.9 MMOL/L (ref 3.5–5.1)
RBC # BLD AUTO: 2.79 10*6/MM3 (ref 4.7–6.1)
SODIUM BLD-SCNC: 140 MMOL/L (ref 137–145)
WBC NRBC COR # BLD: 3.23 10*3/MM3 (ref 4.8–10.8)

## 2017-08-31 PROCEDURE — 85025 COMPLETE CBC W/AUTO DIFF WBC: CPT | Performed by: INTERNAL MEDICINE

## 2017-08-31 PROCEDURE — 99232 SBSQ HOSP IP/OBS MODERATE 35: CPT | Performed by: INTERNAL MEDICINE

## 2017-08-31 PROCEDURE — 82962 GLUCOSE BLOOD TEST: CPT

## 2017-08-31 PROCEDURE — 80048 BASIC METABOLIC PNL TOTAL CA: CPT | Performed by: INTERNAL MEDICINE

## 2017-08-31 PROCEDURE — 94799 UNLISTED PULMONARY SVC/PX: CPT

## 2017-08-31 PROCEDURE — 25010000002 ENOXAPARIN PER 10 MG: Performed by: INTERNAL MEDICINE

## 2017-08-31 PROCEDURE — 25010000002 CEFTRIAXONE: Performed by: INTERNAL MEDICINE

## 2017-08-31 RX ORDER — HYDROCODONE BITARTRATE AND ACETAMINOPHEN 10; 325 MG/1; MG/1
1 TABLET ORAL EVERY 4 HOURS PRN
Status: DISCONTINUED | OUTPATIENT
Start: 2017-08-31 | End: 2017-09-11 | Stop reason: HOSPADM

## 2017-08-31 RX ADMIN — ATORVASTATIN CALCIUM 40 MG: 40 TABLET, FILM COATED ORAL at 20:54

## 2017-08-31 RX ADMIN — LOPERAMIDE HYDROCHLORIDE 2 MG: 2 CAPSULE ORAL at 18:24

## 2017-08-31 RX ADMIN — LOSARTAN POTASSIUM AND HYDROCHLOROTHIAZIDE 1 TABLET: 50; 12.5 TABLET, FILM COATED ORAL at 09:00

## 2017-08-31 RX ADMIN — HYDROCODONE BITARTRATE AND ACETAMINOPHEN 1 TABLET: 10; 325 TABLET ORAL at 11:58

## 2017-08-31 RX ADMIN — AMIODARONE HYDROCHLORIDE 200 MG: 200 TABLET ORAL at 09:25

## 2017-08-31 RX ADMIN — PANTOPRAZOLE SODIUM 40 MG: 40 TABLET, DELAYED RELEASE ORAL at 05:43

## 2017-08-31 RX ADMIN — HYDROCODONE BITARTRATE AND ACETAMINOPHEN 1 TABLET: 10; 325 TABLET ORAL at 00:57

## 2017-08-31 RX ADMIN — LOPERAMIDE HYDROCHLORIDE 2 MG: 2 CAPSULE ORAL at 04:26

## 2017-08-31 RX ADMIN — HYDROCODONE BITARTRATE AND ACETAMINOPHEN 1 TABLET: 10; 325 TABLET ORAL at 20:59

## 2017-08-31 RX ADMIN — CEFTRIAXONE 2 G: 2 INJECTION, POWDER, FOR SOLUTION INTRAMUSCULAR; INTRAVENOUS at 10:19

## 2017-08-31 RX ADMIN — ALBUTEROL SULFATE 2.5 MG: 2.5 SOLUTION RESPIRATORY (INHALATION) at 12:46

## 2017-08-31 RX ADMIN — NICOTINE 1 PATCH: 14 PATCH TRANSDERMAL at 23:30

## 2017-08-31 RX ADMIN — ENOXAPARIN SODIUM 40 MG: 40 INJECTION SUBCUTANEOUS at 09:24

## 2017-08-31 RX ADMIN — HYDROXYCHLOROQUINE SULFATE 200 MG: 200 TABLET, FILM COATED ORAL at 09:24

## 2017-08-31 RX ADMIN — CLOPIDOGREL BISULFATE 75 MG: 75 TABLET ORAL at 09:24

## 2017-08-31 RX ADMIN — HYDROCODONE BITARTRATE AND ACETAMINOPHEN 1 TABLET: 10; 325 TABLET ORAL at 17:11

## 2017-08-31 RX ADMIN — METOPROLOL TARTRATE 25 MG: 25 TABLET ORAL at 09:24

## 2017-08-31 RX ADMIN — DULOXETINE HYDROCHLORIDE 30 MG: 30 CAPSULE, DELAYED RELEASE ORAL at 09:25

## 2017-08-31 RX ADMIN — COLCHICINE 0.6 MG: 0.6 TABLET, FILM COATED ORAL at 09:00

## 2017-08-31 RX ADMIN — METOPROLOL TARTRATE 25 MG: 25 TABLET ORAL at 17:11

## 2017-08-31 RX ADMIN — HYDROCODONE BITARTRATE AND ACETAMINOPHEN 1 TABLET: 10; 325 TABLET ORAL at 06:19

## 2017-08-31 RX ADMIN — NICOTINE 1 PATCH: 14 PATCH TRANSDERMAL at 00:05

## 2017-09-01 LAB
BACTERIA SPEC AEROBE CULT: ABNORMAL
GLUCOSE BLDC GLUCOMTR-MCNC: 110 MG/DL (ref 70–130)
GLUCOSE BLDC GLUCOMTR-MCNC: 114 MG/DL (ref 70–130)
GLUCOSE BLDC GLUCOMTR-MCNC: 122 MG/DL (ref 70–130)
GLUCOSE BLDC GLUCOMTR-MCNC: 132 MG/DL (ref 70–130)
GLUCOSE BLDC GLUCOMTR-MCNC: 187 MG/DL (ref 70–130)
GLUCOSE BLDC GLUCOMTR-MCNC: 194 MG/DL (ref 70–130)

## 2017-09-01 PROCEDURE — 97605 NEG PRS WND THER DME<=50SQCM: CPT

## 2017-09-01 PROCEDURE — 99232 SBSQ HOSP IP/OBS MODERATE 35: CPT | Performed by: INTERNAL MEDICINE

## 2017-09-01 PROCEDURE — 82962 GLUCOSE BLOOD TEST: CPT

## 2017-09-01 PROCEDURE — 25010000002 CEFTRIAXONE: Performed by: INTERNAL MEDICINE

## 2017-09-01 PROCEDURE — 25010000002 ENOXAPARIN PER 10 MG: Performed by: INTERNAL MEDICINE

## 2017-09-01 RX ORDER — LORAZEPAM 0.5 MG/1
1 TABLET ORAL EVERY 6 HOURS PRN
Status: DISPENSED | OUTPATIENT
Start: 2017-09-01 | End: 2017-09-11

## 2017-09-01 RX ORDER — LEVOFLOXACIN 750 MG/1
750 TABLET ORAL EVERY 24 HOURS
Status: DISCONTINUED | OUTPATIENT
Start: 2017-09-02 | End: 2017-09-01

## 2017-09-01 RX ADMIN — METOPROLOL TARTRATE 25 MG: 25 TABLET ORAL at 09:00

## 2017-09-01 RX ADMIN — HYDROXYCHLOROQUINE SULFATE 200 MG: 200 TABLET, FILM COATED ORAL at 09:00

## 2017-09-01 RX ADMIN — LOSARTAN POTASSIUM AND HYDROCHLOROTHIAZIDE 1 TABLET: 50; 12.5 TABLET, FILM COATED ORAL at 09:01

## 2017-09-01 RX ADMIN — METOPROLOL TARTRATE 25 MG: 25 TABLET ORAL at 19:33

## 2017-09-01 RX ADMIN — HYDROCODONE BITARTRATE AND ACETAMINOPHEN 1 TABLET: 10; 325 TABLET ORAL at 22:34

## 2017-09-01 RX ADMIN — CEFTRIAXONE 2 G: 2 INJECTION, POWDER, FOR SOLUTION INTRAMUSCULAR; INTRAVENOUS at 10:02

## 2017-09-01 RX ADMIN — COLCHICINE 0.6 MG: 0.6 TABLET, FILM COATED ORAL at 09:01

## 2017-09-01 RX ADMIN — ENOXAPARIN SODIUM 40 MG: 40 INJECTION SUBCUTANEOUS at 08:59

## 2017-09-01 RX ADMIN — LOPERAMIDE HYDROCHLORIDE 2 MG: 2 CAPSULE ORAL at 09:01

## 2017-09-01 RX ADMIN — PANTOPRAZOLE SODIUM 40 MG: 40 TABLET, DELAYED RELEASE ORAL at 06:19

## 2017-09-01 RX ADMIN — LOPERAMIDE HYDROCHLORIDE 2 MG: 2 CAPSULE ORAL at 20:53

## 2017-09-01 RX ADMIN — LORAZEPAM 1 MG: 0.5 TABLET ORAL at 14:35

## 2017-09-01 RX ADMIN — ATORVASTATIN CALCIUM 40 MG: 40 TABLET, FILM COATED ORAL at 20:53

## 2017-09-01 RX ADMIN — HYDROCODONE BITARTRATE AND ACETAMINOPHEN 1 TABLET: 10; 325 TABLET ORAL at 13:58

## 2017-09-01 RX ADMIN — AMIODARONE HYDROCHLORIDE 200 MG: 200 TABLET ORAL at 09:01

## 2017-09-01 RX ADMIN — HYDROCODONE BITARTRATE AND ACETAMINOPHEN 1 TABLET: 10; 325 TABLET ORAL at 18:36

## 2017-09-01 RX ADMIN — HYDROCODONE BITARTRATE AND ACETAMINOPHEN 1 TABLET: 10; 325 TABLET ORAL at 07:28

## 2017-09-01 RX ADMIN — HYDROCODONE BITARTRATE AND ACETAMINOPHEN 1 TABLET: 10; 325 TABLET ORAL at 02:48

## 2017-09-01 RX ADMIN — TRAZODONE HYDROCHLORIDE 50 MG: 50 TABLET ORAL at 22:35

## 2017-09-01 RX ADMIN — DULOXETINE HYDROCHLORIDE 30 MG: 30 CAPSULE, DELAYED RELEASE ORAL at 09:01

## 2017-09-01 RX ADMIN — CLOPIDOGREL BISULFATE 75 MG: 75 TABLET ORAL at 09:01

## 2017-09-01 NOTE — PROGRESS NOTES
Continued Stay Note   [facility]     Patient Name: Vasquez Marie  MRN: [id number]  Today's Date: 9/1/2017    Admit Date: 8/25/2017          Discharge Plan       09/01/17 0917    Case Management/Social Work Plan    Plan Call from patient's PCP and they feel patient should go to rehab. Patient has agreed to go to rehab. Spoke to MD and to [facility]. They will see what they may be able to do to get patient to them under his medicaid.      Additional Comments Notified CM.     Final Note    Final Note Following for social and discharge needs.               Discharge Codes     None        Expected Discharge Date and Time     Expected Discharge Date Expected Discharge Time    Aug 31, 2017             [facility] beds, but [facility] if [facility] beds .

## 2017-09-01 NOTE — PLAN OF CARE
Problem: Patient Care Overview (Adult)  Goal: Plan of Care Review  Outcome: Ongoing (interventions implemented as appropriate)    09/01/17 1435   Coping/Psychosocial Response Interventions   Plan Of Care Reviewed With patient   Outcome Evaluation   Outcome Summary/Follow up Plan PT eval for L TMA wound completed and wound vac dressings changed. Old dressings removed (1 black foam piece) and one layer of Adaptic left in wound bed as per ordered to protect graft. Surrounding tissue macerated. Wound gently cleansed with sterile saline. Surrounding health tissue coated with skin barrier. 5 layers of Adaptic placed in wound bed and 1 piece of black foam inserted in wound bed. Clear dressing applied to hold and bridge drain applied. Good seal obtained and 125 mm continuous pressure achieved. Dressings secured with Kerlix and Ace bandage.         Problem: Inpatient Physical Therapy  Goal: Wound Care Goal 1 STG- PT  Outcome: Ongoing (interventions implemented as appropriate)    09/01/17 1435   Wound Care PT STG   Wound Care PT STG 1, Date Established 09/01/17   Wound Care PT STG 1, Time to Achieve 2 wks   Wound Care PT STG 1, Location L TMA site   Wound Care PT STG 1, No S&S of Infection yes   Wound Care PT STG 1, Decrease Wound Size Decrease wound dimensions by 0.5 cm in all measures.   Wound Care PT STG 1, Outcome goal ongoing

## 2017-09-01 NOTE — PLAN OF CARE
Problem: Patient Care Overview (Adult)  Goal: Plan of Care Review  Outcome: Ongoing (interventions implemented as appropriate)    09/01/17 0338   Coping/Psychosocial Response Interventions   Plan Of Care Reviewed With patient   Patient Care Overview   Progress improving       Goal: Adult Individualization and Mutuality  Outcome: Ongoing (interventions implemented as appropriate)  Goal: Discharge Needs Assessment  Outcome: Ongoing (interventions implemented as appropriate)    Problem: Pain, Acute (Adult)  Goal: Identify Related Risk Factors and Signs and Symptoms  Outcome: Ongoing (interventions implemented as appropriate)  Goal: Acceptable Pain Control/Comfort Level  Outcome: Ongoing (interventions implemented as appropriate)

## 2017-09-01 NOTE — PROGRESS NOTES
[facility]    Name:  Vasquez Marie   Age:  56 y.o.  Sex:  male  :  1960  MRN:  [id number]   Visit Number:  [id number]  Admission Date:  2017  Date Of Service:  17  Primary Care Physician:  Marilyn K. Vermeesch, MD     LOS: 7 days :  Patient Care Team:  Marilyn K Vermeesch, MD as PCP - General (Internal Medicine):    Chief Complaint:      Follow-up of left transmetatarsal amputation stump infection.    Subjective / Interval History:     Patient was seen and examined.  He sitting up on the bed and is eating his breakfast.  No overnight events.  No fevers.  Unfortunately, no home health agency as accepted him for outpatient home health yet.  He is currently on wound VAC and IV antibiotic therapy with Rocephin.  Repeat wound cultures during debridement have grown Providencia rettgeri species that are sensitive to ceftriaxone and quinolones but resistant to cefuroxime.    Patient has been noted to have elevated blood sugars but denies any prior history of diabetes.  He has been seen by diabetic educator and has been given information regarding diet therapy.  His hemoglobin A1c from 2017 was 4.9.    He was admitted on 2017 from the emergency room with foul-smelling discharge from the left transmetatarsal amputation stump.  He was seen by his primary care physician and was subsequently sent to the emergency room.  Patient was recently admitted to [facility] where he underwent his transmetatarsal amputation and was subsequently sent home with wound VAC.  Patient was receiving home health for wound care but was subsequently discharged from their service.  Unfortunately, he has not been very compliant with his wound care and has been admitted with ongoing infection.  He has been on IV antibiotic therapy with vancomycin and ertapenem since admission until 2017 when he was changed to Rocephin.  He does live alone.  During his last  admission here in June 2017, he was seen by Dr. Storm for his peripheral arterial disease and underwent peripheral angiogram and percutaneous intervention to his left leg on 6/20/2017.      Patient underwent wound debridement, wound graft application and wound VAC application to his left transmetatarsal stump done by Dr. Campbell on 8/29/2017.    Review of Systems:     General ROS: Patient denies any fevers, chills or loss of consciousness.  Respiratory ROS: Denies cough or shortness of breath.  Cardiovascular ROS: Denies chest pain or palpitations. No history of exertional chest pain.  Gastrointestinal ROS: Denies nausea and vomiting. Denies any abdominal pain. No diarrhea.  Neurological ROS: Denies any focal weakness. No loss of consciousness. Denies any numbness.     Vital Signs:    Temp:  [97.7 °F (36.5 °C)-98.7 °F (37.1 °C)] 97.7 °F (36.5 °C)  Heart Rate:  [60-75] 64  Resp:  [18] 18  BP: (110-148)/(60-68) 110/68    Intake and output:    I/O last 3 completed shifts:  In: 1560 [P.O.:1560]  Out: 4525 [Urine:4525]  I/O this shift:  In: 480 [P.O.:480]  Out: -     Physical Examination:    General Appearance:  Alert and cooperative, not in any acute distress.   Head:  Atraumatic and normocephalic, without obvious abnormality.   Eyes:          PERRLA, conjunctivae and sclerae normal, no Icterus. No pallor. Extra-occular movements are within normal limits.   Neck: Supple, trachea midline, no thyromegaly, no carotid bruit.   Lungs:   Chest shape is normal. Breath sounds heard bilaterally equally.  No crackles or wheezing. No Pleural rub or bronchial breathing.   Heart:  Normal S1 and S2, no murmur, no gallop, no rub. No JVD   Abdomen:   Normal bowel sounds, no masses, no organomegaly. Soft       non-tender, obese, no guarding, no rebound tenderness.   Extremities: Moves all extremities well, 1+ edema, no cyanosis, no            clubbing.  Ace wrap with wound VAC is noted on the left foot. Hand deformities noted due to his  rheumatoid arthritis.     Skin: No bleeding, bruising or rash.   Neurologic: Awake, alert and oriented times 3. Moves all 4 extremities equally.   Laboratory results:      Results from last 7 days  Lab Units 08/31/17  0643 08/30/17  0501 08/29/17  0604  08/25/17  1848   SODIUM mmol/L 140 140 139  < > 137   POTASSIUM mmol/L 3.9 5.3* 4.4  < > 4.3   CHLORIDE mmol/L 111* 113* 110*  < > 100   CO2 mmol/L 20.0* 17.0* 19.0*  < > 22.0*   BUN mg/dL 21* 14 10  < > 13   CREATININE mg/dL 1.20 1.30 1.20  < > 1.20   CALCIUM mg/dL 8.6 8.6 8.8  < > 9.7   BILIRUBIN mg/dL  --   --   --   --  1.3   ALK PHOS U/L  --   --   --   --  160*   ALT (SGPT) U/L  --   --   --   --  25   AST (SGOT) U/L  --   --   --   --  82*   GLUCOSE mg/dL 126* 216* 87  < > 123*   < > = values in this interval not displayed.    Results from last 7 days  Lab Units 08/31/17  0643 08/30/17  0501 08/29/17  0604   WBC 10*3/mm3 3.23* 2.12* 3.65*   HEMOGLOBIN g/dL 8.3* 8.8* 8.3*   HEMATOCRIT % 26.1* 28.7* 27.0*   PLATELETS 10*3/mm3 121* 100* 136               Results from last 7 days  Lab Units 08/29/17  1723 08/25/17  2128   WOUNDCX  Scant growth (1+) Providencia rettgeri* Heavy growth (4+) Alcaligenes species*     I have reviewed the patient's laboratory results.    Radiology results:    Imaging Results (last 24 hours)     ** No results found for the last 24 hours. **        Medication Review:     I have reviewed the patients active and prn medications.     Principal Problem:    Subacute osteomyelitis of left foot  Active Problems:    Chronic hepatitis C virus infection    Coronary artery disease involving native coronary artery of native heart without angina pectoris    Chronic atrial fibrillation    Essential hypertension    Depression with anxiety    Gastroesophageal reflux disease with esophagitis    COPD (chronic obstructive pulmonary disease)    Rheumatoid arthritis    Peripheral vascular disease    Liver mass    Assessment:    1.  Acute infection and  cellulitis of the left transmetatarsal amputation stump, present on admission.  2.  Peripheral arterial disease s/p left SFA stent and intervention to the peroneal arteries on 6/20/2017.  3.  Paroxysmal atrial fibrillation.  4.  Coronary artery disease on medical therapy.  5.  Essential hypertension.  6.  COPD, stable.  7.  Chronic hepatitis C infection.  8.  Rheumatoid arthritis.    Plan:    Patient is currently on IV antibiotic therapy with ceftriaxone 2 g daily.  We will continue his wound VAC therapy.  He does not need any IV antibiotic therapy at this time since there is no evidence of osteomyelitis and he has already had transmetatarsal amputation of the left foot.  Wound culture obtained during the debridement has grown Providencia rettgeri which is sensitive to ceftriaxone and quinolones.  Unfortunately, since the patient is on amiodarone, quinolones can cause QT prolongation and I will hold off on quinolones at this time.  He will be started on Omnicef at discharge.    Patient does have prior history of poor medical compliance but unfortunately we have not been successful in getting him to short-term rehabilitation.  I have discussed with the case management to continue to try for inpatient rehabilitation if possible.    Hospital course:    Patient was admitted to the medical floor with telemetry for his left amputation stump infection.  Wound cultures were sent and he was started on ertapenem and vancomycin.  Patient did not have any sepsis, fever or leukocytosis.  His home medications were continued.  He was seen by Dr. Campbell from podiatric surgery and the patient was taken to the OR on 8/29/2017 for wound debridement, wound graft and wound VAC application.  Patient tolerated the procedure well.  Wound cultures were sent from the deep wound.  Patient was subsequently transferred to the regular floor and his antibiotic was changed to Rocephin 2 g daily from 8/30/2017.  Initial wound cultures grew  Alcaligenes species which are sensitive to carbopenems and Rocephin.  He was afebrile throughout the hospital stay.  He was noted to have mild elevation of blood sugars and was seen by diabetic educator and has been advised to continue diabetic diet.  His hemoglobin A1c was 4.9 on 8/25/2017.    Patient was continued on wound VAC therapy.  Due to his prior history of compliance it was felt that the patient would benefit from going to short-term rehabilitation for continuation of wound care.  I discussed the patient's condition with Dr. Campbell and he did not think the patient has osteomyelitis and does not require any IV antibiotic therapy at discharge.  His repeat wound cultures have grown Providencia rettgeri which is sensitive to ceftriaxone and quinolones.  Patient unfortunately, on amiodarone and there is interaction with quinolones.  Since the patient has already received ceftriaxone and ertapenem, I will place him on Omnicef (cefdinir) which is a third generation cephalosporin, for another one week.  He has been strongly advised to be compliant with his wound care as well as medications.  He is advised to follow-up with Dr. Campbell in 2 weeks and his primary care provider in one week.    Liang Polanco MD  09/01/17  9:52 AM    Portions of this note may have been completed with Dragon, a voice recognition program. Not all errors in transcription may have been detected prior to signing.

## 2017-09-01 NOTE — PROGRESS NOTES
Continued Stay Note  [facility]     Patient Name: Vasquez Marie  MRN: [id number]  Today's Date: 9/1/2017    Admit Date: 8/25/2017          Discharge Plan       09/01/17 1114    Case Management/Social Work Plan    Additional Comments Spoke  to pt regarding  [facility] for [facility] and that another county would have to be presued he is declinig any other rehab and wants to go discharged  home  Confirmed with [facility]  Can See pt this Pm or to marrow Am       09/01/17 0917    Case Management/Social Work Plan    Plan Call from patient's PCP and they feel patient should go to rehab. Patient has agreed to go to rehab. Spoke to MD and [facility]. They will see what they may be able to do to get patient to them under his medicaid.      Additional Comments Notified CM.     Final Note    Final Note Following for social and discharge needs.               Discharge Codes     None        Expected Discharge Date and Time     Expected Discharge Date Expected Discharge Time    Aug 31, 2017             Sonali Polanco and myself spoke to pt he is agreement to rehab and is agreement to [facility] and [facility] or [facility]

## 2017-09-01 NOTE — PROGRESS NOTES
Continued Stay Note   [facility]     Patient Name: Vasquez Marie  MRN: [id number]  Today's Date: 9/1/2017    Admit Date: 8/25/2017          Discharge Plan       09/01/17 1308    Case Management/Social Work Plan    Additional Comments Received call from [facility].  He states that pt still has a wound vac at home from previous admission.  They will not be delivering a new one.  However, he states that they are shipping new supplies to be delivered to his home tomorrow.      09/01/17 1114    Case Management/Social Work Plan    Additional Comments Spoke  to pt regarding  [facility] not ah[facility] abed for insurance and that another [facility] would have to be presued he is declinig any other rehab anbd wants to go dischacharge home               Discharge Codes     None        Expected Discharge Date and Time     Expected Discharge Date Expected Discharge Time    Aug 31, 2017             Sonali Mcintyre

## 2017-09-01 NOTE — DISCHARGE PLACEMENT REQUEST
"Vasquez Marie (56 y.o. Male)     Date of Birth Social Security Number Address Home Phone MRN    1960  [address]    [facility] [address] [phone] [id number]    Yarsani Marital Status          None Single       Admission Date Admission Type Admitting Provider Attending Provider Department, Room/Bed    17 Emergency Murali Garber DO Pais, Roshan, MD [facility] MED SURG  4, 432/1    Discharge Date Discharge Disposition Discharge Destination                      Attending Provider: Liang Polanco MD     Allergies:  No Known Allergies    Isolation:  None   Infection:  None   Code Status:  FULL    Ht:  68\" (172.7 cm)   Wt:  206 lb 9.6 oz (93.7 kg)    Admission Cmt:  None   Principal Problem:  Subacute osteomyelitis of left foot [M86.272]                 Active Insurance as of 2017     Primary Coverage     Payor Plan Insurance Group Employer/Plan Group    HUMANA MEDICARE REPLACEMENT HUMANA MEDICARE REPL [id number]     Payor Plan Address Payor Plan Phone Number Effective From Effective To    [address] [address] [phone] 2017     [place] [address]       Subscriber Name Subscriber Birth Date Member ID       VASQUEZ MARIE 1960 [id number]                 Emergency Contacts      (Rel.) Home Phone Work Phone Mobile Phone    Maral Marie (Sister) [phone] -- --            Insurance Information                HUMANA MEDICARE REPLACEMENT/HUMANA MEDICARE REPL Phone: [phone]    Subscriber: Vasquez Marie Subscriber#: [id number]    Group#: [id number] Precert#:              History & Physical      Murali Garber DO at 2017 10:33 PM              [facility] HOSPITALIST   HISTORY AND PHYSICAL      Name:  Vasquez Marie   Age:  56 y.o.  Sex:  male  :  1960  MRN:  [id number]   Visit Number:  [id number]  Admission Date:  2017  Date Of Service:  17  Primary Care Physician:  Kelsey MCCARTHY" Vermeesch, MD    History Obtained From:    patient    Chief Complaint:     Left foot pain     History Of Presenting Illness:      Patient is a 56-year-old male who developed a wound in his foot in June.  He was admitted here for cellulitis of the left foot and developed osteomyelitis.  He had intervention on the left SFA by Dr. Storm with good resultant flow.  Dr. Campbell saw the patient and did amputation of the left third digit.  He also did a left foot skin graft.  Culture grew out Enterobacter cloacae.  Dr. Rodriguez noted he had osteomyelitis and had recommended 6 weeks of ertapenem and a PICC line in her note from her office, however the patient claims he did not get this.  He was on oral antibiotics as an outpatient.  This was doxycycline.  He does have borderline diabetes.  He did worsen so Dr. Campbell sent him to  where the patient claims he had a transmetatarsal amputation about a couple weeks ago.  He had a wound VAC placed after that.  However he has been noncompliant with the wound VAC, with home health nurse frequently following and finding the wound VAC in the trash.  Home health nurse discharged him due to noncompliance.  He apparently frequently was drinking beer when they came.  He adamantly denies that he is an alcoholic.  On review of the chart he has missed multiple appointments with his primary care physician as well as Dr. Campbell.  He was last seen by home health on Monday who wrapped the foot and he did not take the dressing off since then.  There is now redness ascending up his leg and he claims there is 9/10 pain in the left leg.  Specifically in the foot, nothing is making it better, any touch to it makes it worse.  There is purulent malodorous drainage from the foot, and dressings are adhered to the wounds.  Patient denies any chest pressure, shortness breath, nausea, vomiting, or diarrhea.  He also has fever and chills.    Review Of Systems:     General ROS: positive for  - chills and  fever  Psychological ROS: negative  Ophthalmic ROS: negative  ENT ROS: negative  Allergy and Immunology ROS: negative  Hematological and Lymphatic ROS: negative  Endocrine ROS: negative  Breast ROS: negative  Respiratory ROS: negative  Cardiovascular ROS: negative  Gastrointestinal ROS: negative  Genito-Urinary ROS: negative  Musculoskeletal ROS: positive for - pain in foot - left  Neurological ROS: negative  Dermatological ROS: positive for skin lesion changes  Left foot     Past Medical History:    Coronary artery disease, osteomyelitis, atrial fibrillation, GI bleed, peripheral arterial disease, hypertension, gout, RA, DVT several years ago, CVA, hep C, cirrhosis, left occluded SFA, chronic kidney disease, COPD    Past Surgical history:    Cardiac stent, EGD, cardioversion 2017, ankle surgery, transmetatarsal amputation, left total hip arthroplasty, left total shoulder replacement      Social History:    Every day smoker since he was a teenager, does drink beer frequently, but claims he has never had withdrawal and it is not a problem.  He occasionally drinks liquor as well but is not drank that for 3 weeks.  He is to use drugs when he was younger including speed.  He denies any IV drug history.    Family History:    Mother had breast cancer and rheumatoid arthritis, colon cancer in his sister, father  of heart disease    Allergies:      Review of patient's allergies indicates no known allergies.    Home Medications:    Prior to Admission Medications     Prescriptions Last Dose Informant Patient Reported? Taking?    albuterol (PROVENTIL HFA;VENTOLIN HFA) 108 (90 BASE) MCG/ACT inhaler   Yes No    Inhale 2 puffs Every 4 (Four) Hours As Needed for Wheezing.    amiodarone (PACERONE) 200 MG tablet   No No    Take 1 tablet by mouth Daily.    atorvastatin (LIPITOR) 40 MG tablet   No No    Take 1 tablet by mouth Every Night.    clopidogrel (PLAVIX) 75 MG tablet   No No    Take 1 tablet by mouth Daily.     colchicine 0.6 MG tablet   No No    Take 1 tablet by mouth Daily.    collagenase (SANTYL) 250 UNIT/GM ointment   No No    Apply  topically Daily.    DULoxetine (CYMBALTA) 30 MG capsule   No No    Take 1 capsule by mouth Daily.    hydroxychloroquine (PLAQUENIL) 200 MG tablet   No No    Take 1 tablet by mouth Daily.    losartan-hydrochlorothiazide (HYZAAR) 50-12.5 MG per tablet   No No    Take 1 tablet by mouth Daily.    metoprolol tartrate (LOPRESSOR) 25 MG tablet   No No    Take 1 tablet by mouth Every 12 (Twelve) Hours.    Patient taking differently:  Take 25 mg by mouth 2 (Two) Times a Day.    pantoprazole (PROTONIX) 40 MG EC tablet   No No    Take 1 tablet by mouth Daily.             Hospital Scheduled Meds:      [START ON 8/26/2017] albuterol 2.5 mg Nebulization Q6H - RT   [START ON 8/26/2017] amiodarone 200 mg Oral Q24H   atorvastatin 40 mg Oral Nightly   [START ON 8/26/2017] clopidogrel 75 mg Oral Daily   [START ON 8/26/2017] colchicine 0.6 mg Oral Daily   [START ON 8/26/2017] DULoxetine 30 mg Oral Daily   [START ON 8/26/2017] enoxaparin 40 mg Subcutaneous Daily   ertapenem 1 g Intravenous Q24H   [START ON 8/26/2017] hydroxychloroquine 200 mg Oral Daily   [START ON 8/26/2017] losartan-hydrochlorothiazide 1 tablet Oral Daily   [START ON 8/26/2017] metoprolol tartrate 25 mg Oral BID   [START ON 8/26/2017] pantoprazole 40 mg Oral Daily         Pharmacy to dose vancomycin         Vital Signs:    Temp:  [97.9 °F (36.6 °C)-98.6 °F (37 °C)] 98.2 °F (36.8 °C)  Heart Rate:  [53-80] 80  Resp:  [16-22] 22  BP: (133-149)/(68-90) 133/81    Last 3 weights    08/25/17  1812 08/25/17 2127   Weight: 230 lb (104 kg) 230 lb (104 kg)       Body mass index is 34.97 kg/(m^2).    Physical Exam:      General Appearance:    Alert, cooperative, in no acute distress   Head:    Normocephalic, without obvious abnormality, atraumatic   Eyes:            Lids and lashes normal, conjunctivae and sclerae normal, no   icterus, no pallor,  corneas clear, PERRLA   Ears:    Ears appear intact with no abnormalities noted   Throat:   No oral lesions, no thrush, oral mucosa moist   Neck:   No adenopathy, supple, trachea midline, no thyromegaly, no   carotid bruit, no JVD   Back:     No kyphosis present, no scoliosis present, no skin lesions,      erythema or scars, no tenderness to percussion or                   palpation,   range of motion normal   Lungs:     Clear to auscultation,respirations regular, even and                  unlabored    Heart:    Regular rhythm and normal rate, normal S1 and S2, no            murmur, no gallop, no rub, no click   Chest Wall:    No abnormalities observed   Abdomen:     Normal bowel sounds, no masses, no organomegaly, soft        non-tender, non-distended, no guarding, no rebound                tenderness   Rectal:     Deferred   Extremities:   Moves all extremities well, Redness in the left leg from the knee down.  TMA noted on the left with open wounds which are draining and malodorous.     Pulses:   Pulses palpable and equal bilaterally   Skin:   No bleeding, bruising or rash   Lymph nodes:   No palpable adenopathy   Neurologic:   Cranial nerves 2 - 12 grossly intact, sensation intact, DTR       present and equal bilaterally             Telemetry:  A. fib rate controlled    I have personally looked at  telemetry strips.    Labs:    Results from last 7 days  Lab Units 08/25/17  1848   LACTATE mmol/L 1.9   WBC 10*3/mm3 8.62   HEMOGLOBIN g/dL 9.7*   HEMATOCRIT % 29.3*   MCV fL 93.6   MCHC g/dL 33.1   PLATELETS 10*3/mm3 257           Results from last 7 days  Lab Units 08/25/17  1848   SODIUM mmol/L 137   POTASSIUM mmol/L 4.3   CHLORIDE mmol/L 100   CO2 mmol/L 22.0*   BUN mg/dL 13   CREATININE mg/dL 1.20   EGFR IF NONAFRICN AM mL/min/1.73 62   CALCIUM mg/dL 9.7   GLUCOSE mg/dL 123*   ALBUMIN g/dL 3.80   BILIRUBIN mg/dL 1.3   ALK PHOS U/L 160*   AST (SGOT) U/L 82*   ALT (SGPT) U/L 25   Estimated Creatinine Clearance:  78.4 mL/min (by C-G formula based on Cr of 1.2).  No results found for: AMMONIA          Lab Results   Component Value Date    HGBA1C 6.20 (H) 04/08/2017     Lab Results   Component Value Date    TSH 2.250 04/07/2017     No results found for: PREGTESTUR, PREGSERUM, HCG, HCGQUANT  Pain Management Panel     Pain Management Panel Latest Ref Rng & Units 6/20/2017    AMPHETAMINES SCREEN, URINE Negative Negative    BARBITURATES SCREEN Negative Negative    BENZODIAZEPINE SCREEN, URINE Negative Positive(A)    BUPRENORPHINE Negative Negative    COCAINE SCREEN, URINE Negative Negative    METHADONE SCREEN, URINE Negative Negative    METHAMPHETAMINE UR Negative Negative                          Radiology:    Imaging Results (last 7 days)     Procedure Component Value Units Date/Time    MRI Foot Left Without Contrast [[id number]] Collected:  08/25/17 1954     Updated:  08/25/17 1956    Narrative:       FINAL REPORT    CLINICAL HISTORY:  LOOKING FOR OSTEO. RECENT AMPUTATION AND DELAYED WOUND HEALING INCREASE IN PAIN TO FOOT AND LEG    FINDINGS:  Multiplanar multisequence imaging of the foot was performed without the intravenous administration of contrast. Images are degraded by motion artifact limiting evaluation. There postsurgical changes from recent midfoot amputation. There is prominent soft   tissue swelling without discrete drainable fluid collection to suggest abscess. There is bone marrow edema of the third metatarsal. Cannot exclude osteomyelitis.    Impression:    Cellulitis post amputation. Cannot exclude third metatarsal osteomyelitis. Given the limitation by motion artifact, recommend three-phase bone scan      Impression:       Authenticated by Latrell Clemente MD on 08/25/2017 07:54:38 PM    MRI Tibia Fibula Left Without Contrast [[id number]] Collected:  08/25/17 1956     Updated:  08/1960    Narrative:       FINAL REPORT    CLINICAL HISTORY:  INCREASE PAIN, RECENT AMPUTATION OF LEFT FOOT WITH NEW PAIN TO  TIBAL TUBEROSITY 3 DAYS.    FINDINGS:  Multiplanar multisequence imaging of the leg was performed without the intravenous administration of contrast. Images are degraded by motion artifact.    There are postsurgical changes at the tibiotalar joint. 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 Remaining bone marrow signal is within normal limits. There is no acute fracture or or cortical erosion. There is no drainable tissue fluid collection.    Impression:    Postsurgical and chronic changes but no acute abnormality      Impression:       Authenticated by Latrell Clemente MD on 2017 07:56:41 PM          Assessment:    1.  Osteomyelitis of left foot status post TMA, with apparent wound infection and left leg cellulitis  2.  Chronic atrial fibrillation  3.  Rheumatoid arthritis  4.  Borderline diabetes mellitus type 2  5.  Hypertension  6.  History coronary artery disease with stents  7.  Noncompliance  8.  Possible history of alcoholism  9.  Peripheral arterial disease status post intervention by Dr. Storm  10.  Hepatitis C  11.  Possible cirrhosis.  Plan:     We'll have wound care see the patient.  Consult Dr. Campbell.  Placed on vancomycin and ertapenem.  Morphine for pain.  Place him on insulin sliding scale.  Also will place him on alcohol withdrawal protocol and give him a banana bag.  Anticipate this patient will need a BKA in the near future.  We'll see what Dr. Campbell advises.  We will hold his Xarelto at the present time in view of possible surgery.  Place him on subcutaneous Lovenox.  Continue with his home medications.  Further recommendations will depend on the clinical course.    Murali Garber DO  17  10:34 PM     Electronically signed by Murali Garber DO at 2017 10:48 PM      Liang Polanco MD at 2017  2:55 PM                [facility]    [facility]ESS [facility]    Name:  Vasquez Marie   Age:  56 y.o.  Sex:  male  :  1960  MRN:  [id number]    Visit Number:  [id number]  Admission Date:  8/25/2017  Date Of Service:  08/29/17  Primary Care Physician:  Marilyn K. Vermeesch, MD     LOS: 4 days :  Patient Care Team:  Marilyn K Vermeesch, MD as PCP - General (Internal Medicine):    Chief Complaint:      Follow-up of left transmetatarsal amputation stump infection.    Subjective / Interval History:     Patient is currently sitting up on the bed and is comfortable at rest.  He was transferred to the regular floor from the telemetry floor yesterday.  He was also seen by Dr. Campbell wear scheduled him for wound debridement to be done today.  Dr. Campbell feels that the patient may need wound debridement and wound VAC placement without any further amputation at this time.  He has stressed medical compliance to the patient.    Patient was admitted on 8/25/2017 from the emergency room with foul-smelling discharge from the left transmetatarsal amputation stump.  He was seen by his primary care physician and was subsequently sent to the emergency room.  Patient was recently admitted to [facility] where he underwent his transmetatarsal amputation and was subsequently sent home with wound VAC.  Patient was receiving home health for wound care but was subsequently discharged from their service.  Unfortunately, he has not been very compliant with his wound care and has been admitted with ongoing infection.  He has been started on IV antibiotic therapy with vancomycin and ertapenem since admission.  He has been afebrile.  Denies any chest pain or shortness of breath.  He does leave alone.  During his last admission here in June 2017, he was seen by Dr. Storm for his peripheral arterial disease and underwent peripheral angiogram and percutaneous intervention to his left leg on 6/20/2017.      Review of Systems:     General ROS: Patient denies any fevers, chills or loss of consciousness.  Respiratory ROS: Denies cough or shortness of breath.  Cardiovascular ROS: Denies  chest pain or palpitations. No history of exertional chest pain.  Gastrointestinal ROS: Denies nausea and vomiting. Denies any abdominal pain. No diarrhea.  Neurological ROS: Denies any focal weakness. No loss of consciousness. Denies any numbness.     Vital Signs:    Temp:  [97.5 °F (36.4 °C)-98.2 °F (36.8 °C)] 98.2 °F (36.8 °C)  Heart Rate:  [63-70] 64  Resp:  [16-18] 16  BP: (123-152)/(62-77) 123/66    Intake and output:    I/O last 3 completed shifts:  In: 3144 [P.O.:1520; I.V.:1124; IV Piggyback:500]  Out: 3450 [Urine:3450]  I/O this shift:  In: -   Out: 500 [Urine:500]    Physical Examination:    General Appearance:  Alert and cooperative, not in any acute distress.   Head:  Atraumatic and normocephalic, without obvious abnormality.   Eyes:          PERRLA, conjunctivae and sclerae normal, no Icterus. No pallor. Extra-occular movements are within normal limits.   Neck: Supple, trachea midline, no thyromegaly, no carotid bruit.   Lungs:   Chest shape is normal. Breath sounds heard bilaterally equally.  No crackles or wheezing. No Pleural rub or bronchial breathing.   Heart:  Normal S1 and S2, no murmur, no gallop, no rub. No JVD   Abdomen:   Normal bowel sounds, no masses, no organomegaly. Soft       non-tender, obese, no guarding, no rebound tenderness.   Extremities: Moves all extremities well, 1+ edema, no cyanosis, no            clubbing.  Surgical bandage is noted on the left foot with the left transmetatarsal amputation.   Skin: No bleeding, bruising or rash.   Neurologic: Awake, alert and oriented times 3. Moves all 4 extremities equally.   Laboratory results:      Results from last 7 days  Lab Units 08/29/17  0604 08/26/17  0651 08/25/17  1848   SODIUM mmol/L 139 140 137   POTASSIUM mmol/L 4.4 3.6 4.3   CHLORIDE mmol/L 110* 107 100   CO2 mmol/L 19.0* 20.0* 22.0*   BUN mg/dL 10 13 13   CREATININE mg/dL 1.20 1.40* 1.20   CALCIUM mg/dL 8.8 8.9 9.7   BILIRUBIN mg/dL  --   --  1.3   ALK PHOS U/L  --   --   160*   ALT (SGPT) U/L  --   --  25   AST (SGOT) U/L  --   --  82*   GLUCOSE mg/dL 87 130* 123*       Results from last 7 days  Lab Units 08/29/17  0604 08/26/17  0651 08/25/17  1848   WBC 10*3/mm3 3.65* 5.57 8.62   HEMOGLOBIN g/dL 8.3* 9.1* 9.7*   HEMATOCRIT % 27.0* 28.7* 29.3*   PLATELETS 10*3/mm3 136 176 257               Results from last 7 days  Lab Units 08/25/17  2128   WOUNDCX  Heavy growth (4+) Alcaligenes species*       I have reviewed the patient's laboratory results.    Radiology results:    Imaging Results (last 24 hours)     Procedure Component Value Units Date/Time    SCANNED - IMAGING [[id number]] Resulted:  08/25/17      Updated:  08/29/17 1430        Medication Review:     I have reviewed the patients active and prn medications.     Principal Problem:    Subacute osteomyelitis of left foot  Active Problems:    Chronic hepatitis C virus infection    Coronary artery disease involving native coronary artery of native heart without angina pectoris    Chronic atrial fibrillation    Essential hypertension    Depression with anxiety    Gastroesophageal reflux disease with esophagitis    COPD (chronic obstructive pulmonary disease)    Rheumatoid arthritis    Peripheral vascular disease    Liver mass    Assessment:    1.  Acute infection and cellulitis of the left transmetatarsal amputation stump, present on admission.  2.  Peripheral arterial disease s/p left SFA stent and intervention to the peroneal arteries on 6/20/2017.  3.  Paroxysmal atrial fibrillation.  4.  Coronary artery disease on medical therapy.  5.  Essential hypertension.  6.  COPD, stable.  7.  Chronic hepatitis C infection.    Plan:    Patient is currently on IV antibiotic therapy with ertapenem and vancomycin for his left foot amputation stump cellulitis.  His dressing has been changed.  Wound cultures are growing Alcaligenes species which are sensitive to carbopenems and Rocephin.  I will continue the current antibiotic regimen until wound  debridement and will switch him over to Rocephin from tomorrow.  He will be continued on his home medications including antiplatelet agents.  MRI of the foot done in the emergency room showed cellulitis but was not certain about osteomyelitis especially due to motion artifact.  I discussed options of going to short-term rehabilitation for wound care and wound VAC and he is open to that idea.  Further recommendations depend upon his clinical course.    Liang Polanco MD  17  2:55 PM    Portions of this note may have been completed with Dragon, a voice recognition program. Not all errors in transcription may have been detected prior to signing.       Electronically signed by Liang Polanco MD at 2017  4:00 PM      Wolfgang Campbell DPM at 2017  4:38 PM          H&P reviewed. The patient was examined and there are no changes to the H&P.     Electronically signed by Wolfgang Campbell DPM at 2017  4:38 PM    Source Note                   [facility]    PROGRESS NOTE    Name:  Vasquez Marie   Age:  56 y.o.  Sex:  male  :  1960  MRN:  [id number]   Visit Number:  [id number]  Admission Date:  2017  Date Of Service:  17  Primary Care Physician:  Marilyn K. Vermeesch, MD     LOS: 4 days :  Patient Care Team:  Marilyn K Vermeesch, MD as PCP - General (Internal Medicine):    Chief Complaint:      Follow-up of left transmetatarsal amputation stump infection.    Subjective / Interval History:     Patient is currently sitting up on the bed and is comfortable at rest.  He was transferred to the regular floor from the telemetry floor yesterday.  He was also seen by Dr. Campbell wear scheduled him for wound debridement to be done today.  Dr. Campbell feels that the patient may need wound debridement and wound VAC placement without any further amputation at this time.  He has stressed medical compliance to the patient.    Patient was admitted on 2017 from the emergency room  with foul-smelling discharge from the left transmetatarsal amputation stump.  He was seen by his primary care physician and was subsequently sent to the emergency room.  Patient was recently admitted to [facility] where he underwent his transmetatarsal amputation and was subsequently sent home with wound VAC.  Patient was receiving home health for wound care but was subsequently discharged from their service.  Unfortunately, he has not been very compliant with his wound care and has been admitted with ongoing infection.  He has been started on IV antibiotic therapy with vancomycin and ertapenem since admission.  He has been afebrile.  Denies any chest pain or shortness of breath.  He does leave alone.  During his last admission here in June 2017, he was seen by Dr. Storm for his peripheral arterial disease and underwent peripheral angiogram and percutaneous intervention to his left leg on 6/20/2017.      Review of Systems:     General ROS: Patient denies any fevers, chills or loss of consciousness.  Respiratory ROS: Denies cough or shortness of breath.  Cardiovascular ROS: Denies chest pain or palpitations. No history of exertional chest pain.  Gastrointestinal ROS: Denies nausea and vomiting. Denies any abdominal pain. No diarrhea.  Neurological ROS: Denies any focal weakness. No loss of consciousness. Denies any numbness.     Vital Signs:    Temp:  [97.5 °F (36.4 °C)-98.2 °F (36.8 °C)] 98.2 °F (36.8 °C)  Heart Rate:  [63-70] 64  Resp:  [16-18] 16  BP: (123-152)/(62-77) 123/66    Intake and output:    I/O last 3 completed shifts:  In: 3144 [P.O.:1520; I.V.:1124; IV Piggyback:500]  Out: 3450 [Urine:3450]  I/O this shift:  In: -   Out: 500 [Urine:500]    Physical Examination:    General Appearance:  Alert and cooperative, not in any acute distress.   Head:  Atraumatic and normocephalic, without obvious abnormality.   Eyes:          PERRLA, conjunctivae and sclerae normal, no Icterus. No pallor. Extra-occular  movements are within normal limits.   Neck: Supple, trachea midline, no thyromegaly, no carotid bruit.   Lungs:   Chest shape is normal. Breath sounds heard bilaterally equally.  No crackles or wheezing. No Pleural rub or bronchial breathing.   Heart:  Normal S1 and S2, no murmur, no gallop, no rub. No JVD   Abdomen:   Normal bowel sounds, no masses, no organomegaly. Soft       non-tender, obese, no guarding, no rebound tenderness.   Extremities: Moves all extremities well, 1+ edema, no cyanosis, no            clubbing.  Surgical bandage is noted on the left foot with the left transmetatarsal amputation.   Skin: No bleeding, bruising or rash.   Neurologic: Awake, alert and oriented times 3. Moves all 4 extremities equally.   Laboratory results:      Results from last 7 days  Lab Units 08/29/17  0604 08/26/17  0651 08/25/17  1848   SODIUM mmol/L 139 140 137   POTASSIUM mmol/L 4.4 3.6 4.3   CHLORIDE mmol/L 110* 107 100   CO2 mmol/L 19.0* 20.0* 22.0*   BUN mg/dL 10 13 13   CREATININE mg/dL 1.20 1.40* 1.20   CALCIUM mg/dL 8.8 8.9 9.7   BILIRUBIN mg/dL  --   --  1.3   ALK PHOS U/L  --   --  160*   ALT (SGPT) U/L  --   --  25   AST (SGOT) U/L  --   --  82*   GLUCOSE mg/dL 87 130* 123*       Results from last 7 days  Lab Units 08/29/17  0604 08/26/17  0651 08/25/17  1848   WBC 10*3/mm3 3.65* 5.57 8.62   HEMOGLOBIN g/dL 8.3* 9.1* 9.7*   HEMATOCRIT % 27.0* 28.7* 29.3*   PLATELETS 10*3/mm3 136 176 257               Results from last 7 days  Lab Units 08/25/17  2128   WOUNDCX  Heavy growth (4+) Alcaligenes species*       I have reviewed the patient's laboratory results.    Radiology results:    Imaging Results (last 24 hours)     Procedure Component Value Units Date/Time    SCANNED - IMAGING [[id number]] Resulted:  08/25/17      Updated:  08/29/17 1430        Medication Review:     I have reviewed the patients active and prn medications.     Principal Problem:    Subacute osteomyelitis of left foot  Active Problems:    Chronic  hepatitis C virus infection    Coronary artery disease involving native coronary artery of native heart without angina pectoris    Chronic atrial fibrillation    Essential hypertension    Depression with anxiety    Gastroesophageal reflux disease with esophagitis    COPD (chronic obstructive pulmonary disease)    Rheumatoid arthritis    Peripheral vascular disease    Liver mass    Assessment:    1.  Acute infection and cellulitis of the left transmetatarsal amputation stump, present on admission.  2.  Peripheral arterial disease s/p left SFA stent and intervention to the peroneal arteries on 6/20/2017.  3.  Paroxysmal atrial fibrillation.  4.  Coronary artery disease on medical therapy.  5.  Essential hypertension.  6.  COPD, stable.  7.  Chronic hepatitis C infection.    Plan:    Patient is currently on IV antibiotic therapy with ertapenem and vancomycin for his left foot amputation stump cellulitis.  His dressing has been changed.  Wound cultures are growing Alcaligenes species which are sensitive to carbopenems and Rocephin.  I will continue the current antibiotic regimen until wound debridement and will switch him over to Rocephin from tomorrow.  He will be continued on his home medications including antiplatelet agents.  MRI of the foot done in the emergency room showed cellulitis but was not certain about osteomyelitis especially due to motion artifact.  I discussed options of going to short-term rehabilitation for wound care and wound VAC and he is open to that idea.  Further recommendations depend upon his clinical course.    Liang Polanco MD  08/29/17  2:55 PM    Portions of this note may have been completed with Dragon, a voice recognition program. Not all errors in transcription may have been detected prior to signing.        Electronically signed by Liang Polanco MD at 8/29/2017  4:00 PM              Hospital Medications (active)       Dose Frequency Start End    acetaminophen (TYLENOL) tablet 650 mg 650  mg Every 4 Hours PRN 8/25/2017     Sig - Route: Take 2 tablets by mouth Every 4 (Four) Hours As Needed for Mild Pain . - Oral    albuterol (PROVENTIL) nebulizer solution 0.083% 2.5 mg/3mL 2.5 mg Every 6 Hours - RT 8/26/2017     Sig - Route: Take 2.5 mg by nebulization Every 6 (Six) Hours. - Nebulization    amiodarone (PACERONE) tablet 200 mg 200 mg Every 24 Hours Scheduled 8/26/2017     Sig - Route: Take 1 tablet by mouth Daily. - Oral    atorvastatin (LIPITOR) tablet 40 mg 40 mg Nightly 8/25/2017     Sig - Route: Take 1 tablet by mouth Every Night. - Oral    cefTRIAXone (ROCEPHIN) 2 g/100 mL 0.9% NS (MBP) 2 g Every 24 Hours 8/30/2017     Sig - Route: Infuse 100 mL into a venous catheter Daily. - Intravenous    clopidogrel (PLAVIX) tablet 75 mg 75 mg Daily 8/26/2017     Sig - Route: Take 1 tablet by mouth Daily. - Oral    colchicine tablet 0.6 mg 0.6 mg Daily 8/26/2017     Sig - Route: Take 1 tablet by mouth Daily. - Oral    dextrose (D50W) solution 25 g 25 g Every 15 Minutes PRN 8/25/2017     Sig - Route: Infuse 50 mL into a venous catheter Every 15 (Fifteen) Minutes As Needed for Low Blood Sugar (Blood Sugar Less Than 70, Patient Has IV Access - Unresponsive, NPO or Unable To Safely Swallow). - Intravenous    dextrose (GLUTOSE) oral gel 1 tube 1 tube Every 15 Minutes PRN 8/25/2017     Sig - Route: Take 1 tube by mouth Every 15 (Fifteen) Minutes As Needed for Low Blood Sugar (Blood Sugar Less Than 70, Patient Alert, Is Not NPO & Can Safely Swallow). - Oral    DULoxetine (CYMBALTA) DR capsule 30 mg 30 mg Daily 8/26/2017     Sig - Route: Take 1 capsule by mouth Daily. - Oral    enoxaparin (LOVENOX) syringe 40 mg 40 mg Daily 8/26/2017     Sig - Route: Inject 0.4 mL under the skin Daily. - Subcutaneous    glucagon (human recombinant) (GLUCAGEN DIAGNOSTIC) injection 1 mg 1 mg Every 15 Minutes PRN 8/25/2017     Sig - Route: Inject 1 mg under the skin Every 15 (Fifteen) Minutes As Needed (Blood Glucose Less Than 70 -  "Patient Without IV Access - Unresponsive, NPO or Unable To Safely Swallow). - Subcutaneous    HYDROcodone-acetaminophen (NORCO)  MG per tablet 1 tablet 1 tablet Every 4 Hours PRN 8/31/2017     Sig - Route: Take 1 tablet by mouth Every 4 (Four) Hours As Needed for Moderate Pain . - Oral    hydroxychloroquine (PLAQUENIL) tablet 200 mg 200 mg Daily 8/26/2017     Sig - Route: Take 1 tablet by mouth Daily. - Oral    insulin aspart (novoLOG) injection 0-7 Units 0-7 Units 4 Times Daily Before Meals & Nightly 8/25/2017     Sig - Route: Inject 0-7 Units under the skin 4 (Four) Times a Day Before Meals & at Bedtime. - Subcutaneous    loperamide (IMODIUM) capsule 2 mg 2 mg 4 Times Daily PRN 8/27/2017     Sig - Route: Take 1 capsule by mouth 4 (Four) Times a Day As Needed for Diarrhea. - Oral    LORazepam (ATIVAN) injection 1 mg 1 mg Every 2 Hours PRN 8/25/2017 9/4/2017    Sig - Route: Infuse 0.5 mL into a venous catheter Every 2 (Two) Hours As Needed for Withdrawal (For CIWA score 8-10). - Intravenous    Linked Group 1:  \"Or\" Linked Group Details        LORazepam (ATIVAN) injection 2 mg 2 mg Every 1 Hour PRN 8/25/2017 9/4/2017    Sig - Route: Infuse 1 mL into a venous catheter Every 1 (One) Hour As Needed for Withdrawal (For CIWA score 11-15). - Intravenous    Linked Group 1:  \"Or\" Linked Group Details        LORazepam (ATIVAN) injection 2 mg 2 mg Every 15 Minutes PRN 8/25/2017 9/4/2017    Sig - Route: Infuse 1 mL into a venous catheter Every 15 (Fifteen) Minutes As Needed for Anxiety (Use IV route first.  If unable to give IV, use IM.  For CIWA-Ar greater than 15.  Repeat dose in 15 minutes if CIWA-Ar is not decreasing.). - Intravenous    Linked Group 1:  \"Or\" Linked Group Details        LORazepam (ATIVAN) injection 2 mg 2 mg Every 15 Minutes PRN 8/25/2017 9/4/2017    Sig - Route: Inject 1 mL into the shoulder, thigh, or buttocks Every 15 (Fifteen) Minutes As Needed for Withdrawal (Use IV route first.  If unable to give " "IV, use IM.  For CIWA-Ar greater than 15.  Repeat dose in 15 minutes if CIWA-Ar is not decreasing.). - Intramuscular    Linked Group 1:  \"Or\" Linked Group Details        LORazepam (ATIVAN) tablet 1 mg 1 mg Every 2 Hours PRN 8/25/2017 9/4/2017    Sig - Route: Take 2 tablets by mouth Every 2 (Two) Hours As Needed for Withdrawal (For CIWA score 8-10). - Oral    Linked Group 1:  \"Or\" Linked Group Details        LORazepam (ATIVAN) tablet 2 mg 2 mg Every 1 Hour PRN 8/25/2017 9/4/2017    Sig - Route: Take 4 tablets by mouth Every 1 (One) Hour As Needed for Withdrawal (For CIWA score 11-15). - Oral    Linked Group 1:  \"Or\" Linked Group Details        losartan-hydrochlorothiazide (HYZAAR) 50-12.5 MG per tablet 1 tablet 1 tablet Daily 8/26/2017     Sig - Route: Take 1 tablet by mouth Daily. - Oral    metoprolol tartrate (LOPRESSOR) tablet 25 mg 25 mg 2 Times Daily 8/26/2017     Sig - Route: Take 1 tablet by mouth 2 (Two) Times a Day. - Oral    nicotine (NICODERM CQ) 14 MG/24HR patch 1 patch 1 patch Every 24 Hours 8/26/2017     Sig - Route: Place 1 patch on the skin Daily. - Transdermal    ondansetron (ZOFRAN) injection 4 mg 4 mg Every 6 Hours PRN 8/25/2017     Sig - Route: Infuse 2 mL into a venous catheter Every 6 (Six) Hours As Needed for Nausea or Vomiting. - Intravenous    pantoprazole (PROTONIX) EC tablet 40 mg 40 mg Every Early Morning 8/26/2017     Sig - Route: Take 1 tablet by mouth Every Morning. - Oral    Pharmacy Meds to Bed Consult  Daily (Monday-Friday) 8/28/2017     Sig - Route: Daily (Monday-Friday). - Does not apply    sodium chloride 0.9 % flush 1-10 mL 1-10 mL As Needed 8/25/2017     Sig - Route: Infuse 1-10 mL into a venous catheter As Needed for Line Care. - Intravenous    sodium chloride 0.9 % flush 10 mL 10 mL As Needed 8/25/2017     Sig - Route: Infuse 10 mL into a venous catheter As Needed for Line Care. - Intravenous    Cosign for Ordering: Accepted by Adrian Palmer MD on 8/25/2017  6:41 PM    " "Linked Group 2:  \"And\" Linked Group Details        traZODone (DESYREL) tablet 50 mg 50 mg Nightly PRN 2017     Sig - Route: Take 1 tablet by mouth At Night As Needed for Sleep. - Oral          Operative/Procedure Notes (last 24 hours) (Notes from 2017 10:59 AM through 2017 10:59 AM)     No notes of this type exist for this encounter.           Physician Progress Notes (last 24 hours) (Notes from 2017 10:59 AM through 2017 10:59 AM)      Liang Polanco MD at 2017 11:19 AM  Version 1 of 1               [facility]    Name:  Vasquez Marie   Age:  56 y.o.  Sex:  male  :  1960  MRN:  [id number]   Visit Number:  [id number]  Admission Date:  2017  Date Of Service:  17  Primary Care Physician:  Marilyn K. Vermeesch, MD     LOS: 6 days :  Patient Care Team:  Marilyn K Vermeesch, MD as PCP - General (Internal Medicine):    Chief Complaint:      Follow-up of left transmetatarsal amputation stump infection.    Subjective / Interval History:     Patient is currently sitting up on the bed and is feeling better.  Pain in the left foot has improved.  Denies any chest pain or shortness of breath.  Blood sugars are fairly stable.  He underwent wound debridement, wound graft application and wound VAC application to his left transmetatarsal stump done by Dr. Campbell on 2017.  No significant overnight events.  He was initially on ertapenem and vancomycin and his wound culture has grown Alcaligenes species which is sensitive to ceftriaxone.  He was switched over to ceftriaxone on 2017.  Repeat deep wound culture sent during the debridement process yesterday is growing gram-negative bacilli.  Patient has been afebrile throughout the hospital stay.    He was admitted on 2017 from the emergency room with foul-smelling discharge from the left transmetatarsal amputation stump.  He was seen by his primary care physician and was subsequently " sent to the emergency room.  Patient was recently admitted to [facility] where he underwent his transmetatarsal amputation and was subsequently sent home with wound VAC.  Patient was receiving home health for wound care but was subsequently discharged from their service.  Unfortunately, he has not been very compliant with his wound care and has been admitted with ongoing infection.  He has been on IV antibiotic therapy with vancomycin and ertapenem since admission until 8/30/2017 when he was restored to Rocephin.  He does leave alone.  During his last admission here in June 2017, he was seen by Dr. Storm for his peripheral arterial disease and underwent peripheral angiogram and percutaneous intervention to his left leg on 6/20/2017.      Review of Systems:     General ROS: Patient denies any fevers, chills or loss of consciousness.  Respiratory ROS: Denies cough or shortness of breath.  Cardiovascular ROS: Denies chest pain or palpitations. No history of exertional chest pain.  Gastrointestinal ROS: Denies nausea and vomiting. Denies any abdominal pain. No diarrhea.  Neurological ROS: Denies any focal weakness. No loss of consciousness. Denies any numbness.     Vital Signs:    Temp:  [98.1 °F (36.7 °C)-98.9 °F (37.2 °C)] 98.4 °F (36.9 °C)  Heart Rate:  [69-88] 70  Resp:  [18] 18  BP: (128-149)/(61-72) 128/64    Intake and output:    I/O last 3 completed shifts:  In: 1060 [P.O.:960; IV Piggyback:100]  Out: 3265 [Urine:3265]       Physical Examination:    General Appearance:  Alert and cooperative, not in any acute distress.   Head:  Atraumatic and normocephalic, without obvious abnormality.   Eyes:          PERRLA, conjunctivae and sclerae normal, no Icterus. No pallor. Extra-occular movements are within normal limits.   Neck: Supple, trachea midline, no thyromegaly, no carotid bruit.   Lungs:   Chest shape is normal. Breath sounds heard bilaterally equally.  No crackles or wheezing. No Pleural rub or  bronchial breathing.   Heart:  Normal S1 and S2, no murmur, no gallop, no rub. No JVD   Abdomen:   Normal bowel sounds, no masses, no organomegaly. Soft       non-tender, obese, no guarding, no rebound tenderness.   Extremities: Moves all extremities well, 1+ edema, no cyanosis, no            clubbing.  Ace wrap with wound VAC is noted on the left foot.   Skin: No bleeding, bruising or rash.   Neurologic: Awake, alert and oriented times 3. Moves all 4 extremities equally.   Laboratory results:      Results from last 7 days  Lab Units 08/31/17  0643 08/30/17  0501 08/29/17  0604  08/25/17  1848   SODIUM mmol/L 140 140 139  < > 137   POTASSIUM mmol/L 3.9 5.3* 4.4  < > 4.3   CHLORIDE mmol/L 111* 113* 110*  < > 100   CO2 mmol/L 20.0* 17.0* 19.0*  < > 22.0*   BUN mg/dL 21* 14 10  < > 13   CREATININE mg/dL 1.20 1.30 1.20  < > 1.20   CALCIUM mg/dL 8.6 8.6 8.8  < > 9.7   BILIRUBIN mg/dL  --   --   --   --  1.3   ALK PHOS U/L  --   --   --   --  160*   ALT (SGPT) U/L  --   --   --   --  25   AST (SGOT) U/L  --   --   --   --  82*   GLUCOSE mg/dL 126* 216* 87  < > 123*   < > = values in this interval not displayed.    Results from last 7 days  Lab Units 08/31/17  0643 08/30/17  0501 08/29/17  0604   WBC 10*3/mm3 3.23* 2.12* 3.65*   HEMOGLOBIN g/dL 8.3* 8.8* 8.3*   HEMATOCRIT % 26.1* 28.7* 27.0*   PLATELETS 10*3/mm3 121* 100* 136               Results from last 7 days  Lab Units 08/29/17  1723 08/25/17  2128   WOUNDCX  Scant growth (1+) Gram Negative Bacilli* Heavy growth (4+) Alcaligenes species*     I have reviewed the patient's laboratory results.    Radiology results:    Imaging Results (last 24 hours)     ** No results found for the last 24 hours. **        Medication Review:     I have reviewed the patients active and prn medications.     Principal Problem:    Subacute osteomyelitis of left foot  Active Problems:    Chronic hepatitis C virus infection    Coronary artery disease involving native coronary artery of native  heart without angina pectoris    Chronic atrial fibrillation    Essential hypertension    Depression with anxiety    Gastroesophageal reflux disease with esophagitis    COPD (chronic obstructive pulmonary disease)    Rheumatoid arthritis    Peripheral vascular disease    Liver mass    Assessment:    1.  Acute infection and cellulitis of the left transmetatarsal amputation stump, present on admission.  2.  Peripheral arterial disease s/p left SFA stent and intervention to the peroneal arteries on 6/20/2017.  3.  Paroxysmal atrial fibrillation.  4.  Coronary artery disease on medical therapy.  5.  Essential hypertension.  6.  COPD, stable.  7.  Chronic hepatitis C infection.    Plan:    Patient continues to do well and has remained afebrile.  We will continue him on IV Rocephin 2 g daily for his left foot infection.  I do not suspect any osteomyelitis at this time and he may not need long-term IV antibiotic therapy especially in view of the recent debridement.  Patient will however need wound care and wound VAC post discharge.  Unfortunately, he may not qualify to go to short-term rehabilitation and may have to go home with home health.  Patient does have history of poor compliance and I have strongly stressed the need for being compliant with home health as well as his follow-up appointments.  Hopefully, he may be able to go home tomorrow with oral antibiotic therapy and continued wound VAC.  Discussed with case management services.    Liang Polanco MD  08/31/17  11:19 AM    Portions of this note may have been completed with Dragon, a voice recognition program. Not all errors in transcription may have been detected prior to signing.       Electronically signed by Liang Polanco MD at 8/31/2017  4:35 PM        Consult Notes (last 24 hours) (Notes from 8/31/2017 10:59 AM through 9/1/2017 10:59 AM)     No notes of this type exist for this encounter.        Nutrition Notes (last 24 hours) (Notes from 8/31/2017 10:59 AM  "through 9/1/2017 10:59 AM)     No notes of this type exist for this encounter.           Physical Therapy Notes (last 24 hours) (Notes from 8/31/2017 10:59 AM through 9/1/2017 10:59 AM)      Cristal Martinez, PT at 8/31/2017 11:48 AM  Version 1 of 1            08/31/17 1146   Rehab Treatment   Discipline physical therapist   Treatment Not Performed patient/family declined treatment  (Pt states, \"I don't need to do that exercise stuff.\" Declines participation with PT.  PT emphasized importance of maintaining NWB L LE for wound healing. Wound vac intact and functioning properly.)        Electronically signed by Cristal Martinez PT at 8/31/2017 11:48 AM        Occupational Therapy Notes (last 24 hours) (Notes from 8/31/2017 10:59 AM through 9/1/2017 10:59 AM)     No notes of this type exist for this encounter.        Speech Language Pathology Notes (last 24 hours) (Notes from 8/31/2017 11:00 AM through 9/1/2017 11:00 AM)     No notes of this type exist for this encounter.           Nursing Assessments (last 24 hours)      Adult PCS Body System       08/31/17 1213 08/31/17 1400 08/31/17 1600 08/31/17 1741 08/31/17 2000    Pain/Comfort/Sleep    Presence Of Pain complains of pain/discomfort   pain meds given at 1158 denies pain/discomfort denies pain/discomfort denies pain/discomfort denies pain/discomfort    Preferred Pain Scale number (Numeric Rating Pain Scale)  number (Numeric Rating Pain Scale) number (Numeric Rating Pain Scale) number (Numeric Rating Pain Scale)    Sleep/Rest/Relaxation awake awake awake awake awake    Coping/Psychosocial    Observed Emotional State  calm;cooperative   calm;cooperative    Verbalized Emotional State     acceptance    Plan Of Care Reviewed With     patient    Coping Strategies    Supportive Measures     active listening utilized;verbalization of feelings encouraged    Trust Relationship/Rapport     care explained;questions answered    HEENT    HEENT WDL      WDL except;eye   pt states he has " poor vision from some meds he had taken.    Teeth WDL    Teeth WDL      WDL except;symptoms    Teeth Symptoms     tooth/teeth missing    Cognitive    Cognitive/Neuro/Behavioral WDL  WDL   WDL    Level of Consciousness  Alert   Alert    Behavioral    Additional Documentation     Behavior WDL (Group)    Behavior WDL    Behavior WDL  WDL   WDL    Respiratory    Respiratory WDL     WDL    Cough And Deep Breathing     done with encouragement    Cough Frequency     infrequent    Cough Type     nonproductive    Breath Sounds    Breath Sounds     All Fields    All Fields Breath Sounds     clear;equal bilaterally    Incentive Spirometer    Administration (Incentive Spirometer)     done independently per patient    Oxygen Therapy    O2 Device  room air   room air    Peripheral Neurovascular    Peripheral Neurovascular WDL      WDL except;all    Capillary Refill     LLE;RLE    LLE Capillary Refill     greater than 3 secs    RLE Capillary Refill     greater than 3 secs    Venous Thromboembolism Prevent/Manage     anticoagulant therapy maintained    Neurovascular Assessment    All Extremities     LLE    All Extremities Temperature     warm    All Extremities Color     other (see comments)    LLE Temperature     warm    LLE Color     hernan   ace wrap dsg cdi with wound vac    Edema    Edema     leg, left    Leg, Left Edema     1+ (Trace)    Gastrointestinal    GI WDL     WDL    Genitourinary    Genitourinary WDL     WDL    Urine Characteristics     clear;yellow    Skin    Skin WDL      WDL except;all    Skin Color/Characteristics     bruised (ecchymotic)    Skin Temperature     warm    Skin Moisture     dry    Skin Elasticity     slow return to original state    Skin Integrity     scar(s);incision(s)    Additional Documentation     --   scab on right second toe    Missael Risk Assessment (If Missael score </= 18, add the appropriate CPG to the care plan)    Sensory Perception     3-->slightly limited    Moisture     4-->rarely  moist    Activity     3-->walks occasionally    Mobility     3-->slightly limited    Nutrition     3-->adequate    Friction and Shear     3-->no apparent problem    Missael Score     19    Incision 08/29/17 1748 Left foot    Incision - Properties Group Placement Date: 08/29/17 Placement Time: 1748 Side: Left Location: foot    Dressing Appearance     dry;intact    Wound 08/25/17 2200 Left foot amputation stump    Wound - Properties Group Date first assessed: 08/25/17 Time first assessed: 2200 Side: Left Location: foot Type: amputation stump , partial of left foot   Additional Comments: culture sent to lab     Dressing Appearance     dry;intact   wound vac present    Musculoskeletal    Musculoskeletal WDL      WDL except;extremity movement    General Mobility     moderately impaired    Functional Screen Current    Ambulation     3-->assistive equipment and person    Transferring     3-->assistive equipment and person    Toileting     2-->assistive person    Bathing     2-->assistive person    Dressing     2-->assistive person    Eating     0-->independent    Communication     0-->understands/communicates without difficulty    Swallowing (if score 2 or more for any item, consult Rehab Services)     0-->swallows foods/liquids without difficulty    Nutrition    Diet/Nutrition Prescription     regular;consistent carb/diabetic diet    Nutrition Interventions    Glycemic Management     blood glucose monitoring    Peripheral IV Line - Single Lumen 08/29/17 1200 cephalic vein (lateral side of arm), right 20 gauge    Peripheral IV Line - Properties Group Placement Date: 08/29/17 Placement Time: 1200 Placed by External Staff?: Other (Comment) , PLACED ON FLOOR  Location: cephalic vein (lateral side of arm), right Gauge/Length: 20 gauge Additional Comments: PRESENT ON ARRIVAL TO PACU    Indication/Daily Review of Necessity medication therapy intermittent  medication therapy intermittent      Site Preparation/Maintenance dressing:  dry and intact dressing: dry and intact dressing: dry and intact  dressing: dry and intact    Securement     site guard in place    Patency/Maintenance     flushed without difficulty    Phlebitis     0-->no symptoms    Infiltration     0-->no symptoms    Sepsis Screening Tool    Previous screen positive?     no    Are 2 or > of the above criteria present?     no: STOP/negative screen    Safety    Safety WDL  WDL   WDL    Safety  patient up in chair       [facility] High Risk Falls Assessment (If Fall score is >/=13, add the Fall Risk CPG to the care plan)     Fallen in past 6 months     5--> Yes    Mental Status     0--> no mental status change    Elimination     0--> No elimination issues    Mobility     2--> Requires assistance- transfer, walker, etc.    Medications     1--> Narcotics    Nurses' Clinical Judgement     7    Total Fall Risk Score     15    Safety Interventions    Safety Promotion/Fall Prevention  safety round/check completed   safety round/check completed;nonskid shoes/slippers when out of bed    Medication Review/Management     medications reviewed    Environmental Safety Modification  assistive device/personal items within reach;clutter free environment maintained;room organization consistent   assistive device/personal items within reach;clutter free environment maintained;room organization consistent    Infection Prevention     single patient room provided    Daily Care    Activity Type  activity adjusted per tolerance   activity adjusted per tolerance    Activity Level of Assistance     assistance, 1 person    Elimination Assistance  urinal within reach   urinal within reach    Positioning    Positioning  high Fowlers   semi-Fowlers    Head Of Bed (HOB) Position     HOB elevated    Positioning/Transfer Devices     pillows;in use      08/31/17 2129 09/01/17 0000 09/01/17 0318 09/01/17 0400 09/01/17 0758    Pain/Comfort/Sleep    Presence Of Pain denies pain/discomfort denies pain/discomfort  non-verbal indicator of pain/discomfort not present denies pain/discomfort denies pain/discomfort    Preferred Pain Scale  number (Numeric Rating Pain Scale)  number (Numeric Rating Pain Scale) number (Numeric Rating Pain Scale)    Pain Rating (0-10): Rest 0  0      Sleep/Rest/Relaxation awake awake appears asleep appears asleep awake    Coping/Psychosocial    Observed Emotional State  calm;cooperative  calm;cooperative     Verbalized Emotional State  acceptance  acceptance     HEENT    HEENT WDL   WDL except;eye   pt states he has poor vision from some meds he had taken.   WDL except;eye   pt states he has poor vision from some meds he had taken.     Teeth WDL    Teeth WDL   WDL except;symptoms   WDL except;symptoms     Cognitive    Cognitive/Neuro/Behavioral WDL  WDL  WDL     Level of Consciousness  Alert  Alert     Behavioral    Additional Documentation  Behavior WDL (Group)  Behavior WDL (Group)     Behavior WDL    Behavior WDL  WDL  WDL     Respiratory    Respiratory WDL  WDL  WDL     Cough Frequency  infrequent  infrequent     Cough Type  nonproductive  nonproductive     Breath Sounds    Breath Sounds  All Fields  All Fields     All Fields Breath Sounds  clear;equal bilaterally  clear;equal bilaterally     Oxygen Therapy    O2 Device  room air  room air     Peripheral Neurovascular    Peripheral Neurovascular WDL   WDL except;all   WDL except;all     Capillary Refill  LLE;RLE  LLE;RLE     LLE Capillary Refill  greater than 3 secs  greater than 3 secs     RLE Capillary Refill  greater than 3 secs  greater than 3 secs     Neurovascular Assessment    All Extremities  LLE  LLE     All Extremities Temperature  warm  warm     All Extremities Color  other (see comments)  other (see comments)     LLE Temperature  warm  warm     LLE Color  hernan   ace wrap dsg cdi with wound vac  hernan   ace wrap dsg cdi with wound vac     Edema    Edema  leg, left  leg, left     Leg, Left Edema  1+ (Trace)  1+ (Trace)      Gastrointestinal    GI WDL  WDL  WDL     Genitourinary    Genitourinary WDL  WDL  WDL     Urine Characteristics  clear;yellow  clear;yellow     Skin    Skin WDL   WDL except;all   WDL except;all     Skin Color/Characteristics  bruised (ecchymotic)  bruised (ecchymotic)     Skin Temperature  warm  warm     Skin Moisture  dry  dry     Skin Elasticity  slow return to original state  slow return to original state     Skin Integrity  scar(s);incision(s)  scar(s);incision(s)     Additional Documentation  --   scab on right second toe  --   scab on right second toe     Incision 08/29/17 1748 Left foot    Incision - Properties Group Placement Date: 08/29/17 Placement Time: 1748 Side: Left Location: foot    Dressing Appearance  dry;intact  dry;intact     Wound 08/25/17 2200 Left foot amputation stump    Wound - Properties Group Date first assessed: 08/25/17 Time first assessed: 2200 Side: Left Location: foot Type: amputation stump , partial of left foot   Additional Comments: culture sent to lab     Dressing Appearance  dry;intact   wound vac present  dry;intact   wound vac present     Musculoskeletal    Musculoskeletal WDL   WDL except;extremity movement   WDL except;extremity movement     General Mobility  moderately impaired  moderately impaired     Functional Screen Current    Ambulation  3-->assistive equipment and person  3-->assistive equipment and person     Transferring  3-->assistive equipment and person  3-->assistive equipment and person     Toileting  2-->assistive person  2-->assistive person     Bathing  2-->assistive person  2-->assistive person     Dressing  2-->assistive person  2-->assistive person     Eating  0-->independent  0-->independent     Communication  0-->understands/communicates without difficulty  0-->understands/communicates without difficulty     Swallowing (if score 2 or more for any item, consult Rehab Services)  0-->swallows foods/liquids without difficulty  0-->swallows foods/liquids  without difficulty     Nutrition    Diet/Nutrition Prescription  regular;consistent carb/diabetic diet  regular;consistent carb/diabetic diet     Peripheral IV Line - Single Lumen 08/29/17 1200 cephalic vein (lateral side of arm), right 20 gauge    Peripheral IV Line - Properties Group Placement Date: 08/29/17 Placement Time: 1200 Placed by External Staff?: Other (Comment) , PLACED ON FLOOR  Location: cephalic vein (lateral side of arm), right Gauge/Length: 20 gauge Additional Comments: PRESENT ON ARRIVAL TO PACU    Safety    Safety WDL  WDL  WDL     Safety Interventions    Safety Promotion/Fall Prevention  safety round/check completed  safety round/check completed     Environmental Safety Modification  assistive device/personal items within reach;clutter free environment maintained;room organization consistent  assistive device/personal items within reach;clutter free environment maintained;room organization consistent     Daily Care    Activity Type    activity adjusted per tolerance     Activity Level of Assistance    assistance, 1 person     Elimination Assistance  urinal within reach  urinal within reach     Positioning    Positioning  semi-Fowlers  semi-Fowlers     Head Of Bed (HOB) Position  HOB elevated  HOB elevated     Positioning/Transfer Devices  pillows;in use  pillows;in use

## 2017-09-01 NOTE — PROGRESS NOTES
Continued Stay Note  [facility]     Patient Name: aVsquez Marie  MRN: [id number]  Today's Date: 9/1/2017    Admit Date: 8/25/2017          Discharge Plan       09/01/17 1454    Case Management/Social Work Plan    Plan [facility] and [facility] are looking at patient for possible bed. [facility] has no beds. Left message for [facility].     Patient/Family In Agreement With Plan yes      09/01/17 1341    Case Management/Social Work Plan    Plan Faxed patient to [facility] to locate a medicaid bed.       09/01/17 1308    Case Management/Social Work Plan    Additional Comments Received call from [facility].  He states that pt still has a wound vac at home from previous admission.  They will not be delivering a new one.  However, he states that they are shipping new supplies to be delivered to his home tomorrow.              Discharge Codes     None        Expected Discharge Date and Time     Expected Discharge Date Expected Discharge Time    Aug 31, 2017             Zoraida Cosme

## 2017-09-01 NOTE — THERAPY TREATMENT NOTE
Acute Care - Wound/Debridement Treatment Note  [facility]     Patient Name: Vsaquez Marie  : 1960  MRN: [id number]  Today's Date: 2017  Onset of Illness/Injury or Date of Surgery Date: 17   Date of Referral to PT: 17   Referring Physician: Adrian       Admit Date: 2017    Visit Dx:    ICD-10-CM ICD-9-CM   1. Subacute osteomyelitis of left foot M86.272 730.07   2. Impaired functional mobility, balance, gait, and endurance Z74.09 V49.89   3. Coronary artery disease involving native coronary artery of native heart without angina pectoris I25.10 414.01   4. Essential hypertension I10 401.9   5. Gastroesophageal reflux disease with esophagitis K21.0 530.11   6. Other emphysema J43.8 492.8   7. Peripheral vascular disease I73.9 443.9       Patient Active Problem List   Diagnosis   • Chronic hepatitis C virus infection   • Coronary artery disease involving native coronary artery of native heart without angina pectoris   • Chronic atrial fibrillation   • Essential hypertension   • Thrombocytopenia   • Depression with anxiety   • Gastroesophageal reflux disease with esophagitis   • Hyperlipidemia   • Tobacco abuse   • COPD (chronic obstructive pulmonary disease)   • Gout   • Congestive heart disease   • RLS (restless legs syndrome)   • Rheumatoid arthritis   • Typical atrial flutter   • Atrial flutter   • Cerebrovascular accident (CVA)   • Borderline diabetes   • Dry gangrene   • Anemia   • Peripheral vascular disease   • Subacute osteomyelitis of left foot   • Liver mass               LDA Wound       17 1344          Incision 17 1748 Left foot    Incision - Properties Group Placement Date: 17  -JK Placement Time: 1748 Side: Left  -JK Location: foot  -JK    Dressing Appearance no drainage  -LM      Appearance not approximated  -LM      Incision Length (cm) 4.75  -LM      Incision Width (cm) 9.5  -LM      Incision Depth (cm) 0.75  -LM      Drainage Characteristics/Odor  sanguineous  -LM      Drainage Amount small  -LM      Wound Cleaning sterile normal saline  -LM      Dressing Dressing changed  -LM      Therapy Setting (Negative Pressure Wound Therapy) continuous therapy  -LM      Pressure Setting (Negative Pressure Wound Therapy) 125 mmHg  -LM      Dressing (Negative Pressure Wound Therapy) foam, black  -LM      Sponges Inserted (Negative Pressure Wound Therapy) 1  -LM      Sponges Removed (Negative Pressure Wound Therapy) 1  -LM      Wound 08/25/17 2200 Left foot amputation stump    Wound - Properties Group Date first assessed: 08/25/17  -VL Time first assessed: 2200  -VL Side: Left  -VL Location: foot  -VL Type: amputation stump  -VL, partial of left foot   Additional Comments: culture sent to lab   -VL      User Key  (r) = Recorded By, (t) = Taken By, (c) = Cosigned By    Initials Name Provider Type    LM Cristal Martinez, PT Physical Therapist    CHIKA Carrillo, RN Registered Nurse    VL Luann Kraft, RN Registered Nurse            WOUND DEBRIDEMENT                         IP PT Goals       09/01/17 1435 08/30/17 1330       Transfer Training PT LTG    Transfer Training PT LTG, Date Established  08/30/17  -LM     Transfer Training PT LTG, Time to Achieve  2 wks  -LM     Transfer Training PT LTG, Activity Type  sit to stand/stand to sit;bed to chair /chair to bed  -LM     Transfer Training PT LTG, [place] Level  supervision required  -LM     Transfer Training PT LTG, Assist Device  walker, rolling  -LM     Transfer Training PT LTG, Additional Goal  NWB L LE  -LM     Transfer Training PT LTG, Outcome  goal ongoing  -LM     Gait Training PT LTG    Gait Training Goal PT LTG, Date Established  08/30/17  -LM     Gait Training Goal PT LTG, Time to Achieve  2 wks  -LM     Gait Training Goal PT LTG, [place] Level  contact guard assist  -LM     Gait Training Goal PT LTG, Assist Device  walker, rolling  -LM     Gait Training Goal PT LTG, Distance to Achieve  30  -LM     Gait  Training Goal PT LTG, Additional Goal  NWB L LE  -LM     Gait Training Goal PT LTG, Outcome  goal ongoing  -LM     Strength Goal PT LTG    Strength Goal PT LTG, Date Established  08/30/17  -LM     Strength Goal PT LTG, Time to Achieve  2 wks  -LM     Strength Goal PT LTG, Measure to Achieve  Patient will perform ther ex x 15 reps to improve functional strength for mobility.  -LM     Strength Goal PT LTG, Outcome  goal ongoing  -LM     Wound Care PT STG    Wound Care PT STG 1, Date Established 09/01/17  -LM      Wound Care PT STG 1, Time to Achieve 2 wks  -LM      Wound Care PT STG 1, Location L TMA site  -LM      Wound Care PT STG 1, No S&S of Infection yes  -LM      Wound Care PT STG 1, Decrease Wound Size Decrease wound dimensions by 0.5 cm in all measures.  -LM      Wound Care PT STG 1, Outcome goal ongoing  -LM        User Key  (r) = Recorded By, (t) = Taken By, (c) = Cosigned By    Initials Name Provider Type    LM Cristal Martinez, PT Physical Therapist          Physical Therapy Education     Title: PT OT SLP Therapies (Done)     Topic: Physical Therapy (Done)     Point: Mobility training (Done)    Learning Progress Summary    Learner Readiness Method Response Comment Documented by Status   Patient Acceptance E VU Purpose of PT/POC;  importance of NWB L LE for wound healing; proper use of RW for safe transfers/ambulation.  08/30/17 1330 Done               Point: Home exercise program (Done)    Learning Progress Summary    Learner Readiness Method Response Comment Documented by Status   Patient Acceptance E VU Purpose of PT/POC;  importance of NWB L LE for wound healing; proper use of RW for safe transfers/ambulation.  08/30/17 1330 Done               Point: Precautions (Done)    Learning Progress Summary    Learner Readiness Method Response Comment Documented by Status   Patient Acceptance E VU Purpose of wound vac dressing change.  09/01/17 1434 Done    Acceptance E VU Purpose of PT/POC;  importance of NWB  L LE for wound healing; proper use of RW for safe transfers/ambulation.  08/30/17 1330 Done                      User Key     Initials Effective Dates Name Provider Type Discipline     10/26/16 -  Cristal Martinez, PT Physical Therapist PT                   PT ASSESSMENT (last 72 hours)      PT Evaluation       08/30/17 1108       Rehab Evaluation    Document Type evaluation  -     Subjective Information agree to therapy;complains of;pain  -     Patient Effort, Rehab Treatment adequate  -     General Information    Patient Profile Review yes  -     Onset of Illness/Injury or Date of Surgery Date 08/29/17  -     Referring Physician Adrian  -     General Observations Pt received sitting EOB. Wound vac intact to L foot.  -LM     Pertinent History Of Current Problem s/p L foot TMA/I&D/wound vac placement;CAD,Afib,PAD,HTN,OA,Hep C,CKD,COPD  -     Precautions/Limitations fall precautions;non-weight bearing status  -     Prior Level of Function independent:;all household mobility   with SW  -     Equipment Currently Used at Home walker, standard;shower chair;wheelchair;wound care supplies;cane, straight  -     Plans/Goals Discussed With patient;agreed upon  -LM     Risks Reviewed patient:;increased discomfort  -LM     Benefits Reviewed patient:;improve function;increase independence  -     Living Environment    Lives With spouse  -     Living Arrangements apartment  -     Home Accessibility bed and bath on same level  -     Living Environment Comment [facility]  -     Clinical Impression    Date of Referral to PT 08/29/17  -     PT Diagnosis Generalized weakness;Wound Vac to L foot.  -LM     Patient/Family Goals Statement Return home.  -     Criteria for Skilled Therapeutic Interventions Met yes;treatment indicated  -     Pathology/Pathophysiology Noted (Describe Specifically for Each System) musculoskeletal;cardiovascular;pulmonary;integumentary;endocrine/metabolic  -     Rehab  Potential good, to achieve stated therapy goals  -LM     Pain Assessment    Pain Assessment 0-10  -LM     Pain Score 8  -LM     Post Pain Score 8  -LM     Pain Type Acute pain;Surgical pain  -LM     Pain Location Foot  -LM     Pain Orientation Left  -LM     Pain Intervention(s) Repositioned;Ambulation/increased activity  -LM     Response to Interventions Tolerated  -LM     Cognitive Assessment/Intervention    Current Cognitive/Communication Assessment functional  -LM     Orientation Status oriented x 4  -LM     Follows Commands/Answers Questions 100% of the time;needs cueing;needs repetition  -LM     Personal Safety decreased awareness, need for assist;decreased awareness, need for safety;decreased insight to deficits;impulsive  -LM     Personal Safety Interventions fall prevention program maintained;gait belt;nonskid shoes/slippers when out of bed  -LM     ROM (Range of Motion)    General ROM no range of motion deficits identified  -LM     MMT (Manual Muscle Testing)    General MMT Assessment no strength deficits identified  -LM     Mobility Assessment/Training    Extremity Weight-Bearing Status left lower extremity  -LM     Left Lower Extremity Weight-Bearing non weight-bearing   with walking boot  -LM     Bed Mobility, Assessment/Treatment    Bed Mobility, Assistive Device bed rails;head of bed elevated  -LM     Bed Mob, Supine to Sit, [place] conditional independence  -LM     Bed Mobility, Impairments impaired balance;pain  -LM     Transfer Assessment/Treatment    Transfers, Bed-Chair [place] contact guard assist  -LM     Transfers, Bed-Chair-Bed, Assist Device rolling walker  -LM     Transfers, Sit-Stand [place] contact guard assist  -LM     Transfers, Stand-Sit [place] contact guard assist  -LM     Transfers, Sit-Stand-Sit, Assist Device rolling walker  -LM     Transfer, Maintain Weight Bearing Status unable to maintain weight bearing status   multiple verbal/tactile cues given/pt does not  maintain NWB   -LM     Transfer, Safety Issues balance decreased during turns;impulsivity  -LM     Transfer, Impairments impaired balance;pain  -LM     Gait Assessment/Treatment    Gait, Comment Did not advance amb d/t pt not maintaining NWB status for transfer.  -LM     Positioning and Restraints    Pre-Treatment Position in bed  -LM     Post Treatment Position chair  -LM     In Chair sitting;call light within reach;encouraged to call for assist  -LM       User Key  (r) = Recorded By, (t) = Taken By, (c) = Cosigned By    Initials Name Provider Type    SONA Martinez, PT Physical Therapist            PT Recommendation and Plan  Anticipated Discharge Disposition: inpatient rehabilitation facility  Planned Therapy Interventions: balance training, bed mobility training, gait training, home exercise program, patient/family education, strengthening, transfer training  PT Frequency: daily    Plan Of Care Reviewed With: patient       Outcome Summary/Follow up Plan: PT eval for L TMA wound completed and wound vac dressings changed.  Old dressings removed (1 black foam piece) and one layer of Adaptic left in wound bed as per ordered to protect graft.  Surrounding tissue macerated.  Wound gently cleansed with sterile saline.  Surrounding health tissue coated with skin barrier.  5 layers of Adaptic placed in wound bed and 1 piece of black foam inserted in wound bed.  Clear dressing applied to hold and bridge drain applied.  Good seal obtained and 125 mm continuous pressure achieved.  Dressings secured with Kerlix and Ace bandage.          Outcome Measures       08/30/17 1108          How much help from another person do you currently need...    Turning from your back to your side while in flat bed without using bedrails? 4  -LM      Moving from lying on back to sitting on the side of a flat bed without bedrails? 4  -LM      Moving to and from a bed to a chair (including a wheelchair)? 3  -LM      Standing up from a chair  using your arms (e.g., wheelchair, bedside chair)? 3  -LM      Climbing 3-5 steps with a railing? 2  -LM      To walk in hospital room? 2  -LM      AM-PAC 6 Clicks Score 18  -LM      Functional Assessment    Outcome Measure Options AM-PAC 6 Clicks Basic Mobility (PT)  -LM        User Key  (r) = Recorded By, (t) = Taken By, (c) = Cosigned By    Initials Name Provider Type    SONA Martinez, PT Physical Therapist              Time Calculation        PT Charges       09/01/17 1440          Time Calculation    Start Time 1344  -LM      PT Received On 09/01/17  -LM      PT Goal Re-Cert Due Date 09/11/17  -LM        User Key  (r) = Recorded By, (t) = Taken By, (c) = Cosigned By    Initials Name Provider Type    SNOA Martinez, PT Physical Therapist             Therapy Charges for Today     Code Description Service Date Service Provider Modifiers Qty    07281613302 HC PT NEG PRESS WOUND TO 50SQCM DME1 9/1/2017 Cristal Martinez, PT  1            PT G-Codes  Outcome Measure Options: AM-PAC 6 Clicks Basic Mobility (PT)        Cristal Martinez, PT  9/1/2017

## 2017-09-02 LAB
GLUCOSE BLDC GLUCOMTR-MCNC: 113 MG/DL (ref 70–130)
GLUCOSE BLDC GLUCOMTR-MCNC: 122 MG/DL (ref 70–130)
GLUCOSE BLDC GLUCOMTR-MCNC: 128 MG/DL (ref 70–130)
GLUCOSE BLDC GLUCOMTR-MCNC: 145 MG/DL (ref 70–130)
GLUCOSE BLDC GLUCOMTR-MCNC: 169 MG/DL (ref 70–130)

## 2017-09-02 PROCEDURE — 99232 SBSQ HOSP IP/OBS MODERATE 35: CPT | Performed by: INTERNAL MEDICINE

## 2017-09-02 PROCEDURE — 82962 GLUCOSE BLOOD TEST: CPT

## 2017-09-02 PROCEDURE — 25010000002 ENOXAPARIN PER 10 MG: Performed by: INTERNAL MEDICINE

## 2017-09-02 PROCEDURE — 25010000002 CEFTRIAXONE: Performed by: INTERNAL MEDICINE

## 2017-09-02 RX ADMIN — HYDROCODONE BITARTRATE AND ACETAMINOPHEN 1 TABLET: 10; 325 TABLET ORAL at 21:08

## 2017-09-02 RX ADMIN — Medication 10 ML: at 21:08

## 2017-09-02 RX ADMIN — ENOXAPARIN SODIUM 40 MG: 40 INJECTION SUBCUTANEOUS at 08:19

## 2017-09-02 RX ADMIN — NICOTINE 1 PATCH: 14 PATCH TRANSDERMAL at 23:58

## 2017-09-02 RX ADMIN — LOSARTAN POTASSIUM AND HYDROCHLOROTHIAZIDE 1 TABLET: 50; 12.5 TABLET, FILM COATED ORAL at 08:19

## 2017-09-02 RX ADMIN — COLCHICINE 0.6 MG: 0.6 TABLET, FILM COATED ORAL at 08:19

## 2017-09-02 RX ADMIN — NICOTINE 1 PATCH: 14 PATCH TRANSDERMAL at 00:07

## 2017-09-02 RX ADMIN — LOPERAMIDE HYDROCHLORIDE 2 MG: 2 CAPSULE ORAL at 17:17

## 2017-09-02 RX ADMIN — HYDROXYCHLOROQUINE SULFATE 200 MG: 200 TABLET, FILM COATED ORAL at 08:19

## 2017-09-02 RX ADMIN — LOPERAMIDE HYDROCHLORIDE 2 MG: 2 CAPSULE ORAL at 21:08

## 2017-09-02 RX ADMIN — HYDROCODONE BITARTRATE AND ACETAMINOPHEN 1 TABLET: 10; 325 TABLET ORAL at 17:18

## 2017-09-02 RX ADMIN — METOPROLOL TARTRATE 25 MG: 25 TABLET ORAL at 17:18

## 2017-09-02 RX ADMIN — PANTOPRAZOLE SODIUM 40 MG: 40 TABLET, DELAYED RELEASE ORAL at 06:23

## 2017-09-02 RX ADMIN — ATORVASTATIN CALCIUM 40 MG: 40 TABLET, FILM COATED ORAL at 21:08

## 2017-09-02 RX ADMIN — CLOPIDOGREL BISULFATE 75 MG: 75 TABLET ORAL at 08:19

## 2017-09-02 RX ADMIN — HYDROCODONE BITARTRATE AND ACETAMINOPHEN 1 TABLET: 10; 325 TABLET ORAL at 06:23

## 2017-09-02 RX ADMIN — AMIODARONE HYDROCHLORIDE 200 MG: 200 TABLET ORAL at 08:19

## 2017-09-02 RX ADMIN — LOPERAMIDE HYDROCHLORIDE 2 MG: 2 CAPSULE ORAL at 08:18

## 2017-09-02 RX ADMIN — DULOXETINE HYDROCHLORIDE 30 MG: 30 CAPSULE, DELAYED RELEASE ORAL at 08:19

## 2017-09-02 RX ADMIN — METOPROLOL TARTRATE 25 MG: 25 TABLET ORAL at 08:18

## 2017-09-02 RX ADMIN — CEFTRIAXONE 2 G: 2 INJECTION, POWDER, FOR SOLUTION INTRAMUSCULAR; INTRAVENOUS at 09:55

## 2017-09-02 RX ADMIN — HYDROCODONE BITARTRATE AND ACETAMINOPHEN 1 TABLET: 10; 325 TABLET ORAL at 11:21

## 2017-09-02 RX ADMIN — HYDROCODONE BITARTRATE AND ACETAMINOPHEN 1 TABLET: 10; 325 TABLET ORAL at 02:32

## 2017-09-02 RX ADMIN — TRAZODONE HYDROCHLORIDE 50 MG: 50 TABLET ORAL at 21:08

## 2017-09-02 NOTE — SIGNIFICANT NOTE
09/02/17 1433   PT Deferred Reason   PT Deferred Reason Patient/family declined treatment  (Pt reports he is too tired to work with PT today.  PT to attempt again tomorrow)

## 2017-09-02 NOTE — PLAN OF CARE
Problem: Infection, Risk/Actual (Adult)  Goal: Infection Prevention/Resolution  Outcome: Ongoing (interventions implemented as appropriate)    Problem: Patient Care Overview (Adult)  Goal: Plan of Care Review  Outcome: Ongoing (interventions implemented as appropriate)    09/02/17 0518   Coping/Psychosocial Response Interventions   Plan Of Care Reviewed With patient   Patient Care Overview   Progress progress toward functional goals as expected   Outcome Evaluation   Outcome Summary/Follow up Plan DRSG WITH WOUND VAC INTACT TO LEFT FOOT, PAIN MEDS EFFECTIVE FOR SURGICAL PAIN,          Problem: Fall Risk (Adult)  Goal: Absence of Falls  Outcome: Outcome(s) achieved Date Met:  09/02/17    Problem: Pain, Acute (Adult)  Goal: Identify Related Risk Factors and Signs and Symptoms  Outcome: Outcome(s) achieved Date Met:  09/02/17  Goal: Acceptable Pain Control/Comfort Level  Outcome: Ongoing (interventions implemented as appropriate)

## 2017-09-02 NOTE — PROGRESS NOTES
[facility]    Name:  Vasquez Marie   Age:  56 y.o.  Sex:  male  :  1960  MRN:  [id number]   Visit Number:  [id number]  Admission Date:  2017  Date Of Service:  17  Primary Care Physician:  Marilyn K. Vermeesch, MD     LOS: 8 days :  Patient Care Team:  Marilyn K Vermeesch, MD as PCP - General (Internal Medicine):    Chief Complaint:      Follow-up of left transmetatarsal amputation stump infection.    Subjective / Interval History:     Patient is currently sitting up on the bed and is eating his breakfast.  Denies any chest pain or shortness of breath.  His wound VAC dressing was changed yesterday.  He is currently on Rocephin for wound infection.  No fevers, chest pain or shortness of breath.    He was admitted on 2017 from the emergency room with foul-smelling discharge from the left transmetatarsal amputation stump.  He was seen by his primary care physician and was subsequently sent to the emergency room.  Patient was recently admitted to [facility] where he underwent his transmetatarsal amputation and was subsequently sent home with wound VAC.  Patient was receiving home health for wound care but was subsequently discharged from their service.  Unfortunately, he has not been very compliant with his wound care and has been admitted with ongoing infection.  He has been on IV antibiotic therapy with vancomycin and ertapenem since admission until 2017 when he was changed to Rocephin.  He does live alone.  During his last admission here in 2017, he was seen by Dr. Storm for his peripheral arterial disease and underwent peripheral angiogram and percutaneous intervention to his left leg on 2017.      Patient underwent wound debridement, wound graft application and wound VAC application to his left transmetatarsal stump done by Dr. Campbell on 2017.    Review of Systems:     General ROS: Patient denies any fevers,  chills or loss of consciousness.  Respiratory ROS: Denies cough or shortness of breath.  Cardiovascular ROS: Denies chest pain or palpitations. No history of exertional chest pain.  Gastrointestinal ROS: Denies nausea and vomiting. Denies any abdominal pain. No diarrhea.  Neurological ROS: Denies any focal weakness. No loss of consciousness. Denies any numbness.     Vital Signs:    Temp:  [97.5 °F (36.4 °C)-98.3 °F (36.8 °C)] 98.1 °F (36.7 °C)  Heart Rate:  [62-79] 69  Resp:  [18-20] 20  BP: (125-138)/(60-90) 130/60    Intake and output:    I/O last 3 completed shifts:  In: 480 [P.O.:480]  Out: 3000 [Urine:3000]  I/O this shift:  In: -   Out: 600 [Urine:600]    Physical Examination:    General Appearance:  Alert and cooperative, not in any acute distress.   Head:  Atraumatic and normocephalic, without obvious abnormality.   Eyes:          PERRLA, conjunctivae and sclerae normal, no Icterus. No pallor. Extra-occular movements are within normal limits.   Neck: Supple, trachea midline, no thyromegaly, no carotid bruit.   Lungs:   Chest shape is normal. Breath sounds heard bilaterally equally.  No crackles or wheezing. No Pleural rub or bronchial breathing.   Heart:  Normal S1 and S2, no murmur, no gallop, no rub. No JVD   Abdomen:   Normal bowel sounds, no masses, no organomegaly. Soft       non-tender, obese, no guarding, no rebound tenderness.   Extremities: Moves all extremities well, 1+ edema, no cyanosis, no            clubbing.  Ace wrap with wound VAC is noted on the left foot. Hand deformities noted due to his rheumatoid arthritis.     Skin: No bleeding, bruising or rash.   Neurologic: Awake, alert and oriented times 3. Moves all 4 extremities equally.   Laboratory results:      Results from last 7 days  Lab Units 08/31/17  0643 08/30/17  0501 08/29/17  0604   SODIUM mmol/L 140 140 139   POTASSIUM mmol/L 3.9 5.3* 4.4   CHLORIDE mmol/L 111* 113* 110*   CO2 mmol/L 20.0* 17.0* 19.0*   BUN mg/dL 21* 14 10    CREATININE mg/dL 1.20 1.30 1.20   CALCIUM mg/dL 8.6 8.6 8.8   GLUCOSE mg/dL 126* 216* 87       Results from last 7 days  Lab Units 08/31/17  0643 08/30/17  0501 08/29/17  0604   WBC 10*3/mm3 3.23* 2.12* 3.65*   HEMOGLOBIN g/dL 8.3* 8.8* 8.3*   HEMATOCRIT % 26.1* 28.7* 27.0*   PLATELETS 10*3/mm3 121* 100* 136               Results from last 7 days  Lab Units 08/29/17  1723   WOUNDCX  Scant growth (1+) Providencia rettgeri*     I have reviewed the patient's laboratory results.    Radiology results:    Imaging Results (last 24 hours)     ** No results found for the last 24 hours. **        Medication Review:     I have reviewed the patients active and prn medications.     Principal Problem:    Subacute osteomyelitis of left foot  Active Problems:    Chronic hepatitis C virus infection    Coronary artery disease involving native coronary artery of native heart without angina pectoris    Chronic atrial fibrillation    Essential hypertension    Depression with anxiety    Gastroesophageal reflux disease with esophagitis    COPD (chronic obstructive pulmonary disease)    Rheumatoid arthritis    Peripheral vascular disease    Liver mass    Assessment:    1.  Acute infection and cellulitis of the left transmetatarsal amputation stump, present on admission.  2.  Peripheral arterial disease s/p left SFA stent and intervention to the peroneal arteries on 6/20/2017.  3.  Paroxysmal atrial fibrillation.  4.  Coronary artery disease on medical therapy.  5.  Essential hypertension.  6.  COPD, stable.  7.  Chronic hepatitis C infection.  8.  Rheumatoid arthritis.    Plan:    Patient is currently on IV antibiotic therapy with ceftriaxone 2 g daily.  He will be continued on wound VAC therapy.  He does not need any IV antibiotic therapy at this time since there is no evidence of osteomyelitis and he has already had transmetatarsal amputation of the left foot.  Wound culture obtained during the debridement has grown Providencia  rettgeri which is sensitive to ceftriaxone and quinolones.      Case management services are currently working to get him to short-term rehabilitation facility for continued wound care.  He will be transferred to short-term rehabilitation when bed is available.  If no facility except same then he will have to go home with home health.      Liang Polanco MD  09/02/17  1:51 PM    Portions of this note may have been completed with Dragon, a voice recognition program. Not all errors in transcription may have been detected prior to signing.

## 2017-09-03 LAB
BACTERIA SPEC ANAEROBE CULT: ABNORMAL
GLUCOSE BLDC GLUCOMTR-MCNC: 116 MG/DL (ref 70–130)

## 2017-09-03 PROCEDURE — 97116 GAIT TRAINING THERAPY: CPT

## 2017-09-03 PROCEDURE — 99232 SBSQ HOSP IP/OBS MODERATE 35: CPT | Performed by: INTERNAL MEDICINE

## 2017-09-03 PROCEDURE — 25010000002 ENOXAPARIN PER 10 MG: Performed by: INTERNAL MEDICINE

## 2017-09-03 PROCEDURE — 25010000002 CEFTRIAXONE: Performed by: INTERNAL MEDICINE

## 2017-09-03 PROCEDURE — 97530 THERAPEUTIC ACTIVITIES: CPT

## 2017-09-03 PROCEDURE — 82962 GLUCOSE BLOOD TEST: CPT

## 2017-09-03 RX ADMIN — ATORVASTATIN CALCIUM 40 MG: 40 TABLET, FILM COATED ORAL at 20:43

## 2017-09-03 RX ADMIN — DULOXETINE HYDROCHLORIDE 30 MG: 30 CAPSULE, DELAYED RELEASE ORAL at 09:08

## 2017-09-03 RX ADMIN — HYDROCODONE BITARTRATE AND ACETAMINOPHEN 1 TABLET: 10; 325 TABLET ORAL at 06:18

## 2017-09-03 RX ADMIN — CLOPIDOGREL BISULFATE 75 MG: 75 TABLET ORAL at 09:08

## 2017-09-03 RX ADMIN — LOSARTAN POTASSIUM AND HYDROCHLOROTHIAZIDE 1 TABLET: 50; 12.5 TABLET, FILM COATED ORAL at 09:08

## 2017-09-03 RX ADMIN — TRAZODONE HYDROCHLORIDE 50 MG: 50 TABLET ORAL at 20:43

## 2017-09-03 RX ADMIN — HYDROCODONE BITARTRATE AND ACETAMINOPHEN 1 TABLET: 10; 325 TABLET ORAL at 10:12

## 2017-09-03 RX ADMIN — PANTOPRAZOLE SODIUM 40 MG: 40 TABLET, DELAYED RELEASE ORAL at 06:18

## 2017-09-03 RX ADMIN — HYDROXYCHLOROQUINE SULFATE 200 MG: 200 TABLET, FILM COATED ORAL at 09:08

## 2017-09-03 RX ADMIN — LOPERAMIDE HYDROCHLORIDE 2 MG: 2 CAPSULE ORAL at 20:43

## 2017-09-03 RX ADMIN — AMIODARONE HYDROCHLORIDE 200 MG: 200 TABLET ORAL at 09:08

## 2017-09-03 RX ADMIN — HYDROCODONE BITARTRATE AND ACETAMINOPHEN 1 TABLET: 10; 325 TABLET ORAL at 02:08

## 2017-09-03 RX ADMIN — CEFTRIAXONE 2 G: 2 INJECTION, POWDER, FOR SOLUTION INTRAMUSCULAR; INTRAVENOUS at 11:14

## 2017-09-03 RX ADMIN — LOPERAMIDE HYDROCHLORIDE 2 MG: 2 CAPSULE ORAL at 10:12

## 2017-09-03 RX ADMIN — ENOXAPARIN SODIUM 40 MG: 40 INJECTION SUBCUTANEOUS at 09:08

## 2017-09-03 RX ADMIN — HYDROCODONE BITARTRATE AND ACETAMINOPHEN 1 TABLET: 10; 325 TABLET ORAL at 20:43

## 2017-09-03 RX ADMIN — HYDROCODONE BITARTRATE AND ACETAMINOPHEN 1 TABLET: 10; 325 TABLET ORAL at 16:22

## 2017-09-03 RX ADMIN — Medication 10 ML: at 20:43

## 2017-09-03 RX ADMIN — COLCHICINE 0.6 MG: 0.6 TABLET, FILM COATED ORAL at 09:08

## 2017-09-03 RX ADMIN — METOPROLOL TARTRATE 25 MG: 25 TABLET ORAL at 09:08

## 2017-09-03 RX ADMIN — METOPROLOL TARTRATE 25 MG: 25 TABLET ORAL at 17:51

## 2017-09-03 NOTE — THERAPY TREATMENT NOTE
Acute Care - Physical Therapy Treatment Note   [facility]     Patient Name: Vasquez Marie  : 1960  MRN: [id number]  Today's Date: 9/3/2017  Onset of Illness/Injury or Date of Surgery Date: 17  Date of Referral to PT: 17  Referring Physician: Adrian    Admit Date: 2017    Visit Dx:    ICD-10-CM ICD-9-CM   1. Subacute osteomyelitis of left foot M86.272 730.07   2. Impaired functional mobility, balance, gait, and endurance Z74.09 V49.89   3. Coronary artery disease involving native coronary artery of native heart without angina pectoris I25.10 414.01   4. Essential hypertension I10 401.9   5. Gastroesophageal reflux disease with esophagitis K21.0 530.11   6. Other emphysema J43.8 492.8   7. Peripheral vascular disease I73.9 443.9     Patient Active Problem List   Diagnosis   • Chronic hepatitis C virus infection   • Coronary artery disease involving native coronary artery of native heart without angina pectoris   • Chronic atrial fibrillation   • Essential hypertension   • Thrombocytopenia   • Depression with anxiety   • Gastroesophageal reflux disease with esophagitis   • Hyperlipidemia   • Tobacco abuse   • COPD (chronic obstructive pulmonary disease)   • Gout   • Congestive heart disease   • RLS (restless legs syndrome)   • Rheumatoid arthritis   • Typical atrial flutter   • Atrial flutter   • Cerebrovascular accident (CVA)   • Borderline diabetes   • Dry gangrene   • Anemia   • Peripheral vascular disease   • Subacute osteomyelitis of left foot   • Liver mass               Adult Rehabilitation Note       17 1441          Rehab Assessment/Intervention    Discipline physical therapist  -JR      Document Type therapy note (daily note)  -JR      Subjective Information agree to therapy;no complaints  -JR      Patient Effort, Rehab Treatment adequate  -JR      Symptoms Noted During/After Treatment none  -JR      Precautions/Limitations non-weight bearing status   LLE  -JR      Patient  Response to Treatment Patient is eager to get stronger and imporve mobility  -JR      Recorded by [JR] Sia Whaley, PT      Pain Assessment    Pain Assessment 0-10  -JR      Pain Score 6  -JR      Post Pain Score 6  -JR      Pain Type Acute pain  -JR      Pain Location Foot  -JR      Pain Orientation Left  -JR      Pain Intervention(s) Repositioned  -JR      Response to Interventions no pain increase  -JR      Recorded by [JR] Sia Whaley, PT      Cognitive Assessment/Intervention    Current Cognitive/Communication Assessment functional  -JR      Orientation Status oriented x 4  -JR      Follows Commands/Answers Questions able to follow multi-step instructions  -JR      Personal Safety decreased awareness, need for safety;decreased awareness, need for assist  -JR      Personal Safety Interventions fall prevention program maintained;gait belt  -JR      Recorded by [JR] Sia Whaley, PT      ROM (Range of Motion)    General ROM no range of motion deficits identified  -JR      Recorded by [JR] Sia Whaley PT      MMT (Manual Muscle Testing)    General MMT Assessment no strength deficits identified  -JR      Recorded by [JR] Sia Whaley, PT      Transfer Assessment/Treatment    Transfers, Bed-Chair [place] contact guard assist  -JR      Transfers, Sit-Stand [place] contact guard assist  -JR      Transfers, Stand-Sit [place] contact guard assist  -JR      Transfers, Sit-Stand-Sit, Assist Device rolling walker  -JR      Transfer, Maintain Weight Bearing Status unable to maintain weight bearing status  -JR      Transfer, Safety Issues balance decreased during turns;sequencing ability decreased;impulsivity  -JR      Transfer, Impairments unable to maintain weight bearing status;impaired balance  -JR      Recorded by [JR] Sia Whaley, PT      Gait Assessment/Treatment    Gait, [place] Level contact guard assist  -JR      Gait, Assistive Device rolling walker  -JR      Gait, Distance (Feet) 10  -JR       Gait, Gait Deviations --   unable to maintain NWB  -JR      Gait, Maintain Weight Bearing Status unable to maintain weight bearing status  -JR      Recorded by [JR] Sia Whaley PT      Therapy Exercises    Bilateral Lower Extremities AROM:;15 reps;ankle pumps/circles;heel raises;hip abduction/adduction;hip flexion;LAQ;SLR  -JR      Recorded by [JR] Sia Whaley PT      Positioning and Restraints    Pre-Treatment Position in bed  -JR      Post Treatment Position chair  -JR      In Chair sitting;call light within reach;encouraged to call for assist  -JR      Recorded by [JR] Sia Whaley PT        User Key  (r) = Recorded By, (t) = Taken By, (c) = Cosigned By    Initials Name Effective Dates    JR Sia Whaley PT 10/26/16 -                 IP PT Goals       09/03/17 1601 09/01/17 1435 08/30/17 1330    Transfer Training PT LTG    Transfer Training PT LTG, Date Established   08/30/17  -LM    Transfer Training PT LTG, Time to Achieve   2 wks  -LM    Transfer Training PT LTG, Activity Type   sit to stand/stand to sit;bed to chair /chair to bed  -LM    Transfer Training PT LTG, [place] Level   supervision required  -LM    Transfer Training PT LTG, Assist Device   walker, rolling  -LM    Transfer Training PT LTG, Additional Goal   NWB L LE  -LM    Transfer Training PT LTG, Outcome goal ongoing  -JR  goal ongoing  -LM    Gait Training PT LTG    Gait Training Goal PT LTG, Date Established   08/30/17  -LM    Gait Training Goal PT LTG, Time to Achieve   2 wks  -LM    Gait Training Goal PT LTG, [place] Level   contact guard assist  -LM    Gait Training Goal PT LTG, Assist Device   walker, rolling  -LM    Gait Training Goal PT LTG, Distance to Achieve   30  -LM    Gait Training Goal PT LTG, Additional Goal   NWB L LE  -LM    Gait Training Goal PT LTG, Outcome goal ongoing  -JR  goal ongoing  -LM    Strength Goal PT LTG    Strength Goal PT LTG, Date Established   08/30/17  -LM    Strength Goal PT LTG, Time to Achieve    2 wks  -LM    Strength Goal PT LTG, Measure to Achieve   Patient will perform ther ex x 15 reps to improve functional strength for mobility.  -LM    Strength Goal PT LTG, Outcome goal ongoing  -JR  goal ongoing  -LM    Wound Care PT STG    Wound Care PT STG 1, Date Established  09/01/17  -LM     Wound Care PT STG 1, Time to Achieve  2 wks  -LM     Wound Care PT STG 1, Location  L TMA site  -LM     Wound Care PT STG 1, No S&S of Infection  yes  -LM     Wound Care PT STG 1, Decrease Wound Size  Decrease wound dimensions by 0.5 cm in all measures.  -LM     Wound Care PT STG 1, Outcome  goal ongoing  -LM       User Key  (r) = Recorded By, (t) = Taken By, (c) = Cosigned By    Initials Name Provider Type    JR Sia Whaley, PT Physical Therapist    SONA Martinez, PT Physical Therapist          Physical Therapy Education     Title: PT OT SLP Therapies (Done)     Topic: Physical Therapy (Done)     Point: Mobility training (Done)    Learning Progress Summary    Learner Readiness Method Response Comment Documented by Status   Patient Acceptance E VU Purpose of PT/POC;  importance of NWB L LE for wound healing; proper use of RW for safe transfers/ambulation.  08/30/17 1330 Done               Point: Home exercise program (Done)    Learning Progress Summary    Learner Readiness Method Response Comment Documented by Status   Patient Acceptance E VU Purpose of PT/POC;  importance of NWB L LE for wound healing; proper use of RW for safe transfers/ambulation.  08/30/17 1330 Done               Point: Precautions (Done)    Learning Progress Summary    Learner Readiness Method Response Comment Documented by Status   Patient Acceptance E VU Importance of maintaining NWB on LLE JR 09/03/17 1559 Done    Acceptance E VU Purpose of wound vac dressing change.  09/01/17 1434 Done    Acceptance E VU Purpose of PT/POC;  importance of NWB L LE for wound healing; proper use of RW for safe transfers/ambulation.  08/30/17 1330 Done                       User Key     Initials Effective Dates Name Provider Type Discipline     10/26/16 -  Sia Whaley, PT Physical Therapist PT    LM 10/26/16 -  Cristal Martinez PT Physical Therapist PT                    PT Recommendation and Plan  Anticipated Discharge Disposition: inpatient rehabilitation facility  Planned Therapy Interventions: balance training, bed mobility training, gait training, home exercise program, patient/family education, strengthening, transfer training  PT Frequency: daily  Plan of Care Review  Plan Of Care Reviewed With: patient  Progress: improving  Outcome Summary/Follow up Plan: Patient is able to perform therapeutic exercises and gait with no more than CGA; although, he is unable to maintain NWB on LLE.  He states he is walking in his room--PT re-educted on the importance of NWB.  He is expected to make progress with additional PT services.          Outcome Measures       09/03/17 1441          How much help from another person do you currently need...    Turning from your back to your side while in flat bed without using bedrails? 4  -JR      Moving from lying on back to sitting on the side of a flat bed without bedrails? 4  -JR      Moving to and from a bed to a chair (including a wheelchair)? 3  -JR      Standing up from a chair using your arms (e.g., wheelchair, bedside chair)? 3  -JR      Climbing 3-5 steps with a railing? 2  -JR      To walk in hospital room? 2  -JR      AM-PAC 6 Clicks Score 18  -      Functional Assessment    Outcome Measure Options AM-PAC 6 Clicks Basic Mobility (PT)  -        User Key  (r) = Recorded By, (t) = Taken By, (c) = Cosigned By    Initials Name Provider Type     Sia Whaley, PT Physical Therapist           Time Calculation:         PT Charges       09/03/17 1600          Time Calculation    Start Time 1441  -      PT Received On 09/03/17  -      PT Goal Re-Cert Due Date 09/11/17  -      Time Calculation- PT    Total Timed Code Minutes- PT  26 minute(s)  -        User Key  (r) = Recorded By, (t) = Taken By, (c) = Cosigned By    Initials Name Provider Type    JR Sia Whaley, PT Physical Therapist          Therapy Charges for Today     Code Description Service Date Service Provider Modifiers Qty    72073654534 HC GAIT TRAINING EA 15 MIN 9/3/2017 Sia Whaley, PT GP 1    27925806667 HC PT THERAPEUTIC ACT EA 15 MIN 9/3/2017 Sia Whaley, PT GP 1          PT G-Codes  Outcome Measure Options: AM-PAC 6 Clicks Basic Mobility (PT)    Sia Whaley, PT  9/3/2017

## 2017-09-03 NOTE — PLAN OF CARE
Problem: Patient Care Overview (Adult)  Goal: Plan of Care Review  Outcome: Ongoing (interventions implemented as appropriate)    09/03/17 1601   Coping/Psychosocial Response Interventions   Plan Of Care Reviewed With patient   Patient Care Overview   Progress improving   Outcome Evaluation   Outcome Summary/Follow up Plan Patient is able to perform therapeutic exercises and gait with no more than CGA; although, he is unable to maintain NWB on LLE. He states he is walking in his room--PT re-educted on the importance of NWB. He is expected to make progress with additional PT services.         Problem: Inpatient Physical Therapy  Goal: Transfer Training Goal 1 LTG- PT  Outcome: Ongoing (interventions implemented as appropriate)    08/30/17 1330 09/03/17 1601   Transfer Training PT LTG   Transfer Training PT LTG, Date Established 08/30/17 --    Transfer Training PT LTG, Time to Achieve 2 wks --    Transfer Training PT LTG, Activity Type sit to stand/stand to sit;bed to chair /chair to bed --    Transfer Training PT LTG, [place] Level supervision required --    Transfer Training PT LTG, Assist Device walker, rolling --    Transfer Training PT LTG, Additional Goal NWB L LE --    Transfer Training PT LTG, Outcome --  goal ongoing       Goal: Gait Training Goal LTG- PT  Outcome: Ongoing (interventions implemented as appropriate)    08/30/17 1330 09/03/17 1601   Gait Training PT LTG   Gait Training Goal PT LTG, Date Established 08/30/17 --    Gait Training Goal PT LTG, Time to Achieve 2 wks --    Gait Training Goal PT LTG, [place] Level contact guard assist --    Gait Training Goal PT LTG, Assist Device walker, rolling --    Gait Training Goal PT LTG, Distance to Achieve 30 --    Gait Training Goal PT LTG, Additional Goal NWB L LE --    Gait Training Goal PT LTG, Outcome --  goal ongoing       Goal: Strength Goal LTG- PT  Outcome: Ongoing (interventions implemented as appropriate)    08/30/17 1330 09/03/17 1601    Strength Goal PT LTG   Strength Goal PT LTG, Date Established 08/30/17 --    Strength Goal PT LTG, Time to Achieve 2 wks --    Strength Goal PT LTG, Measure to Achieve Patient will perform ther ex x 15 reps to improve functional strength for mobility. --    Strength Goal PT LTG, Outcome --  goal ongoing

## 2017-09-03 NOTE — PROGRESS NOTES
[facility]    Name:  Vasquez Marie   Age:  56 y.o.  Sex:  male  :  1960  MRN:  [id number]   Visit Number:  [id number]  Admission Date:  2017  Date Of Service:  17  Primary Care Physician:  Marilyn K. Vermeesch, MD     LOS: 9 days :  Patient Care Team:  Marilyn K Vermeesch, MD as PCP - General (Internal Medicine):    Chief Complaint:      Follow-up of left transmetatarsal amputation stump infection.    Subjective / Interval History:     Patient is comfortable at rest and denies any new complaints.  He is eating his breakfast.  Denies any fevers, chest pain or shortness of breath.  Left foot pain has improved.  His wound VAC dressing was changed on 2017.  He is currently on Rocephin for wound infection.    He was admitted on 2017 from the emergency room with foul-smelling discharge from the left transmetatarsal amputation stump.  He was seen by his primary care physician and was subsequently sent to the emergency room.  Patient was recently admitted to [facility] where he underwent his transmetatarsal amputation and was subsequently sent home with wound VAC.  Patient was receiving home health for wound care but was subsequently discharged from their service.  Unfortunately, he has not been very compliant with his wound care and has been admitted with ongoing infection.  He has been on IV antibiotic therapy with vancomycin and ertapenem since admission until 2017 when he was changed to Rocephin.  He does live alone.  During his last admission here in 2017, he was seen by Dr. Storm for his peripheral arterial disease and underwent peripheral angiogram and percutaneous intervention to his left leg on 2017.      Patient underwent wound debridement, wound graft application and wound VAC application to his left transmetatarsal stump done by Dr. Campbell on 2017.    Review of Systems:     General ROS: Patient denies any  fevers, chills or loss of consciousness.  Respiratory ROS: Denies cough or shortness of breath.  Cardiovascular ROS: Denies chest pain or palpitations. No history of exertional chest pain.  Gastrointestinal ROS: Denies nausea and vomiting. Denies any abdominal pain. No diarrhea.  Neurological ROS: Denies any focal weakness. No loss of consciousness. Denies any numbness.     Vital Signs:    Temp:  [97.6 °F (36.4 °C)-98.6 °F (37 °C)] 98.3 °F (36.8 °C)  Heart Rate:  [59-87] 65  Resp:  [16-22] 16  BP: (109-138)/(51-75) 121/60    Intake and output:    I/O last 3 completed shifts:  In: 1840 [P.O.:1840]  Out: 3600 [Urine:3600]  I/O this shift:  In: -   Out: 200 [Urine:200]    Physical Examination:    General Appearance:  Alert and cooperative, not in any acute distress.   Head:  Atraumatic and normocephalic, without obvious abnormality.   Eyes:          PERRLA, conjunctivae and sclerae normal, no Icterus. No pallor. Extra-occular movements are within normal limits.   Neck: Supple, trachea midline, no thyromegaly, no carotid bruit.   Lungs:   Chest shape is normal. Breath sounds heard bilaterally equally.  No crackles or wheezing. No Pleural rub or bronchial breathing.   Heart:  Normal S1 and S2, no murmur, no gallop, no rub. No JVD   Abdomen:   Normal bowel sounds, no masses, no organomegaly. Soft       non-tender, obese, no guarding, no rebound tenderness.   Extremities: Moves all extremities well, 1+ edema, no cyanosis, no            clubbing.  Ace wrap with wound VAC is noted on the left foot. Hand deformities noted due to his rheumatoid arthritis.     Skin: No bleeding, bruising or rash.   Neurologic: Awake, alert and oriented times 3. Moves all 4 extremities equally.   Laboratory results:      Results from last 7 days  Lab Units 08/31/17  0643 08/30/17  0501 08/29/17  0604   SODIUM mmol/L 140 140 139   POTASSIUM mmol/L 3.9 5.3* 4.4   CHLORIDE mmol/L 111* 113* 110*   CO2 mmol/L 20.0* 17.0* 19.0*   BUN mg/dL 21* 14 10    CREATININE mg/dL 1.20 1.30 1.20   CALCIUM mg/dL 8.6 8.6 8.8   GLUCOSE mg/dL 126* 216* 87       Results from last 7 days  Lab Units 08/31/17  0643 08/30/17  0501 08/29/17  0604   WBC 10*3/mm3 3.23* 2.12* 3.65*   HEMOGLOBIN g/dL 8.3* 8.8* 8.3*   HEMATOCRIT % 26.1* 28.7* 27.0*   PLATELETS 10*3/mm3 121* 100* 136               Results from last 7 days  Lab Units 08/29/17  1723   WOUNDCX  Scant growth (1+) Providencia rettgeri*     I have reviewed the patient's laboratory results.    Radiology results:    Imaging Results (last 24 hours)     ** No results found for the last 24 hours. **        Medication Review:     I have reviewed the patients active and prn medications.     Principal Problem:    Subacute osteomyelitis of left foot  Active Problems:    Chronic hepatitis C virus infection    Coronary artery disease involving native coronary artery of native heart without angina pectoris    Chronic atrial fibrillation    Essential hypertension    Depression with anxiety    Gastroesophageal reflux disease with esophagitis    COPD (chronic obstructive pulmonary disease)    Rheumatoid arthritis    Peripheral vascular disease    Liver mass    Assessment:    1.  Acute infection and cellulitis of the left transmetatarsal amputation stump, present on admission.  2.  Peripheral arterial disease s/p left SFA stent and intervention to the peroneal arteries on 6/20/2017.  3.  Paroxysmal atrial fibrillation.  4.  Coronary artery disease on medical therapy.  5.  Essential hypertension.  6.  COPD, stable.  7.  Chronic hepatitis C infection.  8.  Rheumatoid arthritis.    Plan:    Patient is currently on IV antibiotic therapy with ceftriaxone 2 g daily.  He will be continued on wound VAC therapy.  He does not need any long-term IV antibiotic therapy at this time since there is no evidence of osteomyelitis and he has already had transmetatarsal amputation of the left foot.  Wound culture obtained during the debridement has grown  Providencia rettgeri which is sensitive to ceftriaxone and quinolones.  Unfortunately, there is no appropriate oral cephalosporin that was tested.  He has already received several days of IV antibiotic therapy and may benefit from Omnicef (cefdinir) which is a third generation cephalosporin for another one week as an outpatient.    Case management services are currently working to get him to short-term rehabilitation facility for continued wound care.  He will be transferred to short-term rehabilitation when bed is available.  If no facility except same then he will have to go home with home health.      Liang Polanco MD  09/03/17  2:33 PM    Portions of this note may have been completed with Dragon, a voice recognition program. Not all errors in transcription may have been detected prior to signing.

## 2017-09-03 NOTE — PLAN OF CARE
Problem: Infection, Risk/Actual (Adult)  Goal: Infection Prevention/Resolution  Outcome: Ongoing (interventions implemented as appropriate)    Problem: Patient Care Overview (Adult)  Goal: Plan of Care Review  Outcome: Ongoing (interventions implemented as appropriate)    09/03/17 0221   Coping/Psychosocial Response Interventions   Plan Of Care Reviewed With patient   Patient Care Overview   Progress progress toward functional goals as expected   Outcome Evaluation   Outcome Summary/Follow up Plan DRSG TO LEFT FOOT AND WOUND VAC IN PLACE, PAIN MANAGED WITH ORAL PAIN MEDS AS NEEDED.         Problem: Pain, Acute (Adult)  Goal: Acceptable Pain Control/Comfort Level  Outcome: Ongoing (interventions implemented as appropriate)

## 2017-09-04 LAB
ANION GAP SERPL CALCULATED.3IONS-SCNC: 12.8 MMOL/L
BASOPHILS # BLD AUTO: 0.01 10*3/MM3 (ref 0–0.2)
BASOPHILS NFR BLD AUTO: 0.3 % (ref 0–2.5)
BUN BLD-MCNC: 29 MG/DL (ref 7–20)
BUN/CREAT SERPL: 24.2 (ref 6.3–21.9)
CALCIUM SPEC-SCNC: 8.2 MG/DL (ref 8.4–10.2)
CHLORIDE SERPL-SCNC: 109 MMOL/L (ref 98–107)
CO2 SERPL-SCNC: 23 MMOL/L (ref 26–30)
CREAT BLD-MCNC: 1.2 MG/DL (ref 0.6–1.3)
DEPRECATED RDW RBC AUTO: 55.8 FL (ref 37–54)
EOSINOPHIL # BLD AUTO: 0.73 10*3/MM3 (ref 0–0.7)
EOSINOPHIL NFR BLD AUTO: 19 % (ref 0–7)
ERYTHROCYTE [DISTWIDTH] IN BLOOD BY AUTOMATED COUNT: 16.1 % (ref 11.5–14.5)
GFR SERPL CREATININE-BSD FRML MDRD: 62 ML/MIN/1.73
GLUCOSE BLD-MCNC: 104 MG/DL (ref 74–98)
HCT VFR BLD AUTO: 27.7 % (ref 42–52)
HGB BLD-MCNC: 8.6 G/DL (ref 14–18)
IMM GRANULOCYTES # BLD: 0.01 10*3/MM3 (ref 0–0.06)
IMM GRANULOCYTES NFR BLD: 0.3 % (ref 0–0.6)
LYMPHOCYTES # BLD AUTO: 1.14 10*3/MM3 (ref 0.6–3.4)
LYMPHOCYTES NFR BLD AUTO: 29.7 % (ref 10–50)
MCH RBC QN AUTO: 29.8 PG (ref 27–31)
MCHC RBC AUTO-ENTMCNC: 31 G/DL (ref 30–37)
MCV RBC AUTO: 95.8 FL (ref 80–94)
MONOCYTES # BLD AUTO: 0.26 10*3/MM3 (ref 0–0.9)
MONOCYTES NFR BLD AUTO: 6.8 % (ref 0–12)
NEUTROPHILS # BLD AUTO: 1.69 10*3/MM3 (ref 2–6.9)
NEUTROPHILS NFR BLD AUTO: 43.9 % (ref 37–80)
NRBC BLD MANUAL-RTO: 0 /100 WBC (ref 0–0)
PLATELET # BLD AUTO: 114 10*3/MM3 (ref 130–400)
PMV BLD AUTO: 10.7 FL (ref 6–12)
POTASSIUM BLD-SCNC: 3.8 MMOL/L (ref 3.5–5.1)
RBC # BLD AUTO: 2.89 10*6/MM3 (ref 4.7–6.1)
SODIUM BLD-SCNC: 141 MMOL/L (ref 137–145)
WBC NRBC COR # BLD: 3.84 10*3/MM3 (ref 4.8–10.8)

## 2017-09-04 PROCEDURE — 85025 COMPLETE CBC W/AUTO DIFF WBC: CPT | Performed by: INTERNAL MEDICINE

## 2017-09-04 PROCEDURE — 97530 THERAPEUTIC ACTIVITIES: CPT

## 2017-09-04 PROCEDURE — 80048 BASIC METABOLIC PNL TOTAL CA: CPT | Performed by: INTERNAL MEDICINE

## 2017-09-04 PROCEDURE — 97605 NEG PRS WND THER DME<=50SQCM: CPT

## 2017-09-04 PROCEDURE — 25010000002 ENOXAPARIN PER 10 MG: Performed by: INTERNAL MEDICINE

## 2017-09-04 PROCEDURE — 99232 SBSQ HOSP IP/OBS MODERATE 35: CPT | Performed by: INTERNAL MEDICINE

## 2017-09-04 PROCEDURE — 25010000002 CEFTRIAXONE: Performed by: INTERNAL MEDICINE

## 2017-09-04 PROCEDURE — 97110 THERAPEUTIC EXERCISES: CPT

## 2017-09-04 RX ADMIN — PANTOPRAZOLE SODIUM 40 MG: 40 TABLET, DELAYED RELEASE ORAL at 06:26

## 2017-09-04 RX ADMIN — ENOXAPARIN SODIUM 40 MG: 40 INJECTION SUBCUTANEOUS at 08:14

## 2017-09-04 RX ADMIN — DULOXETINE HYDROCHLORIDE 30 MG: 30 CAPSULE, DELAYED RELEASE ORAL at 08:15

## 2017-09-04 RX ADMIN — AMIODARONE HYDROCHLORIDE 200 MG: 200 TABLET ORAL at 08:14

## 2017-09-04 RX ADMIN — HYDROCODONE BITARTRATE AND ACETAMINOPHEN 1 TABLET: 10; 325 TABLET ORAL at 08:15

## 2017-09-04 RX ADMIN — CLOPIDOGREL BISULFATE 75 MG: 75 TABLET ORAL at 08:14

## 2017-09-04 RX ADMIN — TRAZODONE HYDROCHLORIDE 50 MG: 50 TABLET ORAL at 20:58

## 2017-09-04 RX ADMIN — Medication 10 ML: at 06:26

## 2017-09-04 RX ADMIN — Medication: at 13:05

## 2017-09-04 RX ADMIN — NICOTINE 1 PATCH: 14 PATCH TRANSDERMAL at 00:17

## 2017-09-04 RX ADMIN — HYDROCODONE BITARTRATE AND ACETAMINOPHEN 1 TABLET: 10; 325 TABLET ORAL at 02:59

## 2017-09-04 RX ADMIN — HYDROCODONE BITARTRATE AND ACETAMINOPHEN 1 TABLET: 10; 325 TABLET ORAL at 13:04

## 2017-09-04 RX ADMIN — METOPROLOL TARTRATE 25 MG: 25 TABLET ORAL at 17:05

## 2017-09-04 RX ADMIN — LORAZEPAM 1 MG: 0.5 TABLET ORAL at 23:22

## 2017-09-04 RX ADMIN — HYDROCODONE BITARTRATE AND ACETAMINOPHEN 1 TABLET: 10; 325 TABLET ORAL at 20:58

## 2017-09-04 RX ADMIN — COLCHICINE 0.6 MG: 0.6 TABLET, FILM COATED ORAL at 08:15

## 2017-09-04 RX ADMIN — CEFTRIAXONE 2 G: 2 INJECTION, POWDER, FOR SOLUTION INTRAMUSCULAR; INTRAVENOUS at 10:45

## 2017-09-04 RX ADMIN — HYDROCODONE BITARTRATE AND ACETAMINOPHEN 1 TABLET: 10; 325 TABLET ORAL at 17:05

## 2017-09-04 RX ADMIN — METOPROLOL TARTRATE 25 MG: 25 TABLET ORAL at 08:15

## 2017-09-04 RX ADMIN — LOSARTAN POTASSIUM AND HYDROCHLOROTHIAZIDE 1 TABLET: 50; 12.5 TABLET, FILM COATED ORAL at 08:16

## 2017-09-04 RX ADMIN — HYDROXYCHLOROQUINE SULFATE 200 MG: 200 TABLET, FILM COATED ORAL at 08:15

## 2017-09-04 RX ADMIN — ATORVASTATIN CALCIUM 40 MG: 40 TABLET, FILM COATED ORAL at 20:58

## 2017-09-04 NOTE — PLAN OF CARE
Problem: Patient Care Overview (Adult)  Goal: Plan of Care Review  Outcome: Ongoing (interventions implemented as appropriate)    09/04/17 1305   Coping/Psychosocial Response Interventions   Plan Of Care Reviewed With patient   Patient Care Overview   Progress improving   Outcome Evaluation   Outcome Summary/Follow up Plan See flow sheet for details. Pt presented with ther ability to perform sit to stand to sit transfers and maintain NWB staus however pt cont to not be able to ambulate and maintain it. Pt reported feeling more comfortable with sw therefore one was abtained for pt and rw was removed.          Problem: Inpatient Physical Therapy  Goal: Transfer Training Goal 1 LTG- PT  Outcome: Ongoing (interventions implemented as appropriate)    08/30/17 1330 09/04/17 1305   Transfer Training PT LTG   Transfer Training PT LTG, Date Established 08/30/17 --    Transfer Training PT LTG, Time to Achieve 2 wks --    Transfer Training PT LTG, Activity Type sit to stand/stand to sit;bed to chair /chair to bed --    Transfer Training PT LTG, [place] Level supervision required --    Transfer Training PT LTG, Assist Device walker, rolling --    Transfer Training PT LTG, Additional Goal NWB L LE --    Transfer Training PT LTG, Outcome --  goal ongoing       Goal: Gait Training Goal LTG- PT  Outcome: Ongoing (interventions implemented as appropriate)    08/30/17 1330 09/04/17 1305   Gait Training PT LTG   Gait Training Goal PT LTG, Date Established 08/30/17 --    Gait Training Goal PT LTG, Time to Achieve 2 wks --    Gait Training Goal PT LTG, [place] Level contact guard assist --    Gait Training Goal PT LTG, Assist Device walker, rolling --    Gait Training Goal PT LTG, Distance to Achieve 30 --    Gait Training Goal PT LTG, Additional Goal NWB L LE --    Gait Training Goal PT LTG, Outcome --  goal ongoing       Goal: Strength Goal LTG- PT  Outcome: Ongoing (interventions implemented as appropriate)    08/30/17 1330  09/04/17 1305   Strength Goal PT LTG   Strength Goal PT LTG, Date Established 08/30/17 --    Strength Goal PT LTG, Time to Achieve 2 wks --    Strength Goal PT LTG, Measure to Achieve Patient will perform ther ex x 15 reps to improve functional strength for mobility. --    Strength Goal PT LTG, Outcome --  goal ongoing

## 2017-09-04 NOTE — PLAN OF CARE
Problem: Patient Care Overview (Adult)  Goal: Plan of Care Review  Outcome: Ongoing (interventions implemented as appropriate)    09/04/17 1711   Coping/Psychosocial Response Interventions   Plan Of Care Reviewed With patient   Patient Care Overview   Progress no change   Outcome Evaluation   Outcome Summary/Follow up Plan See flow sheet for details. Little to no drainage noted. Wound bed continues to have healthy look with granulation tissue most notable at woundbed edges.          Problem: Inpatient Physical Therapy  Goal: Wound Care Goal 1 STG- PT  Outcome: Ongoing (interventions implemented as appropriate)    09/01/17 1435 09/04/17 1711   Wound Care PT STG   Wound Care PT STG 1, Date Established 09/01/17 --    Wound Care PT STG 1, Time to Achieve 2 wks --    Wound Care PT STG 1, Location L TMA site --    Wound Care PT STG 1, No S&S of Infection yes --    Wound Care PT STG 1, Decrease Wound Size Decrease wound dimensions by 0.5 cm in all measures. --    Wound Care PT STG 1, Outcome --  goal ongoing

## 2017-09-04 NOTE — THERAPY TREATMENT NOTE
Acute Care - Physical Therapy Treatment Note   [facility]     Patient Name: Vasquez Marie  : 1960  MRN: [id number]  Today's Date: 2017  Onset of Illness/Injury or Date of Surgery Date: 17  Date of Referral to PT: 17  Referring Physician: Adrian    Admit Date: 2017    Visit Dx:    ICD-10-CM ICD-9-CM   1. Subacute osteomyelitis of left foot M86.272 730.07   2. Impaired functional mobility, balance, gait, and endurance Z74.09 V49.89   3. Coronary artery disease involving native coronary artery of native heart without angina pectoris I25.10 414.01   4. Essential hypertension I10 401.9   5. Gastroesophageal reflux disease with esophagitis K21.0 530.11   6. Other emphysema J43.8 492.8   7. Peripheral vascular disease I73.9 443.9     Patient Active Problem List   Diagnosis   • Chronic hepatitis C virus infection   • Coronary artery disease involving native coronary artery of native heart without angina pectoris   • Chronic atrial fibrillation   • Essential hypertension   • Thrombocytopenia   • Depression with anxiety   • Gastroesophageal reflux disease with esophagitis   • Hyperlipidemia   • Tobacco abuse   • COPD (chronic obstructive pulmonary disease)   • Gout   • Congestive heart disease   • RLS (restless legs syndrome)   • Rheumatoid arthritis   • Typical atrial flutter   • Atrial flutter   • Cerebrovascular accident (CVA)   • Borderline diabetes   • Dry gangrene   • Anemia   • Peripheral vascular disease   • Subacute osteomyelitis of left foot   • Liver mass               Adult Rehabilitation Note       17 1021 17 1441       Rehab Assessment/Intervention    Discipline physical therapy assistant  -RM physical therapist  -JR     Document Type therapy note (daily note)  -RM therapy note (daily note)  -JR     Subjective Information agree to therapy;complains of;pain  -RM agree to therapy;no complaints  -JR     Patient Effort, Rehab Treatment fair  -RM adequate  -JR     Symptoms  Noted During/After Treatment increased pain  -RM none  -JR     Precautions/Limitations non-weight bearing status  -RM non-weight bearing status   LLE  -JR     Patient Response to Treatment  Patient is eager to get stronger and imporve mobility  -JR     Recorded by [RM] Dipak Scott PTA [JR] Sia Whaley, PT     Pain Assessment    Pain Assessment 0-10  -RM 0-10  -JR     Pain Score 8  -RM 6  -JR     Post Pain Score 8  -RM 6  -JR     Pain Type Acute pain  -RM Acute pain  -JR     Pain Location Foot  -RM Foot  -JR     Pain Orientation Left  -RM Left  -JR     Pain Intervention(s) Repositioned;Ambulation/increased activity  -RM Repositioned  -JR     Response to Interventions no increase in pain.   -RM no pain increase  -JR     Recorded by [RM] Dipak Scott PTA [JR] Sia Whaley, PT     Cognitive Assessment/Intervention    Current Cognitive/Communication Assessment  functional  -JR     Orientation Status  oriented x 4  -JR     Follows Commands/Answers Questions  able to follow multi-step instructions  -JR     Personal Safety  decreased awareness, need for safety;decreased awareness, need for assist  -JR     Personal Safety Interventions  fall prevention program maintained;gait belt  -JR     Recorded by  [JR] Sia Whaley, PT     ROM (Range of Motion)    General ROM  no range of motion deficits identified  -JR     Recorded by  [JR] Sia Whaley PT     MMT (Manual Muscle Testing)    General MMT Assessment  no strength deficits identified  -JR     Recorded by  [JR] Sia Whaley, PT     Mobility Assessment/Training    Left Lower Extremity Weight-Bearing non weight-bearing  -RM      Recorded by [RM] Dipak Scott PTA      Transfer Assessment/Treatment    Transfers, Bed-Chair [place]  contact guard assist  -JR     Transfers, Sit-Stand [place] contact guard assist  -RM contact guard assist  -JR     Transfers, Stand-Sit [place] contact guard assist  -RM contact guard assist  -JR     Transfers,  Sit-Stand-Sit, Assist Device standard walker  -RM rolling walker  -JR     Transfer, Maintain Weight Bearing Status able to maintain weight bearing status  -RM unable to maintain weight bearing status  -JR     Transfer, Safety Issues balance decreased during turns  -RM balance decreased during turns;sequencing ability decreased;impulsivity  -JR     Transfer, Impairments  unable to maintain weight bearing status;impaired balance  -JR     Recorded by [RM] Dipak Scott PTA [JR] Sia Whaley, PT     Gait Assessment/Treatment    Gait, [place] Level contact guard assist;minimum assist (75% patient effort)  -RM contact guard assist  -JR     Gait, Assistive Device standard walker  -RM rolling walker  -JR     Gait, Distance (Feet) 3  -RM 10  -JR     Gait, Gait Deviations  --   unable to maintain NWB  -JR     Gait, Maintain Weight Bearing Status unable to maintain weight bearing status  -RM unable to maintain weight bearing status  -JR     Gait, Safety Issues balance decreased during turns  -RM      Gait, Impairments strength decreased;impaired balance;pain  -RM      Recorded by [RM] Dipak Scott PTA [JR] Sia Whaley, PT     Therapy Exercises    Bilateral Lower Extremities AROM:;10 reps;sitting;ankle pumps/circles;heel slides;LAQ  -RM AROM:;15 reps;ankle pumps/circles;heel raises;hip abduction/adduction;hip flexion;LAQ;SLR  -JR     BLE Resistance manual resistance- moderate   with hip extension   -RM      Bilateral Upper Extremity AROM:;10 reps;sitting;shoulder protraction/retraction  -RM      BUE Resistance manual resistance- moderate  -RM      Recorded by [RM] Dipak Scott PTA [JR] Sia Whaley, PT     Positioning and Restraints    Pre-Treatment Position sitting in chair/recliner  -RM in bed  -JR     Post Treatment Position chair  -RM chair  -JR     In Chair reclined;call light within reach;encouraged to call for assist;LLE elevated;notified nsg  -RM sitting;call light within reach;encouraged to call for  assist  -JR     Recorded by [RM] Dipak Scott, PTA [JR] Sia Whaley, PT       User Key  (r) = Recorded By, (t) = Taken By, (c) = Cosigned By    Initials Name Effective Dates    JR Sia Whaley, PT 10/26/16 -     RM Dipak Scott, PTA 10/26/16 -                 IP PT Goals       09/04/17 1305 09/03/17 1601 09/01/17 1435    Transfer Training PT LTG    Transfer Training PT LTG, Outcome goal ongoing  -RM goal ongoing  -JR     Gait Training PT LTG    Gait Training Goal PT LTG, Outcome goal ongoing  -RM goal ongoing  -JR     Strength Goal PT LTG    Strength Goal PT LTG, Outcome goal ongoing  -RM goal ongoing  -JR     Wound Care PT STG    Wound Care PT STG 1, Date Established   09/01/17  -LM    Wound Care PT STG 1, Time to Achieve   2 wks  -LM    Wound Care PT STG 1, Location   L TMA site  -LM    Wound Care PT STG 1, No S&S of Infection   yes  -LM    Wound Care PT STG 1, Decrease Wound Size   Decrease wound dimensions by 0.5 cm in all measures.  -LM    Wound Care PT STG 1, Outcome   goal ongoing  -LM      08/30/17 1330          Transfer Training PT LTG    Transfer Training PT LTG, Date Established 08/30/17  -LM      Transfer Training PT LTG, Time to Achieve 2 wks  -LM      Transfer Training PT LTG, Activity Type sit to stand/stand to sit;bed to chair /chair to bed  -LM      Transfer Training PT LTG, [place] Level supervision required  -LM      Transfer Training PT LTG, Assist Device walker, rolling  -LM      Transfer Training PT LTG, Additional Goal NWB L LE  -LM      Transfer Training PT LTG, Outcome goal ongoing  -LM      Gait Training PT LTG    Gait Training Goal PT LTG, Date Established 08/30/17  -LM      Gait Training Goal PT LTG, Time to Achieve 2 wks  -LM      Gait Training Goal PT LTG, [place] Level contact guard assist  -LM      Gait Training Goal PT LTG, Assist Device walker, rolling  -LM      Gait Training Goal PT LTG, Distance to Achieve 30  -LM      Gait Training Goal PT LTG, Additional Goal  NWB L LE  -LM      Gait Training Goal PT LTG, Outcome goal ongoing  -LM      Strength Goal PT LTG    Strength Goal PT LTG, Date Established 08/30/17  -LM      Strength Goal PT LTG, Time to Achieve 2 wks  -LM      Strength Goal PT LTG, Measure to Achieve Patient will perform ther ex x 15 reps to improve functional strength for mobility.  -LM      Strength Goal PT LTG, Outcome goal ongoing  -LM        User Key  (r) = Recorded By, (t) = Taken By, (c) = Cosigned By    Initials Name Provider Type    JR Sia Whaley, PT Physical Therapist    LM Cristal Martinez, PT Physical Therapist    RM Dipak Scott, PTA Physical Therapy Assistant          Physical Therapy Education     Title: PT OT SLP Therapies (Done)     Topic: Physical Therapy (Done)     Point: Mobility training (Done)    Learning Progress Summary    Learner Readiness Method Response Comment Documented by Status   Patient Acceptance E,D VU,NR   09/04/17 1305 Done    Acceptance E VU Purpose of PT/POC;  importance of NWB L LE for wound healing; proper use of RW for safe transfers/ambulation.  08/30/17 1330 Done               Point: Home exercise program (Done)    Learning Progress Summary    Learner Readiness Method Response Comment Documented by Status   Patient Acceptance E,D VU,NR   09/04/17 1305 Done    Acceptance E VU Purpose of PT/POC;  importance of NWB L LE for wound healing; proper use of RW for safe transfers/ambulation.  08/30/17 1330 Done               Point: Precautions (Done)    Learning Progress Summary    Learner Readiness Method Response Comment Documented by Status   Patient Acceptance E,D VU,NR   09/04/17 1305 Done    Acceptance E VU Importance of maintaining NWB on LLE  09/03/17 1559 Done    Acceptance E VU Purpose of wound vac dressing change.  09/01/17 1434 Done    Acceptance E VU Purpose of PT/POC;  importance of NWB L LE for wound healing; proper use of RW for safe transfers/ambulation.  08/30/17 1330 Done                       User Key     Initials Effective Dates Name Provider Type Discipline    JR 10/26/16 -  Sia Whaley, PT Physical Therapist PT    LM 10/26/16 -  Cristal Martinez PT Physical Therapist PT    RM 10/26/16 -  Dipak Scott PTA Physical Therapy Assistant PT                    PT Recommendation and Plan  Anticipated Discharge Disposition: inpatient rehabilitation facility  Planned Therapy Interventions: balance training, bed mobility training, gait training, home exercise program, patient/family education, strengthening, transfer training  PT Frequency: daily  Plan of Care Review  Plan Of Care Reviewed With: patient  Progress: improving  Outcome Summary/Follow up Plan: See flow sheet for details. Pt presented with ther ability to perform sit to stand to sit transfers and maintain NWB staus however pt cont to not be able to ambulate and maintain it.  Pt reported feeling more comfortable with sw therefore one was abtained for pt and rw was removed.           Outcome Measures       09/03/17 1441          How much help from another person do you currently need...    Turning from your back to your side while in flat bed without using bedrails? 4  -JR      Moving from lying on back to sitting on the side of a flat bed without bedrails? 4  -JR      Moving to and from a bed to a chair (including a wheelchair)? 3  -JR      Standing up from a chair using your arms (e.g., wheelchair, bedside chair)? 3  -JR      Climbing 3-5 steps with a railing? 2  -JR      To walk in hospital room? 2  -JR      AM-PAC 6 Clicks Score 18  -JR      Functional Assessment    Outcome Measure Options AM-PAC 6 Clicks Basic Mobility (PT)  -JR        User Key  (r) = Recorded By, (t) = Taken By, (c) = Cosigned By    Initials Name Provider Type    JR Sia Whaley, PT Physical Therapist           Time Calculation:         PT Charges       09/04/17 1309          Time Calculation    Start Time 1021  -RM      PT Received On 09/04/17  -RM      PT Goal Re-Cert Due Date  09/11/17  -RM      Time Calculation- PT    Total Timed Code Minutes- PT 29 minute(s)  -RM        User Key  (r) = Recorded By, (t) = Taken By, (c) = Cosigned By    Initials Name Provider Type     Dipak Scott PTA Physical Therapy Assistant          Therapy Charges for Today     Code Description Service Date Service Provider Modifiers Qty    37760266090 HC PT THERAPEUTIC ACT EA 15 MIN 9/4/2017 Dipak Scott PTA GP 1    27043854241 HC PT THER PROC EA 15 MIN 9/4/2017 Dipak Scott PTA GP 1          PT G-Codes  Outcome Measure Options: AM-PAC 6 Clicks Basic Mobility (PT)    Dipak Scott PTA  9/4/2017

## 2017-09-04 NOTE — PROGRESS NOTES
[facility]    Name:  Vasquez Marie   Age:  56 y.o.  Sex:  male  :  1960  MRN:  [id number]   Visit Number:  [id number]  Admission Date:  2017  Date Of Service:  17  Primary Care Physician:  Marilyn K. Vermeesch, MD     LOS: 10 days :  Patient Care Team:  Marilyn K Vermeesch, MD as PCP - General (Internal Medicine):    Chief Complaint:      Foot pain    Subjective / Interval History:     No current f/c/s.  Does have current foot pain improved on pain medicine. + generalized weakness. No n/v, abdominal pain, soa or cp.     I have reviewed labs/imaging/records from this hospitalization, including ER staff and admitting/attending physicians H/P's and progress notes to establish a comprehensive understanding of this patient's clinical hospital course, as well as to establish a transition of care appropriately.    Vital Signs:    Temp:  [97.4 °F (36.3 °C)-98.1 °F (36.7 °C)] 97.4 °F (36.3 °C)  Heart Rate:  [59-76] 59  Resp:  [18] 18  BP: (107-123)/(57-86) 115/64    Intake and output:    Intake/Output       17 0700 - 17 0659 17 0700 - 17 0659    Intake (ml) 780 720    Output (ml) 1525 --    Net (ml) -745 720          Physical Examination:    General Appearance:  Alert and cooperative, not in any acute distress.   Head:  Atraumatic and normocephalic, without obvious abnormality.   Eyes:      PERRLA, Extra-occular movements are within normal limits.   Lungs:   Chest shape is normal. Breath sounds heard bilaterally equally.  No crackles or wheezing.   Heart:  Normal S1 and S2, no murmur, no gallop, no rub.   Abdomen:   Normal bowel sounds,  Soft non-tender, non-distended, no guarding, no rebound tenderness   Extremities: ACE wrap around left foot with wound VAC in place.                  Laboratory results:      Results from last 7 days  Lab Units 17  0543 17  0643 17  0501   SODIUM mmol/L 141 140 140   POTASSIUM  mmol/L 3.8 3.9 5.3*   CHLORIDE mmol/L 109* 111* 113*   CO2 mmol/L 23.0* 20.0* 17.0*   BUN mg/dL 29* 21* 14   CREATININE mg/dL 1.20 1.20 1.30   CALCIUM mg/dL 8.2* 8.6 8.6   GLUCOSE mg/dL 104* 126* 216*       Results from last 7 days  Lab Units 09/04/17  0543 08/31/17  0643 08/30/17  0501 08/29/17  0604   WBC 10*3/mm3 3.84* 3.23* 2.12* 3.65*   HEMOGLOBIN g/dL 8.6* 8.3* 8.8* 8.3*   HEMATOCRIT % 27.7* 26.1* 28.7* 27.0*   PLATELETS 10*3/mm3 114* 121* 100* 136   MONOCYTES % %  --   --   --  10.0               I have reviewed the patient's laboratory results.    Radiology results:    Imaging Results (last 24 hours)     ** No results found for the last 24 hours. **          I have reviewed the patient's radiology reports.    Medication Review:     I have reviewed the patients active and prn medications.       Assessment/Plan:     1.  Acute infection and cellulitis of the left transmetatarsal amputation stump, present on admission.  2.  Peripheral arterial disease s/p left SFA stent and intervention to the peroneal arteries on 6/20/2017.  3.  Paroxysmal atrial fibrillation.  4.  Coronary artery disease on medical therapy.  5.  Essential hypertension.  6.  COPD, stable.  7.  Chronic hepatitis C infection.  8.  Rheumatoid arthritis.     Patient is currently on IV antibiotic therapy with ceftriaxone 2 g daily.  He will be continued on wound VAC therapy.  He does not need any long-term IV antibiotic therapy at this time since there is no evidence of osteomyelitis and he has already had transmetatarsal amputation of the left foot.  Wound culture obtained during the debridement has grown Providencia rettgeri which is sensitive to ceftriaxone and quinolones.  Case management services are currently working to get him to short-term rehabilitation facility for continued wound care.  He will be transferred to short-term rehabilitation when bed is available.  If no facility except same then he will have to go home with home  health.       Alen Potter MD  09/04/17  2:28 PM

## 2017-09-04 NOTE — PLAN OF CARE
Problem: Infection, Risk/Actual (Adult)  Goal: Infection Prevention/Resolution  Outcome: Ongoing (interventions implemented as appropriate)    Problem: Patient Care Overview (Adult)  Goal: Plan of Care Review  Outcome: Ongoing (interventions implemented as appropriate)    09/03/17 7947   Coping/Psychosocial Response Interventions   Plan Of Care Reviewed With patient   Patient Care Overview   Progress improving   Outcome Evaluation   Outcome Summary/Follow up Plan PT CON'T TO REQUIRE FREQUENT PAIN MED. DRSG AND WOUND VAC IN PLACE.         Problem: Pain, Acute (Adult)  Goal: Acceptable Pain Control/Comfort Level  Outcome: Ongoing (interventions implemented as appropriate)

## 2017-09-04 NOTE — PLAN OF CARE
Problem: Infection, Risk/Actual (Adult)  Goal: Infection Prevention/Resolution  Outcome: Ongoing (interventions implemented as appropriate)    Problem: Patient Care Overview (Adult)  Goal: Plan of Care Review  Outcome: Ongoing (interventions implemented as appropriate)    09/04/17 1746   Coping/Psychosocial Response Interventions   Plan Of Care Reviewed With patient   Patient Care Overview   Progress no change       Goal: Adult Individualization and Mutuality  Outcome: Ongoing (interventions implemented as appropriate)  Goal: Discharge Needs Assessment  Outcome: Ongoing (interventions implemented as appropriate)    Problem: Pain, Acute (Adult)  Goal: Acceptable Pain Control/Comfort Level  Outcome: Ongoing (interventions implemented as appropriate)

## 2017-09-04 NOTE — THERAPY TREATMENT NOTE
Acute Care - Physical Therapy Treatment Note   [facility]     Patient Name: Vasquez Marie  : 1960  MRN: [id number]  Today's Date: 2017  Onset of Illness/Injury or Date of Surgery Date: 17  Date of Referral to PT: 17  Referring Physician: Adrian    Admit Date: 2017    Visit Dx:    ICD-10-CM ICD-9-CM   1. Subacute osteomyelitis of left foot M86.272 730.07   2. Impaired functional mobility, balance, gait, and endurance Z74.09 V49.89   3. Coronary artery disease involving native coronary artery of native heart without angina pectoris I25.10 414.01   4. Essential hypertension I10 401.9   5. Gastroesophageal reflux disease with esophagitis K21.0 530.11   6. Other emphysema J43.8 492.8   7. Peripheral vascular disease I73.9 443.9     Patient Active Problem List   Diagnosis   • Chronic hepatitis C virus infection   • Coronary artery disease involving native coronary artery of native heart without angina pectoris   • Chronic atrial fibrillation   • Essential hypertension   • Thrombocytopenia   • Depression with anxiety   • Gastroesophageal reflux disease with esophagitis   • Hyperlipidemia   • Tobacco abuse   • COPD (chronic obstructive pulmonary disease)   • Gout   • Congestive heart disease   • RLS (restless legs syndrome)   • Rheumatoid arthritis   • Typical atrial flutter   • Atrial flutter   • Cerebrovascular accident (CVA)   • Borderline diabetes   • Dry gangrene   • Anemia   • Peripheral vascular disease   • Subacute osteomyelitis of left foot   • Liver mass               Adult Rehabilitation Note       17 1021 17 1441       Rehab Assessment/Intervention    Discipline physical therapy assistant  -RM physical therapist  -JR     Document Type therapy note (daily note)  -RM therapy note (daily note)  -JR     Subjective Information agree to therapy;complains of;pain  -RM agree to therapy;no complaints  -JR     Patient Effort, Rehab Treatment fair  -RM adequate  -JR     Symptoms  Noted During/After Treatment increased pain  -RM none  -JR     Precautions/Limitations non-weight bearing status  -RM non-weight bearing status   LLE  -JR     Patient Response to Treatment  Patient is eager to get stronger and imporve mobility  -JR     Recorded by [RM] Dipak Scott PTA [JR] Sia Whaley, PT     Pain Assessment    Pain Assessment 0-10  -RM 0-10  -JR     Pain Score 8  -RM 6  -JR     Post Pain Score 8  -RM 6  -JR     Pain Type Acute pain  -RM Acute pain  -JR     Pain Location Foot  -RM Foot  -JR     Pain Orientation Left  -RM Left  -JR     Pain Intervention(s) Repositioned;Ambulation/increased activity  -RM Repositioned  -JR     Response to Interventions no increase in pain.   -RM no pain increase  -JR     Recorded by [RM] Dipak Scott PTA [JR] Sia Whaley, PT     Cognitive Assessment/Intervention    Current Cognitive/Communication Assessment  functional  -JR     Orientation Status  oriented x 4  -JR     Follows Commands/Answers Questions  able to follow multi-step instructions  -JR     Personal Safety  decreased awareness, need for safety;decreased awareness, need for assist  -JR     Personal Safety Interventions  fall prevention program maintained;gait belt  -JR     Recorded by  [JR] Sia Whaley, PT     ROM (Range of Motion)    General ROM  no range of motion deficits identified  -JR     Recorded by  [JR] Sia Whaley PT     MMT (Manual Muscle Testing)    General MMT Assessment  no strength deficits identified  -JR     Recorded by  [JR] Sia Whaley, PT     Mobility Assessment/Training    Left Lower Extremity Weight-Bearing non weight-bearing  -RM      Recorded by [RM] Dipak Scott PTA      Transfer Assessment/Treatment    Transfers, Bed-Chair [place]  contact guard assist  -JR     Transfers, Sit-Stand [place] contact guard assist  -RM contact guard assist  -JR     Transfers, Stand-Sit [place] contact guard assist  -RM contact guard assist  -JR     Transfers,  Sit-Stand-Sit, Assist Device standard walker  -RM rolling walker  -JR     Transfer, Maintain Weight Bearing Status able to maintain weight bearing status  -RM unable to maintain weight bearing status  -JR     Transfer, Safety Issues balance decreased during turns  -RM balance decreased during turns;sequencing ability decreased;impulsivity  -JR     Transfer, Impairments  unable to maintain weight bearing status;impaired balance  -JR     Recorded by [RM] Dipak Scott PTA [JR] Sia Whaley, PT     Gait Assessment/Treatment    Gait, [place] Level contact guard assist;minimum assist (75% patient effort)  -RM contact guard assist  -JR     Gait, Assistive Device standard walker  -RM rolling walker  -JR     Gait, Distance (Feet) 3  -RM 10  -JR     Gait, Gait Deviations  --   unable to maintain NWB  -JR     Gait, Maintain Weight Bearing Status unable to maintain weight bearing status  -RM unable to maintain weight bearing status  -JR     Gait, Safety Issues balance decreased during turns  -RM      Gait, Impairments strength decreased;impaired balance;pain  -RM      Recorded by [RM] Dipak Scott PTA [JR] Sia Whaley, PT     Therapy Exercises    Bilateral Lower Extremities AROM:;10 reps;sitting;ankle pumps/circles;heel slides;LAQ  -RM AROM:;15 reps;ankle pumps/circles;heel raises;hip abduction/adduction;hip flexion;LAQ;SLR  -JR     BLE Resistance manual resistance- moderate   with hip extension   -RM      Bilateral Upper Extremity AROM:;10 reps;sitting;shoulder protraction/retraction  -RM      BUE Resistance manual resistance- moderate  -RM      Recorded by [RM] iDpak Scott PTA [JR] Sia Whaley, PT     Positioning and Restraints    Pre-Treatment Position sitting in chair/recliner  -RM in bed  -JR     Post Treatment Position chair  -RM chair  -JR     In Chair reclined;call light within reach;encouraged to call for assist;LLE elevated;notified nsg  -RM sitting;call light within reach;encouraged to call for  assist  -JR     Recorded by [RM] Dipak Scott, PTA [JR] Sia Whaley, PT       User Key  (r) = Recorded By, (t) = Taken By, (c) = Cosigned By    Initials Name Effective Dates    JR Sia Whaley, PT 10/26/16 -     RM Dipak Scott, PTA 10/26/16 -                 IP PT Goals       09/04/17 1711 09/04/17 1305 09/03/17 1601    Transfer Training PT LTG    Transfer Training PT LTG, Outcome  goal ongoing  -RM goal ongoing  -JR    Gait Training PT LTG    Gait Training Goal PT LTG, Outcome  goal ongoing  -RM goal ongoing  -JR    Strength Goal PT LTG    Strength Goal PT LTG, Outcome  goal ongoing  -RM goal ongoing  -JR    Wound Care PT STG    Wound Care PT STG 1, Outcome goal ongoing  -RM        09/01/17 1435 08/30/17 1330       Transfer Training PT LTG    Transfer Training PT LTG, Date Established  08/30/17  -LM     Transfer Training PT LTG, Time to Achieve  2 wks  -LM     Transfer Training PT LTG, Activity Type  sit to stand/stand to sit;bed to chair /chair to bed  -LM     Transfer Training PT LTG, [place] Level  supervision required  -LM     Transfer Training PT LTG, Assist Device  walker, rolling  -LM     Transfer Training PT LTG, Additional Goal  NWB L LE  -LM     Transfer Training PT LTG, Outcome  goal ongoing  -LM     Gait Training PT LTG    Gait Training Goal PT LTG, Date Established  08/30/17  -LM     Gait Training Goal PT LTG, Time to Achieve  2 wks  -LM     Gait Training Goal PT LTG, [place] Level  contact guard assist  -LM     Gait Training Goal PT LTG, Assist Device  walker, rolling  -LM     Gait Training Goal PT LTG, Distance to Achieve  30  -LM     Gait Training Goal PT LTG, Additional Goal  NWB L LE  -LM     Gait Training Goal PT LTG, Outcome  goal ongoing  -LM     Strength Goal PT LTG    Strength Goal PT LTG, Date Established  08/30/17  -LM     Strength Goal PT LTG, Time to Achieve  2 wks  -LM     Strength Goal PT LTG, Measure to Achieve  Patient will perform ther ex x 15 reps to improve  functional strength for mobility.  -LM     Strength Goal PT LTG, Outcome  goal ongoing  -LM     Wound Care PT STG    Wound Care PT STG 1, Date Established 09/01/17  -LM      Wound Care PT STG 1, Time to Achieve 2 wks  -LM      Wound Care PT STG 1, Location L TMA site  -LM      Wound Care PT STG 1, No S&S of Infection yes  -LM      Wound Care PT STG 1, Decrease Wound Size Decrease wound dimensions by 0.5 cm in all measures.  -LM      Wound Care PT STG 1, Outcome goal ongoing  -LM        User Key  (r) = Recorded By, (t) = Taken By, (c) = Cosigned By    Initials Name Provider Type    JR Sia Whaley, PT Physical Therapist    LM Cristal Martinez, PT Physical Therapist    RM Dipak Scott, PTA Physical Therapy Assistant          Physical Therapy Education     Title: PT OT SLP Therapies (Done)     Topic: Physical Therapy (Done)     Point: Mobility training (Done)    Learning Progress Summary    Learner Readiness Method Response Comment Documented by Status   Patient Acceptance REECE ROMONR   09/04/17 1305 Done    Acceptance E VU Purpose of PT/POC;  importance of NWB L LE for wound healing; proper use of RW for safe transfers/ambulation.  08/30/17 1330 Done               Point: Home exercise program (Done)    Learning Progress Summary    Learner Readiness Method Response Comment Documented by Status   Patient Acceptance REECE ROMO NR   09/04/17 1305 Done    Acceptance E VU Purpose of PT/POC;  importance of NWB L LE for wound healing; proper use of RW for safe transfers/ambulation.  08/30/17 1330 Done               Point: Precautions (Done)    Learning Progress Summary    Learner Readiness Method Response Comment Documented by Status   Patient Acceptance REECE ROMONR   09/04/17 1305 Done    Acceptance E VU Importance of maintaining NWB on LLE JR 09/03/17 1559 Done    Acceptance E VU Purpose of wound vac dressing change.  09/01/17 1434 Done    Acceptance E VU Purpose of PT/POC;  importance of NWB L LE for wound healing;  proper use of RW for safe transfers/ambulation.  08/30/17 1330 Done                      User Key     Initials Effective Dates Name Provider Type Discipline     10/26/16 -  Sia Whaley, PT Physical Therapist PT    LM 10/26/16 -  Cristal Martinez, PT Physical Therapist PT     10/26/16 -  Dipak Scott PTA Physical Therapy Assistant PT                    PT Recommendation and Plan  Anticipated Discharge Disposition: inpatient rehabilitation facility  Planned Therapy Interventions: balance training, bed mobility training, gait training, home exercise program, patient/family education, strengthening, transfer training  PT Frequency: daily  Plan of Care Review  Plan Of Care Reviewed With: patient  Progress: no change  Outcome Summary/Follow up Plan: See flow sheet for details. Little to no drainage noted. Wound bed continues to have healthy look with granulation tissue most notable at woundbed edges.            Outcome Measures       09/03/17 1441          How much help from another person do you currently need...    Turning from your back to your side while in flat bed without using bedrails? 4  -JR      Moving from lying on back to sitting on the side of a flat bed without bedrails? 4  -JR      Moving to and from a bed to a chair (including a wheelchair)? 3  -JR      Standing up from a chair using your arms (e.g., wheelchair, bedside chair)? 3  -JR      Climbing 3-5 steps with a railing? 2  -JR      To walk in hospital room? 2  -JR      AM-PAC 6 Clicks Score 18  -JR      Functional Assessment    Outcome Measure Options AM-PAC 6 Clicks Basic Mobility (PT)  -JR        User Key  (r) = Recorded By, (t) = Taken By, (c) = Cosigned By    Initials Name Provider Type    JR Sia Whaley, PT Physical Therapist           Time Calculation:         PT Charges       09/04/17 1708 09/04/17 1309       Time Calculation    Start Time 1320  -RM 1021  -RM     PT Received On 09/04/17  -RM 09/04/17  -RM     PT Goal Re-Cert Due Date  09/11/17  -RM 09/11/17  -RM     Time Calculation- PT    Total Timed Code Minutes- PT 80 minute(s)  -RM 29 minute(s)  -RM       User Key  (r) = Recorded By, (t) = Taken By, (c) = Cosigned By    Initials Name Provider Type     Dipak Scott PTA Physical Therapy Assistant          Therapy Charges for Today     Code Description Service Date Service Provider Modifiers Qty    49888256053 HC PT THERAPEUTIC ACT EA 15 MIN 9/4/2017 Dipak Scott PTA GP 1    84655141874 HC PT THER PROC EA 15 MIN 9/4/2017 Dipak Scott PTA GP 1    95877400645 HC PT NEG PRESS WOUND TO 50SQCM DME1 9/4/2017 Dipak Scott PTA  1          PT G-Codes  Outcome Measure Options: AM-PAC 6 Clicks Basic Mobility (PT)    Dipak Scott PTA  9/4/2017

## 2017-09-05 LAB
ANION GAP SERPL CALCULATED.3IONS-SCNC: 12.7 MMOL/L
BASOPHILS # BLD AUTO: 0.01 10*3/MM3 (ref 0–0.2)
BASOPHILS NFR BLD AUTO: 0.3 % (ref 0–2.5)
BUN BLD-MCNC: 33 MG/DL (ref 7–20)
BUN/CREAT SERPL: 27.5 (ref 6.3–21.9)
CALCIUM SPEC-SCNC: 8.4 MG/DL (ref 8.4–10.2)
CHLORIDE SERPL-SCNC: 107 MMOL/L (ref 98–107)
CO2 SERPL-SCNC: 24 MMOL/L (ref 26–30)
CREAT BLD-MCNC: 1.2 MG/DL (ref 0.6–1.3)
DEPRECATED RDW RBC AUTO: 55.3 FL (ref 37–54)
EOSINOPHIL # BLD AUTO: 0.76 10*3/MM3 (ref 0–0.7)
EOSINOPHIL NFR BLD AUTO: 19.9 % (ref 0–7)
ERYTHROCYTE [DISTWIDTH] IN BLOOD BY AUTOMATED COUNT: 16 % (ref 11.5–14.5)
GFR SERPL CREATININE-BSD FRML MDRD: 62 ML/MIN/1.73
GLUCOSE BLD-MCNC: 110 MG/DL (ref 74–98)
HCT VFR BLD AUTO: 26.9 % (ref 42–52)
HGB BLD-MCNC: 8.4 G/DL (ref 14–18)
IMM GRANULOCYTES # BLD: 0.01 10*3/MM3 (ref 0–0.06)
IMM GRANULOCYTES NFR BLD: 0.3 % (ref 0–0.6)
LYMPHOCYTES # BLD AUTO: 1.17 10*3/MM3 (ref 0.6–3.4)
LYMPHOCYTES NFR BLD AUTO: 30.6 % (ref 10–50)
MCH RBC QN AUTO: 29.4 PG (ref 27–31)
MCHC RBC AUTO-ENTMCNC: 31.2 G/DL (ref 30–37)
MCV RBC AUTO: 94.1 FL (ref 80–94)
MONOCYTES # BLD AUTO: 0.27 10*3/MM3 (ref 0–0.9)
MONOCYTES NFR BLD AUTO: 7.1 % (ref 0–12)
NEUTROPHILS # BLD AUTO: 1.6 10*3/MM3 (ref 2–6.9)
NEUTROPHILS NFR BLD AUTO: 41.8 % (ref 37–80)
NRBC BLD MANUAL-RTO: 0 /100 WBC (ref 0–0)
PLATELET # BLD AUTO: 116 10*3/MM3 (ref 130–400)
PMV BLD AUTO: 11 FL (ref 6–12)
POTASSIUM BLD-SCNC: 3.7 MMOL/L (ref 3.5–5.1)
RBC # BLD AUTO: 2.86 10*6/MM3 (ref 4.7–6.1)
SODIUM BLD-SCNC: 140 MMOL/L (ref 137–145)
WBC NRBC COR # BLD: 3.82 10*3/MM3 (ref 4.8–10.8)

## 2017-09-05 PROCEDURE — 25010000002 CEFTRIAXONE: Performed by: INTERNAL MEDICINE

## 2017-09-05 PROCEDURE — 94799 UNLISTED PULMONARY SVC/PX: CPT

## 2017-09-05 PROCEDURE — 85025 COMPLETE CBC W/AUTO DIFF WBC: CPT | Performed by: INTERNAL MEDICINE

## 2017-09-05 PROCEDURE — 25010000002 ENOXAPARIN PER 10 MG: Performed by: INTERNAL MEDICINE

## 2017-09-05 PROCEDURE — 80048 BASIC METABOLIC PNL TOTAL CA: CPT | Performed by: INTERNAL MEDICINE

## 2017-09-05 PROCEDURE — 97110 THERAPEUTIC EXERCISES: CPT

## 2017-09-05 PROCEDURE — 99232 SBSQ HOSP IP/OBS MODERATE 35: CPT | Performed by: INTERNAL MEDICINE

## 2017-09-05 PROCEDURE — 97530 THERAPEUTIC ACTIVITIES: CPT

## 2017-09-05 RX ADMIN — HYDROCODONE BITARTRATE AND ACETAMINOPHEN 1 TABLET: 10; 325 TABLET ORAL at 20:05

## 2017-09-05 RX ADMIN — DULOXETINE HYDROCHLORIDE 30 MG: 30 CAPSULE, DELAYED RELEASE ORAL at 09:14

## 2017-09-05 RX ADMIN — CLOPIDOGREL BISULFATE 75 MG: 75 TABLET ORAL at 09:14

## 2017-09-05 RX ADMIN — ENOXAPARIN SODIUM 40 MG: 40 INJECTION SUBCUTANEOUS at 09:15

## 2017-09-05 RX ADMIN — CEFTRIAXONE 2 G: 2 INJECTION, POWDER, FOR SOLUTION INTRAMUSCULAR; INTRAVENOUS at 11:20

## 2017-09-05 RX ADMIN — HYDROCODONE BITARTRATE AND ACETAMINOPHEN 1 TABLET: 10; 325 TABLET ORAL at 00:26

## 2017-09-05 RX ADMIN — AMIODARONE HYDROCHLORIDE 200 MG: 200 TABLET ORAL at 09:14

## 2017-09-05 RX ADMIN — HYDROCODONE BITARTRATE AND ACETAMINOPHEN 1 TABLET: 10; 325 TABLET ORAL at 16:07

## 2017-09-05 RX ADMIN — COLCHICINE 0.6 MG: 0.6 TABLET, FILM COATED ORAL at 09:14

## 2017-09-05 RX ADMIN — HYDROCODONE BITARTRATE AND ACETAMINOPHEN 1 TABLET: 10; 325 TABLET ORAL at 09:14

## 2017-09-05 RX ADMIN — LOSARTAN POTASSIUM AND HYDROCHLOROTHIAZIDE 1 TABLET: 50; 12.5 TABLET, FILM COATED ORAL at 09:14

## 2017-09-05 RX ADMIN — HYDROXYCHLOROQUINE SULFATE 200 MG: 200 TABLET, FILM COATED ORAL at 09:14

## 2017-09-05 RX ADMIN — NICOTINE 1 PATCH: 14 PATCH TRANSDERMAL at 00:20

## 2017-09-05 RX ADMIN — METOPROLOL TARTRATE 25 MG: 25 TABLET ORAL at 09:14

## 2017-09-05 RX ADMIN — HYDROCODONE BITARTRATE AND ACETAMINOPHEN 1 TABLET: 10; 325 TABLET ORAL at 05:11

## 2017-09-05 RX ADMIN — METOPROLOL TARTRATE 25 MG: 25 TABLET ORAL at 17:29

## 2017-09-05 RX ADMIN — ATORVASTATIN CALCIUM 40 MG: 40 TABLET, FILM COATED ORAL at 20:04

## 2017-09-05 RX ADMIN — PANTOPRAZOLE SODIUM 40 MG: 40 TABLET, DELAYED RELEASE ORAL at 05:11

## 2017-09-05 NOTE — PROGRESS NOTES
[facility]    Name:  Vasquez Marie   Age:  56 y.o.  Sex:  male  :  1960  MRN:  [id number]   Visit Number:  [id number]  Admission Date:  2017  Date Of Service:  17  Primary Care Physician:  Marilyn K. Vermeesch, MD     LOS: 11 days :  Patient Care Team:  Marilyn K Vermeesch, MD as PCP - General (Internal Medicine):    Chief Complaint:      Foot pain, weakness    Subjective / Interval History:     Patient currently with 7/10 foot pain, improves with pain medicines.  + generalized weakness.  No soa, cp, n/v, or abdominal pain.     I have reviewed labs/imaging/records from this hospitalization, including ER staff and admitting/attending physicians H/P's and progress notes to establish a comprehensive understanding of this patient's clinical hospital course, as well as to establish a transition of care appropriately.    Vital Signs:    Temp:  [97.7 °F (36.5 °C)-98.2 °F (36.8 °C)] 98.1 °F (36.7 °C)  Heart Rate:  [63-71] 65  Resp:  [18-20] 20  BP: (107-122)/(52-73) 107/52    Intake and output:    Intake/Output       17 0700 - 17 0659 17 0700 - 17 0659    Intake (ml) 1760 800    Output (ml) 650 --    Net (ml) 1110 800          Physical Examination:    General Appearance:  Alert and cooperative, not in any acute distress.   Head:  Atraumatic and normocephalic, without obvious abnormality.   Eyes:      PERRLA, Extra-occular movements are within normal limits.   Lungs:   Chest shape is normal. Breath sounds heard bilaterally equally.  No crackles or wheezing.   Heart:  Normal S1 and S2, no murmur, no gallop, no rub.   Abdomen:   Normal bowel sounds,  Soft non-tender, non-distended, no guarding, no rebound tenderness   Extremities: No edema                     Laboratory results:      Results from last 7 days  Lab Units 17  0515 17  0543 17  0643   SODIUM mmol/L 140 141 140   POTASSIUM mmol/L 3.7 3.8 3.9    CHLORIDE mmol/L 107 109* 111*   CO2 mmol/L 24.0* 23.0* 20.0*   BUN mg/dL 33* 29* 21*   CREATININE mg/dL 1.20 1.20 1.20   CALCIUM mg/dL 8.4 8.2* 8.6   GLUCOSE mg/dL 110* 104* 126*       Results from last 7 days  Lab Units 09/05/17  0515 09/04/17  0543 08/31/17  0643   WBC 10*3/mm3 3.82* 3.84* 3.23*   HEMOGLOBIN g/dL 8.4* 8.6* 8.3*   HEMATOCRIT % 26.9* 27.7* 26.1*   PLATELETS 10*3/mm3 116* 114* 121*               I have reviewed the patient's laboratory results.    Radiology results:    Imaging Results (last 24 hours)     ** No results found for the last 24 hours. **          I have reviewed the patient's radiology reports.    Medication Review:     I have reviewed the patients active and prn medications.       Assessment/Plan:  1.  Acute infection and cellulitis of the left transmetatarsal amputation stump, present on admission.  2.  Peripheral arterial disease s/p left SFA stent and intervention to the peroneal arteries on 6/20/2017.  3.  Paroxysmal atrial fibrillation.  4.  Coronary artery disease on medical therapy.  5.  Essential hypertension.  6.  COPD, stable.  7.  Chronic hepatitis C infection.  8.  Rheumatoid arthritis.      Patient is currently on IV antibiotic therapy with ceftriaxone 2 g daily.  He will be continued on wound VAC therapy. Wound culture obtained during the debridement has grown Providencia rettgeri which is sensitive to ceftriaxone.  Case management services are currently working to get him to short-term rehabilitation facility for continued wound care.  He will be transferred to short-term rehabilitation when bed is available.      Alen Potter MD  09/05/17  11:44 AM

## 2017-09-05 NOTE — DISCHARGE PLACEMENT REQUEST
"Vasquez Jefferson (56 y.o. Male)     Date of Birth Social Security Number Address Home Phone MRN    1960  [address]  [address]  [facility] [address] [phone] [id number]    [place] Marital Status          None Single       Admission Date Admission Type Admitting Provider Attending Provider Department, Room/Bed    8/25/17 Emergency Murali Garber DO Gaspar, Daniel F., MD [facility] MED SURG  4, 432/1    Discharge Date Discharge Disposition Discharge Destination                      Attending Provider: Alen Potter MD     Allergies:  No Known Allergies    Isolation:  None   Infection:  None   Code Status:  FULL    Ht:  68\" (172.7 cm)   Wt:  204 lb 1.6 oz (92.6 kg)    Admission Cmt:  None   Principal Problem:  Subacute osteomyelitis of left foot [M86.272]                 Active Insurance as of 8/25/2017     Primary Coverage     Payor Plan Insurance Group Employer/Plan Group    HUMANA MEDICARE REPLACEMENT HUMANA MEDICARE REPL [id number]     Payor Plan Address Payor Plan Phone Number Effective From Effective To    [address] [address] [phone] 1/1/2017     [place] [address]       Subscriber Name Subscriber Birth Date Member ID       VASQUEZ JEFFERSON 1960 [id number]                 Emergency Contacts      (Rel.) Home Phone Work Phone Mobile Phone    Maral Jefferson (Sister) [phone] -- --            Hospital Medications (active)       Dose Frequency Start End    acetaminophen (TYLENOL) tablet 650 mg 650 mg Every 4 Hours PRN 8/25/2017     Sig - Route: Take 2 tablets by mouth Every 4 (Four) Hours As Needed for Mild Pain . - Oral    albuterol (PROVENTIL) nebulizer solution 0.083% 2.5 mg/3mL 2.5 mg Every 6 Hours - RT 8/26/2017     Sig - Route: Take 2.5 mg by nebulization Every 6 (Six) Hours. - Nebulization    amiodarone (PACERONE) tablet 200 mg 200 mg Every 24 Hours Scheduled 8/26/2017     Sig - Route: Take 1 tablet by mouth Daily. - Oral    " atorvastatin (LIPITOR) tablet 40 mg 40 mg Nightly 8/25/2017     Sig - Route: Take 1 tablet by mouth Every Night. - Oral    cefTRIAXone (ROCEPHIN) 2 g/100 mL 0.9% NS (MBP) 2 g Every 24 Hours 8/30/2017     Sig - Route: Infuse 100 mL into a venous catheter Daily. - Intravenous    clopidogrel (PLAVIX) tablet 75 mg 75 mg Daily 8/26/2017     Sig - Route: Take 1 tablet by mouth Daily. - Oral    colchicine tablet 0.6 mg 0.6 mg Daily 8/26/2017     Sig - Route: Take 1 tablet by mouth Daily. - Oral    DULoxetine (CYMBALTA) DR capsule 30 mg 30 mg Daily 8/26/2017     Sig - Route: Take 1 capsule by mouth Daily. - Oral    enoxaparin (LOVENOX) syringe 40 mg 40 mg Daily 8/26/2017     Sig - Route: Inject 0.4 mL under the skin Daily. - Subcutaneous    HYDROcodone-acetaminophen (NORCO)  MG per tablet 1 tablet 1 tablet Every 4 Hours PRN 8/31/2017     Sig - Route: Take 1 tablet by mouth Every 4 (Four) Hours As Needed for Moderate Pain . - Oral    hydroxychloroquine (PLAQUENIL) tablet 200 mg 200 mg Daily 8/26/2017     Sig - Route: Take 1 tablet by mouth Daily. - Oral    loperamide (IMODIUM) capsule 2 mg 2 mg 4 Times Daily PRN 8/27/2017     Sig - Route: Take 1 capsule by mouth 4 (Four) Times a Day As Needed for Diarrhea. - Oral    LORazepam (ATIVAN) tablet 1 mg 1 mg Every 6 Hours PRN 9/1/2017 9/11/2017    Sig - Route: Take 2 tablets by mouth Every 6 (Six) Hours As Needed for Anxiety. - Oral    losartan-hydrochlorothiazide (HYZAAR) 50-12.5 MG per tablet 1 tablet 1 tablet Daily 8/26/2017     Sig - Route: Take 1 tablet by mouth Daily. - Oral    metoprolol tartrate (LOPRESSOR) tablet 25 mg 25 mg 2 Times Daily 8/26/2017     Sig - Route: Take 1 tablet by mouth 2 (Two) Times a Day. - Oral    nicotine (NICODERM CQ) 14 MG/24HR patch 1 patch 1 patch Every 24 Hours 8/26/2017     Sig - Route: Place 1 patch on the skin Daily. - Transdermal    ondansetron (ZOFRAN) injection 4 mg 4 mg Every 6 Hours PRN 8/25/2017     Sig - Route: Infuse 2 mL into a  "venous catheter Every 6 (Six) Hours As Needed for Nausea or Vomiting. - Intravenous    pantoprazole (PROTONIX) EC tablet 40 mg 40 mg Every Early Morning 8/26/2017     Sig - Route: Take 1 tablet by mouth Every Morning. - Oral    Pharmacy Meds to Bed Consult  Daily (Monday-Friday) 8/28/2017     Sig - Route: Daily (Monday-Friday). - Does not apply    sodium chloride 0.9 % flush 1-10 mL 1-10 mL As Needed 8/25/2017     Sig - Route: Infuse 1-10 mL into a venous catheter As Needed for Line Care. - Intravenous    sodium chloride 0.9 % flush 10 mL 10 mL As Needed 8/25/2017     Sig - Route: Infuse 10 mL into a venous catheter As Needed for Line Care. - Intravenous    Cosign for Ordering: Accepted by Adrian Palmer MD on 8/25/2017  6:41 PM    Linked Group 1:  \"And\" Linked Group Details        traZODone (DESYREL) tablet 50 mg 50 mg Nightly PRN 8/27/2017     Sig - Route: Take 1 tablet by mouth At Night As Needed for Sleep. - Oral          "

## 2017-09-05 NOTE — PLAN OF CARE
Problem: Infection, Risk/Actual (Adult)  Goal: Infection Prevention/Resolution  Outcome: Ongoing (interventions implemented as appropriate)    09/04/17 2210   Infection, Risk/Actual (Adult)   Infection Prevention/Resolution making progress toward outcome         Problem: Pain, Acute (Adult)  Goal: Acceptable Pain Control/Comfort Level  Outcome: Ongoing (interventions implemented as appropriate)    09/04/17 2210   Pain, Acute (Adult)   Acceptable Pain Control/Comfort Level making progress toward outcome

## 2017-09-05 NOTE — THERAPY TREATMENT NOTE
Acute Care - Physical Therapy Treatment Note   [facility]     Patient Name: Vasquez Marie  : 1960  MRN: [id number]  Today's Date: 2017  Onset of Illness/Injury or Date of Surgery Date: 17  Date of Referral to PT: 17  Referring Physician: Adrian    Admit Date: 2017    Visit Dx:    ICD-10-CM ICD-9-CM   1. Subacute osteomyelitis of left foot M86.272 730.07   2. Impaired functional mobility, balance, gait, and endurance Z74.09 V49.89   3. Coronary artery disease involving native coronary artery of native heart without angina pectoris I25.10 414.01   4. Essential hypertension I10 401.9   5. Gastroesophageal reflux disease with esophagitis K21.0 530.11   6. Other emphysema J43.8 492.8   7. Peripheral vascular disease I73.9 443.9     Patient Active Problem List   Diagnosis   • Chronic hepatitis C virus infection   • Coronary artery disease involving native coronary artery of native heart without angina pectoris   • Chronic atrial fibrillation   • Essential hypertension   • Thrombocytopenia   • Depression with anxiety   • Gastroesophageal reflux disease with esophagitis   • Hyperlipidemia   • Tobacco abuse   • COPD (chronic obstructive pulmonary disease)   • Gout   • Congestive heart disease   • RLS (restless legs syndrome)   • Rheumatoid arthritis   • Typical atrial flutter   • Atrial flutter   • Cerebrovascular accident (CVA)   • Borderline diabetes   • Dry gangrene   • Anemia   • Peripheral vascular disease   • Subacute osteomyelitis of left foot   • Liver mass               Adult Rehabilitation Note       17 1035 17 1021 17 1441    Rehab Assessment/Intervention    Discipline physical therapy assistant  -RM physical therapy assistant  -RM physical therapist  -JR    Document Type therapy note (daily note)  -RM therapy note (daily note)  -RM therapy note (daily note)  -JR    Subjective Information agree to therapy;complains of;pain  -RM agree to therapy;complains of;pain   -RM agree to therapy;no complaints  -JR    Patient Effort, Rehab Treatment adequate  -RM fair  -RM adequate  -JR    Symptoms Noted During/After Treatment  increased pain  -RM none  -JR    Precautions/Limitations non-weight bearing status  -RM non-weight bearing status  -RM non-weight bearing status   LLE  -JR    Patient Response to Treatment   Patient is eager to get stronger and imporve mobility  -JR    Recorded by [RM] Dipak Scott PTA [RM] Dipak Scott PTA [JR] Sia Whaley, PT    Pain Assessment    Pain Assessment 0-10  -RM 0-10  -RM 0-10  -JR    Pain Score 8  -RM 8  -RM 6  -JR    Post Pain Score 8  -RM 8  -RM 6  -JR    Pain Type Acute pain  -RM Acute pain  -RM Acute pain  -JR    Pain Location Foot  -RM Foot  -RM Foot  -JR    Pain Orientation Left  -RM Left  -RM Left  -JR    Pain Intervention(s) Repositioned  -RM Repositioned;Ambulation/increased activity  -RM Repositioned  -JR    Response to Interventions no increase in pain.   -RM no increase in pain.   -RM no pain increase  -JR    Recorded by [RM] Dipak Scott PTA [RM] Dipak Scott, SANYA [JR] Sia Whaley, PT    Cognitive Assessment/Intervention    Current Cognitive/Communication Assessment   functional  -JR    Orientation Status   oriented x 4  -JR    Follows Commands/Answers Questions   able to follow multi-step instructions  -JR    Personal Safety   decreased awareness, need for safety;decreased awareness, need for assist  -JR    Personal Safety Interventions   fall prevention program maintained;gait belt  -JR    Recorded by   [JR] Sia Whaley, PT    ROM (Range of Motion)    General ROM   no range of motion deficits identified  -JR    Recorded by   [JR] Sia Whaley, PT    MMT (Manual Muscle Testing)    General MMT Assessment   no strength deficits identified  -JR    Recorded by   [JR] Sia Whaley, PT    Mobility Assessment/Training    Left Lower Extremity Weight-Bearing non weight-bearing  -RM non weight-bearing  -RM     Recorded by [RM]  Dipak Scott PTA [RM] Dipak Scott PTA     Transfer Assessment/Treatment    Transfers, Bed-Chair [place] verbal cues required;nonverbal cues required (demo/gesture);contact guard assist;minimum assist (75% patient effort)  -RM  contact guard assist  -JR    Transfers, Chair-Bed [place] verbal cues required;nonverbal cues required (demo/gesture);contact guard assist;minimum assist (75% patient effort)  -RM      Transfers, Bed-Chair-Bed, Assist Device --   therapy staff  -RM      Transfers, Sit-Stand [place]  contact guard assist  -RM contact guard assist  -JR    Transfers, Stand-Sit [place]  contact guard assist  -RM contact guard assist  -JR    Transfers, Sit-Stand-Sit, Assist Device  standard walker  -RM rolling walker  -JR    Transfer, Maintain Weight Bearing Status able to maintain weight bearing status  -RM able to maintain weight bearing status  -RM unable to maintain weight bearing status  -JR    Transfer, Safety Issues balance decreased during turns  -RM balance decreased during turns  -RM balance decreased during turns;sequencing ability decreased;impulsivity  -JR    Transfer, Impairments   unable to maintain weight bearing status;impaired balance  -JR    Recorded by [RM] Dipak Scott, PTA [RM] Dipak Scott, PTA [JR] Sia Whaley, PT    Gait Assessment/Treatment    Gait, [place] Level  contact guard assist;minimum assist (75% patient effort)  -RM contact guard assist  -JR    Gait, Assistive Device  standard walker  -RM rolling walker  -JR    Gait, Distance (Feet)  3  -RM 10  -JR    Gait, Gait Deviations   --   unable to maintain NWB  -JR    Gait, Maintain Weight Bearing Status  unable to maintain weight bearing status  -RM unable to maintain weight bearing status  -JR    Gait, Safety Issues  balance decreased during turns  -RM     Gait, Impairments  strength decreased;impaired balance;pain  -RM     Recorded by  [RM] Dipak Scott, PTA [JR] Sia Whaley, PT     Therapy Exercises    Bilateral Lower Extremities  AROM:;10 reps;sitting;ankle pumps/circles;heel slides;LAQ  -RM AROM:;15 reps;ankle pumps/circles;heel raises;hip abduction/adduction;hip flexion;LAQ;SLR  -JR    BLE Resistance  manual resistance- moderate   with hip extension   -RM     Bilateral Upper Extremity AAROM:;15 reps;sitting;elbow flexion/extension;shoulder abduction/adduction;shoulder horizontal abd/add;shoulder protraction/retraction  -RM AROM:;10 reps;sitting;shoulder protraction/retraction  -RM     BUE Resistance theraband   green  -RM manual resistance- moderate  -RM     Recorded by [RM] Dipak Scott PTA [RM] Dipak Scott PTA [JR] Sia Whaley, PT    Positioning and Restraints    Pre-Treatment Position sitting in chair/recliner  -RM sitting in chair/recliner  -RM in bed  -JR    Post Treatment Position chair  -RM chair  -RM chair  -JR    In Chair reclined;call light within reach;encouraged to call for assist;LLE elevated;notified nsg  -RM reclined;call light within reach;encouraged to call for assist;LLE elevated;notified nsg  -RM sitting;call light within reach;encouraged to call for assist  -JR    Recorded by [RM] Dipak Scott PTA [RM] Dipak Scott, PTA [JR] Sia Whaley, PT      User Key  (r) = Recorded By, (t) = Taken By, (c) = Cosigned By    Initials Name Effective Dates    JR Sia Whaley, PT 10/26/16 -     RM Dipak Scott PTA 10/26/16 -                 IP PT Goals       09/05/17 1559 09/04/17 1711 09/04/17 1305    Transfer Training PT LTG    Transfer Training PT LTG, Outcome goal ongoing  -RM  goal ongoing  -RM    Gait Training PT LTG    Gait Training Goal PT LTG, Outcome   goal ongoing  -RM    Strength Goal PT LTG    Strength Goal PT LTG, Outcome goal ongoing  -RM  goal ongoing  -RM    Wound Care PT STG    Wound Care PT STG 1, Outcome  goal ongoing  -RM       09/03/17 1601 09/01/17 1435 08/30/17 1330    Transfer Training PT LTG    Transfer Training PT LTG, Date Established    08/30/17  -LM    Transfer Training PT LTG, Time to Achieve   2 wks  -LM    Transfer Training PT LTG, Activity Type   sit to stand/stand to sit;bed to chair /chair to bed  -LM    Transfer Training PT LTG, [place] Level   supervision required  -LM    Transfer Training PT LTG, Assist Device   walker, rolling  -LM    Transfer Training PT LTG, Additional Goal   NWB L LE  -LM    Transfer Training PT LTG, Outcome goal ongoing  -JR  goal ongoing  -LM    Gait Training PT LTG    Gait Training Goal PT LTG, Date Established   08/30/17  -LM    Gait Training Goal PT LTG, Time to Achieve   2 wks  -LM    Gait Training Goal PT LTG, [place] Level   contact guard assist  -LM    Gait Training Goal PT LTG, Assist Device   walker, rolling  -LM    Gait Training Goal PT LTG, Distance to Achieve   30  -LM    Gait Training Goal PT LTG, Additional Goal   NWB L LE  -LM    Gait Training Goal PT LTG, Outcome goal ongoing  -JR  goal ongoing  -LM    Strength Goal PT LTG    Strength Goal PT LTG, Date Established   08/30/17  -LM    Strength Goal PT LTG, Time to Achieve   2 wks  -LM    Strength Goal PT LTG, Measure to Achieve   Patient will perform ther ex x 15 reps to improve functional strength for mobility.  -LM    Strength Goal PT LTG, Outcome goal ongoing  -JR  goal ongoing  -LM    Wound Care PT STG    Wound Care PT STG 1, Date Established  09/01/17  -LM     Wound Care PT STG 1, Time to Achieve  2 wks  -LM     Wound Care PT STG 1, Location  L TMA site  -LM     Wound Care PT STG 1, No S&S of Infection  yes  -LM     Wound Care PT STG 1, Decrease Wound Size  Decrease wound dimensions by 0.5 cm in all measures.  -LM     Wound Care PT STG 1, Outcome  goal ongoing  -LM       User Key  (r) = Recorded By, (t) = Taken By, (c) = Cosigned By    Initials Name Provider Type    JR Sia Whaley, PT Physical Therapist    LM Cristal Martinez, PT Physical Therapist    TREY Scott, PTA Physical Therapy Assistant          Physical Therapy Education      Title: PT OT SLP Therapies (Done)     Topic: Physical Therapy (Done)     Point: Mobility training (Done)    Learning Progress Summary    Learner Readiness Method Response Comment Documented by Status   Patient Acceptance E,D VU,NR   09/05/17 1558 Done    Acceptance E,D VU,NR   09/04/17 1305 Done    Acceptance E VU Purpose of PT/POC;  importance of NWB L LE for wound healing; proper use of RW for safe transfers/ambulation.  08/30/17 1330 Done               Point: Home exercise program (Done)    Learning Progress Summary    Learner Readiness Method Response Comment Documented by Status   Patient Acceptance E,D VU,NR   09/04/17 1305 Done    Acceptance E VU Purpose of PT/POC;  importance of NWB L LE for wound healing; proper use of RW for safe transfers/ambulation.  08/30/17 1330 Done               Point: Precautions (Done)    Learning Progress Summary    Learner Readiness Method Response Comment Documented by Status   Patient Acceptance E,D VU,NR   09/04/17 1305 Done    Acceptance E VU Importance of maintaining NWB on LLE  09/03/17 1559 Done    Acceptance E VU Purpose of wound vac dressing change.  09/01/17 1434 Done    Acceptance E VU Purpose of PT/POC;  importance of NWB L LE for wound healing; proper use of RW for safe transfers/ambulation.  08/30/17 1330 Done                      User Key     Initials Effective Dates Name Provider Type Discipline     10/26/16 -  Sia Whaley, PT Physical Therapist PT     10/26/16 -  Cristal Martinez, PT Physical Therapist PT     10/26/16 -  Dipak Scott PTA Physical Therapy Assistant PT                    PT Recommendation and Plan  Anticipated Discharge Disposition: inpatient rehabilitation facility  Planned Therapy Interventions: balance training, bed mobility training, gait training, home exercise program, patient/family education, strengthening, transfer training  PT Frequency: daily  Plan of Care Review  Plan Of Care Reviewed With: patient  Progress:  improving  Outcome Summary/Follow up Plan: Negative pressure dressing was found to be in good working order          Outcome Measures       09/05/17 1035 09/03/17 1441       How much help from another person do you currently need...    Turning from your back to your side while in flat bed without using bedrails? 4  -RM 4  -JR     Moving from lying on back to sitting on the side of a flat bed without bedrails? 4  -RM 4  -JR     Moving to and from a bed to a chair (including a wheelchair)? 3  -RM 3  -JR     Standing up from a chair using your arms (e.g., wheelchair, bedside chair)? 3  -RM 3  -JR     Climbing 3-5 steps with a railing? 1  -RM 2  -JR     To walk in hospital room? 1  -RM 2  -JR     AM-PAC 6 Clicks Score 16  -RM 18  -JR     Functional Assessment    Outcome Measure Options AM-PAC 6 Clicks Basic Mobility (PT)  -RM AM-PAC 6 Clicks Basic Mobility (PT)  -JR       User Key  (r) = Recorded By, (t) = Taken By, (c) = Cosigned By    Initials Name Provider Type    JR Sia Whaley PT Physical Therapist     Dipak Scott PTA Physical Therapy Assistant           Time Calculation:         PT Charges       09/05/17 1605          Time Calculation    Start Time 1035  -RM      PT Received On 09/05/17  -RM      PT Goal Re-Cert Due Date 09/11/17  -RM      Time Calculation- PT    Total Timed Code Minutes- PT 38 minute(s)  -RM        User Key  (r) = Recorded By, (t) = Taken By, (c) = Cosigned By    Initials Name Provider Type     Dipak Scott PTA Physical Therapy Assistant          Therapy Charges for Today     Code Description Service Date Service Provider Modifiers Qty    93244569417 HC PT THERAPEUTIC ACT EA 15 MIN 9/4/2017 Dipak Scott PTA GP 1    63000816815 HC PT THER PROC EA 15 MIN 9/4/2017 Dpiak Scott PTA GP 1    22156454788 HC PT NEG PRESS WOUND TO 50SQCM DME1 9/4/2017 Dipak Scott PTA  1    56754356423 HC PT THERAPEUTIC ACT EA 15 MIN 9/5/2017 Dipak Scott PTA GP 2    79138351760 HC PT  THER PROC EA 15 MIN 9/5/2017 Dipak Scott, PTA GP 1          PT G-Codes  Outcome Measure Options: AM-PAC 6 Clicks Basic Mobility (PT)    Dipak Scott, PTA  9/5/2017

## 2017-09-05 NOTE — PLAN OF CARE
Problem: Patient Care Overview (Adult)  Goal: Plan of Care Review    09/05/17 1482   Outcome Evaluation   Outcome Summary/Follow up Plan Negative pressure dressing was found to be in good working order

## 2017-09-05 NOTE — PROGRESS NOTES
Continued Stay Note  BRITTANI Espinal     Patient Name: Vasquez Marie  MRN: [id number]  Today's Date: 9/5/2017    Admit Date: 8/25/2017          Discharge Plan       09/05/17 1217    Case Management/Social Work Plan    Additional Comments Per Dr Potter pt will need iv [place] 2 Gm  for 2 more weeks He will get e [facility] Called [facility]  they have a male short term They have to get approval form  regarding the wound VAC   Once they have approval they will start precert if not they will call Case Management               Discharge Codes     None        Expected Discharge Date and Time     Expected Discharge Date Expected Discharge Time    Aug 31, 2017             Sonali Nagel

## 2017-09-05 NOTE — PLAN OF CARE
Problem: Patient Care Overview (Adult)  Goal: Plan of Care Review  Outcome: Ongoing (interventions implemented as appropriate)    09/05/17 1559   Coping/Psychosocial Response Interventions   Plan Of Care Reviewed With patient   Patient Care Overview   Progress improving   Outcome Evaluation   Outcome Summary/Follow up Plan See flow sheet for details. Pt was able to perform squat pivot transfer with arm of chair removed. Pt required tactile as well as verbal cues x 3 from chair to bed .          Problem: Inpatient Physical Therapy  Goal: Transfer Training Goal 1 LTG- PT  Outcome: Ongoing (interventions implemented as appropriate)    08/30/17 1330 09/05/17 1559   Transfer Training PT LTG   Transfer Training PT LTG, Date Established 08/30/17 --    Transfer Training PT LTG, Time to Achieve 2 wks --    Transfer Training PT LTG, Activity Type sit to stand/stand to sit;bed to chair /chair to bed --    Transfer Training PT LTG, [place] Level supervision required --    Transfer Training PT LTG, Assist Device walker, rolling --    Transfer Training PT LTG, Additional Goal NWB L LE --    Transfer Training PT LTG, Outcome --  goal ongoing       Goal: Strength Goal LTG- PT  Outcome: Outcome(s) achieved Date Met:  09/05/17 08/30/17 1330 09/05/17 1559   Strength Goal PT LTG   Strength Goal PT LTG, Date Established 08/30/17 --    Strength Goal PT LTG, Time to Achieve 2 wks --    Strength Goal PT LTG, Measure to Achieve Patient will perform ther ex x 15 reps to improve functional strength for mobility. --    Strength Goal PT LTG, Outcome --  goal ongoing

## 2017-09-06 LAB
ANION GAP SERPL CALCULATED.3IONS-SCNC: 12.8 MMOL/L
BASOPHILS # BLD AUTO: 0.01 10*3/MM3 (ref 0–0.2)
BASOPHILS NFR BLD AUTO: 0.3 % (ref 0–2.5)
BUN BLD-MCNC: 37 MG/DL (ref 7–20)
BUN/CREAT SERPL: 30.8 (ref 6.3–21.9)
CALCIUM SPEC-SCNC: 8.8 MG/DL (ref 8.4–10.2)
CHLORIDE SERPL-SCNC: 107 MMOL/L (ref 98–107)
CO2 SERPL-SCNC: 25 MMOL/L (ref 26–30)
CREAT BLD-MCNC: 1.2 MG/DL (ref 0.6–1.3)
DEPRECATED RDW RBC AUTO: 54.2 FL (ref 37–54)
EOSINOPHIL # BLD AUTO: 0.79 10*3/MM3 (ref 0–0.7)
EOSINOPHIL NFR BLD AUTO: 23.7 % (ref 0–7)
ERYTHROCYTE [DISTWIDTH] IN BLOOD BY AUTOMATED COUNT: 15.9 % (ref 11.5–14.5)
GFR SERPL CREATININE-BSD FRML MDRD: 62 ML/MIN/1.73
GLUCOSE BLD-MCNC: 115 MG/DL (ref 74–98)
HCT VFR BLD AUTO: 27.9 % (ref 42–52)
HGB BLD-MCNC: 8.8 G/DL (ref 14–18)
IMM GRANULOCYTES # BLD: 0.01 10*3/MM3 (ref 0–0.06)
IMM GRANULOCYTES NFR BLD: 0.3 % (ref 0–0.6)
LYMPHOCYTES # BLD AUTO: 0.91 10*3/MM3 (ref 0.6–3.4)
LYMPHOCYTES NFR BLD AUTO: 27.2 % (ref 10–50)
MCH RBC QN AUTO: 29.3 PG (ref 27–31)
MCHC RBC AUTO-ENTMCNC: 31.5 G/DL (ref 30–37)
MCV RBC AUTO: 93 FL (ref 80–94)
MONOCYTES # BLD AUTO: 0.24 10*3/MM3 (ref 0–0.9)
MONOCYTES NFR BLD AUTO: 7.2 % (ref 0–12)
NEUTROPHILS # BLD AUTO: 1.38 10*3/MM3 (ref 2–6.9)
NEUTROPHILS NFR BLD AUTO: 41.3 % (ref 37–80)
NRBC BLD MANUAL-RTO: 0 /100 WBC (ref 0–0)
PLATELET # BLD AUTO: 101 10*3/MM3 (ref 130–400)
PMV BLD AUTO: 10.8 FL (ref 6–12)
POTASSIUM BLD-SCNC: 3.8 MMOL/L (ref 3.5–5.1)
RBC # BLD AUTO: 3 10*6/MM3 (ref 4.7–6.1)
SODIUM BLD-SCNC: 141 MMOL/L (ref 137–145)
WBC NRBC COR # BLD: 3.34 10*3/MM3 (ref 4.8–10.8)

## 2017-09-06 PROCEDURE — C1894 INTRO/SHEATH, NON-LASER: HCPCS

## 2017-09-06 PROCEDURE — 85025 COMPLETE CBC W/AUTO DIFF WBC: CPT | Performed by: INTERNAL MEDICINE

## 2017-09-06 PROCEDURE — 80048 BASIC METABOLIC PNL TOTAL CA: CPT | Performed by: INTERNAL MEDICINE

## 2017-09-06 PROCEDURE — 99232 SBSQ HOSP IP/OBS MODERATE 35: CPT | Performed by: INTERNAL MEDICINE

## 2017-09-06 PROCEDURE — C1751 CATH, INF, PER/CENT/MIDLINE: HCPCS

## 2017-09-06 PROCEDURE — 97605 NEG PRS WND THER DME<=50SQCM: CPT

## 2017-09-06 PROCEDURE — 05H333Z INSERTION OF INFUSION DEVICE INTO RIGHT INNOMINATE VEIN, PERCUTANEOUS APPROACH: ICD-10-PCS | Performed by: FAMILY MEDICINE

## 2017-09-06 PROCEDURE — 25010000002 ENOXAPARIN PER 10 MG: Performed by: INTERNAL MEDICINE

## 2017-09-06 PROCEDURE — 25010000002 CEFTRIAXONE: Performed by: INTERNAL MEDICINE

## 2017-09-06 RX ORDER — SODIUM CHLORIDE 0.9 % (FLUSH) 0.9 %
1-10 SYRINGE (ML) INJECTION AS NEEDED
Status: DISCONTINUED | OUTPATIENT
Start: 2017-09-06 | End: 2017-09-11 | Stop reason: HOSPADM

## 2017-09-06 RX ORDER — L.ACID,PARA/B.BIFIDUM/S.THERM 8B CELL
1 CAPSULE ORAL DAILY
Status: DISCONTINUED | OUTPATIENT
Start: 2017-09-06 | End: 2017-09-11 | Stop reason: HOSPADM

## 2017-09-06 RX ADMIN — HYDROCODONE BITARTRATE AND ACETAMINOPHEN 1 TABLET: 10; 325 TABLET ORAL at 06:05

## 2017-09-06 RX ADMIN — METOPROLOL TARTRATE 25 MG: 25 TABLET ORAL at 10:00

## 2017-09-06 RX ADMIN — LOSARTAN POTASSIUM AND HYDROCHLOROTHIAZIDE 1 TABLET: 50; 12.5 TABLET, FILM COATED ORAL at 10:00

## 2017-09-06 RX ADMIN — HYDROCODONE BITARTRATE AND ACETAMINOPHEN 1 TABLET: 10; 325 TABLET ORAL at 00:17

## 2017-09-06 RX ADMIN — Medication: at 10:00

## 2017-09-06 RX ADMIN — HYDROCODONE BITARTRATE AND ACETAMINOPHEN 1 TABLET: 10; 325 TABLET ORAL at 14:38

## 2017-09-06 RX ADMIN — HYDROXYCHLOROQUINE SULFATE 200 MG: 200 TABLET, FILM COATED ORAL at 10:00

## 2017-09-06 RX ADMIN — HYDROCODONE BITARTRATE AND ACETAMINOPHEN 1 TABLET: 10; 325 TABLET ORAL at 19:13

## 2017-09-06 RX ADMIN — ENOXAPARIN SODIUM 40 MG: 40 INJECTION SUBCUTANEOUS at 10:00

## 2017-09-06 RX ADMIN — HYDROCODONE BITARTRATE AND ACETAMINOPHEN 1 TABLET: 10; 325 TABLET ORAL at 23:16

## 2017-09-06 RX ADMIN — NICOTINE 1 PATCH: 14 PATCH TRANSDERMAL at 00:30

## 2017-09-06 RX ADMIN — CLOPIDOGREL BISULFATE 75 MG: 75 TABLET ORAL at 10:00

## 2017-09-06 RX ADMIN — HYDROCODONE BITARTRATE AND ACETAMINOPHEN 1 TABLET: 10; 325 TABLET ORAL at 05:00

## 2017-09-06 RX ADMIN — ATORVASTATIN CALCIUM 40 MG: 40 TABLET, FILM COATED ORAL at 21:05

## 2017-09-06 RX ADMIN — COLCHICINE 0.6 MG: 0.6 TABLET, FILM COATED ORAL at 10:00

## 2017-09-06 RX ADMIN — AMIODARONE HYDROCHLORIDE 200 MG: 200 TABLET ORAL at 10:00

## 2017-09-06 RX ADMIN — PANTOPRAZOLE SODIUM 40 MG: 40 TABLET, DELAYED RELEASE ORAL at 06:05

## 2017-09-06 RX ADMIN — METOPROLOL TARTRATE 25 MG: 25 TABLET ORAL at 19:00

## 2017-09-06 RX ADMIN — DULOXETINE HYDROCHLORIDE 30 MG: 30 CAPSULE, DELAYED RELEASE ORAL at 10:00

## 2017-09-06 RX ADMIN — CEFTRIAXONE 2 G: 2 INJECTION, POWDER, FOR SOLUTION INTRAMUSCULAR; INTRAVENOUS at 10:30

## 2017-09-06 RX ADMIN — Medication 1 CAPSULE: at 17:12

## 2017-09-06 NOTE — PROGRESS NOTES
Updated patient on Plan of Care states he needs a new wheelchair that his he had the [place] up a new bottom,  States he has had it 8 year.    Faxed  Clinicals to [facility] bedContinued Stay Note  BRITTANI Espinal     Patient Name: Vasquez Marie  MRN: [id number]  Today's Date: 9/6/2017    Admit Date: 8/25/2017          Discharge Plan     None              Discharge Codes     None        Expected Discharge Date and Time     Expected Discharge Date Expected Discharge Time    Aug 31, 2017             Sonali Ferreira RN

## 2017-09-06 NOTE — PLAN OF CARE
Problem: Patient Care Overview (Adult)  Goal: Plan of Care Review  Outcome: Ongoing (interventions implemented as appropriate)    09/06/17 1601   Coping/Psychosocial Response Interventions   Plan Of Care Reviewed With patient   Outcome Evaluation   Outcome Summary/Follow up Plan Old dressing removed. Periwound area presented with decreased maceration. Wound bed cont to be red beefy in nature. Skin graft intact. x 4 layers of adaptic applied black foam cut to fit with periwound area covered with x 2 layers of skin protectant. Transparent dressing applied as well as bridge dressing. Seal attained at 125 mm/Hg.          Problem: Inpatient Physical Therapy  Goal: Wound Care Goal 1 STG- PT  Outcome: Ongoing (interventions implemented as appropriate)    09/06/17 1601   Wound Care PT STG   Wound Care PT STG 1, Outcome goal ongoing

## 2017-09-06 NOTE — PROGRESS NOTES
Pt states has  Wound vac at home  Got when he was a patient at .    [facility]   And  For  to approve   Wound vac    Pt informedContinued Stay Note  BRITTANI Espinal     Patient Name: Vasquez Marie  MRN: [id number]  Today's Date: 9/6/2017    Admit Date: 8/25/2017          Discharge Plan     None              Discharge Codes     None        Expected Discharge Date and Time     Expected Discharge Date Expected Discharge Time    Aug 31, 2017             Sonali Ferreira RN

## 2017-09-06 NOTE — PROGRESS NOTES
[facility]    [facility]    Name:  Vasquez Marie   Age:  56 y.o.  Sex:  male  :  1960  MRN:  [id number]   Visit Number:  [id number]  Admission Date:  2017  Date Of Service:  17  Primary Care Physician:  Marilyn K. Vermeesch, MD     LOS: 12 days :  Patient Care Team:  Marilyn K Vermeesch, MD as PCP - General (Internal Medicine):    Chief Complaint:      Weakness    Subjective / Interval History:     Patient foot pain is controlled well today on pain medicines.  No current issues of soa, cp, n/v or abdominal pain.   Currently awaiting rehab placement.     I have reviewed labs/imaging/records from this hospitalization, including ER staff and admitting/attending physicians H/P's and progress notes to establish a comprehensive understanding of this patient's clinical hospital course, as well as to establish a transition of care appropriately.    Vital Signs:    Temp:  [98 °F (36.7 °C)-98.8 °F (37.1 °C)] 98 °F (36.7 °C)  Heart Rate:  [63-69] 63  Resp:  [12-20] 12  BP: (121-148)/(57-89) 135/73    Intake and output:    Intake/Output       17 0700 - 17 0659 17 0700 - 17 0659    Intake (ml) 960 480    Output (ml) 2150 100    Net (ml) -1190 380    Last Weight 216 lb (98 kg) --          Physical Examination:    General Appearance:  Alert and cooperative, not in any acute distress.   Head:  Atraumatic and normocephalic, without obvious abnormality.   Eyes:      PERRLA, Extra-occular movements are within normal limits.   Lungs:   Chest shape is normal. Breath sounds heard bilaterally equally.  No crackles or wheezing.   Heart:  Normal S1 and S2, no murmur, no gallop, no rub.   Abdomen:   Normal bowel sounds,  Soft non-tender, non-distended, no guarding, no rebound tenderness   Extremities: No edema                     Laboratory results:      Results from last 7 days  Lab Units 17  0513 17  0515 17  0543   SODIUM mmol/L 141 140  141   POTASSIUM mmol/L 3.8 3.7 3.8   CHLORIDE mmol/L 107 107 109*   CO2 mmol/L 25.0* 24.0* 23.0*   BUN mg/dL 37* 33* 29*   CREATININE mg/dL 1.20 1.20 1.20   CALCIUM mg/dL 8.8 8.4 8.2*   GLUCOSE mg/dL 115* 110* 104*       Results from last 7 days  Lab Units 09/06/17  0513 09/05/17  0515 09/04/17  0543   WBC 10*3/mm3 3.34* 3.82* 3.84*   HEMOGLOBIN g/dL 8.8* 8.4* 8.6*   HEMATOCRIT % 27.9* 26.9* 27.7*   PLATELETS 10*3/mm3 101* 116* 114*               I have reviewed the patient's laboratory results.    Radiology results:    Imaging Results (last 24 hours)     ** No results found for the last 24 hours. **          I have reviewed the patient's radiology reports.    Medication Review:     I have reviewed the patients active and prn medications.       Assessment/Plan:  1.  Acute infection and cellulitis of the left transmetatarsal amputation stump, present on admission.  2.  Peripheral arterial disease s/p left SFA stent and intervention to the peroneal arteries on 6/20/2017.  3.  Paroxysmal atrial fibrillation.  4.  Coronary artery disease on medical therapy.  5.  Essential hypertension.  6.  COPD, stable.  7.  Chronic hepatitis C infection.  8.  Rheumatoid arthritis.      Patient is currently on IV antibiotic therapy with ceftriaxone 2 g daily.  He will be continued on wound VAC therapy. Wound culture obtained during the debridement has grown Providencia rettgeri which is sensitive to ceftriaxone.  Case management services are currently working to get him to short-term rehabilitation facility for continued wound care.  He will be transferred to short-term rehabilitation when bed is available.      Alen Potter MD  09/06/17  3:24 PM

## 2017-09-06 NOTE — PLAN OF CARE
Problem: Infection, Risk/Actual (Adult)  Goal: Infection Prevention/Resolution  Outcome: Ongoing (interventions implemented as appropriate)    Problem: Patient Care Overview (Adult)  Goal: Plan of Care Review  Outcome: Ongoing (interventions implemented as appropriate)    09/06/17 9948   Coping/Psychosocial Response Interventions   Plan Of Care Reviewed With patient       Goal: Adult Individualization and Mutuality  Outcome: Ongoing (interventions implemented as appropriate)  Goal: Discharge Needs Assessment  Outcome: Ongoing (interventions implemented as appropriate)    Problem: Pain, Acute (Adult)  Goal: Acceptable Pain Control/Comfort Level  Outcome: Ongoing (interventions implemented as appropriate)

## 2017-09-06 NOTE — PROGRESS NOTES
Myrna from [facility] called states unable to accept the patient due to wound vac and IV antibiotics    Dr schmitt notifiedContinued Stay Note  [facility]     Patient Name: Vasquez Marie  MRN: [id number]  Today's Date: 9/6/2017    Admit Date: 8/25/2017          Discharge Plan     None              Discharge Codes     None        Expected Discharge Date and Time     Expected Discharge Date Expected Discharge Time    Aug 31, 2017             Sonali Ferreira RN

## 2017-09-06 NOTE — THERAPY TREATMENT NOTE
Acute Care - Physical Therapy Treatment Note   [facility]     Patient Name: Vasquez Marie  : 1960  MRN: [id number]  Today's Date: 2017  Onset of Illness/Injury or Date of Surgery Date: 17  Date of Referral to PT: 17  Referring Physician: Adrian    Admit Date: 2017    Visit Dx:    ICD-10-CM ICD-9-CM   1. Subacute osteomyelitis of left foot M86.272 730.07   2. Impaired functional mobility, balance, gait, and endurance Z74.09 V49.89   3. Coronary artery disease involving native coronary artery of native heart without angina pectoris I25.10 414.01   4. Essential hypertension I10 401.9   5. Gastroesophageal reflux disease with esophagitis K21.0 530.11   6. Other emphysema J43.8 492.8   7. Peripheral vascular disease I73.9 443.9     Patient Active Problem List   Diagnosis   • Chronic hepatitis C virus infection   • Coronary artery disease involving native coronary artery of native heart without angina pectoris   • Chronic atrial fibrillation   • Essential hypertension   • Thrombocytopenia   • Depression with anxiety   • Gastroesophageal reflux disease with esophagitis   • Hyperlipidemia   • Tobacco abuse   • COPD (chronic obstructive pulmonary disease)   • Gout   • Congestive heart disease   • RLS (restless legs syndrome)   • Rheumatoid arthritis   • Typical atrial flutter   • Atrial flutter   • Cerebrovascular accident (CVA)   • Borderline diabetes   • Dry gangrene   • Anemia   • Peripheral vascular disease   • Subacute osteomyelitis of left foot   • Liver mass               Adult Rehabilitation Note       17 1035 17 1021       Rehab Assessment/Intervention    Discipline physical therapy assistant  -RM physical therapy assistant  -RM     Document Type therapy note (daily note)  -RM therapy note (daily note)  -RM     Subjective Information agree to therapy;complains of;pain  -RM agree to therapy;complains of;pain  -RM     Patient Effort, Rehab Treatment adequate  -RM fair  -RM      Symptoms Noted During/After Treatment  increased pain  -RM     Precautions/Limitations non-weight bearing status  -RM non-weight bearing status  -RM     Recorded by [RM] Dipak Scott PTA [RM] Dipak Scott PTA     Pain Assessment    Pain Assessment 0-10  -RM 0-10  -RM     Pain Score 8  -RM 8  -RM     Post Pain Score 8  -RM 8  -RM     Pain Type Acute pain  -RM Acute pain  -RM     Pain Location Foot  -RM Foot  -RM     Pain Orientation Left  -RM Left  -RM     Pain Intervention(s) Repositioned  -RM Repositioned;Ambulation/increased activity  -RM     Response to Interventions no increase in pain.   -RM no increase in pain.   -RM     Recorded by [RM] Dipak Scott PTA [RM] Dipak Scott PTA     Mobility Assessment/Training    Left Lower Extremity Weight-Bearing non weight-bearing  -RM non weight-bearing  -RM     Recorded by [RM] Dipak Scott PTA [RM] Dipak Scott PTA     Transfer Assessment/Treatment    Transfers, Bed-Chair [place] verbal cues required;nonverbal cues required (demo/gesture);contact guard assist;minimum assist (75% patient effort)  -RM      Transfers, Chair-Bed [place] verbal cues required;nonverbal cues required (demo/gesture);contact guard assist;minimum assist (75% patient effort)  -RM      Transfers, Bed-Chair-Bed, Assist Device --   therapy staff  -RM      Transfers, Sit-Stand [place]  contact guard assist  -RM     Transfers, Stand-Sit [place]  contact guard assist  -RM     Transfers, Sit-Stand-Sit, Assist Device  standard walker  -RM     Transfer, Maintain Weight Bearing Status able to maintain weight bearing status  -RM able to maintain weight bearing status  -RM     Transfer, Safety Issues balance decreased during turns  -RM balance decreased during turns  -RM     Recorded by [RM] Dipak Scott PTA [RM] Dipak Scott PTA     Gait Assessment/Treatment    Gait, [place] Level  contact guard assist;minimum assist (75% patient effort)  -RM      Gait, Assistive Device  standard walker  -RM     Gait, Distance (Feet)  3  -RM     Gait, Maintain Weight Bearing Status  unable to maintain weight bearing status  -RM     Gait, Safety Issues  balance decreased during turns  -RM     Gait, Impairments  strength decreased;impaired balance;pain  -RM     Recorded by  [RM] Dipak Scott PTA     Therapy Exercises    Bilateral Lower Extremities  AROM:;10 reps;sitting;ankle pumps/circles;heel slides;LAQ  -RM     BLE Resistance  manual resistance- moderate   with hip extension   -RM     Bilateral Upper Extremity AAROM:;15 reps;sitting;elbow flexion/extension;shoulder abduction/adduction;shoulder horizontal abd/add;shoulder protraction/retraction  -RM AROM:;10 reps;sitting;shoulder protraction/retraction  -RM     BUE Resistance theraband   green  -RM manual resistance- moderate  -RM     Recorded by [RM] Dipak Scott PTA [RM] Dipak Scott PTA     Positioning and Restraints    Pre-Treatment Position sitting in chair/recliner  -RM sitting in chair/recliner  -RM     Post Treatment Position chair  -RM chair  -RM     In Chair reclined;call light within reach;encouraged to call for assist;LLE elevated;notified nsg  -RM reclined;call light within reach;encouraged to call for assist;LLE elevated;notified nsg  -RM     Recorded by [RM] Dipak Scott PTA [RM] Dipak Scott PTA       User Key  (r) = Recorded By, (t) = Taken By, (c) = Cosigned By    Initials Name Effective Dates     Dipak Scott PTA 10/26/16 -                 IP PT Goals       09/06/17 1601 09/05/17 1559 09/04/17 1711    Transfer Training PT LTG    Transfer Training PT LTG, Outcome  goal ongoing  -RM     Strength Goal PT LTG    Strength Goal PT LTG, Outcome  goal ongoing  -RM     Wound Care PT STG    Wound Care PT STG 1, Outcome goal ongoing  -RM  goal ongoing  -RM      09/04/17 1305 09/03/17 1601 09/01/17 1435    Transfer Training PT LTG    Transfer Training PT LTG, Outcome goal ongoing  -RM  goal ongoing  -JR     Gait Training PT LTG    Gait Training Goal PT LTG, Outcome goal ongoing  -RM goal ongoing  -JR     Strength Goal PT LTG    Strength Goal PT LTG, Outcome goal ongoing  -RM goal ongoing  -JR     Wound Care PT STG    Wound Care PT STG 1, Date Established   09/01/17  -LM    Wound Care PT STG 1, Time to Achieve   2 wks  -LM    Wound Care PT STG 1, Location   L TMA site  -LM    Wound Care PT STG 1, No S&S of Infection   yes  -LM    Wound Care PT STG 1, Decrease Wound Size   Decrease wound dimensions by 0.5 cm in all measures.  -LM    Wound Care PT STG 1, Outcome   goal ongoing  -LM      08/30/17 1330          Transfer Training PT LTG    Transfer Training PT LTG, Date Established 08/30/17  -LM      Transfer Training PT LTG, Time to Achieve 2 wks  -LM      Transfer Training PT LTG, Activity Type sit to stand/stand to sit;bed to chair /chair to bed  -LM      Transfer Training PT LTG, [place] Level supervision required  -LM      Transfer Training PT LTG, Assist Device walker, rolling  -LM      Transfer Training PT LTG, Additional Goal NWB L LE  -LM      Transfer Training PT LTG, Outcome goal ongoing  -LM      Gait Training PT LTG    Gait Training Goal PT LTG, Date Established 08/30/17  -LM      Gait Training Goal PT LTG, Time to Achieve 2 wks  -LM      Gait Training Goal PT LTG, [place] Level contact guard assist  -LM      Gait Training Goal PT LTG, Assist Device walker, rolling  -LM      Gait Training Goal PT LTG, Distance to Achieve 30  -LM      Gait Training Goal PT LTG, Additional Goal NWB L LE  -LM      Gait Training Goal PT LTG, Outcome goal ongoing  -LM      Strength Goal PT LTG    Strength Goal PT LTG, Date Established 08/30/17  -LM      Strength Goal PT LTG, Time to Achieve 2 wks  -LM      Strength Goal PT LTG, Measure to Achieve Patient will perform ther ex x 15 reps to improve functional strength for mobility.  -LM      Strength Goal PT LTG, Outcome goal ongoing  -LM        User Key   (r) = Recorded By, (t) = Taken By, (c) = Cosigned By    Initials Name Provider Type    JR Sia Whaley, PT Physical Therapist     Cristal Martinez, PT Physical Therapist     Dipak Scott PTA Physical Therapy Assistant          Physical Therapy Education     Title: PT OT SLP Therapies (Done)     Topic: Physical Therapy (Done)     Point: Mobility training (Done)    Learning Progress Summary    Learner Readiness Method Response Comment Documented by Status   Patient Acceptance E,D VU,NR   09/05/17 1558 Done    Acceptance E,D VU,NR   09/04/17 1305 Done    Acceptance E VU Purpose of PT/POC;  importance of NWB L LE for wound healing; proper use of RW for safe transfers/ambulation.  08/30/17 1330 Done               Point: Home exercise program (Done)    Learning Progress Summary    Learner Readiness Method Response Comment Documented by Status   Patient Acceptance E,D VU,NR   09/04/17 1305 Done    Acceptance E VU Purpose of PT/POC;  importance of NWB L LE for wound healing; proper use of RW for safe transfers/ambulation.  08/30/17 1330 Done               Point: Precautions (Done)    Learning Progress Summary    Learner Readiness Method Response Comment Documented by Status   Patient Acceptance E,D VU,NR   09/04/17 1305 Done    Acceptance E VU Importance of maintaining NWB on LLE  09/03/17 1559 Done    Acceptance E VU Purpose of wound vac dressing change.  09/01/17 1434 Done    Acceptance E VU Purpose of PT/POC;  importance of NWB L LE for wound healing; proper use of RW for safe transfers/ambulation.  08/30/17 1330 Done                      User Key     Initials Effective Dates Name Provider Type Discipline     10/26/16 -  Sia Whaley, PT Physical Therapist PT     10/26/16 -  Cristal Martinez, PT Physical Therapist PT     10/26/16 -  Dipak Scott PTA Physical Therapy Assistant PT                    PT Recommendation and Plan  Anticipated Discharge Disposition: inpatient rehabilitation  facility  Planned Therapy Interventions: balance training, bed mobility training, gait training, home exercise program, patient/family education, strengthening, transfer training  PT Frequency: daily  Plan of Care Review  Plan Of Care Reviewed With: patient  Progress: improving  Outcome Summary/Follow up Plan: Old dressing removed. Periwound area presented with decreased maceration. Wound bed cont to be red beefy  in nature. Skin graft intact. x 4 layers of adaptic applied black foam cut to fit with periwound area covered with x 2 layers of skin protectant. Transparent dressing applied  as well as bridge dressing. Seal attained at 125 mm/Hg.           Outcome Measures       09/05/17 1035          How much help from another person do you currently need...    Turning from your back to your side while in flat bed without using bedrails? 4  -RM      Moving from lying on back to sitting on the side of a flat bed without bedrails? 4  -RM      Moving to and from a bed to a chair (including a wheelchair)? 3  -RM      Standing up from a chair using your arms (e.g., wheelchair, bedside chair)? 3  -RM      Climbing 3-5 steps with a railing? 1  -RM      To walk in hospital room? 1  -RM      AM-PAC 6 Clicks Score 16  -RM      Functional Assessment    Outcome Measure Options AM-PAC 6 Clicks Basic Mobility (PT)  -RM        User Key  (r) = Recorded By, (t) = Taken By, (c) = Cosigned By    Initials Name Provider Type    TREY Scott PTA Physical Therapy Assistant           Time Calculation:         PT Charges       09/06/17 1605          Time Calculation    Start Time 1450  -RM      PT Received On 09/06/17  -RM      PT Goal Re-Cert Due Date 09/11/17  -RM      Time Calculation- PT    Total Timed Code Minutes- PT 53 minute(s)  -RM        User Key  (r) = Recorded By, (t) = Taken By, (c) = Cosigned By    Initials Name Provider Type    TREY Scott PTA Physical Therapy Assistant          Therapy Charges for Today     Code  Description Service Date Service Provider Modifiers Qty    71151636746 HC PT THERAPEUTIC ACT EA 15 MIN 9/5/2017 Dipak Scott, PTA GP 2    47923695770 HC PT THER PROC EA 15 MIN 9/5/2017 Dipak Scott PTA GP 1    09804787575 HC PT NEG PRESS WOUND TO 50SQCM DME1 9/6/2017 Dipak Scott, PTA  1          PT G-Codes  Outcome Measure Options: AM-PAC 6 Clicks Basic Mobility (PT)    Dipak Scott PTA  9/6/2017

## 2017-09-06 NOTE — PLAN OF CARE
Problem: Infection, Risk/Actual (Adult)  Goal: Infection Prevention/Resolution  Outcome: Ongoing (interventions implemented as appropriate)    09/06/17 0012   Infection, Risk/Actual (Adult)   Infection Prevention/Resolution making progress toward outcome         Problem: Pain, Acute (Adult)  Goal: Acceptable Pain Control/Comfort Level  Outcome: Ongoing (interventions implemented as appropriate)    09/06/17 0012   Pain, Acute (Adult)   Acceptable Pain Control/Comfort Level making progress toward outcome

## 2017-09-07 LAB
ANION GAP SERPL CALCULATED.3IONS-SCNC: 14.6 MMOL/L
BUN BLD-MCNC: 33 MG/DL (ref 7–20)
BUN/CREAT SERPL: 30 (ref 6.3–21.9)
CALCIUM SPEC-SCNC: 9.1 MG/DL (ref 8.4–10.2)
CHLORIDE SERPL-SCNC: 107 MMOL/L (ref 98–107)
CO2 SERPL-SCNC: 22 MMOL/L (ref 26–30)
CREAT BLD-MCNC: 1.1 MG/DL (ref 0.6–1.3)
DEPRECATED RDW RBC AUTO: 54.6 FL (ref 37–54)
EOSINOPHIL # BLD MANUAL: 0.68 10*3/MM3 (ref 0–0.7)
EOSINOPHIL NFR BLD MANUAL: 20.2 % (ref 0–7)
ERYTHROCYTE [DISTWIDTH] IN BLOOD BY AUTOMATED COUNT: 15.9 % (ref 11.5–14.5)
GFR SERPL CREATININE-BSD FRML MDRD: 69 ML/MIN/1.73
GLUCOSE BLD-MCNC: 95 MG/DL (ref 74–98)
HCT VFR BLD AUTO: 26.5 % (ref 42–52)
HGB BLD-MCNC: 8.2 G/DL (ref 14–18)
LYMPHOCYTES # BLD MANUAL: 0.95 10*3/MM3 (ref 0.6–3.4)
LYMPHOCYTES NFR BLD MANUAL: 28.3 % (ref 10–50)
LYMPHOCYTES NFR BLD MANUAL: 6.1 % (ref 0–12)
MCH RBC QN AUTO: 29.2 PG (ref 27–31)
MCHC RBC AUTO-ENTMCNC: 30.9 G/DL (ref 30–37)
MCV RBC AUTO: 94.3 FL (ref 80–94)
MONOCYTES # BLD AUTO: 0.21 10*3/MM3 (ref 0–0.9)
NEUTROPHILS # BLD AUTO: 1.53 10*3/MM3 (ref 2–6.9)
NEUTROPHILS NFR BLD MANUAL: 43.4 % (ref 37–80)
NEUTS BAND NFR BLD MANUAL: 2 % (ref 0–6)
PLAT MORPH BLD: NORMAL
PLATELET # BLD AUTO: 108 10*3/MM3 (ref 130–400)
PMV BLD AUTO: 10.6 FL (ref 6–12)
POTASSIUM BLD-SCNC: 3.6 MMOL/L (ref 3.5–5.1)
RBC # BLD AUTO: 2.81 10*6/MM3 (ref 4.7–6.1)
RBC MORPH BLD: NORMAL
SCAN SLIDE: NORMAL
SODIUM BLD-SCNC: 140 MMOL/L (ref 137–145)
WBC MORPH BLD: NORMAL
WBC NRBC COR # BLD: 3.37 10*3/MM3 (ref 4.8–10.8)

## 2017-09-07 PROCEDURE — 25010000002 ENOXAPARIN PER 10 MG: Performed by: INTERNAL MEDICINE

## 2017-09-07 PROCEDURE — 97605 NEG PRS WND THER DME<=50SQCM: CPT | Performed by: PODIATRIST

## 2017-09-07 PROCEDURE — 99024 POSTOP FOLLOW-UP VISIT: CPT | Performed by: PODIATRIST

## 2017-09-07 PROCEDURE — 25010000002 CEFTRIAXONE: Performed by: INTERNAL MEDICINE

## 2017-09-07 PROCEDURE — 85007 BL SMEAR W/DIFF WBC COUNT: CPT | Performed by: INTERNAL MEDICINE

## 2017-09-07 PROCEDURE — 80048 BASIC METABOLIC PNL TOTAL CA: CPT | Performed by: INTERNAL MEDICINE

## 2017-09-07 PROCEDURE — 99232 SBSQ HOSP IP/OBS MODERATE 35: CPT | Performed by: INTERNAL MEDICINE

## 2017-09-07 PROCEDURE — 85025 COMPLETE CBC W/AUTO DIFF WBC: CPT | Performed by: INTERNAL MEDICINE

## 2017-09-07 RX ADMIN — ENOXAPARIN SODIUM 40 MG: 40 INJECTION SUBCUTANEOUS at 10:27

## 2017-09-07 RX ADMIN — HYDROXYCHLOROQUINE SULFATE 200 MG: 200 TABLET, FILM COATED ORAL at 10:25

## 2017-09-07 RX ADMIN — AMIODARONE HYDROCHLORIDE 200 MG: 200 TABLET ORAL at 10:26

## 2017-09-07 RX ADMIN — METOPROLOL TARTRATE 25 MG: 25 TABLET ORAL at 10:25

## 2017-09-07 RX ADMIN — ATORVASTATIN CALCIUM 40 MG: 40 TABLET, FILM COATED ORAL at 20:42

## 2017-09-07 RX ADMIN — PANTOPRAZOLE SODIUM 40 MG: 40 TABLET, DELAYED RELEASE ORAL at 06:42

## 2017-09-07 RX ADMIN — COLCHICINE 0.6 MG: 0.6 TABLET, FILM COATED ORAL at 10:26

## 2017-09-07 RX ADMIN — HYDROCODONE BITARTRATE AND ACETAMINOPHEN 1 TABLET: 10; 325 TABLET ORAL at 03:15

## 2017-09-07 RX ADMIN — HYDROCODONE BITARTRATE AND ACETAMINOPHEN 1 TABLET: 10; 325 TABLET ORAL at 06:42

## 2017-09-07 RX ADMIN — LOSARTAN POTASSIUM AND HYDROCHLOROTHIAZIDE 1 TABLET: 50; 12.5 TABLET, FILM COATED ORAL at 10:26

## 2017-09-07 RX ADMIN — NICOTINE 1 PATCH: 14 PATCH TRANSDERMAL at 20:44

## 2017-09-07 RX ADMIN — Medication 1 CAPSULE: at 10:25

## 2017-09-07 RX ADMIN — NICOTINE 1 PATCH: 14 PATCH TRANSDERMAL at 00:30

## 2017-09-07 RX ADMIN — CEFTRIAXONE 2 G: 2 INJECTION, POWDER, FOR SOLUTION INTRAMUSCULAR; INTRAVENOUS at 10:25

## 2017-09-07 RX ADMIN — METOPROLOL TARTRATE 25 MG: 25 TABLET ORAL at 18:18

## 2017-09-07 RX ADMIN — HYDROCODONE BITARTRATE AND ACETAMINOPHEN 1 TABLET: 10; 325 TABLET ORAL at 19:54

## 2017-09-07 RX ADMIN — CLOPIDOGREL BISULFATE 75 MG: 75 TABLET ORAL at 10:26

## 2017-09-07 RX ADMIN — HYDROCODONE BITARTRATE AND ACETAMINOPHEN 1 TABLET: 10; 325 TABLET ORAL at 15:24

## 2017-09-07 RX ADMIN — HYDROCODONE BITARTRATE AND ACETAMINOPHEN 1 TABLET: 10; 325 TABLET ORAL at 10:24

## 2017-09-07 RX ADMIN — DULOXETINE HYDROCHLORIDE 30 MG: 30 CAPSULE, DELAYED RELEASE ORAL at 10:25

## 2017-09-07 NOTE — DISCHARGE PLACEMENT REQUEST
"Has PICC line and wound vac  Will need two weeks of antibiiotics      Vasquez Marie (56 y.o. Male)     Date of Birth Social Security Number Address Home Phone MRN    1960  [address]    [facility] [address] [phone] [id number]    Mandaeism Marital Status          None Single       Admission Date Admission Type Admitting Provider Attending Provider Department, Room/Bed    17 Emergency Murali Garber DO Gaspar, Daniel F., MD [facility] MED SURG  /    Discharge Date Discharge Disposition Discharge Destination                      Attending Provider: Alen Potter MD     Allergies:  No Known Allergies    Isolation:  None   Infection:  None   Code Status:  FULL    Ht:  68\" (172.7 cm)   Wt:  215 lb 14.4 oz (97.9 kg)    Admission Cmt:  None   Principal Problem:  Subacute osteomyelitis of left foot [M86.272]                 Active Insurance as of 2017     Primary Coverage     Payor Plan Insurance Group Employer/Plan Group    HUMANA MEDICARE REPLACEMENT HUMANA MEDICARE REPL [id number]     Payor Plan Address Payor Plan Phone Number Effective From Effective To    [address] [address] [phone] 2017     [place] [address]       Subscriber Name Subscriber Birth Date Member ID       VASQUEZ MARIE 1960 [id number]                 Emergency Contacts      (Rel.) Home Phone Work Phone Mobile Phone    Maral Marie (Sister) [phone] -- --               History & Physical      Murali Garber DO at 2017 10:33 PM              [facility] HOSPITALIST   HISTORY AND PHYSICAL      Name:  Vasquez Marie   Age:  56 y.o.  Sex:  male  :  1960  MRN:  [id number]   Visit Number:  [id number]  Admission Date:  2017  Date Of Service:  17  Primary Care Physician:  Marilyn K. Vermeesch, MD    History Obtained From:    patient    Chief Complaint:     Left foot pain     History Of Presenting Illness:  "     Patient is a 56-year-old male who developed a wound in his foot in June.  He was admitted here for cellulitis of the left foot and developed osteomyelitis.  He had intervention on the left SFA by Dr. Storm with good resultant flow.  Dr. Campbell saw the patient and did amputation of the left third digit.  He also did a left foot skin graft.  Culture grew out Enterobacter cloacae.  Dr. Rodriguez noted he had osteomyelitis and had recommended 6 weeks of ertapenem and a PICC line in her note from her office, however the patient claims he did not get this.  He was on oral antibiotics as an outpatient.  This was doxycycline.  He does have borderline diabetes.  He did worsen so Dr. Campbell sent him to  where the patient claims he had a transmetatarsal amputation about a couple weeks ago.  He had a wound VAC placed after that.  However he has been noncompliant with the wound VAC, with home health nurse frequently following and finding the wound VAC in the trash.  Home health nurse discharged him due to noncompliance.  He apparently frequently was drinking beer when they came.  He adamantly denies that he is an alcoholic.  On review of the chart he has missed multiple appointments with his primary care physician as well as Dr. Campbell.  He was last seen by home health on Monday who wrapped the foot and he did not take the dressing off since then.  There is now redness ascending up his leg and he claims there is 9/10 pain in the left leg.  Specifically in the foot, nothing is making it better, any touch to it makes it worse.  There is purulent malodorous drainage from the foot, and dressings are adhered to the wounds.  Patient denies any chest pressure, shortness breath, nausea, vomiting, or diarrhea.  He also has fever and chills.    Review Of Systems:     General ROS: positive for  - chills and fever  Psychological ROS: negative  Ophthalmic ROS: negative  ENT ROS: negative  Allergy and Immunology ROS: negative  Hematological and  Lymphatic ROS: negative  Endocrine ROS: negative  Breast ROS: negative  Respiratory ROS: negative  Cardiovascular ROS: negative  Gastrointestinal ROS: negative  Genito-Urinary ROS: negative  Musculoskeletal ROS: positive for - pain in foot - left  Neurological ROS: negative  Dermatological ROS: positive for skin lesion changes  Left foot     Past Medical History:    Coronary artery disease, osteomyelitis, atrial fibrillation, GI bleed, peripheral arterial disease, hypertension, gout, RA, DVT several years ago, CVA, hep C, cirrhosis, left occluded SFA, chronic kidney disease, COPD    Past Surgical history:    Cardiac stent, EGD, cardioversion 2017, ankle surgery, transmetatarsal amputation, left total hip arthroplasty, left total shoulder replacement      Social History:    Every day smoker since he was a teenager, does drink beer frequently, but claims he has never had withdrawal and it is not a problem.  He occasionally drinks liquor as well but is not drank that for 3 weeks.  He is to use drugs when he was younger including speed.  He denies any IV drug history.    Family History:    Mother had breast cancer and rheumatoid arthritis, colon cancer in his sister, father  of heart disease    Allergies:      Review of patient's allergies indicates no known allergies.    Home Medications:    Prior to Admission Medications     Prescriptions Last Dose Informant Patient Reported? Taking?    albuterol (PROVENTIL HFA;VENTOLIN HFA) 108 (90 BASE) MCG/ACT inhaler   Yes No    Inhale 2 puffs Every 4 (Four) Hours As Needed for Wheezing.    amiodarone (PACERONE) 200 MG tablet   No No    Take 1 tablet by mouth Daily.    atorvastatin (LIPITOR) 40 MG tablet   No No    Take 1 tablet by mouth Every Night.    clopidogrel (PLAVIX) 75 MG tablet   No No    Take 1 tablet by mouth Daily.    colchicine 0.6 MG tablet   No No    Take 1 tablet by mouth Daily.    collagenase (SANTYL) 250 UNIT/GM ointment   No No    Apply  topically  Daily.    DULoxetine (CYMBALTA) 30 MG capsule   No No    Take 1 capsule by mouth Daily.    hydroxychloroquine (PLAQUENIL) 200 MG tablet   No No    Take 1 tablet by mouth Daily.    losartan-hydrochlorothiazide (HYZAAR) 50-12.5 MG per tablet   No No    Take 1 tablet by mouth Daily.    metoprolol tartrate (LOPRESSOR) 25 MG tablet   No No    Take 1 tablet by mouth Every 12 (Twelve) Hours.    Patient taking differently:  Take 25 mg by mouth 2 (Two) Times a Day.    pantoprazole (PROTONIX) 40 MG EC tablet   No No    Take 1 tablet by mouth Daily.             Hospital Scheduled Meds:      [START ON 8/26/2017] albuterol 2.5 mg Nebulization Q6H - RT   [START ON 8/26/2017] amiodarone 200 mg Oral Q24H   atorvastatin 40 mg Oral Nightly   [START ON 8/26/2017] clopidogrel 75 mg Oral Daily   [START ON 8/26/2017] colchicine 0.6 mg Oral Daily   [START ON 8/26/2017] DULoxetine 30 mg Oral Daily   [START ON 8/26/2017] enoxaparin 40 mg Subcutaneous Daily   ertapenem 1 g Intravenous Q24H   [START ON 8/26/2017] hydroxychloroquine 200 mg Oral Daily   [START ON 8/26/2017] losartan-hydrochlorothiazide 1 tablet Oral Daily   [START ON 8/26/2017] metoprolol tartrate 25 mg Oral BID   [START ON 8/26/2017] pantoprazole 40 mg Oral Daily         Pharmacy to dose vancomycin         Vital Signs:    Temp:  [97.9 °F (36.6 °C)-98.6 °F (37 °C)] 98.2 °F (36.8 °C)  Heart Rate:  [53-80] 80  Resp:  [16-22] 22  BP: (133-149)/(68-90) 133/81    Last 3 weights    08/25/17  1812 08/25/17 2127   Weight: 230 lb (104 kg) 230 lb (104 kg)       Body mass index is 34.97 kg/(m^2).    Physical Exam:      General Appearance:    Alert, cooperative, in no acute distress   Head:    Normocephalic, without obvious abnormality, atraumatic   Eyes:            Lids and lashes normal, conjunctivae and sclerae normal, no   icterus, no pallor, corneas clear, PERRLA   Ears:    Ears appear intact with no abnormalities noted   Throat:   No oral lesions, no thrush, oral mucosa moist    Neck:   No adenopathy, supple, trachea midline, no thyromegaly, no   carotid bruit, no JVD   Back:     No kyphosis present, no scoliosis present, no skin lesions,      erythema or scars, no tenderness to percussion or                   palpation,   range of motion normal   Lungs:     Clear to auscultation,respirations regular, even and                  unlabored    Heart:    Regular rhythm and normal rate, normal S1 and S2, no            murmur, no gallop, no rub, no click   Chest Wall:    No abnormalities observed   Abdomen:     Normal bowel sounds, no masses, no organomegaly, soft        non-tender, non-distended, no guarding, no rebound                tenderness   Rectal:     Deferred   Extremities:   Moves all extremities well, Redness in the left leg from the knee down.  TMA noted on the left with open wounds which are draining and malodorous.     Pulses:   Pulses palpable and equal bilaterally   Skin:   No bleeding, bruising or rash   Lymph nodes:   No palpable adenopathy   Neurologic:   Cranial nerves 2 - 12 grossly intact, sensation intact, DTR       present and equal bilaterally             Telemetry:  A. fib rate controlled    I have personally looked at  telemetry strips.    Labs:    Results from last 7 days  Lab Units 08/25/17  1848   LACTATE mmol/L 1.9   WBC 10*3/mm3 8.62   HEMOGLOBIN g/dL 9.7*   HEMATOCRIT % 29.3*   MCV fL 93.6   MCHC g/dL 33.1   PLATELETS 10*3/mm3 257           Results from last 7 days  Lab Units 08/25/17  1848   SODIUM mmol/L 137   POTASSIUM mmol/L 4.3   CHLORIDE mmol/L 100   CO2 mmol/L 22.0*   BUN mg/dL 13   CREATININE mg/dL 1.20   EGFR IF NONAFRICN AM mL/min/1.73 62   CALCIUM mg/dL 9.7   GLUCOSE mg/dL 123*   ALBUMIN g/dL 3.80   BILIRUBIN mg/dL 1.3   ALK PHOS U/L 160*   AST (SGOT) U/L 82*   ALT (SGPT) U/L 25   Estimated Creatinine Clearance: 78.4 mL/min (by C-G formula based on Cr of 1.2).  No results found for: AMMONIA          Lab Results   Component Value Date    HGBA1C 6.20  (H) 04/08/2017     Lab Results   Component Value Date    TSH 2.250 04/07/2017     No results found for: PREGTESTUR, PREGSERUM, HCG, HCGQUANT  Pain Management Panel     Pain Management Panel Latest Ref Rng & Units 6/20/2017    AMPHETAMINES SCREEN, URINE Negative Negative    BARBITURATES SCREEN Negative Negative    BENZODIAZEPINE SCREEN, URINE Negative Positive(A)    BUPRENORPHINE Negative Negative    COCAINE SCREEN, URINE Negative Negative    METHADONE SCREEN, URINE Negative Negative    METHAMPHETAMINE UR Negative Negative                          Radiology:    Imaging Results (last 7 days)     Procedure Component Value Units Date/Time    MRI Foot Left Without Contrast [[id number]] Collected:  08/25/17 1954     Updated:  08/25/17 1956    Narrative:       FINAL REPORT    CLINICAL HISTORY:  LOOKING FOR OSTEO. RECENT AMPUTATION AND DELAYED WOUND HEALING INCREASE IN PAIN TO FOOT AND LEG    FINDINGS:  Multiplanar multisequence imaging of the foot was performed without the intravenous administration of contrast. Images are degraded by motion artifact limiting evaluation. There postsurgical changes from recent midfoot amputation. There is prominent soft   tissue swelling without discrete drainable fluid collection to suggest abscess. There is bone marrow edema of the third metatarsal. Cannot exclude osteomyelitis.    Impression:    Cellulitis post amputation. Cannot exclude third metatarsal osteomyelitis. Given the limitation by motion artifact, recommend three-phase bone scan      Impression:       Authenticated by Latrell Clmeente MD on 08/25/2017 07:54:38 PM    MRI Tibia Fibula Left Without Contrast [[id number]] Collected:  08/25/17 1956     Updated:  08/1960    Narrative:       FINAL REPORT    CLINICAL HISTORY:  INCREASE PAIN, RECENT AMPUTATION OF LEFT FOOT WITH NEW PAIN TO TIBAL TUBEROSITY 3 DAYS.    FINDINGS:  Multiplanar multisequence imaging of the leg was performed without the intravenous administration of  contrast. Images are degraded by motion artifact.    There are postsurgical changes at the tibiotalar joint. 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 Remaining bone marrow signal is within normal limits. There is no acute fracture or or cortical erosion. There is no drainable tissue fluid collection.    Impression:    Postsurgical and chronic changes but no acute abnormality      Impression:       Authenticated by Latrell Clemente MD on 2017 07:56:41 PM          Assessment:    1.  Osteomyelitis of left foot status post TMA, with apparent wound infection and left leg cellulitis  2.  Chronic atrial fibrillation  3.  Rheumatoid arthritis  4.  Borderline diabetes mellitus type 2  5.  Hypertension  6.  History coronary artery disease with stents  7.  Noncompliance  8.  Possible history of alcoholism  9.  Peripheral arterial disease status post intervention by Dr. Storm  10.  Hepatitis C  11.  Possible cirrhosis.  Plan:     We'll have wound care see the patient.  Consult Dr. Campbell.  Placed on vancomycin and ertapenem.  Morphine for pain.  Place him on insulin sliding scale.  Also will place him on alcohol withdrawal protocol and give him a banana bag.  Anticipate this patient will need a BKA in the near future.  We'll see what Dr. Campbell advises.  We will hold his Xarelto at the present time in view of possible surgery.  Place him on subcutaneous Lovenox.  Continue with his home medications.  Further recommendations will depend on the clinical course.    Murali Garber DO  17  10:34 PM     Electronically signed by Murali Garber DO at 2017 10:48 PM      Liang Polanco MD at 2017  2:55 PM                [facility]    Name:  Vasquez Marie   Age:  56 y.o.  Sex:  male  :  1960  MRN:  [id number]   Visit Number:  [id number]  Admission Date:  2017  Date Of Service:  17  Primary Care Physician:  Marilyn K. Vermeesch, MD     LOS: 4  days :  Patient Care Team:  Marilyn K Vermeesch, MD as PCP - General (Internal Medicine):    Chief Complaint:      Follow-up of left transmetatarsal amputation stump infection.    Subjective / Interval History:     Patient is currently sitting up on the bed and is comfortable at rest.  He was transferred to the regular floor from the telemetry floor yesterday.  He was also seen by Dr. Campbell wear scheduled him for wound debridement to be done today.  Dr. Campbell feels that the patient may need wound debridement and wound VAC placement without any further amputation at this time.  He has stressed medical compliance to the patient.    Patient was admitted on 8/25/2017 from the emergency room with foul-smelling discharge from the left transmetatarsal amputation stump.  He was seen by his primary care physician and was subsequently sent to the emergency room.  Patient was recently admitted to [facility] where he underwent his transmetatarsal amputation and was subsequently sent home with wound VAC.  Patient was receiving home health for wound care but was subsequently discharged from their service.  Unfortunately, he has not been very compliant with his wound care and has been admitted with ongoing infection.  He has been started on IV antibiotic therapy with vancomycin and ertapenem since admission.  He has been afebrile.  Denies any chest pain or shortness of breath.  He does leave alone.  During his last admission here in June 2017, he was seen by Dr. Storm for his peripheral arterial disease and underwent peripheral angiogram and percutaneous intervention to his left leg on 6/20/2017.      Review of Systems:     General ROS: Patient denies any fevers, chills or loss of consciousness.  Respiratory ROS: Denies cough or shortness of breath.  Cardiovascular ROS: Denies chest pain or palpitations. No history of exertional chest pain.  Gastrointestinal ROS: Denies nausea and vomiting. Denies any abdominal pain. No  diarrhea.  Neurological ROS: Denies any focal weakness. No loss of consciousness. Denies any numbness.     Vital Signs:    Temp:  [97.5 °F (36.4 °C)-98.2 °F (36.8 °C)] 98.2 °F (36.8 °C)  Heart Rate:  [63-70] 64  Resp:  [16-18] 16  BP: (123-152)/(62-77) 123/66    Intake and output:    I/O last 3 completed shifts:  In: 3144 [P.O.:1520; I.V.:1124; IV Piggyback:500]  Out: 3450 [Urine:3450]  I/O this shift:  In: -   Out: 500 [Urine:500]    Physical Examination:    General Appearance:  Alert and cooperative, not in any acute distress.   Head:  Atraumatic and normocephalic, without obvious abnormality.   Eyes:          PERRLA, conjunctivae and sclerae normal, no Icterus. No pallor. Extra-occular movements are within normal limits.   Neck: Supple, trachea midline, no thyromegaly, no carotid bruit.   Lungs:   Chest shape is normal. Breath sounds heard bilaterally equally.  No crackles or wheezing. No Pleural rub or bronchial breathing.   Heart:  Normal S1 and S2, no murmur, no gallop, no rub. No JVD   Abdomen:   Normal bowel sounds, no masses, no organomegaly. Soft       non-tender, obese, no guarding, no rebound tenderness.   Extremities: Moves all extremities well, 1+ edema, no cyanosis, no            clubbing.  Surgical bandage is noted on the left foot with the left transmetatarsal amputation.   Skin: No bleeding, bruising or rash.   Neurologic: Awake, alert and oriented times 3. Moves all 4 extremities equally.   Laboratory results:      Results from last 7 days  Lab Units 08/29/17  0604 08/26/17  0651 08/25/17  1848   SODIUM mmol/L 139 140 137   POTASSIUM mmol/L 4.4 3.6 4.3   CHLORIDE mmol/L 110* 107 100   CO2 mmol/L 19.0* 20.0* 22.0*   BUN mg/dL 10 13 13   CREATININE mg/dL 1.20 1.40* 1.20   CALCIUM mg/dL 8.8 8.9 9.7   BILIRUBIN mg/dL  --   --  1.3   ALK PHOS U/L  --   --  160*   ALT (SGPT) U/L  --   --  25   AST (SGOT) U/L  --   --  82*   GLUCOSE mg/dL 87 130* 123*       Results from last 7 days  Lab Units  08/29/17  0604 08/26/17  0651 08/25/17  1848   WBC 10*3/mm3 3.65* 5.57 8.62   HEMOGLOBIN g/dL 8.3* 9.1* 9.7*   HEMATOCRIT % 27.0* 28.7* 29.3*   PLATELETS 10*3/mm3 136 176 257               Results from last 7 days  Lab Units 08/25/17  2128   WOUNDCX  Heavy growth (4+) Alcaligenes species*       I have reviewed the patient's laboratory results.    Radiology results:    Imaging Results (last 24 hours)     Procedure Component Value Units Date/Time    SCANNED - IMAGING [[id number]] Resulted:  08/25/17      Updated:  08/29/17 1430        Medication Review:     I have reviewed the patients active and prn medications.     Principal Problem:    Subacute osteomyelitis of left foot  Active Problems:    Chronic hepatitis C virus infection    Coronary artery disease involving native coronary artery of native heart without angina pectoris    Chronic atrial fibrillation    Essential hypertension    Depression with anxiety    Gastroesophageal reflux disease with esophagitis    COPD (chronic obstructive pulmonary disease)    Rheumatoid arthritis    Peripheral vascular disease    Liver mass    Assessment:    1.  Acute infection and cellulitis of the left transmetatarsal amputation stump, present on admission.  2.  Peripheral arterial disease s/p left SFA stent and intervention to the peroneal arteries on 6/20/2017.  3.  Paroxysmal atrial fibrillation.  4.  Coronary artery disease on medical therapy.  5.  Essential hypertension.  6.  COPD, stable.  7.  Chronic hepatitis C infection.    Plan:    Patient is currently on IV antibiotic therapy with ertapenem and vancomycin for his left foot amputation stump cellulitis.  His dressing has been changed.  Wound cultures are growing Alcaligenes species which are sensitive to carbopenems and Rocephin.  I will continue the current antibiotic regimen until wound debridement and will switch him over to Rocephin from tomorrow.  He will be continued on his home medications including antiplatelet  agents.  MRI of the foot done in the emergency room showed cellulitis but was not certain about osteomyelitis especially due to motion artifact.  I discussed options of going to short-term rehabilitation for wound care and wound VAC and he is open to that idea.  Further recommendations depend upon his clinical course.    Liang Polanco MD  17  2:55 PM    Portions of this note may have been completed with Dragon, a voice recognition program. Not all errors in transcription may have been detected prior to signing.       Electronically signed by Liang Polanco MD at 2017  4:00 PM      Wolfgang Campbell DPM at 2017  4:38 PM          H&P reviewed. The patient was examined and there are no changes to the H&P.     Electronically signed by Wolfgang Campbell DPM at 2017  4:38 PM    Source Note                   [facility]    Name:  Vasquez Marie   Age:  56 y.o.  Sex:  male  :  1960  MRN:  [id number]   Visit Number:  [id number]  Admission Date:  2017  Date Of Service:  17  Primary Care Physician:  Marilyn K. Vermeesch, MD     LOS: 4 days :  Patient Care Team:  Marilyn K Vermeesch, MD as PCP - General (Internal Medicine):    Chief Complaint:      Follow-up of left transmetatarsal amputation stump infection.    Subjective / Interval History:     Patient is currently sitting up on the bed and is comfortable at rest.  He was transferred to the regular floor from the telemetry floor yesterday.  He was also seen by Dr. Campbell wear scheduled him for wound debridement to be done today.  Dr. Campbell feels that the patient may need wound debridement and wound VAC placement without any further amputation at this time.  He has stressed medical compliance to the patient.    Patient was admitted on 2017 from the emergency room with foul-smelling discharge from the left transmetatarsal amputation stump.  He was seen by his primary care physician and was  subsequently sent to the emergency room.  Patient was recently admitted to [facility] where he underwent his transmetatarsal amputation and was subsequently sent home with wound VAC.  Patient was receiving home health for wound care but was subsequently discharged from their service.  Unfortunately, he has not been very compliant with his wound care and has been admitted with ongoing infection.  He has been started on IV antibiotic therapy with vancomycin and ertapenem since admission.  He has been afebrile.  Denies any chest pain or shortness of breath.  He does leave alone.  During his last admission here in June 2017, he was seen by Dr. Storm for his peripheral arterial disease and underwent peripheral angiogram and percutaneous intervention to his left leg on 6/20/2017.      Review of Systems:     General ROS: Patient denies any fevers, chills or loss of consciousness.  Respiratory ROS: Denies cough or shortness of breath.  Cardiovascular ROS: Denies chest pain or palpitations. No history of exertional chest pain.  Gastrointestinal ROS: Denies nausea and vomiting. Denies any abdominal pain. No diarrhea.  Neurological ROS: Denies any focal weakness. No loss of consciousness. Denies any numbness.     Vital Signs:    Temp:  [97.5 °F (36.4 °C)-98.2 °F (36.8 °C)] 98.2 °F (36.8 °C)  Heart Rate:  [63-70] 64  Resp:  [16-18] 16  BP: (123-152)/(62-77) 123/66    Intake and output:    I/O last 3 completed shifts:  In: 3144 [P.O.:1520; I.V.:1124; IV Piggyback:500]  Out: 3450 [Urine:3450]  I/O this shift:  In: -   Out: 500 [Urine:500]    Physical Examination:    General Appearance:  Alert and cooperative, not in any acute distress.   Head:  Atraumatic and normocephalic, without obvious abnormality.   Eyes:          PERRLA, conjunctivae and sclerae normal, no Icterus. No pallor. Extra-occular movements are within normal limits.   Neck: Supple, trachea midline, no thyromegaly, no carotid bruit.   Lungs:   Chest shape is  normal. Breath sounds heard bilaterally equally.  No crackles or wheezing. No Pleural rub or bronchial breathing.   Heart:  Normal S1 and S2, no murmur, no gallop, no rub. No JVD   Abdomen:   Normal bowel sounds, no masses, no organomegaly. Soft       non-tender, obese, no guarding, no rebound tenderness.   Extremities: Moves all extremities well, 1+ edema, no cyanosis, no            clubbing.  Surgical bandage is noted on the left foot with the left transmetatarsal amputation.   Skin: No bleeding, bruising or rash.   Neurologic: Awake, alert and oriented times 3. Moves all 4 extremities equally.   Laboratory results:      Results from last 7 days  Lab Units 08/29/17  0604 08/26/17  0651 08/25/17  1848   SODIUM mmol/L 139 140 137   POTASSIUM mmol/L 4.4 3.6 4.3   CHLORIDE mmol/L 110* 107 100   CO2 mmol/L 19.0* 20.0* 22.0*   BUN mg/dL 10 13 13   CREATININE mg/dL 1.20 1.40* 1.20   CALCIUM mg/dL 8.8 8.9 9.7   BILIRUBIN mg/dL  --   --  1.3   ALK PHOS U/L  --   --  160*   ALT (SGPT) U/L  --   --  25   AST (SGOT) U/L  --   --  82*   GLUCOSE mg/dL 87 130* 123*       Results from last 7 days  Lab Units 08/29/17  0604 08/26/17  0651 08/25/17  1848   WBC 10*3/mm3 3.65* 5.57 8.62   HEMOGLOBIN g/dL 8.3* 9.1* 9.7*   HEMATOCRIT % 27.0* 28.7* 29.3*   PLATELETS 10*3/mm3 136 176 257               Results from last 7 days  Lab Units 08/25/17  2128   WOUNDCX  Heavy growth (4+) Alcaligenes species*       I have reviewed the patient's laboratory results.    Radiology results:    Imaging Results (last 24 hours)     Procedure Component Value Units Date/Time    SCANNED - IMAGING [[id number]] Resulted:  08/25/17      Updated:  08/29/17 1430        Medication Review:     I have reviewed the patients active and prn medications.     Principal Problem:    Subacute osteomyelitis of left foot  Active Problems:    Chronic hepatitis C virus infection    Coronary artery disease involving native coronary artery of native heart without angina pectoris     Chronic atrial fibrillation    Essential hypertension    Depression with anxiety    Gastroesophageal reflux disease with esophagitis    COPD (chronic obstructive pulmonary disease)    Rheumatoid arthritis    Peripheral vascular disease    Liver mass    Assessment:    1.  Acute infection and cellulitis of the left transmetatarsal amputation stump, present on admission.  2.  Peripheral arterial disease s/p left SFA stent and intervention to the peroneal arteries on 6/20/2017.  3.  Paroxysmal atrial fibrillation.  4.  Coronary artery disease on medical therapy.  5.  Essential hypertension.  6.  COPD, stable.  7.  Chronic hepatitis C infection.    Plan:    Patient is currently on IV antibiotic therapy with ertapenem and vancomycin for his left foot amputation stump cellulitis.  His dressing has been changed.  Wound cultures are growing Alcaligenes species which are sensitive to carbopenems and Rocephin.  I will continue the current antibiotic regimen until wound debridement and will switch him over to Rocephin from tomorrow.  He will be continued on his home medications including antiplatelet agents.  MRI of the foot done in the emergency room showed cellulitis but was not certain about osteomyelitis especially due to motion artifact.  I discussed options of going to short-term rehabilitation for wound care and wound VAC and he is open to that idea.  Further recommendations depend upon his clinical course.    Liang Polanco MD  08/29/17  2:55 PM    Portions of this note may have been completed with Dragon, a voice recognition program. Not all errors in transcription may have been detected prior to signing.        Electronically signed by Liang Polanco MD at 8/29/2017  4:00 PM                 Emergency Department Notes      Fatimah Sumner at 8/25/2017  7:05 PM          Contacted Dr. Garber for Rema, SHADI, @ [phone].     Fatimah Sumner  08/25/17 1906       Electronically signed by Fatimah Sumner at 8/25/2017  7:06 PM       SHADI Stratton at 8/25/2017  7:19 PM     Attestation signed by Adrian Palmer MD at 8/28/2017  7:02 AM              For this patient encounter, I reviewed the NP or PA documentation, treatment plan, and medical decision making. Adrian Palmer MD 8/28/2017 7:02 AM                               Subjective   History of Present Illness  This is a 56 year old male who was sent by Dr. Turcios office today for increase in edema to left leg with pain up to his knee and poor wound healing. He had partial foot amputation 15 days ago at  for wound infection and osteomylitis and was sent home with home health for wound care and wound vac. His wound vac was taken off last week and home health stopped coming this week on Monday for unknown reason. When he saw his PCP today he had not had any wound care since Monday. According to her note he is a noncompliant patient and misses several appointments. She sent him here today for evaluation of worsening wound and pain. He does report chills but unknown fever.   Review of Systems   Constitutional: Negative.    HENT: Negative.    Eyes: Negative.    Respiratory: Negative.    Cardiovascular: Positive for leg swelling.   Gastrointestinal: Negative.    Genitourinary: Negative.    Musculoskeletal: Positive for arthralgias.   Skin: Negative.    Neurological: Negative.    Psychiatric/Behavioral: Negative.    All other systems reviewed and are negative.      Past Medical History:   Diagnosis Date   • Abdominal pain     History of epigastric abdominal tenderness. Differentials include peptic ulcer disease,   pancreatobiliary disease.   • Abnormal LFTs    • Anemia    • Anxiety    • Arthritis    • Asthma    • Atrial fibrillation    • CAD (coronary artery disease)    • Calf cramp    • CKD (chronic kidney disease)    • COPD (chronic obstructive pulmonary disease)    • CVA (cerebral infarction)    • Depression    • DVT (deep venous thrombosis)    • Electrocution    • Fatigue     • Hepatitis C    • History of allergy    • History of blood transfusion    • Hyperlipidemia    • Hypertension    • Leg pain    • Loss of appetite    • PUD (peptic ulcer disease)    • Stroke syndrome    • Tachycardia    • Thrombocytopenia    • Thrombophlebitis of deep femoral vein    • Vision problems        No Known Allergies    Past Surgical History:   Procedure Laterality Date   • AMPUTATION DIGIT Left 6/23/2017    Procedure: amputation of left foot third digit, left foot wound debridments and grafting;  Surgeon: Wolfgang Campbell DPM;  Location: [facility] OR;  Service:    • ANKLE SURGERY     • CARDIAC CATHETERIZATION N/A 6/20/2017    Procedure: Peripheral angiography;  Surgeon: Jonathan Storm MD;  Location: [facility] CATH INVASIVE LOCATION;  Service:    • CARDIAC CATHETERIZATION N/A 6/20/2017    Procedure: Angioplasty-peripheral;  Surgeon: Jonathan Storm MD;  Location: [facility] CATH INVASIVE LOCATION;  Service:    • CARDIAC SURGERY     • CORONARY STENT PLACEMENT     • TONSILLECTOMY     • TOTAL HIP ARTHROPLASTY Left    • TOTAL SHOULDER REPLACEMENT Left        Family History   Problem Relation Age of Onset   • Breast cancer Mother    • Colon cancer Sister    • Heart attack Other    • Arthritis Other    • Diabetes Other    • Hypertension Other    • Kidney disease Other    • Stroke Other    • Heart disease Father        Social History     Social History   • Marital status: Single     Spouse name: N/A   • Number of children: N/A   • Years of education: N/A     Social History Main Topics   • Smoking status: Current Every Day Smoker     Packs/day: 0.50     Years: 30.00     Types: Cigarettes   • Smokeless tobacco: None   • Alcohol use No   • Drug use: No   • Sexual activity: Defer     Other Topics Concern   • None     Social History Narrative           Objective   Physical Exam   Constitutional: He appears well-developed and well-nourished.   Nursing note and vitals reviewed.  GEN: No acute distress  Head:  Normocephalic, atraumatic  Eyes: Pupils equal round reactive to light  ENT: Posterior pharynx normal in appearance, oral mucosa is moist  Chest: Nontender to palpation  Cardiovascular: Regular rate  Lungs: Clear to auscultation bilaterally  Abdomen: Soft, nontender, nondistended, no peritoneal signs  Extremities: left leg edema with tenderness from tibial tuberosity to wound. Wound bed with grey tissue without granulation and minimal drainage.   Neuro: GCS 15  Psych: Mood and affect are appropriate      Procedures        ED Course  ED Course   Comment By Time   After reviewing medical record office visit from PCP patient was discharged from home health due to removing wound vac several times and refusing dressing changes due to drinking with friends when they arrived. SHADI Stratton 08/25 2010                  MDM  Number of Diagnoses or Management Options     Amount and/or Complexity of Data Reviewed  Clinical lab tests: ordered and reviewed  Tests in the radiology section of CPT®:  ordered and reviewed    Risk of Complications, Morbidity, and/or Mortality  Presenting problems: high  Diagnostic procedures: high  Management options: high        Final diagnoses:   Subacute osteomyelitis of left foot            SHADI Stratton  08/25/17 2026       Electronically signed by Adrian Palmer MD at 8/28/2017  7:02 AM      Crista Calzada at 8/25/2017  8:04 PM          Dr. Garber called for SHADI Cesar with no answer. Voicemail left.     Crista Calzada  08/25/17 2004       Electronically signed by Crista Calzada at 8/25/2017  8:04 PM      Crista Calzada at 8/25/2017  8:17 PM          Dr. Garber returned call.     Crista Calzada  08/25/17 2017       Electronically signed by Crista Calzada at 8/25/2017  8:17 PM        Hospital Medications (active)       Dose Frequency Start End    acetaminophen (TYLENOL) tablet 650 mg 650 mg Every 4 Hours PRN 8/25/2017     Sig - Route:  Take 2 tablets by mouth Every 4 (Four) Hours As Needed for Mild Pain . - Oral    albuterol (PROVENTIL) nebulizer solution 0.083% 2.5 mg/3mL 2.5 mg Every 6 Hours - RT 8/26/2017     Sig - Route: Take 2.5 mg by nebulization Every 6 (Six) Hours. - Nebulization    amiodarone (PACERONE) tablet 200 mg 200 mg Every 24 Hours Scheduled 8/26/2017     Sig - Route: Take 1 tablet by mouth Daily. - Oral    atorvastatin (LIPITOR) tablet 40 mg 40 mg Nightly 8/25/2017     Sig - Route: Take 1 tablet by mouth Every Night. - Oral    cefTRIAXone (ROCEPHIN) 2 g/100 mL 0.9% NS (MBP) 2 g Every 24 Hours 8/30/2017     Sig - Route: Infuse 100 mL into a venous catheter Daily. - Intravenous    clopidogrel (PLAVIX) tablet 75 mg 75 mg Daily 8/26/2017     Sig - Route: Take 1 tablet by mouth Daily. - Oral    colchicine tablet 0.6 mg 0.6 mg Daily 8/26/2017     Sig - Route: Take 1 tablet by mouth Daily. - Oral    DULoxetine (CYMBALTA) DR capsule 30 mg 30 mg Daily 8/26/2017     Sig - Route: Take 1 capsule by mouth Daily. - Oral    enoxaparin (LOVENOX) syringe 40 mg 40 mg Daily 8/26/2017     Sig - Route: Inject 0.4 mL under the skin Daily. - Subcutaneous    HYDROcodone-acetaminophen (NORCO)  MG per tablet 1 tablet 1 tablet Every 4 Hours PRN 8/31/2017     Sig - Route: Take 1 tablet by mouth Every 4 (Four) Hours As Needed for Moderate Pain . - Oral    hydroxychloroquine (PLAQUENIL) tablet 200 mg 200 mg Daily 8/26/2017     Sig - Route: Take 1 tablet by mouth Daily. - Oral    lactobacillus acidophilus (RISAQUAD) capsule 1 capsule 1 capsule Daily 9/6/2017     Sig - Route: Take 1 capsule by mouth Daily. - Oral    loperamide (IMODIUM) capsule 2 mg 2 mg 4 Times Daily PRN 8/27/2017     Sig - Route: Take 1 capsule by mouth 4 (Four) Times a Day As Needed for Diarrhea. - Oral    LORazepam (ATIVAN) tablet 1 mg 1 mg Every 6 Hours PRN 9/1/2017 9/11/2017    Sig - Route: Take 2 tablets by mouth Every 6 (Six) Hours As Needed for Anxiety. - Oral     "losartan-hydrochlorothiazide (HYZAAR) 50-12.5 MG per tablet 1 tablet 1 tablet Daily 8/26/2017     Sig - Route: Take 1 tablet by mouth Daily. - Oral    metoprolol tartrate (LOPRESSOR) tablet 25 mg 25 mg 2 Times Daily 8/26/2017     Sig - Route: Take 1 tablet by mouth 2 (Two) Times a Day. - Oral    nicotine (NICODERM CQ) 14 MG/24HR patch 1 patch 1 patch Every 24 Hours 8/26/2017     Sig - Route: Place 1 patch on the skin Daily. - Transdermal    ondansetron (ZOFRAN) injection 4 mg 4 mg Every 6 Hours PRN 8/25/2017     Sig - Route: Infuse 2 mL into a venous catheter Every 6 (Six) Hours As Needed for Nausea or Vomiting. - Intravenous    pantoprazole (PROTONIX) EC tablet 40 mg 40 mg Every Early Morning 8/26/2017     Sig - Route: Take 1 tablet by mouth Every Morning. - Oral    Pharmacy Meds to Bed Consult  Daily (Monday-Friday) 8/28/2017     Sig - Route: Daily (Monday-Friday). - Does not apply    sodium chloride 0.9 % flush 1-10 mL 1-10 mL As Needed 8/25/2017     Sig - Route: Infuse 1-10 mL into a venous catheter As Needed for Line Care. - Intravenous    sodium chloride 0.9 % flush 1-10 mL 1-10 mL As Needed 9/6/2017     Sig - Route: 1-10 mL by Intracatheter route As Needed for Line Care. - Intracatheter    Cosign for Ordering: Required by Alen Potter MD    sodium chloride 0.9 % flush 10 mL 10 mL As Needed 8/25/2017     Sig - Route: Infuse 10 mL into a venous catheter As Needed for Line Care. - Intravenous    Cosign for Ordering: Accepted by Adrian Palmer MD on 8/25/2017  6:41 PM    Linked Group 1:  \"And\" Linked Group Details        traZODone (DESYREL) tablet 50 mg 50 mg Nightly PRN 8/27/2017     Sig - Route: Take 1 tablet by mouth At Night As Needed for Sleep. - Oral             Physician Progress Notes (last 24 hours) (Notes from 9/6/2017 12:19 PM through 9/7/2017 12:19 PM)      Alen Potter MD at 9/6/2017  3:24 PM  Version 1 of 1               [facility]    PROGRESS NOTE    Name:  " Vasquez Marie   Age:  56 y.o.  Sex:  male  :  1960  MRN:  [id number]   Visit Number:  [id number]  Admission Date:  2017  Date Of Service:  17  Primary Care Physician:  Marilyn K. Vermeesch, MD     LOS: 12 days :  Patient Care Team:  Marilyn K Vermeesch, MD as PCP - General (Internal Medicine):    Chief Complaint:      Weakness    Subjective / Interval History:     Patient foot pain is controlled well today on pain medicines.  No current issues of soa, cp, n/v or abdominal pain.   Currently awaiting rehab placement.     I have reviewed labs/imaging/records from this hospitalization, including ER staff and admitting/attending physicians H/P's and progress notes to establish a comprehensive understanding of this patient's clinical hospital course, as well as to establish a transition of care appropriately.    Vital Signs:    Temp:  [98 °F (36.7 °C)-98.8 °F (37.1 °C)] 98 °F (36.7 °C)  Heart Rate:  [63-69] 63  Resp:  [12-20] 12  BP: (121-148)/(57-89) 135/73    Intake and output:    Intake/Output       17 0700 - 17 0659 17 0700 - 17 0659    Intake (ml) 960 480    Output (ml) 2150 100    Net (ml) -1190 380    Last Weight 216 lb (98 kg) --          Physical Examination:    General Appearance:  Alert and cooperative, not in any acute distress.   Head:  Atraumatic and normocephalic, without obvious abnormality.   Eyes:      PERRLA, Extra-occular movements are within normal limits.   Lungs:   Chest shape is normal. Breath sounds heard bilaterally equally.  No crackles or wheezing.   Heart:  Normal S1 and S2, no murmur, no gallop, no rub.   Abdomen:   Normal bowel sounds,  Soft non-tender, non-distended, no guarding, no rebound tenderness   Extremities: No edema                     Laboratory results:      Results from last 7 days  Lab Units 17  0513 17  0515 17  0543   SODIUM mmol/L 141 140 141   POTASSIUM mmol/L 3.8 3.7 3.8   CHLORIDE mmol/L 107 107 109*    CO2 mmol/L 25.0* 24.0* 23.0*   BUN mg/dL 37* 33* 29*   CREATININE mg/dL 1.20 1.20 1.20   CALCIUM mg/dL 8.8 8.4 8.2*   GLUCOSE mg/dL 115* 110* 104*       Results from last 7 days  Lab Units 09/06/17  0513 09/05/17  0515 09/04/17  0543   WBC 10*3/mm3 3.34* 3.82* 3.84*   HEMOGLOBIN g/dL 8.8* 8.4* 8.6*   HEMATOCRIT % 27.9* 26.9* 27.7*   PLATELETS 10*3/mm3 101* 116* 114*               I have reviewed the patient's laboratory results.    Radiology results:    Imaging Results (last 24 hours)     ** No results found for the last 24 hours. **          I have reviewed the patient's radiology reports.    Medication Review:     I have reviewed the patients active and prn medications.       Assessment/Plan:  1.  Acute infection and cellulitis of the left transmetatarsal amputation stump, present on admission.  2.  Peripheral arterial disease s/p left SFA stent and intervention to the peroneal arteries on 6/20/2017.  3.  Paroxysmal atrial fibrillation.  4.  Coronary artery disease on medical therapy.  5.  Essential hypertension.  6.  COPD, stable.  7.  Chronic hepatitis C infection.  8.  Rheumatoid arthritis.      Patient is currently on IV antibiotic therapy with ceftriaxone 2 g daily.  He will be continued on wound VAC therapy. Wound culture obtained during the debridement has grown Providencia rettgeri which is sensitive to ceftriaxone.  Case management services are currently working to get him to short-term rehabilitation facility for continued wound care.  He will be transferred to short-term rehabilitation when bed is available.      Alen Potter MD  09/06/17  3:24 PM               Electronically signed by Alen Potter MD at 9/6/2017  3:25 PM        Consult Notes (last 24 hours) (Notes from 9/6/2017 12:19 PM through 9/7/2017 12:19 PM)     No notes of this type exist for this encounter.           Physical Therapy Notes (last 24 hours) (Notes from 9/6/2017 12:19 PM through 9/7/2017 12:19 PM)      Dipak Scott  PTA at 2017  4:05 PM  Version 1 of 1         Problem: Patient Care Overview (Adult)  Goal: Plan of Care Review  Outcome: Ongoing (interventions implemented as appropriate)    17 1601   Coping/Psychosocial Response Interventions   Plan Of Care Reviewed With patient   Outcome Evaluation   Outcome Summary/Follow up Plan Old dressing removed. Periwound area presented with decreased maceration. Wound bed cont to be red beefy in nature. Skin graft intact. x 4 layers of adaptic applied black foam cut to fit with periwound area covered with x 2 layers of skin protectant. Transparent dressing applied as well as bridge dressing. Seal attained at 125 mm/Hg.          Problem: Inpatient Physical Therapy  Goal: Wound Care Goal 1 STG- PT  Outcome: Ongoing (interventions implemented as appropriate)    17 1601   Wound Care PT STG   Wound Care PT STG 1, Outcome goal ongoing              Electronically signed by Dipak Scott PTA at 2017  4:05 PM      Dipak Scott PTA at 2017  4:06 PM  Version 1 of 1         Acute Care - Physical Therapy Treatment Note  BRITTANI Espinal     Patient Name: Vasquez Marie  : 1960  MRN: [id number]  Today's Date: 2017  Onset of Illness/Injury or Date of Surgery Date: 17  Date of Referral to PT: 17  Referring Physician: Adrian    Admit Date: 2017    Visit Dx:    ICD-10-CM ICD-9-CM   1. Subacute osteomyelitis of left foot M86.272 730.07   2. Impaired functional mobility, balance, gait, and endurance Z74.09 V49.89   3. Coronary artery disease involving native coronary artery of native heart without angina pectoris I25.10 414.01   4. Essential hypertension I10 401.9   5. Gastroesophageal reflux disease with esophagitis K21.0 530.11   6. Other emphysema J43.8 492.8   7. Peripheral vascular disease I73.9 443.9     Patient Active Problem List   Diagnosis   • Chronic hepatitis C virus infection   • Coronary artery disease involving native coronary artery  of native heart without angina pectoris   • Chronic atrial fibrillation   • Essential hypertension   • Thrombocytopenia   • Depression with anxiety   • Gastroesophageal reflux disease with esophagitis   • Hyperlipidemia   • Tobacco abuse   • COPD (chronic obstructive pulmonary disease)   • Gout   • Congestive heart disease   • RLS (restless legs syndrome)   • Rheumatoid arthritis   • Typical atrial flutter   • Atrial flutter   • Cerebrovascular accident (CVA)   • Borderline diabetes   • Dry gangrene   • Anemia   • Peripheral vascular disease   • Subacute osteomyelitis of left foot   • Liver mass               Adult Rehabilitation Note       09/05/17 1035 09/04/17 1021       Rehab Assessment/Intervention    Discipline physical therapy assistant  -RM physical therapy assistant  -RM     Document Type therapy note (daily note)  -RM therapy note (daily note)  -RM     Subjective Information agree to therapy;complains of;pain  -RM agree to therapy;complains of;pain  -RM     Patient Effort, Rehab Treatment adequate  -RM fair  -RM     Symptoms Noted During/After Treatment  increased pain  -RM     Precautions/Limitations non-weight bearing status  -RM non-weight bearing status  -RM     Recorded by [RM] Dipak Scott PTA [RM] Dipak Scott PTA     Pain Assessment    Pain Assessment 0-10  -RM 0-10  -RM     Pain Score 8  -RM 8  -RM     Post Pain Score 8  -RM 8  -RM     Pain Type Acute pain  -RM Acute pain  -RM     Pain Location Foot  -RM Foot  -RM     Pain Orientation Left  -RM Left  -RM     Pain Intervention(s) Repositioned  -RM Repositioned;Ambulation/increased activity  -RM     Response to Interventions no increase in pain.   -RM no increase in pain.   -RM     Recorded by [RM] Dipak Scott PTA [RM] Dipak Scott PTA     Mobility Assessment/Training    Left Lower Extremity Weight-Bearing non weight-bearing  -RM non weight-bearing  -RM     Recorded by [RM] Dipak Scott PTA [RM] Dipak Scott PTA      Transfer Assessment/Treatment    Transfers, Bed-Chair Cassia verbal cues required;nonverbal cues required (demo/gesture);contact guard assist;minimum assist (75% patient effort)  -RM      Transfers, Chair-Bed Cassia verbal cues required;nonverbal cues required (demo/gesture);contact guard assist;minimum assist (75% patient effort)  -RM      Transfers, Bed-Chair-Bed, Assist Device --   therapy staff  -RM      Transfers, Sit-Stand Cassia  contact guard assist  -RM     Transfers, Stand-Sit Cassia  contact guard assist  -RM     Transfers, Sit-Stand-Sit, Assist Device  standard walker  -RM     Transfer, Maintain Weight Bearing Status able to maintain weight bearing status  -RM able to maintain weight bearing status  -RM     Transfer, Safety Issues balance decreased during turns  -RM balance decreased during turns  -RM     Recorded by [RM] Dipak Scott PTA [RM] Dipak Scott PTA     Gait Assessment/Treatment    Gait, Cassia Level  contact guard assist;minimum assist (75% patient effort)  -RM     Gait, Assistive Device  standard walker  -RM     Gait, Distance (Feet)  3  -RM     Gait, Maintain Weight Bearing Status  unable to maintain weight bearing status  -RM     Gait, Safety Issues  balance decreased during turns  -RM     Gait, Impairments  strength decreased;impaired balance;pain  -RM     Recorded by  [RM] Dipak Scott PTA     Therapy Exercises    Bilateral Lower Extremities  AROM:;10 reps;sitting;ankle pumps/circles;heel slides;LAQ  -RM     BLE Resistance  manual resistance- moderate   with hip extension   -RM     Bilateral Upper Extremity AAROM:;15 reps;sitting;elbow flexion/extension;shoulder abduction/adduction;shoulder horizontal abd/add;shoulder protraction/retraction  -RM AROM:;10 reps;sitting;shoulder protraction/retraction  -RM     BUE Resistance theraband   green  -RM manual resistance- moderate  -RM     Recorded by [RM] Dipak Scott PTA [RM] Dipak Scott,  PTA     Positioning and Restraints    Pre-Treatment Position sitting in chair/recliner  -RM sitting in chair/recliner  -RM     Post Treatment Position chair  -RM chair  -RM     In Chair reclined;call light within reach;encouraged to call for assist;LLE elevated;notified nsg  -RM reclined;call light within reach;encouraged to call for assist;LLE elevated;notified nsg  -RM     Recorded by [RM] Dipak Scott PTA [RM] Dipak Scott PTA       User Key  (r) = Recorded By, (t) = Taken By, (c) = Cosigned By    Initials Name Effective Dates    RM Dipak Scott, SANYA 10/26/16 -                 IP PT Goals       09/06/17 1601 09/05/17 1559 09/04/17 1711    Transfer Training PT LTG    Transfer Training PT LTG, Outcome  goal ongoing  -RM     Strength Goal PT LTG    Strength Goal PT LTG, Outcome  goal ongoing  -RM     Wound Care PT STG    Wound Care PT STG 1, Outcome goal ongoing  -RM  goal ongoing  -RM      09/04/17 1305 09/03/17 1601 09/01/17 1435    Transfer Training PT LTG    Transfer Training PT LTG, Outcome goal ongoing  -RM goal ongoing  -JR     Gait Training PT LTG    Gait Training Goal PT LTG, Outcome goal ongoing  -RM goal ongoing  -JR     Strength Goal PT LTG    Strength Goal PT LTG, Outcome goal ongoing  -RM goal ongoing  -JR     Wound Care PT STG    Wound Care PT STG 1, Date Established   09/01/17  -LM    Wound Care PT STG 1, Time to Achieve   2 wks  -LM    Wound Care PT STG 1, Location   L TMA site  -LM    Wound Care PT STG 1, No S&S of Infection   yes  -LM    Wound Care PT STG 1, Decrease Wound Size   Decrease wound dimensions by 0.5 cm in all measures.  -LM    Wound Care PT STG 1, Outcome   goal ongoing  -LM      08/30/17 1330          Transfer Training PT LTG    Transfer Training PT LTG, Date Established 08/30/17  -LM      Transfer Training PT LTG, Time to Achieve 2 wks  -LM      Transfer Training PT LTG, Activity Type sit to stand/stand to sit;bed to chair /chair to bed  -LM      Transfer Training PT  LTG, [place] Level supervision required  -LM      Transfer Training PT LTG, Assist Device walker, rolling  -LM      Transfer Training PT LTG, Additional Goal NWB L LE  -LM      Transfer Training PT LTG, Outcome goal ongoing  -LM      Gait Training PT LTG    Gait Training Goal PT LTG, Date Established 08/30/17  -LM      Gait Training Goal PT LTG, Time to Achieve 2 wks  -LM      Gait Training Goal PT LTG, [place] Level contact guard assist  -LM      Gait Training Goal PT LTG, Assist Device walker, rolling  -LM      Gait Training Goal PT LTG, Distance to Achieve 30  -LM      Gait Training Goal PT LTG, Additional Goal NWB L LE  -LM      Gait Training Goal PT LTG, Outcome goal ongoing  -LM      Strength Goal PT LTG    Strength Goal PT LTG, Date Established 08/30/17  -LM      Strength Goal PT LTG, Time to Achieve 2 wks  -LM      Strength Goal PT LTG, Measure to Achieve Patient will perform ther ex x 15 reps to improve functional strength for mobility.  -LM      Strength Goal PT LTG, Outcome goal ongoing  -LM        User Key  (r) = Recorded By, (t) = Taken By, (c) = Cosigned By    Initials Name Provider Type    JR Sia Whaley, PT Physical Therapist    LM Cristal Martinez, PT Physical Therapist    RM Dipak Scott, PTA Physical Therapy Assistant          Physical Therapy Education     Title: PT OT SLP Therapies (Done)     Topic: Physical Therapy (Done)     Point: Mobility training (Done)    Learning Progress Summary    Learner Readiness Method Response Comment Documented by Status   Patient Acceptance REECE ROMO,NR   09/05/17 1558 Done    Acceptance REECE ROMO,NR   09/04/17 1305 Done    Acceptance E JAYESH Purpose of PT/POC;  importance of NWB L LE for wound healing; proper use of RW for safe transfers/ambulation. LM 08/30/17 1330 Done               Point: Home exercise program (Done)    Learning Progress Summary    Learner Readiness Method Response Comment Documented by Status   Patient Acceptance REECE ROMONR   09/04/17  1305 Done    Acceptance E VU Purpose of PT/POC;  importance of NWB L LE for wound healing; proper use of RW for safe transfers/ambulation.  08/30/17 1330 Done               Point: Precautions (Done)    Learning Progress Summary    Learner Readiness Method Response Comment Documented by Status   Patient Acceptance E,D VU,NR   09/04/17 1305 Done    Acceptance E VU Importance of maintaining NWB on LLE JR 09/03/17 1559 Done    Acceptance E VU Purpose of wound vac dressing change.  09/01/17 1434 Done    Acceptance E VU Purpose of PT/POC;  importance of NWB L LE for wound healing; proper use of RW for safe transfers/ambulation.  08/30/17 1330 Done                      User Key     Initials Effective Dates Name Provider Type Discipline     10/26/16 -  Sia Whaley, PT Physical Therapist PT     10/26/16 -  Cristal Martinez, PT Physical Therapist PT     10/26/16 -  Dipak Scott, PTA Physical Therapy Assistant PT                    PT Recommendation and Plan  Anticipated Discharge Disposition: inpatient rehabilitation facility  Planned Therapy Interventions: balance training, bed mobility training, gait training, home exercise program, patient/family education, strengthening, transfer training  PT Frequency: daily  Plan of Care Review  Plan Of Care Reviewed With: patient  Progress: improving  Outcome Summary/Follow up Plan: Old dressing removed. Periwound area presented with decreased maceration. Wound bed cont to be red beefy  in nature. Skin graft intact. x 4 layers of adaptic applied black foam cut to fit with periwound area covered with x 2 layers of skin protectant. Transparent dressing applied  as well as bridge dressing. Seal attained at 125 mm/Hg.           Outcome Measures       09/05/17 1035          How much help from another person do you currently need...    Turning from your back to your side while in flat bed without using bedrails? 4  -RM      Moving from lying on back to sitting on the side of a  flat bed without bedrails? 4  -RM      Moving to and from a bed to a chair (including a wheelchair)? 3  -RM      Standing up from a chair using your arms (e.g., wheelchair, bedside chair)? 3  -RM      Climbing 3-5 steps with a railing? 1  -RM      To walk in hospital room? 1  -RM      AM-PAC 6 Clicks Score 16  -RM      Functional Assessment    Outcome Measure Options AM-PAC 6 Clicks Basic Mobility (PT)  -RM        User Key  (r) = Recorded By, (t) = Taken By, (c) = Cosigned By    Initials Name Provider Type    TREY Scott PTA Physical Therapy Assistant           Time Calculation:         PT Charges       09/06/17 1605          Time Calculation    Start Time 1450  -RM      PT Received On 09/06/17  -RM      PT Goal Re-Cert Due Date 09/11/17  -RM      Time Calculation- PT    Total Timed Code Minutes- PT 53 minute(s)  -RM        User Key  (r) = Recorded By, (t) = Taken By, (c) = Cosigned By    Initials Name Provider Type     Dipak Scott PTA Physical Therapy Assistant          Therapy Charges for Today     Code Description Service Date Service Provider Modifiers Qty    09150217681 HC PT THERAPEUTIC ACT EA 15 MIN 9/5/2017 Dipak Scott PTA GP 2    52419447794 HC PT THER PROC EA 15 MIN 9/5/2017 Dipak Scott PTA GP 1    54561217223 HC PT NEG PRESS WOUND TO 50SQCM DME1 9/6/2017 Dipak Scott PTA  1          PT G-Codes  Outcome Measure Options: AM-PAC 6 Clicks Basic Mobility (PT)    Dipak Scott PTA  9/6/2017            Electronically signed by Dipak Scott PTA at 9/6/2017  4:06 PM

## 2017-09-07 NOTE — PROGRESS NOTES
Brynn from [facility] bed came to [facility] pt states would  Send to [facility] for precert.    Also faxed clinicals to [facility]  Continued Stay Note  BRITTANI Espinal     Patient Name: Vasquez Marie  MRN: [id number]  Today's Date: 9/7/2017    Admit Date: 8/25/2017          Discharge Plan     None              Discharge Codes     None        Expected Discharge Date and Time     Expected Discharge Date Expected Discharge Time    Aug 31, 2017             Sonali Ferreira RN

## 2017-09-07 NOTE — SIGNIFICANT NOTE
09/07/17 1432   Rehab Treatment   Discipline physical therapy assistant   Treatment Not Performed patient/family declined treatment  (Pt reports he is going to be discharged therfore did not wish to participate with therapy this date. Therapy will f/u with pt tomorrow if available. )

## 2017-09-07 NOTE — PROGRESS NOTES
Patient Care Team:  Marilyn K Vermeesch, MD as PCP - General (Internal Medicine)    Chief complaint left foot wound status post open TMA    Subjective .   56-year-old noncompliant poorly controlled diabetic male now over 1 week status post debridement of left foot wound with grafting and wound VAC application.  I was originally told the patient was going to be discharged to a facility and then it changed to him being discharged home and now I was told on Monday he was being discharged to a facility again and would be discharged to stay however it's now Thursday and he is still in the hospital.  He states that he thinks he's waiting on some type of insurance approval or to hear back from someone.  He denies any constitutional symptoms.  Says his wound VAC was changed yesterday.  Says he was told it looks better.      Review of Systems  All systems were reviewed and negative except for chief complaint.    History  Past Medical History:   Diagnosis Date   • Abdominal pain     History of epigastric abdominal tenderness. Differentials include peptic ulcer disease,   pancreatobiliary disease.   • Abnormal LFTs    • Anemia    • Anxiety    • Arthritis    • Asthma    • Atrial fibrillation    • CAD (coronary artery disease)    • Calf cramp    • CKD (chronic kidney disease)    • COPD (chronic obstructive pulmonary disease)    • CVA (cerebral infarction)    • Depression    • DVT (deep venous thrombosis)    • Electrocution    • Fatigue    • Hepatitis C    • History of allergy    • History of blood transfusion    • Hyperlipidemia    • Hypertension    • Leg pain    • Loss of appetite    • PUD (peptic ulcer disease)    • Stroke syndrome    • Tachycardia    • Thrombocytopenia    • Thrombophlebitis of deep femoral vein    • Vision problems    , Past Surgical History:   Procedure Laterality Date   • AMPUTATION DIGIT Left 6/23/2017    Procedure: amputation of left foot third digit, left foot wound debridments and grafting;   Surgeon: Wolfgang Campebll DPM;  Location: [facility] OR;  Service:    • ANKLE SURGERY     • CARDIAC CATHETERIZATION N/A 6/20/2017    Procedure: Peripheral angiography;  Surgeon: Jonathan Storm MD;  Location: [facility] CATH INVASIVE LOCATION;  Service:    • CARDIAC CATHETERIZATION N/A 6/20/2017    Procedure: Angioplasty-peripheral;  Surgeon: Jonathan Storm MD;  Location: [facility] CATH INVASIVE LOCATION;  Service:    • CARDIAC SURGERY     • CORONARY STENT PLACEMENT     • INCISION AND DRAINAGE FOOT Left 8/29/2017    Procedure: Incision and drainage/wound debridement of left foot, wound VAC application, skin graft application;  Surgeon: Wolfgang Campbell DPM;  Location: [facility] OR;  Service:    • TONSILLECTOMY     • TOTAL HIP ARTHROPLASTY Left    • TOTAL SHOULDER REPLACEMENT Left    , Family History   Problem Relation Age of Onset   • Breast cancer Mother    • Colon cancer Sister    • Heart attack Other    • Arthritis Other    • Diabetes Other    • Hypertension Other    • Kidney disease Other    • Stroke Other    • Heart disease Father    , Social History   Substance Use Topics   • Smoking status: Current Every Day Smoker     Packs/day: 0.50     Years: 30.00     Types: Cigarettes   • Smokeless tobacco: None   • Alcohol use No   , Prescriptions Prior to Admission   Medication Sig Dispense Refill Last Dose   • albuterol (PROVENTIL HFA;VENTOLIN HFA) 108 (90 BASE) MCG/ACT inhaler Inhale 2 puffs Every 4 (Four) Hours As Needed for Wheezing.   Unknown at Unknown time   • amiodarone (PACERONE) 200 MG tablet Take 1 tablet by mouth Daily. 30 tablet 11 8/24/2017   • atorvastatin (LIPITOR) 40 MG tablet Take 1 tablet by mouth Every Night. 30 tablet 11 8/24/2017   • clopidogrel (PLAVIX) 75 MG tablet Take 1 tablet by mouth Daily. (Patient not taking: Reported on 8/28/2017) 30 tablet 3 Not Taking at Unknown time   • colchicine 0.6 MG tablet Take 1 tablet by mouth Daily. 30 tablet 0 8/24/2017   • collagenase (SANTYL) 250 UNIT/GM  "ointment Apply  topically Daily. 90 g 0 Unknown at Unknown time   • DULoxetine (CYMBALTA) 30 MG capsule Take 1 capsule by mouth Daily. 30 capsule 3 8/25/2017   • hydroxychloroquine (PLAQUENIL) 200 MG tablet Take 1 tablet by mouth Daily. 30 tablet 5 8/24/2017   • losartan-hydrochlorothiazide (HYZAAR) 50-12.5 MG per tablet Take 1 tablet by mouth Daily. 30 tablet 3 8/24/2017   • metoprolol tartrate (LOPRESSOR) 25 MG tablet Take 1 tablet by mouth Every 12 (Twelve) Hours. (Patient taking differently: Take 25 mg by mouth 2 (Two) Times a Day.) 60 tablet 11 8/24/2017   • pantoprazole (PROTONIX) 40 MG EC tablet Take 1 tablet by mouth Daily. 30 tablet 5 8/25/2017   , Scheduled Meds:    albuterol 2.5 mg Nebulization Q6H - RT   amiodarone 200 mg Oral Q24H   atorvastatin 40 mg Oral Nightly   ceftriaxone 2 g Intravenous Q24H   clopidogrel 75 mg Oral Daily   colchicine 0.6 mg Oral Daily   DULoxetine 30 mg Oral Daily   enoxaparin 40 mg Subcutaneous Daily   hydroxychloroquine 200 mg Oral Daily   lactobacillus acidophilus 1 capsule Oral Daily   losartan-hydrochlorothiazide 1 tablet Oral Daily   metoprolol tartrate 25 mg Oral BID   nicotine 1 patch Transdermal Q24H   pantoprazole 40 mg Oral Q AM   Pharmacy Meds to Bed Consult  Does not apply Daily   , Continuous Infusions:   , PRN Meds:  •  acetaminophen  •  HYDROcodone-acetaminophen  •  loperamide  •  LORazepam  •  ondansetron  •  sodium chloride  •  sodium chloride  •  Insert peripheral IV **AND** sodium chloride  •  traZODone and Allergies:  Review of patient's allergies indicates no known allergies.    Objective     Vital Signs   /66 (BP Location: Left arm, Patient Position: Sitting)  Pulse 66  Temp 97.9 °F (36.6 °C) (Oral)   Resp 20  Ht 68\" (172.7 cm)  Wt 215 lb 14.4 oz (97.9 kg)  SpO2 98%  BMI 32.83 kg/m2    Physical Exam:  AAO ×3, NAD, AF VSS  PERRLA, cranial nerves II through XII intact  Abdomen, obese soft and nontender    His left TMA site is now 100% granular.  " The wound still measures 9-10 cm wide by 4-5 cm tall.  There is no exposed bone.  Wound base itself is 100% granular now.  There is Adaptic and the wound base over the grafts applied last week.  Pulses are palpable to the left foot.  Capillary refill time to the stump is brisk, no new wounds or signs of infection.  No drainage.  No odor.       Results Review: Labs reviewed.      Assessment/Plan     Principal Problem:    Subacute osteomyelitis of left foot  Active Problems:    Chronic hepatitis C virus infection    Coronary artery disease involving native coronary artery of native heart without angina pectoris    Chronic atrial fibrillation    Essential hypertension    Depression with anxiety    Gastroesophageal reflux disease with esophagitis    COPD (chronic obstructive pulmonary disease)    Rheumatoid arthritis    Peripheral vascular disease    Liver mass  It seems as if the patient still waiting on some type of placement.  I changed his wound VAC this evening with a black sponge.  I redressed the left foot.  We will continue 3 times a week VAC changes.  The patient's wound now seems to be fairly amenable to grafting and wound VAC therapy so I would plan to proceed with this rather than further amputation of the foot.  I think at this time discharging him on by mouth antibiotics probably acceptable.  I'll continue to follow along while on the hospital.  He can weight-bear to the left foot on his heel only with his boot or Darco wedge shoe.  He will follow up in wound care upon discharge.          Wolfgang Campbell DPM  09/07/17  6:06 PM

## 2017-09-07 NOTE — PLAN OF CARE
Problem: Infection, Risk/Actual (Adult)  Goal: Infection Prevention/Resolution  Outcome: Ongoing (interventions implemented as appropriate)    09/07/17 0146   Infection, Risk/Actual (Adult)   Infection Prevention/Resolution making progress toward outcome         Problem: Pain, Acute (Adult)  Goal: Acceptable Pain Control/Comfort Level  Outcome: Ongoing (interventions implemented as appropriate)    09/07/17 0146   Pain, Acute (Adult)   Acceptable Pain Control/Comfort Level making progress toward outcome

## 2017-09-07 NOTE — PROGRESS NOTES
[facility]    Name:  Vasquez Marie   Age:  56 y.o.  Sex:  male  :  1960  MRN:  [id number]   Visit Number:  [id number]  Admission Date:  2017  Date Of Service:  17  Primary Care Physician:  Marilyn K. Vermeesch, MD     LOS: 13 days :  Patient Care Team:  Marilyn K Vermeesch, MD as PCP - General (Internal Medicine):    Chief Complaint:      Weakness    Subjective / Interval History:     Patient overall doing well.  His foot pain is tolerable. He has no soa, cp, n/v or abdominal pain.  Currently awaiting placement.     I have reviewed labs/imaging/records from this hospitalization, including ER staff and admitting/attending physicians H/P's and progress notes to establish a comprehensive understanding of this patient's clinical hospital course, as well as to establish a transition of care appropriately.    Vital Signs:    Temp:  [97.7 °F (36.5 °C)-98.9 °F (37.2 °C)] 97.8 °F (36.6 °C)  Heart Rate:  [62-71] 66  Resp:  [16-18] 16  BP: (114-146)/(48-70) 132/67    Intake and output:    Intake/Output       17 0700 - 17 0659 17 0700 - 17 0659    Intake (ml) 480 480    Output (ml) 1700 --    Net (ml) -1220 480    Last Weight 215 lb 14.4 oz (97.9 kg) --          Physical Examination:    General Appearance:  Alert and cooperative, not in any acute distress.   Head:  Atraumatic and normocephalic, without obvious abnormality.   Eyes:      PERRLA, Extra-occular movements are within normal limits.   Lungs:   Chest shape is normal. Breath sounds heard bilaterally equally.  No crackles or wheezing.   Heart:  Normal S1 and S2, no murmur, no gallop, no rub.   Abdomen:   Normal bowel sounds,  Soft non-tender, non-distended, no guarding, no rebound tenderness   Extremities: No edema                     Laboratory results:      Results from last 7 days  Lab Units 17  0530 17  0513 17  0515   SODIUM mmol/L 140 141 140    POTASSIUM mmol/L 3.6 3.8 3.7   CHLORIDE mmol/L 107 107 107   CO2 mmol/L 22.0* 25.0* 24.0*   BUN mg/dL 33* 37* 33*   CREATININE mg/dL 1.10 1.20 1.20   CALCIUM mg/dL 9.1 8.8 8.4   GLUCOSE mg/dL 95 115* 110*       Results from last 7 days  Lab Units 09/07/17  0530 09/06/17  0513 09/05/17  0515   WBC 10*3/mm3 3.37* 3.34* 3.82*   HEMOGLOBIN g/dL 8.2* 8.8* 8.4*   HEMATOCRIT % 26.5* 27.9* 26.9*   PLATELETS 10*3/mm3 108* 101* 116*   MONOCYTES % % 6.1  --   --                I have reviewed the patient's laboratory results.    Radiology results:    Imaging Results (last 24 hours)     ** No results found for the last 24 hours. **          I have reviewed the patient's radiology reports.    Medication Review:     I have reviewed the patients active and prn medications.       Assessment/Plan:  1.  Acute infection and cellulitis of the left transmetatarsal amputation stump, present on admission.  2.  Peripheral arterial disease s/p left SFA stent and intervention to the peroneal arteries on 6/20/2017.  3.  Paroxysmal atrial fibrillation.  4.  Coronary artery disease on medical therapy.  5.  Essential hypertension.  6.  COPD, stable.  7.  Chronic hepatitis C infection.  8.  Rheumatoid arthritis.      Patient is currently on IV antibiotic therapy with ceftriaxone 2 g daily.  He will be continued on wound VAC therapy. Wound culture obtained during the debridement has grown Providencia rettgeri which is sensitive to ceftriaxone.  Case management services are currently working to get him to short-term rehabilitation facility for continued wound care, awaiting placement.     Alen Potter MD  09/07/17  4:32 PM

## 2017-09-08 LAB
ANION GAP SERPL CALCULATED.3IONS-SCNC: 13.8 MMOL/L
BASOPHILS # BLD AUTO: 0.02 10*3/MM3 (ref 0–0.2)
BASOPHILS NFR BLD AUTO: 0.6 % (ref 0–2.5)
BUN BLD-MCNC: 30 MG/DL (ref 7–20)
BUN/CREAT SERPL: 30 (ref 6.3–21.9)
CALCIUM SPEC-SCNC: 9.1 MG/DL (ref 8.4–10.2)
CHLORIDE SERPL-SCNC: 108 MMOL/L (ref 98–107)
CO2 SERPL-SCNC: 23 MMOL/L (ref 26–30)
CREAT BLD-MCNC: 1 MG/DL (ref 0.6–1.3)
DEPRECATED RDW RBC AUTO: 54.8 FL (ref 37–54)
EOSINOPHIL # BLD AUTO: 0.81 10*3/MM3 (ref 0–0.7)
EOSINOPHIL NFR BLD AUTO: 23.8 % (ref 0–7)
ERYTHROCYTE [DISTWIDTH] IN BLOOD BY AUTOMATED COUNT: 15.9 % (ref 11.5–14.5)
GFR SERPL CREATININE-BSD FRML MDRD: 77 ML/MIN/1.73
GLUCOSE BLD-MCNC: 102 MG/DL (ref 74–98)
HCT VFR BLD AUTO: 25 % (ref 42–52)
HGB BLD-MCNC: 7.8 G/DL (ref 14–18)
IMM GRANULOCYTES # BLD: 0.01 10*3/MM3 (ref 0–0.06)
IMM GRANULOCYTES NFR BLD: 0.3 % (ref 0–0.6)
LYMPHOCYTES # BLD AUTO: 0.9 10*3/MM3 (ref 0.6–3.4)
LYMPHOCYTES NFR BLD AUTO: 26.5 % (ref 10–50)
MCH RBC QN AUTO: 29.5 PG (ref 27–31)
MCHC RBC AUTO-ENTMCNC: 31.2 G/DL (ref 30–37)
MCV RBC AUTO: 94.7 FL (ref 80–94)
MONOCYTES # BLD AUTO: 0.35 10*3/MM3 (ref 0–0.9)
MONOCYTES NFR BLD AUTO: 10.3 % (ref 0–12)
NEUTROPHILS # BLD AUTO: 1.31 10*3/MM3 (ref 2–6.9)
NEUTROPHILS NFR BLD AUTO: 38.5 % (ref 37–80)
NRBC BLD MANUAL-RTO: 0 /100 WBC (ref 0–0)
PLATELET # BLD AUTO: 100 10*3/MM3 (ref 130–400)
PMV BLD AUTO: 10.5 FL (ref 6–12)
POTASSIUM BLD-SCNC: 3.8 MMOL/L (ref 3.5–5.1)
RBC # BLD AUTO: 2.64 10*6/MM3 (ref 4.7–6.1)
SODIUM BLD-SCNC: 141 MMOL/L (ref 137–145)
WBC NRBC COR # BLD: 3.4 10*3/MM3 (ref 4.8–10.8)

## 2017-09-08 PROCEDURE — 25010000002 CEFTRIAXONE: Performed by: INTERNAL MEDICINE

## 2017-09-08 PROCEDURE — 97116 GAIT TRAINING THERAPY: CPT

## 2017-09-08 PROCEDURE — 25010000002 ENOXAPARIN PER 10 MG: Performed by: INTERNAL MEDICINE

## 2017-09-08 PROCEDURE — 85025 COMPLETE CBC W/AUTO DIFF WBC: CPT | Performed by: INTERNAL MEDICINE

## 2017-09-08 PROCEDURE — 80048 BASIC METABOLIC PNL TOTAL CA: CPT | Performed by: INTERNAL MEDICINE

## 2017-09-08 PROCEDURE — 99232 SBSQ HOSP IP/OBS MODERATE 35: CPT | Performed by: INTERNAL MEDICINE

## 2017-09-08 RX ADMIN — HYDROCODONE BITARTRATE AND ACETAMINOPHEN 1 TABLET: 10; 325 TABLET ORAL at 00:18

## 2017-09-08 RX ADMIN — PANTOPRAZOLE SODIUM 40 MG: 40 TABLET, DELAYED RELEASE ORAL at 06:10

## 2017-09-08 RX ADMIN — HYDROCODONE BITARTRATE AND ACETAMINOPHEN 1 TABLET: 10; 325 TABLET ORAL at 19:03

## 2017-09-08 RX ADMIN — METOPROLOL TARTRATE 25 MG: 25 TABLET ORAL at 09:33

## 2017-09-08 RX ADMIN — HYDROCODONE BITARTRATE AND ACETAMINOPHEN 1 TABLET: 10; 325 TABLET ORAL at 11:59

## 2017-09-08 RX ADMIN — Medication: at 09:35

## 2017-09-08 RX ADMIN — LOSARTAN POTASSIUM AND HYDROCHLOROTHIAZIDE 1 TABLET: 50; 12.5 TABLET, FILM COATED ORAL at 09:34

## 2017-09-08 RX ADMIN — HYDROCODONE BITARTRATE AND ACETAMINOPHEN 1 TABLET: 10; 325 TABLET ORAL at 06:09

## 2017-09-08 RX ADMIN — DULOXETINE HYDROCHLORIDE 30 MG: 30 CAPSULE, DELAYED RELEASE ORAL at 09:33

## 2017-09-08 RX ADMIN — ENOXAPARIN SODIUM 40 MG: 40 INJECTION SUBCUTANEOUS at 09:32

## 2017-09-08 RX ADMIN — TRAZODONE HYDROCHLORIDE 50 MG: 50 TABLET ORAL at 00:18

## 2017-09-08 RX ADMIN — HYDROCODONE BITARTRATE AND ACETAMINOPHEN 1 TABLET: 10; 325 TABLET ORAL at 22:56

## 2017-09-08 RX ADMIN — HYDROXYCHLOROQUINE SULFATE 200 MG: 200 TABLET, FILM COATED ORAL at 09:34

## 2017-09-08 RX ADMIN — CEFTRIAXONE 2 G: 2 INJECTION, POWDER, FOR SOLUTION INTRAMUSCULAR; INTRAVENOUS at 09:34

## 2017-09-08 RX ADMIN — LORAZEPAM 1 MG: 0.5 TABLET ORAL at 21:23

## 2017-09-08 RX ADMIN — AMIODARONE HYDROCHLORIDE 200 MG: 200 TABLET ORAL at 09:34

## 2017-09-08 RX ADMIN — CLOPIDOGREL BISULFATE 75 MG: 75 TABLET ORAL at 09:33

## 2017-09-08 RX ADMIN — NICOTINE 1 PATCH: 14 PATCH TRANSDERMAL at 21:19

## 2017-09-08 RX ADMIN — Medication 1 CAPSULE: at 09:33

## 2017-09-08 RX ADMIN — ATORVASTATIN CALCIUM 40 MG: 40 TABLET, FILM COATED ORAL at 21:18

## 2017-09-08 RX ADMIN — METOPROLOL TARTRATE 25 MG: 25 TABLET ORAL at 19:00

## 2017-09-08 RX ADMIN — COLCHICINE 0.6 MG: 0.6 TABLET, FILM COATED ORAL at 09:33

## 2017-09-08 NOTE — PLAN OF CARE
Problem: Infection, Risk/Actual (Adult)  Goal: Infection Prevention/Resolution    09/08/17 0234   Infection, Risk/Actual (Adult)   Infection Prevention/Resolution making progress toward outcome

## 2017-09-08 NOTE — PROGRESS NOTES
Brynn from [facility] states waiting precert from [employer]  Updated patient  09/08/17  14:00Continued Stay Note  BRITTANI Espinal     Patient Name: Vasquez Marie  MRN: [id number]  Today's Date: 9/8/2017    Admit Date: 8/25/2017          Discharge Plan     None              Discharge Codes     None        Expected Discharge Date and Time     Expected Discharge Date Expected Discharge Time    Aug 31, 2017             Sonali Ferreira RN

## 2017-09-08 NOTE — PLAN OF CARE
Problem: Patient Care Overview (Adult)  Goal: Plan of Care Review  Outcome: Ongoing (interventions implemented as appropriate)    09/08/17 1630   Coping/Psychosocial Response Interventions   Plan Of Care Reviewed With patient   Patient Care Overview   Progress improving   Outcome Evaluation   Outcome Summary/Follow up Plan See flowsheet for details. Pt with new weightbaring staus per Dr. Burch progressed ambulation distance and improved balance with activity.          Problem: Inpatient Physical Therapy  Goal: Transfer Training Goal 1 LTG- PT  Outcome: Ongoing (interventions implemented as appropriate)  Goal: Gait Training Goal LTG- PT  Outcome: Ongoing (interventions implemented as appropriate)    08/30/17 1330 09/08/17 1630   Gait Training PT LTG   Gait Training Goal PT LTG, Date Established 08/30/17 --    Gait Training Goal PT LTG, Time to Achieve 2 wks --    Gait Training Goal PT LTG, [place] Level contact guard assist --    Gait Training Goal PT LTG, Assist Device walker, rolling --    Gait Training Goal PT LTG, Distance to Achieve 30 --    Gait Training Goal PT LTG, Additional Goal NWB L LE --    Gait Training Goal PT LTG, Outcome --  goal ongoing

## 2017-09-08 NOTE — PROGRESS NOTES
Antonette from [facility] Case Management called and states   Mr Marie  Has an HMO  Insurance and [facility] swing [facility] is not  In the network.    [facility], [place], [facility], [facility] signiture, and [facility]  Is in network    [place] and [place] already has refused  States  Due  [facility] refusing the Car voute    Faxing clinicals to  [facility] and [facility]    Pt updated and Dr Gauthierued Stay Note   Teddy     Patient Name: Vasquez Marie  MRN: [id number]  Today's Date: 9/8/2017    Admit Date: 8/25/2017          Discharge Plan     None              Discharge Codes     None        Expected Discharge Date and Time     Expected Discharge Date Expected Discharge Time    Aug 31, 2017       Informed pt of Human decisions  States he would prefer to go home.Continued Stay Note  BRITTANI Espinal     Patient Name: Vasquez Marie  MRN: [id number]  Today's Date: 9/8/2017    Admit Date: 8/25/2017          Discharge Plan     None              Discharge Codes     None        Expected Discharge Date and Time     Expected Discharge Date Expected Discharge Time    Aug 31, 2017             PETRA Pop RN

## 2017-09-08 NOTE — THERAPY TREATMENT NOTE
Acute Care - Physical Therapy Treatment Note   [facility]     Patient Name: Vasquez Marie  : 1960  MRN: [id number]  Today's Date: 2017  Onset of Illness/Injury or Date of Surgery Date: 17  Date of Referral to PT: 17  Referring Physician: Adrian    Admit Date: 2017    Visit Dx:    ICD-10-CM ICD-9-CM   1. Subacute osteomyelitis of left foot M86.272 730.07   2. Impaired functional mobility, balance, gait, and endurance Z74.09 V49.89   3. Coronary artery disease involving native coronary artery of native heart without angina pectoris I25.10 414.01   4. Essential hypertension I10 401.9   5. Gastroesophageal reflux disease with esophagitis K21.0 530.11   6. Other emphysema J43.8 492.8   7. Peripheral vascular disease I73.9 443.9     Patient Active Problem List   Diagnosis   • Chronic hepatitis C virus infection   • Coronary artery disease involving native coronary artery of native heart without angina pectoris   • Chronic atrial fibrillation   • Essential hypertension   • Thrombocytopenia   • Depression with anxiety   • Gastroesophageal reflux disease with esophagitis   • Hyperlipidemia   • Tobacco abuse   • COPD (chronic obstructive pulmonary disease)   • Gout   • Congestive heart disease   • RLS (restless legs syndrome)   • Rheumatoid arthritis   • Typical atrial flutter   • Atrial flutter   • Cerebrovascular accident (CVA)   • Borderline diabetes   • Dry gangrene   • Anemia   • Peripheral vascular disease   • Subacute osteomyelitis of left foot   • Liver mass               Adult Rehabilitation Note       17 0901          Rehab Assessment/Intervention    Discipline physical therapy assistant  -RM      Document Type therapy note (daily note)  -RM      Subjective Information agree to therapy;complains of;pain  -RM      Patient Effort, Rehab Treatment adequate  -RM      Symptoms Noted During/After Treatment none  -RM      Precautions/Limitations --   WBAT on L heel with Darco shoe or  boot.   -RM      Recorded by [RM] Dipak Scott PTA      Pain Assessment    Pain Assessment 0-10  -RM      Pain Score 8  -RM      Post Pain Score 8  -RM      Pain Type Acute pain;Surgical pain  -RM      Pain Location Foot  -RM      Pain Orientation Left  -RM      Pain Intervention(s) Repositioned;Ambulation/increased activity  -RM      Recorded by [RM] Dipak Scott PTA      Mobility Assessment/Training    Left Lower Extremity Weight-Bearing weight-bearing as tolerated   with Darco Shoe   -RM      Recorded by [RM] Dipak Scott PTA      Bed Mobility, Assessment/Treatment    Bed Mobility, Assistive Device bed rails;head of bed elevated  -RM      Bed Mob, Supine to Sit, [place] conditional independence  -RM      Recorded by [RM] Dipak Scott PTA      Transfer Assessment/Treatment    Transfers, Sit-Stand [place] stand by assist;contact guard assist  -RM      Transfers, Stand-Sit [place] stand by assist;contact guard assist  -RM      Transfers, Sit-Stand-Sit, Assist Device rolling walker  -RM      Transfer, Maintain Weight Bearing Status able to maintain weight bearing status  -RM      Transfer, Safety Issues balance decreased during turns;step length decreased;weight-shifting ability decreased  -RM      Transfer, Impairments strength decreased;impaired balance;pain  -RM      Recorded by [RM] Dipak Scott PTA      Gait Assessment/Treatment    Gait, [place] Level stand by assist;contact guard assist  -RM      Gait, Assistive Device rolling walker  -RM      Gait, Distance (Feet) 44  -RM      Gait, Gait Pattern Analysis swing-to gait  -RM      Gait, Gait Deviations antalgic  -RM      Gait, Maintain Weight Bearing Status able to maintain weight bearing status  -RM      Gait, Safety Issues weight-shifting ability decreased  -RM      Gait, Impairments strength decreased;impaired balance;pain  -RM      Recorded by [RM] Dipak Scott PTA      Positioning and Restraints     Pre-Treatment Position in bed  -RM      Post Treatment Position chair  -RM      In Chair reclined;call light within reach;encouraged to call for assist;notified nsg  -RM      Recorded by [RM] Dipak Scott PTA        User Key  (r) = Recorded By, (t) = Taken By, (c) = Cosigned By    Initials Name Effective Dates    RM Dipak Scott, PTA 10/26/16 -                 IP PT Goals       09/08/17 1630 09/06/17 1601 09/05/17 1559    Transfer Training PT LTG    Transfer Training PT LTG, Outcome goal ongoing  -RM  goal ongoing  -RM    Gait Training PT LTG    Gait Training Goal PT LTG, Outcome goal ongoing  -RM      Strength Goal PT LTG    Strength Goal PT LTG, Outcome   goal ongoing  -RM    Wound Care PT STG    Wound Care PT STG 1, Outcome  goal ongoing  -RM       09/04/17 1711 09/04/17 1305 09/03/17 1601    Transfer Training PT LTG    Transfer Training PT LTG, Outcome  goal ongoing  -RM goal ongoing  -JR    Gait Training PT LTG    Gait Training Goal PT LTG, Outcome  goal ongoing  -RM goal ongoing  -JR    Strength Goal PT LTG    Strength Goal PT LTG, Outcome  goal ongoing  -RM goal ongoing  -JR    Wound Care PT STG    Wound Care PT STG 1, Outcome goal ongoing  -RM        09/01/17 1435 08/30/17 1330       Transfer Training PT LTG    Transfer Training PT LTG, Date Established  08/30/17  -LM     Transfer Training PT LTG, Time to Achieve  2 wks  -LM     Transfer Training PT LTG, Activity Type  sit to stand/stand to sit;bed to chair /chair to bed  -LM     Transfer Training PT LTG, [place] Level  supervision required  -LM     Transfer Training PT LTG, Assist Device  walker, rolling  -LM     Transfer Training PT LTG, Additional Goal  NWB L LE  -LM     Transfer Training PT LTG, Outcome  goal ongoing  -LM     Gait Training PT LTG    Gait Training Goal PT LTG, Date Established  08/30/17  -LM     Gait Training Goal PT LTG, Time to Achieve  2 wks  -LM     Gait Training Goal PT LTG, [place] Level  contact guard assist  -LM      Gait Training Goal PT LTG, Assist Device  walker, rolling  -LM     Gait Training Goal PT LTG, Distance to Achieve  30  -LM     Gait Training Goal PT LTG, Additional Goal  NWB L LE  -LM     Gait Training Goal PT LTG, Outcome  goal ongoing  -LM     Strength Goal PT LTG    Strength Goal PT LTG, Date Established  08/30/17  -LM     Strength Goal PT LTG, Time to Achieve  2 wks  -LM     Strength Goal PT LTG, Measure to Achieve  Patient will perform ther ex x 15 reps to improve functional strength for mobility.  -LM     Strength Goal PT LTG, Outcome  goal ongoing  -LM     Wound Care PT STG    Wound Care PT STG 1, Date Established 09/01/17  -LM      Wound Care PT STG 1, Time to Achieve 2 wks  -LM      Wound Care PT STG 1, Location L TMA site  -LM      Wound Care PT STG 1, No S&S of Infection yes  -LM      Wound Care PT STG 1, Decrease Wound Size Decrease wound dimensions by 0.5 cm in all measures.  -LM      Wound Care PT STG 1, Outcome goal ongoing  -LM        User Key  (r) = Recorded By, (t) = Taken By, (c) = Cosigned By    Initials Name Provider Type    JR Sia Whaley, PT Physical Therapist    LM Cristal Martinez, PT Physical Therapist    RM Dipak Scott, PTA Physical Therapy Assistant          Physical Therapy Education     Title: PT OT SLP Therapies (Done)     Topic: Physical Therapy (Done)     Point: Mobility training (Done)    Learning Progress Summary    Learner Readiness Method Response Comment Documented by Status   Patient Acceptance REECE ROMO DU   09/08/17 1630 Done    Acceptance REECE ROMO NR   09/05/17 1558 Done    Acceptance REECE ROMO NR   09/04/17 1305 Done    Acceptance E JAYESH Purpose of PT/POC;  importance of NWB L LE for wound healing; proper use of RW for safe transfers/ambulation.  08/30/17 1330 Done               Point: Home exercise program (Done)    Learning Progress Summary    Learner Readiness Method Response Comment Documented by Status   Patient Acceptance REECE ROMONR   09/04/17 1305 Done     Acceptance E VU Purpose of PT/POC;  importance of NWB L LE for wound healing; proper use of RW for safe transfers/ambulation.  08/30/17 1330 Done               Point: Precautions (Done)    Learning Progress Summary    Learner Readiness Method Response Comment Documented by Status   Patient Acceptance E,D VU,NR   09/04/17 1305 Done    Acceptance E VU Importance of maintaining NWB on LLE JR 09/03/17 1559 Done    Acceptance E VU Purpose of wound vac dressing change.  09/01/17 1434 Done    Acceptance E VU Purpose of PT/POC;  importance of NWB L LE for wound healing; proper use of RW for safe transfers/ambulation.  08/30/17 1330 Done                      User Key     Initials Effective Dates Name Provider Type Discipline     10/26/16 -  Sia Whaley, PT Physical Therapist PT     10/26/16 -  Cristal Martinez, PT Physical Therapist PT     10/26/16 -  Dipak Scott, PTA Physical Therapy Assistant PT                    PT Recommendation and Plan  Anticipated Discharge Disposition: inpatient rehabilitation facility  Planned Therapy Interventions: balance training, bed mobility training, gait training, home exercise program, patient/family education, strengthening, transfer training  PT Frequency: daily  Plan of Care Review  Plan Of Care Reviewed With: patient  Progress: improving  Outcome Summary/Follow up Plan: See flowsheet for details. Pt with new weightbaring staus per Dr. Burch progressed ambulation distance and improved balance with activity.            Outcome Measures       09/08/17 0901          How much help from another person do you currently need...    Turning from your back to your side while in flat bed without using bedrails? 4  -RM      Moving from lying on back to sitting on the side of a flat bed without bedrails? 4  -RM      Moving to and from a bed to a chair (including a wheelchair)? 3  -RM      Standing up from a chair using your arms (e.g., wheelchair, bedside chair)? 4  -RM      Climbing  3-5 steps with a railing? 2  -RM      To walk in hospital room? 3  -RM      AM-PAC 6 Clicks Score 20  -RM      Functional Assessment    Outcome Measure Options AM-PAC 6 Clicks Basic Mobility (PT)  -RM        User Key  (r) = Recorded By, (t) = Taken By, (c) = Cosigned By    Initials Name Provider Type    TREY Scott PTA Physical Therapy Assistant           Time Calculation:         PT Charges       09/08/17 1633          Time Calculation    Start Time 0901  -RM      PT Received On 09/08/17  -RM      PT Goal Re-Cert Due Date 09/11/17  -RM      Time Calculation- PT    Total Timed Code Minutes- PT 18 minute(s)  -RM        User Key  (r) = Recorded By, (t) = Taken By, (c) = Cosigned By    Initials Name Provider Type    TREY Scott PTA Physical Therapy Assistant          Therapy Charges for Today     Code Description Service Date Service Provider Modifiers Qty    [id number] HC GAIT TRAINING EA 15 MIN 9/8/2017 Dipak Scott PTA GP 1          PT G-Codes  Outcome Measure Options: AM-PAC 6 Clicks Basic Mobility (PT)    Dipak Scott PTA  9/8/2017

## 2017-09-08 NOTE — DISCHARGE PLACEMENT REQUEST
"possibly Medicaid pending      Vasquez Marie (56 y.o. Male)     Date of Birth Social Security Number Address Home Phone MRN    1960  [address]    [facility] [address] [phone] [id number]    Shinto Marital Status          None Single       Admission Date Admission Type Admitting Provider Attending Provider Department, Room/Bed    17 Emergency Murali Garber DO Gaspar, Daniel F., MD [facility] MED SURG  4, 432/    Discharge Date Discharge Disposition Discharge Destination                      Attending Provider: Alen Potter MD     Allergies:  No Known Allergies    Isolation:  None   Infection:  None   Code Status:  FULL    Ht:  68\" (172.7 cm)   Wt:  199 lb 14.4 oz (90.7 kg)    Admission Cmt:  None   Principal Problem:  Subacute osteomyelitis of left foot [M86.272]                 Active Insurance as of 2017     Primary Coverage     Payor Plan Insurance Group Employer/Plan Group    HUMANA MEDICARE REPLACEMENT HUMANA MEDICARE REPL [id number]     Payor Plan Address Payor Plan Phone Number Effective From Effective To    [address] [address] [phone] 2017     [place] [address]       Subscriber Name Subscriber Birth Date Member ID       VASQUEZ MARIE 1960 [id number]                 Emergency Contacts      (Rel.) Home Phone Work Phone Mobile Phone    Maral Marie (Sister) [phone] -- --               History & Physical      Murali Garber DO at 2017 10:33 PM              [facility] HOSPITALIST   HISTORY AND PHYSICAL      Name:  Vasquez Marie   Age:  56 y.o.  Sex:  male  :  1960  MRN:  [id number]   Visit Number:  [id number]  Admission Date:  2017  Date Of Service:  17  Primary Care Physician:  Marilyn K. Vermeesch, MD    History Obtained From:    patient    Chief Complaint:     Left foot pain     History Of Presenting Illness:      Patient is a 56-year-old male who " developed a wound in his foot in June.  He was admitted here for cellulitis of the left foot and developed osteomyelitis.  He had intervention on the left SFA by Dr. Storm with good resultant flow.  Dr. Campbell saw the patient and did amputation of the left third digit.  He also did a left foot skin graft.  Culture grew out Enterobacter cloacae.  Dr. Rodriguez noted he had osteomyelitis and had recommended 6 weeks of ertapenem and a PICC line in her note from her office, however the patient claims he did not get this.  He was on oral antibiotics as an outpatient.  This was doxycycline.  He does have borderline diabetes.  He did worsen so Dr. Campbell sent him to  where the patient claims he had a transmetatarsal amputation about a couple weeks ago.  He had a wound VAC placed after that.  However he has been noncompliant with the wound VAC, with home health nurse frequently following and finding the wound VAC in the trash.  Home health nurse discharged him due to noncompliance.  He apparently frequently was drinking beer when they came.  He adamantly denies that he is an alcoholic.  On review of the chart he has missed multiple appointments with his primary care physician as well as Dr. Campbell.  He was last seen by home health on Monday who wrapped the foot and he did not take the dressing off since then.  There is now redness ascending up his leg and he claims there is 9/10 pain in the left leg.  Specifically in the foot, nothing is making it better, any touch to it makes it worse.  There is purulent malodorous drainage from the foot, and dressings are adhered to the wounds.  Patient denies any chest pressure, shortness breath, nausea, vomiting, or diarrhea.  He also has fever and chills.    Review Of Systems:     General ROS: positive for  - chills and fever  Psychological ROS: negative  Ophthalmic ROS: negative  ENT ROS: negative  Allergy and Immunology ROS: negative  Hematological and Lymphatic ROS: negative  Endocrine ROS:  negative  Breast ROS: negative  Respiratory ROS: negative  Cardiovascular ROS: negative  Gastrointestinal ROS: negative  Genito-Urinary ROS: negative  Musculoskeletal ROS: positive for - pain in foot - left  Neurological ROS: negative  Dermatological ROS: positive for skin lesion changes  Left foot     Past Medical History:    Coronary artery disease, osteomyelitis, atrial fibrillation, GI bleed, peripheral arterial disease, hypertension, gout, RA, DVT several years ago, CVA, hep C, cirrhosis, left occluded SFA, chronic kidney disease, COPD    Past Surgical history:    Cardiac stent, EGD, cardioversion 2017, ankle surgery, transmetatarsal amputation, left total hip arthroplasty, left total shoulder replacement      Social History:    Every day smoker since he was a teenager, does drink beer frequently, but claims he has never had withdrawal and it is not a problem.  He occasionally drinks liquor as well but is not drank that for 3 weeks.  He is to use drugs when he was younger including speed.  He denies any IV drug history.    Family History:    Mother had breast cancer and rheumatoid arthritis, colon cancer in his sister, father  of heart disease    Allergies:      Review of patient's allergies indicates no known allergies.    Home Medications:    Prior to Admission Medications     Prescriptions Last Dose Informant Patient Reported? Taking?    albuterol (PROVENTIL HFA;VENTOLIN HFA) 108 (90 BASE) MCG/ACT inhaler   Yes No    Inhale 2 puffs Every 4 (Four) Hours As Needed for Wheezing.    amiodarone (PACERONE) 200 MG tablet   No No    Take 1 tablet by mouth Daily.    atorvastatin (LIPITOR) 40 MG tablet   No No    Take 1 tablet by mouth Every Night.    clopidogrel (PLAVIX) 75 MG tablet   No No    Take 1 tablet by mouth Daily.    colchicine 0.6 MG tablet   No No    Take 1 tablet by mouth Daily.    collagenase (SANTYL) 250 UNIT/GM ointment   No No    Apply  topically Daily.    DULoxetine (CYMBALTA) 30 MG  capsule   No No    Take 1 capsule by mouth Daily.    hydroxychloroquine (PLAQUENIL) 200 MG tablet   No No    Take 1 tablet by mouth Daily.    losartan-hydrochlorothiazide (HYZAAR) 50-12.5 MG per tablet   No No    Take 1 tablet by mouth Daily.    metoprolol tartrate (LOPRESSOR) 25 MG tablet   No No    Take 1 tablet by mouth Every 12 (Twelve) Hours.    Patient taking differently:  Take 25 mg by mouth 2 (Two) Times a Day.    pantoprazole (PROTONIX) 40 MG EC tablet   No No    Take 1 tablet by mouth Daily.             Hospital Scheduled Meds:      [START ON 8/26/2017] albuterol 2.5 mg Nebulization Q6H - RT   [START ON 8/26/2017] amiodarone 200 mg Oral Q24H   atorvastatin 40 mg Oral Nightly   [START ON 8/26/2017] clopidogrel 75 mg Oral Daily   [START ON 8/26/2017] colchicine 0.6 mg Oral Daily   [START ON 8/26/2017] DULoxetine 30 mg Oral Daily   [START ON 8/26/2017] enoxaparin 40 mg Subcutaneous Daily   ertapenem 1 g Intravenous Q24H   [START ON 8/26/2017] hydroxychloroquine 200 mg Oral Daily   [START ON 8/26/2017] losartan-hydrochlorothiazide 1 tablet Oral Daily   [START ON 8/26/2017] metoprolol tartrate 25 mg Oral BID   [START ON 8/26/2017] pantoprazole 40 mg Oral Daily         Pharmacy to dose vancomycin         Vital Signs:    Temp:  [97.9 °F (36.6 °C)-98.6 °F (37 °C)] 98.2 °F (36.8 °C)  Heart Rate:  [53-80] 80  Resp:  [16-22] 22  BP: (133-149)/(68-90) 133/81    Last 3 weights    08/25/17  1812 08/25/17 2127   Weight: 230 lb (104 kg) 230 lb (104 kg)       Body mass index is 34.97 kg/(m^2).    Physical Exam:      General Appearance:    Alert, cooperative, in no acute distress   Head:    Normocephalic, without obvious abnormality, atraumatic   Eyes:            Lids and lashes normal, conjunctivae and sclerae normal, no   icterus, no pallor, corneas clear, PERRLA   Ears:    Ears appear intact with no abnormalities noted   Throat:   No oral lesions, no thrush, oral mucosa moist   Neck:   No adenopathy, supple, trachea  midline, no thyromegaly, no   carotid bruit, no JVD   Back:     No kyphosis present, no scoliosis present, no skin lesions,      erythema or scars, no tenderness to percussion or                   palpation,   range of motion normal   Lungs:     Clear to auscultation,respirations regular, even and                  unlabored    Heart:    Regular rhythm and normal rate, normal S1 and S2, no            murmur, no gallop, no rub, no click   Chest Wall:    No abnormalities observed   Abdomen:     Normal bowel sounds, no masses, no organomegaly, soft        non-tender, non-distended, no guarding, no rebound                tenderness   Rectal:     Deferred   Extremities:   Moves all extremities well, Redness in the left leg from the knee down.  TMA noted on the left with open wounds which are draining and malodorous.     Pulses:   Pulses palpable and equal bilaterally   Skin:   No bleeding, bruising or rash   Lymph nodes:   No palpable adenopathy   Neurologic:   Cranial nerves 2 - 12 grossly intact, sensation intact, DTR       present and equal bilaterally             Telemetry:  A. fib rate controlled    I have personally looked at  telemetry strips.    Labs:    Results from last 7 days  Lab Units 08/25/17  1848   LACTATE mmol/L 1.9   WBC 10*3/mm3 8.62   HEMOGLOBIN g/dL 9.7*   HEMATOCRIT % 29.3*   MCV fL 93.6   MCHC g/dL 33.1   PLATELETS 10*3/mm3 257           Results from last 7 days  Lab Units 08/25/17  1848   SODIUM mmol/L 137   POTASSIUM mmol/L 4.3   CHLORIDE mmol/L 100   CO2 mmol/L 22.0*   BUN mg/dL 13   CREATININE mg/dL 1.20   EGFR IF NONAFRICN AM mL/min/1.73 62   CALCIUM mg/dL 9.7   GLUCOSE mg/dL 123*   ALBUMIN g/dL 3.80   BILIRUBIN mg/dL 1.3   ALK PHOS U/L 160*   AST (SGOT) U/L 82*   ALT (SGPT) U/L 25   Estimated Creatinine Clearance: 78.4 mL/min (by C-G formula based on Cr of 1.2).  No results found for: AMMONIA          Lab Results   Component Value Date    HGBA1C 6.20 (H) 04/08/2017     Lab Results   Component  Value Date    TSH 2.250 04/07/2017     No results found for: PREGTESTUR, PREGSERUM, HCG, HCGQUANT  Pain Management Panel     Pain Management Panel Latest Ref Rng & Units 6/20/2017    AMPHETAMINES SCREEN, URINE Negative Negative    BARBITURATES SCREEN Negative Negative    BENZODIAZEPINE SCREEN, URINE Negative Positive(A)    BUPRENORPHINE Negative Negative    COCAINE SCREEN, URINE Negative Negative    METHADONE SCREEN, URINE Negative Negative    METHAMPHETAMINE UR Negative Negative                          Radiology:    Imaging Results (last 7 days)     Procedure Component Value Units Date/Time    MRI Foot Left Without Contrast [[id number]] Collected:  08/25/17 1954     Updated:  08/25/17 1956    Narrative:       FINAL REPORT    CLINICAL HISTORY:  LOOKING FOR OSTEO. RECENT AMPUTATION AND DELAYED WOUND HEALING INCREASE IN PAIN TO FOOT AND LEG    FINDINGS:  Multiplanar multisequence imaging of the foot was performed without the intravenous administration of contrast. Images are degraded by motion artifact limiting evaluation. There postsurgical changes from recent midfoot amputation. There is prominent soft   tissue swelling without discrete drainable fluid collection to suggest abscess. There is bone marrow edema of the third metatarsal. Cannot exclude osteomyelitis.    Impression:    Cellulitis post amputation. Cannot exclude third metatarsal osteomyelitis. Given the limitation by motion artifact, recommend three-phase bone scan      Impression:       Authenticated by Latrell Clemente MD on 08/25/2017 07:54:38 PM    MRI Tibia Fibula Left Without Contrast [[id number]] Collected:  08/25/17 1956     Updated:  08/1960    Narrative:       FINAL REPORT    CLINICAL HISTORY:  INCREASE PAIN, RECENT AMPUTATION OF LEFT FOOT WITH NEW PAIN TO TIBAL TUBEROSITY 3 DAYS.    FINDINGS:  Multiplanar multisequence imaging of the leg was performed without the intravenous administration of contrast. Images are degraded by motion  artifact.    There are postsurgical changes at the tibiotalar joint. 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 Remaining bone marrow signal is within normal limits. There is no acute fracture or or cortical erosion. There is no drainable tissue fluid collection.    Impression:    Postsurgical and chronic changes but no acute abnormality      Impression:       Authenticated by Latrell Clemente MD on 2017 07:56:41 PM          Assessment:    1.  Osteomyelitis of left foot status post TMA, with apparent wound infection and left leg cellulitis  2.  Chronic atrial fibrillation  3.  Rheumatoid arthritis  4.  Borderline diabetes mellitus type 2  5.  Hypertension  6.  History coronary artery disease with stents  7.  Noncompliance  8.  Possible history of alcoholism  9.  Peripheral arterial disease status post intervention by Dr. Storm  10.  Hepatitis C  11.  Possible cirrhosis.  Plan:     We'll have wound care see the patient.  Consult Dr. Campbell.  Placed on vancomycin and ertapenem.  Morphine for pain.  Place him on insulin sliding scale.  Also will place him on alcohol withdrawal protocol and give him a banana bag.  Anticipate this patient will need a BKA in the near future.  We'll see what Dr. Campbell advises.  We will hold his Xarelto at the present time in view of possible surgery.  Place him on subcutaneous Lovenox.  Continue with his home medications.  Further recommendations will depend on the clinical course.    Murali Garber DO  17  10:34 PM     Electronically signed by Murali Garber DO at 2017 10:48 PM      Liang Polanco MD at 2017  2:55 PM                [facility]    PROGRESS NOTE    Name:  Vasquez Marie   Age:  56 y.o.  Sex:  male  :  1960  MRN:  [id number]   Visit Number:  [id number]  Admission Date:  2017  Date Of Service:  17  Primary Care Physician:  Marilyn K. Vermeesch, MD     LOS: 4 days :  Patient Care Team:  Kelsey DONALD  Vermeesch, MD as PCP - General (Internal Medicine):    Chief Complaint:      Follow-up of left transmetatarsal amputation stump infection.    Subjective / Interval History:     Patient is currently sitting up on the bed and is comfortable at rest.  He was transferred to the regular floor from the telemetry floor yesterday.  He was also seen by Dr. Campbell wear scheduled him for wound debridement to be done today.  Dr. Campbell feels that the patient may need wound debridement and wound VAC placement without any further amputation at this time.  He has stressed medical compliance to the patient.    Patient was admitted on 8/25/2017 from the emergency room with foul-smelling discharge from the left transmetatarsal amputation stump.  He was seen by his primary care physician and was subsequently sent to the emergency room.  Patient was recently admitted to [facility] where he underwent his transmetatarsal amputation and was subsequently sent home with wound VAC.  Patient was receiving home health for wound care but was subsequently discharged from their service.  Unfortunately, he has not been very compliant with his wound care and has been admitted with ongoing infection.  He has been started on IV antibiotic therapy with vancomycin and ertapenem since admission.  He has been afebrile.  Denies any chest pain or shortness of breath.  He does leave alone.  During his last admission here in June 2017, he was seen by Dr. Storm for his peripheral arterial disease and underwent peripheral angiogram and percutaneous intervention to his left leg on 6/20/2017.      Review of Systems:     General ROS: Patient denies any fevers, chills or loss of consciousness.  Respiratory ROS: Denies cough or shortness of breath.  Cardiovascular ROS: Denies chest pain or palpitations. No history of exertional chest pain.  Gastrointestinal ROS: Denies nausea and vomiting. Denies any abdominal pain. No diarrhea.  Neurological ROS: Denies any  focal weakness. No loss of consciousness. Denies any numbness.     Vital Signs:    Temp:  [97.5 °F (36.4 °C)-98.2 °F (36.8 °C)] 98.2 °F (36.8 °C)  Heart Rate:  [63-70] 64  Resp:  [16-18] 16  BP: (123-152)/(62-77) 123/66    Intake and output:    I/O last 3 completed shifts:  In: 3144 [P.O.:1520; I.V.:1124; IV Piggyback:500]  Out: 3450 [Urine:3450]  I/O this shift:  In: -   Out: 500 [Urine:500]    Physical Examination:    General Appearance:  Alert and cooperative, not in any acute distress.   Head:  Atraumatic and normocephalic, without obvious abnormality.   Eyes:          PERRLA, conjunctivae and sclerae normal, no Icterus. No pallor. Extra-occular movements are within normal limits.   Neck: Supple, trachea midline, no thyromegaly, no carotid bruit.   Lungs:   Chest shape is normal. Breath sounds heard bilaterally equally.  No crackles or wheezing. No Pleural rub or bronchial breathing.   Heart:  Normal S1 and S2, no murmur, no gallop, no rub. No JVD   Abdomen:   Normal bowel sounds, no masses, no organomegaly. Soft       non-tender, obese, no guarding, no rebound tenderness.   Extremities: Moves all extremities well, 1+ edema, no cyanosis, no            clubbing.  Surgical bandage is noted on the left foot with the left transmetatarsal amputation.   Skin: No bleeding, bruising or rash.   Neurologic: Awake, alert and oriented times 3. Moves all 4 extremities equally.   Laboratory results:      Results from last 7 days  Lab Units 08/29/17  0604 08/26/17  0651 08/25/17  1848   SODIUM mmol/L 139 140 137   POTASSIUM mmol/L 4.4 3.6 4.3   CHLORIDE mmol/L 110* 107 100   CO2 mmol/L 19.0* 20.0* 22.0*   BUN mg/dL 10 13 13   CREATININE mg/dL 1.20 1.40* 1.20   CALCIUM mg/dL 8.8 8.9 9.7   BILIRUBIN mg/dL  --   --  1.3   ALK PHOS U/L  --   --  160*   ALT (SGPT) U/L  --   --  25   AST (SGOT) U/L  --   --  82*   GLUCOSE mg/dL 87 130* 123*       Results from last 7 days  Lab Units 08/29/17  0604 08/26/17  0651 08/25/17  1848   WBC  10*3/mm3 3.65* 5.57 8.62   HEMOGLOBIN g/dL 8.3* 9.1* 9.7*   HEMATOCRIT % 27.0* 28.7* 29.3*   PLATELETS 10*3/mm3 136 176 257               Results from last 7 days  Lab Units 08/25/17  2128   WOUNDCX  Heavy growth (4+) Alcaligenes species*       I have reviewed the patient's laboratory results.    Radiology results:    Imaging Results (last 24 hours)     Procedure Component Value Units Date/Time    SCANNED - IMAGING [[id number]] Resulted:  08/25/17      Updated:  08/29/17 1430        Medication Review:     I have reviewed the patients active and prn medications.     Principal Problem:    Subacute osteomyelitis of left foot  Active Problems:    Chronic hepatitis C virus infection    Coronary artery disease involving native coronary artery of native heart without angina pectoris    Chronic atrial fibrillation    Essential hypertension    Depression with anxiety    Gastroesophageal reflux disease with esophagitis    COPD (chronic obstructive pulmonary disease)    Rheumatoid arthritis    Peripheral vascular disease    Liver mass    Assessment:    1.  Acute infection and cellulitis of the left transmetatarsal amputation stump, present on admission.  2.  Peripheral arterial disease s/p left SFA stent and intervention to the peroneal arteries on 6/20/2017.  3.  Paroxysmal atrial fibrillation.  4.  Coronary artery disease on medical therapy.  5.  Essential hypertension.  6.  COPD, stable.  7.  Chronic hepatitis C infection.    Plan:    Patient is currently on IV antibiotic therapy with ertapenem and vancomycin for his left foot amputation stump cellulitis.  His dressing has been changed.  Wound cultures are growing Alcaligenes species which are sensitive to carbopenems and Rocephin.  I will continue the current antibiotic regimen until wound debridement and will switch him over to Rocephin from tomorrow.  He will be continued on his home medications including antiplatelet agents.  MRI of the foot done in the emergency room  showed cellulitis but was not certain about osteomyelitis especially due to motion artifact.  I discussed options of going to short-term rehabilitation for wound care and wound VAC and he is open to that idea.  Further recommendations depend upon his clinical course.    Liang Polanco MD  17  2:55 PM    Portions of this note may have been completed with Dragon, a voice recognition program. Not all errors in transcription may have been detected prior to signing.       Electronically signed by Liang Polanco MD at 2017  4:00 PM      Wolfgang Campbell DPM at 2017  4:38 PM          H&P reviewed. The patient was examined and there are no changes to the H&P.     Electronically signed by Wolfgang Campbell DPM at 2017  4:38 PM    Source Note                   [facility]    [facility]    Name:  Vasquez Marie   Age:  56 y.o.  Sex:  male  :  1960  MRN:  [id number]   Visit Number:  [id number]  Admission Date:  2017  Date Of Service:  17  Primary Care Physician:  Marilyn K. Vermeesch, MD     LOS: 4 days :  Patient Care Team:  Marilyn K Vermeesch, MD as PCP - General (Internal Medicine):    Chief Complaint:      Follow-up of left transmetatarsal amputation stump infection.    Subjective / Interval History:     Patient is currently sitting up on the bed and is comfortable at rest.  He was transferred to the regular floor from the telemetry floor yesterday.  He was also seen by Dr. Campbell wear scheduled him for wound debridement to be done today.  Dr. Campbell feels that the patient may need wound debridement and wound VAC placement without any further amputation at this time.  He has stressed medical compliance to the patient.    Patient was admitted on 2017 from the emergency room with foul-smelling discharge from the left transmetatarsal amputation stump.  He was seen by his primary care physician and was subsequently sent to the emergency room.  Patient was  recently admitted to [facility] where he underwent his transmetatarsal amputation and was subsequently sent home with wound VAC.  Patient was receiving home health for wound care but was subsequently discharged from their service.  Unfortunately, he has not been very compliant with his wound care and has been admitted with ongoing infection.  He has been started on IV antibiotic therapy with vancomycin and ertapenem since admission.  He has been afebrile.  Denies any chest pain or shortness of breath.  He does leave alone.  During his last admission here in June 2017, he was seen by Dr. Storm for his peripheral arterial disease and underwent peripheral angiogram and percutaneous intervention to his left leg on 6/20/2017.      Review of Systems:     General ROS: Patient denies any fevers, chills or loss of consciousness.  Respiratory ROS: Denies cough or shortness of breath.  Cardiovascular ROS: Denies chest pain or palpitations. No history of exertional chest pain.  Gastrointestinal ROS: Denies nausea and vomiting. Denies any abdominal pain. No diarrhea.  Neurological ROS: Denies any focal weakness. No loss of consciousness. Denies any numbness.     Vital Signs:    Temp:  [97.5 °F (36.4 °C)-98.2 °F (36.8 °C)] 98.2 °F (36.8 °C)  Heart Rate:  [63-70] 64  Resp:  [16-18] 16  BP: (123-152)/(62-77) 123/66    Intake and output:    I/O last 3 completed shifts:  In: 3144 [P.O.:1520; I.V.:1124; IV Piggyback:500]  Out: 3450 [Urine:3450]  I/O this shift:  In: -   Out: 500 [Urine:500]    Physical Examination:    General Appearance:  Alert and cooperative, not in any acute distress.   Head:  Atraumatic and normocephalic, without obvious abnormality.   Eyes:          PERRLA, conjunctivae and sclerae normal, no Icterus. No pallor. Extra-occular movements are within normal limits.   Neck: Supple, trachea midline, no thyromegaly, no carotid bruit.   Lungs:   Chest shape is normal. Breath sounds heard bilaterally equally.  No  crackles or wheezing. No Pleural rub or bronchial breathing.   Heart:  Normal S1 and S2, no murmur, no gallop, no rub. No JVD   Abdomen:   Normal bowel sounds, no masses, no organomegaly. Soft       non-tender, obese, no guarding, no rebound tenderness.   Extremities: Moves all extremities well, 1+ edema, no cyanosis, no            clubbing.  Surgical bandage is noted on the left foot with the left transmetatarsal amputation.   Skin: No bleeding, bruising or rash.   Neurologic: Awake, alert and oriented times 3. Moves all 4 extremities equally.   Laboratory results:      Results from last 7 days  Lab Units 08/29/17  0604 08/26/17  0651 08/25/17  1848   SODIUM mmol/L 139 140 137   POTASSIUM mmol/L 4.4 3.6 4.3   CHLORIDE mmol/L 110* 107 100   CO2 mmol/L 19.0* 20.0* 22.0*   BUN mg/dL 10 13 13   CREATININE mg/dL 1.20 1.40* 1.20   CALCIUM mg/dL 8.8 8.9 9.7   BILIRUBIN mg/dL  --   --  1.3   ALK PHOS U/L  --   --  160*   ALT (SGPT) U/L  --   --  25   AST (SGOT) U/L  --   --  82*   GLUCOSE mg/dL 87 130* 123*       Results from last 7 days  Lab Units 08/29/17  0604 08/26/17  0651 08/25/17  1848   WBC 10*3/mm3 3.65* 5.57 8.62   HEMOGLOBIN g/dL 8.3* 9.1* 9.7*   HEMATOCRIT % 27.0* 28.7* 29.3*   PLATELETS 10*3/mm3 136 176 257               Results from last 7 days  Lab Units 08/25/17  2128   WOUNDCX  Heavy growth (4+) Alcaligenes species*       I have reviewed the patient's laboratory results.    Radiology results:    Imaging Results (last 24 hours)     Procedure Component Value Units Date/Time    SCANNED - IMAGING [[id number]] Resulted:  08/25/17      Updated:  08/29/17 1430        Medication Review:     I have reviewed the patients active and prn medications.     Principal Problem:    Subacute osteomyelitis of left foot  Active Problems:    Chronic hepatitis C virus infection    Coronary artery disease involving native coronary artery of native heart without angina pectoris    Chronic atrial fibrillation    Essential  hypertension    Depression with anxiety    Gastroesophageal reflux disease with esophagitis    COPD (chronic obstructive pulmonary disease)    Rheumatoid arthritis    Peripheral vascular disease    Liver mass    Assessment:    1.  Acute infection and cellulitis of the left transmetatarsal amputation stump, present on admission.  2.  Peripheral arterial disease s/p left SFA stent and intervention to the peroneal arteries on 6/20/2017.  3.  Paroxysmal atrial fibrillation.  4.  Coronary artery disease on medical therapy.  5.  Essential hypertension.  6.  COPD, stable.  7.  Chronic hepatitis C infection.    Plan:    Patient is currently on IV antibiotic therapy with ertapenem and vancomycin for his left foot amputation stump cellulitis.  His dressing has been changed.  Wound cultures are growing Alcaligenes species which are sensitive to carbopenems and Rocephin.  I will continue the current antibiotic regimen until wound debridement and will switch him over to Rocephin from tomorrow.  He will be continued on his home medications including antiplatelet agents.  MRI of the foot done in the emergency room showed cellulitis but was not certain about osteomyelitis especially due to motion artifact.  I discussed options of going to short-term rehabilitation for wound care and wound VAC and he is open to that idea.  Further recommendations depend upon his clinical course.    Liang Polanco MD  08/29/17  2:55 PM    Portions of this note may have been completed with Dragon, a voice recognition program. Not all errors in transcription may have been detected prior to signing.        Electronically signed by Liang Polanco MD at 8/29/2017  4:00 PM              Hospital Medications (active)       Dose Frequency Start End    acetaminophen (TYLENOL) tablet 650 mg 650 mg Every 4 Hours PRN 8/25/2017     Sig - Route: Take 2 tablets by mouth Every 4 (Four) Hours As Needed for Mild Pain . - Oral    albuterol (PROVENTIL) nebulizer solution  0.083% 2.5 mg/3mL 2.5 mg Every 6 Hours - RT 8/26/2017     Sig - Route: Take 2.5 mg by nebulization Every 6 (Six) Hours. - Nebulization    amiodarone (PACERONE) tablet 200 mg 200 mg Every 24 Hours Scheduled 8/26/2017     Sig - Route: Take 1 tablet by mouth Daily. - Oral    atorvastatin (LIPITOR) tablet 40 mg 40 mg Nightly 8/25/2017     Sig - Route: Take 1 tablet by mouth Every Night. - Oral    cefTRIAXone (ROCEPHIN) 2 g/100 mL 0.9% NS (MBP) 2 g Every 24 Hours 8/30/2017     Sig - Route: Infuse 100 mL into a venous catheter Daily. - Intravenous    clopidogrel (PLAVIX) tablet 75 mg 75 mg Daily 8/26/2017     Sig - Route: Take 1 tablet by mouth Daily. - Oral    colchicine tablet 0.6 mg 0.6 mg Daily 8/26/2017     Sig - Route: Take 1 tablet by mouth Daily. - Oral    DULoxetine (CYMBALTA) DR capsule 30 mg 30 mg Daily 8/26/2017     Sig - Route: Take 1 capsule by mouth Daily. - Oral    enoxaparin (LOVENOX) syringe 40 mg 40 mg Daily 8/26/2017     Sig - Route: Inject 0.4 mL under the skin Daily. - Subcutaneous    HYDROcodone-acetaminophen (NORCO)  MG per tablet 1 tablet 1 tablet Every 4 Hours PRN 8/31/2017     Sig - Route: Take 1 tablet by mouth Every 4 (Four) Hours As Needed for Moderate Pain . - Oral    hydroxychloroquine (PLAQUENIL) tablet 200 mg 200 mg Daily 8/26/2017     Sig - Route: Take 1 tablet by mouth Daily. - Oral    lactobacillus acidophilus (RISAQUAD) capsule 1 capsule 1 capsule Daily 9/6/2017     Sig - Route: Take 1 capsule by mouth Daily. - Oral    loperamide (IMODIUM) capsule 2 mg 2 mg 4 Times Daily PRN 8/27/2017     Sig - Route: Take 1 capsule by mouth 4 (Four) Times a Day As Needed for Diarrhea. - Oral    LORazepam (ATIVAN) tablet 1 mg 1 mg Every 6 Hours PRN 9/1/2017 9/11/2017    Sig - Route: Take 2 tablets by mouth Every 6 (Six) Hours As Needed for Anxiety. - Oral    losartan-hydrochlorothiazide (HYZAAR) 50-12.5 MG per tablet 1 tablet 1 tablet Daily 8/26/2017     Sig - Route: Take 1 tablet by mouth  "Daily. - Oral    metoprolol tartrate (LOPRESSOR) tablet 25 mg 25 mg 2 Times Daily 2017     Sig - Route: Take 1 tablet by mouth 2 (Two) Times a Day. - Oral    nicotine (NICODERM CQ) 14 MG/24HR patch 1 patch 1 patch Every 24 Hours 2017     Sig - Route: Place 1 patch on the skin Daily. - Transdermal    ondansetron (ZOFRAN) injection 4 mg 4 mg Every 6 Hours PRN 2017     Sig - Route: Infuse 2 mL into a venous catheter Every 6 (Six) Hours As Needed for Nausea or Vomiting. - Intravenous    pantoprazole (PROTONIX) EC tablet 40 mg 40 mg Every Early Morning 2017     Sig - Route: Take 1 tablet by mouth Every Morning. - Oral    Pharmacy Meds to Bed Consult  Daily (Monday-Friday) 2017     Sig - Route: Daily (Monday-Friday). - Does not apply    sodium chloride 0.9 % flush 1-10 mL 1-10 mL As Needed 2017     Sig - Route: Infuse 1-10 mL into a venous catheter As Needed for Line Care. - Intravenous    sodium chloride 0.9 % flush 1-10 mL 1-10 mL As Needed 2017     Sig - Route: 1-10 mL by Intracatheter route As Needed for Line Care. - Intracatheter    Cosign for Ordering: Accepted by Alen Potter MD on 2017 12:41 PM    sodium chloride 0.9 % flush 10 mL 10 mL As Needed 2017     Sig - Route: Infuse 10 mL into a venous catheter As Needed for Line Care. - Intravenous    Cosign for Ordering: Accepted by Adrian Palmer MD on 2017  6:41 PM    Linked Group 1:  \"And\" Linked Group Details        traZODone (DESYREL) tablet 50 mg 50 mg Nightly PRN 2017     Sig - Route: Take 1 tablet by mouth At Night As Needed for Sleep. - Oral             Physician Progress Notes (last 24 hours) (Notes from 2017  1:47 PM through 2017  1:47 PM)      Alen Potter MD at 2017  4:31 PM  Version 1 of 1               [facility]    PROGRESS NOTE    Name:  Vasquez Marie   Age:  56 y.o.  Sex:  male  :  1960  MRN:  [id number]   Visit Number:  " [id number]  Admission Date:  8/25/2017  Date Of Service:  09/07/17  Primary Care Physician:  Marilyn K. Vermeesch, MD     LOS: 13 days :  Patient Care Team:  Marilyn K Vermeesch, MD as PCP - General (Internal Medicine):    Chief Complaint:      Weakness    Subjective / Interval History:     Patient overall doing well.  His foot pain is tolerable. He has no soa, cp, n/v or abdominal pain.  Currently awaiting placement.     I have reviewed labs/imaging/records from this hospitalization, including ER staff and admitting/attending physicians H/P's and progress notes to establish a comprehensive understanding of this patient's clinical hospital course, as well as to establish a transition of care appropriately.    Vital Signs:    Temp:  [97.7 °F (36.5 °C)-98.9 °F (37.2 °C)] 97.8 °F (36.6 °C)  Heart Rate:  [62-71] 66  Resp:  [16-18] 16  BP: (114-146)/(48-70) 132/67    Intake and output:    Intake/Output       09/06/17 0700 - 09/07/17 0659 09/07/17 0700 - 09/08/17 0659    Intake (ml) 480 480    Output (ml) 1700 --    Net (ml) -1220 480    Last Weight 215 lb 14.4 oz (97.9 kg) --          Physical Examination:    General Appearance:  Alert and cooperative, not in any acute distress.   Head:  Atraumatic and normocephalic, without obvious abnormality.   Eyes:      PERRLA, Extra-occular movements are within normal limits.   Lungs:   Chest shape is normal. Breath sounds heard bilaterally equally.  No crackles or wheezing.   Heart:  Normal S1 and S2, no murmur, no gallop, no rub.   Abdomen:   Normal bowel sounds,  Soft non-tender, non-distended, no guarding, no rebound tenderness   Extremities: No edema                     Laboratory results:      Results from last 7 days  Lab Units 09/07/17  0530 09/06/17  0513 09/05/17  0515   SODIUM mmol/L 140 141 140   POTASSIUM mmol/L 3.6 3.8 3.7   CHLORIDE mmol/L 107 107 107   CO2 mmol/L 22.0* 25.0* 24.0*   BUN mg/dL 33* 37* 33*   CREATININE mg/dL 1.10 1.20 1.20   CALCIUM mg/dL 9.1 8.8 8.4    GLUCOSE mg/dL 95 115* 110*       Results from last 7 days  Lab Units 09/07/17  0530 09/06/17  0513 09/05/17  0515   WBC 10*3/mm3 3.37* 3.34* 3.82*   HEMOGLOBIN g/dL 8.2* 8.8* 8.4*   HEMATOCRIT % 26.5* 27.9* 26.9*   PLATELETS 10*3/mm3 108* 101* 116*   MONOCYTES % % 6.1  --   --                I have reviewed the patient's laboratory results.    Radiology results:    Imaging Results (last 24 hours)     ** No results found for the last 24 hours. **          I have reviewed the patient's radiology reports.    Medication Review:     I have reviewed the patients active and prn medications.       Assessment/Plan:  1.  Acute infection and cellulitis of the left transmetatarsal amputation stump, present on admission.  2.  Peripheral arterial disease s/p left SFA stent and intervention to the peroneal arteries on 6/20/2017.  3.  Paroxysmal atrial fibrillation.  4.  Coronary artery disease on medical therapy.  5.  Essential hypertension.  6.  COPD, stable.  7.  Chronic hepatitis C infection.  8.  Rheumatoid arthritis.      Patient is currently on IV antibiotic therapy with ceftriaxone 2 g daily.  He will be continued on wound VAC therapy. Wound culture obtained during the debridement has grown Providencia rettgeri which is sensitive to ceftriaxone.  Case management services are currently working to get him to short-term rehabilitation facility for continued wound care, awaiting placement.     Alen Potter MD  09/07/17  4:32 PM             Electronically signed by Alen Potter MD at 9/7/2017  4:34 PM      Wolfgang Campbell DPM at 9/7/2017  6:05 PM  Version 1 of 1             Patient Care Team:  Marilyn K Vermeesch, MD as PCP - General (Internal Medicine)    Chief complaint left foot wound status post open TMA    Subjective .   56-year-old noncompliant poorly controlled diabetic male now over 1 week status post debridement of left foot wound with grafting and wound VAC application.  I was originally told the patient  was going to be discharged to a facility and then it changed to him being discharged home and now I was told on Monday he was being discharged to a facility again and would be discharged to stay however it's now Thursday and he is still in the hospital.  He states that he thinks he's waiting on some type of insurance approval or to hear back from someone.  He denies any constitutional symptoms.  Says his wound VAC was changed yesterday.  Says he was told it looks better.      Review of Systems  All systems were reviewed and negative except for chief complaint.    History  Past Medical History:   Diagnosis Date   • Abdominal pain     History of epigastric abdominal tenderness. Differentials include peptic ulcer disease,   pancreatobiliary disease.   • Abnormal LFTs    • Anemia    • Anxiety    • Arthritis    • Asthma    • Atrial fibrillation    • CAD (coronary artery disease)    • Calf cramp    • CKD (chronic kidney disease)    • COPD (chronic obstructive pulmonary disease)    • CVA (cerebral infarction)    • Depression    • DVT (deep venous thrombosis)    • Electrocution    • Fatigue    • Hepatitis C    • History of allergy    • History of blood transfusion    • Hyperlipidemia    • Hypertension    • Leg pain    • Loss of appetite    • PUD (peptic ulcer disease)    • Stroke syndrome    • Tachycardia    • Thrombocytopenia    • Thrombophlebitis of deep femoral vein    • Vision problems    , Past Surgical History:   Procedure Laterality Date   • AMPUTATION DIGIT Left 6/23/2017    Procedure: amputation of left foot third digit, left foot wound debridments and grafting;  Surgeon: Wolfgang Campbell DPM;  Location: [facility] OR;  Service:    • ANKLE SURGERY     • CARDIAC CATHETERIZATION N/A 6/20/2017    Procedure: Peripheral angiography;  Surgeon: Jonathan Storm MD;  Location: [facility] CATH INVASIVE LOCATION;  Service:    • CARDIAC CATHETERIZATION N/A 6/20/2017    Procedure: Angioplasty-peripheral;  Surgeon: Jonathan  Phillip Storm MD;  Location: [facility] CATH INVASIVE LOCATION;  Service:    • CARDIAC SURGERY     • CORONARY STENT PLACEMENT     • INCISION AND DRAINAGE FOOT Left 8/29/2017    Procedure: Incision and drainage/wound debridement of left foot, wound VAC application, skin graft application;  Surgeon: Wolfgang Campbell DPM;  Location: [facility] OR;  Service:    • TONSILLECTOMY     • TOTAL HIP ARTHROPLASTY Left    • TOTAL SHOULDER REPLACEMENT Left    , Family History   Problem Relation Age of Onset   • Breast cancer Mother    • Colon cancer Sister    • Heart attack Other    • Arthritis Other    • Diabetes Other    • Hypertension Other    • Kidney disease Other    • Stroke Other    • Heart disease Father    , Social History   Substance Use Topics   • Smoking status: Current Every Day Smoker     Packs/day: 0.50     Years: 30.00     Types: Cigarettes   • Smokeless tobacco: None   • Alcohol use No   , Prescriptions Prior to Admission   Medication Sig Dispense Refill Last Dose   • albuterol (PROVENTIL HFA;VENTOLIN HFA) 108 (90 BASE) MCG/ACT inhaler Inhale 2 puffs Every 4 (Four) Hours As Needed for Wheezing.   Unknown at Unknown time   • amiodarone (PACERONE) 200 MG tablet Take 1 tablet by mouth Daily. 30 tablet 11 8/24/2017   • atorvastatin (LIPITOR) 40 MG tablet Take 1 tablet by mouth Every Night. 30 tablet 11 8/24/2017   • clopidogrel (PLAVIX) 75 MG tablet Take 1 tablet by mouth Daily. (Patient not taking: Reported on 8/28/2017) 30 tablet 3 Not Taking at Unknown time   • colchicine 0.6 MG tablet Take 1 tablet by mouth Daily. 30 tablet 0 8/24/2017   • collagenase (SANTYL) 250 UNIT/GM ointment Apply  topically Daily. 90 g 0 Unknown at Unknown time   • DULoxetine (CYMBALTA) 30 MG capsule Take 1 capsule by mouth Daily. 30 capsule 3 8/25/2017   • hydroxychloroquine (PLAQUENIL) 200 MG tablet Take 1 tablet by mouth Daily. 30 tablet 5 8/24/2017   • losartan-hydrochlorothiazide (HYZAAR) 50-12.5 MG per tablet Take 1 tablet by mouth Daily.  "30 tablet 3 8/24/2017   • metoprolol tartrate (LOPRESSOR) 25 MG tablet Take 1 tablet by mouth Every 12 (Twelve) Hours. (Patient taking differently: Take 25 mg by mouth 2 (Two) Times a Day.) 60 tablet 11 8/24/2017   • pantoprazole (PROTONIX) 40 MG EC tablet Take 1 tablet by mouth Daily. 30 tablet 5 8/25/2017   , Scheduled Meds:    albuterol 2.5 mg Nebulization Q6H - RT   amiodarone 200 mg Oral Q24H   atorvastatin 40 mg Oral Nightly   ceftriaxone 2 g Intravenous Q24H   clopidogrel 75 mg Oral Daily   colchicine 0.6 mg Oral Daily   DULoxetine 30 mg Oral Daily   enoxaparin 40 mg Subcutaneous Daily   hydroxychloroquine 200 mg Oral Daily   lactobacillus acidophilus 1 capsule Oral Daily   losartan-hydrochlorothiazide 1 tablet Oral Daily   metoprolol tartrate 25 mg Oral BID   nicotine 1 patch Transdermal Q24H   pantoprazole 40 mg Oral Q AM   Pharmacy Meds to Bed Consult  Does not apply Daily   , Continuous Infusions:   , PRN Meds:  •  acetaminophen  •  HYDROcodone-acetaminophen  •  loperamide  •  LORazepam  •  ondansetron  •  sodium chloride  •  sodium chloride  •  Insert peripheral IV **AND** sodium chloride  •  traZODone and Allergies:  Review of patient's allergies indicates no known allergies.    Objective     Vital Signs   /66 (BP Location: Left arm, Patient Position: Sitting)  Pulse 66  Temp 97.9 °F (36.6 °C) (Oral)   Resp 20  Ht 68\" (172.7 cm)  Wt 215 lb 14.4 oz (97.9 kg)  SpO2 98%  BMI 32.83 kg/m2    Physical Exam:  AAO ×3, NAD, AF VSS  PERRLA, cranial nerves II through XII intact  Abdomen, obese soft and nontender    His left TMA site is now 100% granular.  The wound still measures 9-10 cm wide by 4-5 cm tall.  There is no exposed bone.  Wound base itself is 100% granular now.  There is Adaptic and the wound base over the grafts applied last week.  Pulses are palpable to the left foot.  Capillary refill time to the stump is brisk, no new wounds or signs of infection.  No drainage.  No " odor.       Results Review: Labs reviewed.      Assessment/Plan     Principal Problem:    Subacute osteomyelitis of left foot  Active Problems:    Chronic hepatitis C virus infection    Coronary artery disease involving native coronary artery of native heart without angina pectoris    Chronic atrial fibrillation    Essential hypertension    Depression with anxiety    Gastroesophageal reflux disease with esophagitis    COPD (chronic obstructive pulmonary disease)    Rheumatoid arthritis    Peripheral vascular disease    Liver mass  It seems as if the patient still waiting on some type of placement.  I changed his wound VAC this evening with a black sponge.  I redressed the left foot.  We will continue 3 times a week VAC changes.  The patient's wound now seems to be fairly amenable to grafting and wound VAC therapy so I would plan to proceed with this rather than further amputation of the foot.  I think at this time discharging him on by mouth antibiotics probably acceptable.  I'll continue to follow along while on the hospital.  He can weight-bear to the left foot on his heel only with his boot or Darco wedge shoe.  He will follow up in wound care upon discharge.          Wolfgang Campbell DPM  09/07/17  6:06 PM         Electronically signed by Wolfgang Campbell DPM at 9/7/2017  6:09 PM        Consult Notes (last 24 hours) (Notes from 9/7/2017  1:47 PM through 9/8/2017  1:47 PM)     No notes of this type exist for this encounter.           Physical Therapy Notes (last 24 hours) (Notes from 9/7/2017  1:47 PM through 9/8/2017  1:47 PM)      Dipak Scott PTA at 9/7/2017  3:33 PM  Version 1 of 1            09/07/17 1432   Rehab Treatment   Discipline physical therapy assistant   Treatment Not Performed patient/family declined treatment  (Pt reports he is going to be discharged therfore did not wish to participate with therapy this date. Therapy will f/u with pt tomorrow if available. )        Electronically signed by  Dipak Scott, PTA at 9/7/2017  3:33 PM        Occupational Therapy Notes (last 24 hours) (Notes from 9/7/2017  1:47 PM through 9/8/2017  1:47 PM)     No notes of this type exist for this encounter.

## 2017-09-08 NOTE — PROGRESS NOTES
[facility]    Name:  Vasquez Marie   Age:  56 y.o.  Sex:  male  :  1960  MRN:  [id number]   Visit Number:  [id number]  Admission Date:  2017  Date Of Service:  17  Primary Care Physician:  Marilyn K. Vermeesch, MD     LOS: 14 days :  Patient Care Team:  Marilyn K Vermeesch, MD as PCP - General (Internal Medicine):    Chief Complaint:      -Weakness    Subjective / Interval History:     -Overall doing well, currently awaiting placement.  No current symptoms today.  Foot pain is tolerable.  He is working with PT.  Dr. Campbell has changed his wound vac and redressed his foot and to continue three times a week VAC changes. No current soa, cp, n/v or abdominal pain.     I have reviewed labs/imaging/records from this hospitalization, including ER staff and admitting/attending physicians H/P's and progress notes to establish a comprehensive understanding of this patient's clinical hospital course, as well as to establish a transition of care appropriately.    Vital Signs:    Temp:  [97.8 °F (36.6 °C)-97.9 °F (36.6 °C)] 97.9 °F (36.6 °C)  Heart Rate:  [66-81] 81  Resp:  [18-20] 18  BP: (118-152)/(58-79) 152/58    Intake and output:    Intake/Output       17 0700 - 17 0659 17 0700 - 17 0659    Intake (ml) 1280 580    Output (ml) 2140 200    Net (ml) -860 380    Last Weight 199 lb 14.4 oz (90.7 kg) --          Physical Examination:    General Appearance:  Alert and cooperative, not in any acute distress.   Head:  Atraumatic and normocephalic, without obvious abnormality.   Eyes:      PERRLA, Extra-occular movements are within normal limits.   Lungs:   Chest shape is normal. Breath sounds heard bilaterally equally.  No crackles or wheezing.   Heart:  Normal S1 and S2, no murmur, no gallop, no rub.   Abdomen:   Normal bowel sounds,  Soft non-tender, non-distended, no guarding, no rebound tenderness   Extremities: No edema                      Laboratory results:      Results from last 7 days  Lab Units 09/08/17  0550 09/07/17  0530 09/06/17  0513   SODIUM mmol/L 141 140 141   POTASSIUM mmol/L 3.8 3.6 3.8   CHLORIDE mmol/L 108* 107 107   CO2 mmol/L 23.0* 22.0* 25.0*   BUN mg/dL 30* 33* 37*   CREATININE mg/dL 1.00 1.10 1.20   CALCIUM mg/dL 9.1 9.1 8.8   GLUCOSE mg/dL 102* 95 115*       Results from last 7 days  Lab Units 09/08/17  0550 09/07/17  0530 09/06/17  0513   WBC 10*3/mm3 3.40* 3.37* 3.34*   HEMOGLOBIN g/dL 7.8* 8.2* 8.8*   HEMATOCRIT % 25.0* 26.5* 27.9*   PLATELETS 10*3/mm3 100* 108* 101*   MONOCYTES % %  --  6.1  --                I have reviewed the patient's laboratory results.    Radiology results:    Imaging Results (last 24 hours)     ** No results found for the last 24 hours. **          I have reviewed the patient's radiology reports.    Medication Review:     I have reviewed the patients active and prn medications.       Assessment/Plan:  1.  Acute infection and cellulitis of the left transmetatarsal amputation stump, present on admission.  2.  Peripheral arterial disease s/p left SFA stent and intervention to the peroneal arteries on 6/20/2017.  3.  Paroxysmal atrial fibrillation.  4.  Coronary artery disease on medical therapy.  5.  Essential hypertension.  6.  COPD, stable.  7.  Chronic hepatitis C infection.  8.  Rheumatoid arthritis.    Patient is currently on IV antibiotic therapy with ceftriaxone 2 g daily.  He will be continued on wound VAC therapy.  Dr. Campbell changed his wound VAC this evening with a black sponge and redressed the left foot.  He is to continue 3 times a week VAC changes. Wound culture obtained during the debridement has grown Providencia rettgeri which is sensitive to ceftriaxone.  Case management services are currently working to get him to short-term rehabilitation facility for continued wound care, awaiting placement.      Alen Potter MD  09/08/17  3:18 PM

## 2017-09-09 LAB
ANION GAP SERPL CALCULATED.3IONS-SCNC: 15 MMOL/L
BASOPHILS # BLD AUTO: 0.03 10*3/MM3 (ref 0–0.2)
BASOPHILS NFR BLD AUTO: 0.7 % (ref 0–2.5)
BUN BLD-MCNC: 26 MG/DL (ref 7–20)
BUN/CREAT SERPL: 23.6 (ref 6.3–21.9)
CALCIUM SPEC-SCNC: 9.4 MG/DL (ref 8.4–10.2)
CHLORIDE SERPL-SCNC: 106 MMOL/L (ref 98–107)
CO2 SERPL-SCNC: 22 MMOL/L (ref 26–30)
CREAT BLD-MCNC: 1.1 MG/DL (ref 0.6–1.3)
DEPRECATED RDW RBC AUTO: 54.7 FL (ref 37–54)
EOSINOPHIL # BLD AUTO: 0.87 10*3/MM3 (ref 0–0.7)
EOSINOPHIL NFR BLD AUTO: 21.1 % (ref 0–7)
ERYTHROCYTE [DISTWIDTH] IN BLOOD BY AUTOMATED COUNT: 16 % (ref 11.5–14.5)
GFR SERPL CREATININE-BSD FRML MDRD: 69 ML/MIN/1.73
GLUCOSE BLD-MCNC: 113 MG/DL (ref 74–98)
HCT VFR BLD AUTO: 27.2 % (ref 42–52)
HGB BLD-MCNC: 8.6 G/DL (ref 14–18)
IMM GRANULOCYTES # BLD: 0.02 10*3/MM3 (ref 0–0.06)
IMM GRANULOCYTES NFR BLD: 0.5 % (ref 0–0.6)
LYMPHOCYTES # BLD AUTO: 1.08 10*3/MM3 (ref 0.6–3.4)
LYMPHOCYTES NFR BLD AUTO: 26.2 % (ref 10–50)
MCH RBC QN AUTO: 29.6 PG (ref 27–31)
MCHC RBC AUTO-ENTMCNC: 31.6 G/DL (ref 30–37)
MCV RBC AUTO: 93.5 FL (ref 80–94)
MONOCYTES # BLD AUTO: 0.38 10*3/MM3 (ref 0–0.9)
MONOCYTES NFR BLD AUTO: 9.2 % (ref 0–12)
NEUTROPHILS # BLD AUTO: 1.74 10*3/MM3 (ref 2–6.9)
NEUTROPHILS NFR BLD AUTO: 42.3 % (ref 37–80)
NRBC BLD MANUAL-RTO: 0 /100 WBC (ref 0–0)
PLATELET # BLD AUTO: 118 10*3/MM3 (ref 130–400)
PMV BLD AUTO: 10.7 FL (ref 6–12)
POTASSIUM BLD-SCNC: 4 MMOL/L (ref 3.5–5.1)
RBC # BLD AUTO: 2.91 10*6/MM3 (ref 4.7–6.1)
SODIUM BLD-SCNC: 139 MMOL/L (ref 137–145)
WBC NRBC COR # BLD: 4.12 10*3/MM3 (ref 4.8–10.8)

## 2017-09-09 PROCEDURE — 85025 COMPLETE CBC W/AUTO DIFF WBC: CPT | Performed by: INTERNAL MEDICINE

## 2017-09-09 PROCEDURE — 25010000002 CEFTRIAXONE: Performed by: INTERNAL MEDICINE

## 2017-09-09 PROCEDURE — 97116 GAIT TRAINING THERAPY: CPT

## 2017-09-09 PROCEDURE — 99231 SBSQ HOSP IP/OBS SF/LOW 25: CPT | Performed by: INTERNAL MEDICINE

## 2017-09-09 PROCEDURE — 80048 BASIC METABOLIC PNL TOTAL CA: CPT | Performed by: INTERNAL MEDICINE

## 2017-09-09 PROCEDURE — 25010000002 ENOXAPARIN PER 10 MG: Performed by: INTERNAL MEDICINE

## 2017-09-09 RX ORDER — LEVOFLOXACIN 500 MG/1
500 TABLET, FILM COATED ORAL EVERY 24 HOURS
Status: DISCONTINUED | OUTPATIENT
Start: 2017-09-09 | End: 2017-09-11 | Stop reason: HOSPADM

## 2017-09-09 RX ADMIN — HYDROXYCHLOROQUINE SULFATE 200 MG: 200 TABLET, FILM COATED ORAL at 09:15

## 2017-09-09 RX ADMIN — LEVOFLOXACIN 500 MG: 500 TABLET, FILM COATED ORAL at 11:42

## 2017-09-09 RX ADMIN — HYDROCODONE BITARTRATE AND ACETAMINOPHEN 1 TABLET: 10; 325 TABLET ORAL at 14:02

## 2017-09-09 RX ADMIN — CEFTRIAXONE 2 G: 2 INJECTION, POWDER, FOR SOLUTION INTRAMUSCULAR; INTRAVENOUS at 09:42

## 2017-09-09 RX ADMIN — COLCHICINE 0.6 MG: 0.6 TABLET, FILM COATED ORAL at 09:14

## 2017-09-09 RX ADMIN — PANTOPRAZOLE SODIUM 40 MG: 40 TABLET, DELAYED RELEASE ORAL at 05:05

## 2017-09-09 RX ADMIN — HYDROCODONE BITARTRATE AND ACETAMINOPHEN 1 TABLET: 10; 325 TABLET ORAL at 23:31

## 2017-09-09 RX ADMIN — DULOXETINE HYDROCHLORIDE 30 MG: 30 CAPSULE, DELAYED RELEASE ORAL at 09:12

## 2017-09-09 RX ADMIN — AMIODARONE HYDROCHLORIDE 200 MG: 200 TABLET ORAL at 09:13

## 2017-09-09 RX ADMIN — ATORVASTATIN CALCIUM 40 MG: 40 TABLET, FILM COATED ORAL at 20:27

## 2017-09-09 RX ADMIN — HYDROCODONE BITARTRATE AND ACETAMINOPHEN 1 TABLET: 10; 325 TABLET ORAL at 09:33

## 2017-09-09 RX ADMIN — NICOTINE 1 PATCH: 14 PATCH TRANSDERMAL at 20:28

## 2017-09-09 RX ADMIN — CLOPIDOGREL BISULFATE 75 MG: 75 TABLET ORAL at 09:15

## 2017-09-09 RX ADMIN — LOSARTAN POTASSIUM AND HYDROCHLOROTHIAZIDE 1 TABLET: 50; 12.5 TABLET, FILM COATED ORAL at 09:15

## 2017-09-09 RX ADMIN — METOPROLOL TARTRATE 25 MG: 25 TABLET ORAL at 09:15

## 2017-09-09 RX ADMIN — ENOXAPARIN SODIUM 40 MG: 40 INJECTION SUBCUTANEOUS at 09:17

## 2017-09-09 RX ADMIN — HYDROCODONE BITARTRATE AND ACETAMINOPHEN 1 TABLET: 10; 325 TABLET ORAL at 05:05

## 2017-09-09 RX ADMIN — Medication 1 CAPSULE: at 09:12

## 2017-09-09 RX ADMIN — HYDROCODONE BITARTRATE AND ACETAMINOPHEN 1 TABLET: 10; 325 TABLET ORAL at 18:03

## 2017-09-09 RX ADMIN — METOPROLOL TARTRATE 25 MG: 25 TABLET ORAL at 18:03

## 2017-09-09 NOTE — THERAPY TREATMENT NOTE
Acute Care - Physical Therapy Treatment Note   [place]     Patient Name: Vasquez Marie  : 1960  MRN: [id number]  Today's Date: 2017  Onset of Illness/Injury or Date of Surgery Date: 17  Date of Referral to PT: 17  Referring Physician: Adrian    Admit Date: 2017    Visit Dx:    ICD-10-CM ICD-9-CM   1. Subacute osteomyelitis of left foot M86.272 730.07   2. Impaired functional mobility, balance, gait, and endurance Z74.09 V49.89   3. Coronary artery disease involving native coronary artery of native heart without angina pectoris I25.10 414.01   4. Essential hypertension I10 401.9   5. Gastroesophageal reflux disease with esophagitis K21.0 530.11   6. Other emphysema J43.8 492.8   7. Peripheral vascular disease I73.9 443.9     Patient Active Problem List   Diagnosis   • Chronic hepatitis C virus infection   • Coronary artery disease involving native coronary artery of native heart without angina pectoris   • Chronic atrial fibrillation   • Essential hypertension   • Thrombocytopenia   • Depression with anxiety   • Gastroesophageal reflux disease with esophagitis   • Hyperlipidemia   • Tobacco abuse   • COPD (chronic obstructive pulmonary disease)   • Gout   • Congestive heart disease   • RLS (restless legs syndrome)   • Rheumatoid arthritis   • Typical atrial flutter   • Atrial flutter   • Cerebrovascular accident (CVA)   • Borderline diabetes   • Dry gangrene   • Anemia   • Peripheral vascular disease   • Subacute osteomyelitis of left foot   • Liver mass               Adult Rehabilitation Note       17 1249 17 0901       Rehab Assessment/Intervention    Discipline physical therapy assistant  -CK physical therapy assistant  -RM     Document Type therapy note (daily note)  -CK therapy note (daily note)  -RM     Subjective Information agree to therapy;complains of;pain;fatigue  -CK agree to therapy;complains of;pain  -RM     Patient Effort, Rehab Treatment adequate  -CK  adequate  -RM     Symptoms Noted During/After Treatment  none  -RM     Precautions/Limitations  --   WBAT on L heel with Darco shoe or boot.   -RM     Recorded by [CK] Jessica Quiles PTA [RM] Dipak Scott PTA     Pain Assessment    Pain Assessment  0-10  -RM     Pain Score  8  -RM     Post Pain Score  8  -RM     Pain Type  Acute pain;Surgical pain  -RM     Pain Location  Foot  -RM     Pain Orientation  Left  -RM     Pain Intervention(s)  Repositioned;Ambulation/increased activity  -RM     Recorded by  [RM] Dipak Scott PTA     Mobility Assessment/Training    Left Lower Extremity Weight-Bearing weight-bearing as tolerated  -CK weight-bearing as tolerated   with Darco Shoe   -RM     Recorded by [CK] Jessica Quiles PTA [RM] Dipak Scott PTA     Bed Mobility, Assessment/Treatment    Bed Mobility, Assistive Device  bed rails;head of bed elevated  -RM     Bed Mob, Supine to Sit, [place]  conditional independence  -RM     Recorded by  [RM] Dipak Scott PTA     Transfer Assessment/Treatment    Transfers, Sit-Stand [place] stand by assist  -CK stand by assist;contact guard assist  -RM     Transfers, Stand-Sit [place] stand by assist  -CK stand by assist;contact guard assist  -RM     Transfers, Sit-Stand-Sit, Assist Device rolling walker  -CK rolling walker  -RM     Transfer, Maintain Weight Bearing Status able to maintain weight bearing status  -CK able to maintain weight bearing status  -RM     Transfer, Safety Issues balance decreased during turns  -CK balance decreased during turns;step length decreased;weight-shifting ability decreased  -RM     Transfer, Impairments strength decreased;impaired balance  -CK strength decreased;impaired balance;pain  -RM     Recorded by [CK] Jessica Quiles PTA [RM] Dipak Scott PTA     Gait Assessment/Treatment    Gait, [place] Level stand by assist;contact guard assist  -CK stand by assist;contact guard assist  -RM     Gait, Assistive  Device rolling walker  -CK rolling walker  -RM     Gait, Distance (Feet) 48  -CK 44  -RM     Gait, Gait Pattern Analysis swing-to gait  -CK swing-to gait  -RM     Gait, Gait Deviations  antalgic  -RM     Gait, Maintain Weight Bearing Status able to maintain weight bearing status  -CK able to maintain weight bearing status  -RM     Gait, Safety Issues weight-shifting ability decreased  -CK weight-shifting ability decreased  -RM     Gait, Impairments strength decreased;impaired balance  -CK strength decreased;impaired balance;pain  -RM     Recorded by [CK] Jessica Quiles PTA [RM] Dipak Scott PTA     Therapy Exercises    Bilateral Lower Extremities AROM:;10 reps;sitting;ankle pumps/circles;LAQ;knee flexion  -CK      Recorded by [CK] Jessica Quiles PTA      Positioning and Restraints    Pre-Treatment Position sitting in chair/recliner  -CK in bed  -RM     Post Treatment Position chair  -CK chair  -RM     In Chair call light within reach;encouraged to call for assist  -CK reclined;call light within reach;encouraged to call for assist;notified nsg  -RM     Recorded by [CK] Jessica Quiles PTA [RM] Dipak Scott PTA       User Key  (r) = Recorded By, (t) = Taken By, (c) = Cosigned By    Initials Name Effective Dates    CK Jessica Quiles PTA 10/26/16 -     RM Dipak Scott PTA 10/26/16 -                 IP PT Goals       09/09/17 1249 09/08/17 1630 09/06/17 1601    Transfer Training PT LTG    Transfer Training PT  LTG, Date Goal Reviewed 09/09/17  -CK      Transfer Training PT LTG, Outcome  goal ongoing  -RM     Gait Training PT LTG    Gait Training Goal PT LTG, Date Goal Reviewed 09/09/17  -CK      Gait Training Goal PT LTG, Outcome  goal ongoing  -RM     Wound Care PT STG    Wound Care PT STG 1, Outcome   goal ongoing  -RM      09/05/17 1559 09/04/17 1711 09/04/17 1305    Transfer Training PT LTG    Transfer Training PT LTG, Outcome goal ongoing  -RM  goal ongoing  -RM    Gait Training PT LTG    Gait  Training Goal PT LTG, Outcome   goal ongoing  -RM    Strength Goal PT LTG    Strength Goal PT LTG, Outcome goal ongoing  -RM  goal ongoing  -RM    Wound Care PT STG    Wound Care PT STG 1, Outcome  goal ongoing  -RM       09/03/17 1601 09/01/17 1435 08/30/17 1330    Transfer Training PT LTG    Transfer Training PT LTG, Date Established   08/30/17  -LM    Transfer Training PT LTG, Time to Achieve   2 wks  -LM    Transfer Training PT LTG, Activity Type   sit to stand/stand to sit;bed to chair /chair to bed  -LM    Transfer Training PT LTG, [place] Level   supervision required  -LM    Transfer Training PT LTG, Assist Device   walker, rolling  -LM    Transfer Training PT LTG, Additional Goal   NWB L LE  -LM    Transfer Training PT LTG, Outcome goal ongoing  -JR  goal ongoing  -LM    Gait Training PT LTG    Gait Training Goal PT LTG, Date Established   08/30/17  -LM    Gait Training Goal PT LTG, Time to Achieve   2 wks  -LM    Gait Training Goal PT LTG, [place] Level   contact guard assist  -LM    Gait Training Goal PT LTG, Assist Device   walker, rolling  -LM    Gait Training Goal PT LTG, Distance to Achieve   30  -LM    Gait Training Goal PT LTG, Additional Goal   NWB L LE  -LM    Gait Training Goal PT LTG, Outcome goal ongoing  -JR  goal ongoing  -LM    Strength Goal PT LTG    Strength Goal PT LTG, Date Established   08/30/17  -LM    Strength Goal PT LTG, Time to Achieve   2 wks  -LM    Strength Goal PT LTG, Measure to Achieve   Patient will perform ther ex x 15 reps to improve functional strength for mobility.  -LM    Strength Goal PT LTG, Outcome goal ongoing  -JR  goal ongoing  -LM    Wound Care PT STG    Wound Care PT STG 1, Date Established  09/01/17  -LM     Wound Care PT STG 1, Time to Achieve  2 wks  -LM     Wound Care PT STG 1, Location  L TMA site  -LM     Wound Care PT STG 1, No S&S of Infection  yes  -LM     Wound Care PT STG 1, Decrease Wound Size  Decrease wound dimensions by 0.5 cm in all  measures.  -LM     Wound Care PT STG 1, Outcome  goal ongoing  -       User Key  (r) = Recorded By, (t) = Taken By, (c) = Cosigned By    Initials Name Provider Type    JR Sia Whaley, PT Physical Therapist    LM Cristal Martinez, PT Physical Therapist    CK Jessica Quiles, PTA Physical Therapy Assistant     Dipak Scott, PTA Physical Therapy Assistant          Physical Therapy Education     Title: PT OT SLP Therapies (Done)     Topic: Physical Therapy (Done)     Point: Mobility training (Done)    Learning Progress Summary    Learner Readiness Method Response Comment Documented by Status   Patient Acceptance E VU   09/09/17 1308 Done    Acceptance E,D VU,DU   09/08/17 1630 Done    Acceptance E,D VU,NR   09/05/17 1558 Done    Acceptance E,D VU,NR   09/04/17 1305 Done    Acceptance E VU Purpose of PT/POC;  importance of NWB L LE for wound healing; proper use of RW for safe transfers/ambulation.  08/30/17 1330 Done               Point: Home exercise program (Done)    Learning Progress Summary    Learner Readiness Method Response Comment Documented by Status   Patient Acceptance E VU   09/09/17 1308 Done    Acceptance E,D VU,NR   09/04/17 1305 Done    Acceptance E VU Purpose of PT/POC;  importance of NWB L LE for wound healing; proper use of RW for safe transfers/ambulation.  08/30/17 1330 Done               Point: Precautions (Done)    Learning Progress Summary    Learner Readiness Method Response Comment Documented by Status   Patient Acceptance E VU   09/09/17 1308 Done    Acceptance E,D VU,NR   09/04/17 1305 Done    Acceptance E VU Importance of maintaining NWB on LLE  09/03/17 1559 Done    Acceptance E VU Purpose of wound vac dressing change.  09/01/17 1434 Done    Acceptance E VU Purpose of PT/POC;  importance of NWB L LE for wound healing; proper use of RW for safe transfers/ambulation.  08/30/17 1330 Done                      User Key     Initials Effective Dates Name Provider Type  Discipline    JR 10/26/16 -  Sia Whaley, PT Physical Therapist PT    LM 10/26/16 -  Cristal Martinez, PT Physical Therapist PT    CK 10/26/16 -  Jessica Quiles PTA Physical Therapy Assistant PT    RM 10/26/16 -  Dipak Scott PTA Physical Therapy Assistant PT                    PT Recommendation and Plan  Anticipated Discharge Disposition: inpatient rehabilitation facility  Planned Therapy Interventions: balance training, bed mobility training, gait training, home exercise program, patient/family education, strengthening, transfer training  PT Frequency: daily  Plan of Care Review  Outcome Summary/Follow up Plan: PT tx completed. pt was able to increase ambulation distance  to 48'ft, with no LOB noted, increased time needed to change directions/with turns. cont toward goals as indicated.           Outcome Measures       09/09/17 1300 09/08/17 0901       How much help from another person do you currently need...    Turning from your back to your side while in flat bed without using bedrails? 4  -CK 4  -RM     Moving from lying on back to sitting on the side of a flat bed without bedrails? 4  -CK 4  -RM     Moving to and from a bed to a chair (including a wheelchair)? 3  -CK 3  -RM     Standing up from a chair using your arms (e.g., wheelchair, bedside chair)? 4  -CK 4  -RM     Climbing 3-5 steps with a railing? 2  -CK 2  -RM     To walk in hospital room? 3  -CK 3  -RM     AM-PAC 6 Clicks Score 20  -CK 20  -RM     Functional Assessment    Outcome Measure Options AM-PAC 6 Clicks Basic Mobility (PT)  -CK AM-PAC 6 Clicks Basic Mobility (PT)  -RM       User Key  (r) = Recorded By, (t) = Taken By, (c) = Cosigned By    Initials Name Provider Type    CK Jessica Quiles, SANYA Physical Therapy Assistant    RM Dipak Scott PTA Physical Therapy Assistant           Time Calculation:         PT Charges       09/09/17 1312          Time Calculation    Start Time 1249  -CK      Stop Time 1400  -CK      Time Calculation (min)  71 min  -CK        User Key  (r) = Recorded By, (t) = Taken By, (c) = Cosigned By    Initials Name Provider Type    CK Jessica Quiles PTA Physical Therapy Assistant          Therapy Charges for Today     Code Description Service Date Service Provider Modifiers Qty    74998780749 HC GAIT TRAINING EA 15 MIN 9/9/2017 Jessica Quiles PTA GP 1          PT G-Codes  Outcome Measure Options: AM-PAC 6 Clicks Basic Mobility (PT)    Jessica Quiles PTA  9/9/2017

## 2017-09-09 NOTE — PLAN OF CARE
Problem: Infection, Risk/Actual (Adult)  Goal: Infection Prevention/Resolution    09/09/17 0156   Infection, Risk/Actual (Adult)   Infection Prevention/Resolution making progress toward outcome

## 2017-09-09 NOTE — PLAN OF CARE
Problem: Infection, Risk/Actual (Adult)  Goal: Infection Prevention/Resolution  Outcome: Ongoing (interventions implemented as appropriate)    Problem: Patient Care Overview (Adult)  Goal: Plan of Care Review  Outcome: Ongoing (interventions implemented as appropriate)    09/08/17 2018   Coping/Psychosocial Response Interventions   Plan Of Care Reviewed With patient       Goal: Adult Individualization and Mutuality  Outcome: Ongoing (interventions implemented as appropriate)  Goal: Discharge Needs Assessment  Outcome: Ongoing (interventions implemented as appropriate)    Problem: Pain, Acute (Adult)  Goal: Acceptable Pain Control/Comfort Level  Outcome: Ongoing (interventions implemented as appropriate)

## 2017-09-09 NOTE — PLAN OF CARE
Problem: Patient Care Overview (Adult)  Goal: Plan of Care Review  Outcome: Ongoing (interventions implemented as appropriate)    Problem: Inpatient Physical Therapy  Goal: Transfer Training Goal 1 LTG- PT  Outcome: Ongoing (interventions implemented as appropriate)  Goal: Gait Training Goal LTG- PT  Outcome: Ongoing (interventions implemented as appropriate)

## 2017-09-09 NOTE — PLAN OF CARE
"Problem: Patient Care Overview (Adult)  Goal: Plan of Care Review  Outcome: Ongoing (interventions implemented as appropriate)    09/09/17 1411   Coping/Psychosocial Response Interventions   Plan Of Care Reviewed With patient   Patient Care Overview   Progress improving   Outcome Evaluation   Outcome Summary/Follow up Plan Dr. Campbell contacted PT with questions as to why bridge dressing was being used with would vac. Explained this to him and he was concerned that the pt did not like it. Talked with the pt and pt states he doesn't care either way as long as it is \"secured and wrapped\".            "

## 2017-09-09 NOTE — PLAN OF CARE
Problem: Pain, Acute (Adult)  Goal: Acceptable Pain Control/Comfort Level    09/09/17 0156   Pain, Acute (Adult)   Acceptable Pain Control/Comfort Level making progress toward outcome

## 2017-09-09 NOTE — PLAN OF CARE
Problem: Pain, Acute (Adult)  Goal: Acceptable Pain Control/Comfort Level    09/09/17 0309   Pain, Acute (Adult)   Acceptable Pain Control/Comfort Level making progress toward outcome

## 2017-09-10 LAB
ANION GAP SERPL CALCULATED.3IONS-SCNC: 14 MMOL/L
BASOPHILS # BLD AUTO: 0.01 10*3/MM3 (ref 0–0.2)
BASOPHILS NFR BLD AUTO: 0.2 % (ref 0–2.5)
BUN BLD-MCNC: 31 MG/DL (ref 7–20)
BUN/CREAT SERPL: 25.8 (ref 6.3–21.9)
CALCIUM SPEC-SCNC: 9.3 MG/DL (ref 8.4–10.2)
CHLORIDE SERPL-SCNC: 104 MMOL/L (ref 98–107)
CO2 SERPL-SCNC: 23 MMOL/L (ref 26–30)
CREAT BLD-MCNC: 1.2 MG/DL (ref 0.6–1.3)
DEPRECATED RDW RBC AUTO: 54 FL (ref 37–54)
EOSINOPHIL # BLD AUTO: 0.86 10*3/MM3 (ref 0–0.7)
EOSINOPHIL NFR BLD AUTO: 20.8 % (ref 0–7)
ERYTHROCYTE [DISTWIDTH] IN BLOOD BY AUTOMATED COUNT: 16 % (ref 11.5–14.5)
GFR SERPL CREATININE-BSD FRML MDRD: 62 ML/MIN/1.73
GLUCOSE BLD-MCNC: 112 MG/DL (ref 74–98)
HCT VFR BLD AUTO: 25.8 % (ref 42–52)
HGB BLD-MCNC: 8.2 G/DL (ref 14–18)
IMM GRANULOCYTES # BLD: 0.01 10*3/MM3 (ref 0–0.06)
IMM GRANULOCYTES NFR BLD: 0.2 % (ref 0–0.6)
LYMPHOCYTES # BLD AUTO: 1.08 10*3/MM3 (ref 0.6–3.4)
LYMPHOCYTES NFR BLD AUTO: 26.1 % (ref 10–50)
MCH RBC QN AUTO: 29.4 PG (ref 27–31)
MCHC RBC AUTO-ENTMCNC: 31.8 G/DL (ref 30–37)
MCV RBC AUTO: 92.5 FL (ref 80–94)
MONOCYTES # BLD AUTO: 0.34 10*3/MM3 (ref 0–0.9)
MONOCYTES NFR BLD AUTO: 8.2 % (ref 0–12)
NEUTROPHILS # BLD AUTO: 1.84 10*3/MM3 (ref 2–6.9)
NEUTROPHILS NFR BLD AUTO: 44.5 % (ref 37–80)
NRBC BLD MANUAL-RTO: 0 /100 WBC (ref 0–0)
PLATELET # BLD AUTO: 115 10*3/MM3 (ref 130–400)
PMV BLD AUTO: 10.9 FL (ref 6–12)
POTASSIUM BLD-SCNC: 4 MMOL/L (ref 3.5–5.1)
RBC # BLD AUTO: 2.79 10*6/MM3 (ref 4.7–6.1)
SODIUM BLD-SCNC: 137 MMOL/L (ref 137–145)
WBC NRBC COR # BLD: 4.14 10*3/MM3 (ref 4.8–10.8)

## 2017-09-10 PROCEDURE — 25010000002 ENOXAPARIN PER 10 MG: Performed by: INTERNAL MEDICINE

## 2017-09-10 PROCEDURE — 99024 POSTOP FOLLOW-UP VISIT: CPT | Performed by: PODIATRIST

## 2017-09-10 PROCEDURE — 97530 THERAPEUTIC ACTIVITIES: CPT

## 2017-09-10 PROCEDURE — 99231 SBSQ HOSP IP/OBS SF/LOW 25: CPT | Performed by: INTERNAL MEDICINE

## 2017-09-10 PROCEDURE — 80048 BASIC METABOLIC PNL TOTAL CA: CPT | Performed by: INTERNAL MEDICINE

## 2017-09-10 PROCEDURE — 85025 COMPLETE CBC W/AUTO DIFF WBC: CPT | Performed by: INTERNAL MEDICINE

## 2017-09-10 RX ADMIN — HYDROCODONE BITARTRATE AND ACETAMINOPHEN 1 TABLET: 10; 325 TABLET ORAL at 18:33

## 2017-09-10 RX ADMIN — LOSARTAN POTASSIUM AND HYDROCHLOROTHIAZIDE 1 TABLET: 50; 12.5 TABLET, FILM COATED ORAL at 09:17

## 2017-09-10 RX ADMIN — Medication 10 ML: at 21:25

## 2017-09-10 RX ADMIN — Medication 1 CAPSULE: at 09:17

## 2017-09-10 RX ADMIN — DULOXETINE HYDROCHLORIDE 30 MG: 30 CAPSULE, DELAYED RELEASE ORAL at 09:17

## 2017-09-10 RX ADMIN — NICOTINE 1 PATCH: 14 PATCH TRANSDERMAL at 21:30

## 2017-09-10 RX ADMIN — TRAZODONE HYDROCHLORIDE 50 MG: 50 TABLET ORAL at 21:25

## 2017-09-10 RX ADMIN — HYDROCODONE BITARTRATE AND ACETAMINOPHEN 1 TABLET: 10; 325 TABLET ORAL at 14:30

## 2017-09-10 RX ADMIN — HYDROCODONE BITARTRATE AND ACETAMINOPHEN 1 TABLET: 10; 325 TABLET ORAL at 22:50

## 2017-09-10 RX ADMIN — HYDROCODONE BITARTRATE AND ACETAMINOPHEN 1 TABLET: 10; 325 TABLET ORAL at 05:43

## 2017-09-10 RX ADMIN — LEVOFLOXACIN 500 MG: 500 TABLET, FILM COATED ORAL at 11:54

## 2017-09-10 RX ADMIN — METOPROLOL TARTRATE 25 MG: 25 TABLET ORAL at 09:17

## 2017-09-10 RX ADMIN — PANTOPRAZOLE SODIUM 40 MG: 40 TABLET, DELAYED RELEASE ORAL at 05:43

## 2017-09-10 RX ADMIN — COLCHICINE 0.6 MG: 0.6 TABLET, FILM COATED ORAL at 09:17

## 2017-09-10 RX ADMIN — HYDROXYCHLOROQUINE SULFATE 200 MG: 200 TABLET, FILM COATED ORAL at 09:17

## 2017-09-10 RX ADMIN — Medication 10 ML: at 05:44

## 2017-09-10 RX ADMIN — CLOPIDOGREL BISULFATE 75 MG: 75 TABLET ORAL at 09:17

## 2017-09-10 RX ADMIN — ENOXAPARIN SODIUM 40 MG: 40 INJECTION SUBCUTANEOUS at 09:17

## 2017-09-10 RX ADMIN — ATORVASTATIN CALCIUM 40 MG: 40 TABLET, FILM COATED ORAL at 21:25

## 2017-09-10 RX ADMIN — HYDROCODONE BITARTRATE AND ACETAMINOPHEN 1 TABLET: 10; 325 TABLET ORAL at 10:06

## 2017-09-10 RX ADMIN — AMIODARONE HYDROCHLORIDE 200 MG: 200 TABLET ORAL at 09:17

## 2017-09-10 RX ADMIN — LORAZEPAM 1 MG: 0.5 TABLET ORAL at 21:25

## 2017-09-10 RX ADMIN — METOPROLOL TARTRATE 25 MG: 25 TABLET ORAL at 17:34

## 2017-09-10 NOTE — PROGRESS NOTES
[facility]    Name:  Vasquez Marie   Age:  56 y.o.  Sex:  male  :  1960  MRN:  [id number]   Visit Number:  [id number]  Admission Date:  2017  Date Of Service:  09/10/17  Primary Care Physician:  Marilyn K. Vermeesch, MD     LOS: 16 days :  Patient Care Team:  Marilyn K Vermeesch, MD as PCP - General (Internal Medicine):    Chief Complaint:      Weakness    Subjective / Interval History:     Patient with no current soa, cp, n/v or abdominal pain.  He reports his left foot pain is currently a 3/10 and tolerable.     I have reviewed labs/imaging/records from this hospitalization, including ER staff and admitting/attending physicians H/P's and progress notes to establish a comprehensive understanding of this patient's clinical hospital course, as well as to establish a transition of care appropriately.    Vital Signs:    Temp:  [98.1 °F (36.7 °C)-98.9 °F (37.2 °C)] 98.6 °F (37 °C)  Heart Rate:  [58-81] 68  Resp:  [16-20] 16  BP: (102-146)/(47-62) 120/62    Intake and output:    Intake/Output       17 0700 - 09/10/17 0659    Intake (ml) 1900    Output (ml) 2800    Net (ml) -900    Last Weight 198 lb 8 oz (90 kg)          Physical Examination:    General Appearance:  Alert and cooperative, not in any acute distress.   Head:  Atraumatic and normocephalic, without obvious abnormality.   Eyes:      PERRLA, Extra-occular movements are within normal limits.   Lungs:   Chest shape is normal. Breath sounds heard bilaterally equally.  No crackles or wheezing.   Heart:  Normal S1 and S2, no murmur, no gallop, no rub.   Abdomen:   Normal bowel sounds,  Soft non-tender, non-distended, no guarding, no rebound tenderness   Extremities: Left foot with wound vac                     Laboratory results:      Results from last 7 days  Lab Units 09/10/17  0734 17  0713 17  0550   SODIUM mmol/L 137 139 141   POTASSIUM mmol/L 4.0 4.0 3.8   CHLORIDE mmol/L  104 106 108*   CO2 mmol/L 23.0* 22.0* 23.0*   BUN mg/dL 31* 26* 30*   CREATININE mg/dL 1.20 1.10 1.00   CALCIUM mg/dL 9.3 9.4 9.1   GLUCOSE mg/dL 112* 113* 102*       Results from last 7 days  Lab Units 09/10/17  0734 09/09/17  0713 09/08/17  0550 09/07/17  0530   WBC 10*3/mm3 4.14* 4.12* 3.40* 3.37*   HEMOGLOBIN g/dL 8.2* 8.6* 7.8* 8.2*   HEMATOCRIT % 25.8* 27.2* 25.0* 26.5*   PLATELETS 10*3/mm3 115* 118* 100* 108*   MONOCYTES % %  --   --   --  6.1               I have reviewed the patient's laboratory results.    Radiology results:    Imaging Results (last 24 hours)     ** No results found for the last 24 hours. **          I have reviewed the patient's radiology reports.    Medication Review:     I have reviewed the patients active and prn medications.       Assessment/Plan:  1.  Osteomyelitis of left foot status post TMA, with apparent wound infection and left leg cellulitis  2.  Chronic atrial fibrillation  3.  Rheumatoid arthritis  4.  Borderline diabetes mellitus type 2  5.  Hypertension  6.  History coronary artery disease with stents  7.  Noncompliance  8.  History of alcoholism  9.  Peripheral arterial disease status post intervention by Dr. Storm  10.  Hepatitis C  11.  Possible cirrhosis.     -Patients microbiology has grown Providencia rettgeri and Alcaligenes both sensitive to Levaquin for which he is currently on.  Continue with PT/OT and pain meds PRN.  Placement pending.  The patient has a prior history of being non compliant with his wound vac at home with home health frequently finding the wound VAC in the trash for which home health has discharged him in the past due to non compliance.  He apparently has been frequently found drinking beers when they come.      Alen Potter MD  09/10/17  9:43 AM

## 2017-09-10 NOTE — PLAN OF CARE
Problem: Pain, Acute (Adult)  Goal: Acceptable Pain Control/Comfort Level    09/10/17 0348   Pain, Acute (Adult)   Acceptable Pain Control/Comfort Level making progress toward outcome

## 2017-09-10 NOTE — THERAPY TREATMENT NOTE
Acute Care - Physical Therapy Treatment Note   [facility]     Patient Name: Vasquez Marie  : 1960  MRN: [id number]  Today's Date: 9/10/2017  Onset of Illness/Injury or Date of Surgery Date: 17  Date of Referral to PT: 17  Referring Physician: Adrian    Admit Date: 2017    Visit Dx:    ICD-10-CM ICD-9-CM   1. Subacute osteomyelitis of left foot M86.272 730.07   2. Impaired functional mobility, balance, gait, and endurance Z74.09 V49.89   3. Coronary artery disease involving native coronary artery of native heart without angina pectoris I25.10 414.01   4. Essential hypertension I10 401.9   5. Gastroesophageal reflux disease with esophagitis K21.0 530.11   6. Other emphysema J43.8 492.8   7. Peripheral vascular disease I73.9 443.9     Patient Active Problem List   Diagnosis   • Chronic hepatitis C virus infection   • Coronary artery disease involving native coronary artery of native heart without angina pectoris   • Chronic atrial fibrillation   • Essential hypertension   • Thrombocytopenia   • Depression with anxiety   • Gastroesophageal reflux disease with esophagitis   • Hyperlipidemia   • Tobacco abuse   • COPD (chronic obstructive pulmonary disease)   • Gout   • Congestive heart disease   • RLS (restless legs syndrome)   • Rheumatoid arthritis   • Typical atrial flutter   • Atrial flutter   • Cerebrovascular accident (CVA)   • Borderline diabetes   • Dry gangrene   • Anemia   • Peripheral vascular disease   • Subacute osteomyelitis of left foot   • Liver mass               Adult Rehabilitation Note       09/10/17 1430 17 1249 17 0901    Rehab Assessment/Intervention    Discipline physical therapy assistant  -CC physical therapy assistant  -CK physical therapy assistant  -RM    Document Type therapy note (daily note)  -CC therapy note (daily note)  -CK therapy note (daily note)  -RM    Subjective Information agree to therapy;complains of;pain  -CC agree to therapy;complains  of;pain;fatigue  -CK agree to therapy;complains of;pain  -RM    Patient Effort, Rehab Treatment adequate  -CC adequate  -CK adequate  -RM    Symptoms Noted During/After Treatment none  -CC  none  -RM    Precautions/Limitations --   WBAT on L heel with Darco shoe or boot.  -CC  --   WBAT on L heel with Darco shoe or boot.   -RM    Recorded by [CC] Harika Lei PTA [CK] Jessica Quiles PTA [RM] Dipak Scott PTA    Pain Assessment    Pain Assessment 0-10  -CC  0-10  -RM    Pain Score 8  -CC  8  -RM    Post Pain Score 8  -CC  8  -RM    Pain Type Acute pain;Surgical pain  -CC  Acute pain;Surgical pain  -RM    Pain Location Foot  -CC  Foot  -RM    Pain Orientation Left  -CC  Left  -RM    Pain Intervention(s) Repositioned  -CC  Repositioned;Ambulation/increased activity  -RM    Recorded by [CC] Harika Lei PTA  [RM] Dipak Scott PTA    Mobility Assessment/Training    Left Lower Extremity Weight-Bearing weight-bearing as tolerated    on L heel with Darco shoe or boot.  -CC weight-bearing as tolerated  -CK weight-bearing as tolerated   with Darco Shoe   -RM    Recorded by [CC] Harika Lei PTA [CK] Jessica Quiles PTA [RM] Dipak Scott PTA    Bed Mobility, Assessment/Treatment    Bed Mobility, Assistive Device head of bed elevated  -CC  bed rails;head of bed elevated  -RM    Bed Mob, Supine to Sit, [place] conditional independence  -CC  conditional independence  -RM    Recorded by [CC] Harika Lei PTA  [RM] Dipak Scott PTA    Transfer Assessment/Treatment    Transfers, Bed-Chair [place] nonverbal cues required (demo/gesture);supervision required  -CC      Transfers, Bed-Chair-Bed, Assist Device standard walker  -CC      Transfers, Sit-Stand [place] verbal cues required;supervision required  -CC stand by assist  -CK stand by assist;contact guard assist  -RM    Transfers, Stand-Sit [place] verbal cues required;supervision required  -CC stand by assist  -CK stand  by assist;contact guard assist  -RM    Transfers, Sit-Stand-Sit, Assist Device standard walker  -CC rolling walker  -CK rolling walker  -RM    Transfer, Maintain Weight Bearing Status able to maintain weight bearing status  -CC able to maintain weight bearing status  -CK able to maintain weight bearing status  -RM    Transfer, Safety Issues balance decreased during turns;step length decreased;weight-shifting ability decreased  -CC balance decreased during turns  -CK balance decreased during turns;step length decreased;weight-shifting ability decreased  -RM    Transfer, Impairments strength decreased;impaired balance;pain  -CC strength decreased;impaired balance  -CK strength decreased;impaired balance;pain  -RM    Recorded by [CC] Harika Lei PTA [CK] Jessica Quiles PTA [RM] Dipak Scott PTA    Gait Assessment/Treatment    Gait, [place] Level  stand by assist;contact guard assist  -CK stand by assist;contact guard assist  -RM    Gait, Assistive Device  rolling walker  -CK rolling walker  -RM    Gait, Distance (Feet)  48  -CK 44  -RM    Gait, Gait Pattern Analysis  swing-to gait  -CK swing-to gait  -RM    Gait, Gait Deviations   antalgic  -RM    Gait, Maintain Weight Bearing Status  able to maintain weight bearing status  -CK able to maintain weight bearing status  -RM    Gait, Safety Issues  weight-shifting ability decreased  -CK weight-shifting ability decreased  -RM    Gait, Impairments  strength decreased;impaired balance  -CK strength decreased;impaired balance;pain  -RM    Recorded by  [CK] Jessica Quiles PTA [RM] Dipak Scott PTA    Therapy Exercises    Bilateral Lower Extremities  AROM:;10 reps;sitting;ankle pumps/circles;LAQ;knee flexion  -CK     Recorded by  [CK] Jessica Quiles PTA     Positioning and Restraints    Pre-Treatment Position in bed  -CC sitting in chair/recliner  -CK in bed  -RM    Post Treatment Position chair  -CC chair  -CK chair  -RM    In Chair reclined;call  light within reach;encouraged to call for assist  -CC call light within reach;encouraged to call for assist  -CK reclined;call light within reach;encouraged to call for assist;notified nsg  -RM    Recorded by [CC] Harika Lei, PTA [CK] Jessica Quiles, PTA [RM] Dipak Scott, PTA      User Key  (r) = Recorded By, (t) = Taken By, (c) = Cosigned By    Initials Name Effective Dates    CC Harika Lei, PTA 10/26/16 -     CK Jessica Quiles, PTA 10/26/16 -     RM Dipak Scott, PTA 10/26/16 -                 IP PT Goals       09/10/17 1430 09/09/17 1249 09/08/17 1630    Transfer Training PT LTG    Transfer Training PT  LTG, Date Goal Reviewed  09/09/17  -CK     Transfer Training PT LTG, Outcome goal ongoing  -CC  goal ongoing  -RM    Gait Training PT LTG    Gait Training Goal PT LTG, Date Goal Reviewed  09/09/17  -CK     Gait Training Goal PT LTG, Outcome goal ongoing  -CC  goal ongoing  -RM    Wound Care PT STG    Wound Care PT STG 1, Outcome goal ongoing  -CC        09/06/17 1601 09/05/17 1559 09/04/17 1711    Transfer Training PT LTG    Transfer Training PT LTG, Outcome  goal ongoing  -RM     Strength Goal PT LTG    Strength Goal PT LTG, Outcome  goal ongoing  -RM     Wound Care PT STG    Wound Care PT STG 1, Outcome goal ongoing  -RM  goal ongoing  -RM      09/04/17 1305 09/03/17 1601 09/01/17 1435    Transfer Training PT LTG    Transfer Training PT LTG, Outcome goal ongoing  -RM goal ongoing  -JR     Gait Training PT LTG    Gait Training Goal PT LTG, Outcome goal ongoing  -RM goal ongoing  -JR     Strength Goal PT LTG    Strength Goal PT LTG, Outcome goal ongoing  -RM goal ongoing  -JR     Wound Care PT STG    Wound Care PT STG 1, Date Established   09/01/17  -LM    Wound Care PT STG 1, Time to Achieve   2 wks  -LM    Wound Care PT STG 1, Location   L TMA site  -LM    Wound Care PT STG 1, No S&S of Infection   yes  -LM    Wound Care PT STG 1, Decrease Wound Size   Decrease wound dimensions by 0.5 cm  in all measures.  -LM    Wound Care PT STG 1, Outcome   goal ongoing  -LM      08/30/17 1330          Transfer Training PT LTG    Transfer Training PT LTG, Date Established 08/30/17  -LM      Transfer Training PT LTG, Time to Achieve 2 wks  -LM      Transfer Training PT LTG, Activity Type sit to stand/stand to sit;bed to chair /chair to bed  -LM      Transfer Training PT LTG, [place] Level supervision required  -LM      Transfer Training PT LTG, Assist Device walker, rolling  -LM      Transfer Training PT LTG, Additional Goal NWB L LE  -LM      Transfer Training PT LTG, Outcome goal ongoing  -LM      Gait Training PT LTG    Gait Training Goal PT LTG, Date Established 08/30/17  -LM      Gait Training Goal PT LTG, Time to Achieve 2 wks  -LM      Gait Training Goal PT LTG, [place] Level contact guard assist  -LM      Gait Training Goal PT LTG, Assist Device walker, rolling  -LM      Gait Training Goal PT LTG, Distance to Achieve 30  -LM      Gait Training Goal PT LTG, Additional Goal NWB L LE  -LM      Gait Training Goal PT LTG, Outcome goal ongoing  -LM      Strength Goal PT LTG    Strength Goal PT LTG, Date Established 08/30/17  -LM      Strength Goal PT LTG, Time to Achieve 2 wks  -LM      Strength Goal PT LTG, Measure to Achieve Patient will perform ther ex x 15 reps to improve functional strength for mobility.  -LM      Strength Goal PT LTG, Outcome goal ongoing  -LM        User Key  (r) = Recorded By, (t) = Taken By, (c) = Cosigned By    Initials Name Provider Type    JR Sia Whaley, PT Physical Therapist    LM Cristal Martinez, PT Physical Therapist    CC Harika Lei, PTA Physical Therapy Assistant    CK Jessica Quiles, PTA Physical Therapy Assistant    RM Dipak Scott, PTA Physical Therapy Assistant          Physical Therapy Education     Title: PT OT SLP Therapies (Done)     Topic: Physical Therapy (Done)     Point: Mobility training (Done)    Learning Progress Summary    Learner Readiness  Method Response Comment Documented by Status   Patient Acceptance E VU   09/09/17 1308 Done    Acceptance E,D VU,DU   09/08/17 1630 Done    Acceptance E,D VU,NR   09/05/17 1558 Done    Acceptance E,D VU,NR   09/04/17 1305 Done    Acceptance E VU Purpose of PT/POC;  importance of NWB L LE for wound healing; proper use of RW for safe transfers/ambulation.  08/30/17 1330 Done               Point: Home exercise program (Done)    Learning Progress Summary    Learner Readiness Method Response Comment Documented by Status   Patient Acceptance E VU   09/09/17 1308 Done    Acceptance E,D VU,NR   09/04/17 1305 Done    Acceptance E VU Purpose of PT/POC;  importance of NWB L LE for wound healing; proper use of RW for safe transfers/ambulation.  08/30/17 1330 Done               Point: Precautions (Done)    Learning Progress Summary    Learner Readiness Method Response Comment Documented by Status   Patient Acceptance E VU   09/09/17 1308 Done    Acceptance E,D VU,NR   09/04/17 1305 Done    Acceptance E VU Importance of maintaining NWB on LLE  09/03/17 1559 Done    Acceptance E VU Purpose of wound vac dressing change.  09/01/17 1434 Done    Acceptance E VU Purpose of PT/POC;  importance of NWB L LE for wound healing; proper use of RW for safe transfers/ambulation.  08/30/17 1330 Done                      User Key     Initials Effective Dates Name Provider Type Discipline     10/26/16 -  Sia Whaley, PT Physical Therapist PT     10/26/16 -  Cristal Martinez, PT Physical Therapist PT     10/26/16 -  Jessica Quiles, PTA Physical Therapy Assistant PT     10/26/16 -  Dipak Scott PTA Physical Therapy Assistant PT                    PT Recommendation and Plan  Anticipated Discharge Disposition: inpatient rehabilitation facility  Planned Therapy Interventions: balance training, bed mobility training, gait training, home exercise program, patient/family education, strengthening, transfer training  PT  Frequency: daily  Plan of Care Review  Plan Of Care Reviewed With: patient  Progress: no change  Outcome Summary/Follow up Plan: Pt peformed transfers with darco shoe donned. Per nursing Dr. Campbell wanted wound vac dressing changed, however checked dressing and it has good seal and rewrapped foot . Spoke with rehab manager and d/t this PTA beig PRN and not experienced with wound vac dressing changes, dressing change will be completed in am by edperienced wound vac therapist. Attempted to call Dr. Campbell however  that answered phone stated it was the wrong number. Nursing and rehab staff aware of  this PTA's concerns regarding wound vac dressing change and lack of experience with wound vacs. con't with PT POC           Outcome Measures       09/10/17 1430 09/09/17 1300 09/08/17 0901    How much help from another person do you currently need...    Turning from your back to your side while in flat bed without using bedrails? 4  -CC 4  -CK 4  -RM    Moving from lying on back to sitting on the side of a flat bed without bedrails? 4  -CC 4  -CK 4  -RM    Moving to and from a bed to a chair (including a wheelchair)? 3  -CC 3  -CK 3  -RM    Standing up from a chair using your arms (e.g., wheelchair, bedside chair)? 4  -CC 4  -CK 4  -RM    Climbing 3-5 steps with a railing? 2  -CC 2  -CK 2  -RM    To walk in hospital room? 3  -CC 3  -CK 3  -RM    AM-PAC 6 Clicks Score 20  -CC 20  -CK 20  -RM    Functional Assessment    Outcome Measure Options AM-PAC 6 Clicks Basic Mobility (PT)  -CC AM-PAC 6 Clicks Basic Mobility (PT)  -CK AM-PAC 6 Clicks Basic Mobility (PT)  -RM      User Key  (r) = Recorded By, (t) = Taken By, (c) = Cosigned By    Initials Name Provider Type    DOROTHY Lei, PTA Physical Therapy Assistant    CK Jessica Quiles, PTA Physical Therapy Assistant    RM Dipak Scott, PTA Physical Therapy Assistant           Time Calculation:         PT Charges       09/10/17 1430          Time Calculation    Start  Time 1430  -CC      PT Received On 09/10/17  -      PT Goal Re-Cert Due Date 09/11/17  -      Time Calculation- PT    Total Timed Code Minutes- PT 15 minute(s)  -CC        User Key  (r) = Recorded By, (t) = Taken By, (c) = Cosigned By    Initials Name Provider Type    CC Harika Lei PTA Physical Therapy Assistant          Therapy Charges for Today     Code Description Service Date Service Provider Modifiers Qty    70423242982 HC PT THERAPEUTIC ACT EA 15 MIN 9/10/2017 Harika Lei PTA GP 1          PT G-Codes  Outcome Measure Options: AM-PAC 6 Clicks Basic Mobility (PT)    Harika Lei PTA  9/10/2017

## 2017-09-10 NOTE — PROGRESS NOTES
Patient Care Team:  Marilyn K Vermeesch, MD as PCP - General (Internal Medicine)    Chief complaint: Left foot wound, alcoholism, medical noncompliance, diabetes    Subjective .   56-year-old diabetic male seen today at bedside again for his left foot wound.  He still to this point has become a very difficult case from a  standpoint.  There has been extreme difficulty with finding him placement and his primary care physician feels that is not safe for him to go home.  At this point he is remaining here for a little antibiotics, pain control, wound care with a wound VAC.  He denies any complaints.  Denies any constitutional symptoms.      Review of Systems  All systems were reviewed and negative except for chief complaint.    History  Past Medical History:   Diagnosis Date   • Abdominal pain     History of epigastric abdominal tenderness. Differentials include peptic ulcer disease,   pancreatobiliary disease.   • Abnormal LFTs    • Anemia    • Anxiety    • Arthritis    • Asthma    • Atrial fibrillation    • CAD (coronary artery disease)    • Calf cramp    • CKD (chronic kidney disease)    • COPD (chronic obstructive pulmonary disease)    • CVA (cerebral infarction)    • Depression    • DVT (deep venous thrombosis)    • Electrocution    • Fatigue    • Hepatitis C    • History of allergy    • History of blood transfusion    • Hyperlipidemia    • Hypertension    • Leg pain    • Loss of appetite    • PUD (peptic ulcer disease)    • Stroke syndrome    • Tachycardia    • Thrombocytopenia    • Thrombophlebitis of deep femoral vein    • Vision problems    ,   Past Surgical History:   Procedure Laterality Date   • AMPUTATION DIGIT Left 6/23/2017    Procedure: amputation of left foot third digit, left foot wound debridments and grafting;  Surgeon: Wolfgang Campbell DPM;  Location: [facility];  Service:    • ANKLE SURGERY     • CARDIAC CATHETERIZATION N/A 6/20/2017    Procedure: Peripheral angiography;   Surgeon: Jonathan Storm MD;  Location: [facility] CATH INVASIVE LOCATION;  Service:    • CARDIAC CATHETERIZATION N/A 6/20/2017    Procedure: Angioplasty-peripheral;  Surgeon: Jonathan Storm MD;  Location: [facility] CATH INVASIVE LOCATION;  Service:    • CARDIAC SURGERY     • CORONARY STENT PLACEMENT     • INCISION AND DRAINAGE FOOT Left 8/29/2017    Procedure: Incision and drainage/wound debridement of left foot, wound VAC application, skin graft application;  Surgeon: Wolfgang Campbell DPM;  Location: [facility] OR;  Service:    • TONSILLECTOMY     • TOTAL HIP ARTHROPLASTY Left    • TOTAL SHOULDER REPLACEMENT Left    ,   Family History   Problem Relation Age of Onset   • Breast cancer Mother    • Colon cancer Sister    • Heart attack Other    • Arthritis Other    • Diabetes Other    • Hypertension Other    • Kidney disease Other    • Stroke Other    • Heart disease Father    ,   Social History   Substance Use Topics   • Smoking status: Current Every Day Smoker     Packs/day: 0.50     Years: 30.00     Types: Cigarettes   • Smokeless tobacco: None   • Alcohol use No   ,   Prescriptions Prior to Admission   Medication Sig Dispense Refill Last Dose   • albuterol (PROVENTIL HFA;VENTOLIN HFA) 108 (90 BASE) MCG/ACT inhaler Inhale 2 puffs Every 4 (Four) Hours As Needed for Wheezing.   Unknown at Unknown time   • amiodarone (PACERONE) 200 MG tablet Take 1 tablet by mouth Daily. 30 tablet 11 8/24/2017   • atorvastatin (LIPITOR) 40 MG tablet Take 1 tablet by mouth Every Night. 30 tablet 11 8/24/2017   • clopidogrel (PLAVIX) 75 MG tablet Take 1 tablet by mouth Daily. (Patient not taking: Reported on 8/28/2017) 30 tablet 3 Not Taking at Unknown time   • colchicine 0.6 MG tablet Take 1 tablet by mouth Daily. 30 tablet 0 8/24/2017   • collagenase (SANTYL) 250 UNIT/GM ointment Apply  topically Daily. 90 g 0 Unknown at Unknown time   • DULoxetine (CYMBALTA) 30 MG capsule Take 1 capsule by mouth Daily. 30 capsule 3 8/25/2017  "  • hydroxychloroquine (PLAQUENIL) 200 MG tablet Take 1 tablet by mouth Daily. 30 tablet 5 8/24/2017   • losartan-hydrochlorothiazide (HYZAAR) 50-12.5 MG per tablet Take 1 tablet by mouth Daily. 30 tablet 3 8/24/2017   • metoprolol tartrate (LOPRESSOR) 25 MG tablet Take 1 tablet by mouth Every 12 (Twelve) Hours. (Patient taking differently: Take 25 mg by mouth 2 (Two) Times a Day.) 60 tablet 11 8/24/2017   • pantoprazole (PROTONIX) 40 MG EC tablet Take 1 tablet by mouth Daily. 30 tablet 5 8/25/2017   , Scheduled Meds:      albuterol 2.5 mg Nebulization Q6H - RT   amiodarone 200 mg Oral Q24H   atorvastatin 40 mg Oral Nightly   clopidogrel 75 mg Oral Daily   colchicine 0.6 mg Oral Daily   DULoxetine 30 mg Oral Daily   enoxaparin 40 mg Subcutaneous Daily   hydroxychloroquine 200 mg Oral Daily   lactobacillus acidophilus 1 capsule Oral Daily   levoFLOXacin 500 mg Oral Q24H   losartan-hydrochlorothiazide 1 tablet Oral Daily   metoprolol tartrate 25 mg Oral BID   nicotine 1 patch Transdermal Q24H   pantoprazole 40 mg Oral Q AM   Pharmacy Meds to Bed Consult  Does not apply Daily   , Continuous Infusions:   , PRN Meds:  •  acetaminophen  •  HYDROcodone-acetaminophen  •  loperamide  •  LORazepam  •  ondansetron  •  sodium chloride  •  sodium chloride  •  Insert peripheral IV **AND** sodium chloride  •  traZODone and Allergies:  Review of patient's allergies indicates no known allergies.    Objective     Vital Signs   /62  Pulse 68  Temp 98.6 °F (37 °C) (Oral)   Resp 16  Ht 68\" (172.7 cm)  Wt 198 lb 8 oz (90 kg)  SpO2 100%  BMI 30.18 kg/m2    Physical Exam:AAO ×3, NAD, AF ESS, PERRLA, cranial nerves II through XII intact  The wound vacs intact to the left foot.  Functioning well at 125 mmHg of suction.  No erythema or edema about the wound.  The wounds granular and healthy now.  Shows good healing potential.  No signs of drainage or infection.  Pulses are palpable and capillary refill time to the stump is " constance.       Results Review: lAbs reviewed    Assessment/Plan     Principal Problem:    Subacute osteomyelitis of left foot  Active Problems:    Chronic hepatitis C virus infection    Coronary artery disease involving native coronary artery of native heart without angina pectoris    Chronic atrial fibrillation    Essential hypertension    Depression with anxiety    Gastroesophageal reflux disease with esophagitis    COPD (chronic obstructive pulmonary disease)    Rheumatoid arthritis    Peripheral vascular disease    Liver mass  Continue to await discharge based off his primary care/ recommendations.  Continue 3 times a week VAC changes.  Continue only partial weightbearing to the heel if necessary but preferably no weightbearing to the left foot with his walker.  I discussed this with him and instructed him on this.  I also discussed with physical therapy his wound VAC changes.  No further need for surgical intervention right now.  Follow-up in wound care upon discharge.        Wolfgang Campbell DPM  09/10/17  12:30 PM

## 2017-09-10 NOTE — PLAN OF CARE
Problem: Patient Care Overview (Adult)  Goal: Plan of Care Review  Outcome: Ongoing (interventions implemented as appropriate)    09/10/17 1430   Coping/Psychosocial Response Interventions   Plan Of Care Reviewed With patient   Patient Care Overview   Progress no change   Outcome Evaluation   Outcome Summary/Follow up Plan Pt peformed transfers with darco shoe donned. Per nursing Dr. Campbell wanted wound vac dressing changed, however checked dressing and it has good seal and PTA rewrapped foot with carlos and pt reported felt better with it re-wrapped . Spoke with rehab manager and d/t this PTA being PRN and not experienced with wound vac dressing changes, dressing change will be completed in am by experienced wound vac therapist. Attempted to call Dr. Campbell however ladeladio that answered phone stated it was the wrong number. Nursing and rehab staff aware of this PTA's concerns regarding wound vac dressing change and lack of experience with wound vacs. con't with PT POC          Problem: Inpatient Physical Therapy  Goal: Transfer Training Goal 1 LTG- PT  Outcome: Ongoing (interventions implemented as appropriate)    08/30/17 1330 09/09/17 1249 09/10/17 1430   Transfer Training PT LTG   Transfer Training PT LTG, Date Established 08/30/17 --  --    Transfer Training PT LTG, Time to Achieve 2 wks --  --    Transfer Training PT LTG, Activity Type sit to stand/stand to sit;bed to chair /chair to bed --  --    Transfer Training PT LTG, [place] Level supervision required --  --    Transfer Training PT LTG, Assist Device walker, rolling --  --    Transfer Training PT LTG, Additional Goal NWB L LE --  --    Transfer Training PT LTG, Date Goal Reviewed --  09/09/17 --    Transfer Training PT LTG, Outcome --  --  goal ongoing       Goal: Gait Training Goal LTG- PT  Outcome: Ongoing (interventions implemented as appropriate)    08/30/17 1330 09/09/17 1249 09/10/17 1430   Gait Training PT LTG   Gait Training Goal PT LTG, Date  Established 08/30/17 --  --    Gait Training Goal PT LTG, Time to Achieve 2 wks --  --    Gait Training Goal PT LTG, [place] Level contact guard assist --  --    Gait Training Goal PT LTG, Assist Device walker, rolling --  --    Gait Training Goal PT LTG, Distance to Achieve 30 --  --    Gait Training Goal PT LTG, Additional Goal NWB L LE --  --    Gait Training Goal PT LTG, Date Goal Reviewed --  09/09/17 --    Gait Training Goal PT LTG, Outcome --  --  goal ongoing       Goal: Wound Care Goal 1 STG- PT  Outcome: Ongoing (interventions implemented as appropriate)

## 2017-09-11 VITALS
DIASTOLIC BLOOD PRESSURE: 76 MMHG | TEMPERATURE: 97.8 F | OXYGEN SATURATION: 96 % | SYSTOLIC BLOOD PRESSURE: 122 MMHG | WEIGHT: 198.5 LBS | BODY MASS INDEX: 30.08 KG/M2 | HEART RATE: 68 BPM | HEIGHT: 68 IN | RESPIRATION RATE: 18 BRPM

## 2017-09-11 LAB
ANION GAP SERPL CALCULATED.3IONS-SCNC: 13.9 MMOL/L
BASOPHILS # BLD AUTO: 0.02 10*3/MM3 (ref 0–0.2)
BASOPHILS NFR BLD AUTO: 0.5 % (ref 0–2.5)
BUN BLD-MCNC: 37 MG/DL (ref 7–20)
BUN/CREAT SERPL: 30.8 (ref 6.3–21.9)
CALCIUM SPEC-SCNC: 9.6 MG/DL (ref 8.4–10.2)
CHLORIDE SERPL-SCNC: 106 MMOL/L (ref 98–107)
CO2 SERPL-SCNC: 23 MMOL/L (ref 26–30)
CREAT BLD-MCNC: 1.2 MG/DL (ref 0.6–1.3)
DEPRECATED RDW RBC AUTO: 55.2 FL (ref 37–54)
EOSINOPHIL # BLD AUTO: 0.85 10*3/MM3 (ref 0–0.7)
EOSINOPHIL NFR BLD AUTO: 23.2 % (ref 0–7)
ERYTHROCYTE [DISTWIDTH] IN BLOOD BY AUTOMATED COUNT: 16 % (ref 11.5–14.5)
GFR SERPL CREATININE-BSD FRML MDRD: 62 ML/MIN/1.73
GLUCOSE BLD-MCNC: 103 MG/DL (ref 74–98)
HCT VFR BLD AUTO: 26.5 % (ref 42–52)
HGB BLD-MCNC: 8.4 G/DL (ref 14–18)
IMM GRANULOCYTES # BLD: 0.01 10*3/MM3 (ref 0–0.06)
IMM GRANULOCYTES NFR BLD: 0.3 % (ref 0–0.6)
LYMPHOCYTES # BLD AUTO: 1.02 10*3/MM3 (ref 0.6–3.4)
LYMPHOCYTES NFR BLD AUTO: 27.9 % (ref 10–50)
MCH RBC QN AUTO: 29.7 PG (ref 27–31)
MCHC RBC AUTO-ENTMCNC: 31.7 G/DL (ref 30–37)
MCV RBC AUTO: 93.6 FL (ref 80–94)
MONOCYTES # BLD AUTO: 0.31 10*3/MM3 (ref 0–0.9)
MONOCYTES NFR BLD AUTO: 8.5 % (ref 0–12)
NEUTROPHILS # BLD AUTO: 1.45 10*3/MM3 (ref 2–6.9)
NEUTROPHILS NFR BLD AUTO: 39.6 % (ref 37–80)
NRBC BLD MANUAL-RTO: 0 /100 WBC (ref 0–0)
PLATELET # BLD AUTO: 118 10*3/MM3 (ref 130–400)
PMV BLD AUTO: 10.6 FL (ref 6–12)
POTASSIUM BLD-SCNC: 3.9 MMOL/L (ref 3.5–5.1)
RBC # BLD AUTO: 2.83 10*6/MM3 (ref 4.7–6.1)
SODIUM BLD-SCNC: 139 MMOL/L (ref 137–145)
WBC NRBC COR # BLD: 3.66 10*3/MM3 (ref 4.8–10.8)

## 2017-09-11 PROCEDURE — 97605 NEG PRS WND THER DME<=50SQCM: CPT

## 2017-09-11 PROCEDURE — 25010000002 ENOXAPARIN PER 10 MG: Performed by: INTERNAL MEDICINE

## 2017-09-11 PROCEDURE — 85025 COMPLETE CBC W/AUTO DIFF WBC: CPT | Performed by: INTERNAL MEDICINE

## 2017-09-11 PROCEDURE — 99239 HOSP IP/OBS DSCHRG MGMT >30: CPT | Performed by: FAMILY MEDICINE

## 2017-09-11 PROCEDURE — 97164 PT RE-EVAL EST PLAN CARE: CPT

## 2017-09-11 PROCEDURE — 80048 BASIC METABOLIC PNL TOTAL CA: CPT | Performed by: INTERNAL MEDICINE

## 2017-09-11 RX ORDER — HYDROCODONE BITARTRATE AND ACETAMINOPHEN 10; 325 MG/1; MG/1
1 TABLET ORAL EVERY 4 HOURS PRN
Qty: 12 TABLET | Refills: 0 | Status: SHIPPED | OUTPATIENT
Start: 2017-09-11 | End: 2017-09-14

## 2017-09-11 RX ORDER — LORAZEPAM 1 MG/1
1 TABLET ORAL 2 TIMES DAILY PRN
Qty: 4 TABLET | Refills: 0 | Status: SHIPPED | OUTPATIENT
Start: 2017-09-11 | End: 2017-10-20

## 2017-09-11 RX ORDER — GABAPENTIN 300 MG/1
CAPSULE ORAL
Qty: 24 CAPSULE | Refills: 0 | Status: SHIPPED | OUTPATIENT
Start: 2017-09-11 | End: 2017-12-06 | Stop reason: HOSPADM

## 2017-09-11 RX ORDER — LEVOFLOXACIN 500 MG/1
500 TABLET, FILM COATED ORAL EVERY 24 HOURS
Qty: 10 TABLET | Refills: 0 | Status: SHIPPED | OUTPATIENT
Start: 2017-09-11 | End: 2017-09-25

## 2017-09-11 RX ADMIN — METOPROLOL TARTRATE 25 MG: 25 TABLET ORAL at 10:28

## 2017-09-11 RX ADMIN — COLCHICINE 0.6 MG: 0.6 TABLET, FILM COATED ORAL at 10:28

## 2017-09-11 RX ADMIN — CLOPIDOGREL BISULFATE 75 MG: 75 TABLET ORAL at 10:28

## 2017-09-11 RX ADMIN — Medication 10 ML: at 05:15

## 2017-09-11 RX ADMIN — LORAZEPAM 1 MG: 0.5 TABLET ORAL at 05:15

## 2017-09-11 RX ADMIN — HYDROXYCHLOROQUINE SULFATE 200 MG: 200 TABLET, FILM COATED ORAL at 10:29

## 2017-09-11 RX ADMIN — LOSARTAN POTASSIUM AND HYDROCHLOROTHIAZIDE 1 TABLET: 50; 12.5 TABLET, FILM COATED ORAL at 10:29

## 2017-09-11 RX ADMIN — HYDROCODONE BITARTRATE AND ACETAMINOPHEN 1 TABLET: 10; 325 TABLET ORAL at 10:29

## 2017-09-11 RX ADMIN — PANTOPRAZOLE SODIUM 40 MG: 40 TABLET, DELAYED RELEASE ORAL at 05:15

## 2017-09-11 RX ADMIN — Medication 1 CAPSULE: at 10:29

## 2017-09-11 RX ADMIN — HYDROCODONE BITARTRATE AND ACETAMINOPHEN 1 TABLET: 10; 325 TABLET ORAL at 05:15

## 2017-09-11 RX ADMIN — ENOXAPARIN SODIUM 40 MG: 40 INJECTION SUBCUTANEOUS at 10:30

## 2017-09-11 RX ADMIN — DULOXETINE HYDROCHLORIDE 30 MG: 30 CAPSULE, DELAYED RELEASE ORAL at 10:29

## 2017-09-11 RX ADMIN — LEVOFLOXACIN 500 MG: 500 TABLET, FILM COATED ORAL at 11:35

## 2017-09-11 RX ADMIN — LORAZEPAM 1 MG: 0.5 TABLET ORAL at 11:35

## 2017-09-11 RX ADMIN — AMIODARONE HYDROCHLORIDE 200 MG: 200 TABLET ORAL at 10:29

## 2017-09-11 NOTE — PROGRESS NOTES
Continued Stay Note  BRITTANI Espinal     Patient Name: Vasquez Marie  MRN: [id number]  Today's Date: 9/11/2017    Admit Date: 8/25/2017          Discharge Plan       09/11/17 1259    Case Management/Social Work Plan    Plan Reviewing and assisting due to LACE status. Called [facility] and left a message regarding bed status. Contacted Yokasta at [facility] and faxed updates to see if patient can obtain bed.     Patient/Family In Agreement With Plan yes    Additional Comments UPdated CM.     Final Note    Final Note Assisting as needed due to LACE status.       09/11/17 1113    Case Management/Social Work Plan    Additional Comments Janeen is considering accpeting pt pending Insurance verifcation               Discharge Codes     None        Expected Discharge Date and Time     Expected Discharge Date Expected Discharge Time    Aug 31, 2017             Zoraida Cosme  Called [facility] and they are re reviewing patient's packet for bed consideration. Notified Yokasta he is now working with PT.

## 2017-09-11 NOTE — PROGRESS NOTES
Continued Stay Note   Teddy     Patient Name: Vasquez Marie  MRN: [id number]  Today's Date: 9/11/2017    Admit Date: 8/25/2017          Discharge Plan       09/11/17 1356    Case Management/Social Work Plan    Additional Comments Spoke to pt he wants to return home .He reports he has a wound vac at home form [facility] called [facility] confirmed with the supplies are at his home .Confirmed with [facility] they can accept pt and will open the Case tonight .Pt will need to have a   saline wet to dry dressing to transport home than [facility] will apply the Wound Vac at home .He will need transportation home social service consulted for this .He is also going to utilize the Med[facility] to bed kimmie called them to inform them of discharge       09/11/17 1355    Case Management/Social Work Plan    Plan Cab voucher available when patient ready for discharge .  Patient does not have medicaid.     Patient/Family In Agreement With Plan yes    Final Note    Final Note Following for social and discharge planning.       09/11/17 1259    Case Management/Social Work Plan    Plan Reviewing and assisting due to LACE status. Called [facility] and left a message regarding bed status. Contacted Yokasta at [facility] and faxed updates to see if patient can obtain bed.     Patient/Family In Agreement With Plan yes    Additional Comments UPdated CM.     Final Note    Final Note Assisting as needed due to LACE status.       09/11/17 1113    Case Management/Social Work Plan    Additional Comments Janeen is considering accpeting pt pending Insurance verifcation               Discharge Codes     None        Expected Discharge Date and Time     Expected Discharge Date Expected Discharge Time    Aug 31, 2017             Sonali Nagel    Pt would like a new WC his is eight years old and a BSC from list provided he choose [facility] he is agreement that the equipmnetr could be delivered to his home after precert form insurance is  obtained   Called [facility] [employer] to see if he qualifies for Medicaid   Pt lives at [address] Phone number [phone]  Called [facility]   Called and faxed order for WC to [facility] for them to precert WC  And deliver tomorrow

## 2017-09-11 NOTE — NURSING NOTE
PT HAD DC ORDERS AND LEFT WITHOUT TEACHING OR DC PAPER WORK. HE HAS HOME HEALTH TO COME THIS EVENING TO APPLY WOUND VAC.

## 2017-09-11 NOTE — PLAN OF CARE
Problem: Patient Care Overview (Adult)  Goal: Plan of Care Review  Outcome: Ongoing (interventions implemented as appropriate)    09/11/17 1315   Coping/Psychosocial Response Interventions   Plan Of Care Reviewed With patient   Patient Care Overview   Progress progress towards functional goals is fair   Outcome Evaluation   Outcome Summary/Follow up Plan Patient tolerates NPWT dressing change and has some decrease in wound size. He is able to demonstrate improving strength and balance and has increased WB status to PWB with Darco shoe--all of which allow him to perform mobility tasks with less assistance needed. Patient is expected to make progress with additional PT services.          Problem: Inpatient Physical Therapy  Goal: Transfer Training Goal 1 LTG- PT  Outcome: Ongoing (interventions implemented as appropriate)    09/11/17 1315   Transfer Training PT LTG   Transfer Training PT LTG, Date Established 09/11/17   Transfer Training PT LTG, Time to Achieve 2 wks   Transfer Training PT LTG, Activity Type sit to stand/stand to sit;bed to chair /chair to bed   Transfer Training PT LTG, [place] Level conditional independence   Transfer Training PT LTG, Assist Device walker, standard   Transfer Training PT LTG, Additional Goal PWB LLE with Darco Shoe   Transfer Training PT LTG, Date Goal Reviewed 09/11/17   Transfer Training PT LTG, Outcome goal revised       Goal: Gait Training Goal LTG- PT  Outcome: Ongoing (interventions implemented as appropriate)    09/11/17 1315   Gait Training PT LTG   Gait Training Goal PT LTG, Date Established 09/11/17   Gait Training Goal PT LTG, Time to Achieve 2 wks   Gait Training Goal PT LTG, [place] Level conditional independence   Gait Training Goal PT LTG, Assist Device walker, standard   Gait Training Goal PT LTG, Distance to Achieve 100   Gait Training Goal PT LTG, Additional Goal PWB LLE with Darco Shoe   Gait Training Goal PT LTG, Date Goal Reviewed 09/11/17   Gait  Training Goal PT LTG, Outcome goal revised       Goal: Wound Care Goal 1 STG- PT  Outcome: Ongoing (interventions implemented as appropriate)    09/11/17 1315   Wound Care PT STG   Wound Care PT STG 1, Date Established 09/11/17   Wound Care PT STG 1, Time to Achieve 2 wks   Wound Care PT STG 1, Location L TMA site    Wound Care PT STG 1, No S&S of Infection yes   Wound Care PT STG 1, Decrease Wound Size by 0.5 cm in all measures   Wound Care PT STG 1, Outcome goal revised

## 2017-09-11 NOTE — PLAN OF CARE
Problem: Infection, Risk/Actual (Adult)  Goal: Infection Prevention/Resolution  Outcome: Ongoing (interventions implemented as appropriate)    Problem: Patient Care Overview (Adult)  Goal: Plan of Care Review  Outcome: Ongoing (interventions implemented as appropriate)    09/10/17 2100   Coping/Psychosocial Response Interventions   Plan Of Care Reviewed With patient   Patient Care Overview   Progress improving   Outcome Evaluation   Outcome Summary/Follow up Plan pleasant patient -moderate complaint of pain with medications given per physician's orders-wound vac on left foot-dressing intact-PICC-right upper arm-care per protocol-will continue to monitor patient       Goal: Adult Individualization and Mutuality  Outcome: Ongoing (interventions implemented as appropriate)  Goal: Discharge Needs Assessment  Outcome: Ongoing (interventions implemented as appropriate)    Problem: Pain, Acute (Adult)  Goal: Acceptable Pain Control/Comfort Level  Outcome: Ongoing (interventions implemented as appropriate)

## 2017-09-11 NOTE — PROGRESS NOTES
Case Management Discharge Note    Final Note: Discharged home with [facility]     Discharge Placement     Facility/Agency Request Status Selected? Address Phone Number Fax Number    [facility] Pending - Request Sent     [address], [facility] [address] [phone]    [facility] Pending - Request Sent     [address], [facility] [address] [phone]    [facility] Pending - Request Sent     [address], [facility] [id number] [phone]    [facility] Pending - Request Sent     [address], [facility] [address] [phone]    [facility] Pending - Request Sent     [address], [facility] [address] [phone]    [facility] Pending - Request Sent     [address] [facility] [id number] [phone]    [facility] Pending - Request Sent     [address], [facility] [address] [phone]    [facility] Pending - Request Sent     [address] [facility] [address] [phone]    [facility] Pending - Request Sent     [address], [facility] [address] [phone]     [facility] Pending - Request Sent     [address] [place] [id number] [phone]    [facility] Pending - Request Sent     [address] [facility] [id number] [phone]    [facility] Declined     [address], [facility] [address] [phone]    [facility] Declined     [address], [facility] [address] [phone]    [facility] Declined     [address], [facility] [address] [phone]    [facility] [place] [facility]     [address] [facility] [address] [phone]    [facility] Declined     [address] [facility] [address] [phone]    [facility] Declined   insurance is HMO and would not pay     [address] [facility] [address] [phone]    [facility] Declined     [address] [facility] [address] [phone]        Taxi:  Cab    Discharge Codes: 06  Discharged/transferred to home under care of organized home health service organization in anticipation of skilled care

## 2017-09-11 NOTE — PROGRESS NOTES
Continued Stay Note  BRITTANI Espinal     Patient Name: Vasquez Marie  MRN: [id number]  Today's Date: 9/11/2017    Admit Date: 8/25/2017          Discharge Plan       09/11/17 1113    Case Management/Social Work Plan    Additional Comments [facility] is considering accepting pt pending Insurance verification               Discharge Codes     None        Expected Discharge Date and Time     Expected Discharge Date Expected Discharge Time    Aug 31, 2017             Sonali Nagel

## 2017-09-11 NOTE — THERAPY PROGRESS REPORT/RE-CERT
Acute Care - Physical Therapy Re-Assessment   [facility]     Patient Name: Vasquez Marie  : 1960  MRN: [id number]  Today's Date: 2017   Onset of Illness/Injury or Date of Surgery Date: 17  Date of Referral to PT: 17  Referring Physician: Adrian      Admit Date: 2017     Visit Dx:    ICD-10-CM ICD-9-CM   1. Subacute osteomyelitis of left foot M86.272 730.07   2. Impaired functional mobility, balance, gait, and endurance Z74.09 V49.89   3. Coronary artery disease involving native coronary artery of native heart without angina pectoris I25.10 414.01   4. Essential hypertension I10 401.9   5. Gastroesophageal reflux disease with esophagitis K21.0 530.11   6. Other emphysema J43.8 492.8   7. Peripheral vascular disease I73.9 443.9     Patient Active Problem List   Diagnosis   • Chronic hepatitis C virus infection   • Coronary artery disease involving native coronary artery of native heart without angina pectoris   • Chronic atrial fibrillation   • Essential hypertension   • Thrombocytopenia   • Depression with anxiety   • Gastroesophageal reflux disease with esophagitis   • Hyperlipidemia   • Tobacco abuse   • COPD (chronic obstructive pulmonary disease)   • Gout   • Congestive heart disease   • RLS (restless legs syndrome)   • Rheumatoid arthritis   • Typical atrial flutter   • Atrial flutter   • Cerebrovascular accident (CVA)   • Borderline diabetes   • Dry gangrene   • Anemia   • Peripheral vascular disease   • Subacute osteomyelitis of left foot   • Liver mass     Past Medical History:   Diagnosis Date   • Abdominal pain     History of epigastric abdominal tenderness. Differentials include peptic ulcer disease,   pancreatobiliary disease.   • Abnormal LFTs    • Anemia    • Anxiety    • Arthritis    • Asthma    • Atrial fibrillation    • CAD (coronary artery disease)    • Calf cramp    • CKD (chronic kidney disease)    • COPD (chronic obstructive pulmonary disease)    • CVA (cerebral  infarction)    • Depression    • DVT (deep venous thrombosis)    • Electrocution    • Fatigue    • Hepatitis C    • History of allergy    • History of blood transfusion    • Hyperlipidemia    • Hypertension    • Leg pain    • Loss of appetite    • PUD (peptic ulcer disease)    • Stroke syndrome    • Tachycardia    • Thrombocytopenia    • Thrombophlebitis of deep femoral vein    • Vision problems      Past Surgical History:   Procedure Laterality Date   • AMPUTATION DIGIT Left 6/23/2017    Procedure: amputation of left foot third digit, left foot wound debridments and grafting;  Surgeon: Wolfgang Campbell DPM;  Location: [facility] OR;  Service:    • ANKLE SURGERY     • CARDIAC CATHETERIZATION N/A 6/20/2017    Procedure: Peripheral angiography;  Surgeon: Jonathan Storm MD;  Location: [facility] CATH INVASIVE LOCATION;  Service:    • CARDIAC CATHETERIZATION N/A 6/20/2017    Procedure: Angioplasty-peripheral;  Surgeon: Jonathan Storm MD;  Location: [facility] CATH INVASIVE LOCATION;  Service:    • CARDIAC SURGERY     • CORONARY STENT PLACEMENT     • INCISION AND DRAINAGE FOOT Left 8/29/2017    Procedure: Incision and drainage/wound debridement of left foot, wound VAC application, skin graft application;  Surgeon: Wolfgang Campbell DPM;  Location: [facility] OR;  Service:    • TONSILLECTOMY     • TOTAL HIP ARTHROPLASTY Left    • TOTAL SHOULDER REPLACEMENT Left           PT ASSESSMENT (last 72 hours)      PT Evaluation       09/11/17 1042 09/11/17 0800    Rehab Evaluation    Document Type progress note  -JR     Subjective Information agree to therapy;complains of;pain  -JR     Patient Effort, Rehab Treatment good  -JR     Symptoms Noted During/After Treatment increased pain  -JR     Symptoms Noted Comment during NPWT dressing change  -JR     General Information    Precautions/Limitations fall precautions;other (see comments)   PBAT on heel with Darco shoe  -JR     Pain Assessment    Pain Assessment 0-10  -JR     Pain  Score 6  -JR 0  -AA    Post Pain Score 4  -JR     Pain Type Acute pain  -JR     Pain Location Foot  -JR     Pain Orientation Left  -JR     Pain Descriptors Patient unable to describe  -JR     Pain Frequency Intermittent  -JR     Patient's Stated Pain Goal No pain  -JR     Pain Intervention(s) Repositioned;Ambulation/increased activity;Medication (See MAR)  -JR     Response to Interventions nurse is asking MD for additional pain meds--pt fells the pressure of NPWT  -JR     Cognitive Assessment/Intervention    Current Cognitive/Communication Assessment functional  -JR     Orientation Status oriented x 4  -JR     Follows Commands/Answers Questions able to follow multi-step instructions  -JR     Personal Safety WNL/WFL;impulsive  -JR     Personal Safety Interventions fall prevention program maintained;other (see comments)   wear Darco shoe  -JR     ROM (Range of Motion)    General ROM no range of motion deficits identified  -JR     MMT (Manual Muscle Testing)    General MMT Assessment Detail BLE=3+/5  -JR     Mobility Assessment/Training    Extremity Weight-Bearing Status left lower extremity  -JR     Left Lower Extremity Weight-Bearing partial weight-bearing   on heel with Darco shoe  -JR     Bed Mobility, Assessment/Treatment    Bed Mobility, Assistive Device head of bed elevated;bed rails  -JR     Bed Mob, Supine to Sit, [place] conditional independence  -JR     Bed Mob, Sit to Supine, [place] set up required  -JR     Transfer Assessment/Treatment    Transfers, Sit-Stand [place] supervision required;verbal cues required  -JR     Transfers, Stand-Sit [place] supervision required;verbal cues required  -JR     Transfers, Sit-Stand-Sit, Assist Device standard walker  -JR     Toilet Transfer, [place] supervision required  -JR     Toilet Transfer, Assistive Device standard walker  -JR     Transfer, Maintain Weight Bearing Status able to maintain weight bearing status  -JR     Transfer, Safety  Issues balance decreased during turns  -JR     Transfer, Impairments strength decreased;impaired balance  -JR     Gait Assessment/Treatment    Gait, [place] Level stand by assist  -JR     Gait, Assistive Device standard walker  -JR     Gait, Distance (Feet) 50  -JR     Gait, Gait Deviations antalgic  -JR     Gait, Maintain Weight Bearing Status able to maintain weight bearing status  -JR     Gait, Impairments impaired balance  -JR     Gait, Comment with Darco shoe  -JR     Positioning and Restraints    Pre-Treatment Position in bed  -JR     Post Treatment Position chair  -JR     In Chair sitting;call light within reach;encouraged to call for assist  -JR       09/10/17 2100 09/10/17 1430    Rehab Evaluation    Document Type  therapy note (daily note)  -    Subjective Information  agree to therapy;complains of;pain  -    Patient Effort, Rehab Treatment  adequate  -    Symptoms Noted During/After Treatment  none  -    General Information    Precautions/Limitations  --   WBAT on L heel with Darco shoe or boot.  -    Equipment Currently Used at Home walker, standard;shower chair;wheelchair;wound care supplies;cane, straight  -     Living Environment    Transportation Available family or friend will provide;car  -     Pain Assessment    Pain Assessment  0-10  -    Pain Score  8  -    Post Pain Score  8  -    Pain Type  Acute pain;Surgical pain  -    Pain Location  Foot  -    Pain Orientation  Left  -    Pain Intervention(s)  Repositioned  -    Mobility Assessment/Training    Left Lower Extremity Weight-Bearing  weight-bearing as tolerated    on L heel with Darco shoe or boot.  -    Bed Mobility, Assessment/Treatment    Bed Mobility, Assistive Device  head of bed elevated  -    Bed Mob, Supine to Sit, [place]  conditional independence  -    Transfer Assessment/Treatment    Transfers, Bed-Chair [place]  nonverbal cues required (demo/gesture);supervision required  -     Transfers, Bed-Chair-Bed, Assist Device  standard walker  -CC    Transfers, Sit-Stand [place]  verbal cues required;supervision required  -CC    Transfers, Stand-Sit [place]  verbal cues required;supervision required  -CC    Transfers, Sit-Stand-Sit, Assist Device  standard walker  -CC    Transfer, Maintain Weight Bearing Status  able to maintain weight bearing status  -CC    Transfer, Safety Issues  balance decreased during turns;step length decreased;weight-shifting ability decreased  -CC    Transfer, Impairments  strength decreased;impaired balance;pain  -CC    Positioning and Restraints    Pre-Treatment Position  in bed  -CC    Post Treatment Position  chair  -CC    In Chair  reclined;call light within reach;encouraged to call for assist  -CC      09/09/17 7391       Rehab Evaluation    Document Type therapy note (daily note)  -CK     Subjective Information agree to therapy;complains of;pain;fatigue  -CK     Patient Effort, Rehab Treatment adequate  -CK     Mobility Assessment/Training    Left Lower Extremity Weight-Bearing weight-bearing as tolerated  -CK     Transfer Assessment/Treatment    Transfers, Sit-Stand [place] stand by assist  -CK     Transfers, Stand-Sit [place] stand by assist  -CK     Transfers, Sit-Stand-Sit, Assist Device rolling walker  -CK     Transfer, Maintain Weight Bearing Status able to maintain weight bearing status  -CK     Transfer, Safety Issues balance decreased during turns  -CK     Transfer, Impairments strength decreased;impaired balance  -CK     Gait Assessment/Treatment    Gait, [place] Level stand by assist;contact guard assist  -CK     Gait, Assistive Device rolling walker  -CK     Gait, Distance (Feet) 48  -CK     Gait, Gait Pattern Analysis swing-to gait  -CK     Gait, Maintain Weight Bearing Status able to maintain weight bearing status  -CK     Gait, Safety Issues weight-shifting ability decreased  -CK     Gait, Impairments strength  decreased;impaired balance  -CK     Therapy Exercises    Bilateral Lower Extremities AROM:;10 reps;sitting;ankle pumps/circles;LAQ;knee flexion  -CK     Positioning and Restraints    Pre-Treatment Position sitting in chair/recliner  -CK     Post Treatment Position chair  -CK     In Chair call light within reach;encouraged to call for assist  -CK       User Key  (r) = Recorded By, (t) = Taken By, (c) = Cosigned By    Initials Name Provider Type    JR Sia Whaley, PT Physical Therapist    IRMA Bardales, RN Registered Nurse    YASMEEN Valente, RN Registered Nurse    DOROTHY Lei, PTA Physical Therapy Assistant    ABELINO Quiles, PTA Physical Therapy Assistant          Physical Therapy Education     Title: PT OT SLP Therapies (Done)     Topic: Physical Therapy (Done)     Point: Mobility training (Done)    Learning Progress Summary    Learner Readiness Method Response Comment Documented by Status   Patient Acceptance E VU Importance of wearing Darco shoe during gait  09/11/17 1305 Done    Acceptance E VU   09/09/17 1308 Done    Acceptance E,D VU,DU   09/08/17 1630 Done    Acceptance E,D VU,NR   09/05/17 1558 Done    Acceptance E,D VU,NR   09/04/17 1305 Done    Acceptance E VU Purpose of PT/POC;  importance of NWB L LE for wound healing; proper use of RW for safe transfers/ambulation.  08/30/17 1330 Done               Point: Home exercise program (Done)    Learning Progress Summary    Learner Readiness Method Response Comment Documented by Status   Patient Acceptance E VU   09/09/17 1308 Done    Acceptance E,D VU,NR   09/04/17 1305 Done    Acceptance E VU Purpose of PT/POC;  importance of NWB L LE for wound healing; proper use of RW for safe transfers/ambulation.  08/30/17 1330 Done               Point: Precautions (Done)    Learning Progress Summary    Learner Readiness Method Response Comment Documented by Status   Patient Acceptance E VU   09/09/17 1308 Done    Acceptance E,D VU,NR   RM 09/04/17 1305 Done    Acceptance E VU Importance of maintaining NWB on LLE JR 09/03/17 1559 Done    Acceptance E VU Purpose of wound vac dressing change. LM 09/01/17 1434 Done    Acceptance E VU Purpose of PT/POC;  importance of NWB L LE for wound healing; proper use of RW for safe transfers/ambulation. LM 08/30/17 1330 Done                      User Key     Initials Effective Dates Name Provider Type Discipline    JR 10/26/16 -  Sia Whaley, PT Physical Therapist PT    LM 10/26/16 -  Cristal Martinez, PT Physical Therapist PT    CK 10/26/16 -  Jessica Quiles, PTA Physical Therapy Assistant PT    RM 10/26/16 -  Dipak Scott, PTA Physical Therapy Assistant PT                PT Recommendation and Plan  Anticipated Discharge Disposition: inpatient rehabilitation facility  Planned Therapy Interventions: balance training, bed mobility training, gait training, home exercise program, patient/family education, strengthening, transfer training  PT Frequency: daily  Plan of Care Review  Plan Of Care Reviewed With: patient  Progress: progress towards functional goals is fair  Outcome Summary/Follow up Plan: Patient tolerates NPWT dressing change and has some decrease in wound size.  He is able to demonstrate improving strength and balance and has increased WB status to PWB with Darco shoe--all of which allow him to perform mobility tasks with less assistance needed.  Patient is expected to make progress with additional PT services.                                                                     IP PT Goals       09/11/17 1315 09/10/17 1430 09/09/17 1249    Transfer Training PT LTG    Transfer Training PT LTG, Date Established 09/11/17  -JR      Transfer Training PT LTG, Time to Achieve 2 wks  -JR      Transfer Training PT LTG, Activity Type sit to stand/stand to sit;bed to chair /chair to bed  -JR      Transfer Training PT LTG, [place] Level conditional independence  -JR      Transfer Training PT LTG, Assist  Device walker, standard  -JR      Transfer Training PT LTG, Additional Goal PWB LLE with Darco Shoe  -JR      Transfer Training PT  LTG, Date Goal Reviewed 09/11/17  -JR  09/09/17  -CK    Transfer Training PT LTG, Outcome goal revised  -JR goal ongoing  -CC     Gait Training PT LTG    Gait Training Goal PT LTG, Date Established 09/11/17  -JR      Gait Training Goal PT LTG, Time to Achieve 2 wks  -JR      Gait Training Goal PT LTG, [place] Level conditional independence  -JR      Gait Training Goal PT LTG, Assist Device walker, standard  -JR      Gait Training Goal PT LTG, Distance to Achieve 100  -JR      Gait Training Goal PT LTG, Additional Goal PWB LLE with Darco Shoe  -JR      Gait Training Goal PT LTG, Date Goal Reviewed 09/11/17  -JR  09/09/17  -CK    Gait Training Goal PT LTG, Outcome goal revised  -JR goal ongoing  -CC     Wound Care PT STG    Wound Care PT STG 1, Date Established 09/11/17  -JR      Wound Care PT STG 1, Time to Achieve 2 wks  -JR      Wound Care PT STG 1, Location L TMA site   -JR      Wound Care PT STG 1, No S&S of Infection yes  -JR      Wound Care PT STG 1, Decrease Wound Size by 0.5 cm in all measures  -JR      Wound Care PT STG 1, Outcome goal revised  -JR goal ongoing  -CC       09/08/17 1630 09/06/17 1601 09/05/17 1559    Transfer Training PT LTG    Transfer Training PT LTG, Outcome goal ongoing  -RM  goal ongoing  -RM    Gait Training PT LTG    Gait Training Goal PT LTG, Outcome goal ongoing  -RM      Strength Goal PT LTG    Strength Goal PT LTG, Outcome   goal ongoing  -RM    Wound Care PT STG    Wound Care PT STG 1, Outcome  goal ongoing  -RM       09/04/17 1711 09/04/17 1305 09/03/17 1601    Transfer Training PT LTG    Transfer Training PT LTG, Outcome  goal ongoing  -RM goal ongoing  -JR    Gait Training PT LTG    Gait Training Goal PT LTG, Outcome  goal ongoing  -RM goal ongoing  -JR    Strength Goal PT LTG    Strength Goal PT LTG, Outcome  goal ongoing  -RM goal ongoing   -JR    Wound Care PT STG    Wound Care PT STG 1, Outcome goal ongoing  -RM        09/01/17 1435 08/30/17 1330       Transfer Training PT LTG    Transfer Training PT LTG, Date Established  08/30/17  -LM     Transfer Training PT LTG, Time to Achieve  2 wks  -LM     Transfer Training PT LTG, Activity Type  sit to stand/stand to sit;bed to chair /chair to bed  -LM     Transfer Training PT LTG, [place] Level  supervision required  -LM     Transfer Training PT LTG, Assist Device  walker, rolling  -LM     Transfer Training PT LTG, Additional Goal  NWB L LE  -LM     Transfer Training PT LTG, Outcome  goal ongoing  -LM     Gait Training PT LTG    Gait Training Goal PT LTG, Date Established  08/30/17  -LM     Gait Training Goal PT LTG, Time to Achieve  2 wks  -LM     Gait Training Goal PT LTG, [place] Level  contact guard assist  -LM     Gait Training Goal PT LTG, Assist Device  walker, rolling  -LM     Gait Training Goal PT LTG, Distance to Achieve  30  -LM     Gait Training Goal PT LTG, Additional Goal  NWB L LE  -LM     Gait Training Goal PT LTG, Outcome  goal ongoing  -LM     Strength Goal PT LTG    Strength Goal PT LTG, Date Established  08/30/17  -LM     Strength Goal PT LTG, Time to Achieve  2 wks  -LM     Strength Goal PT LTG, Measure to Achieve  Patient will perform ther ex x 15 reps to improve functional strength for mobility.  -LM     Strength Goal PT LTG, Outcome  goal ongoing  -LM     Wound Care PT STG    Wound Care PT STG 1, Date Established 09/01/17  -LM      Wound Care PT STG 1, Time to Achieve 2 wks  -LM      Wound Care PT STG 1, Location L TMA site  -LM      Wound Care PT STG 1, No S&S of Infection yes  -LM      Wound Care PT STG 1, Decrease Wound Size Decrease wound dimensions by 0.5 cm in all measures.  -LM      Wound Care PT STG 1, Outcome goal ongoing  -LM        User Key  (r) = Recorded By, (t) = Taken By, (c) = Cosigned By    Initials Name Provider Type    JR Sia Whaley, PT Physical  Therapist    LM Cristal Martinez, PT Physical Therapist    CC Harika Lei, PTA Physical Therapy Assistant    CK Jessica Quiles, PTA Physical Therapy Assistant     Dipak Scott, [facility] Physical Therapy Assistant                Outcome Measures       09/11/17 1042 09/10/17 1430 09/09/17 1300    How much help from another person do you currently need...    Turning from your back to your side while in flat bed without using bedrails? 4  -JR 4  -CC 4  -CK    Moving from lying on back to sitting on the side of a flat bed without bedrails? 4  -JR 4  -CC 4  -CK    Moving to and from a bed to a chair (including a wheelchair)? 3  -JR 3  -CC 3  -CK    Standing up from a chair using your arms (e.g., wheelchair, bedside chair)? 4  -JR 4  -CC 4  -CK    Climbing 3-5 steps with a railing? 3  -JR 2  -CC 2  -CK    To walk in hospital room? 3  -JR 3  -CC 3  -CK    AM-PAC 6 Clicks Score 21  -JR 20  -CC 20  -CK    Functional Assessment    Outcome Measure Options AM-PAC 6 Clicks Basic Mobility (PT)  -JR AM-PAC 6 Clicks Basic Mobility (PT)  -CC AM-PAC 6 Clicks Basic Mobility (PT)  -CK      User Key  (r) = Recorded By, (t) = Taken By, (c) = Cosigned By    Initials Name Provider Type    JR Sia Whaley, PT Physical Therapist    CC Harika Lei, PTA Physical Therapy Assistant    CK Jessica Quiles, [facility] Physical Therapy Assistant           Time Calculation:         PT Charges       09/11/17 1307          Time Calculation    Start Time 1042  -JR      PT Received On 09/11/17  -      PT Goal Re-Cert Due Date 09/21/17  -        User Key  (r) = Recorded By, (t) = Taken By, (c) = Cosigned By    Initials Name Provider Type    JR Sia Whaley, PT Physical Therapist          Therapy Charges for Today     Code Description Service Date Service Provider Modifiers Qty    [id number]  PT RE-EVAL ESTABLISHED PLAN 2 9/11/2017 Sia Whaley, PT GP 1    [id number]  PT NEG PRESS WOUND TO 50SQCM DME1 9/11/2017 Sia Whaley, PT  1          PT  G-Codes  Outcome Measure Options: AM-PAC 6 Clicks Basic Mobility (PT)      Sia Whaley, PT  9/11/2017

## 2017-09-11 NOTE — DISCHARGE SUMMARY
[facility]   DISCHARGE SUMMARY      Name:  Vasquez Marie   Age:  56 y.o.  Sex:  male  :  1960  MRN:  [id number]   Visit Number:  [id number]  Primary Care Physician:  Marilyn K. Vermeesch, MD  Date of Discharge:  2017  Admission Date:  2017    Discharge Diagnosis:     1.  Osteomyelitis recurrent of left foot status post Transmetatarsal amputation, with apparent wound infection and left leg cellulitis  2.  Chronic atrial fibrillation  3.  Rheumatoid arthritis  4.  Borderline diabetes mellitus type 2  5.  Hypertension  6.  History coronary artery disease with stents  7.  Noncompliance  8.  History of alcoholism  9.  Peripheral arterial disease status post intervention by Dr. Storm  10.  Hepatitis C  11.  Possible cirrhosis    History of Present Illness/Hospital Course:  The patient is a 56 yr old man with previous wound and osteomyelitis recurrent, with history of noncompliance, who had progressive left foot osteomyelitis with recent left transmetatarsal amputation. The patient was admitted here, after recent amputation and patient had left foot skin graft placed to the amputation site. His culture grew Enterobacter. He had seen ID Dr Rodriguez in past and was noncompliant with therapy in past. HE was continued on oral doxycycline and wound vac. Apparently he had become frustrated with the situation, was reportedly drinking beer with friends and had discarded his wound vac in the trash. The patient states this is not accurate information, that he does still have his wound vac machine fully functioning at home and would welcome home health nursing to return. He states his motivation is continued improvement of his pain in the left foot.     The patient was admitted on 17 and placed on iv Invanz and Vancomycin initially with PICC line. He received lovenox for dvt prophylaxis. Cultures grew Providencia and alcaligenes, sensitive to Levaquin. The patient is on  Amiodarone and metoprolol as well. Due to risk of severe drug interaction, with current rhythm regular and regular rate, we will stop the amiodarone and continue metoprolol with close monitoring by home health and providers. Patient was counseled on medication risks and benefits. Additionally, he complained of recurrent excess bleeding of the foot with increased fall risk, so until wound is better healed his xarelto was held.     The patient has refused to wait for further inpatient rehab placement and he had not been accepted to any facility despite active interaction between case management and multiple facilities. He does not feel he is a current fall risk, as he has benefited over the past 2 weeks here with physical therapy. He is able to stand, ambulate with walker and remaining compliant with directions to minimally place partial nonweightbearing status to left heel and sits safely in his wheelchair. He agreed to wear the boot/shoe as well. He requested bedside commode and new wheelchair with foot rests, which was ordered. He agreed to be compliant with follow up appointment and he reported being able to financially afford to pay for taxi ride to visits. He agreed to see PCP for further discussion and refills of his pain and anxiety medication. Long term if he has pain, he will require pain management referral. Compliance with all therapies will be needed.     Dr Campbell saw the patient and recommended no further surgical intervention currently and continued wound care. The patient will continue 2 weeks of levaquin for now and Dr Campbell will monitor further.  Dr Campbell will see patient in 4 days and he will see Dr Vermeesch in 1 day.     The patient has improving pain in left foot, stable on opiate therapy and his gabapentin needs to be restarted. SOPHIE was reviewed. The patient appears to have stable controlled pain at this time. He was out of his room this morning, buying candy in the gift shop.     For his  anxiety, he has been treated with ativan as needed with improvement and recommended this be weaned. Patient agreed to avoid alcohol.     The patient reported understanding that further noncompliance could lead to further amputation and or death.     The patient is stable for discharge home today. He denies chest pain, shortness of breath, abdominal pain, constipation. His left foot pain has improved. He is stronger and happy with his improvement. Again, he agreed to allow regular wound care with wound vac placement later tonight by home health. The vac attachment here does not match the machine at home, so the vac was removed and a wet to dry dressing placed until later tonight, when the vac can be replaced. He agreed to allow the home health to come in to his home and help continue good wound care. I explained to him the dramatic improvement from pictures on admission to viewing the wound today.           Consults:     Consults     Date and Time Order Name Status Description    8/25/2017 2233 Inpatient Consult to Podiatry Completed           Procedures Performed:    Procedure(s):  Incision and drainage/wound debridement of left foot, wound VAC application, skin graft application         Vital Signs:    Heart Rate:  [68] 68  BP: (122)/(76) 122/76    Physical Exam:      General Appearance:    Alert, cooperative, in no acute distress   Head:    Normocephalic, without obvious abnormality, atraumatic   Eyes:            Lids and lashes normal, conjunctivae and sclerae normal, no   icterus, no pallor, corneas clear, PERRLA   Ears:    Ears appear intact with no abnormalities noted   Throat:   No oral lesions, no thrush, oral mucosa moist   Neck:   No adenopathy, supple, trachea midline, no thyromegaly, no   carotid bruit, no JVD   Back:     No kyphosis present, no scoliosis present, no skin lesions,      erythema or scars, no tenderness to percussion or                   palpation,   range of motion normal   Lungs:      Clear to auscultation,respirations regular, even and                  unlabored    Heart:    Regular rhythm and normal rate, normal S1 and S2, no            murmur, no gallop, no rub, no click   Chest Wall:    No abnormalities observed   Abdomen:     Normal bowel sounds, no masses, no organomegaly, soft        non-tender, non-distended, no guarding, no rebound                tenderness   Rectal:     Deferred   Extremities:   Moves all extremities well, no edema, no cyanosis, no             Redness, left foot transmetatarsal amputation site seen and has well appearing healing tissue  With wound size 4.4 cm x 9.5cm with trace blood without odor. Wound vac was removed for discharge.    Pulses:   Pulses palpable and equal bilaterally   Skin:   No bleeding, bruising or rash; see extremity for foot wound   Lymph nodes:   No palpable adenopathy   Neurologic:   Cranial nerves 2 - 12 grossly intact, sensation intact, DTR       present and equal bilaterally           Pertinent Lab Results:     Lab Results (all)     Procedure Component Value Units Date/Time    CBC & Differential [[id number]] Collected:  08/25/17 1848    Specimen:  Blood Updated:  08/25/17 1918    Narrative:       The following orders were created for panel order CBC & Differential.  Procedure                               Abnormality         Status                     ---------                               -----------         ------                     CBC Auto Differential[[id number]]        Abnormal            Final result                 Please view results for these tests on the individual orders.    CBC Auto Differential [[id number]]  (Abnormal) Collected:  08/25/17 1848    Specimen:  Blood Updated:  08/25/17 1918     WBC 8.62 10*3/mm3      RBC 3.13 (L) 10*6/mm3      Hemoglobin 9.7 (L) g/dL      Hematocrit 29.3 (L) %      MCV 93.6 fL      MCH 31.0 pg      MCHC 33.1 g/dL      RDW 16.5 (H) %      RDW-SD 53.6 fl      MPV 9.5 fL      Platelets 257 10*3/mm3       Neutrophil % 53.2 %      Lymphocyte % 18.8 %      Monocyte % 6.6 %      Eosinophil % 20.6 (H) %      Basophil % 0.5 %      Immature Grans % 0.3 %      Neutrophils, Absolute 4.58 10*3/mm3      Lymphocytes, Absolute 1.62 10*3/mm3      Monocytes, Absolute 0.57 10*3/mm3      Eosinophils, Absolute 1.78 (H) 10*3/mm3      Basophils, Absolute 0.04 10*3/mm3      Immature Grans, Absolute 0.03 10*3/mm3      nRBC 0.0 /100 WBC     Comprehensive Metabolic Panel [[id number]]  (Abnormal) Collected:  08/25/17 1848    Specimen:  Blood Updated:  08/25/17 1926     Glucose 123 (H) mg/dL      BUN 13 mg/dL      Creatinine 1.20 mg/dL      Sodium 137 mmol/L      Potassium 4.3 mmol/L      Chloride 100 mmol/L      CO2 22.0 (L) mmol/L      Calcium 9.7 mg/dL      Total Protein 9.8 (H) g/dL      Albumin 3.80 g/dL      ALT (SGPT) 25 U/L      AST (SGOT) 82 (H) U/L      Alkaline Phosphatase 160 (H) U/L      Total Bilirubin 1.3 mg/dL      eGFR Non African Amer 62 mL/min/1.73      Globulin 6.0 gm/dL      A/G Ratio 0.6 (L) g/dL      BUN/Creatinine Ratio 10.8     Anion Gap 19.3 mmol/L     Narrative:       Abnormal estimated GFR should be followed by more specific studies to confirm end stage chronic renal disease. The equation used for calculation may not be accurate for patients less than 19 years old, greater than 70 years old, patients at extremes of weight, malnutrition, or with acute renal dysfunction.    Lactic Acid, Plasma [[id number]]  (Normal) Collected:  08/25/17 1848    Specimen:  Blood Updated:  08/25/17 1937     Lactate 1.9 mmol/L     Hemoglobin A1c [[id number]]  (Normal) Collected:  08/25/17 1848    Specimen:  Blood Updated:  08/25/17 2319     Hemoglobin A1C 4.9 %     Narrative:       Expected HgbA1C results:     3% to 6% HgbA1C      HgbA1C                 Estimated Average Glucose     5%                              97 mg/dl   6%                             126 mg/dl   7%                             154 mg/dl   8%                              183 mg/dl   9%                             212 mg/dl  >10%                           >240 mg/dl      POC Glucose Fingerstick [[id number]]  (Abnormal) Collected:  08/25/17 2356    Specimen:  Blood Updated:  08/26/17 0005     Glucose 156 (H) mg/dL       Serial Number: [id number]:  [id number]       POC Glucose Fingerstick [[id number]]  (Abnormal) Collected:  08/26/17 0617    Specimen:  Blood Updated:  08/26/17 0621     Glucose 131 (H) mg/dL       Serial Number: [id number]:  [id number]       CBC Auto Differential [[id number]]  (Abnormal) Collected:  08/26/17 0651    Specimen:  Blood Updated:  08/26/17 0708     WBC 5.57 10*3/mm3      RBC 3.11 (L) 10*6/mm3      Hemoglobin 9.1 (L) g/dL      Hematocrit 28.7 (L) %      MCV 92.3 fL      MCH 29.3 pg      MCHC 31.7 g/dL      RDW 16.3 (H) %      RDW-SD 54.9 (H) fl      MPV 8.9 fL      Platelets 176 10*3/mm3      Neutrophil % 51.8 %      Lymphocyte % 17.2 %      Monocyte % 7.7 %      Eosinophil % 22.6 (H) %      Basophil % 0.5 %      Immature Grans % 0.2 %      Neutrophils, Absolute 2.88 10*3/mm3      Lymphocytes, Absolute 0.96 10*3/mm3      Monocytes, Absolute 0.43 10*3/mm3      Eosinophils, Absolute 1.26 (H) 10*3/mm3      Basophils, Absolute 0.03 10*3/mm3      Immature Grans, Absolute 0.01 10*3/mm3      nRBC 0.0 /100 WBC     Basic Metabolic Panel [[id number]]  (Abnormal) Collected:  08/26/17 0651    Specimen:  Blood Updated:  08/26/17 0717     Glucose 130 (H) mg/dL      BUN 13 mg/dL      Creatinine 1.40 (H) mg/dL      Sodium 140 mmol/L      Potassium 3.6 mmol/L      Chloride 107 mmol/L      CO2 20.0 (L) mmol/L      Calcium 8.9 mg/dL      eGFR Non African Amer 52 (L) mL/min/1.73      BUN/Creatinine Ratio 9.3     Anion Gap 16.6 mmol/L     Narrative:       Abnormal estimated GFR should be followed by more specific studies to confirm end stage chronic renal disease. The equation used for calculation may not be accurate for patients less than 19 years old, greater than 70 years  old, patients at extremes of weight, malnutrition, or with acute renal dysfunction.    Magnesium [[id number]]  (Abnormal) Collected:  08/26/17 0651    Specimen:  Blood Updated:  08/26/17 0729     Magnesium 1.0 (C) mg/dL     POC Glucose Fingerstick [[id number]]  (Abnormal) Collected:  08/26/17 1023    Specimen:  Blood Updated:  08/26/17 1100     Glucose 154 (H) mg/dL       Serial Number: [id number]:  [id number]       POC Glucose Fingerstick [[id number]]  (Abnormal) Collected:  08/26/17 1554    Specimen:  Blood Updated:  08/26/17 1725     Glucose 141 (H) mg/dL       Serial Number: [id number]:  [id number]       POC Glucose Fingerstick [[id number]]  (Abnormal) Collected:  08/26/17 2040    Specimen:  Blood Updated:  08/26/17 2101     Glucose 141 (H) mg/dL       Serial Number: [id number]:  [id number]       POC Glucose Fingerstick [[id number]]  (Normal) Collected:  08/27/17 0633    Specimen:  Blood Updated:  08/27/17 0656     Glucose 103 mg/dL       Serial Number: [id number]:  [id number]       POC Glucose Fingerstick [[id number]]  (Normal) Collected:  08/27/17 1104    Specimen:  Blood Updated:  08/27/17 1116     Glucose 118 mg/dL       Serial Number: [id number]:  [id number]       POC Glucose Fingerstick [[id number]]  (Normal) Collected:  08/27/17 1629    Specimen:  Blood Updated:  08/27/17 1640     Glucose 108 mg/dL       Serial Number: [id number]:  [id number]       POC Glucose Fingerstick [[id number]]  (Normal) Collected:  08/27/17 2101    Specimen:  Blood Updated:  08/27/17 2126     Glucose 110 mg/dL       Serial Number: [id number]:  [id number]       POC Glucose Fingerstick [[id number]]  (Normal) Collected:  08/28/17 0620    Specimen:  Blood Updated:  08/28/17 0656     Glucose 101 mg/dL       Serial Number: [id number]:  [id number]       Vancomycin, Trough [[id number]]  (Abnormal) Collected:  08/28/17 0600    Specimen:  Blood Updated:  08/28/17 0711     Vancomycin Trough 22.21 (C) mcg/mL     Wound  Culture [[id number]]  (Abnormal)  (Susceptibility) Collected:  08/25/17 2128    Specimen:  Wound from Foot, Left Updated:  08/28/17 1039     Wound Culture Heavy growth (4+) Alcaligenes species (A)    Susceptibility      Alcaligenes species     MYRA     Amikacin <=16 ug/ml Susceptible     Aztreonam 16 ug/ml Intermediate     Cefepime 8 ug/ml Susceptible     Cefotaxime <=2 ug/ml Susceptible     Ceftazidime <=1 ug/ml Susceptible     Ceftriaxone <=8 ug/ml Susceptible     Ciprofloxacin <=1 ug/ml Susceptible     Gentamicin <=4 ug/ml Susceptible     Levofloxacin <=2 ug/ml Susceptible     Meropenem <=1 ug/ml Susceptible     Piperacillin + Tazobactam <=16 ug/ml Susceptible     Tobramycin <=4 ug/ml Susceptible     Trimethoprim + Sulfamethoxazole <=2/38 ug/ml Susceptible                    POC Glucose Fingerstick [[id number]]  (Normal) Collected:  08/28/17 1037    Specimen:  Blood Updated:  08/28/17 1126     Glucose 100 mg/dL       Serial Number: [id number]:  [id number]       POC Glucose Fingerstick [[id number]]  (Normal) Collected:  08/28/17 1723    Specimen:  Blood Updated:  08/28/17 1746     Glucose 99 mg/dL       Serial Number: [id number]:  [id number]       POC Glucose Fingerstick [[id number]]  (Normal) Collected:  08/28/17 2011    Specimen:  Blood Updated:  08/28/17 2040     Glucose 115 mg/dL       Serial Number: [id number]:  [id number]       POC Glucose Fingerstick [[id number]]  (Normal) Collected:  08/29/17 0613    Specimen:  Blood Updated:  08/29/17 0621     Glucose 92 mg/dL       Serial Number: [id number]:  [id number]       Basic Metabolic Panel [[id number]]  (Abnormal) Collected:  08/29/17 0604    Specimen:  Blood Updated:  08/29/17 0641     Glucose 87 mg/dL      BUN 10 mg/dL      Creatinine 1.20 mg/dL      Sodium 139 mmol/L      Potassium 4.4 mmol/L      Chloride 110 (H) mmol/L      CO2 19.0 (L) mmol/L      Calcium 8.8 mg/dL      eGFR Non African Amer 62 mL/min/1.73      BUN/Creatinine Ratio 8.3     Anion  Gap 14.4 mmol/L     Narrative:       Abnormal estimated GFR should be followed by more specific studies to confirm end stage chronic renal disease. The equation used for calculation may not be accurate for patients less than 19 years old, greater than 70 years old, patients at extremes of weight, malnutrition, or with acute renal dysfunction.    CBC Auto Differential [[id number]]  (Abnormal) Collected:  08/29/17 0604    Specimen:  Blood Updated:  08/29/17 0659     WBC 3.65 (L) 10*3/mm3      RBC 2.87 (L) 10*6/mm3      Hemoglobin 8.3 (L) g/dL      Hematocrit 27.0 (L) %      MCV 94.1 (H) fL      MCH 28.9 pg      MCHC 30.7 g/dL      RDW 15.8 (H) %      RDW-SD 54.2 (H) fl      MPV 9.5 fL      Platelets 136 10*3/mm3      Neutrophil % 42.7 %      Lymphocyte % 23.3 %      Monocyte % 6.6 %      Eosinophil % 26.6 (H) %      Basophil % 0.5 %      Immature Grans % 0.3 %      Neutrophils, Absolute 1.56 (L) 10*3/mm3      Lymphocytes, Absolute 0.85 10*3/mm3      Monocytes, Absolute 0.24 10*3/mm3      Eosinophils, Absolute 0.97 (H) 10*3/mm3      Basophils, Absolute 0.02 10*3/mm3      Immature Grans, Absolute 0.01 10*3/mm3      nRBC 0.0 /100 WBC     CBC & Differential [[id number]] Collected:  08/29/17 0604    Specimen:  Blood Updated:  08/29/17 0714    Narrative:       The following orders were created for panel order CBC & Differential.  Procedure                               Abnormality         Status                     ---------                               -----------         ------                     Manual Differential[[id number]]          Abnormal            Final result               Scan Slide[[id number]]                                       Edited Result - FINAL      CBC Auto Differential[[id number]]        Abnormal            Final result                 Please view results for these tests on the individual orders.    Scan Slide [[id number]] Collected:  08/29/17 0604    Specimen:  Blood Updated:  08/29/17 0714      Anisocytosis Mod/2+     WBC Morphology Normal     Platelet Estimate Adequate    Manual Differential [[id number]]  (Abnormal) Collected:  08/29/17 0604    Specimen:  Blood Updated:  08/29/17 0714     Neutrophil % 39.0 %      Lymphocyte % 23.0 %      Monocyte % 10.0 %      Eosinophil % 24.0 (H) %      Bands %  4.0 %      Neutrophils Absolute 1.57 (L) 10*3/mm3      Lymphocytes Absolute 0.84 10*3/mm3      Monocytes Absolute 0.37 10*3/mm3      Eosinophils Absolute 0.88 (H) 10*3/mm3      Anisocytosis Mod/2+     WBC Morphology Normal     Platelet Estimate Adequate    Gram Stain [[id number]] Collected:  08/29/17 1722    Specimen:  Wound from Foot, Left Updated:  08/29/17 1900     Gram Stain Result Few (2+) WBCs seen      Rare (1+) Gram positive cocci in clusters    Basic Metabolic Panel [[id number]]  (Abnormal) Collected:  08/30/17 0501    Specimen:  Blood Updated:  08/30/17 0606     Glucose 216 (H) mg/dL      BUN 14 mg/dL      Creatinine 1.30 mg/dL      Sodium 140 mmol/L      Potassium 5.3 (H) mmol/L      Chloride 113 (H) mmol/L      CO2 17.0 (L) mmol/L      Calcium 8.6 mg/dL      eGFR Non African Amer 57 (L) mL/min/1.73      BUN/Creatinine Ratio 10.8     Anion Gap 15.3 mmol/L     Narrative:       Abnormal estimated GFR should be followed by more specific studies to confirm end stage chronic renal disease. The equation used for calculation may not be accurate for patients less than 19 years old, greater than 70 years old, patients at extremes of weight, malnutrition, or with acute renal dysfunction.    CBC & Differential [[id number]] Collected:  08/30/17 0501    Specimen:  Blood Updated:  08/30/17 0621    Narrative:       The following orders were created for panel order CBC & Differential.  Procedure                               Abnormality         Status                     ---------                               -----------         ------                     Scan Slide[[id number]]                   Normal              Final  result               CBC Auto Differential[[id number]]        Abnormal            Final result                 Please view results for these tests on the individual orders.    CBC Auto Differential [[id number]]  (Abnormal) Collected:  08/30/17 0501    Specimen:  Blood Updated:  08/30/17 0621     WBC 2.12 (L) 10*3/mm3      RBC 2.98 (L) 10*6/mm3      Hemoglobin 8.8 (L) g/dL      Hematocrit 28.7 (L) %      MCV 96.3 (H) fL      MCH 29.5 pg      MCHC 30.7 g/dL      RDW 15.5 (H) %      RDW-SD 54.0 fl      MPV 10.7 fL      Platelets 100 (L) 10*3/mm3      Neutrophil % 75.5 %      Lymphocyte % 17.9 %      Monocyte % 1.9 %      Eosinophil % 0.9 %      Basophil % 0.0 %      Immature Grans % 3.8 (H) %      Neutrophils, Absolute 1.60 (L) 10*3/mm3      Lymphocytes, Absolute 0.38 (L) 10*3/mm3      Monocytes, Absolute 0.04 10*3/mm3      Eosinophils, Absolute 0.02 10*3/mm3      Basophils, Absolute 0.00 10*3/mm3      Immature Grans, Absolute 0.08 (H) 10*3/mm3      nRBC 0.0 /100 WBC     Scan Slide [[id number]]  (Normal) Collected:  08/30/17 0501    Specimen:  Blood Updated:  08/30/17 0621     RBC Morphology Normal     WBC Morphology Normal     Platelet Morphology Normal    POC Glucose Fingerstick [[id number]]  (Normal) Collected:  08/29/17 1119    Specimen:  Blood Updated:  08/30/17 0621     Glucose 123 mg/dL       Serial Number: [id number]:  [id number]       POC Glucose Fingerstick [[id number]]  (Normal) Collected:  08/29/17 1607    Specimen:  Blood Updated:  08/30/17 0622     Glucose 111 mg/dL       Serial Number: [id number]:  [id number]       POC Glucose Fingerstick [[id number]]  (Abnormal) Collected:  08/29/17 2033    Specimen:  Blood Updated:  08/30/17 0623     Glucose 185 (H) mg/dL       Serial Number: [id number]:  [id number]       POC Glucose Fingerstick [[id number]]  (Abnormal) Collected:  08/30/17 0606    Specimen:  Blood Updated:  08/30/17 0624     Glucose 210 (H) mg/dL       Serial Number: [id number]:  [id number]        POC Glucose Fingerstick [[id number]]  (Abnormal) Collected:  08/30/17 1106    Specimen:  Blood Updated:  08/30/17 1600     Glucose 193 (H) mg/dL       Serial Number: [id number]:  [id number]       POC Glucose Fingerstick [[id number]]  (Abnormal) Collected:  08/31/17 0609    Specimen:  Blood Updated:  08/31/17 0616     Glucose 131 (H) mg/dL       Serial Number: [id number]:  [id number]       CBC & Differential [[id number]] Collected:  08/31/17 0643    Specimen:  Blood Updated:  08/31/17 0701    Narrative:       The following orders were created for panel order CBC & Differential.  Procedure                               Abnormality         Status                     ---------                               -----------         ------                     CBC Auto Differential[[id number]]        Abnormal            Final result                 Please view results for these tests on the individual orders.    CBC Auto Differential [[id number]]  (Abnormal) Collected:  08/31/17 0643    Specimen:  Blood Updated:  08/31/17 0701     WBC 3.23 (L) 10*3/mm3      RBC 2.79 (L) 10*6/mm3      Hemoglobin 8.3 (L) g/dL      Hematocrit 26.1 (L) %      MCV 93.5 fL      MCH 29.7 pg      MCHC 31.8 g/dL      RDW 15.5 (H) %      RDW-SD 53.1 fl      MPV 9.5 fL      Platelets 121 (L) 10*3/mm3      Neutrophil % 68.1 %      Lymphocyte % 23.2 %      Monocyte % 5.0 %      Eosinophil % 2.5 %      Basophil % 0.3 %      Immature Grans % 0.9 (H) %      Neutrophils, Absolute 2.20 10*3/mm3      Lymphocytes, Absolute 0.75 10*3/mm3      Monocytes, Absolute 0.16 10*3/mm3      Eosinophils, Absolute 0.08 10*3/mm3      Basophils, Absolute 0.01 10*3/mm3      Immature Grans, Absolute 0.03 10*3/mm3      nRBC 0.0 /100 WBC     Basic Metabolic Panel [[id number]]  (Abnormal) Collected:  08/31/17 0643    Specimen:  Blood Updated:  08/31/17 0709     Glucose 126 (H) mg/dL      BUN 21 (H) mg/dL      Creatinine 1.20 mg/dL      Sodium 140 mmol/L      Potassium 3.9  mmol/L      Chloride 111 (H) mmol/L      CO2 20.0 (L) mmol/L      Calcium 8.6 mg/dL      eGFR Non African Amer 62 mL/min/1.73      BUN/Creatinine Ratio 17.5     Anion Gap 12.9 mmol/L     Narrative:       Abnormal estimated GFR should be followed by more specific studies to confirm end stage chronic renal disease. The equation used for calculation may not be accurate for patients less than 19 years old, greater than 70 years old, patients at extremes of weight, malnutrition, or with acute renal dysfunction.    POC Glucose Fingerstick [[id number]]  (Abnormal) Collected:  08/31/17 1543    Specimen:  Blood Updated:  08/31/17 1615     Glucose 136 (H) mg/dL       Serial Number: [id number]:  [id number]       POC Glucose Fingerstick [[id number]]  (Abnormal) Collected:  08/31/17 2013    Specimen:  Blood Updated:  08/31/17 2020     Glucose 133 (H) mg/dL       Serial Number: [id number]:  [id number]       POC Glucose Fingerstick [[id number]]  (Normal) Collected:  09/01/17 0620    Specimen:  Blood Updated:  09/01/17 0626     Glucose 110 mg/dL       Serial Number: [id number]:  [id number]       Wound Culture [[id number]]  (Abnormal)  (Susceptibility) Collected:  08/29/17 1723    Specimen:  Wound from Foot, Left Updated:  09/01/17 0759     Wound Culture Scant growth (1+) Providencia rettgeri (A)    Susceptibility      Providencia rettgeri     MYRA     Amikacin <=16 ug/ml Susceptible     Ampicillin <=8 ug/ml Resistant     Ampicillin + Sulbactam >16/8 ug/ml Resistant     Aztreonam 16 ug/ml Intermediate     Cefazolin >16 ug/ml Resistant     Cefepime <=4 ug/ml Susceptible     Cefotaxime <=2 ug/ml Susceptible     Cefoxitin <=8 ug/ml Susceptible     Ceftazidime 16 ug/ml Intermediate     Ceftriaxone <=8 ug/ml Susceptible     Cefuroxime sodium >16 ug/ml Resistant     Ciprofloxacin <=1 ug/ml Susceptible     Doripenem <=0.5 ug/ml Susceptible     Ertapenem <=1 ug/ml Susceptible     Gentamicin <=4 ug/ml Susceptible     Levofloxacin <=2  ug/ml Susceptible     Meropenem <=1 ug/ml Susceptible     Piperacillin + Tazobactam <=16 ug/ml Susceptible     Tetracycline >8 ug/ml Resistant     Tigecycline <=1 ug/ml Resistant     Tobramycin <=4 ug/ml Susceptible     Trimethoprim + Sulfamethoxazole >2/38 ug/ml Resistant                    POC Glucose Fingerstick [[id number]]  (Abnormal) Collected:  08/30/17 1613    Specimen:  Blood Updated:  09/01/17 1605     Glucose 187 (H) mg/dL       Serial Number: [id number]:  [id number]       POC Glucose Fingerstick [[id number]]  (Abnormal) Collected:  08/30/17 2025    Specimen:  Blood Updated:  09/01/17 1606     Glucose 194 (H) mg/dL       Serial Number: [id number]:  [id number]       POC Glucose Fingerstick [[id number]]  (Normal) Collected:  08/31/17 1111    Specimen:  Blood Updated:  09/01/17 1607     Glucose 114 mg/dL       Serial Number: [id number]:  [id number]       POC Glucose Fingerstick [[id number]]  (Normal) Collected:  09/01/17 1107    Specimen:  Blood Updated:  09/01/17 2010     Glucose 122 mg/dL       Serial Number: [id number]:  [id number]       POC Glucose Fingerstick [[id number]]  (Abnormal) Collected:  09/01/17 1610    Specimen:  Blood Updated:  09/01/17 2010     Glucose 132 (H) mg/dL       Serial Number: [id number]:  [id number]       POC Glucose Fingerstick [[id number]]  (Normal) Collected:  09/01/17 2016    Specimen:  Blood Updated:  09/02/17 0620     Glucose 113 mg/dL       Serial Number: [id number]:  [id number]       POC Glucose Fingerstick [[id number]]  (Normal) Collected:  09/02/17 0609    Specimen:  Blood Updated:  09/02/17 0621     Glucose 122 mg/dL       Serial Number: [id number]:  [id number]       POC Glucose Fingerstick [[id number]]  (Normal) Collected:  09/02/17 1059    Specimen:  Blood Updated:  09/02/17 2230     Glucose 128 mg/dL       Serial Number: [id number]:  [id number]       POC Glucose Fingerstick [[id number]]  (Abnormal) Collected:  09/02/17 1603    Specimen:  Blood  Updated:  09/02/17 2231     Glucose 169 (H) mg/dL       Serial Number: [id number]:  [id number]       POC Glucose Fingerstick [[id number]]  (Abnormal) Collected:  09/02/17 2046    Specimen:  Blood Updated:  09/02/17 2231     Glucose 145 (H) mg/dL       Serial Number: [id number]:  [id number]       POC Glucose Fingerstick [[id number]]  (Normal) Collected:  09/03/17 0622    Specimen:  Blood Updated:  09/03/17 0631     Glucose 116 mg/dL       Serial Number: [id number]:  [id number]       Anaerobic Culture [[id number]]  (Abnormal) Collected:  08/29/17 1722    Specimen:  Wound from Foot, Left Updated:  09/03/17 0823     Culture Light growth (2+) Bacteroides fragilis (A)    Basic Metabolic Panel [[id number]]  (Abnormal) Collected:  09/04/17 0543    Specimen:  Blood Updated:  09/04/17 0645     Glucose 104 (H) mg/dL      BUN 29 (H) mg/dL      Creatinine 1.20 mg/dL      Sodium 141 mmol/L      Potassium 3.8 mmol/L      Chloride 109 (H) mmol/L      CO2 23.0 (L) mmol/L      Calcium 8.2 (L) mg/dL      eGFR Non African Amer 62 mL/min/1.73      BUN/Creatinine Ratio 24.2 (H)     Anion Gap 12.8 mmol/L     Narrative:       Abnormal estimated GFR should be followed by more specific studies to confirm end stage chronic renal disease. The equation used for calculation may not be accurate for patients less than 19 years old, greater than 70 years old, patients at extremes of weight, malnutrition, or with acute renal dysfunction.    CBC & Differential [[id number]] Collected:  09/04/17 0543    Specimen:  Blood Updated:  09/04/17 0651    Narrative:       The following orders were created for panel order CBC & Differential.  Procedure                               Abnormality         Status                     ---------                               -----------         ------                     CBC Auto Differential[[id number]]        Abnormal            Final result                 Please view results for these tests on the individual  orders.    CBC Auto Differential [[id number]]  (Abnormal) Collected:  09/04/17 0543    Specimen:  Blood Updated:  09/04/17 0651     WBC 3.84 (L) 10*3/mm3      RBC 2.89 (L) 10*6/mm3      Hemoglobin 8.6 (L) g/dL      Hematocrit 27.7 (L) %      MCV 95.8 (H) fL      MCH 29.8 pg      MCHC 31.0 g/dL      RDW 16.1 (H) %      RDW-SD 55.8 (H) fl      MPV 10.7 fL      Platelets 114 (L) 10*3/mm3      Neutrophil % 43.9 %      Lymphocyte % 29.7 %      Monocyte % 6.8 %      Eosinophil % 19.0 (H) %      Basophil % 0.3 %      Immature Grans % 0.3 %      Neutrophils, Absolute 1.69 (L) 10*3/mm3      Lymphocytes, Absolute 1.14 10*3/mm3      Monocytes, Absolute 0.26 10*3/mm3      Eosinophils, Absolute 0.73 (H) 10*3/mm3      Basophils, Absolute 0.01 10*3/mm3      Immature Grans, Absolute 0.01 10*3/mm3      nRBC 0.0 /100 WBC     CBC & Differential [[id number]] Collected:  09/05/17 0515    Specimen:  Blood Updated:  09/05/17 0553    Narrative:       The following orders were created for panel order CBC & Differential.  Procedure                               Abnormality         Status                     ---------                               -----------         ------                     CBC Auto Differential[[id number]]        Abnormal            Final result                 Please view results for these tests on the individual orders.    CBC Auto Differential [[id number]]  (Abnormal) Collected:  09/05/17 0515    Specimen:  Blood Updated:  09/05/17 0553     WBC 3.82 (L) 10*3/mm3      RBC 2.86 (L) 10*6/mm3      Hemoglobin 8.4 (L) g/dL      Hematocrit 26.9 (L) %      MCV 94.1 (H) fL      MCH 29.4 pg      MCHC 31.2 g/dL      RDW 16.0 (H) %      RDW-SD 55.3 (H) fl      MPV 11.0 fL      Platelets 116 (L) 10*3/mm3      Neutrophil % 41.8 %      Lymphocyte % 30.6 %      Monocyte % 7.1 %      Eosinophil % 19.9 (H) %      Basophil % 0.3 %      Immature Grans % 0.3 %      Neutrophils, Absolute 1.60 (L) 10*3/mm3      Lymphocytes, Absolute 1.17  10*3/mm3      Monocytes, Absolute 0.27 10*3/mm3      Eosinophils, Absolute 0.76 (H) 10*3/mm3      Basophils, Absolute 0.01 10*3/mm3      Immature Grans, Absolute 0.01 10*3/mm3      nRBC 0.0 /100 WBC     Basic Metabolic Panel [[id number]]  (Abnormal) Collected:  09/05/17 0515    Specimen:  Blood Updated:  09/05/17 0559     Glucose 110 (H) mg/dL      BUN 33 (H) mg/dL      Creatinine 1.20 mg/dL      Sodium 140 mmol/L      Potassium 3.7 mmol/L      Chloride 107 mmol/L      CO2 24.0 (L) mmol/L      Calcium 8.4 mg/dL      eGFR Non African Amer 62 mL/min/1.73      BUN/Creatinine Ratio 27.5 (H)     Anion Gap 12.7 mmol/L     Narrative:       Abnormal estimated GFR should be followed by more specific studies to confirm end stage chronic renal disease. The equation used for calculation may not be accurate for patients less than 19 years old, greater than 70 years old, patients at extremes of weight, malnutrition, or with acute renal dysfunction.    CBC & Differential [[id number]] Collected:  09/06/17 0513    Specimen:  Blood Updated:  09/06/17 0545    Narrative:       The following orders were created for panel order CBC & Differential.  Procedure                               Abnormality         Status                     ---------                               -----------         ------                     CBC Auto Differential[[id number]]        Abnormal            Final result                 Please view results for these tests on the individual orders.    CBC Auto Differential [[id number]]  (Abnormal) Collected:  09/06/17 0513    Specimen:  Blood Updated:  09/06/17 0545     WBC 3.34 (L) 10*3/mm3      RBC 3.00 (L) 10*6/mm3      Hemoglobin 8.8 (L) g/dL      Hematocrit 27.9 (L) %      MCV 93.0 fL      MCH 29.3 pg      MCHC 31.5 g/dL      RDW 15.9 (H) %      RDW-SD 54.2 (H) fl      MPV 10.8 fL      Platelets 101 (L) 10*3/mm3      Neutrophil % 41.3 %      Lymphocyte % 27.2 %      Monocyte % 7.2 %      Eosinophil % 23.7 (H) %       Basophil % 0.3 %      Immature Grans % 0.3 %      Neutrophils, Absolute 1.38 (L) 10*3/mm3      Lymphocytes, Absolute 0.91 10*3/mm3      Monocytes, Absolute 0.24 10*3/mm3      Eosinophils, Absolute 0.79 (H) 10*3/mm3      Basophils, Absolute 0.01 10*3/mm3      Immature Grans, Absolute 0.01 10*3/mm3      nRBC 0.0 /100 WBC     Basic Metabolic Panel [[id number]]  (Abnormal) Collected:  09/06/17 0513    Specimen:  Blood Updated:  09/06/17 0555     Glucose 115 (H) mg/dL      BUN 37 (H) mg/dL      Creatinine 1.20 mg/dL      Sodium 141 mmol/L      Potassium 3.8 mmol/L      Chloride 107 mmol/L      CO2 25.0 (L) mmol/L      Calcium 8.8 mg/dL      eGFR Non African Amer 62 mL/min/1.73      BUN/Creatinine Ratio 30.8 (H)     Anion Gap 12.8 mmol/L     Narrative:       Abnormal estimated GFR should be followed by more specific studies to confirm end stage chronic renal disease. The equation used for calculation may not be accurate for patients less than 19 years old, greater than 70 years old, patients at extremes of weight, malnutrition, or with acute renal dysfunction.    Basic Metabolic Panel [[id number]]  (Abnormal) Collected:  09/07/17 0530    Specimen:  Blood Updated:  09/07/17 0627     Glucose 95 mg/dL      BUN 33 (H) mg/dL      Creatinine 1.10 mg/dL      Sodium 140 mmol/L      Potassium 3.6 mmol/L      Chloride 107 mmol/L      CO2 22.0 (L) mmol/L      Calcium 9.1 mg/dL      eGFR Non African Amer 69 mL/min/1.73      BUN/Creatinine Ratio 30.0 (H)     Anion Gap 14.6 mmol/L     Narrative:       Abnormal estimated GFR should be followed by more specific studies to confirm end stage chronic renal disease. The equation used for calculation may not be accurate for patients less than 19 years old, greater than 70 years old, patients at extremes of weight, malnutrition, or with acute renal dysfunction.    CBC Auto Differential [[id number]]  (Abnormal) Collected:  09/07/17 0530    Specimen:  Blood Updated:  09/07/17 0717     WBC 3.37 (L)  10*3/mm3      RBC 2.81 (L) 10*6/mm3      Hemoglobin 8.2 (L) g/dL      Hematocrit 26.5 (L) %      MCV 94.3 (H) fL      MCH 29.2 pg      MCHC 30.9 g/dL      RDW 15.9 (H) %      RDW-SD 54.6 (H) fl      MPV 10.6 fL      Platelets 108 (L) 10*3/mm3     Scan Slide [[id number]] Collected:  09/07/17 0530    Specimen:  Blood Updated:  09/07/17 0717     Scan Slide --      See Manual Differential Results       CBC & Differential [[id number]] Collected:  09/07/17 0530    Specimen:  Blood Updated:  09/07/17 0717    Narrative:       The following orders were created for panel order CBC & Differential.  Procedure                               Abnormality         Status                     ---------                               -----------         ------                     Manual Differential[[id number]]          Abnormal            Final result               Scan Slide[[id number]]                                       Final result               CBC Auto Differential[[id number]]        Abnormal            Final result                 Please view results for these tests on the individual orders.    Manual Differential [[id number]]  (Abnormal) Collected:  09/07/17 0530    Specimen:  Blood Updated:  09/07/17 0717     Neutrophil % 43.4 %      Lymphocyte % 28.3 %      Monocyte % 6.1 %      Eosinophil % 20.2 (H) %      Bands %  2.0 %      Neutrophils Absolute 1.53 (L) 10*3/mm3      Lymphocytes Absolute 0.95 10*3/mm3      Monocytes Absolute 0.21 10*3/mm3      Eosinophils Absolute 0.68 10*3/mm3      RBC Morphology Normal     WBC Morphology Normal     Platelet Morphology Normal    Basic Metabolic Panel [[id number]]  (Abnormal) Collected:  09/08/17 0550    Specimen:  Blood Updated:  09/08/17 0629     Glucose 102 (H) mg/dL      BUN 30 (H) mg/dL      Creatinine 1.00 mg/dL      Sodium 141 mmol/L      Potassium 3.8 mmol/L      Chloride 108 (H) mmol/L      CO2 23.0 (L) mmol/L      Calcium 9.1 mg/dL      eGFR Non African Amer 77 mL/min/1.73       BUN/Creatinine Ratio 30.0 (H)     Anion Gap 13.8 mmol/L     Narrative:       Abnormal estimated GFR should be followed by more specific studies to confirm end stage chronic renal disease. The equation used for calculation may not be accurate for patients less than 19 years old, greater than 70 years old, patients at extremes of weight, malnutrition, or with acute renal dysfunction.    CBC & Differential [[id number]] Collected:  09/08/17 0550    Specimen:  Blood Updated:  09/08/17 0714    Narrative:       The following orders were created for panel order CBC & Differential.  Procedure                               Abnormality         Status                     ---------                               -----------         ------                     CBC Auto Differential[[id number]]        Abnormal            Final result                 Please view results for these tests on the individual orders.    CBC Auto Differential [[id number]]  (Abnormal) Collected:  09/08/17 0550    Specimen:  Blood Updated:  09/08/17 0714     WBC 3.40 (L) 10*3/mm3      RBC 2.64 (L) 10*6/mm3      Hemoglobin 7.8 (C) g/dL      Hematocrit 25.0 (L) %      MCV 94.7 (H) fL      MCH 29.5 pg      MCHC 31.2 g/dL      RDW 15.9 (H) %      RDW-SD 54.8 (H) fl      MPV 10.5 fL      Platelets 100 (L) 10*3/mm3      Neutrophil % 38.5 %      Lymphocyte % 26.5 %      Monocyte % 10.3 %      Eosinophil % 23.8 (H) %      Basophil % 0.6 %      Immature Grans % 0.3 %      Neutrophils, Absolute 1.31 (L) 10*3/mm3      Lymphocytes, Absolute 0.90 10*3/mm3      Monocytes, Absolute 0.35 10*3/mm3      Eosinophils, Absolute 0.81 (H) 10*3/mm3      Basophils, Absolute 0.02 10*3/mm3      Immature Grans, Absolute 0.01 10*3/mm3      nRBC 0.0 /100 WBC     Basic Metabolic Panel [[id number]]  (Abnormal) Collected:  09/09/17 0713    Specimen:  Blood Updated:  09/09/17 0733     Glucose 113 (H) mg/dL      BUN 26 (H) mg/dL      Creatinine 1.10 mg/dL      Sodium 139 mmol/L      Potassium 4.0  mmol/L      Chloride 106 mmol/L      CO2 22.0 (L) mmol/L      Calcium 9.4 mg/dL      eGFR Non African Amer 69 mL/min/1.73      BUN/Creatinine Ratio 23.6 (H)     Anion Gap 15.0 mmol/L     Narrative:       Abnormal estimated GFR should be followed by more specific studies to confirm end stage chronic renal disease. The equation used for calculation may not be accurate for patients less than 19 years old, greater than 70 years old, patients at extremes of weight, malnutrition, or with acute renal dysfunction.    CBC & Differential [[id number]] Collected:  09/09/17 0713    Specimen:  Blood Updated:  09/09/17 0736    Narrative:       The following orders were created for panel order CBC & Differential.  Procedure                               Abnormality         Status                     ---------                               -----------         ------                     CBC Auto Differential[[id number]]        Abnormal            Final result                 Please view results for these tests on the individual orders.    CBC Auto Differential [[id number]]  (Abnormal) Collected:  09/09/17 0713    Specimen:  Blood Updated:  09/09/17 0736     WBC 4.12 (L) 10*3/mm3      RBC 2.91 (L) 10*6/mm3      Hemoglobin 8.6 (L) g/dL      Hematocrit 27.2 (L) %      MCV 93.5 fL      MCH 29.6 pg      MCHC 31.6 g/dL      RDW 16.0 (H) %      RDW-SD 54.7 (H) fl      MPV 10.7 fL      Platelets 118 (L) 10*3/mm3      Neutrophil % 42.3 %      Lymphocyte % 26.2 %      Monocyte % 9.2 %      Eosinophil % 21.1 (H) %      Basophil % 0.7 %      Immature Grans % 0.5 %      Neutrophils, Absolute 1.74 (L) 10*3/mm3      Lymphocytes, Absolute 1.08 10*3/mm3      Monocytes, Absolute 0.38 10*3/mm3      Eosinophils, Absolute 0.87 (H) 10*3/mm3      Basophils, Absolute 0.03 10*3/mm3      Immature Grans, Absolute 0.02 10*3/mm3      nRBC 0.0 /100 WBC     CBC & Differential [[id number]] Collected:  09/10/17 0734    Specimen:  Blood Updated:  09/10/17 0805     Narrative:       The following orders were created for panel order CBC & Differential.  Procedure                               Abnormality         Status                     ---------                               -----------         ------                     CBC Auto Differential[[id number]]        Abnormal            Final result                 Please view results for these tests on the individual orders.    CBC Auto Differential [[id number]]  (Abnormal) Collected:  09/10/17 0734    Specimen:  Blood Updated:  09/10/17 0805     WBC 4.14 (L) 10*3/mm3      RBC 2.79 (L) 10*6/mm3      Hemoglobin 8.2 (L) g/dL      Hematocrit 25.8 (L) %      MCV 92.5 fL      MCH 29.4 pg      MCHC 31.8 g/dL      RDW 16.0 (H) %      RDW-SD 54.0 fl      MPV 10.9 fL      Platelets 115 (L) 10*3/mm3      Neutrophil % 44.5 %      Lymphocyte % 26.1 %      Monocyte % 8.2 %      Eosinophil % 20.8 (H) %      Basophil % 0.2 %      Immature Grans % 0.2 %      Neutrophils, Absolute 1.84 (L) 10*3/mm3      Lymphocytes, Absolute 1.08 10*3/mm3      Monocytes, Absolute 0.34 10*3/mm3      Eosinophils, Absolute 0.86 (H) 10*3/mm3      Basophils, Absolute 0.01 10*3/mm3      Immature Grans, Absolute 0.01 10*3/mm3      nRBC 0.0 /100 WBC     Basic Metabolic Panel [[id number]]  (Abnormal) Collected:  09/10/17 0734    Specimen:  Blood Updated:  09/10/17 0814     Glucose 112 (H) mg/dL      BUN 31 (H) mg/dL      Creatinine 1.20 mg/dL      Sodium 137 mmol/L      Potassium 4.0 mmol/L      Chloride 104 mmol/L      CO2 23.0 (L) mmol/L      Calcium 9.3 mg/dL      eGFR Non African Amer 62 mL/min/1.73      BUN/Creatinine Ratio 25.8 (H)     Anion Gap 14.0 mmol/L     Narrative:       Abnormal estimated GFR should be followed by more specific studies to confirm end stage chronic renal disease. The equation used for calculation may not be accurate for patients less than 19 years old, greater than 70 years old, patients at extremes of weight, malnutrition, or with acute renal  dysfunction.    CBC & Differential [[id number]] Collected:  09/11/17 0548    Specimen:  Blood Updated:  09/11/17 0626    Narrative:       The following orders were created for panel order CBC & Differential.  Procedure                               Abnormality         Status                     ---------                               -----------         ------                     CBC Auto Differential[[id number]]        Abnormal            Final result                 Please view results for these tests on the individual orders.    CBC Auto Differential [[id number]]  (Abnormal) Collected:  09/11/17 0548    Specimen:  Blood Updated:  09/11/17 0626     WBC 3.66 (L) 10*3/mm3      RBC 2.83 (L) 10*6/mm3      Hemoglobin 8.4 (L) g/dL      Hematocrit 26.5 (L) %      MCV 93.6 fL      MCH 29.7 pg      MCHC 31.7 g/dL      RDW 16.0 (H) %      RDW-SD 55.2 (H) fl      MPV 10.6 fL      Platelets 118 (L) 10*3/mm3      Neutrophil % 39.6 %      Lymphocyte % 27.9 %      Monocyte % 8.5 %      Eosinophil % 23.2 (H) %      Basophil % 0.5 %      Immature Grans % 0.3 %      Neutrophils, Absolute 1.45 (L) 10*3/mm3      Lymphocytes, Absolute 1.02 10*3/mm3      Monocytes, Absolute 0.31 10*3/mm3      Eosinophils, Absolute 0.85 (H) 10*3/mm3      Basophils, Absolute 0.02 10*3/mm3      Immature Grans, Absolute 0.01 10*3/mm3      nRBC 0.0 /100 WBC     Basic Metabolic Panel [[id number]]  (Abnormal) Collected:  09/11/17 0548    Specimen:  Blood Updated:  09/11/17 0634     Glucose 103 (H) mg/dL      BUN 37 (H) mg/dL      Creatinine 1.20 mg/dL      Sodium 139 mmol/L      Potassium 3.9 mmol/L      Chloride 106 mmol/L      CO2 23.0 (L) mmol/L      Calcium 9.6 mg/dL      eGFR Non African Amer 62 mL/min/1.73      BUN/Creatinine Ratio 30.8 (H)     Anion Gap 13.9 mmol/L     Narrative:       Abnormal estimated GFR should be followed by more specific studies to confirm end stage chronic renal disease. The equation used for calculation may not be accurate for  patients less than 19 years old, greater than 70 years old, patients at extremes of weight, malnutrition, or with acute renal dysfunction.          Pertinent Radiology Results:    Imaging Results (all)     Procedure Component Value Units Date/Time    MRI Foot Left Without Contrast [[id number]] Collected:  08/25/17 1954     Updated:  08/25/17 1956    Narrative:       FINAL REPORT    CLINICAL HISTORY:  LOOKING FOR OSTEO. RECENT AMPUTATION AND DELAYED WOUND HEALING INCREASE IN PAIN TO FOOT AND LEG    FINDINGS:  Multiplanar multisequence imaging of the foot was performed without the intravenous administration of contrast. Images are degraded by motion artifact limiting evaluation. There postsurgical changes from recent midfoot amputation. There is prominent soft   tissue swelling without discrete drainable fluid collection to suggest abscess. There is bone marrow edema of the third metatarsal. Cannot exclude osteomyelitis.    Impression:    Cellulitis post amputation. Cannot exclude third metatarsal osteomyelitis. Given the limitation by motion artifact, recommend three-phase bone scan      Impression:       Authenticated by Latrell Clemente MD on 08/25/2017 07:54:38 PM    MRI Tibia Fibula Left Without Contrast [[id number]] Collected:  08/25/17 1956     Updated:  08/1958    Narrative:       FINAL REPORT    CLINICAL HISTORY:  INCREASE PAIN, RECENT AMPUTATION OF LEFT FOOT WITH NEW PAIN TO TIBAL TUBEROSITY 3 DAYS.    FINDINGS:  Multiplanar multisequence imaging of the leg was performed without the intravenous administration of contrast. Images are degraded by motion artifact.    There are postsurgical changes at the tibiotalar joint. 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 Remaining bone marrow signal is within normal limits. There is no acute fracture or or cortical erosion. There is no drainable tissue fluid collection.    Impression:    Postsurgical and chronic changes but no acute abnormality      Impression:        Authenticated by Latrell Clemente MD on 08/25/2017 07:56:41 PM    SCANNED - IMAGING [[id number]] Resulted:  08/25/17      Updated:  08/29/17 1430          Condition on Discharge:  Stable        Code status during the hospital stay: Full code       Discharge Disposition:    Home-Health Care [facility] [place], [place]    Discharge Medication:   Vasquez Marie   Home Medication Instructions MARYURI:[id number]    Printed on:09/12/17 0839   Medication Information                      albuterol (PROVENTIL HFA;VENTOLIN HFA) 108 (90 BASE) MCG/ACT inhaler  Inhale 2 puffs Every 4 (Four) Hours As Needed for Wheezing.             atorvastatin (LIPITOR) 40 MG tablet  Take 1 tablet by mouth Every Night.             clopidogrel (PLAVIX) 75 MG tablet  Take 1 tablet by mouth Daily.             colchicine 0.6 MG tablet  Take 1 tablet by mouth Daily.             DULoxetine (CYMBALTA) 30 MG capsule  Take 1 capsule by mouth Daily.             gabapentin (NEURONTIN) 300 MG capsule  Take 1 capsule by mouth tonight, then tomorrow take 1 capsule in the morning and 1 in the evening, then take 1 three times daily thereafter             HYDROcodone-acetaminophen (NORCO)  MG per tablet  Take 1 tablet by mouth Every 4 (Four) Hours As Needed for Moderate Pain             hydroxychloroquine (PLAQUENIL) 200 MG tablet  Take 1 tablet by mouth Daily.             levoFLOXacin (LEVAQUIN) 500 MG tablet  Take 1 tablet by mouth Daily             LORazepam (ATIVAN) 1 MG tablet  Take 1 tablet by mouth 2 (Two) Times a Day As Needed for Anxiety.             losartan-hydrochlorothiazide (HYZAAR) 50-12.5 MG per tablet  Take 1 tablet by mouth Daily.             metoprolol tartrate (LOPRESSOR) 25 MG tablet  Take 1 tablet by mouth Every 12 (Twelve) Hours.             Misc. Devices (COMMODE BEDSIDE) misc  1 Device Every 6 (Six) Hours.             pantoprazole (PROTONIX) 40 MG EC tablet  Take 1 tablet by mouth Daily.             PHARMACY MEDS TO BED CONSULT  Daily  (Monday-Friday).                 Discharge Diet:         Activity at Discharge:     Activity Instructions     As tolerated               Follow-up Appointments:    Future Appointments  Date Time Provider Department Center   9/12/2017 3:15 PM Marilyn K Vermeesch, MD [facility] None     Additional Instructions for the Follow-ups that You Need to Schedule     Ambulatory Referral to Home Health    As directed    Wound Vac change Mon, Wed, Fri. Black foam with 125 mmHg continuous negative pressure, .   Face to Face Visit Date:  8/31/2017   Follow-up Provider for Plan of Care?:  I treated the patient in an acute care facility and will not continue treatment after discharge.   Follow-up Provider:  VERMEESCH, MARILYN K   Reason/Clinical Findings:  Diabetic foot ulcer with transmetatarsal amputation   Describe mobility limitations that make leaving home difficult:  Foot amputation, Wound Vac   Nursing/Therapeutic Services Requested:   Skilled Nursing  Physical Therapy      Skilled nursing orders:   Medication education  Wound vac application and instruction  Cardiopulmonary assessments      PT orders:   Therapeutic exercise  Gait Training  Strengthening      Weight Bearing Status:  As Tolerated   Frequency:  1 Week 1       Ambulatory Referral to Home Health    As directed    Face to Face Visit Date:  9/11/2017   Follow-up Provider for Plan of Care?:  I treated the patient in an acute care facility and will not continue treatment after discharge.   Follow-up Provider:  VERMEESCH, MARILYN K   Reason/Clinical Findings:  LEFT FOOT OSTEOMYELITIS   Describe mobility limitations that make leaving home difficult:  UNABLE TO DRIVE WITH LEFT FOOT AMPUTATION, FOR OSTEOMYELITIS   Nursing/Therapeutic Services Requested:   Skilled Nursing  Physical Therapy      Skilled nursing orders:   Pain management  Medication education  Wound care dressing/changes      Instructions:  WOUND VAC APPLICATION THREE TIMES WEEKLY   PT orders:  Transfer  training Comment - PARTIAL MINIMAL NONWEIGHTBEARING STATUS TO LEFT HEEL    Frequency:  1 Week 1       Discharge Follow-up with PCP    As directed    Follow Up Details:  Dr Vermeesch, tomorrow       Discharge Follow-up with Specialty    As directed    Specialty:  Dr Campbell on Friday in office                 Test Results Pending at Discharge: None           Sia Bartlett,   09/12/17  8:39 AM      Medication risks and benefits were discussed in great detail. The patient reported being satisfied with the current treatment plan and the care delivered while hospitalized.     Time:  I spent 45 minutes preparing discharge counseling and teaching.

## 2017-09-11 NOTE — SIGNIFICANT NOTE
09/11/17 1042   Incision 08/29/17 1748 Left foot   Placement Date/Time: 08/29/17 1748   Side: Left  Location: foot   Incision WDL WDL   Dressing Appearance intact   Appearance moist;tenderness   Incision Length (cm) 4.4   Incision Width (cm) 9.5   Incision Depth (cm) 0.75   Drainage Characteristics/Odor sanguineous   Drainage Amount small   Wound Cleaning irrigated with;sterile normal saline   Wound Interventions other (see comments)  (NPWT dressing changed)   Dressing Dressing changed;foam;transparent film   Therapy Setting (Negative Pressure Wound Therapy) continuous therapy   Pressure Setting (Negative Pressure Wound Therapy) 125 mmHg   Dressing (Negative Pressure Wound Therapy) foam, black   Sponges Inserted (Negative Pressure Wound Therapy) 1   Sponges Removed (Negative Pressure Wound Therapy) 1

## 2017-09-11 NOTE — PROGRESS NOTES
Continued Stay Note   [facility]     Patient Name: Vasquez Marie  MRN: [id number]  Today's Date: 9/11/2017    Admit Date: 8/25/2017          Discharge Plan       09/11/17 1355    Case Management/Social Work Plan    Plan Cab voucher available when patient ready for discharge .  Patient does not have medicaid.     Patient/Family In Agreement With Plan yes    Final Note    Final Note Following for social and discharge planning.       09/11/17 1259    Case Management/Social Work Plan    Plan Reviewing and assisting due to LACE status. Called [facility] and left a message regarding bed status. Contacted [facility] at [facility] and faxed updates to see if patient can obtain bed.     Patient/Family In Agreement With Plan yes    Additional Comments UPdated CM.     Final Note    Final Note Assisting as needed due to LACE status.       09/11/17 1113    Case Management/Social Work Plan    Additional Comments [facility] is considering accpeting pt pending Insurance verifcation               Discharge Codes     None        Expected Discharge Date and Time     Expected Discharge Date Expected Discharge Time    Aug 31, 2017             Zoraida Cosme

## 2017-09-11 NOTE — DISCHARGE PLACEMENT REQUEST
"Vasquez Marie (56 y.o. Male)     Date of Birth Social Security Number Address Home Phone MRN    1960  [address]    [facility] [address] [phone] [id number]    [employer] Marital Status          None Single       Admission Date Admission Type Admitting Provider Attending Provider Department, Room/Bed    17 Emergency Murali Garber DO Gandee, Julie G, DO [facility] SURG  4, 432/1    Discharge Date Discharge Disposition Discharge Destination                      Attending Provider: Sia Bartlett DO     Allergies:  No Known Allergies    Isolation:  None   Infection:  None   Code Status:  FULL    Ht:  68\" (172.7 cm)   Wt:  198 lb 8 oz (90 kg)    Admission Cmt:  None   Principal Problem:  Subacute osteomyelitis of left foot [M86.272]                 Active Insurance as of 2017     Primary Coverage     Payor Plan Insurance Group Employer/Plan Group    HUMANA MEDICARE REPLACEMENT HUMANA MEDICARE REPL [id number]     Payor Plan Address Payor Plan Phone Number Effective From Effective To    [address] [address] [phone] 2017     [place] [address]       Subscriber Name Subscriber Birth Date Member ID       VASQUEZ MARIE 1960 [id number]                 Emergency Contacts      (Rel.) Home Phone Work Phone Mobile Phone    Maral Marie (Sister) [phone] -- --            Insurance Information                HUMANA MEDICARE REPLACEMENT/HUMANA MEDICARE REPL Phone: [phone]    Subscriber: Vasquez Marie Subscriber#: [id number]    Group#: [id number] Precert#:              History & Physical      Murali Garber DO at 2017 10:33 PM              [facility]OSPITAL[facility]   HISTORY AND PHYSICAL      Name:  Vasquez Marie   Age:  56 y.o.  Sex:  male  :  1960  MRN:  [id number]   Visit Number:  [id number]  Admission Date:  2017  Date Of Service:  17  Primary Care Physician:  Kelsey MCCARTHY" Vermeesch, MD    History Obtained From:    patient    Chief Complaint:     Left foot pain     History Of Presenting Illness:      Patient is a 56-year-old male who developed a wound in his foot in June.  He was admitted here for cellulitis of the left foot and developed osteomyelitis.  He had intervention on the left SFA by Dr. Storm with good resultant flow.  Dr. Campbell saw the patient and did amputation of the left third digit.  He also did a left foot skin graft.  Culture grew out Enterobacter cloacae.  Dr. Rodriguez noted he had osteomyelitis and had recommended 6 weeks of ertapenem and a PICC line in her note from her office, however the patient claims he did not get this.  He was on oral antibiotics as an outpatient.  This was doxycycline.  He does have borderline diabetes.  He did worsen so Dr. Campbell sent him to  where the patient claims he had a transmetatarsal amputation about a couple weeks ago.  He had a wound VAC placed after that.  However he has been noncompliant with the wound VAC, with home health nurse frequently following and finding the wound VAC in the trash.  Home health nurse discharged him due to noncompliance.  He apparently frequently was drinking beer when they came.  He adamantly denies that he is an alcoholic.  On review of the chart he has missed multiple appointments with his primary care physician as well as Dr. Campbell.  He was last seen by home health on Monday who wrapped the foot and he did not take the dressing off since then.  There is now redness ascending up his leg and he claims there is 9/10 pain in the left leg.  Specifically in the foot, nothing is making it better, any touch to it makes it worse.  There is purulent malodorous drainage from the foot, and dressings are adhered to the wounds.  Patient denies any chest pressure, shortness breath, nausea, vomiting, or diarrhea.  He also has fever and chills.    Review Of Systems:     General ROS: positive for  - chills and  fever  Psychological ROS: negative  Ophthalmic ROS: negative  ENT ROS: negative  Allergy and Immunology ROS: negative  Hematological and Lymphatic ROS: negative  Endocrine ROS: negative  Breast ROS: negative  Respiratory ROS: negative  Cardiovascular ROS: negative  Gastrointestinal ROS: negative  Genito-Urinary ROS: negative  Musculoskeletal ROS: positive for - pain in foot - left  Neurological ROS: negative  Dermatological ROS: positive for skin lesion changes  Left foot     Past Medical History:    Coronary artery disease, osteomyelitis, atrial fibrillation, GI bleed, peripheral arterial disease, hypertension, gout, RA, DVT several years ago, CVA, hep C, cirrhosis, left occluded SFA, chronic kidney disease, COPD    Past Surgical history:    Cardiac stent, EGD, cardioversion 2017, ankle surgery, transmetatarsal amputation, left total hip arthroplasty, left total shoulder replacement      Social History:    Every day smoker since he was a teenager, does drink beer frequently, but claims he has never had withdrawal and it is not a problem.  He occasionally drinks liquor as well but is not drank that for 3 weeks.  He is to use drugs when he was younger including speed.  He denies any IV drug history.    Family History:    Mother had breast cancer and rheumatoid arthritis, colon cancer in his sister, father  of heart disease    Allergies:      Review of patient's allergies indicates no known allergies.    Home Medications:    Prior to Admission Medications     Prescriptions Last Dose Informant Patient Reported? Taking?    albuterol (PROVENTIL HFA;VENTOLIN HFA) 108 (90 BASE) MCG/ACT inhaler   Yes No    Inhale 2 puffs Every 4 (Four) Hours As Needed for Wheezing.    amiodarone (PACERONE) 200 MG tablet   No No    Take 1 tablet by mouth Daily.    atorvastatin (LIPITOR) 40 MG tablet   No No    Take 1 tablet by mouth Every Night.    clopidogrel (PLAVIX) 75 MG tablet   No No    Take 1 tablet by mouth Daily.     colchicine 0.6 MG tablet   No No    Take 1 tablet by mouth Daily.    collagenase (SANTYL) 250 UNIT/GM ointment   No No    Apply  topically Daily.    DULoxetine (CYMBALTA) 30 MG capsule   No No    Take 1 capsule by mouth Daily.    hydroxychloroquine (PLAQUENIL) 200 MG tablet   No No    Take 1 tablet by mouth Daily.    losartan-hydrochlorothiazide (HYZAAR) 50-12.5 MG per tablet   No No    Take 1 tablet by mouth Daily.    metoprolol tartrate (LOPRESSOR) 25 MG tablet   No No    Take 1 tablet by mouth Every 12 (Twelve) Hours.    Patient taking differently:  Take 25 mg by mouth 2 (Two) Times a Day.    pantoprazole (PROTONIX) 40 MG EC tablet   No No    Take 1 tablet by mouth Daily.             Hospital Scheduled Meds:      [START ON 8/26/2017] albuterol 2.5 mg Nebulization Q6H - RT   [START ON 8/26/2017] amiodarone 200 mg Oral Q24H   atorvastatin 40 mg Oral Nightly   [START ON 8/26/2017] clopidogrel 75 mg Oral Daily   [START ON 8/26/2017] colchicine 0.6 mg Oral Daily   [START ON 8/26/2017] DULoxetine 30 mg Oral Daily   [START ON 8/26/2017] enoxaparin 40 mg Subcutaneous Daily   ertapenem 1 g Intravenous Q24H   [START ON 8/26/2017] hydroxychloroquine 200 mg Oral Daily   [START ON 8/26/2017] losartan-hydrochlorothiazide 1 tablet Oral Daily   [START ON 8/26/2017] metoprolol tartrate 25 mg Oral BID   [START ON 8/26/2017] pantoprazole 40 mg Oral Daily         Pharmacy to dose vancomycin         Vital Signs:    Temp:  [97.9 °F (36.6 °C)-98.6 °F (37 °C)] 98.2 °F (36.8 °C)  Heart Rate:  [53-80] 80  Resp:  [16-22] 22  BP: (133-149)/(68-90) 133/81    Last 3 weights    08/25/17  1812 08/25/17 2127   Weight: 230 lb (104 kg) 230 lb (104 kg)       Body mass index is 34.97 kg/(m^2).    Physical Exam:      General Appearance:    Alert, cooperative, in no acute distress   Head:    Normocephalic, without obvious abnormality, atraumatic   Eyes:            Lids and lashes normal, conjunctivae and sclerae normal, no   icterus, no pallor,  corneas clear, PERRLA   Ears:    Ears appear intact with no abnormalities noted   Throat:   No oral lesions, no thrush, oral mucosa moist   Neck:   No adenopathy, supple, trachea midline, no thyromegaly, no   carotid bruit, no JVD   Back:     No kyphosis present, no scoliosis present, no skin lesions,      erythema or scars, no tenderness to percussion or                   palpation,   range of motion normal   Lungs:     Clear to auscultation,respirations regular, even and                  unlabored    Heart:    Regular rhythm and normal rate, normal S1 and S2, no            murmur, no gallop, no rub, no click   Chest Wall:    No abnormalities observed   Abdomen:     Normal bowel sounds, no masses, no organomegaly, soft        non-tender, non-distended, no guarding, no rebound                tenderness   Rectal:     Deferred   Extremities:   Moves all extremities well, Redness in the left leg from the knee down.  TMA noted on the left with open wounds which are draining and malodorous.     Pulses:   Pulses palpable and equal bilaterally   Skin:   No bleeding, bruising or rash   Lymph nodes:   No palpable adenopathy   Neurologic:   Cranial nerves 2 - 12 grossly intact, sensation intact, DTR       present and equal bilaterally             Telemetry:  A. fib rate controlled    I have personally looked at  telemetry strips.    Labs:    Results from last 7 days  Lab Units 08/25/17  1848   LACTATE mmol/L 1.9   WBC 10*3/mm3 8.62   HEMOGLOBIN g/dL 9.7*   HEMATOCRIT % 29.3*   MCV fL 93.6   MCHC g/dL 33.1   PLATELETS 10*3/mm3 257           Results from last 7 days  Lab Units 08/25/17  1848   SODIUM mmol/L 137   POTASSIUM mmol/L 4.3   CHLORIDE mmol/L 100   CO2 mmol/L 22.0*   BUN mg/dL 13   CREATININE mg/dL 1.20   EGFR IF NONAFRICN AM mL/min/1.73 62   CALCIUM mg/dL 9.7   GLUCOSE mg/dL 123*   ALBUMIN g/dL 3.80   BILIRUBIN mg/dL 1.3   ALK PHOS U/L 160*   AST (SGOT) U/L 82*   ALT (SGPT) U/L 25   Estimated Creatinine Clearance:  78.4 mL/min (by C-G formula based on Cr of 1.2).  No results found for: AMMONIA          Lab Results   Component Value Date    HGBA1C 6.20 (H) 04/08/2017     Lab Results   Component Value Date    TSH 2.250 04/07/2017     No results found for: PREGTESTUR, PREGSERUM, HCG, HCGQUANT  Pain Management Panel     Pain Management Panel Latest Ref Rng & Units 6/20/2017    AMPHETAMINES SCREEN, URINE Negative Negative    BARBITURATES SCREEN Negative Negative    BENZODIAZEPINE SCREEN, URINE Negative Positive(A)    BUPRENORPHINE Negative Negative    COCAINE SCREEN, URINE Negative Negative    METHADONE SCREEN, URINE Negative Negative    METHAMPHETAMINE UR Negative Negative                          Radiology:    Imaging Results (last 7 days)     Procedure Component Value Units Date/Time    MRI Foot Left Without Contrast [[id number]] Collected:  08/25/17 1954     Updated:  08/25/17 1956    Narrative:       FINAL REPORT    CLINICAL HISTORY:  LOOKING FOR OSTEO. RECENT AMPUTATION AND DELAYED WOUND HEALING INCREASE IN PAIN TO FOOT AND LEG    FINDINGS:  Multiplanar multisequence imaging of the foot was performed without the intravenous administration of contrast. Images are degraded by motion artifact limiting evaluation. There postsurgical changes from recent midfoot amputation. There is prominent soft   tissue swelling without discrete drainable fluid collection to suggest abscess. There is bone marrow edema of the third metatarsal. Cannot exclude osteomyelitis.    Impression:    Cellulitis post amputation. Cannot exclude third metatarsal osteomyelitis. Given the limitation by motion artifact, recommend three-phase bone scan      Impression:       Authenticated by Latrell Clemente MD on 08/25/2017 07:54:38 PM    MRI Tibia Fibula Left Without Contrast [[id number]] Collected:  08/25/17 1956     Updated:  08/1960    Narrative:       FINAL REPORT    CLINICAL HISTORY:  INCREASE PAIN, RECENT AMPUTATION OF LEFT FOOT WITH NEW PAIN TO  TIBAL TUBEROSITY 3 DAYS.    FINDINGS:  Multiplanar multisequence imaging of the leg was performed without the intravenous administration of contrast. Images are degraded by motion artifact.    There are postsurgical changes at the tibiotalar joint. 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 Remaining bone marrow signal is within normal limits. There is no acute fracture or or cortical erosion. There is no drainable tissue fluid collection.    Impression:    Postsurgical and chronic changes but no acute abnormality      Impression:       Authenticated by Latrell Clemente MD on 2017 07:56:41 PM          Assessment:    1.  Osteomyelitis of left foot status post TMA, with apparent wound infection and left leg cellulitis  2.  Chronic atrial fibrillation  3.  Rheumatoid arthritis  4.  Borderline diabetes mellitus type 2  5.  Hypertension  6.  History coronary artery disease with stents  7.  Noncompliance  8.  Possible history of alcoholism  9.  Peripheral arterial disease status post intervention by Dr. Storm  10.  Hepatitis C  11.  Possible cirrhosis.  Plan:     We'll have wound care see the patient.  Consult Dr. Campbell.  Placed on vancomycin and ertapenem.  Morphine for pain.  Place him on insulin sliding scale.  Also will place him on alcohol withdrawal protocol and give him a banana bag.  Anticipate this patient will need a BKA in the near future.  We'll see what Dr. Campbell advises.  We will hold his Xarelto at the present time in view of possible surgery.  Place him on subcutaneous Lovenox.  Continue with his home medications.  Further recommendations will depend on the clinical course.    Murali Garber DO  17  10:34 PM     Electronically signed by Murali Garber DO at 2017 10:48 PM      Liang Polanco MD at 2017  2:55 PM                [facility]    [facility]GRESS NOTE    Name:  Vasquez Marie   Age:  56 y.o.  Sex:  male  :  1960  MRN:  [id number]    Visit Number:  [id number]  Admission Date:  8/25/2017  Date Of Service:  08/29/17  Primary Care Physician:  Marilyn K. Vermeesch, MD     LOS: 4 days :  Patient Care Team:  Marilyn K Vermeesch, MD as PCP - General (Internal Medicine):    Chief Complaint:      Follow-up of left transmetatarsal amputation stump infection.    Subjective / Interval History:     Patient is currently sitting up on the bed and is comfortable at rest.  He was transferred to the regular floor from the telemetry floor yesterday.  He was also seen by Dr. Campbell wear scheduled him for wound debridement to be done today.  Dr. Campbell feels that the patient may need wound debridement and wound VAC placement without any further amputation at this time.  He has stressed medical compliance to the patient.    Patient was admitted on 8/25/2017 from the emergency room with foul-smelling discharge from the left transmetatarsal amputation stump.  He was seen by his primary care physician and was subsequently sent to the emergency room.  Patient was recently admitted to [facility] where he underwent his transmetatarsal amputation and was subsequently sent home with wound VAC.  Patient was receiving home health for wound care but was subsequently discharged from their service.  Unfortunately, he has not been very compliant with his wound care and has been admitted with ongoing infection.  He has been started on IV antibiotic therapy with vancomycin and ertapenem since admission.  He has been afebrile.  Denies any chest pain or shortness of breath.  He does leave alone.  During his last admission here in June 2017, he was seen by Dr. Storm for his peripheral arterial disease and underwent peripheral angiogram and percutaneous intervention to his left leg on 6/20/2017.      Review of Systems:     General ROS: Patient denies any fevers, chills or loss of consciousness.  Respiratory ROS: Denies cough or shortness of breath.  Cardiovascular ROS: Denies  chest pain or palpitations. No history of exertional chest pain.  Gastrointestinal ROS: Denies nausea and vomiting. Denies any abdominal pain. No diarrhea.  Neurological ROS: Denies any focal weakness. No loss of consciousness. Denies any numbness.     Vital Signs:    Temp:  [97.5 °F (36.4 °C)-98.2 °F (36.8 °C)] 98.2 °F (36.8 °C)  Heart Rate:  [63-70] 64  Resp:  [16-18] 16  BP: (123-152)/(62-77) 123/66    Intake and output:    I/O last 3 completed shifts:  In: 3144 [P.O.:1520; I.V.:1124; IV Piggyback:500]  Out: 3450 [Urine:3450]  I/O this shift:  In: -   Out: 500 [Urine:500]    Physical Examination:    General Appearance:  Alert and cooperative, not in any acute distress.   Head:  Atraumatic and normocephalic, without obvious abnormality.   Eyes:          PERRLA, conjunctivae and sclerae normal, no Icterus. No pallor. Extra-occular movements are within normal limits.   Neck: Supple, trachea midline, no thyromegaly, no carotid bruit.   Lungs:   Chest shape is normal. Breath sounds heard bilaterally equally.  No crackles or wheezing. No Pleural rub or bronchial breathing.   Heart:  Normal S1 and S2, no murmur, no gallop, no rub. No JVD   Abdomen:   Normal bowel sounds, no masses, no organomegaly. Soft       non-tender, obese, no guarding, no rebound tenderness.   Extremities: Moves all extremities well, 1+ edema, no cyanosis, no            clubbing.  Surgical bandage is noted on the left foot with the left transmetatarsal amputation.   Skin: No bleeding, bruising or rash.   Neurologic: Awake, alert and oriented times 3. Moves all 4 extremities equally.   Laboratory results:      Results from last 7 days  Lab Units 08/29/17  0604 08/26/17  0651 08/25/17  1848   SODIUM mmol/L 139 140 137   POTASSIUM mmol/L 4.4 3.6 4.3   CHLORIDE mmol/L 110* 107 100   CO2 mmol/L 19.0* 20.0* 22.0*   BUN mg/dL 10 13 13   CREATININE mg/dL 1.20 1.40* 1.20   CALCIUM mg/dL 8.8 8.9 9.7   BILIRUBIN mg/dL  --   --  1.3   ALK PHOS U/L  --   --   160*   ALT (SGPT) U/L  --   --  25   AST (SGOT) U/L  --   --  82*   GLUCOSE mg/dL 87 130* 123*       Results from last 7 days  Lab Units 08/29/17  0604 08/26/17  0651 08/25/17  1848   WBC 10*3/mm3 3.65* 5.57 8.62   HEMOGLOBIN g/dL 8.3* 9.1* 9.7*   HEMATOCRIT % 27.0* 28.7* 29.3*   PLATELETS 10*3/mm3 136 176 257               Results from last 7 days  Lab Units 08/25/17  2128   WOUNDCX  Heavy growth (4+) Alcaligenes species*       I have reviewed the patient's laboratory results.    Radiology results:    Imaging Results (last 24 hours)     Procedure Component Value Units Date/Time    SCANNED - IMAGING [[id number]] Resulted:  08/25/17      Updated:  08/29/17 1430        Medication Review:     I have reviewed the patients active and prn medications.     Principal Problem:    Subacute osteomyelitis of left foot  Active Problems:    Chronic hepatitis C virus infection    Coronary artery disease involving native coronary artery of native heart without angina pectoris    Chronic atrial fibrillation    Essential hypertension    Depression with anxiety    Gastroesophageal reflux disease with esophagitis    COPD (chronic obstructive pulmonary disease)    Rheumatoid arthritis    Peripheral vascular disease    Liver mass    Assessment:    1.  Acute infection and cellulitis of the left transmetatarsal amputation stump, present on admission.  2.  Peripheral arterial disease s/p left SFA stent and intervention to the peroneal arteries on 6/20/2017.  3.  Paroxysmal atrial fibrillation.  4.  Coronary artery disease on medical therapy.  5.  Essential hypertension.  6.  COPD, stable.  7.  Chronic hepatitis C infection.    Plan:    Patient is currently on IV antibiotic therapy with ertapenem and vancomycin for his left foot amputation stump cellulitis.  His dressing has been changed.  Wound cultures are growing Alcaligenes species which are sensitive to carbopenems and Rocephin.  I will continue the current antibiotic regimen until wound  debridement and will switch him over to Rocephin from tomorrow.  He will be continued on his home medications including antiplatelet agents.  MRI of the foot done in the emergency room showed cellulitis but was not certain about osteomyelitis especially due to motion artifact.  I discussed options of going to short-term rehabilitation for wound care and wound VAC and he is open to that idea.  Further recommendations depend upon his clinical course.    Liang Polanco MD  17  2:55 PM    Portions of this note may have been completed with Dragon, a voice recognition program. Not all errors in transcription may have been detected prior to signing.       Electronically signed by Liang Polanco MD at 2017  4:00 PM      Wolfgang Campbell DPM at 2017  4:38 PM          H&P reviewed. The patient was examined and there are no changes to the H&P.     Electronically signed by Wolfgang Campbell DPM at 2017  4:38 PM    Source Note                   [facility]    PROGRESS NOTE    Name:  Vasquez Marie   Age:  56 y.o.  Sex:  male  :  1960  MRN:  [id number]   Visit Number:  [id number]  Admission Date:  2017  Date Of Service:  17  Primary Care Physician:  Marilyn K. Vermeesch, MD     LOS: 4 days :  Patient Care Team:  Marilyn K Vermeesch, MD as PCP - General (Internal Medicine):    Chief Complaint:      Follow-up of left transmetatarsal amputation stump infection.    Subjective / Interval History:     Patient is currently sitting up on the bed and is comfortable at rest.  He was transferred to the regular floor from the telemetry floor yesterday.  He was also seen by Dr. Campbell wear scheduled him for wound debridement to be done today.  Dr. Campbell feels that the patient may need wound debridement and wound VAC placement without any further amputation at this time.  He has stressed medical compliance to the patient.    Patient was admitted on 2017 from the emergency room  with foul-smelling discharge from the left transmetatarsal amputation stump.  He was seen by his primary care physician and was subsequently sent to the emergency room.  Patient was recently admitted to [facility] where he underwent his transmetatarsal amputation and was subsequently sent home with wound VAC.  Patient was receiving home health for wound care but was subsequently discharged from their service.  Unfortunately, he has not been very compliant with his wound care and has been admitted with ongoing infection.  He has been started on IV antibiotic therapy with vancomycin and ertapenem since admission.  He has been afebrile.  Denies any chest pain or shortness of breath.  He does leave alone.  During his last admission here in June 2017, he was seen by Dr. Storm for his peripheral arterial disease and underwent peripheral angiogram and percutaneous intervention to his left leg on 6/20/2017.      Review of Systems:     General ROS: Patient denies any fevers, chills or loss of consciousness.  Respiratory ROS: Denies cough or shortness of breath.  Cardiovascular ROS: Denies chest pain or palpitations. No history of exertional chest pain.  Gastrointestinal ROS: Denies nausea and vomiting. Denies any abdominal pain. No diarrhea.  Neurological ROS: Denies any focal weakness. No loss of consciousness. Denies any numbness.     Vital Signs:    Temp:  [97.5 °F (36.4 °C)-98.2 °F (36.8 °C)] 98.2 °F (36.8 °C)  Heart Rate:  [63-70] 64  Resp:  [16-18] 16  BP: (123-152)/(62-77) 123/66    Intake and output:    I/O last 3 completed shifts:  In: 3144 [P.O.:1520; I.V.:1124; IV Piggyback:500]  Out: 3450 [Urine:3450]  I/O this shift:  In: -   Out: 500 [Urine:500]    Physical Examination:    General Appearance:  Alert and cooperative, not in any acute distress.   Head:  Atraumatic and normocephalic, without obvious abnormality.   Eyes:          PERRLA, conjunctivae and sclerae normal, no Icterus. No pallor. Extra-occular  movements are within normal limits.   Neck: Supple, trachea midline, no thyromegaly, no carotid bruit.   Lungs:   Chest shape is normal. Breath sounds heard bilaterally equally.  No crackles or wheezing. No Pleural rub or bronchial breathing.   Heart:  Normal S1 and S2, no murmur, no gallop, no rub. No JVD   Abdomen:   Normal bowel sounds, no masses, no organomegaly. Soft       non-tender, obese, no guarding, no rebound tenderness.   Extremities: Moves all extremities well, 1+ edema, no cyanosis, no            clubbing.  Surgical bandage is noted on the left foot with the left transmetatarsal amputation.   Skin: No bleeding, bruising or rash.   Neurologic: Awake, alert and oriented times 3. Moves all 4 extremities equally.   Laboratory results:      Results from last 7 days  Lab Units 08/29/17  0604 08/26/17  0651 08/25/17  1848   SODIUM mmol/L 139 140 137   POTASSIUM mmol/L 4.4 3.6 4.3   CHLORIDE mmol/L 110* 107 100   CO2 mmol/L 19.0* 20.0* 22.0*   BUN mg/dL 10 13 13   CREATININE mg/dL 1.20 1.40* 1.20   CALCIUM mg/dL 8.8 8.9 9.7   BILIRUBIN mg/dL  --   --  1.3   ALK PHOS U/L  --   --  160*   ALT (SGPT) U/L  --   --  25   AST (SGOT) U/L  --   --  82*   GLUCOSE mg/dL 87 130* 123*       Results from last 7 days  Lab Units 08/29/17  0604 08/26/17  0651 08/25/17  1848   WBC 10*3/mm3 3.65* 5.57 8.62   HEMOGLOBIN g/dL 8.3* 9.1* 9.7*   HEMATOCRIT % 27.0* 28.7* 29.3*   PLATELETS 10*3/mm3 136 176 257               Results from last 7 days  Lab Units 08/25/17  2128   WOUNDCX  Heavy growth (4+) Alcaligenes species*       I have reviewed the patient's laboratory results.    Radiology results:    Imaging Results (last 24 hours)     Procedure Component Value Units Date/Time    SCANNED - IMAGING [[id number]] Resulted:  08/25/17      Updated:  08/29/17 1430        Medication Review:     I have reviewed the patients active and prn medications.     Principal Problem:    Subacute osteomyelitis of left foot  Active Problems:    Chronic  hepatitis C virus infection    Coronary artery disease involving native coronary artery of native heart without angina pectoris    Chronic atrial fibrillation    Essential hypertension    Depression with anxiety    Gastroesophageal reflux disease with esophagitis    COPD (chronic obstructive pulmonary disease)    Rheumatoid arthritis    Peripheral vascular disease    Liver mass    Assessment:    1.  Acute infection and cellulitis of the left transmetatarsal amputation stump, present on admission.  2.  Peripheral arterial disease s/p left SFA stent and intervention to the peroneal arteries on 6/20/2017.  3.  Paroxysmal atrial fibrillation.  4.  Coronary artery disease on medical therapy.  5.  Essential hypertension.  6.  COPD, stable.  7.  Chronic hepatitis C infection.    Plan:    Patient is currently on IV antibiotic therapy with ertapenem and vancomycin for his left foot amputation stump cellulitis.  His dressing has been changed.  Wound cultures are growing Alcaligenes species which are sensitive to carbopenems and Rocephin.  I will continue the current antibiotic regimen until wound debridement and will switch him over to Rocephin from tomorrow.  He will be continued on his home medications including antiplatelet agents.  MRI of the foot done in the emergency room showed cellulitis but was not certain about osteomyelitis especially due to motion artifact.  I discussed options of going to short-term rehabilitation for wound care and wound VAC and he is open to that idea.  Further recommendations depend upon his clinical course.    Liang Polanco MD  08/29/17  2:55 PM    Portions of this note may have been completed with Dragon, a voice recognition program. Not all errors in transcription may have been detected prior to signing.        Electronically signed by Liang Polanco MD at 8/29/2017  4:00 PM              Lines, Drains & Airways    Active LDAs     Name:   Placement date:   Placement time:   Site:   Days:     PICC Line - Single Lumen 09/06/17 1012 basilic vein (medial side of arm), right 4 Fr;length (specify)  09/06/17    1012      5                Prior to Admission Medications     Prescriptions Last Dose Informant Patient Reported? Taking?    albuterol (PROVENTIL HFA;VENTOLIN HFA) 108 (90 BASE) MCG/ACT inhaler Unknown  Yes No    Inhale 2 puffs Every 4 (Four) Hours As Needed for Wheezing.    amiodarone (PACERONE) 200 MG tablet 8/24/2017  No No    Take 1 tablet by mouth Daily.    atorvastatin (LIPITOR) 40 MG tablet 8/24/2017  No No    Take 1 tablet by mouth Every Night.    clopidogrel (PLAVIX) 75 MG tablet Not Taking  No No    Take 1 tablet by mouth Daily.    Patient not taking:  Reported on 8/28/2017    colchicine 0.6 MG tablet 8/24/2017  No No    Take 1 tablet by mouth Daily.    collagenase (SANTYL) 250 UNIT/GM ointment Unknown  No No    Apply  topically Daily.    DULoxetine (CYMBALTA) 30 MG capsule 8/25/2017  No No    Take 1 capsule by mouth Daily.    hydroxychloroquine (PLAQUENIL) 200 MG tablet 8/24/2017  No No    Take 1 tablet by mouth Daily.    losartan-hydrochlorothiazide (HYZAAR) 50-12.5 MG per tablet 8/24/2017  No No    Take 1 tablet by mouth Daily.    metoprolol tartrate (LOPRESSOR) 25 MG tablet 8/24/2017  No No    Take 1 tablet by mouth Every 12 (Twelve) Hours.    Patient taking differently:  Take 25 mg by mouth 2 (Two) Times a Day.    pantoprazole (PROTONIX) 40 MG EC tablet 8/25/2017  No No    Take 1 tablet by mouth Daily.          Physician Progress Notes (last 24 hours) (Notes from 9/10/2017 12:55 PM through 9/11/2017 12:55 PM)     No notes of this type exist for this encounter.        Consult Notes (last 24 hours) (Notes from 9/10/2017 12:55 PM through 9/11/2017 12:55 PM)     No notes of this type exist for this encounter.           Physical Therapy Notes (last 24 hours) (Notes from 9/10/2017 12:55 PM through 9/11/2017 12:55 PM)      Harika Lei PTA at 9/10/2017  6:16 PM  Version 1 of 1          Problem: Patient Care Overview (Adult)  Goal: Plan of Care Review  Outcome: Ongoing (interventions implemented as appropriate)    09/10/17 1430   Coping/Psychosocial Response Interventions   Plan Of Care Reviewed With patient   Patient Care Overview   Progress no change   Outcome Evaluation   Outcome Summary/Follow up Plan Pt peformed transfers with darco shoe donned. Per nursing Dr. Campbell wanted wound vac dressing changed, however checked dressing and it has good seal and PTA rewrapped foot with carlos and pt reported felt better with it re-wrapped . Spoke with rehab manager and d/t this PTA being PRN and not experienced with wound vac dressing changes, dressing change will be completed in am by experienced wound vac therapist. Attempted to call Dr. Campbell however ladeladio that answered phone stated it was the wrong number. Nursing and rehab staff aware of this PTA's concerns regarding wound vac dressing change and lack of experience with wound vacs. con't with PT POC          Problem: Inpatient Physical Therapy  Goal: Transfer Training Goal 1 LTG- PT  Outcome: Ongoing (interventions implemented as appropriate)    08/30/17 1330 09/09/17 1249 09/10/17 1430   Transfer Training PT LTG   Transfer Training PT LTG, Date Established 08/30/17 --  --    Transfer Training PT LTG, Time to Achieve 2 wks --  --    Transfer Training PT LTG, Activity Type sit to stand/stand to sit;bed to chair /chair to bed --  --    Transfer Training PT LTG, [place] Level supervision required --  --    Transfer Training PT LTG, Assist Device walker, rolling --  --    Transfer Training PT LTG, Additional Goal NWB L LE --  --    Transfer Training PT LTG, Date Goal Reviewed --  09/09/17 --    Transfer Training PT LTG, Outcome --  --  goal ongoing       Goal: Gait Training Goal LTG- PT  Outcome: Ongoing (interventions implemented as appropriate)    08/30/17 1330 09/09/17 1249 09/10/17 1430   Gait Training PT LTG   Gait Training Goal PT LTG, Date  Established 17 --  --    Gait Training Goal PT LTG, Time to Achieve 2 wks --  --    Gait Training Goal PT LTG, [place] Level contact guard assist --  --    Gait Training Goal PT LTG, Assist Device walker, rolling --  --    Gait Training Goal PT LTG, Distance to Achieve 30 --  --    Gait Training Goal PT LTG, Additional Goal NWB L LE --  --    Gait Training Goal PT LTG, Date Goal Reviewed --  17 --    Gait Training Goal PT LTG, Outcome --  --  goal ongoing       Goal: Wound Care Goal 1 STG- PT  Outcome: Ongoing (interventions implemented as appropriate)         Electronically signed by Harika Lei PTA at 9/10/2017  6:16 PM      Harika Lei PTA at 9/10/2017  6:18 PM  Version 1 of 1         Acute Care - Physical Therapy Treatment Note  [facility]     Patient Name: Vasquez Marie  : 1960  MRN: [id number]  Today's Date: 9/10/2017  Onset of Illness/Injury or Date of Surgery Date: 17  Date of Referral to PT: 17  Referring Physician: Adrian    Admit Date: 2017    Visit Dx:    ICD-10-CM ICD-9-CM   1. Subacute osteomyelitis of left foot M86.272 730.07   2. Impaired functional mobility, balance, gait, and endurance Z74.09 V49.89   3. Coronary artery disease involving native coronary artery of native heart without angina pectoris I25.10 414.01   4. Essential hypertension I10 401.9   5. Gastroesophageal reflux disease with esophagitis K21.0 530.11   6. Other emphysema J43.8 492.8   7. Peripheral vascular disease I73.9 443.9     Patient Active Problem List   Diagnosis   • Chronic hepatitis C virus infection   • Coronary artery disease involving native coronary artery of native heart without angina pectoris   • Chronic atrial fibrillation   • Essential hypertension   • Thrombocytopenia   • Depression with anxiety   • Gastroesophageal reflux disease with esophagitis   • Hyperlipidemia   • Tobacco abuse   • COPD (chronic obstructive pulmonary disease)   • Gout   • Congestive  heart disease   • RLS (restless legs syndrome)   • Rheumatoid arthritis   • Typical atrial flutter   • Atrial flutter   • Cerebrovascular accident (CVA)   • Borderline diabetes   • Dry gangrene   • Anemia   • Peripheral vascular disease   • Subacute osteomyelitis of left foot   • Liver mass               Adult Rehabilitation Note       09/10/17 1430 09/09/17 1249 09/08/17 0901    Rehab Assessment/Intervention    Discipline physical therapy assistant  -CC physical therapy assistant  -CK physical therapy assistant  -RM    Document Type therapy note (daily note)  -CC therapy note (daily note)  -CK therapy note (daily note)  -RM    Subjective Information agree to therapy;complains of;pain  -CC agree to therapy;complains of;pain;fatigue  -CK agree to therapy;complains of;pain  -RM    Patient Effort, Rehab Treatment adequate  -CC adequate  -CK adequate  -RM    Symptoms Noted During/After Treatment none  -CC  none  -RM    Precautions/Limitations --   WBAT on L heel with Darco shoe or boot.  -CC  --   WBAT on L heel with Darco shoe or boot.   -RM    Recorded by [CC] Harika Lei PTA [CK] Jessica Quiles PTA [RM] Dipak Scott PTA    Pain Assessment    Pain Assessment 0-10  -CC  0-10  -RM    Pain Score 8  -CC  8  -RM    Post Pain Score 8  -CC  8  -RM    Pain Type Acute pain;Surgical pain  -CC  Acute pain;Surgical pain  -RM    Pain Location Foot  -CC  Foot  -RM    Pain Orientation Left  -CC  Left  -RM    Pain Intervention(s) Repositioned  -CC  Repositioned;Ambulation/increased activity  -RM    Recorded by [CC] Harika Lei PTA  [RM] Dipak Scott PTA    Mobility Assessment/Training    Left Lower Extremity Weight-Bearing weight-bearing as tolerated    on L heel with Darco shoe or boot.  -CC weight-bearing as tolerated  -CK weight-bearing as tolerated   with Darco Shoe   -RM    Recorded by [CC] Harika Lei PTA [CK] Jessica Quiles PTA [RM] Dipak Scott PTA    Bed Mobility, Assessment/Treatment     Bed Mobility, Assistive Device head of bed elevated  -CC  bed rails;head of bed elevated  -RM    Bed Mob, Supine to Sit, [place] conditional independence  -CC  conditional independence  -RM    Recorded by [CC] Harika Lei PTA  [RM] Dipak Scott PTA    Transfer Assessment/Treatment    Transfers, Bed-Chair [place] nonverbal cues required (demo/gesture);supervision required  -CC      Transfers, Bed-Chair-Bed, Assist Device standard walker  -CC      Transfers, Sit-Stand [place] verbal cues required;supervision required  -CC stand by assist  -CK stand by assist;contact guard assist  -RM    Transfers, Stand-Sit [place] verbal cues required;supervision required  -CC stand by assist  -CK stand by assist;contact guard assist  -RM    Transfers, Sit-Stand-Sit, Assist Device standard walker  -CC rolling walker  -CK rolling walker  -RM    Transfer, Maintain Weight Bearing Status able to maintain weight bearing status  -CC able to maintain weight bearing status  -CK able to maintain weight bearing status  -RM    Transfer, Safety Issues balance decreased during turns;step length decreased;weight-shifting ability decreased  -CC balance decreased during turns  -CK balance decreased during turns;step length decreased;weight-shifting ability decreased  -RM    Transfer, Impairments strength decreased;impaired balance;pain  -CC strength decreased;impaired balance  -CK strength decreased;impaired balance;pain  -RM    Recorded by [CC] Harika Lei PTA [CK] Jessica Quiles PTA [RM] Dipak Scott PTA    Gait Assessment/Treatment    Gait, [place] Level  stand by assist;contact guard assist  -CK stand by assist;contact guard assist  -RM    Gait, Assistive Device  rolling walker  -CK rolling walker  -RM    Gait, Distance (Feet)  48  -CK 44  -RM    Gait, Gait Pattern Analysis  swing-to gait  -CK swing-to gait  -RM    Gait, Gait Deviations   antalgic  -RM    Gait, Maintain Weight Bearing Status  able  to maintain weight bearing status  -CK able to maintain weight bearing status  -RM    Gait, Safety Issues  weight-shifting ability decreased  -CK weight-shifting ability decreased  -RM    Gait, Impairments  strength decreased;impaired balance  -CK strength decreased;impaired balance;pain  -RM    Recorded by  [CK] Jessica Quiles PTA [RM] Dipak Scott PTA    Therapy Exercises    Bilateral Lower Extremities  AROM:;10 reps;sitting;ankle pumps/circles;LAQ;knee flexion  -CK     Recorded by  [CK] Jessica Quiles PTA     Positioning and Restraints    Pre-Treatment Position in bed  -CC sitting in chair/recliner  -CK in bed  -RM    Post Treatment Position chair  -CC chair  -CK chair  -RM    In Chair reclined;call light within reach;encouraged to call for assist  -CC call light within reach;encouraged to call for assist  -CK reclined;call light within reach;encouraged to call for assist;notified nsg  -RM    Recorded by [CC] Harika Lei PTA [CK] Jessica Quiles, PTA [RM] Dipak Scott PTA      User Key  (r) = Recorded By, (t) = Taken By, (c) = Cosigned By    Initials Name Effective Dates    CC Harika Lei, SANYA 10/26/16 -     CK Jessica Quiles, SANYA 10/26/16 -     RM Dipak Scott, SANYA 10/26/16 -                 IP PT Goals       09/10/17 1430 09/09/17 1249 09/08/17 1630    Transfer Training PT LTG    Transfer Training PT  LTG, Date Goal Reviewed  09/09/17  -CK     Transfer Training PT LTG, Outcome goal ongoing  -CC  goal ongoing  -RM    Gait Training PT LTG    Gait Training Goal PT LTG, Date Goal Reviewed  09/09/17  -CK     Gait Training Goal PT LTG, Outcome goal ongoing  -CC  goal ongoing  -RM    Wound Care PT STG    Wound Care PT STG 1, Outcome goal ongoing  -CC        09/06/17 1601 09/05/17 1559 09/04/17 1711    Transfer Training PT LTG    Transfer Training PT LTG, Outcome  goal ongoing  -RM     Strength Goal PT LTG    Strength Goal PT LTG, Outcome  goal ongoing  -RM     Wound Care PT STG    Wound  Care PT STG 1, Outcome goal ongoing  -RM  goal ongoing  -RM      09/04/17 1305 09/03/17 1601 09/01/17 1435    Transfer Training PT LTG    Transfer Training PT LTG, Outcome goal ongoing  -RM goal ongoing  -JR     Gait Training PT LTG    Gait Training Goal PT LTG, Outcome goal ongoing  -RM goal ongoing  -JR     Strength Goal PT LTG    Strength Goal PT LTG, Outcome goal ongoing  -RM goal ongoing  -JR     Wound Care PT STG    Wound Care PT STG 1, Date Established   09/01/17  -LM    Wound Care PT STG 1, Time to Achieve   2 wks  -LM    Wound Care PT STG 1, Location   L TMA site  -LM    Wound Care PT STG 1, No S&S of Infection   yes  -LM    Wound Care PT STG 1, Decrease Wound Size   Decrease wound dimensions by 0.5 cm in all measures.  -LM    Wound Care PT STG 1, Outcome   goal ongoing  -LM      08/30/17 1330          Transfer Training PT LTG    Transfer Training PT LTG, Date Established 08/30/17  -LM      Transfer Training PT LTG, Time to Achieve 2 wks  -LM      Transfer Training PT LTG, Activity Type sit to stand/stand to sit;bed to chair /chair to bed  -LM      Transfer Training PT LTG, [place] Level supervision required  -LM      Transfer Training PT LTG, Assist Device walker, rolling  -LM      Transfer Training PT LTG, Additional Goal NWB L LE  -LM      Transfer Training PT LTG, Outcome goal ongoing  -LM      Gait Training PT LTG    Gait Training Goal PT LTG, Date Established 08/30/17  -LM      Gait Training Goal PT LTG, Time to Achieve 2 wks  -LM      Gait Training Goal PT LTG, [place] Level contact guard assist  -LM      Gait Training Goal PT LTG, Assist Device walker, rolling  -LM      Gait Training Goal PT LTG, Distance to Achieve 30  -LM      Gait Training Goal PT LTG, Additional Goal NWB L LE  -LM      Gait Training Goal PT LTG, Outcome goal ongoing  -LM      Strength Goal PT LTG    Strength Goal PT LTG, Date Established 08/30/17  -LM      Strength Goal PT LTG, Time to Achieve 2 wks  -LM      Strength  Goal PT LTG, Measure to Achieve Patient will perform ther ex x 15 reps to improve functional strength for mobility.  -LM      Strength Goal PT LTG, Outcome goal ongoing  -LM        User Key  (r) = Recorded By, (t) = Taken By, (c) = Cosigned By    Initials Name Provider Type    JR Sia Whaley, PT Physical Therapist    LM Cristal Martinez, PT Physical Therapist    CC Harika Lei, PTA Physical Therapy Assistant    CK Jessica Quiles, PTA Physical Therapy Assistant    RM Dipak Scott, PTA Physical Therapy Assistant          Physical Therapy Education     Title: PT OT SLP Therapies (Done)     Topic: Physical Therapy (Done)     Point: Mobility training (Done)    Learning Progress Summary    Learner Readiness Method Response Comment Documented by Status   Patient Acceptance E VU   09/09/17 1308 Done    Acceptance E,D VU,DU   09/08/17 1630 Done    Acceptance E,D VU,NR   09/05/17 1558 Done    Acceptance E,D VU,NR   09/04/17 1305 Done    Acceptance E VU Purpose of PT/POC;  importance of NWB L LE for wound healing; proper use of RW for safe transfers/ambulation.  08/30/17 1330 Done               Point: Home exercise program (Done)    Learning Progress Summary    Learner Readiness Method Response Comment Documented by Status   Patient Acceptance E VU   09/09/17 1308 Done    Acceptance E,D VU,NR   09/04/17 1305 Done    Acceptance E VU Purpose of PT/POC;  importance of NWB L LE for wound healing; proper use of RW for safe transfers/ambulation.  08/30/17 1330 Done               Point: Precautions (Done)    Learning Progress Summary    Learner Readiness Method Response Comment Documented by Status   Patient Acceptance E VU  CK 09/09/17 1308 Done    Acceptance E,D VU,NR   09/04/17 1305 Done    Acceptance E VU Importance of maintaining NWB on LLE JR 09/03/17 1559 Done    Acceptance E VU Purpose of wound vac dressing change.  09/01/17 1434 Done    Acceptance E VU Purpose of PT/POC;  importance of NWB L LE for  wound healing; proper use of RW for safe transfers/ambulation. LM 08/30/17 1330 Done                      User Key     Initials Effective Dates Name Provider Type Discipline    JR 10/26/16 -  Sia Whaley, PT Physical Therapist PT    LM 10/26/16 -  Cristal Martinez, PT Physical Therapist PT    CK 10/26/16 -  Jessica Quiles, PTA Physical Therapy Assistant PT    RM 10/26/16 -  Dipak Scott, PTA Physical Therapy Assistant PT                    PT Recommendation and Plan  Anticipated Discharge Disposition: inpatient rehabilitation facility  Planned Therapy Interventions: balance training, bed mobility training, gait training, home exercise program, patient/family education, strengthening, transfer training  PT Frequency: daily  Plan of Care Review  Plan Of Care Reviewed With: patient  Progress: no change  Outcome Summary/Follow up Plan: Pt peformed transfers with darco shoe donned. Per nursing Dr. Campbell wanted wound vac dressing changed, however checked dressing and it has good seal and rewrapped foot . Spoke with rehab manager and d/t this PTA beig PRN and not experienced with wound vac dressing changes, dressing change will be completed in am by edperienced wound vac therapist. Attempted to call Dr. Campbell however  that answered phone stated it was the wrong number. Nursing and rehab staff aware of  this PTA's concerns regarding wound vac dressing change and lack of experience with wound vacs. con't with PT POC           Outcome Measures       09/10/17 1430 09/09/17 1300 09/08/17 0901    How much help from another person do you currently need...    Turning from your back to your side while in flat bed without using bedrails? 4  -CC 4  -CK 4  -RM    Moving from lying on back to sitting on the side of a flat bed without bedrails? 4  -CC 4  -CK 4  -RM    Moving to and from a bed to a chair (including a wheelchair)? 3  -CC 3  -CK 3  -RM    Standing up from a chair using your arms (e.g., wheelchair, bedside chair)? 4  -CC  4  -CK 4  -RM    Climbing 3-5 steps with a railing? 2  -CC 2  -CK 2  -RM    To walk in hospital room? 3  -CC 3  -CK 3  -RM    AM-PAC 6 Clicks Score 20  -CC 20  -CK 20  -RM    Functional Assessment    Outcome Measure Options AM-PAC 6 Clicks Basic Mobility (PT)  -CC AM-PAC 6 Clicks Basic Mobility (PT)  -CK AM-PAC 6 Clicks Basic Mobility (PT)  -RM      User Key  (r) = Recorded By, (t) = Taken By, (c) = Cosigned By    Initials Name Provider Type    CC Harika Lei PTA Physical Therapy Assistant    CK Jessica Quiles PTA Physical Therapy Assistant    RM Dipak Scott PTA Physical Therapy Assistant           Time Calculation:         PT Charges       09/10/17 1430          Time Calculation    Start Time 1430  -CC      PT Received On 09/10/17  -      PT Goal Re-Cert Due Date 09/11/17  -      Time Calculation- PT    Total Timed Code Minutes- PT 15 minute(s)  -        User Key  (r) = Recorded By, (t) = Taken By, (c) = Cosigned By    Initials Name Provider Type     Harika Lei PTA Physical Therapy Assistant          Therapy Charges for Today     Code Description Service Date Service Provider Modifiers Qty    [id number] HC PT THERAPEUTIC ACT EA 15 MIN 9/10/2017 Harika Lei PTA GP 1          PT G-Codes  Outcome Measure Options: AM-PAC 6 Clicks Basic Mobility (PT)    Harika Lei PTA  9/10/2017            Electronically signed by Harika Lie PTA at 9/10/2017  6:18 PM        Occupational Therapy Notes (last 24 hours) (Notes from 9/10/2017 12:55 PM through 9/11/2017 12:55 PM)     No notes of this type exist for this encounter.

## 2017-09-12 ENCOUNTER — TELEPHONE (OUTPATIENT)
Dept: INTERNAL MEDICINE | Facility: CLINIC | Age: 57
End: 2017-09-12

## 2017-09-13 ENCOUNTER — TRANSITIONAL CARE MANAGEMENT TELEPHONE ENCOUNTER (OUTPATIENT)
Dept: INTERNAL MEDICINE | Facility: CLINIC | Age: 57
End: 2017-09-13

## 2017-09-13 NOTE — THERAPY DISCHARGE NOTE
Acute Care - Physical Therapy Discharge Summary  [facility]       Patient Name: Vasquez Marie  : 1960  MRN: [id number]    Today's Date: 2017  Onset of Illness/Injury or Date of Surgery Date: 17    Date of Referral to PT: 17  Referring Physician: Adrian      Admit Date: 2017      PT Recommendation and Plan    Visit Dx:    ICD-10-CM ICD-9-CM   1. Subacute osteomyelitis of left foot M86.272 730.07   2. Impaired functional mobility, balance, gait, and endurance Z74.09 V49.89   3. Coronary artery disease involving native coronary artery of native heart without angina pectoris I25.10 414.01   4. Essential hypertension I10 401.9   5. Gastroesophageal reflux disease with esophagitis K21.0 530.11   6. Other emphysema J43.8 492.8   7. Peripheral vascular disease I73.9 443.9             Outcome Measures       17 1042 09/10/17 1430       How much help from another person do you currently need...    Turning from your back to your side while in flat bed without using bedrails? 4  -JR 4  -CC     Moving from lying on back to sitting on the side of a flat bed without bedrails? 4  -JR 4  -CC     Moving to and from a bed to a chair (including a wheelchair)? 3  -JR 3  -CC     Standing up from a chair using your arms (e.g., wheelchair, bedside chair)? 4  -JR 4  -CC     Climbing 3-5 steps with a railing? 3  -JR 2  -CC     To walk in hospital room? 3  -JR 3  -CC     AM-PAC 6 Clicks Score 21  - 20  -CC     Functional Assessment    Outcome Measure Options AM-PAC 6 Clicks Basic Mobility (PT)  -JR AM-PAC 6 Clicks Basic Mobility (PT)  -CC       User Key  (r) = Recorded By, (t) = Taken By, (c) = Cosigned By    Initials Name Provider Type    JR Sia Whaley, PT Physical Therapist    CC Harika Lei, PTA Physical Therapy Assistant                      IP PT Goals       17 1315 09/10/17 1430 17 1249    Transfer Training PT LTG    Transfer Training PT LTG, Date Established 17  -       Transfer Training PT LTG, Time to Achieve 2 wks  -JR      Transfer Training PT LTG, Activity Type sit to stand/stand to sit;bed to chair /chair to bed  -JR      Transfer Training PT LTG, [place] Level conditional independence  -JR      Transfer Training PT LTG, Assist Device walker, standard  -JR      Transfer Training PT LTG, Additional Goal PWB LLE with Darco Shoe  -JR      Transfer Training PT  LTG, Date Goal Reviewed 09/11/17  -JR  09/09/17  -CK    Transfer Training PT LTG, Outcome goal revised  -JR goal ongoing  -CC     Gait Training PT LTG    Gait Training Goal PT LTG, Date Established 09/11/17  -JR      Gait Training Goal PT LTG, Time to Achieve 2 wks  -JR      Gait Training Goal PT LTG, [place] Level conditional independence  -JR      Gait Training Goal PT LTG, Assist Device walker, standard  -JR      Gait Training Goal PT LTG, Distance to Achieve 100  -JR      Gait Training Goal PT LTG, Additional Goal PWB LLE with Darco Shoe  -JR      Gait Training Goal PT LTG, Date Goal Reviewed 09/11/17  -JR  09/09/17  -CK    Gait Training Goal PT LTG, Outcome goal revised  -JR goal ongoing  -CC     Wound Care PT STG    Wound Care PT STG 1, Date Established 09/11/17  -JR      Wound Care PT STG 1, Time to Achieve 2 wks  -JR      Wound Care PT STG 1, Location L TMA site   -JR      Wound Care PT STG 1, No S&S of Infection yes  -JR      Wound Care PT STG 1, Decrease Wound Size by 0.5 cm in all measures  -JR      Wound Care PT STG 1, Outcome goal revised  -JR goal ongoing  -CC       09/08/17 1630 09/06/17 1601 09/05/17 1559    Transfer Training PT LTG    Transfer Training PT LTG, Outcome goal ongoing  -RM  goal ongoing  -RM    Gait Training PT LTG    Gait Training Goal PT LTG, Outcome goal ongoing  -RM      Strength Goal PT LTG    Strength Goal PT LTG, Outcome   goal ongoing  -RM    Wound Care PT STG    Wound Care PT STG 1, Outcome  goal ongoing  -RM       09/04/17 1711 09/04/17 1305 09/03/17 1601    Transfer  Training PT LTG    Transfer Training PT LTG, Outcome  goal ongoing  -RM goal ongoing  -JR    Gait Training PT LTG    Gait Training Goal PT LTG, Outcome  goal ongoing  -RM goal ongoing  -JR    Strength Goal PT LTG    Strength Goal PT LTG, Outcome  goal ongoing  -RM goal ongoing  -JR    Wound Care PT STG    Wound Care PT STG 1, Outcome goal ongoing  -RM        09/01/17 1435          Wound Care PT STG    Wound Care PT STG 1, Date Established 09/01/17  -LM      Wound Care PT STG 1, Time to Achieve 2 wks  -LM      Wound Care PT STG 1, Location L TMA site  -LM      Wound Care PT STG 1, No S&S of Infection yes  -LM      Wound Care PT STG 1, Decrease Wound Size Decrease wound dimensions by 0.5 cm in all measures.  -LM      Wound Care PT STG 1, Outcome goal ongoing  -LM        User Key  (r) = Recorded By, (t) = Taken By, (c) = Cosigned By    Initials Name Provider Type    JR Sia Whaley, PT Physical Therapist    LM Cristal Martinez, PT Physical Therapist    CC Harika Lei, PTA Physical Therapy Assistant    ABELINO Quiles, PTA Physical Therapy Assistant    RM Dipak Scott, PTA Physical Therapy Assistant              PT Discharge Summary  Anticipated Discharge Disposition: inpatient rehabilitation facility  Reason for Discharge: Discharge from facility  Outcomes Achieved: Patient able to partially acheive established goals  Discharge Destination: Home with home health      Sia Whaley, PT   9/13/2017

## 2017-09-14 ENCOUNTER — OFFICE VISIT (OUTPATIENT)
Dept: INTERNAL MEDICINE | Facility: CLINIC | Age: 57
End: 2017-09-14

## 2017-09-14 VITALS
OXYGEN SATURATION: 98 % | DIASTOLIC BLOOD PRESSURE: 62 MMHG | TEMPERATURE: 98.1 F | HEART RATE: 71 BPM | SYSTOLIC BLOOD PRESSURE: 128 MMHG

## 2017-09-14 DIAGNOSIS — M86.272 SUBACUTE OSTEOMYELITIS OF LEFT FOOT (HCC): Primary | ICD-10-CM

## 2017-09-14 PROCEDURE — 99495 TRANSJ CARE MGMT MOD F2F 14D: CPT | Performed by: INTERNAL MEDICINE

## 2017-09-14 RX ORDER — HYDROCODONE BITARTRATE AND ACETAMINOPHEN 5; 325 MG/1; MG/1
1 TABLET ORAL 2 TIMES DAILY PRN
Qty: 10 TABLET | Refills: 0 | Status: SHIPPED | OUTPATIENT
Start: 2017-09-14 | End: 2017-09-25 | Stop reason: SDUPTHER

## 2017-09-14 NOTE — PROGRESS NOTES
Transitional Care Follow Up Visit  Subjective     Vasquez Davidson Marie is a 57 y.o. male who presents for a transitional care management visit.    Within 48 business hours after discharge our office contacted him via telephone to coordinate his care and needs.      I reviewed and discussed the details of that call along with the discharge summary, hospital problems, inpatient lab results, inpatient diagnostic studies, and consultation reports with Vasquez.    Date of TCM Phone Call 4/13/2017 6/27/2017 9/13/2017   [facility]   Date of Admission 4/7/2017 6/19/2017 8/25/2017   Date of Discharge 4/11/2017 6/26/2017 9/11/2017   Risk for Readmission (LACE Score) 15 18 -   Discharge Disposition Home or Self Care [facility] or Self Care       History of Present Illness   Course During Hospital Stay: The patient is a 56 yr old man with previous wound and osteomyelitis recurrent, with history of noncompliance, who had progressive left foot osteomyelitis with recent left transmetatarsal amputation. The patient was admitted at [facility], after recent amputation and patient had left foot skin graft placed to the amputation site. His culture grew Enterobacter. He had seen ID Dr Rodriguez in past and was noncompliant with therapy in past. He was continued on oral doxycycline and wound vac. Apparently he had become frustrated with the situation, was reportedly drinking beer with friends and had discarded his wound vac in the trash. The patient states this is not accurate information, that he does still have his wound vac machine fully functioning at home and would welcome home health nursing to return. He states his motivation is continued improvement of his pain in the left foot.      The patient was admitted on 8-25-17 and placed on iv Invanz and Vancomycin initially with PICC line. He received lovenox for dvt prophylaxis. Cultures grew Providencia and  alcaligenes, sensitive to Levaquin. Patient has history of atrial fibrillation and was on amiodarone and metoprolol.  Amiodarone was discontinued and patient remains only on metoprolol for rate control at this time.  Additionally, he complained of recurrent excess bleeding of the foot with increased fall risk, so until wound is better healed his xarelto was held.  He remains on Plavix only at this time for his atrial fibrillation.     The patient has refused to wait for further inpatient rehab placement and he had not been accepted to any facility despite active interaction between case management and multiple facilities. He did not feel he was a current fall risk, as he had benefited over the past 2 weeks with physical therapy. He was able to stand, ambulate with walker and remained compliant with directions to minimally place partial nonweightbearing status to left heel and sits safely in his wheelchair. He agreed to wear the boot/shoe as well. He requested bedside commode and new wheelchair with foot rests, which were ordered. He agreed to be compliant with follow up appointment and he reported being able to financially afford to pay for taxi ride to visits. He agreed to see PCP for further discussion and refills of his pain and anxiety medication.       Since home he is trying to ambulate with use of his walker.  He is requesting a bedside commode.   He has been taking his ABX as directed.    Has his wound vac in place today.  Home health nurses came and applied wound vac yesterday.  [facility] has also sent PT to the house for him.   He is trying to make his own meals.    Has been having some loose stools.  No mucous or blood in his stools.    He does have some SOA.  Has smoked a few cigarettes since he got home.    No chest pains.    Does have some right shoulder pain currently, but gout is not flaring.  He states that he does not currently have any pain medication for his foot.  He has been taking his  gabapentin and Cymbalta as directed.           The following portions of the patient's history were reviewed and updated as appropriate: allergies, current medications, past family history, past medical history, past social history, past surgical history and problem list.    Review of Systems   Constitutional: Positive for fatigue. Negative for chills and fever.   Respiratory: Positive for shortness of breath.    Cardiovascular: Negative for chest pain, palpitations and leg swelling.   Gastrointestinal:        Loose stools   Musculoskeletal: Positive for arthralgias and gait problem.   Skin: Positive for wound.   Neurological: Positive for weakness.   All other systems reviewed and are negative.      Objective   Physical Exam   Constitutional: He is oriented to person, place, and time. He appears well-developed and well-nourished.   Patient appears tired and slightly irritable, using wheelchair and is in no apparent distress   HENT:   Head: Normocephalic and atraumatic.   Mouth/Throat: Oropharynx is clear and moist.   Eyes: Conjunctivae and EOM are normal. Pupils are equal, round, and reactive to light.   Neck: Normal range of motion. Neck supple. No thyromegaly present.   Cardiovascular: Normal rate, normal heart sounds and intact distal pulses.    No murmur heard.  Irregularly irregular   Pulmonary/Chest: Effort normal and breath sounds normal. No respiratory distress.   Abdominal: Soft. Bowel sounds are normal.   Musculoskeletal: He exhibits no edema.   Left lower extremity in the walking boot with wound VAC in place   Lymphadenopathy:     He has no cervical adenopathy.   Neurological: He is alert and oriented to person, place, and time. No cranial nerve deficit.   Psychiatric: Judgment normal.   Flat affect and depressed mood   Nursing note and vitals reviewed.      Assessment/Plan   Vasquez was seen today for transitional care management.    Diagnoses and all orders for this visit:    Subacute osteomyelitis of  left foot    Other orders  -     HYDROcodone-acetaminophen (NORCO) 5-325 MG per tablet; Take 1 tablet by mouth 2 (Two) Times a Day As Needed for Moderate Pain .    Discussed need to continue to use wound vac  Continue  care for nursing and physical therapy  Given #10 HCD for pain to use twice a day when necessary-we will obtain urine drug screen on next office visit as patient has had positive drug screen in the past  Encouraged patient to quit smoking to help facilitate wound healing  Will work on rehab for pt as he states he is having difficulty caring for himself at home and cooking meals, also feel his wound would heal better with inpatient wound care  Continue Cymbalta and gabapentin to help with pain control  Finish Levaquin for underlying wound infection  Ordered bedside commode and wheelchair today for patient    RTC 10 days

## 2017-09-21 ENCOUNTER — TELEPHONE (OUTPATIENT)
Dept: INTERNAL MEDICINE | Facility: CLINIC | Age: 57
End: 2017-09-21

## 2017-09-21 NOTE — TELEPHONE ENCOUNTER
ELMO FROM [facility] CALLED TO INFORM THAT THE PATIENT FELL THIS MORNING WHILE LEAVING HIS APARTMENT. ELMO STATES THAT THE PATIENT FELL ON HIS KNEES BUT THERE IS NO APPARENT INJURIES.

## 2017-09-25 ENCOUNTER — RESULTS ENCOUNTER (OUTPATIENT)
Dept: INTERNAL MEDICINE | Facility: CLINIC | Age: 57
End: 2017-09-25

## 2017-09-25 ENCOUNTER — OFFICE VISIT (OUTPATIENT)
Dept: INTERNAL MEDICINE | Facility: CLINIC | Age: 57
End: 2017-09-25

## 2017-09-25 VITALS
DIASTOLIC BLOOD PRESSURE: 60 MMHG | HEART RATE: 66 BPM | TEMPERATURE: 97.2 F | SYSTOLIC BLOOD PRESSURE: 122 MMHG | OXYGEN SATURATION: 95 %

## 2017-09-25 DIAGNOSIS — Z02.83 ENCOUNTER FOR DRUG SCREENING: ICD-10-CM

## 2017-09-25 DIAGNOSIS — R73.03 BORDERLINE DIABETES: Primary | ICD-10-CM

## 2017-09-25 DIAGNOSIS — Z79.899 OTHER LONG TERM (CURRENT) DRUG THERAPY: ICD-10-CM

## 2017-09-25 DIAGNOSIS — I10 ESSENTIAL HYPERTENSION: ICD-10-CM

## 2017-09-25 DIAGNOSIS — M1A.0290 CHRONIC GOUT OF ELBOW, UNSPECIFIED CAUSE, UNSPECIFIED LATERALITY: ICD-10-CM

## 2017-09-25 DIAGNOSIS — M86.272 SUBACUTE OSTEOMYELITIS OF LEFT FOOT (HCC): ICD-10-CM

## 2017-09-25 DIAGNOSIS — R30.0 DYSURIA: ICD-10-CM

## 2017-09-25 DIAGNOSIS — I73.9 PERIPHERAL VASCULAR DISEASE (HCC): ICD-10-CM

## 2017-09-25 PROCEDURE — 99214 OFFICE O/P EST MOD 30 MIN: CPT | Performed by: INTERNAL MEDICINE

## 2017-09-25 RX ORDER — COLCHICINE 0.6 MG/1
0.6 TABLET ORAL DAILY
Qty: 30 TABLET | Refills: 0 | Status: SHIPPED | OUTPATIENT
Start: 2017-09-25 | End: 2018-02-08 | Stop reason: SDUPTHER

## 2017-09-25 RX ORDER — LOSARTAN POTASSIUM AND HYDROCHLOROTHIAZIDE 12.5; 5 MG/1; MG/1
1 TABLET ORAL DAILY
Qty: 30 TABLET | Refills: 3 | Status: SHIPPED | OUTPATIENT
Start: 2017-09-25 | End: 2017-12-06 | Stop reason: HOSPADM

## 2017-09-25 RX ORDER — CLOPIDOGREL BISULFATE 75 MG/1
75 TABLET ORAL DAILY
Qty: 30 TABLET | Refills: 3 | Status: SHIPPED | OUTPATIENT
Start: 2017-09-25 | End: 2018-02-08 | Stop reason: SDUPTHER

## 2017-09-25 RX ORDER — DULOXETIN HYDROCHLORIDE 30 MG/1
30 CAPSULE, DELAYED RELEASE ORAL DAILY
Qty: 30 CAPSULE | Refills: 3 | Status: SHIPPED | OUTPATIENT
Start: 2017-09-25 | End: 2017-10-20

## 2017-09-25 RX ORDER — ALBUTEROL SULFATE 90 UG/1
2 AEROSOL, METERED RESPIRATORY (INHALATION) EVERY 4 HOURS PRN
Qty: 18 G | Refills: 3 | Status: SHIPPED | OUTPATIENT
Start: 2017-09-25 | End: 2017-12-18 | Stop reason: SDUPTHER

## 2017-09-25 RX ORDER — HYDROXYCHLOROQUINE SULFATE 200 MG/1
200 TABLET, FILM COATED ORAL DAILY
Qty: 30 TABLET | Refills: 5 | Status: SHIPPED | OUTPATIENT
Start: 2017-09-25 | End: 2018-02-08 | Stop reason: SDUPTHER

## 2017-09-25 RX ORDER — PANTOPRAZOLE SODIUM 40 MG/1
40 TABLET, DELAYED RELEASE ORAL DAILY
Qty: 30 TABLET | Refills: 5 | Status: SHIPPED | OUTPATIENT
Start: 2017-09-25 | End: 2018-02-08 | Stop reason: SDUPTHER

## 2017-09-25 RX ORDER — HYDROCODONE BITARTRATE AND ACETAMINOPHEN 5; 325 MG/1; MG/1
1 TABLET ORAL 2 TIMES DAILY PRN
Qty: 20 TABLET | Refills: 0 | Status: SHIPPED | OUTPATIENT
Start: 2017-09-25 | End: 2017-12-06 | Stop reason: HOSPADM

## 2017-09-25 RX ORDER — ATORVASTATIN CALCIUM 40 MG/1
40 TABLET, FILM COATED ORAL NIGHTLY
Qty: 30 TABLET | Refills: 11 | Status: SHIPPED | OUTPATIENT
Start: 2017-09-25 | End: 2018-02-08 | Stop reason: SDUPTHER

## 2017-09-25 NOTE — PROGRESS NOTES
Chief Complaint   Patient presents with   • Follow-up     10 days for subacute osteomyelitis of left foot. Received refill request from pharmacy today for Levaquin, Gabapentin and Lorazepam.     Subjective   Vasquez Marie is a 57 y.o. male.     HPI Comments: Here for followup of osteomyelitis and multiple medical problems including Afib, RA, DM, CAD, HTN, HLD, COPD, GERD, depression and hep C.   He states he has diarrhea.  Has some stomach cramps.  No mucous or blood.   Also has leg cramps.   Home health has been coming every other day to look at wound and change dressing.  He denies any fevers.  Has been compliant with wound VAC.  He is not checking his BS.  Has a glucometer but says he does not know how to use it.   No CP, SOB.  Some edema.   He is not eating well.  Does not cook much.   He fell one day at home.  Was supposed to see Dr Campbell the following day for wound care and never went to his appt.   PT has only come to the house twice per pt.   He denies any GERD sxs.  Patient is currently on Cymbalta for depression and chronic pain.  He states that Cymbalta does not help much for his pain or his depression.  He continues to smoke approximately one half pack of cigarettes daily.           The following portions of the patient's history were reviewed and updated as appropriate: allergies, current medications, past family history, past medical history, past social history, past surgical history and problem list.    Review of Systems   Constitutional: Negative for chills and fever.   Respiratory: Negative for shortness of breath.    Cardiovascular: Positive for leg swelling. Negative for chest pain and palpitations.   Gastrointestinal: Positive for diarrhea.   Musculoskeletal: Positive for arthralgias, gait problem and joint swelling.        Leg cramps   Skin: Positive for wound.   Psychiatric/Behavioral: Positive for dysphoric mood and sleep disturbance.   All other systems reviewed and are  negative.      Objective   /60  Pulse 66  Temp 97.2 °F (36.2 °C)  SpO2 95%  There is no height or weight on file to calculate BMI.  Physical Exam   Constitutional: He is oriented to person, place, and time. He appears well-developed and well-nourished.   Pleasant gentleman, seated in wheelchair, wound VAC on left lower extremity   HENT:   Head: Normocephalic and atraumatic.   Mouth/Throat: Oropharynx is clear and moist.   Eyes: Conjunctivae and EOM are normal. Pupils are equal, round, and reactive to light.   Neck: Normal range of motion. Neck supple. No thyromegaly present.   Cardiovascular: Normal rate, normal heart sounds and intact distal pulses.    No murmur heard.  Irregularly irregular, distant heart sounds   Pulmonary/Chest: Effort normal and breath sounds normal. No respiratory distress.   Abdominal: Soft. Bowel sounds are normal.   Musculoskeletal: He exhibits no edema.   Wound VAC noted on left lower extremity   Lymphadenopathy:     He has no cervical adenopathy.   Neurological: He is alert and oriented to person, place, and time. No cranial nerve deficit.   Skin:   Distal lower extremities with brawny skin color changes, +1 edema to knees bilaterally   Psychiatric: He has a normal mood and affect. Judgment normal.   Nursing note and vitals reviewed.      Assessment/Plan   Vasquez Marie is here today and the following problems have been addressed:      Vasquez was seen today for follow-up.    Diagnoses and all orders for this visit:    Borderline diabetes  -     Comprehensive Metabolic Panel  -     Hemoglobin A1c    Essential hypertension  -     losartan-hydrochlorothiazide (HYZAAR) 50-12.5 MG per tablet; Take 1 tablet by mouth Daily.  -     CBC & Differential  -     Comprehensive Metabolic Panel    Peripheral vascular disease  -     clopidogrel (PLAVIX) 75 MG tablet; Take 1 tablet by mouth Daily.    Chronic gout of elbow, unspecified cause, unspecified laterality  -     colchicine 0.6 MG  tablet; Take 1 tablet by mouth Daily.    Dysuria  -     POC Urinalysis Dipstick, Automated; Future    Other orders  -     albuterol (PROVENTIL HFA;VENTOLIN HFA) 108 (90 Base) MCG/ACT inhaler; Inhale 2 puffs Every 4 (Four) Hours As Needed for Wheezing.  -     metoprolol tartrate (LOPRESSOR) 25 MG tablet; Take 1 tablet by mouth Every 12 (Twelve) Hours.  -     pantoprazole (PROTONIX) 40 MG EC tablet; Take 1 tablet by mouth Daily.  -     DULoxetine (CYMBALTA) 30 MG capsule; Take 1 capsule by mouth Daily.  -     hydroxychloroquine (PLAQUENIL) 200 MG tablet; Take 1 tablet by mouth Daily.  -     atorvastatin (LIPITOR) 40 MG tablet; Take 1 tablet by mouth Every Night.  -     HYDROcodone-acetaminophen (NORCO) 5-325 MG per tablet; Take 1 tablet by mouth 2 (Two) Times a Day As Needed for Moderate Pain .    Follow heart healthy/low salt diet  Avoid processed foods  Monitor blood pressure as discussed  Exercise as tolerated up to 30 minutes 5 days per week  Take all medications as prescribed  Follow diabetic diet  Monitor blood sugars as discussed  See eye doctor annually or as discussed  Wear protective foot wear/no bare feet  Check feet regularly for calluses or ulcers  Discussed risk of poorly controlled diabetes and long-term complications  HCD #20 tabs given  UDS done today  Labs as noted  Will have HH  try to get pt into rehab for PT and OT  Will also have HH help pt learn how to use his glucometer  Check UA today    RTC 3 weeks    Please note that portions of this note were completed with a voice recognition program.  Efforts were made to edit dictation, but occasionally words are mistranscribed.

## 2017-09-26 LAB
ALBUMIN SERPL-MCNC: 4.1 G/DL (ref 3.5–5)
ALBUMIN/GLOB SERPL: 0.9 G/DL (ref 1–2)
ALP SERPL-CCNC: 150 U/L (ref 38–126)
ALT SERPL-CCNC: 53 U/L (ref 13–69)
AST SERPL-CCNC: 78 U/L (ref 15–46)
BASOPHILS # BLD AUTO: 0.02 10*3/MM3 (ref 0–0.2)
BASOPHILS NFR BLD AUTO: 0.2 % (ref 0–2.5)
BILIRUB SERPL-MCNC: 1.1 MG/DL (ref 0.2–1.3)
BUN SERPL-MCNC: 20 MG/DL (ref 7–20)
BUN/CREAT SERPL: 16.7 (ref 6.3–21.9)
CALCIUM SERPL-MCNC: 9.6 MG/DL (ref 8.4–10.2)
CHLORIDE SERPL-SCNC: 105 MMOL/L (ref 98–107)
CO2 SERPL-SCNC: 21 MMOL/L (ref 26–30)
CREAT SERPL-MCNC: 1.2 MG/DL (ref 0.6–1.3)
EOSINOPHIL # BLD AUTO: 2 10*3/MM3 (ref 0–0.7)
EOSINOPHIL NFR BLD AUTO: 22.3 % (ref 0–7)
ERYTHROCYTE [DISTWIDTH] IN BLOOD BY AUTOMATED COUNT: 15.3 % (ref 11.5–14.5)
GLOBULIN SER CALC-MCNC: 4.6 GM/DL
GLUCOSE SERPL-MCNC: 119 MG/DL (ref 74–98)
HBA1C MFR BLD: 4.9 %
HCT VFR BLD AUTO: 29.3 % (ref 42–52)
HGB BLD-MCNC: 9.3 G/DL (ref 14–18)
IMM GRANULOCYTES # BLD: 0.03 10*3/MM3 (ref 0–0.06)
IMM GRANULOCYTES NFR BLD: 0.3 % (ref 0–0.6)
LYMPHOCYTES # BLD AUTO: 1.55 10*3/MM3 (ref 0.6–3.4)
LYMPHOCYTES NFR BLD AUTO: 17.3 % (ref 10–50)
MCH RBC QN AUTO: 29.6 PG (ref 27–31)
MCHC RBC AUTO-ENTMCNC: 31.7 G/DL (ref 30–37)
MCV RBC AUTO: 93.3 FL (ref 80–94)
MONOCYTES # BLD AUTO: 0.59 10*3/MM3 (ref 0–0.9)
MONOCYTES NFR BLD AUTO: 6.6 % (ref 0–12)
NEUTROPHILS # BLD AUTO: 4.76 10*3/MM3 (ref 2–6.9)
NEUTROPHILS NFR BLD AUTO: 53.3 % (ref 37–80)
NRBC BLD AUTO-RTO: 0 /100 WBC (ref 0–0)
PLATELET # BLD AUTO: 224 10*3/MM3 (ref 130–400)
POTASSIUM SERPL-SCNC: 4.5 MMOL/L (ref 3.5–5.1)
PROT SERPL-MCNC: 8.7 G/DL (ref 6.3–8.2)
RBC # BLD AUTO: 3.14 10*6/MM3 (ref 4.7–6.1)
SODIUM SERPL-SCNC: 140 MMOL/L (ref 137–145)
WBC # BLD AUTO: 8.95 10*3/MM3 (ref 4.8–10.8)

## 2017-09-27 LAB
BACTERIA UR CULT: NO GROWTH
BACTERIA UR CULT: NORMAL

## 2017-10-02 ENCOUNTER — TELEPHONE (OUTPATIENT)
Dept: INTERNAL MEDICINE | Facility: CLINIC | Age: 57
End: 2017-10-02

## 2017-10-02 NOTE — TELEPHONE ENCOUNTER
Geri,  with HH, left VM that she has contacted multiple rehabs and all state they can not take Pt due to insurance not paying for his rehab      [phone]

## 2017-10-10 ENCOUNTER — OUTSIDE FACILITY SERVICE (OUTPATIENT)
Dept: INTERNAL MEDICINE | Facility: HOSPITAL | Age: 57
End: 2017-10-10

## 2017-10-11 NOTE — TELEPHONE ENCOUNTER
Geri left  that she did call short-term rehabs. There was only one in [place] that could accept him, but insurance would not pay for it. States she did set Pt up for something called a [facility] where he can get in home care.

## 2017-10-13 ENCOUNTER — OFFICE VISIT (OUTPATIENT)
Dept: ORTHOPEDIC SURGERY | Facility: CLINIC | Age: 57
End: 2017-10-13

## 2017-10-13 VITALS — RESPIRATION RATE: 16 BRPM | BODY MASS INDEX: 30.01 KG/M2 | HEIGHT: 68 IN | WEIGHT: 198 LBS

## 2017-10-13 DIAGNOSIS — E11.621 DIABETIC ULCER OF LEFT MIDFOOT ASSOCIATED WITH TYPE 2 DIABETES MELLITUS, WITH FAT LAYER EXPOSED (HCC): ICD-10-CM

## 2017-10-13 DIAGNOSIS — L97.521 ULCER OF FOOT, LEFT, LIMITED TO BREAKDOWN OF SKIN (HCC): ICD-10-CM

## 2017-10-13 DIAGNOSIS — L97.422 DIABETIC ULCER OF LEFT MIDFOOT ASSOCIATED WITH TYPE 2 DIABETES MELLITUS, WITH FAT LAYER EXPOSED (HCC): ICD-10-CM

## 2017-10-13 DIAGNOSIS — Z79.4 TYPE 2 DIABETES MELLITUS WITH OTHER SKIN ULCER, WITH LONG-TERM CURRENT USE OF INSULIN (HCC): Primary | ICD-10-CM

## 2017-10-13 DIAGNOSIS — E11.622 TYPE 2 DIABETES MELLITUS WITH OTHER SKIN ULCER, WITH LONG-TERM CURRENT USE OF INSULIN (HCC): Primary | ICD-10-CM

## 2017-10-13 DIAGNOSIS — I73.9 PERIPHERAL VASCULAR DISEASE (HCC): ICD-10-CM

## 2017-10-13 PROCEDURE — 11045 DBRDMT SUBQ TISS EACH ADDL: CPT | Performed by: PODIATRIST

## 2017-10-13 PROCEDURE — 11042 DBRDMT SUBQ TIS 1ST 20SQCM/<: CPT | Performed by: PODIATRIST

## 2017-10-13 NOTE — PROGRESS NOTES
Subjective   Patient ID: Vasquez Marie is a 57 y.o. male STATUS POST:  Post-op of the Left Foot (08/29/17: Incision and drainage/wound debridement of left foot, wound VAC application, skin graft application ) and Wound Check  He comes in today after not being seen here in the clinic since July.  He has not been here in the office since his most recent hospital admission roughly 6 weeks ago.  He had a transmetatarsal amputation performed at [facility] and was then admitted for quite some time here due to placement issues.  He was discharged with the wound VAC.  He had graft application and VAC application here during that admission.  Was supposed to follow-up here and this is his first time showing up despite multiple scheduled appointments.  He tells me that he has been sitting at home.  He does have his vac intact to the left foot.  History of Present Illness    Review of Systems   Constitutional: Negative for diaphoresis, fever and unexpected weight change.   HENT: Negative for dental problem and sore throat.    Eyes: Negative for visual disturbance.   Respiratory: Negative for shortness of breath.    Cardiovascular: Negative for chest pain.   Gastrointestinal: Negative for abdominal pain, constipation, diarrhea, nausea and vomiting.   Genitourinary: Negative for difficulty urinating and frequency.   Neurological: Negative for headaches.   Hematological: Does not bruise/bleed easily.   All other systems reviewed and are negative.      Past Medical History:   Diagnosis Date   • Abdominal pain     History of epigastric abdominal tenderness. Differentials include peptic ulcer disease,   pancreatobiliary disease.   • Abnormal LFTs    • Anemia    • Anxiety    • Arthritis    • Asthma    • Atrial fibrillation    • CAD (coronary artery disease)    • Calf cramp    • CKD (chronic kidney disease)    • COPD (chronic obstructive pulmonary disease)    • CVA (cerebral infarction)    • Depression    • DVT (deep venous  thrombosis)    • Electrocution    • Fatigue    • Hepatitis C    • History of allergy    • History of blood transfusion    • Hyperlipidemia    • Hypertension    • Leg pain    • Loss of appetite    • PUD (peptic ulcer disease)    • Stroke syndrome    • Tachycardia    • Thrombocytopenia    • Thrombophlebitis of deep femoral vein    • Vision problems         Past Surgical History:   Procedure Laterality Date   • AMPUTATION DIGIT Left 6/23/2017    Procedure: amputation of left foot third digit, left foot wound debridments and grafting;  Surgeon: Wolfgang Campbell DPM;  Location: [facility] OR;  Service:    • ANKLE SURGERY     • CARDIAC CATHETERIZATION N/A 6/20/2017    Procedure: Peripheral angiography;  Surgeon: Jonathan Storm MD;  Location: [facility] CATH INVASIVE LOCATION;  Service:    • CARDIAC CATHETERIZATION N/A 6/20/2017    Procedure: Angioplasty-peripheral;  Surgeon: Jonathan Storm MD;  Location: [facility] CATH INVASIVE LOCATION;  Service:    • CARDIAC SURGERY     • CORONARY STENT PLACEMENT     • INCISION AND DRAINAGE FOOT Left 8/29/2017    Procedure: Incision and drainage/wound debridement of left foot, wound VAC application, skin graft application;  Surgeon: Wolfgang Campbell DPM;  Location: [facility] OR;  Service:    • TONSILLECTOMY     • TOTAL HIP ARTHROPLASTY Left    • TOTAL SHOULDER REPLACEMENT Left        Social History     Social History   • Marital status: Single     Spouse name: N/A   • Number of children: N/A   • Years of education: N/A     Occupational History   • Not on file.     Social History Main Topics   • Smoking status: Current Every Day Smoker     Packs/day: 0.50     Years: 30.00     Types: Cigarettes   • Smokeless tobacco: Not on file   • Alcohol use No   • Drug use: No   • Sexual activity: Defer     Other Topics Concern   • Not on file     Social History Narrative       Ready to quit: Not Answered  Counseling given: Not Answered      All the above social hx, family hx, surgical  history,medications, allergies, ros & HPI reviewed.    No Known Allergies    Objective   Physical Exam   Constitutional: He is oriented to person, place, and time. He appears well-developed and well-nourished.   HENT:   Head: Normocephalic and atraumatic.   Nose: Nose normal.   Eyes: Conjunctivae and EOM are normal. Pupils are equal, round, and reactive to light.   Neck: Normal range of motion.   Pulmonary/Chest: Effort normal.   Abdominal: Soft.   Neurological: He is alert and oriented to person, place, and time.   Skin: Skin is warm.   Psychiatric: He has a normal mood and affect. His behavior is normal. Judgment and thought content normal.   Nursing note and vitals reviewed.    Ortho Exam  Ortho Exam  Pulses are palpable to the left foot.  Gross sensations intact.  He complains of pain with palpation.  There is no significant edema to the left lower extremity.  The wound bed itself is granular with a very thin layer of fibrin over the top.  It does not probe or tunnel or tract.  The skin edges are even with the base of the wound.  The wound measures 3.7 x 7.7.  Healthy and bleeds after debridement.  No odor.  No clinical sign of infection.      Assessment/Plan  status post left transmetatarsal amputation performed at , status post left foot wound graft application and VAC application, medical noncompliance, diabetes, PVD, chronic pain  Independent Review of Radiographic Studies:      Laboratory and Other Studies:     Medical Decision Making:        Procedures a sharp currette was used to perform full-thickness excisional wound debridement into the subcutaneous tissue layer.  A layer of fibrin tissue and slough was removed and healthy bleeding wound bed measuring 7.7 x 3.7 was created.  A Idalia dry dressing was then applied to the left foot  [] No procedures were performed in office today.    Vasquez was seen today for wound check and post-op.    Diagnoses and all orders for this visit:    Type 2 diabetes  mellitus with other skin ulcer, with long-term current use of insulin    Diabetic ulcer of left midfoot associated with type 2 diabetes mellitus, with fat layer exposed    Peripheral vascular disease    Ulcer of foot, left, limited to breakdown of skin        Recommendations/Plan:  I wrote him with a new home health orders.  Idalia should be applied to the wound every other day.  He can stop the VAC for now but I recommend he hold on to this just in case it is needed in the future.  Protected partial weightbearing to the heel on the left foot only.  I have explained to him that the best way and most likely only went out to get this wound to heal is going to be with skin graft applications but he has to be compliant and showed for his appointments in order for this to be possible and work.  He tells me that he'll be here if I scheduled him an appointment, but I highly doubt that.  He tends to show up once every 2-3 months.  I will schedule him a follow-up appointment and he can follow-up if he desires.  He asked me if he needs antibiotics and clinically appear stable site on think that's necessary.  He also requested pain medication and was denied.    Return in about 1 week (around 10/20/2017).  Patient agreeable to call or return sooner for any concerns.

## 2017-10-20 ENCOUNTER — OFFICE VISIT (OUTPATIENT)
Dept: ORTHOPEDIC SURGERY | Facility: CLINIC | Age: 57
End: 2017-10-20

## 2017-10-20 ENCOUNTER — OFFICE VISIT (OUTPATIENT)
Dept: INTERNAL MEDICINE | Facility: CLINIC | Age: 57
End: 2017-10-20

## 2017-10-20 VITALS
HEART RATE: 80 BPM | DIASTOLIC BLOOD PRESSURE: 72 MMHG | OXYGEN SATURATION: 93 % | SYSTOLIC BLOOD PRESSURE: 128 MMHG | TEMPERATURE: 97 F

## 2017-10-20 VITALS — RESPIRATION RATE: 16 BRPM | HEIGHT: 68 IN | WEIGHT: 198 LBS | BODY MASS INDEX: 30.01 KG/M2

## 2017-10-20 DIAGNOSIS — I96 DRY GANGRENE (HCC): ICD-10-CM

## 2017-10-20 DIAGNOSIS — L97.422 DIABETIC ULCER OF LEFT MIDFOOT ASSOCIATED WITH TYPE 2 DIABETES MELLITUS, WITH FAT LAYER EXPOSED (HCC): Primary | ICD-10-CM

## 2017-10-20 DIAGNOSIS — F51.01 PRIMARY INSOMNIA: ICD-10-CM

## 2017-10-20 DIAGNOSIS — I73.9 PERIPHERAL VASCULAR DISEASE (HCC): ICD-10-CM

## 2017-10-20 DIAGNOSIS — E11.621 DIABETIC ULCER OF LEFT MIDFOOT ASSOCIATED WITH TYPE 2 DIABETES MELLITUS, WITH FAT LAYER EXPOSED (HCC): Primary | ICD-10-CM

## 2017-10-20 DIAGNOSIS — F41.8 DEPRESSION WITH ANXIETY: ICD-10-CM

## 2017-10-20 DIAGNOSIS — L97.524 ULCER OF FOOT, LEFT, WITH NECROSIS OF BONE (HCC): ICD-10-CM

## 2017-10-20 DIAGNOSIS — I10 ESSENTIAL HYPERTENSION: Primary | ICD-10-CM

## 2017-10-20 PROBLEM — R30.0 DYSURIA: Status: RESOLVED | Noted: 2017-09-25 | Resolved: 2017-10-20

## 2017-10-20 PROCEDURE — 99214 OFFICE O/P EST MOD 30 MIN: CPT | Performed by: INTERNAL MEDICINE

## 2017-10-20 PROCEDURE — 11042 DBRDMT SUBQ TIS 1ST 20SQCM/<: CPT | Performed by: PODIATRIST

## 2017-10-20 PROCEDURE — 11045 DBRDMT SUBQ TISS EACH ADDL: CPT | Performed by: PODIATRIST

## 2017-10-20 PROCEDURE — G0008 ADMIN INFLUENZA VIRUS VAC: HCPCS | Performed by: INTERNAL MEDICINE

## 2017-10-20 PROCEDURE — 90662 IIV NO PRSV INCREASED AG IM: CPT | Performed by: INTERNAL MEDICINE

## 2017-10-20 RX ORDER — TRAZODONE HYDROCHLORIDE 50 MG/1
50 TABLET ORAL NIGHTLY
Qty: 30 TABLET | Refills: 1 | Status: SHIPPED | OUTPATIENT
Start: 2017-10-20 | End: 2017-11-09 | Stop reason: SDUPTHER

## 2017-10-20 RX ORDER — DULOXETIN HYDROCHLORIDE 60 MG/1
60 CAPSULE, DELAYED RELEASE ORAL DAILY
Qty: 30 CAPSULE | Refills: 5 | Status: SHIPPED | OUTPATIENT
Start: 2017-10-20 | End: 2017-12-06 | Stop reason: HOSPADM

## 2017-10-20 RX ORDER — GABAPENTIN 300 MG/1
300 CAPSULE ORAL 3 TIMES DAILY
Qty: 90 CAPSULE | Refills: 0 | Status: SHIPPED | OUTPATIENT
Start: 2017-10-20 | End: 2017-12-06 | Stop reason: HOSPADM

## 2017-10-20 NOTE — PROGRESS NOTES
Subjective   Patient ID: Vasquez Marie is a 57 y.o. male comes back in today for follow-up on his left foot TMA wound site.  Still complains of pain.  Has is VAC intact.  Seems to be more compliant currently.  Denies constitutional symptoms.      History of Present Illness                                                   Review of Systems   Constitutional: Negative for diaphoresis, fever and unexpected weight change.   HENT: Negative for dental problem and sore throat.    Eyes: Negative for visual disturbance.   Respiratory: Negative for shortness of breath.    Cardiovascular: Negative for chest pain.   Gastrointestinal: Negative for abdominal pain, constipation, diarrhea, nausea and vomiting.   Genitourinary: Negative for difficulty urinating and frequency.   Skin: Positive for wound.   Neurological: Negative for headaches.   Hematological: Does not bruise/bleed easily.   All other systems reviewed and are negative.      Past Medical History:   Diagnosis Date   • Abdominal pain     History of epigastric abdominal tenderness. Differentials include peptic ulcer disease,   pancreatobiliary disease.   • Abnormal LFTs    • Anemia    • Anxiety    • Arthritis    • Asthma    • Atrial fibrillation    • CAD (coronary artery disease)    • Calf cramp    • CKD (chronic kidney disease)    • COPD (chronic obstructive pulmonary disease)    • CVA (cerebral infarction)    • Depression    • DVT (deep venous thrombosis)    • Electrocution    • Fatigue    • Hepatitis C    • History of allergy    • History of blood transfusion    • Hyperlipidemia    • Hypertension    • Leg pain    • Loss of appetite    • PUD (peptic ulcer disease)    • Stroke syndrome    • Tachycardia    • Thrombocytopenia    • Thrombophlebitis of deep femoral vein    • Vision problems         Past Surgical History:   Procedure Laterality Date   • AMPUTATION DIGIT Left 6/23/2017    Procedure: amputation of left foot third digit, left foot wound debridments and  grafting;  Surgeon: Wolfgang Campbell DPM;  Location: [facility] OR;  Service:    • ANKLE SURGERY     • CARDIAC CATHETERIZATION N/A 6/20/2017    Procedure: Peripheral angiography;  Surgeon: Jonathan Storm MD;  Location: [facility] CATH INVASIVE LOCATION;  Service:    • CARDIAC CATHETERIZATION N/A 6/20/2017    Procedure: Angioplasty-peripheral;  Surgeon: Jonathan Storm MD;  Location: [facility] CATH INVASIVE LOCATION;  Service:    • CARDIAC SURGERY     • CORONARY STENT PLACEMENT     • INCISION AND DRAINAGE FOOT Left 8/29/2017    Procedure: Incision and drainage/wound debridement of left foot, wound VAC application, skin graft application;  Surgeon: Wolfgang Campbell DPM;  Location: [facility] OR;  Service:    • TONSILLECTOMY     • TOTAL HIP ARTHROPLASTY Left    • TOTAL SHOULDER REPLACEMENT Left        Social History     Social History   • Marital status: Single     Spouse name: N/A   • Number of children: N/A   • Years of education: N/A     Occupational History   • Not on file.     Social History Main Topics   • Smoking status: Current Every Day Smoker     Packs/day: 0.50     Years: 30.00     Types: Cigarettes   • Smokeless tobacco: Not on file   • Alcohol use No   • Drug use: No   • Sexual activity: Defer     Other Topics Concern   • Not on file     Social History Narrative       Ready to quit: Not Answered  Counseling given: Not Answered      All the above social hx, family hx, surgical history,medications, allergies, ros & HPI reviewed.    No Known Allergies    Objective   Physical Exam   Constitutional: He is oriented to person, place, and time. He appears well-developed and well-nourished.   HENT:   Head: Normocephalic and atraumatic.   Nose: Nose normal.   Eyes: Conjunctivae and EOM are normal. Pupils are equal, round, and reactive to light.   Neck: Normal range of motion.   Neurological: He is alert and oriented to person, place, and time.   Skin: Skin is warm.   Psychiatric: He has a normal mood and affect.  His behavior is normal. Judgment and thought content normal.   Nursing note and vitals reviewed.    Ortho Exam  Ortho Exam left foot, pulses are palpable to the stump.  CFT is brisk.  The wound is 100% granular.  It seems to have decreased in size from a with perspective.  There is slight amount of fibrin and slough medially but for the most part of the wound is granular and healthy.  It still full thickness into subcutaneous but there is no tunneling or probing or tracking.  No drainage or odor.  Overall appears to be healthy and viable and healing.      Assessment/Plan  left foot wound, status post TMA, diabetes, PVD  Independent Review of Radiographic Studies:      Laboratory and Other Studies:     Medical Decision Making:        Procedures  Debridement Note    Location of debridement: Left foot  Description of procedure: excisional  Type of instrument used: erin  Type of tissue removed: devitalized and non-viable  Appearance and size of the wound: down to fresh bleeding tissue  Depth of debridement: subcutaneous tissue    The wound measures 8 x 3.  I sharply and excisionally debrided the wounds down to healthy bleeding tissue with a good healthy granular base we will continue with the local wound care.    Vasquez was seen today for follow-up.    Diagnoses and all orders for this visit:    Diabetic ulcer of left midfoot associated with type 2 diabetes mellitus, with fat layer exposed    Peripheral vascular disease    Dry gangrene    Ulcer of foot, left, with necrosis of bone    A Idalia and dry dressing was applied.    Recommendations/Plan:  He will continue the wound VAC.  We are still awaiting wound graft approval.  Continue 3 times a week VAC changes.  Continue partial weightbearing with the Darco shoe.  Follow-up 1 week.  Return in about 1 week (around 10/27/2017).  Patient agreeable to call or return sooner for any concerns.

## 2017-10-20 NOTE — PROGRESS NOTES
Chief Complaint   Patient presents with   • Follow-up     3 weeks for subacute osteomyelitis of left foot, HLD, and HTN.      Subjective   Vasquez Marie is a 57 y.o. male.     HPI Comments: Here for followup of osteomyelitis and multiple medical problems including Afib, RA, DM, CAD, HTN, HLD, COPD, GERD, depression and hep C.   Patient is now seeing Dr. Campbell for follow-up of subacute osteomyelitis of left foot.  He was seen earlier today and underwent debridement of left foot.  Home health is also seeing him for wound care.  Patient has been compliant with use of wound VAC.  Dr Campbell took wound vac off today and told him HH nurse can put it back on next week on Monday.  Pt states Dr Campbell said he was on his foot too much.   Home health nurses are coming in to do dressing changes every other day and caring for the wound/wound vac.   Has been having a lot of diffuse joint pains from RA.   States he has been compliant with all medications.   Continues to smoke about 4 or 5 cigarettes daily.  He is not sleeping well.   No CP, SOB or edema.  No palpitations.   He admits he to occasional marijuana use when friends come over due to poorly controlled pain.  I explained to him that I cannot give him pain medication when he has positive urine drug screen.  He states the only reason he smokes pot is due to poorly controlled pain.             The following portions of the patient's history were reviewed and updated as appropriate: allergies, current medications, past family history, past medical history, past social history, past surgical history and problem list.    Review of Systems   Constitutional: Positive for fatigue.   HENT: Negative.    Respiratory: Negative for shortness of breath.    Cardiovascular: Negative for chest pain, palpitations and leg swelling.   Gastrointestinal: Negative.    Musculoskeletal: Positive for arthralgias, back pain and gait problem.   Neurological: Positive for weakness.    Psychiatric/Behavioral: Positive for dysphoric mood and sleep disturbance.   All other systems reviewed and are negative.      Objective   /72  Pulse 80  Temp 97 °F (36.1 °C)  SpO2 93%  There is no height or weight on file to calculate BMI.  Physical Exam   Constitutional: He is oriented to person, place, and time. He appears well-developed and well-nourished.   Pleasant gentleman seated in wheelchair, appears subdued, no apparent distress   HENT:   Head: Normocephalic and atraumatic.   Mouth/Throat: Oropharynx is clear and moist.   Eyes: Conjunctivae and EOM are normal. Pupils are equal, round, and reactive to light.   Neck: Normal range of motion. Neck supple. No thyromegaly present.   Cardiovascular: Normal rate, regular rhythm, normal heart sounds and intact distal pulses.    No murmur heard.  Pulmonary/Chest: Effort normal and breath sounds normal. No respiratory distress.   Abdominal: Soft. Bowel sounds are normal.   Musculoskeletal: He exhibits no edema.   Synovial thickening over MCP joints consistent with rheumatoid, multiple tophi over  fingers, arms and elbows consistent with gouty arthritis  Left foot in walking boot and wrapped   Lymphadenopathy:     He has no cervical adenopathy.   Neurological: He is alert and oriented to person, place, and time. No cranial nerve deficit.   Psychiatric: His behavior is normal. Thought content normal.   Flat affect, depressed   Nursing note and vitals reviewed.      Assessment/Plan   Vasquez Marie is here today and the following problems have been addressed:      Vasquez was seen today for follow-up.    Diagnoses and all orders for this visit:    Essential hypertension    Depression with anxiety    Primary insomnia    Other orders  -     DULoxetine (CYMBALTA) 60 MG capsule; Take 1 capsule by mouth Daily.  -     gabapentin (NEURONTIN) 300 MG capsule; Take 1 capsule by mouth 3 (Three) Times a Day.  -     traZODone (DESYREL) 50 MG tablet; Take 1 tablet by  mouth Every Night.  -     Flu Vaccine High Dose PF 65YR+  Increase Cymbalta to 60 mg daily to help with pain control  Discussed with patient need to take gabapentin correctly only 3 times daily and no more than this for underlying pain  Explained to patient that I will not give him narcotics at this time due to underlying positive drug screen.  Patient states that he will work on avoidance of marijuana, we will repeat drug screen next office visit  Given trazodone 50 mg to take daily at bedtime for sleep, explained to patient I do not want him to take more than one tablet due to interactions with other medications he is taking  Flu vaccine given today  No other medication changes at this time  Continue wound care with Dr. Campbell and home health nurses    Return to clinic in 1 month    Please note that portions of this note were completed with a voice recognition program.  Efforts were made to edit dictation, but occasionally words are mistranscribed.

## 2017-10-27 ENCOUNTER — OFFICE VISIT (OUTPATIENT)
Dept: ORTHOPEDIC SURGERY | Facility: CLINIC | Age: 57
End: 2017-10-27

## 2017-10-27 VITALS — WEIGHT: 198 LBS | RESPIRATION RATE: 16 BRPM | HEIGHT: 68 IN | BODY MASS INDEX: 30.01 KG/M2

## 2017-10-27 DIAGNOSIS — L97.422 DIABETIC ULCER OF LEFT MIDFOOT ASSOCIATED WITH TYPE 2 DIABETES MELLITUS, WITH FAT LAYER EXPOSED (HCC): Primary | ICD-10-CM

## 2017-10-27 DIAGNOSIS — E11.621 DIABETIC ULCER OF LEFT MIDFOOT ASSOCIATED WITH TYPE 2 DIABETES MELLITUS, WITH FAT LAYER EXPOSED (HCC): Primary | ICD-10-CM

## 2017-10-27 DIAGNOSIS — E11.42 DIABETIC POLYNEUROPATHY ASSOCIATED WITH TYPE 2 DIABETES MELLITUS (HCC): ICD-10-CM

## 2017-10-27 DIAGNOSIS — I73.9 PERIPHERAL VASCULAR DISEASE (HCC): ICD-10-CM

## 2017-10-27 PROCEDURE — 11045 DBRDMT SUBQ TISS EACH ADDL: CPT | Performed by: PODIATRIST

## 2017-10-27 PROCEDURE — 11042 DBRDMT SUBQ TIS 1ST 20SQCM/<: CPT | Performed by: PODIATRIST

## 2017-10-27 NOTE — PROGRESS NOTES
Subjective   Patient ID: Vasquez Marie is a 57 y.o. male with diabetes and PVD returns for his left foot TMA wound.  My nurse was contacted by his home health yesterday and we were told that they decided he needed his VAC her to take his VAC off because they thought his wound looked good.  He comes in today with no VAC on.  Does have a postoperative shoe.      History of Present Illness                                                   Review of Systems   Constitutional: Negative for diaphoresis, fever and unexpected weight change.   HENT: Negative for dental problem and sore throat.    Eyes: Negative for visual disturbance.   Respiratory: Negative for shortness of breath.    Cardiovascular: Negative for chest pain.   Gastrointestinal: Negative for abdominal pain, constipation, diarrhea, nausea and vomiting.   Genitourinary: Negative for difficulty urinating and frequency.   Skin: Positive for wound.   Neurological: Negative for headaches.   Hematological: Does not bruise/bleed easily.   All other systems reviewed and are negative.      Past Medical History:   Diagnosis Date   • Abdominal pain     History of epigastric abdominal tenderness. Differentials include peptic ulcer disease,   pancreatobiliary disease.   • Abnormal LFTs    • Anemia    • Anxiety    • Arthritis    • Asthma    • Atrial fibrillation    • CAD (coronary artery disease)    • Calf cramp    • CKD (chronic kidney disease)    • COPD (chronic obstructive pulmonary disease)    • CVA (cerebral infarction)    • Depression    • DVT (deep venous thrombosis)    • Electrocution    • Fatigue    • Hepatitis C    • History of allergy    • History of blood transfusion    • Hyperlipidemia    • Hypertension    • Leg pain    • Loss of appetite    • PUD (peptic ulcer disease)    • Stroke syndrome    • Tachycardia    • Thrombocytopenia    • Thrombophlebitis of deep femoral vein    • Vision problems         Past Surgical History:   Procedure Laterality Date   •  AMPUTATION DIGIT Left 6/23/2017    Procedure: amputation of left foot third digit, left foot wound debridments and grafting;  Surgeon: Wolfgang Campbell DPM;  Location: [facility] OR;  Service:    • ANKLE SURGERY     • CARDIAC CATHETERIZATION N/A 6/20/2017    Procedure: Peripheral angiography;  Surgeon: Jonathan Storm MD;  Location: [facility] CATH INVASIVE LOCATION;  Service:    • CARDIAC CATHETERIZATION N/A 6/20/2017    Procedure: Angioplasty-peripheral;  Surgeon: Jonathan Storm MD;  Location: [facility] CATH INVASIVE LOCATION;  Service:    • CARDIAC SURGERY     • CORONARY STENT PLACEMENT     • INCISION AND DRAINAGE FOOT Left 8/29/2017    Procedure: Incision and drainage/wound debridement of left foot, wound VAC application, skin graft application;  Surgeon: Wolfgang Campbell DPM;  Location: [facility] OR;  Service:    • TONSILLECTOMY     • TOTAL HIP ARTHROPLASTY Left    • TOTAL SHOULDER REPLACEMENT Left        Social History     Social History   • Marital status: Single     Spouse name: N/A   • Number of children: N/A   • Years of education: N/A     Occupational History   • Not on file.     Social History Main Topics   • Smoking status: Current Every Day Smoker     Packs/day: 0.50     Years: 30.00     Types: Cigarettes   • Smokeless tobacco: Not on file   • Alcohol use No   • Drug use: No   • Sexual activity: Defer     Other Topics Concern   • Not on file     Social History Narrative       Ready to quit: Not Answered  Counseling given: Not Answered      All the above social hx, family hx, surgical history,medications, allergies, ros & HPI reviewed.    No Known Allergies    Objective   Physical Exam   Constitutional: He is oriented to person, place, and time. He appears well-developed and well-nourished.   HENT:   Head: Normocephalic and atraumatic.   Nose: Nose normal.   Eyes: Conjunctivae and EOM are normal. Pupils are equal, round, and reactive to light.   Neck: Normal range of motion.   Neurological: He is  alert and oriented to person, place, and time.   Skin: Skin is warm.   Psychiatric: He has a normal mood and affect. His behavior is normal. Judgment and thought content normal.   Vitals reviewed.    Ortho Exam  Ortho Exam  The left foot wound is still for the most part granular within the base.  Does have some hyperkeratotic callus tissue around the perimeter of the wound but overall shows a minimal erythema or edema and no acute clinical signs of infection.  Capillary refill time to the stump is brisk.      Assessment/Plan  left foot TMA wound, diabetes, PAD, chronic pain  Independent Review of Radiographic Studies:      Laboratory and Other Studies:     Medical Decision Making:        Procedures  Debridement Note    Location of debridement: Left TMA  Description of procedure: excisional  Type of instrument used: erin  Type of tissue removed: devitalized and non-viable  Appearance and size of the wound: down to fresh bleeding tissue  Depth of debridement: subcutaneous tissue    The wound measures 7.5 x 3.5  I sharply and excisionally debrided the wounds down to healthy bleeding tissue with a good healthy granular base we will continue with the local wound care.    Vasquez was seen today for wound check and follow-up.    Diagnoses and all orders for this visit:    Diabetic ulcer of left midfoot associated with type 2 diabetes mellitus, with fat layer exposed    Diabetic polyneuropathy associated with type 2 diabetes mellitus    Peripheral vascular disease        Recommendations/Plan:  I explained that it is not the home health nurses place or job nor or they entitled to give orders and whether or not the VAC stays on his up to me.  He'll be sent with a note for home health to reapply his back.  We still do not have word on any type of graft approval but if we get approval we will begin grafting and at that point may consider discharging the use of the VAC.  He is to continue partial weightbearing to the left  foot with the shoe.  Follow-up in 2 weeks.    No Follow-up on file.  Patient agreeable to call or return sooner for any concerns.

## 2017-10-30 RX ORDER — TRAZODONE HYDROCHLORIDE 50 MG/1
TABLET ORAL
Qty: 30 TABLET | Refills: 1 | OUTPATIENT
Start: 2017-10-30

## 2017-11-06 ENCOUNTER — TELEPHONE (OUTPATIENT)
Dept: ORTHOPEDIC SURGERY | Facility: CLINIC | Age: 57
End: 2017-11-06

## 2017-11-09 ENCOUNTER — OFFICE VISIT (OUTPATIENT)
Dept: INTERNAL MEDICINE | Facility: CLINIC | Age: 57
End: 2017-11-09

## 2017-11-09 VITALS
WEIGHT: 205 LBS | DIASTOLIC BLOOD PRESSURE: 84 MMHG | SYSTOLIC BLOOD PRESSURE: 128 MMHG | RESPIRATION RATE: 16 BRPM | HEIGHT: 68 IN | HEART RATE: 92 BPM | BODY MASS INDEX: 31.07 KG/M2 | TEMPERATURE: 96.8 F | OXYGEN SATURATION: 83 %

## 2017-11-09 DIAGNOSIS — Z72.0 TOBACCO ABUSE: ICD-10-CM

## 2017-11-09 DIAGNOSIS — I73.9 PERIPHERAL VASCULAR DISEASE (HCC): ICD-10-CM

## 2017-11-09 DIAGNOSIS — M05.731 RHEUMATOID ARTHRITIS INVOLVING RIGHT WRIST WITH POSITIVE RHEUMATOID FACTOR (HCC): ICD-10-CM

## 2017-11-09 DIAGNOSIS — Z09 FOLLOW UP: Primary | ICD-10-CM

## 2017-11-09 DIAGNOSIS — S91.302S OPEN WOUND OF LEFT FOOT WITH COMPLICATION, SEQUELA: ICD-10-CM

## 2017-11-09 DIAGNOSIS — R10.84 GENERALIZED ABDOMINAL PAIN: ICD-10-CM

## 2017-11-09 DIAGNOSIS — R05.9 COUGH IN ADULT: ICD-10-CM

## 2017-11-09 PROCEDURE — 99214 OFFICE O/P EST MOD 30 MIN: CPT | Performed by: NURSE PRACTITIONER

## 2017-11-09 RX ORDER — TRAZODONE HYDROCHLORIDE 50 MG/1
50 TABLET ORAL NIGHTLY
Qty: 30 TABLET | Refills: 1 | Status: ON HOLD | OUTPATIENT
Start: 2017-11-09 | End: 2017-12-06

## 2017-11-09 RX ORDER — GUAIFENESIN 200 MG/10ML
200 LIQUID ORAL 3 TIMES DAILY PRN
Qty: 118 ML | Refills: 0 | Status: SHIPPED | OUTPATIENT
Start: 2017-11-09 | End: 2017-12-06 | Stop reason: HOSPADM

## 2017-11-09 RX ORDER — DEXTROMETHORPHAN HYDROBROMIDE AND PROMETHAZINE HYDROCHLORIDE 15; 6.25 MG/5ML; MG/5ML
5 SYRUP ORAL NIGHTLY PRN
Qty: 118 ML | Refills: 0 | Status: SHIPPED | OUTPATIENT
Start: 2017-11-09 | End: 2017-12-06 | Stop reason: HOSPADM

## 2017-11-09 NOTE — PROGRESS NOTES
Chief Complaint / Reason:      Chief Complaint   Patient presents with   • Follow-up     pt presents for a f/u visit for left foot would like to discuss meds requests cough medications would also like to discuss something for his hands       Subjective   Presents today for follow up on left foot wound/pain and has complaints of generalized abdominal pain and cough. He indicates he has been having some joint pain especially in his hands. Patient has wound vac currently attached to left foot wound. Wound vac assessed and is currently on and it appears to be working properly.he is followed by Dr. Campbell. Denies fever, chest pain, SOA, or heart palpitations.    Abdominal Pain   This is a recurrent problem. The current episode started 1 to 4 weeks ago. The onset quality is undetermined. The problem occurs daily. The problem has been waxing and waning. The pain is located in the generalized abdominal region. The pain is mild. The quality of the pain is aching. The abdominal pain does not radiate. Associated symptoms include constipation and nausea. Pertinent negatives include no diarrhea, fever, frequency, headaches, vomiting or weight loss. The pain is aggravated by certain positions. The pain is relieved by nothing. He has tried proton pump inhibitors for the symptoms. The treatment provided mild relief. His past medical history is significant for GERD.       History taken from: patient    PMH/FH/Social History were reviewed and updated appropriately in the electronic medical record.     Review of Systems:   Review of Systems   Constitutional: Negative for fever and weight loss.   Gastrointestinal: Positive for abdominal pain, constipation and nausea. Negative for diarrhea and vomiting.   Genitourinary: Negative for frequency.   Neurological: Negative for headaches.     All other systems were reviewed and are negative.  Exceptions are noted in the subjective or above.      Objective     Vital Signs  Vitals:    11/09/17  1059   BP: 128/84   Pulse: 92   Resp: 16   Temp: 96.8 °F (36 °C)   SpO2: (!) 83%       Body mass index is 31.17 kg/(m^2).    Physical Exam   Constitutional: He is oriented to person, place, and time. He appears well-developed and well-nourished.   Pleasant gentleman, seated in wheelchair, wound VAC on left lower extremity   HENT:   Head: Normocephalic and atraumatic.   Mouth/Throat: Oropharynx is clear and moist.   Eyes: Conjunctivae and EOM are normal. Pupils are equal, round, and reactive to light.   Neck: Normal range of motion. Neck supple. No thyromegaly present.   Cardiovascular: Normal heart sounds and intact distal pulses.    No murmur heard.  Regular irregular with distant heart sounds   Pulmonary/Chest: Effort normal and breath sounds normal. No respiratory distress.   Abdominal: Soft. Bowel sounds are normal. There is tenderness.   Musculoskeletal: He exhibits edema.   Wound VAC noted on left lower extremity   Lymphadenopathy:     He has no cervical adenopathy.   Neurological: He is alert and oriented to person, place, and time. No cranial nerve deficit.   Skin: There is pallor.   Distal lower extremities with brawny skin color changes and weak pulses    Psychiatric: Judgment normal. He exhibits a depressed mood.   Nursing note and vitals reviewed.       Results Review:    I reviewed the patient's previous clinical results.       Medication Review:     Current Outpatient Prescriptions:   •  albuterol (PROVENTIL HFA;VENTOLIN HFA) 108 (90 Base) MCG/ACT inhaler, Inhale 2 puffs Every 4 (Four) Hours As Needed for Wheezing., Disp: 18 g, Rfl: 3  •  atorvastatin (LIPITOR) 40 MG tablet, Take 1 tablet by mouth Every Night., Disp: 30 tablet, Rfl: 11  •  clopidogrel (PLAVIX) 75 MG tablet, Take 1 tablet by mouth Daily., Disp: 30 tablet, Rfl: 3  •  colchicine 0.6 MG tablet, Take 1 tablet by mouth Daily., Disp: 30 tablet, Rfl: 0  •  DULoxetine (CYMBALTA) 60 MG capsule, Take 1 capsule by mouth Daily., Disp: 30 capsule,  Rfl: 5  •  gabapentin (NEURONTIN) 300 MG capsule, Take 1 capsule by mouth tonight, then tomorrow take 1 capsule in the morning and 1 in the evening, then take 1 three times daily thereafter, Disp: 24 capsule, Rfl: 0  •  HYDROcodone-acetaminophen (NORCO) 5-325 MG per tablet, Take 1 tablet by mouth 2 (Two) Times a Day As Needed for Moderate Pain ., Disp: 20 tablet, Rfl: 0  •  hydroxychloroquine (PLAQUENIL) 200 MG tablet, Take 1 tablet by mouth Daily., Disp: 30 tablet, Rfl: 5  •  losartan-hydrochlorothiazide (HYZAAR) 50-12.5 MG per tablet, Take 1 tablet by mouth Daily., Disp: 30 tablet, Rfl: 3  •  metoprolol tartrate (LOPRESSOR) 25 MG tablet, Take 1 tablet by mouth Every 12 (Twelve) Hours., Disp: 60 tablet, Rfl: 11  •  Misc. Devices (COMMODE BEDSIDE) misc, 1 Device Every 6 (Six) Hours., Disp: 1 each, Rfl: 0  •  pantoprazole (PROTONIX) 40 MG EC tablet, Take 1 tablet by mouth Daily., Disp: 30 tablet, Rfl: 5  •  PHARMACY MEDS TO BED CONSULT, Daily (Monday-Friday)., Disp: 1 each, Rfl: 0  •  traZODone (DESYREL) 50 MG tablet, Take 1 tablet by mouth Every Night., Disp: 30 tablet, Rfl: 1  •  gabapentin (NEURONTIN) 300 MG capsule, Take 1 capsule by mouth 3 (Three) Times a Day., Disp: 90 capsule, Rfl: 0  •  guaifenesin (ROBITUSSIN) 100 MG/5ML liquid, Take 10 mL by mouth 3 (Three) Times a Day As Needed for Cough or Congestion., Disp: 118 mL, Rfl: 0  •  promethazine-dextromethorphan (PROMETHAZINE-DM) 6.25-15 MG/5ML syrup, Take 5 mL by mouth At Night As Needed for Cough., Disp: 118 mL, Rfl: 0    Assessment/Plan   Vasquez was seen today for follow-up.    Diagnoses and all orders for this visit:    Follow up    Rheumatoid arthritis involving right wrist with positive rheumatoid factor  Continue current medications and discuss non pharmacological interventions  Tobacco abuse  Stressed smoking cessation   Peripheral vascular disease  Recommend stop smoking  Diet and exercise  Open wound of left foot with complication, sequela  Keep  regular follow up with Dr. Campbell as recommended  Cough in adult  -     promethazine-dextromethorphan (PROMETHAZINE-DM) 6.25-15 MG/5ML syrup; Take 5 mL by mouth At Night As Needed for Cough.  -     guaifenesin (ROBITUSSIN) 100 MG/5ML liquid; Take 10 mL by mouth 3 (Three) Times a Day As Needed for Cough or Congestion.    Generalized abdominal pain  Discussed worsening signs and symptoms and recommend patient avoid any changes in bowel habits  Stress management  Other orders  -     traZODone (DESYREL) 50 MG tablet; Take 1 tablet by mouth Every Night.        Return if symptoms worsen or fail to improve.    Amanda Simon, APRN  11/09/2017

## 2017-11-17 ENCOUNTER — OFFICE VISIT (OUTPATIENT)
Dept: ORTHOPEDIC SURGERY | Facility: CLINIC | Age: 57
End: 2017-11-17

## 2017-11-17 VITALS — BODY MASS INDEX: 31.07 KG/M2 | WEIGHT: 205 LBS | RESPIRATION RATE: 16 BRPM | HEIGHT: 68 IN

## 2017-11-17 DIAGNOSIS — L97.422 DIABETIC ULCER OF LEFT MIDFOOT ASSOCIATED WITH TYPE 2 DIABETES MELLITUS, WITH FAT LAYER EXPOSED (HCC): Primary | ICD-10-CM

## 2017-11-17 DIAGNOSIS — I73.9 PERIPHERAL VASCULAR DISEASE (HCC): ICD-10-CM

## 2017-11-17 DIAGNOSIS — E11.621 DIABETIC ULCER OF LEFT MIDFOOT ASSOCIATED WITH TYPE 2 DIABETES MELLITUS, WITH FAT LAYER EXPOSED (HCC): Primary | ICD-10-CM

## 2017-11-17 PROCEDURE — 15276 SKIN SUB GRAFT F/N/HF/G ADDL: CPT | Performed by: PODIATRIST

## 2017-11-17 PROCEDURE — 15275 SKIN SUB GRAFT FACE/NK/HF/G: CPT | Performed by: PODIATRIST

## 2017-11-17 NOTE — PROGRESS NOTES
Subjective   Patient ID: Vasquez Marie is a 57 y.o. male   With diabetes, hepatitis, PAD, rheumatoid arthritis status post left TMA returns for follow-up.  He has his wound VAC intact.  Still complains of significant pain of the wound.  Comes in today in the office in a wheelchair.    History of Present Illness                                                   Review of Systems   Constitutional: Negative for diaphoresis, fever and unexpected weight change.   HENT: Negative for dental problem and sore throat.    Eyes: Negative for visual disturbance.   Respiratory: Negative for shortness of breath.    Cardiovascular: Negative for chest pain.   Gastrointestinal: Negative for abdominal pain, constipation, diarrhea, nausea and vomiting.   Genitourinary: Negative for difficulty urinating and frequency.   Skin: Positive for wound.   Neurological: Negative for headaches.   Hematological: Does not bruise/bleed easily.   All other systems reviewed and are negative.      Past Medical History:   Diagnosis Date   • Abdominal pain     History of epigastric abdominal tenderness. Differentials include peptic ulcer disease,   pancreatobiliary disease.   • Abnormal LFTs    • Anemia    • Anxiety    • Arthritis    • Asthma    • Atrial fibrillation    • CAD (coronary artery disease)    • Calf cramp    • CKD (chronic kidney disease)    • COPD (chronic obstructive pulmonary disease)    • CVA (cerebral infarction)    • Depression    • DVT (deep venous thrombosis)    • Electrocution    • Fatigue    • Hepatitis C    • History of allergy    • History of blood transfusion    • Hyperlipidemia    • Hypertension    • Leg pain    • Loss of appetite    • PUD (peptic ulcer disease)    • Stroke syndrome    • Tachycardia    • Thrombocytopenia    • Thrombophlebitis of deep femoral vein    • Vision problems         Past Surgical History:   Procedure Laterality Date   • AMPUTATION DIGIT Left 6/23/2017    Procedure: amputation of left foot third  digit, left foot wound debridments and grafting;  Surgeon: Wolfgang Campbell DPM;  Location: [facility] OR;  Service:    • ANKLE SURGERY     • CARDIAC CATHETERIZATION N/A 6/20/2017    Procedure: Peripheral angiography;  Surgeon: Jonathan Storm MD;  Location: [facility] CATH INVASIVE LOCATION;  Service:    • CARDIAC CATHETERIZATION N/A 6/20/2017    Procedure: Angioplasty-peripheral;  Surgeon: Jonathan Storm MD;  Location: [facility] CATH INVASIVE LOCATION;  Service:    • CARDIAC SURGERY     • CORONARY STENT PLACEMENT     • INCISION AND DRAINAGE FOOT Left 8/29/2017    Procedure: Incision and drainage/wound debridement of left foot, wound VAC application, skin graft application;  Surgeon: Wolfgang Campbell DPM;  Location: [facility] OR;  Service:    • TONSILLECTOMY     • TOTAL HIP ARTHROPLASTY Left    • TOTAL SHOULDER REPLACEMENT Left        Social History     Social History   • Marital status: Single     Spouse name: N/A   • Number of children: N/A   • Years of education: N/A     Occupational History   • Not on file.     Social History Main Topics   • Smoking status: Current Every Day Smoker     Packs/day: 0.50     Years: 30.00     Types: Cigarettes   • Smokeless tobacco: Not on file   • Alcohol use No   • Drug use: No   • Sexual activity: Defer     Other Topics Concern   • Not on file     Social History Narrative       Ready to quit: Not Answered  Counseling given: Not Answered      I have reviewed all of the above social hx, family hx, surgical hx, medications, allergies & ROS and confirm that it is accurate.    No Known Allergies    Objective   Physical Exam  Ortho Exam  Ortho Exam  His wound for the most part is still granular.  The dorsal 80% is granular with some very speckled fibrous tissue.  The plantar 15-20% has some fairly thickened fibrous tissue.  I tried to debride this manually and due to pain he will not allow this.  It does bleed with some rough stimulation with 4 x 4's.  There is no significant odor  other than from the wound VAC sponge.  No erythema or edema.  No exposed bone.  The wound measures 7.5 x 4  Capillary refill time to the stump is brisk      Assessment/Plan  left transmetatarsal amputation site wound  Independent Review of Radiographic Studies:      Laboratory and Other Studies:     Medical Decision Making:        Procedures  Debridement Note  I Cleaned the wound as best he would allow.  24.5 x 4 cm epi fix mesh grafts were then applied to the left foot wound and dressed with a dry dressing.    Vasquez was seen today for wound check and follow-up.    Diagnoses and all orders for this visit:    Diabetic ulcer of left midfoot associated with type 2 diabetes mellitus, with fat layer exposed    Peripheral vascular disease        Recommendations/Plan:  He has been instructed on wound care and that the dressing is to be left clean, dry, intact and not removed.  I discussed we will discharge the use of the VAC.  I sent him home health instructions.  He will follow-up in one week.    Return in about 5 days (around 11/22/2017).  Patient agreeable to call or return sooner for any concerns.

## 2017-11-25 ENCOUNTER — HOSPITAL ENCOUNTER (INPATIENT)
Facility: HOSPITAL | Age: 57
LOS: 11 days | Discharge: NURSING, REHAB OR LONG-TERM CARE | End: 2017-12-06
Attending: EMERGENCY MEDICINE | Admitting: INTERNAL MEDICINE

## 2017-11-25 ENCOUNTER — APPOINTMENT (OUTPATIENT)
Dept: GENERAL RADIOLOGY | Facility: HOSPITAL | Age: 57
End: 2017-11-25

## 2017-11-25 DIAGNOSIS — E86.0 DEHYDRATION: Primary | ICD-10-CM

## 2017-11-25 DIAGNOSIS — Z74.09 IMPAIRED FUNCTIONAL MOBILITY, BALANCE, GAIT, AND ENDURANCE: ICD-10-CM

## 2017-11-25 DIAGNOSIS — M86.272 SUBACUTE OSTEOMYELITIS OF LEFT FOOT (HCC): ICD-10-CM

## 2017-11-25 DIAGNOSIS — I73.9 PERIPHERAL VASCULAR DISEASE (HCC): ICD-10-CM

## 2017-11-25 DIAGNOSIS — E08.8 DIABETES DUE TO UNDERLYING CONDITION W UNSP COMPLICATIONS (HCC): ICD-10-CM

## 2017-11-25 PROBLEM — Z89.432 STATUS POST TRANSMETATARSAL AMPUTATION OF FOOT, LEFT (HCC): Status: ACTIVE | Noted: 2017-11-25

## 2017-11-25 PROBLEM — N17.9 ACUTE RENAL FAILURE (HCC): Status: ACTIVE | Noted: 2017-11-25

## 2017-11-25 LAB
ABO GROUP BLD: NORMAL
ALBUMIN SERPL-MCNC: 3.6 G/DL (ref 3.5–5)
ALBUMIN/GLOB SERPL: 0.8 G/DL (ref 1–2)
ALP SERPL-CCNC: 117 U/L (ref 38–126)
ALT SERPL W P-5'-P-CCNC: 30 U/L (ref 13–69)
AMORPH URATE CRY URNS QL MICRO: ABNORMAL /HPF
ANION GAP SERPL CALCULATED.3IONS-SCNC: 19.2 MMOL/L
AST SERPL-CCNC: 35 U/L (ref 15–46)
BACTERIA UR QL AUTO: ABNORMAL /HPF
BASOPHILS # BLD AUTO: 0.07 10*3/MM3 (ref 0–0.2)
BASOPHILS NFR BLD AUTO: 0.6 % (ref 0–2.5)
BILIRUB SERPL-MCNC: 1.2 MG/DL (ref 0.2–1.3)
BILIRUB UR QL STRIP: ABNORMAL
BLD GP AB SCN SERPL QL: NEGATIVE
BUN BLD-MCNC: 30 MG/DL (ref 7–20)
BUN/CREAT SERPL: 12.5 (ref 6.3–21.9)
CALCIUM SPEC-SCNC: 9.2 MG/DL (ref 8.4–10.2)
CHLORIDE SERPL-SCNC: 104 MMOL/L (ref 98–107)
CK MB SERPL-CCNC: 5.31 NG/ML (ref 0.2–2.4)
CK MB SERPL-RTO: 2.6 % (ref 0–2)
CK SERPL-CCNC: 202 U/L (ref 30–170)
CLARITY UR: CLEAR
CO2 SERPL-SCNC: 17 MMOL/L (ref 26–30)
COLOR UR: ABNORMAL
CREAT BLD-MCNC: 2.4 MG/DL (ref 0.6–1.3)
CRP SERPL-MCNC: 6.9 MG/DL (ref 0–1)
D-LACTATE SERPL-SCNC: 1.5 MMOL/L (ref 0.5–2)
DEPRECATED RDW RBC AUTO: 54.6 FL (ref 37–54)
EOSINOPHIL # BLD AUTO: 1.77 10*3/MM3 (ref 0–0.7)
EOSINOPHIL NFR BLD AUTO: 14.7 % (ref 0–7)
ERYTHROCYTE [DISTWIDTH] IN BLOOD BY AUTOMATED COUNT: 15.9 % (ref 11.5–14.5)
ERYTHROCYTE [SEDIMENTATION RATE] IN BLOOD: 120 MM/HR (ref 0–15)
GFR SERPL CREATININE-BSD FRML MDRD: 28 ML/MIN/1.73
GLOBULIN UR ELPH-MCNC: 4.5 GM/DL
GLUCOSE BLD-MCNC: 110 MG/DL (ref 74–98)
GLUCOSE BLDC GLUCOMTR-MCNC: 108 MG/DL (ref 70–130)
GLUCOSE BLDC GLUCOMTR-MCNC: 151 MG/DL (ref 70–130)
GLUCOSE UR STRIP-MCNC: NEGATIVE MG/DL
HCT VFR BLD AUTO: 24.3 % (ref 42–52)
HGB BLD-MCNC: 7.8 G/DL (ref 14–18)
HGB UR QL STRIP.AUTO: NEGATIVE
HOLD SPECIMEN: NORMAL
HOLD SPECIMEN: NORMAL
HYALINE CASTS UR QL AUTO: ABNORMAL /LPF
IMM GRANULOCYTES # BLD: 0.06 10*3/MM3 (ref 0–0.06)
IMM GRANULOCYTES NFR BLD: 0.5 % (ref 0–0.6)
KETONES UR QL STRIP: NEGATIVE
LEUKOCYTE ESTERASE UR QL STRIP.AUTO: ABNORMAL
LYMPHOCYTES # BLD AUTO: 2.46 10*3/MM3 (ref 0.6–3.4)
LYMPHOCYTES NFR BLD AUTO: 20.4 % (ref 10–50)
MAGNESIUM SERPL-MCNC: 1.4 MG/DL (ref 1.6–2.3)
MCH RBC QN AUTO: 30 PG (ref 27–31)
MCHC RBC AUTO-ENTMCNC: 32.1 G/DL (ref 30–37)
MCV RBC AUTO: 93.5 FL (ref 80–94)
MONOCYTES # BLD AUTO: 0.91 10*3/MM3 (ref 0–0.9)
MONOCYTES NFR BLD AUTO: 7.5 % (ref 0–12)
NEUTROPHILS # BLD AUTO: 6.8 10*3/MM3 (ref 2–6.9)
NEUTROPHILS NFR BLD AUTO: 56.3 % (ref 37–80)
NITRITE UR QL STRIP: NEGATIVE
NRBC BLD MANUAL-RTO: 0 /100 WBC (ref 0–0)
PH UR STRIP.AUTO: <=5 [PH] (ref 5–8)
PLATELET # BLD AUTO: 269 10*3/MM3 (ref 130–400)
PMV BLD AUTO: 9.6 FL (ref 6–12)
POTASSIUM BLD-SCNC: 4.2 MMOL/L (ref 3.5–5.1)
PROT SERPL-MCNC: 8.1 G/DL (ref 6.3–8.2)
PROT UR QL STRIP: ABNORMAL
RBC # BLD AUTO: 2.6 10*6/MM3 (ref 4.7–6.1)
RBC # UR: ABNORMAL /HPF
REF LAB TEST METHOD: ABNORMAL
RH BLD: POSITIVE
SODIUM BLD-SCNC: 136 MMOL/L (ref 137–145)
SP GR UR STRIP: >=1.03 (ref 1–1.03)
SQUAMOUS #/AREA URNS HPF: ABNORMAL /HPF
TROPONIN I SERPL-MCNC: <0.012 NG/ML (ref 0–0.03)
UROBILINOGEN UR QL STRIP: ABNORMAL
WBC NRBC COR # BLD: 12.07 10*3/MM3 (ref 4.8–10.8)
WBC UR QL AUTO: ABNORMAL /HPF
WHOLE BLOOD HOLD SPECIMEN: NORMAL
WHOLE BLOOD HOLD SPECIMEN: NORMAL

## 2017-11-25 PROCEDURE — 82553 CREATINE MB FRACTION: CPT | Performed by: EMERGENCY MEDICINE

## 2017-11-25 PROCEDURE — 86920 COMPATIBILITY TEST SPIN: CPT

## 2017-11-25 PROCEDURE — 25010000002 MAGNESIUM SULFATE 2 GM/50ML SOLUTION: Performed by: EMERGENCY MEDICINE

## 2017-11-25 PROCEDURE — 99223 1ST HOSP IP/OBS HIGH 75: CPT | Performed by: INTERNAL MEDICINE

## 2017-11-25 PROCEDURE — 83735 ASSAY OF MAGNESIUM: CPT | Performed by: EMERGENCY MEDICINE

## 2017-11-25 PROCEDURE — 83605 ASSAY OF LACTIC ACID: CPT | Performed by: EMERGENCY MEDICINE

## 2017-11-25 PROCEDURE — 71020 HC CHEST PA AND LATERAL: CPT

## 2017-11-25 PROCEDURE — 82962 GLUCOSE BLOOD TEST: CPT

## 2017-11-25 PROCEDURE — 99284 EMERGENCY DEPT VISIT MOD MDM: CPT

## 2017-11-25 PROCEDURE — 87040 BLOOD CULTURE FOR BACTERIA: CPT | Performed by: EMERGENCY MEDICINE

## 2017-11-25 PROCEDURE — 80053 COMPREHEN METABOLIC PANEL: CPT | Performed by: EMERGENCY MEDICINE

## 2017-11-25 PROCEDURE — 81001 URINALYSIS AUTO W/SCOPE: CPT | Performed by: EMERGENCY MEDICINE

## 2017-11-25 PROCEDURE — 85025 COMPLETE CBC W/AUTO DIFF WBC: CPT | Performed by: EMERGENCY MEDICINE

## 2017-11-25 PROCEDURE — 84484 ASSAY OF TROPONIN QUANT: CPT | Performed by: EMERGENCY MEDICINE

## 2017-11-25 PROCEDURE — 86140 C-REACTIVE PROTEIN: CPT | Performed by: EMERGENCY MEDICINE

## 2017-11-25 PROCEDURE — 87186 SC STD MICRODIL/AGAR DIL: CPT | Performed by: INTERNAL MEDICINE

## 2017-11-25 PROCEDURE — 87077 CULTURE AEROBIC IDENTIFY: CPT | Performed by: INTERNAL MEDICINE

## 2017-11-25 PROCEDURE — 87070 CULTURE OTHR SPECIMN AEROBIC: CPT | Performed by: INTERNAL MEDICINE

## 2017-11-25 PROCEDURE — 86850 RBC ANTIBODY SCREEN: CPT | Performed by: EMERGENCY MEDICINE

## 2017-11-25 PROCEDURE — 86900 BLOOD TYPING SEROLOGIC ABO: CPT | Performed by: EMERGENCY MEDICINE

## 2017-11-25 PROCEDURE — 25010000002 ENOXAPARIN PER 10 MG: Performed by: INTERNAL MEDICINE

## 2017-11-25 PROCEDURE — 86901 BLOOD TYPING SEROLOGIC RH(D): CPT | Performed by: EMERGENCY MEDICINE

## 2017-11-25 PROCEDURE — 73630 X-RAY EXAM OF FOOT: CPT

## 2017-11-25 PROCEDURE — 82550 ASSAY OF CK (CPK): CPT | Performed by: EMERGENCY MEDICINE

## 2017-11-25 PROCEDURE — 85651 RBC SED RATE NONAUTOMATED: CPT | Performed by: EMERGENCY MEDICINE

## 2017-11-25 RX ORDER — HYDROXYCHLOROQUINE SULFATE 200 MG/1
200 TABLET, FILM COATED ORAL DAILY
Status: DISCONTINUED | OUTPATIENT
Start: 2017-11-25 | End: 2017-12-06 | Stop reason: HOSPADM

## 2017-11-25 RX ORDER — NICOTINE POLACRILEX 4 MG
1 LOZENGE BUCCAL
Status: DISCONTINUED | OUTPATIENT
Start: 2017-11-25 | End: 2017-12-06 | Stop reason: HOSPADM

## 2017-11-25 RX ORDER — COLCHICINE 0.6 MG/1
0.6 TABLET ORAL DAILY
Status: DISCONTINUED | OUTPATIENT
Start: 2017-11-25 | End: 2017-12-06 | Stop reason: HOSPADM

## 2017-11-25 RX ORDER — DEXTROSE MONOHYDRATE 25 G/50ML
25 INJECTION, SOLUTION INTRAVENOUS
Status: DISCONTINUED | OUTPATIENT
Start: 2017-11-25 | End: 2017-12-06 | Stop reason: HOSPADM

## 2017-11-25 RX ORDER — SODIUM CHLORIDE 0.9 % (FLUSH) 0.9 %
10 SYRINGE (ML) INJECTION AS NEEDED
Status: DISCONTINUED | OUTPATIENT
Start: 2017-11-25 | End: 2017-11-25

## 2017-11-25 RX ORDER — TRAZODONE HYDROCHLORIDE 50 MG/1
50 TABLET ORAL NIGHTLY
Status: DISCONTINUED | OUTPATIENT
Start: 2017-11-25 | End: 2017-11-28

## 2017-11-25 RX ORDER — MAGNESIUM SULFATE HEPTAHYDRATE 40 MG/ML
2 INJECTION, SOLUTION INTRAVENOUS ONCE
Status: COMPLETED | OUTPATIENT
Start: 2017-11-25 | End: 2017-11-25

## 2017-11-25 RX ORDER — ONDANSETRON 2 MG/ML
4 INJECTION INTRAMUSCULAR; INTRAVENOUS EVERY 6 HOURS PRN
Status: DISCONTINUED | OUTPATIENT
Start: 2017-11-25 | End: 2017-12-06 | Stop reason: HOSPADM

## 2017-11-25 RX ORDER — LORAZEPAM 0.5 MG/1
1 TABLET ORAL EVERY 4 HOURS PRN
Status: DISCONTINUED | OUTPATIENT
Start: 2017-11-25 | End: 2017-12-06 | Stop reason: HOSPADM

## 2017-11-25 RX ORDER — SODIUM CHLORIDE 9 MG/ML
75 INJECTION, SOLUTION INTRAVENOUS CONTINUOUS
Status: DISCONTINUED | OUTPATIENT
Start: 2017-11-25 | End: 2017-12-03

## 2017-11-25 RX ORDER — GABAPENTIN 300 MG/1
300 CAPSULE ORAL EVERY 8 HOURS SCHEDULED
Status: DISCONTINUED | OUTPATIENT
Start: 2017-11-25 | End: 2017-11-28

## 2017-11-25 RX ORDER — ACETAMINOPHEN 325 MG/1
650 TABLET ORAL EVERY 4 HOURS PRN
Status: DISCONTINUED | OUTPATIENT
Start: 2017-11-25 | End: 2017-12-06 | Stop reason: HOSPADM

## 2017-11-25 RX ORDER — ATORVASTATIN CALCIUM 40 MG/1
40 TABLET, FILM COATED ORAL NIGHTLY
Status: DISCONTINUED | OUTPATIENT
Start: 2017-11-25 | End: 2017-12-06 | Stop reason: HOSPADM

## 2017-11-25 RX ORDER — ONDANSETRON 4 MG/1
4 TABLET, ORALLY DISINTEGRATING ORAL EVERY 6 HOURS PRN
Status: DISCONTINUED | OUTPATIENT
Start: 2017-11-25 | End: 2017-12-06 | Stop reason: HOSPADM

## 2017-11-25 RX ORDER — CLOPIDOGREL BISULFATE 75 MG/1
75 TABLET ORAL DAILY
Status: DISCONTINUED | OUTPATIENT
Start: 2017-11-25 | End: 2017-11-29

## 2017-11-25 RX ORDER — HYDROCODONE BITARTRATE AND ACETAMINOPHEN 5; 325 MG/1; MG/1
1 TABLET ORAL 2 TIMES DAILY PRN
Status: DISCONTINUED | OUTPATIENT
Start: 2017-11-25 | End: 2017-12-06 | Stop reason: HOSPADM

## 2017-11-25 RX ORDER — SODIUM CHLORIDE 0.9 % (FLUSH) 0.9 %
1-10 SYRINGE (ML) INJECTION AS NEEDED
Status: DISCONTINUED | OUTPATIENT
Start: 2017-11-25 | End: 2017-12-06 | Stop reason: HOSPADM

## 2017-11-25 RX ORDER — PANTOPRAZOLE SODIUM 40 MG/1
40 TABLET, DELAYED RELEASE ORAL
Status: DISCONTINUED | OUTPATIENT
Start: 2017-11-25 | End: 2017-12-06 | Stop reason: HOSPADM

## 2017-11-25 RX ORDER — ONDANSETRON 4 MG/1
4 TABLET, FILM COATED ORAL EVERY 6 HOURS PRN
Status: DISCONTINUED | OUTPATIENT
Start: 2017-11-25 | End: 2017-12-06 | Stop reason: HOSPADM

## 2017-11-25 RX ORDER — DULOXETIN HYDROCHLORIDE 30 MG/1
60 CAPSULE, DELAYED RELEASE ORAL DAILY
Status: DISCONTINUED | OUTPATIENT
Start: 2017-11-25 | End: 2017-11-28

## 2017-11-25 RX ADMIN — SODIUM CHLORIDE 125 ML/HR: 9 INJECTION, SOLUTION INTRAVENOUS at 19:07

## 2017-11-25 RX ADMIN — ENOXAPARIN SODIUM 40 MG: 40 INJECTION SUBCUTANEOUS at 17:47

## 2017-11-25 RX ADMIN — CLOPIDOGREL BISULFATE 75 MG: 75 TABLET ORAL at 17:47

## 2017-11-25 RX ADMIN — HYDROXYCHLOROQUINE SULFATE 200 MG: 200 TABLET, FILM COATED ORAL at 17:47

## 2017-11-25 RX ADMIN — PANTOPRAZOLE SODIUM 40 MG: 40 TABLET, DELAYED RELEASE ORAL at 19:07

## 2017-11-25 RX ADMIN — HYDROCODONE BITARTRATE AND ACETAMINOPHEN 1 TABLET: 5; 325 TABLET ORAL at 20:10

## 2017-11-25 RX ADMIN — DULOXETINE HYDROCHLORIDE 60 MG: 30 CAPSULE, DELAYED RELEASE ORAL at 17:48

## 2017-11-25 RX ADMIN — LORAZEPAM 1 MG: 0.5 TABLET ORAL at 21:00

## 2017-11-25 RX ADMIN — COLCHICINE 0.6 MG: 0.6 TABLET, FILM COATED ORAL at 17:48

## 2017-11-25 RX ADMIN — GABAPENTIN 300 MG: 300 CAPSULE ORAL at 17:47

## 2017-11-25 RX ADMIN — TRAZODONE HYDROCHLORIDE 50 MG: 50 TABLET ORAL at 22:05

## 2017-11-25 RX ADMIN — ATORVASTATIN CALCIUM 40 MG: 40 TABLET, FILM COATED ORAL at 20:10

## 2017-11-25 RX ADMIN — SODIUM CHLORIDE 2000 ML: 9 INJECTION, SOLUTION INTRAVENOUS at 13:58

## 2017-11-25 RX ADMIN — MAGNESIUM SULFATE HEPTAHYDRATE 2 G: 40 INJECTION, SOLUTION INTRAVENOUS at 14:47

## 2017-11-26 LAB
ANION GAP SERPL CALCULATED.3IONS-SCNC: 15.4 MMOL/L
BASOPHILS # BLD AUTO: 0.02 10*3/MM3 (ref 0–0.2)
BASOPHILS NFR BLD AUTO: 0.4 % (ref 0–2.5)
BUN BLD-MCNC: 26 MG/DL (ref 7–20)
BUN/CREAT SERPL: 17.3 (ref 6.3–21.9)
CALCIUM SPEC-SCNC: 8.5 MG/DL (ref 8.4–10.2)
CHLORIDE SERPL-SCNC: 112 MMOL/L (ref 98–107)
CO2 SERPL-SCNC: 17 MMOL/L (ref 26–30)
CREAT BLD-MCNC: 1.5 MG/DL (ref 0.6–1.3)
DEPRECATED RDW RBC AUTO: 53.3 FL (ref 37–54)
EOSINOPHIL # BLD AUTO: 0.89 10*3/MM3 (ref 0–0.7)
EOSINOPHIL NFR BLD AUTO: 18.8 % (ref 0–7)
ERYTHROCYTE [DISTWIDTH] IN BLOOD BY AUTOMATED COUNT: 15.8 % (ref 11.5–14.5)
GFR SERPL CREATININE-BSD FRML MDRD: 48 ML/MIN/1.73
GLUCOSE BLD-MCNC: 109 MG/DL (ref 74–98)
GLUCOSE BLDC GLUCOMTR-MCNC: 113 MG/DL (ref 70–130)
GLUCOSE BLDC GLUCOMTR-MCNC: 128 MG/DL (ref 70–130)
GLUCOSE BLDC GLUCOMTR-MCNC: 141 MG/DL (ref 70–130)
GLUCOSE BLDC GLUCOMTR-MCNC: 161 MG/DL (ref 70–130)
HCT VFR BLD AUTO: 22.5 % (ref 42–52)
HGB BLD-MCNC: 7.3 G/DL (ref 14–18)
IMM GRANULOCYTES # BLD: 0.02 10*3/MM3 (ref 0–0.06)
IMM GRANULOCYTES NFR BLD: 0.4 % (ref 0–0.6)
LYMPHOCYTES # BLD AUTO: 1.21 10*3/MM3 (ref 0.6–3.4)
LYMPHOCYTES NFR BLD AUTO: 25.5 % (ref 10–50)
MCH RBC QN AUTO: 30.3 PG (ref 27–31)
MCHC RBC AUTO-ENTMCNC: 32.4 G/DL (ref 30–37)
MCV RBC AUTO: 93.4 FL (ref 80–94)
MONOCYTES # BLD AUTO: 0.36 10*3/MM3 (ref 0–0.9)
MONOCYTES NFR BLD AUTO: 7.6 % (ref 0–12)
NEUTROPHILS # BLD AUTO: 2.24 10*3/MM3 (ref 2–6.9)
NEUTROPHILS NFR BLD AUTO: 47.3 % (ref 37–80)
NRBC BLD MANUAL-RTO: 0 /100 WBC (ref 0–0)
PLATELET # BLD AUTO: 188 10*3/MM3 (ref 130–400)
PMV BLD AUTO: 9.9 FL (ref 6–12)
POTASSIUM BLD-SCNC: 3.4 MMOL/L (ref 3.5–5.1)
RBC # BLD AUTO: 2.41 10*6/MM3 (ref 4.7–6.1)
SODIUM BLD-SCNC: 141 MMOL/L (ref 137–145)
WBC NRBC COR # BLD: 4.74 10*3/MM3 (ref 4.8–10.8)

## 2017-11-26 PROCEDURE — 86900 BLOOD TYPING SEROLOGIC ABO: CPT

## 2017-11-26 PROCEDURE — 83550 IRON BINDING TEST: CPT | Performed by: INTERNAL MEDICINE

## 2017-11-26 PROCEDURE — 80048 BASIC METABOLIC PNL TOTAL CA: CPT | Performed by: INTERNAL MEDICINE

## 2017-11-26 PROCEDURE — 85025 COMPLETE CBC W/AUTO DIFF WBC: CPT | Performed by: INTERNAL MEDICINE

## 2017-11-26 PROCEDURE — P9016 RBC LEUKOCYTES REDUCED: HCPCS

## 2017-11-26 PROCEDURE — 25010000002 ENOXAPARIN PER 10 MG: Performed by: INTERNAL MEDICINE

## 2017-11-26 PROCEDURE — 83540 ASSAY OF IRON: CPT | Performed by: INTERNAL MEDICINE

## 2017-11-26 PROCEDURE — 82728 ASSAY OF FERRITIN: CPT | Performed by: INTERNAL MEDICINE

## 2017-11-26 PROCEDURE — 36430 TRANSFUSION BLD/BLD COMPNT: CPT

## 2017-11-26 PROCEDURE — 82962 GLUCOSE BLOOD TEST: CPT

## 2017-11-26 PROCEDURE — 99232 SBSQ HOSP IP/OBS MODERATE 35: CPT | Performed by: INTERNAL MEDICINE

## 2017-11-26 RX ADMIN — CLOPIDOGREL BISULFATE 75 MG: 75 TABLET ORAL at 08:28

## 2017-11-26 RX ADMIN — COLCHICINE 0.6 MG: 0.6 TABLET, FILM COATED ORAL at 08:28

## 2017-11-26 RX ADMIN — DULOXETINE HYDROCHLORIDE 60 MG: 30 CAPSULE, DELAYED RELEASE ORAL at 08:28

## 2017-11-26 RX ADMIN — ENOXAPARIN SODIUM 40 MG: 40 INJECTION SUBCUTANEOUS at 18:17

## 2017-11-26 RX ADMIN — PANTOPRAZOLE SODIUM 40 MG: 40 TABLET, DELAYED RELEASE ORAL at 05:45

## 2017-11-26 RX ADMIN — GABAPENTIN 300 MG: 300 CAPSULE ORAL at 14:35

## 2017-11-26 RX ADMIN — SODIUM CHLORIDE 125 ML/HR: 9 INJECTION, SOLUTION INTRAVENOUS at 04:00

## 2017-11-26 RX ADMIN — TRAZODONE HYDROCHLORIDE 50 MG: 50 TABLET ORAL at 21:06

## 2017-11-26 RX ADMIN — HYDROCODONE BITARTRATE AND ACETAMINOPHEN 1 TABLET: 5; 325 TABLET ORAL at 19:34

## 2017-11-26 RX ADMIN — METOPROLOL TARTRATE 25 MG: 25 TABLET ORAL at 08:28

## 2017-11-26 RX ADMIN — HYDROXYCHLOROQUINE SULFATE 200 MG: 200 TABLET, FILM COATED ORAL at 08:28

## 2017-11-26 RX ADMIN — METOPROLOL TARTRATE 25 MG: 25 TABLET ORAL at 21:06

## 2017-11-26 RX ADMIN — ATORVASTATIN CALCIUM 40 MG: 40 TABLET, FILM COATED ORAL at 21:06

## 2017-11-26 RX ADMIN — LORAZEPAM 1 MG: 0.5 TABLET ORAL at 21:06

## 2017-11-26 RX ADMIN — HYDROCODONE BITARTRATE AND ACETAMINOPHEN 1 TABLET: 5; 325 TABLET ORAL at 01:20

## 2017-11-26 RX ADMIN — GABAPENTIN 300 MG: 300 CAPSULE ORAL at 21:06

## 2017-11-26 RX ADMIN — GABAPENTIN 300 MG: 300 CAPSULE ORAL at 05:45

## 2017-11-26 RX ADMIN — LORAZEPAM 1 MG: 0.5 TABLET ORAL at 01:20

## 2017-11-27 ENCOUNTER — APPOINTMENT (OUTPATIENT)
Dept: CT IMAGING | Facility: HOSPITAL | Age: 57
End: 2017-11-27

## 2017-11-27 LAB
ABO + RH BLD: NORMAL
ANION GAP SERPL CALCULATED.3IONS-SCNC: 15.6 MMOL/L
BASOPHILS # BLD AUTO: 0.02 10*3/MM3 (ref 0–0.2)
BASOPHILS NFR BLD AUTO: 0.6 % (ref 0–2.5)
BH BB BLOOD EXPIRATION DATE: NORMAL
BH BB BLOOD TYPE BARCODE: 5100
BH BB DISPENSE STATUS: NORMAL
BH BB PRODUCT CODE: NORMAL
BH BB UNIT NUMBER: NORMAL
BUN BLD-MCNC: 14 MG/DL (ref 7–20)
BUN/CREAT SERPL: 15.6 (ref 6.3–21.9)
C DIFF TOX GENS STL QL NAA+PROBE: NOT DETECTED
CALCIUM SPEC-SCNC: 8.6 MG/DL (ref 8.4–10.2)
CHLORIDE SERPL-SCNC: 117 MMOL/L (ref 98–107)
CO2 SERPL-SCNC: 17 MMOL/L (ref 26–30)
CREAT BLD-MCNC: 0.9 MG/DL (ref 0.6–1.3)
CROSSMATCH INTERPRETATION: NORMAL
DEPRECATED RDW RBC AUTO: 54.3 FL (ref 37–54)
EOSINOPHIL # BLD AUTO: 0.63 10*3/MM3 (ref 0–0.7)
EOSINOPHIL NFR BLD AUTO: 17.4 % (ref 0–7)
ERYTHROCYTE [DISTWIDTH] IN BLOOD BY AUTOMATED COUNT: 15.9 % (ref 11.5–14.5)
GFR SERPL CREATININE-BSD FRML MDRD: 86 ML/MIN/1.73
GLUCOSE BLD-MCNC: 123 MG/DL (ref 74–98)
GLUCOSE BLDC GLUCOMTR-MCNC: 104 MG/DL (ref 70–130)
GLUCOSE BLDC GLUCOMTR-MCNC: 127 MG/DL (ref 70–130)
GLUCOSE BLDC GLUCOMTR-MCNC: 154 MG/DL (ref 70–130)
GLUCOSE BLDC GLUCOMTR-MCNC: 98 MG/DL (ref 70–130)
HCT VFR BLD AUTO: 24.8 % (ref 42–52)
HGB BLD-MCNC: 7.9 G/DL (ref 14–18)
IMM GRANULOCYTES # BLD: 0.03 10*3/MM3 (ref 0–0.06)
IMM GRANULOCYTES NFR BLD: 0.8 % (ref 0–0.6)
LYMPHOCYTES # BLD AUTO: 0.79 10*3/MM3 (ref 0.6–3.4)
LYMPHOCYTES NFR BLD AUTO: 21.8 % (ref 10–50)
MCH RBC QN AUTO: 30.3 PG (ref 27–31)
MCHC RBC AUTO-ENTMCNC: 31.9 G/DL (ref 30–37)
MCV RBC AUTO: 95 FL (ref 80–94)
MONOCYTES # BLD AUTO: 0.3 10*3/MM3 (ref 0–0.9)
MONOCYTES NFR BLD AUTO: 8.3 % (ref 0–12)
NEUTROPHILS # BLD AUTO: 1.85 10*3/MM3 (ref 2–6.9)
NEUTROPHILS NFR BLD AUTO: 51.1 % (ref 37–80)
NRBC BLD MANUAL-RTO: 0 /100 WBC (ref 0–0)
PLATELET # BLD AUTO: 166 10*3/MM3 (ref 130–400)
PMV BLD AUTO: 9.6 FL (ref 6–12)
POTASSIUM BLD-SCNC: 3.6 MMOL/L (ref 3.5–5.1)
RBC # BLD AUTO: 2.61 10*6/MM3 (ref 4.7–6.1)
SODIUM BLD-SCNC: 146 MMOL/L (ref 137–145)
UNIT  ABO: NORMAL
UNIT  RH: NORMAL
WBC NRBC COR # BLD: 3.62 10*3/MM3 (ref 4.8–10.8)
WBC STL QL MICRO: NORMAL

## 2017-11-27 PROCEDURE — 82962 GLUCOSE BLOOD TEST: CPT

## 2017-11-27 PROCEDURE — 97162 PT EVAL MOD COMPLEX 30 MIN: CPT

## 2017-11-27 PROCEDURE — 87205 SMEAR GRAM STAIN: CPT | Performed by: INTERNAL MEDICINE

## 2017-11-27 PROCEDURE — 87046 STOOL CULTR AEROBIC BACT EA: CPT | Performed by: INTERNAL MEDICINE

## 2017-11-27 PROCEDURE — 87493 C DIFF AMPLIFIED PROBE: CPT | Performed by: INTERNAL MEDICINE

## 2017-11-27 PROCEDURE — 94799 UNLISTED PULMONARY SVC/PX: CPT

## 2017-11-27 PROCEDURE — 97165 OT EVAL LOW COMPLEX 30 MIN: CPT

## 2017-11-27 PROCEDURE — 85025 COMPLETE CBC W/AUTO DIFF WBC: CPT | Performed by: INTERNAL MEDICINE

## 2017-11-27 PROCEDURE — 99232 SBSQ HOSP IP/OBS MODERATE 35: CPT | Performed by: INTERNAL MEDICINE

## 2017-11-27 PROCEDURE — 25010000002 ENOXAPARIN PER 10 MG: Performed by: INTERNAL MEDICINE

## 2017-11-27 PROCEDURE — 94640 AIRWAY INHALATION TREATMENT: CPT

## 2017-11-27 PROCEDURE — 74176 CT ABD & PELVIS W/O CONTRAST: CPT

## 2017-11-27 PROCEDURE — 87045 FECES CULTURE AEROBIC BACT: CPT | Performed by: INTERNAL MEDICINE

## 2017-11-27 PROCEDURE — 80048 BASIC METABOLIC PNL TOTAL CA: CPT | Performed by: INTERNAL MEDICINE

## 2017-11-27 RX ORDER — SACCHAROMYCES BOULARDII 250 MG
250 CAPSULE ORAL 2 TIMES DAILY
Status: DISCONTINUED | OUTPATIENT
Start: 2017-11-27 | End: 2017-12-06 | Stop reason: HOSPADM

## 2017-11-27 RX ORDER — IPRATROPIUM BROMIDE AND ALBUTEROL SULFATE 2.5; .5 MG/3ML; MG/3ML
3 SOLUTION RESPIRATORY (INHALATION)
Status: DISPENSED | OUTPATIENT
Start: 2017-11-27 | End: 2017-11-28

## 2017-11-27 RX ORDER — IPRATROPIUM BROMIDE AND ALBUTEROL SULFATE 2.5; .5 MG/3ML; MG/3ML
3 SOLUTION RESPIRATORY (INHALATION) EVERY 4 HOURS PRN
Status: DISCONTINUED | OUTPATIENT
Start: 2017-11-27 | End: 2017-12-06 | Stop reason: HOSPADM

## 2017-11-27 RX ORDER — CLOPIDOGREL BISULFATE 75 MG/1
TABLET ORAL
Qty: 30 TABLET | Refills: 3 | OUTPATIENT
Start: 2017-11-27 | End: 2017-12-06 | Stop reason: HOSPADM

## 2017-11-27 RX ADMIN — ATORVASTATIN CALCIUM 40 MG: 40 TABLET, FILM COATED ORAL at 21:45

## 2017-11-27 RX ADMIN — GABAPENTIN 300 MG: 300 CAPSULE ORAL at 13:26

## 2017-11-27 RX ADMIN — DULOXETINE HYDROCHLORIDE 60 MG: 30 CAPSULE, DELAYED RELEASE ORAL at 08:58

## 2017-11-27 RX ADMIN — CLOPIDOGREL BISULFATE 75 MG: 75 TABLET ORAL at 08:58

## 2017-11-27 RX ADMIN — ENOXAPARIN SODIUM 40 MG: 40 INJECTION SUBCUTANEOUS at 17:24

## 2017-11-27 RX ADMIN — IPRATROPIUM BROMIDE AND ALBUTEROL SULFATE 3 ML: .5; 3 SOLUTION RESPIRATORY (INHALATION) at 22:51

## 2017-11-27 RX ADMIN — TRAZODONE HYDROCHLORIDE 50 MG: 50 TABLET ORAL at 21:45

## 2017-11-27 RX ADMIN — HYDROXYCHLOROQUINE SULFATE 200 MG: 200 TABLET, FILM COATED ORAL at 08:58

## 2017-11-27 RX ADMIN — COLCHICINE 0.6 MG: 0.6 TABLET, FILM COATED ORAL at 08:58

## 2017-11-27 RX ADMIN — GABAPENTIN 300 MG: 300 CAPSULE ORAL at 06:11

## 2017-11-27 RX ADMIN — HYDROCODONE BITARTRATE AND ACETAMINOPHEN 1 TABLET: 5; 325 TABLET ORAL at 21:45

## 2017-11-27 RX ADMIN — Medication 250 MG: at 13:26

## 2017-11-27 RX ADMIN — GABAPENTIN 300 MG: 300 CAPSULE ORAL at 21:45

## 2017-11-27 RX ADMIN — METOPROLOL TARTRATE 25 MG: 25 TABLET ORAL at 21:45

## 2017-11-27 RX ADMIN — Medication 250 MG: at 17:24

## 2017-11-27 RX ADMIN — PANTOPRAZOLE SODIUM 40 MG: 40 TABLET, DELAYED RELEASE ORAL at 06:11

## 2017-11-27 RX ADMIN — METOPROLOL TARTRATE 25 MG: 25 TABLET ORAL at 08:58

## 2017-11-27 RX ADMIN — HYDROCODONE BITARTRATE AND ACETAMINOPHEN 1 TABLET: 5; 325 TABLET ORAL at 06:12

## 2017-11-28 ENCOUNTER — APPOINTMENT (OUTPATIENT)
Dept: GENERAL RADIOLOGY | Facility: HOSPITAL | Age: 57
End: 2017-11-28

## 2017-11-28 ENCOUNTER — APPOINTMENT (OUTPATIENT)
Dept: ULTRASOUND IMAGING | Facility: HOSPITAL | Age: 57
End: 2017-11-28

## 2017-11-28 ENCOUNTER — APPOINTMENT (OUTPATIENT)
Dept: CT IMAGING | Facility: HOSPITAL | Age: 57
End: 2017-11-28

## 2017-11-28 ENCOUNTER — TELEPHONE (OUTPATIENT)
Dept: INTERNAL MEDICINE | Facility: CLINIC | Age: 57
End: 2017-11-28

## 2017-11-28 LAB
AMMONIA BLD-SCNC: 18 UMOL/L (ref 9–30)
ANION GAP SERPL CALCULATED.3IONS-SCNC: 15.5 MMOL/L
BACTERIA SPEC AEROBE CULT: ABNORMAL
BASOPHILS # BLD AUTO: 0.01 10*3/MM3 (ref 0–0.2)
BASOPHILS NFR BLD AUTO: 0.3 % (ref 0–2.5)
BILIRUB UR QL STRIP: NEGATIVE
BUN BLD-MCNC: 9 MG/DL (ref 7–20)
BUN/CREAT SERPL: 11.3 (ref 6.3–21.9)
CALCIUM SPEC-SCNC: 8.8 MG/DL (ref 8.4–10.2)
CHLORIDE SERPL-SCNC: 114 MMOL/L (ref 98–107)
CLARITY UR: CLEAR
CO2 SERPL-SCNC: 18 MMOL/L (ref 26–30)
COLOR UR: YELLOW
CREAT BLD-MCNC: 0.8 MG/DL (ref 0.6–1.3)
DEPRECATED RDW RBC AUTO: 52.3 FL (ref 37–54)
DEPRECATED RDW RBC AUTO: 53.4 FL (ref 37–54)
EOSINOPHIL # BLD AUTO: 0.34 10*3/MM3 (ref 0–0.7)
EOSINOPHIL NFR BLD AUTO: 9.3 % (ref 0–7)
ERYTHROCYTE [DISTWIDTH] IN BLOOD BY AUTOMATED COUNT: 15.9 % (ref 11.5–14.5)
ERYTHROCYTE [DISTWIDTH] IN BLOOD BY AUTOMATED COUNT: 16.3 % (ref 11.5–14.5)
FERRITIN SERPL-MCNC: 104 NG/ML (ref 17.9–464)
GFR SERPL CREATININE-BSD FRML MDRD: 99 ML/MIN/1.73
GLUCOSE BLD-MCNC: 92 MG/DL (ref 74–98)
GLUCOSE BLDC GLUCOMTR-MCNC: 100 MG/DL (ref 70–130)
GLUCOSE BLDC GLUCOMTR-MCNC: 108 MG/DL (ref 70–130)
GLUCOSE BLDC GLUCOMTR-MCNC: 144 MG/DL (ref 70–130)
GLUCOSE BLDC GLUCOMTR-MCNC: 97 MG/DL (ref 70–130)
GLUCOSE UR STRIP-MCNC: NEGATIVE MG/DL
HCT VFR BLD AUTO: 23.8 % (ref 42–52)
HCT VFR BLD AUTO: 24.7 % (ref 42–52)
HEMOCCULT STL QL: NEGATIVE
HGB BLD-MCNC: 7.7 G/DL (ref 14–18)
HGB BLD-MCNC: 8 G/DL (ref 14–18)
HGB UR QL STRIP.AUTO: NEGATIVE
IMM GRANULOCYTES # BLD: 0.02 10*3/MM3 (ref 0–0.06)
IMM GRANULOCYTES NFR BLD: 0.5 % (ref 0–0.6)
IRON 24H UR-MRATE: 35 MCG/DL (ref 37–181)
IRON SATN MFR SERPL: 12 % (ref 11–46)
KETONES UR QL STRIP: NEGATIVE
LEUKOCYTE ESTERASE UR QL STRIP.AUTO: NEGATIVE
LYMPHOCYTES # BLD AUTO: 0.72 10*3/MM3 (ref 0.6–3.4)
LYMPHOCYTES NFR BLD AUTO: 19.8 % (ref 10–50)
MAGNESIUM SERPL-MCNC: 1.2 MG/DL (ref 1.6–2.3)
MCH RBC QN AUTO: 29.9 PG (ref 27–31)
MCH RBC QN AUTO: 30.6 PG (ref 27–31)
MCHC RBC AUTO-ENTMCNC: 32.4 G/DL (ref 30–37)
MCHC RBC AUTO-ENTMCNC: 32.4 G/DL (ref 30–37)
MCV RBC AUTO: 92.2 FL (ref 80–94)
MCV RBC AUTO: 94.4 FL (ref 80–94)
MONOCYTES # BLD AUTO: 0.33 10*3/MM3 (ref 0–0.9)
MONOCYTES NFR BLD AUTO: 9.1 % (ref 0–12)
NEUTROPHILS # BLD AUTO: 2.22 10*3/MM3 (ref 2–6.9)
NEUTROPHILS NFR BLD AUTO: 61 % (ref 37–80)
NITRITE UR QL STRIP: NEGATIVE
NRBC BLD MANUAL-RTO: 0 /100 WBC (ref 0–0)
PH UR STRIP.AUTO: 5.5 [PH] (ref 5–8)
PLATELET # BLD AUTO: 133 10*3/MM3 (ref 130–400)
PLATELET # BLD AUTO: 155 10*3/MM3 (ref 130–400)
PMV BLD AUTO: 9.5 FL (ref 6–12)
PMV BLD AUTO: 9.5 FL (ref 6–12)
POTASSIUM BLD-SCNC: 3.5 MMOL/L (ref 3.5–5.1)
PROT UR QL STRIP: NEGATIVE
RBC # BLD AUTO: 2.52 10*6/MM3 (ref 4.7–6.1)
RBC # BLD AUTO: 2.68 10*6/MM3 (ref 4.7–6.1)
SODIUM BLD-SCNC: 144 MMOL/L (ref 137–145)
SP GR UR STRIP: 1.02 (ref 1–1.03)
TIBC SERPL-MCNC: 301 MCG/DL (ref 261–497)
UROBILINOGEN UR QL STRIP: NORMAL
WBC NRBC COR # BLD: 3.64 10*3/MM3 (ref 4.8–10.8)
WBC NRBC COR # BLD: 4.14 10*3/MM3 (ref 4.8–10.8)

## 2017-11-28 PROCEDURE — 25010000002 MAGNESIUM SULFATE IN D5W 1G/100ML (PREMIX) 1-5 GM/100ML-% SOLUTION: Performed by: FAMILY MEDICINE

## 2017-11-28 PROCEDURE — 81003 URINALYSIS AUTO W/O SCOPE: CPT | Performed by: INTERNAL MEDICINE

## 2017-11-28 PROCEDURE — 99233 SBSQ HOSP IP/OBS HIGH 50: CPT | Performed by: INTERNAL MEDICINE

## 2017-11-28 PROCEDURE — 80074 ACUTE HEPATITIS PANEL: CPT | Performed by: INTERNAL MEDICINE

## 2017-11-28 PROCEDURE — 86707 HEPATITIS BE ANTIBODY: CPT | Performed by: INTERNAL MEDICINE

## 2017-11-28 PROCEDURE — 86706 HEP B SURFACE ANTIBODY: CPT | Performed by: INTERNAL MEDICINE

## 2017-11-28 PROCEDURE — 99221 1ST HOSP IP/OBS SF/LOW 40: CPT | Performed by: PODIATRIST

## 2017-11-28 PROCEDURE — 87350 HEPATITIS BE AG IA: CPT | Performed by: INTERNAL MEDICINE

## 2017-11-28 PROCEDURE — 70450 CT HEAD/BRAIN W/O DYE: CPT

## 2017-11-28 PROCEDURE — 85025 COMPLETE CBC W/AUTO DIFF WBC: CPT | Performed by: INTERNAL MEDICINE

## 2017-11-28 PROCEDURE — 94799 UNLISTED PULMONARY SVC/PX: CPT

## 2017-11-28 PROCEDURE — 82272 OCCULT BLD FECES 1-3 TESTS: CPT | Performed by: INTERNAL MEDICINE

## 2017-11-28 PROCEDURE — P9016 RBC LEUKOCYTES REDUCED: HCPCS

## 2017-11-28 PROCEDURE — 80048 BASIC METABOLIC PNL TOTAL CA: CPT | Performed by: INTERNAL MEDICINE

## 2017-11-28 PROCEDURE — 82140 ASSAY OF AMMONIA: CPT | Performed by: INTERNAL MEDICINE

## 2017-11-28 PROCEDURE — 36430 TRANSFUSION BLD/BLD COMPNT: CPT

## 2017-11-28 PROCEDURE — 99222 1ST HOSP IP/OBS MODERATE 55: CPT | Performed by: PSYCHIATRY & NEUROLOGY

## 2017-11-28 PROCEDURE — 82962 GLUCOSE BLOOD TEST: CPT

## 2017-11-28 PROCEDURE — 71010 HC CHEST PA OR AP: CPT

## 2017-11-28 PROCEDURE — 83735 ASSAY OF MAGNESIUM: CPT | Performed by: INTERNAL MEDICINE

## 2017-11-28 PROCEDURE — 93926 LOWER EXTREMITY STUDY: CPT

## 2017-11-28 PROCEDURE — 86900 BLOOD TYPING SEROLOGIC ABO: CPT

## 2017-11-28 PROCEDURE — 86704 HEP B CORE ANTIBODY TOTAL: CPT | Performed by: INTERNAL MEDICINE

## 2017-11-28 PROCEDURE — 85027 COMPLETE CBC AUTOMATED: CPT | Performed by: INTERNAL MEDICINE

## 2017-11-28 RX ORDER — FERROUS SULFATE TAB EC 324 MG (65 MG FE EQUIVALENT) 324 (65 FE) MG
324 TABLET DELAYED RESPONSE ORAL
Status: DISCONTINUED | OUTPATIENT
Start: 2017-11-28 | End: 2017-12-06 | Stop reason: HOSPADM

## 2017-11-28 RX ORDER — AMOXICILLIN AND CLAVULANATE POTASSIUM 500; 125 MG/1; MG/1
1 TABLET, FILM COATED ORAL EVERY 12 HOURS SCHEDULED
Status: DISCONTINUED | OUTPATIENT
Start: 2017-11-28 | End: 2017-11-28

## 2017-11-28 RX ORDER — MAGNESIUM SULFATE 1 G/100ML
1 INJECTION INTRAVENOUS
Status: COMPLETED | OUTPATIENT
Start: 2017-11-28 | End: 2017-11-28

## 2017-11-28 RX ORDER — SULFAMETHOXAZOLE AND TRIMETHOPRIM 800; 160 MG/1; MG/1
1 TABLET ORAL EVERY 12 HOURS SCHEDULED
Status: DISCONTINUED | OUTPATIENT
Start: 2017-11-28 | End: 2017-12-02

## 2017-11-28 RX ADMIN — IPRATROPIUM BROMIDE AND ALBUTEROL SULFATE 3 ML: .5; 3 SOLUTION RESPIRATORY (INHALATION) at 07:23

## 2017-11-28 RX ADMIN — ATORVASTATIN CALCIUM 40 MG: 40 TABLET, FILM COATED ORAL at 21:24

## 2017-11-28 RX ADMIN — HYDROCODONE BITARTRATE AND ACETAMINOPHEN 1 TABLET: 5; 325 TABLET ORAL at 17:27

## 2017-11-28 RX ADMIN — LORAZEPAM 1 MG: 0.5 TABLET ORAL at 17:28

## 2017-11-28 RX ADMIN — Medication 250 MG: at 08:29

## 2017-11-28 RX ADMIN — SULFAMETHOXAZOLE AND TRIMETHOPRIM 160 MG: 800; 160 TABLET ORAL at 21:25

## 2017-11-28 RX ADMIN — DULOXETINE HYDROCHLORIDE 60 MG: 30 CAPSULE, DELAYED RELEASE ORAL at 08:29

## 2017-11-28 RX ADMIN — IPRATROPIUM BROMIDE AND ALBUTEROL SULFATE 3 ML: .5; 3 SOLUTION RESPIRATORY (INHALATION) at 13:24

## 2017-11-28 RX ADMIN — METOPROLOL TARTRATE 25 MG: 25 TABLET ORAL at 08:29

## 2017-11-28 RX ADMIN — MAGNESIUM SULFATE HEPTAHYDRATE 1 G: 1 INJECTION, SOLUTION INTRAVENOUS at 08:29

## 2017-11-28 RX ADMIN — CLOPIDOGREL BISULFATE 75 MG: 75 TABLET ORAL at 08:29

## 2017-11-28 RX ADMIN — LORAZEPAM 1 MG: 0.5 TABLET ORAL at 21:27

## 2017-11-28 RX ADMIN — PANTOPRAZOLE SODIUM 40 MG: 40 TABLET, DELAYED RELEASE ORAL at 06:07

## 2017-11-28 RX ADMIN — Medication 250 MG: at 17:27

## 2017-11-28 RX ADMIN — ACETAMINOPHEN 650 MG: 325 TABLET, FILM COATED ORAL at 21:24

## 2017-11-28 RX ADMIN — GABAPENTIN 300 MG: 300 CAPSULE ORAL at 06:07

## 2017-11-28 RX ADMIN — AMOXICILLIN AND CLAVULANATE POTASSIUM 500 MG: 500; 125 TABLET, FILM COATED ORAL at 15:04

## 2017-11-28 RX ADMIN — MAGNESIUM SULFATE HEPTAHYDRATE 1 G: 1 INJECTION, SOLUTION INTRAVENOUS at 09:53

## 2017-11-28 RX ADMIN — FERROUS SULFATE TAB EC 324 MG (65 MG FE EQUIVALENT) 324 MG: 324 (65 FE) TABLET DELAYED RESPONSE at 08:29

## 2017-11-28 RX ADMIN — MAGNESIUM SULFATE HEPTAHYDRATE 1 G: 1 INJECTION, SOLUTION INTRAVENOUS at 07:31

## 2017-11-28 RX ADMIN — METOPROLOL TARTRATE 25 MG: 25 TABLET ORAL at 21:25

## 2017-11-28 RX ADMIN — HYDROXYCHLOROQUINE SULFATE 200 MG: 200 TABLET, FILM COATED ORAL at 08:29

## 2017-11-28 RX ADMIN — COLCHICINE 0.6 MG: 0.6 TABLET, FILM COATED ORAL at 08:29

## 2017-11-28 NOTE — TELEPHONE ENCOUNTER
Kell is calling concerning a fax for Lidocaine and Omega 3. She states that they have faxed over the request since September 29 and the patient has called them numerous times as to why they haven't filled his prescription.     [facility] [phone]

## 2017-11-29 LAB
ABO + RH BLD: NORMAL
ALBUMIN SERPL-MCNC: 3.2 G/DL (ref 3.5–5)
ALBUMIN/GLOB SERPL: 0.7 G/DL (ref 1–2)
ALP SERPL-CCNC: 104 U/L (ref 38–126)
ALT SERPL W P-5'-P-CCNC: 25 U/L (ref 13–69)
ANION GAP SERPL CALCULATED.3IONS-SCNC: 15.4 MMOL/L
AST SERPL-CCNC: 28 U/L (ref 15–46)
BASOPHILS # BLD AUTO: 0.03 10*3/MM3 (ref 0–0.2)
BASOPHILS NFR BLD AUTO: 0.6 % (ref 0–2.5)
BH BB BLOOD EXPIRATION DATE: NORMAL
BH BB BLOOD TYPE BARCODE: 5100
BH BB DISPENSE STATUS: NORMAL
BH BB PRODUCT CODE: NORMAL
BH BB UNIT NUMBER: NORMAL
BILIRUB SERPL-MCNC: 1 MG/DL (ref 0.2–1.3)
BUN BLD-MCNC: 7 MG/DL (ref 7–20)
BUN/CREAT SERPL: 10 (ref 6.3–21.9)
CALCIUM SPEC-SCNC: 8.8 MG/DL (ref 8.4–10.2)
CHLORIDE SERPL-SCNC: 112 MMOL/L (ref 98–107)
CO2 SERPL-SCNC: 17 MMOL/L (ref 26–30)
CREAT BLD-MCNC: 0.7 MG/DL (ref 0.6–1.3)
CROSSMATCH INTERPRETATION: NORMAL
DEPRECATED RDW RBC AUTO: 55.1 FL (ref 37–54)
EOSINOPHIL # BLD AUTO: 0.4 10*3/MM3 (ref 0–0.7)
EOSINOPHIL NFR BLD AUTO: 7.8 % (ref 0–7)
ERYTHROCYTE [DISTWIDTH] IN BLOOD BY AUTOMATED COUNT: 16.4 % (ref 11.5–14.5)
GFR SERPL CREATININE-BSD FRML MDRD: 115 ML/MIN/1.73
GLOBULIN UR ELPH-MCNC: 4.3 GM/DL
GLUCOSE BLD-MCNC: 90 MG/DL (ref 74–98)
GLUCOSE BLDC GLUCOMTR-MCNC: 102 MG/DL (ref 70–130)
GLUCOSE BLDC GLUCOMTR-MCNC: 104 MG/DL (ref 70–130)
GLUCOSE BLDC GLUCOMTR-MCNC: 98 MG/DL (ref 70–130)
GLUCOSE BLDC GLUCOMTR-MCNC: 99 MG/DL (ref 70–130)
HBV CORE AB SER DONR QL IA: NEGATIVE
HBV CORE IGM SERPL QL IA: POSITIVE
HBV E AB SERPL QL IA: NEGATIVE
HBV E AG SERPL QL IA: NEGATIVE
HBV SURFACE AB SER QL: NON REACTIVE
HBV SURFACE AG SERPL QL IA: NEGATIVE
HCT VFR BLD AUTO: 23.5 % (ref 42–52)
HCT VFR BLD AUTO: 24.9 % (ref 42–52)
HCV AB S/CO SERPL IA: >11 S/CO RATIO (ref 0–0.9)
HGB BLD-MCNC: 7.5 G/DL (ref 14–18)
HGB BLD-MCNC: 8 G/DL (ref 14–18)
IMM GRANULOCYTES # BLD: 0.03 10*3/MM3 (ref 0–0.06)
IMM GRANULOCYTES NFR BLD: 0.6 % (ref 0–0.6)
LABORATORY COMMENT REPORT: NORMAL
LYMPHOCYTES # BLD AUTO: 0.75 10*3/MM3 (ref 0.6–3.4)
LYMPHOCYTES NFR BLD AUTO: 14.6 % (ref 10–50)
MCH RBC QN AUTO: 30.9 PG (ref 27–31)
MCHC RBC AUTO-ENTMCNC: 32.1 G/DL (ref 30–37)
MCV RBC AUTO: 96.1 FL (ref 80–94)
MONOCYTES # BLD AUTO: 0.43 10*3/MM3 (ref 0–0.9)
MONOCYTES NFR BLD AUTO: 8.4 % (ref 0–12)
NEUTROPHILS # BLD AUTO: 3.49 10*3/MM3 (ref 2–6.9)
NEUTROPHILS NFR BLD AUTO: 68 % (ref 37–80)
NRBC BLD MANUAL-RTO: 0 /100 WBC (ref 0–0)
PLATELET # BLD AUTO: 133 10*3/MM3 (ref 130–400)
PMV BLD AUTO: 9.6 FL (ref 6–12)
POTASSIUM BLD-SCNC: 3.4 MMOL/L (ref 3.5–5.1)
PROT SERPL-MCNC: 7.5 G/DL (ref 6.3–8.2)
RBC # BLD AUTO: 2.59 10*6/MM3 (ref 4.7–6.1)
SODIUM BLD-SCNC: 141 MMOL/L (ref 137–145)
UNIT  ABO: NORMAL
UNIT  RH: NORMAL
WBC NRBC COR # BLD: 5.13 10*3/MM3 (ref 4.8–10.8)

## 2017-11-29 PROCEDURE — 25010000002 HEPARIN (PORCINE) PER 1000 UNITS: Performed by: INTERNAL MEDICINE

## 2017-11-29 PROCEDURE — 25010000002 MIDAZOLAM PER 1 MG: Performed by: INTERNAL MEDICINE

## 2017-11-29 PROCEDURE — 047L3Z1 DILATION OF LEFT FEMORAL ARTERY USING DRUG-COATED BALLOON, PERCUTANEOUS APPROACH: ICD-10-PCS | Performed by: INTERNAL MEDICINE

## 2017-11-29 PROCEDURE — 86850 RBC ANTIBODY SCREEN: CPT | Performed by: FAMILY MEDICINE

## 2017-11-29 PROCEDURE — 80053 COMPREHEN METABOLIC PANEL: CPT | Performed by: INTERNAL MEDICINE

## 2017-11-29 PROCEDURE — 25010000002 MAGNESIUM SULFATE IN D5W 1G/100ML (PREMIX) 1-5 GM/100ML-% SOLUTION: Performed by: INTERNAL MEDICINE

## 2017-11-29 PROCEDURE — C1894 INTRO/SHEATH, NON-LASER: HCPCS | Performed by: INTERNAL MEDICINE

## 2017-11-29 PROCEDURE — 99233 SBSQ HOSP IP/OBS HIGH 50: CPT | Performed by: INTERNAL MEDICINE

## 2017-11-29 PROCEDURE — 25010000002 PIPERACILLIN SOD-TAZOBACTAM PER 1 G: Performed by: INTERNAL MEDICINE

## 2017-11-29 PROCEDURE — 85018 HEMOGLOBIN: CPT | Performed by: FAMILY MEDICINE

## 2017-11-29 PROCEDURE — 82962 GLUCOSE BLOOD TEST: CPT

## 2017-11-29 PROCEDURE — 75716 ARTERY X-RAYS ARMS/LEGS: CPT | Performed by: INTERNAL MEDICINE

## 2017-11-29 PROCEDURE — 99153 MOD SED SAME PHYS/QHP EA: CPT | Performed by: INTERNAL MEDICINE

## 2017-11-29 PROCEDURE — 25010000002 ENOXAPARIN PER 10 MG: Performed by: INTERNAL MEDICINE

## 2017-11-29 PROCEDURE — 86900 BLOOD TYPING SEROLOGIC ABO: CPT | Performed by: FAMILY MEDICINE

## 2017-11-29 PROCEDURE — 99152 MOD SED SAME PHYS/QHP 5/>YRS: CPT | Performed by: INTERNAL MEDICINE

## 2017-11-29 PROCEDURE — C1725 CATH, TRANSLUMIN NON-LASER: HCPCS | Performed by: INTERNAL MEDICINE

## 2017-11-29 PROCEDURE — 85025 COMPLETE CBC W/AUTO DIFF WBC: CPT | Performed by: INTERNAL MEDICINE

## 2017-11-29 PROCEDURE — C2623 CATH, TRANSLUMIN, DRUG-COAT: HCPCS | Performed by: INTERNAL MEDICINE

## 2017-11-29 PROCEDURE — 0 IOPAMIDOL PER 1 ML: Performed by: INTERNAL MEDICINE

## 2017-11-29 PROCEDURE — C1760 CLOSURE DEV, VASC: HCPCS | Performed by: INTERNAL MEDICINE

## 2017-11-29 PROCEDURE — C1769 GUIDE WIRE: HCPCS | Performed by: INTERNAL MEDICINE

## 2017-11-29 PROCEDURE — 85014 HEMATOCRIT: CPT | Performed by: FAMILY MEDICINE

## 2017-11-29 PROCEDURE — 86901 BLOOD TYPING SEROLOGIC RH(D): CPT | Performed by: FAMILY MEDICINE

## 2017-11-29 PROCEDURE — 86920 COMPATIBILITY TEST SPIN: CPT

## 2017-11-29 PROCEDURE — 75774 ARTERY X-RAY EACH VESSEL: CPT | Performed by: INTERNAL MEDICINE

## 2017-11-29 PROCEDURE — 25010000002 FENTANYL CITRATE (PF) 100 MCG/2ML SOLUTION: Performed by: INTERNAL MEDICINE

## 2017-11-29 RX ORDER — HYDROCODONE BITARTRATE AND ACETAMINOPHEN 5; 325 MG/1; MG/1
1 TABLET ORAL EVERY 4 HOURS PRN
Status: DISCONTINUED | OUTPATIENT
Start: 2017-11-29 | End: 2017-11-29 | Stop reason: HOSPADM

## 2017-11-29 RX ORDER — ACETAMINOPHEN 325 MG/1
650 TABLET ORAL EVERY 4 HOURS PRN
Status: DISCONTINUED | OUTPATIENT
Start: 2017-11-29 | End: 2017-11-29 | Stop reason: HOSPADM

## 2017-11-29 RX ORDER — ONDANSETRON 2 MG/ML
4 INJECTION INTRAMUSCULAR; INTRAVENOUS EVERY 6 HOURS PRN
Status: DISCONTINUED | OUTPATIENT
Start: 2017-11-29 | End: 2017-11-29 | Stop reason: HOSPADM

## 2017-11-29 RX ORDER — HYDROCODONE BITARTRATE AND ACETAMINOPHEN 5; 325 MG/1; MG/1
1 TABLET ORAL EVERY 4 HOURS PRN
Status: DISCONTINUED | OUTPATIENT
Start: 2017-11-29 | End: 2017-12-06 | Stop reason: HOSPADM

## 2017-11-29 RX ORDER — LIDOCAINE HYDROCHLORIDE 10 MG/ML
INJECTION, SOLUTION INFILTRATION; PERINEURAL AS NEEDED
Status: DISCONTINUED | OUTPATIENT
Start: 2017-11-29 | End: 2017-11-29 | Stop reason: HOSPADM

## 2017-11-29 RX ORDER — MIDAZOLAM HYDROCHLORIDE 1 MG/ML
INJECTION INTRAMUSCULAR; INTRAVENOUS AS NEEDED
Status: DISCONTINUED | OUTPATIENT
Start: 2017-11-29 | End: 2017-11-29 | Stop reason: HOSPADM

## 2017-11-29 RX ORDER — CLOPIDOGREL BISULFATE 75 MG/1
75 TABLET ORAL DAILY
Status: DISCONTINUED | OUTPATIENT
Start: 2017-11-29 | End: 2017-11-30

## 2017-11-29 RX ORDER — HEPARIN SODIUM 1000 [USP'U]/ML
INJECTION, SOLUTION INTRAVENOUS; SUBCUTANEOUS AS NEEDED
Status: DISCONTINUED | OUTPATIENT
Start: 2017-11-29 | End: 2017-11-29 | Stop reason: HOSPADM

## 2017-11-29 RX ORDER — MAGNESIUM SULFATE 1 G/100ML
1 INJECTION INTRAVENOUS
Status: COMPLETED | OUTPATIENT
Start: 2017-11-29 | End: 2017-11-29

## 2017-11-29 RX ORDER — FENTANYL CITRATE 50 UG/ML
INJECTION, SOLUTION INTRAMUSCULAR; INTRAVENOUS AS NEEDED
Status: DISCONTINUED | OUTPATIENT
Start: 2017-11-29 | End: 2017-11-29 | Stop reason: HOSPADM

## 2017-11-29 RX ADMIN — PANTOPRAZOLE SODIUM 40 MG: 40 TABLET, DELAYED RELEASE ORAL at 06:06

## 2017-11-29 RX ADMIN — SULFAMETHOXAZOLE AND TRIMETHOPRIM 160 MG: 800; 160 TABLET ORAL at 20:42

## 2017-11-29 RX ADMIN — MAGNESIUM SULFATE HEPTAHYDRATE 1 G: 1 INJECTION, SOLUTION INTRAVENOUS at 16:04

## 2017-11-29 RX ADMIN — CLOPIDOGREL BISULFATE 75 MG: 75 TABLET ORAL at 12:42

## 2017-11-29 RX ADMIN — METOPROLOL TARTRATE 25 MG: 25 TABLET ORAL at 06:06

## 2017-11-29 RX ADMIN — Medication 250 MG: at 17:16

## 2017-11-29 RX ADMIN — ATORVASTATIN CALCIUM 40 MG: 40 TABLET, FILM COATED ORAL at 20:42

## 2017-11-29 RX ADMIN — HYDROCODONE BITARTRATE AND ACETAMINOPHEN 1 TABLET: 5; 325 TABLET ORAL at 12:43

## 2017-11-29 RX ADMIN — TAZOBACTAM SODIUM AND PIPERACILLIN SODIUM 3.38 G: 375; 3 INJECTION, SOLUTION INTRAVENOUS at 17:16

## 2017-11-29 RX ADMIN — MAGNESIUM SULFATE HEPTAHYDRATE 1 G: 1 INJECTION, SOLUTION INTRAVENOUS at 14:31

## 2017-11-29 RX ADMIN — HYDROCODONE BITARTRATE AND ACETAMINOPHEN 1 TABLET: 5; 325 TABLET ORAL at 06:06

## 2017-11-29 RX ADMIN — MAGNESIUM SULFATE HEPTAHYDRATE 1 G: 1 INJECTION, SOLUTION INTRAVENOUS at 18:02

## 2017-11-29 RX ADMIN — FERROUS SULFATE TAB EC 324 MG (65 MG FE EQUIVALENT) 324 MG: 324 (65 FE) TABLET DELAYED RESPONSE at 12:43

## 2017-11-29 RX ADMIN — TAZOBACTAM SODIUM AND PIPERACILLIN SODIUM 3.38 G: 375; 3 INJECTION, SOLUTION INTRAVENOUS at 13:05

## 2017-11-29 RX ADMIN — METOPROLOL TARTRATE 25 MG: 25 TABLET ORAL at 20:42

## 2017-11-29 RX ADMIN — ENOXAPARIN SODIUM 40 MG: 40 INJECTION SUBCUTANEOUS at 20:42

## 2017-11-30 ENCOUNTER — APPOINTMENT (OUTPATIENT)
Dept: MRI IMAGING | Facility: HOSPITAL | Age: 57
End: 2017-11-30

## 2017-11-30 ENCOUNTER — APPOINTMENT (OUTPATIENT)
Dept: ULTRASOUND IMAGING | Facility: HOSPITAL | Age: 57
End: 2017-11-30

## 2017-11-30 LAB
ABO GROUP BLD: NORMAL
ANION GAP SERPL CALCULATED.3IONS-SCNC: 16 MMOL/L
BACTERIA SPEC AEROBE CULT: NORMAL
BACTERIA SPEC AEROBE CULT: NORMAL
BASOPHILS # BLD AUTO: 0.02 10*3/MM3 (ref 0–0.2)
BASOPHILS NFR BLD AUTO: 0.4 % (ref 0–2.5)
BLD GP AB SCN SERPL QL: NEGATIVE
BUN BLD-MCNC: 5 MG/DL (ref 7–20)
BUN/CREAT SERPL: 7.1 (ref 6.3–21.9)
CALCIUM SPEC-SCNC: 8.6 MG/DL (ref 8.4–10.2)
CHLORIDE SERPL-SCNC: 109 MMOL/L (ref 98–107)
CO2 SERPL-SCNC: 19 MMOL/L (ref 26–30)
CREAT BLD-MCNC: 0.7 MG/DL (ref 0.6–1.3)
DEPRECATED RDW RBC AUTO: 51.7 FL (ref 37–54)
EOSINOPHIL # BLD AUTO: 0.47 10*3/MM3 (ref 0–0.7)
EOSINOPHIL NFR BLD AUTO: 9.1 % (ref 0–7)
ERYTHROCYTE [DISTWIDTH] IN BLOOD BY AUTOMATED COUNT: 16 % (ref 11.5–14.5)
GFR SERPL CREATININE-BSD FRML MDRD: 115 ML/MIN/1.73
GLUCOSE BLD-MCNC: 111 MG/DL (ref 74–98)
GLUCOSE BLDC GLUCOMTR-MCNC: 108 MG/DL (ref 70–130)
GLUCOSE BLDC GLUCOMTR-MCNC: 108 MG/DL (ref 70–130)
GLUCOSE BLDC GLUCOMTR-MCNC: 121 MG/DL (ref 70–130)
GLUCOSE BLDC GLUCOMTR-MCNC: 150 MG/DL (ref 70–130)
HCT VFR BLD AUTO: 30.3 % (ref 42–52)
HGB BLD-MCNC: 10.2 G/DL (ref 14–18)
IMM GRANULOCYTES # BLD: 0.03 10*3/MM3 (ref 0–0.06)
IMM GRANULOCYTES NFR BLD: 0.6 % (ref 0–0.6)
LYMPHOCYTES # BLD AUTO: 0.83 10*3/MM3 (ref 0.6–3.4)
LYMPHOCYTES NFR BLD AUTO: 16.1 % (ref 10–50)
MAGNESIUM SERPL-MCNC: 1.4 MG/DL (ref 1.6–2.3)
MCH RBC QN AUTO: 30.6 PG (ref 27–31)
MCHC RBC AUTO-ENTMCNC: 33.7 G/DL (ref 30–37)
MCV RBC AUTO: 91 FL (ref 80–94)
MONOCYTES # BLD AUTO: 0.35 10*3/MM3 (ref 0–0.9)
MONOCYTES NFR BLD AUTO: 6.8 % (ref 0–12)
NEUTROPHILS # BLD AUTO: 3.46 10*3/MM3 (ref 2–6.9)
NEUTROPHILS NFR BLD AUTO: 67 % (ref 37–80)
NRBC BLD MANUAL-RTO: 0 /100 WBC (ref 0–0)
PLATELET # BLD AUTO: 118 10*3/MM3 (ref 130–400)
PMV BLD AUTO: 9.5 FL (ref 6–12)
POTASSIUM BLD-SCNC: 3 MMOL/L (ref 3.5–5.1)
RBC # BLD AUTO: 3.33 10*6/MM3 (ref 4.7–6.1)
RH BLD: POSITIVE
SODIUM BLD-SCNC: 141 MMOL/L (ref 137–145)
WBC NRBC COR # BLD: 5.16 10*3/MM3 (ref 4.8–10.8)

## 2017-11-30 PROCEDURE — 82962 GLUCOSE BLOOD TEST: CPT

## 2017-11-30 PROCEDURE — 25010000002 ENOXAPARIN PER 10 MG: Performed by: INTERNAL MEDICINE

## 2017-11-30 PROCEDURE — 70551 MRI BRAIN STEM W/O DYE: CPT

## 2017-11-30 PROCEDURE — 25010000002 PIPERACILLIN SOD-TAZOBACTAM PER 1 G: Performed by: INTERNAL MEDICINE

## 2017-11-30 PROCEDURE — 93970 EXTREMITY STUDY: CPT

## 2017-11-30 PROCEDURE — 99233 SBSQ HOSP IP/OBS HIGH 50: CPT | Performed by: INTERNAL MEDICINE

## 2017-11-30 PROCEDURE — P9016 RBC LEUKOCYTES REDUCED: HCPCS

## 2017-11-30 PROCEDURE — 83735 ASSAY OF MAGNESIUM: CPT | Performed by: INTERNAL MEDICINE

## 2017-11-30 PROCEDURE — 25010000002 MAGNESIUM SULFATE 2 GM/50ML SOLUTION: Performed by: INTERNAL MEDICINE

## 2017-11-30 PROCEDURE — 85025 COMPLETE CBC W/AUTO DIFF WBC: CPT | Performed by: INTERNAL MEDICINE

## 2017-11-30 PROCEDURE — 86900 BLOOD TYPING SEROLOGIC ABO: CPT

## 2017-11-30 PROCEDURE — 36430 TRANSFUSION BLD/BLD COMPNT: CPT

## 2017-11-30 PROCEDURE — 80048 BASIC METABOLIC PNL TOTAL CA: CPT | Performed by: INTERNAL MEDICINE

## 2017-11-30 PROCEDURE — 99222 1ST HOSP IP/OBS MODERATE 55: CPT | Performed by: SURGERY

## 2017-11-30 PROCEDURE — 99233 SBSQ HOSP IP/OBS HIGH 50: CPT | Performed by: PODIATRIST

## 2017-11-30 RX ORDER — ASPIRIN 81 MG/1
81 TABLET ORAL DAILY
Status: DISCONTINUED | OUTPATIENT
Start: 2017-11-30 | End: 2017-12-06 | Stop reason: HOSPADM

## 2017-11-30 RX ORDER — MAGNESIUM SULFATE HEPTAHYDRATE 40 MG/ML
2 INJECTION, SOLUTION INTRAVENOUS ONCE
Status: COMPLETED | OUTPATIENT
Start: 2017-11-30 | End: 2017-11-30

## 2017-11-30 RX ORDER — POTASSIUM CHLORIDE 750 MG/1
40 CAPSULE, EXTENDED RELEASE ORAL EVERY 4 HOURS
Status: COMPLETED | OUTPATIENT
Start: 2017-11-30 | End: 2017-11-30

## 2017-11-30 RX ADMIN — MAGNESIUM SULFATE HEPTAHYDRATE 2 G: 40 INJECTION, SOLUTION INTRAVENOUS at 12:04

## 2017-11-30 RX ADMIN — CLOPIDOGREL BISULFATE 75 MG: 75 TABLET ORAL at 08:28

## 2017-11-30 RX ADMIN — METOPROLOL TARTRATE 25 MG: 25 TABLET ORAL at 08:28

## 2017-11-30 RX ADMIN — HYDROCODONE BITARTRATE AND ACETAMINOPHEN 1 TABLET: 5; 325 TABLET ORAL at 15:15

## 2017-11-30 RX ADMIN — FERROUS SULFATE TAB EC 324 MG (65 MG FE EQUIVALENT) 324 MG: 324 (65 FE) TABLET DELAYED RESPONSE at 08:28

## 2017-11-30 RX ADMIN — SULFAMETHOXAZOLE AND TRIMETHOPRIM 160 MG: 800; 160 TABLET ORAL at 08:28

## 2017-11-30 RX ADMIN — Medication 250 MG: at 08:28

## 2017-11-30 RX ADMIN — HYDROCODONE BITARTRATE AND ACETAMINOPHEN 1 TABLET: 5; 325 TABLET ORAL at 05:49

## 2017-11-30 RX ADMIN — PANTOPRAZOLE SODIUM 40 MG: 40 TABLET, DELAYED RELEASE ORAL at 05:49

## 2017-11-30 RX ADMIN — ATORVASTATIN CALCIUM 40 MG: 40 TABLET, FILM COATED ORAL at 20:56

## 2017-11-30 RX ADMIN — TAZOBACTAM SODIUM AND PIPERACILLIN SODIUM 3.38 G: 375; 3 INJECTION, SOLUTION INTRAVENOUS at 17:16

## 2017-11-30 RX ADMIN — LORAZEPAM 1 MG: 0.5 TABLET ORAL at 02:05

## 2017-11-30 RX ADMIN — METOPROLOL TARTRATE 25 MG: 25 TABLET ORAL at 20:57

## 2017-11-30 RX ADMIN — Medication 400 MG: at 16:44

## 2017-11-30 RX ADMIN — POTASSIUM CHLORIDE 40 MEQ: 750 CAPSULE, EXTENDED RELEASE ORAL at 16:44

## 2017-11-30 RX ADMIN — NICOTINE 1 PATCH: 7 PATCH, EXTENDED RELEASE TRANSDERMAL at 16:44

## 2017-11-30 RX ADMIN — LORAZEPAM 1 MG: 0.5 TABLET ORAL at 20:56

## 2017-11-30 RX ADMIN — POTASSIUM CHLORIDE 40 MEQ: 750 CAPSULE, EXTENDED RELEASE ORAL at 20:56

## 2017-11-30 RX ADMIN — Medication 250 MG: at 17:16

## 2017-11-30 RX ADMIN — HYDROCODONE BITARTRATE AND ACETAMINOPHEN 1 TABLET: 5; 325 TABLET ORAL at 20:57

## 2017-11-30 RX ADMIN — HYDROXYCHLOROQUINE SULFATE 200 MG: 200 TABLET, FILM COATED ORAL at 08:28

## 2017-11-30 RX ADMIN — COLCHICINE 0.6 MG: 0.6 TABLET, FILM COATED ORAL at 08:28

## 2017-11-30 RX ADMIN — ENOXAPARIN SODIUM 40 MG: 40 INJECTION SUBCUTANEOUS at 20:57

## 2017-11-30 RX ADMIN — SULFAMETHOXAZOLE AND TRIMETHOPRIM 160 MG: 800; 160 TABLET ORAL at 20:57

## 2017-11-30 RX ADMIN — TAZOBACTAM SODIUM AND PIPERACILLIN SODIUM 3.38 G: 375; 3 INJECTION, SOLUTION INTRAVENOUS at 09:26

## 2017-11-30 RX ADMIN — ASPIRIN 81 MG: 81 TABLET, COATED ORAL at 08:28

## 2017-12-01 ENCOUNTER — ANESTHESIA (OUTPATIENT)
Dept: PERIOP | Facility: HOSPITAL | Age: 57
End: 2017-12-01

## 2017-12-01 ENCOUNTER — ANESTHESIA EVENT (OUTPATIENT)
Dept: PERIOP | Facility: HOSPITAL | Age: 57
End: 2017-12-01

## 2017-12-01 LAB
ABO + RH BLD: NORMAL
ABO + RH BLD: NORMAL
ANION GAP SERPL CALCULATED.3IONS-SCNC: 15.8 MMOL/L
BACTERIA SPEC AEROBE CULT: NORMAL
BASOPHILS # BLD AUTO: 0.02 10*3/MM3 (ref 0–0.2)
BASOPHILS NFR BLD AUTO: 0.4 % (ref 0–2.5)
BH BB BLOOD EXPIRATION DATE: NORMAL
BH BB BLOOD EXPIRATION DATE: NORMAL
BH BB BLOOD TYPE BARCODE: 5100
BH BB BLOOD TYPE BARCODE: 5100
BH BB DISPENSE STATUS: NORMAL
BH BB DISPENSE STATUS: NORMAL
BH BB PRODUCT CODE: NORMAL
BH BB PRODUCT CODE: NORMAL
BH BB UNIT NUMBER: NORMAL
BH BB UNIT NUMBER: NORMAL
BUN BLD-MCNC: 7 MG/DL (ref 7–20)
BUN/CREAT SERPL: 8.8 (ref 6.3–21.9)
CALCIUM SPEC-SCNC: 8.6 MG/DL (ref 8.4–10.2)
CHLORIDE SERPL-SCNC: 113 MMOL/L (ref 98–107)
CO2 SERPL-SCNC: 18 MMOL/L (ref 26–30)
CREAT BLD-MCNC: 0.8 MG/DL (ref 0.6–1.3)
CROSSMATCH INTERPRETATION: NORMAL
CROSSMATCH INTERPRETATION: NORMAL
DEPRECATED RDW RBC AUTO: 53.3 FL (ref 37–54)
EOSINOPHIL # BLD AUTO: 0.52 10*3/MM3 (ref 0–0.7)
EOSINOPHIL NFR BLD AUTO: 10.9 % (ref 0–7)
ERYTHROCYTE [DISTWIDTH] IN BLOOD BY AUTOMATED COUNT: 16.2 % (ref 11.5–14.5)
GFR SERPL CREATININE-BSD FRML MDRD: 99 ML/MIN/1.73
GLUCOSE BLD-MCNC: 102 MG/DL (ref 74–98)
GLUCOSE BLDC GLUCOMTR-MCNC: 104 MG/DL (ref 70–130)
GLUCOSE BLDC GLUCOMTR-MCNC: 114 MG/DL (ref 70–130)
GLUCOSE BLDC GLUCOMTR-MCNC: 146 MG/DL (ref 70–130)
GLUCOSE BLDC GLUCOMTR-MCNC: 252 MG/DL (ref 70–130)
HCT VFR BLD AUTO: 29.6 % (ref 42–52)
HGB BLD-MCNC: 9.5 G/DL (ref 14–18)
IMM GRANULOCYTES # BLD: 0.02 10*3/MM3 (ref 0–0.06)
IMM GRANULOCYTES NFR BLD: 0.4 % (ref 0–0.6)
LYMPHOCYTES # BLD AUTO: 0.83 10*3/MM3 (ref 0.6–3.4)
LYMPHOCYTES NFR BLD AUTO: 17.3 % (ref 10–50)
MAGNESIUM SERPL-MCNC: 1.5 MG/DL (ref 1.6–2.3)
MCH RBC QN AUTO: 29.4 PG (ref 27–31)
MCHC RBC AUTO-ENTMCNC: 32.1 G/DL (ref 30–37)
MCV RBC AUTO: 91.6 FL (ref 80–94)
MONOCYTES # BLD AUTO: 0.32 10*3/MM3 (ref 0–0.9)
MONOCYTES NFR BLD AUTO: 6.7 % (ref 0–12)
NEUTROPHILS # BLD AUTO: 3.08 10*3/MM3 (ref 2–6.9)
NEUTROPHILS NFR BLD AUTO: 64.3 % (ref 37–80)
NRBC BLD MANUAL-RTO: 0 /100 WBC (ref 0–0)
PLATELET # BLD AUTO: 131 10*3/MM3 (ref 130–400)
PMV BLD AUTO: 10 FL (ref 6–12)
POTASSIUM BLD-SCNC: 3.8 MMOL/L (ref 3.5–5.1)
RBC # BLD AUTO: 3.23 10*6/MM3 (ref 4.7–6.1)
SODIUM BLD-SCNC: 143 MMOL/L (ref 137–145)
TSH SERPL DL<=0.05 MIU/L-ACNC: 4.19 MIU/ML (ref 0.47–4.68)
UNIT  ABO: NORMAL
UNIT  ABO: NORMAL
UNIT  RH: NORMAL
UNIT  RH: NORMAL
VIT B12 BLD-MCNC: 621 PG/ML (ref 239–931)
WBC NRBC COR # BLD: 4.79 10*3/MM3 (ref 4.8–10.8)

## 2017-12-01 PROCEDURE — 80048 BASIC METABOLIC PNL TOTAL CA: CPT | Performed by: INTERNAL MEDICINE

## 2017-12-01 PROCEDURE — 84443 ASSAY THYROID STIM HORMONE: CPT | Performed by: INTERNAL MEDICINE

## 2017-12-01 PROCEDURE — 25010000002 PIPERACILLIN SOD-TAZOBACTAM PER 1 G: Performed by: NURSE ANESTHETIST, CERTIFIED REGISTERED

## 2017-12-01 PROCEDURE — 63710000001 INSULIN ASPART PER 5 UNITS: Performed by: INTERNAL MEDICINE

## 2017-12-01 PROCEDURE — 27880 AMPUTATION OF LOWER LEG: CPT | Performed by: SURGERY

## 2017-12-01 PROCEDURE — 25010000002 DEXAMETHASONE SODIUM PHOSPHATE 10 MG/ML SOLUTION: Performed by: NURSE ANESTHETIST, CERTIFIED REGISTERED

## 2017-12-01 PROCEDURE — 25010000002 PIPERACILLIN SOD-TAZOBACTAM PER 1 G: Performed by: INTERNAL MEDICINE

## 2017-12-01 PROCEDURE — 25010000002 PROPOFOL 1000 MG/ML EMULSION: Performed by: NURSE ANESTHETIST, CERTIFIED REGISTERED

## 2017-12-01 PROCEDURE — 0Y6J0Z1 DETACHMENT AT LEFT LOWER LEG, HIGH, OPEN APPROACH: ICD-10-PCS | Performed by: SURGERY

## 2017-12-01 PROCEDURE — 99232 SBSQ HOSP IP/OBS MODERATE 35: CPT | Performed by: INTERNAL MEDICINE

## 2017-12-01 PROCEDURE — 25010000002 FENTANYL CITRATE (PF) 100 MCG/2ML SOLUTION: Performed by: NURSE ANESTHETIST, CERTIFIED REGISTERED

## 2017-12-01 PROCEDURE — 83735 ASSAY OF MAGNESIUM: CPT | Performed by: INTERNAL MEDICINE

## 2017-12-01 PROCEDURE — 25010000002 ENOXAPARIN PER 10 MG: Performed by: INTERNAL MEDICINE

## 2017-12-01 PROCEDURE — 25010000002 MAGNESIUM SULFATE 2 GM/50ML SOLUTION: Performed by: INTERNAL MEDICINE

## 2017-12-01 PROCEDURE — 82607 VITAMIN B-12: CPT | Performed by: INTERNAL MEDICINE

## 2017-12-01 PROCEDURE — 25010000002 MORPHINE PER 10 MG: Performed by: SURGERY

## 2017-12-01 PROCEDURE — 82962 GLUCOSE BLOOD TEST: CPT

## 2017-12-01 PROCEDURE — 86592 SYPHILIS TEST NON-TREP QUAL: CPT | Performed by: INTERNAL MEDICINE

## 2017-12-01 PROCEDURE — 25010000002 MIDAZOLAM PER 1 MG: Performed by: NURSE ANESTHETIST, CERTIFIED REGISTERED

## 2017-12-01 PROCEDURE — 85025 COMPLETE CBC W/AUTO DIFF WBC: CPT | Performed by: INTERNAL MEDICINE

## 2017-12-01 RX ORDER — SODIUM CHLORIDE 9 MG/ML
75 INJECTION, SOLUTION INTRAVENOUS CONTINUOUS
Status: DISCONTINUED | OUTPATIENT
Start: 2017-12-01 | End: 2017-12-02

## 2017-12-01 RX ORDER — LIDOCAINE HYDROCHLORIDE 20 MG/ML
INJECTION, SOLUTION INFILTRATION; PERINEURAL AS NEEDED
Status: DISCONTINUED | OUTPATIENT
Start: 2017-12-01 | End: 2017-12-04 | Stop reason: SURG

## 2017-12-01 RX ORDER — ONDANSETRON 2 MG/ML
4 INJECTION INTRAMUSCULAR; INTRAVENOUS ONCE AS NEEDED
Status: DISCONTINUED | OUTPATIENT
Start: 2017-12-01 | End: 2017-12-01 | Stop reason: HOSPADM

## 2017-12-01 RX ORDER — MORPHINE SULFATE 2 MG/ML
2 INJECTION, SOLUTION INTRAMUSCULAR; INTRAVENOUS
Status: DISCONTINUED | OUTPATIENT
Start: 2017-12-01 | End: 2017-12-06 | Stop reason: HOSPADM

## 2017-12-01 RX ORDER — MAGNESIUM SULFATE HEPTAHYDRATE 40 MG/ML
2 INJECTION, SOLUTION INTRAVENOUS ONCE
Status: COMPLETED | OUTPATIENT
Start: 2017-12-01 | End: 2017-12-01

## 2017-12-01 RX ORDER — MEPERIDINE HYDROCHLORIDE 50 MG/ML
50 INJECTION INTRAMUSCULAR; INTRAVENOUS; SUBCUTANEOUS ONCE AS NEEDED
Status: DISCONTINUED | OUTPATIENT
Start: 2017-12-01 | End: 2017-12-01 | Stop reason: HOSPADM

## 2017-12-01 RX ORDER — BUPIVACAINE HYDROCHLORIDE 5 MG/ML
INJECTION, SOLUTION EPIDURAL; INTRACAUDAL AS NEEDED
Status: DISCONTINUED | OUTPATIENT
Start: 2017-12-01 | End: 2017-12-04 | Stop reason: SURG

## 2017-12-01 RX ORDER — CLOPIDOGREL BISULFATE 75 MG/1
75 TABLET ORAL DAILY
Status: DISCONTINUED | OUTPATIENT
Start: 2017-12-02 | End: 2017-12-06 | Stop reason: HOSPADM

## 2017-12-01 RX ORDER — DEXAMETHASONE SODIUM PHOSPHATE 10 MG/ML
INJECTION, SOLUTION INTRAMUSCULAR; INTRAVENOUS AS NEEDED
Status: DISCONTINUED | OUTPATIENT
Start: 2017-12-01 | End: 2017-12-04 | Stop reason: SURG

## 2017-12-01 RX ORDER — MORPHINE SULFATE 2 MG/ML
4 INJECTION, SOLUTION INTRAMUSCULAR; INTRAVENOUS
Status: DISCONTINUED | OUTPATIENT
Start: 2017-12-01 | End: 2017-12-06 | Stop reason: HOSPADM

## 2017-12-01 RX ORDER — FENTANYL CITRATE 50 UG/ML
INJECTION, SOLUTION INTRAMUSCULAR; INTRAVENOUS AS NEEDED
Status: DISCONTINUED | OUTPATIENT
Start: 2017-12-01 | End: 2017-12-04 | Stop reason: SURG

## 2017-12-01 RX ORDER — MAGNESIUM HYDROXIDE 1200 MG/15ML
LIQUID ORAL AS NEEDED
Status: DISCONTINUED | OUTPATIENT
Start: 2017-12-01 | End: 2017-12-01 | Stop reason: HOSPADM

## 2017-12-01 RX ORDER — DEXAMETHASONE SODIUM PHOSPHATE 10 MG/ML
INJECTION, SOLUTION INTRAMUSCULAR; INTRAVENOUS
Status: DISPENSED
Start: 2017-12-01 | End: 2017-12-01

## 2017-12-01 RX ORDER — MIDAZOLAM HYDROCHLORIDE 1 MG/ML
INJECTION INTRAMUSCULAR; INTRAVENOUS AS NEEDED
Status: DISCONTINUED | OUTPATIENT
Start: 2017-12-01 | End: 2017-12-04 | Stop reason: SURG

## 2017-12-01 RX ADMIN — Medication 100 MCG: at 11:56

## 2017-12-01 RX ADMIN — SODIUM CHLORIDE 75 ML/HR: 9 INJECTION, SOLUTION INTRAVENOUS at 16:25

## 2017-12-01 RX ADMIN — ATORVASTATIN CALCIUM 40 MG: 40 TABLET, FILM COATED ORAL at 22:07

## 2017-12-01 RX ADMIN — Medication 100 MCG: at 12:00

## 2017-12-01 RX ADMIN — BUPIVACAINE HYDROCHLORIDE 12.5 ML: 5 INJECTION, SOLUTION EPIDURAL; INTRACAUDAL; PERINEURAL at 10:54

## 2017-12-01 RX ADMIN — NICOTINE 1 PATCH: 7 PATCH, EXTENDED RELEASE TRANSDERMAL at 16:20

## 2017-12-01 RX ADMIN — TAZOBACTAM SODIUM AND PIPERACILLIN SODIUM 3.38 G: 375; 3 INJECTION, SOLUTION INTRAVENOUS at 10:26

## 2017-12-01 RX ADMIN — TAZOBACTAM SODIUM AND PIPERACILLIN SODIUM 3.38 G: 375; 3 INJECTION, SOLUTION INTRAVENOUS at 17:26

## 2017-12-01 RX ADMIN — INSULIN ASPART 4 UNITS: 100 INJECTION, SOLUTION INTRAVENOUS; SUBCUTANEOUS at 22:08

## 2017-12-01 RX ADMIN — SODIUM CHLORIDE: 9 INJECTION, SOLUTION INTRAVENOUS at 12:15

## 2017-12-01 RX ADMIN — MIDAZOLAM HYDROCHLORIDE 2 MG: 1 INJECTION, SOLUTION INTRAMUSCULAR; INTRAVENOUS at 10:28

## 2017-12-01 RX ADMIN — DEXAMETHASONE SODIUM PHOSPHATE 5 MG: 10 INJECTION, SOLUTION INTRAMUSCULAR; INTRAVENOUS at 10:54

## 2017-12-01 RX ADMIN — FENTANYL CITRATE 50 MCG: 50 INJECTION, SOLUTION INTRAMUSCULAR; INTRAVENOUS at 10:29

## 2017-12-01 RX ADMIN — FENTANYL CITRATE 50 MCG: 50 INJECTION, SOLUTION INTRAMUSCULAR; INTRAVENOUS at 10:33

## 2017-12-01 RX ADMIN — LIDOCAINE HYDROCHLORIDE 12.5 ML: 20 INJECTION, SOLUTION INFILTRATION; PERINEURAL at 10:54

## 2017-12-01 RX ADMIN — Medication 100 MCG: at 12:04

## 2017-12-01 RX ADMIN — BUPIVACAINE HYDROCHLORIDE 12.5 ML: 5 INJECTION, SOLUTION EPIDURAL; INTRACAUDAL; PERINEURAL at 10:43

## 2017-12-01 RX ADMIN — LORAZEPAM 1 MG: 0.5 TABLET ORAL at 23:52

## 2017-12-01 RX ADMIN — METOPROLOL TARTRATE 25 MG: 25 TABLET ORAL at 22:06

## 2017-12-01 RX ADMIN — Medication 100 MCG: at 11:52

## 2017-12-01 RX ADMIN — Medication 100 MCG: at 12:19

## 2017-12-01 RX ADMIN — MORPHINE SULFATE 2 MG: 2 INJECTION, SOLUTION INTRAMUSCULAR; INTRAVENOUS at 23:52

## 2017-12-01 RX ADMIN — LIDOCAINE HYDROCHLORIDE 12.5 ML: 20 INJECTION, SOLUTION INFILTRATION; PERINEURAL at 10:44

## 2017-12-01 RX ADMIN — Medication 100 MCG: at 11:47

## 2017-12-01 RX ADMIN — SULFAMETHOXAZOLE AND TRIMETHOPRIM 160 MG: 800; 160 TABLET ORAL at 22:06

## 2017-12-01 RX ADMIN — Medication 20 MG: at 10:30

## 2017-12-01 RX ADMIN — MORPHINE SULFATE 2 MG: 2 INJECTION, SOLUTION INTRAMUSCULAR; INTRAVENOUS at 21:02

## 2017-12-01 RX ADMIN — PROPOFOL 100 MCG/KG/MIN: 10 INJECTION, EMULSION INTRAVENOUS at 10:56

## 2017-12-01 RX ADMIN — MAGNESIUM SULFATE HEPTAHYDRATE 2 G: 40 INJECTION, SOLUTION INTRAVENOUS at 16:19

## 2017-12-01 RX ADMIN — ENOXAPARIN SODIUM 40 MG: 40 INJECTION SUBCUTANEOUS at 22:07

## 2017-12-01 RX ADMIN — METOPROLOL TARTRATE 25 MG: 25 TABLET ORAL at 08:29

## 2017-12-01 RX ADMIN — MIDAZOLAM HYDROCHLORIDE 1 MG: 1 INJECTION, SOLUTION INTRAMUSCULAR; INTRAVENOUS at 10:47

## 2017-12-01 RX ADMIN — MIDAZOLAM HYDROCHLORIDE 1 MG: 1 INJECTION, SOLUTION INTRAMUSCULAR; INTRAVENOUS at 11:01

## 2017-12-01 RX ADMIN — TAZOBACTAM SODIUM AND PIPERACILLIN SODIUM 3.38 G: 375; 3 INJECTION, SOLUTION INTRAVENOUS at 03:17

## 2017-12-01 RX ADMIN — DEXAMETHASONE SODIUM PHOSPHATE 5 MG: 10 INJECTION, SOLUTION INTRAMUSCULAR; INTRAVENOUS at 10:44

## 2017-12-01 RX ADMIN — LORAZEPAM 1 MG: 0.5 TABLET ORAL at 20:36

## 2017-12-01 RX ADMIN — HYDROCODONE BITARTRATE AND ACETAMINOPHEN 1 TABLET: 5; 325 TABLET ORAL at 22:06

## 2017-12-01 NOTE — SIGNIFICANT NOTE
12/01/17 0903   Rehab Treatment   Discipline physical therapy assistant   Treatment Not Performed patient unavailable for treatment  (Per nurse pt is being prepped for sx. Therapy will f/u with pt at a later time.  )

## 2017-12-01 NOTE — PROGRESS NOTES
"   LOS: 6 days   Patient Care Team:  Marilyn K Vermeesch, MD as PCP - General (Internal Medicine)      Interval History: He patient is a little more lucid this morning.  Has no new complaints.  The pain in the leg is much improved.        Review of Systems:   Pertinent items are noted in HPI.      Objective     Vital Sign Min/Max for last 24 hours  Temp  Min: 97.7 °F (36.5 °C)  Max: 98.8 °F (37.1 °C)   BP  Min: 134/82  Max: 148/80   Pulse  Min: 75  Max: 88   Resp  Min: 18  Max: 20   SpO2  Min: 98 %  Max: 100 %   No Data Recorded   No Data Recorded     Flowsheet Rows         First Filed Value    Admission Height  68\" (172.7 cm) Documented at 11/25/2017 1319    Admission Weight  270 lb (122 kg) Documented at 11/25/2017 1319          Physical Exam:     General Appearance:    Alert, cooperative, in no acute distress   Head:    Normocephalic, without obvious abnormality, atraumatic   Eyes:            Lids and lashes normal, conjunctivae and sclerae normal, no   icterus, no pallor, corneas clear, PERRLA   Ears:    Ears appear intact with no abnormalities noted   Throat:   No oral lesions, no thrush, oral mucosa moist   Neck:   No adenopathy, supple, trachea midline, no thyromegaly, no   carotid bruit, no JVD   Back:     No kyphosis present, no scoliosis present, no skin lesions,      erythema or scars, no tenderness to percussion or                   palpation,   range of motion normal   Lungs:     Clear to auscultation,respirations regular, even and                  unlabored    Heart:    Regular rhythm and normal rate, normal S1 and S2, no            murmur, no gallop, no rub, no click   Chest Wall:    No abnormalities observed   Abdomen:     Normal bowel sounds, no masses, no organomegaly, soft        non-tender, non-distended, no guarding, no rebound                tenderness   Rectal:     Deferred   Extremities:   Moves all extremities well, no edema, no cyanosis, no             redness   Pulses:   Good Doppler's of " the PT and DP of the left lower extremity.     Skin:   No bleeding, bruising or rash   Lymph nodes:   No palpable adenopathy   Neurologic:   Cranial nerves 2 - 12 grossly intact, sensation intact, DTR       present and equal bilaterally          Results Review:     I reviewed the patient's new clinical results.    Results Review:   I reviewed the patient's new clinical results.        LAB DATA :           Laboratory results:    Results from last 7 days  Lab Units 12/01/17  0518 11/30/17  1153 11/29/17  0513 11/28/17  0548 11/27/17  0515 11/26/17  0500 11/25/17  1357   SODIUM mmol/L 143 141 141 144 146* 141 136*   POTASSIUM mmol/L 3.8 3.0* 3.4* 3.5 3.6 3.4* 4.2   CHLORIDE mmol/L 113* 109* 112* 114* 117* 112* 104   CO2 mmol/L 18.0* 19.0* 17.0* 18.0* 17.0* 17.0* 17.0*   BUN mg/dL 7 5* 7 9 14 26* 30*   CREATININE mg/dL 0.80 0.70 0.70 0.80 0.90 1.50* 2.40*   CALCIUM mg/dL 8.6 8.6 8.8 8.8 8.6 8.5 9.2   BILIRUBIN mg/dL  --   --  1.0  --   --   --  1.2   ALK PHOS U/L  --   --  104  --   --   --  117   ALT (SGPT) U/L  --   --  25  --   --   --  30   AST (SGOT) U/L  --   --  28  --   --   --  35   GLUCOSE mg/dL 102* 111* 90 92 123* 109* 110*       Results from last 7 days  Lab Units 12/01/17  0518 11/30/17  1153 11/29/17  2241 11/29/17  0513 11/28/17  1456 11/28/17  0548 11/27/17  0515 11/26/17  0500   WBC 10*3/mm3 4.79* 5.16  --  5.13 3.64* 4.14* 3.62* 4.74*   HEMOGLOBIN g/dL 9.5* 10.2* 7.5* 8.0* 8.0* 7.7* 7.9* 7.3*   HEMATOCRIT % 29.6* 30.3* 23.5* 24.9* 24.7* 23.8* 24.8* 22.5*   PLATELETS 10*3/mm3 131 118*  --  133 133 155 166 188           Results from last 7 days  Lab Units 11/25/17  1732 11/25/17  1448 11/25/17  1440   BLOODCX   --  No growth at 5 days No growth at 5 days   WOUNDCX    Heavy growth (4+) Escherichia coli*  Stenotrophomonas maltophilia*  --   --            Results from last 7 days  Lab Units 11/25/17  1357   CK TOTAL U/L 202*   CKMB ng/mL 5.31*   CK MB INDEX % 2.6*   TROPONIN I ng/mL <0.012                  Lab Results   Component Value Date    HGBA1C 4.90 09/25/2017               IMAGING DATA:     Ct Abdomen Pelvis Without Contrast    Result Date: 11/27/2017  Narrative: FINAL REPORT TECHNIQUE: Axial images were obtained using computed tomography through the abdomen and pelvis without contrast. MPR Reconstruction in the coronal plane was performed.This study was performed with techniques to keep radiation doses as low as reasonably achievable, (ALARA). Individualized dose reduction techniques using automated exposure control or adjustment of mA and/or kV according to the patient''''''''s size were employed. CLINICAL HISTORY: s/p fall with multiple bruises on the back, low hemoglobin, rule out retroperitoneal bleed FINDINGS: There is no hydronephrosis, nephrolithiasis, perinephric fluid or hydroureter. There are no stones in the bladder. No retroperitoneal hematoma. No free fluid. The liver has a nodular contour. No biliary obstruction. No bowel obstruction. No free air. No evidence of acute bony abnormality. There are old posterior rib fractures. No evidence of an acute displaced rib fracture. There are nonspecific ground glass opacities in the visualized lung bases. There is at least one 7 mm soft tissue nodule in the right lung base. There is airspace disease in the posterior left lung base that could be atelectasis.     Impression: 1. No evidence of retroperitoneal hematoma or free fluid. 2. The liver is cirrhotic. 3. Nonspecific ground glass opacities in the lung bases with a 7 mm soft tissue pulmonary nodule. Followup is recommended. Authenticated by Len Blake MD on 11/27/2017 06:25:33 PM    Xr Chest 2 View    Result Date: 11/26/2017  Narrative: PROCEDURE: XR CHEST 2 VW-  HISTORY: cough  COMPARISON: April 7, 2017.   FINDINGS: The heart is normal in size. The mediastinum is unremarkable. There is a medial right lung base opacity that may represent atelectasis or pneumonia. Small bilateral pleural effusions  are noted. There is no pneumothorax. The bony thorax is intact.         Impression: Medial right lung base atelectasis or pneumonia.  Small bilateral pleural effusions.   This report was finalized on 11/26/2017 8:46 AM by Anjel Amador MD.    Xr Foot 3+ View Left    Result Date: 11/26/2017  Narrative: Three-view left foot  HISTORY: Left foot wound  FINDINGS: 3 views of the left foot were obtained. Comparison is made to the prior exam dated June 19, 2017. Interval postoperative changes are seen from transmetatarsal amputation. Postoperative changes are seen in the fifth proximal metatarsal and at the ankle. Degenerative changes are noted.      Impression: Interval postoperative changes from transmetatarsal amputation.  No definite acute bony abnormality.  This report was finalized on 11/26/2017 8:45 AM by Anjel Amador MD.    Ct Head Without Contrast    Result Date: 11/28/2017  Narrative: PROCEDURE: CT HEAD WO CONTRAST-  HISTORY: Confusion; E86.0-Dehydration; Z74.09-Other reduced mobility  COMPARISON: June 23, 2017.  TECHNIQUE: Multiple axial CT images were performed from the foramen magnum to the vertex without enhancement.  FINDINGS: There is no CT evidence of hemorrhage. There is no mass, mass effect or midline shift.  No hydrocephalus, mild cerebral atrophy. The paranasal sinuses are clear. Bone windows reveal no acute osseous abnormalities. Atherosclerosis is noted.      Impression: No acute intracranial process.      797.85 mGy.cm     This study was performed with techniques to keep radiation doses as low as reasonably achievable (ALARA). Individualized dose reduction techniques using automated exposure control or adjustment of mA and/or kV according to the patient size were employed.   This report was finalized on 11/28/2017 4:26 PM by Neel Mas DO.    Mri Brain Without Contrast    Result Date: 11/30/2017  Narrative: FINAL REPORT TECHNIQUE: Multiplanar MR, without contrast administration CLINICAL  HISTORY: CONFUSION. COMPARISON: 11/28/2017 FINDINGS: Diffusion sequences show no signal abnormalities to indicate acute infarct. The brain parenchyma demonstrates no atrophy with chronic ischemic demyelination and mild ventriculomegaly. No edema or hemorrhage is seen.  Major vessel flow-voids are intact.     Impression: No evidence of acute abnormality. Authenticated by Len Blake MD on 11/30/2017 06:50:25 PM    Us Arterial Doppler Lower Extremity Left    Result Date: 11/28/2017  Narrative: PROCEDURE: US ARTERIAL DOPPLER LOWER EXTREMITY  LEFT-  HISTORY: non healing wound; E86.0-Dehydration; Z74.09-Other reduced mobility  PROCEDURE: Segmental pressures with Doppler waveform evaluation of the lower extremity on the left.  FINDINGS:  LEFT LOWER EXTREMITY.      Velocities cm/sec :  CFA: 43.7 SFA Mid: 21.5 SFA Dist: 23.2 POP: 39.0 PT: 48.7 JULI: 21.6 CASSIE: Unable to be obtained Waveforms are monophasic.       Impression: Diminished flow and monophasic waveforms indicative of peripheral vascular disease.  This report was finalized on 11/28/2017 4:32 PM by Neel Mas DO.    Xr Chest 1 View    Result Date: 11/28/2017  Narrative: PROCEDURE: XR CHEST 1 VW-  HISTORY: nausea, vomiting, atelectasis, rule out developing pneumonia; E86.0-Dehydration; Z74.09-Other reduced mobility  COMPARISON: CT scan dated of the abdomen and pelvis 1 day prior.  FINDINGS: The heart is normal in size. The mediastinum is unremarkable. There are chronic interstitial changes. Bibasilar opacities may represent infiltrates. A follow-up two-view chest x-ray is recommended. There is no pneumothorax.  There are no acute osseous abnormalities.         Impression: Bibasilar opacities may represent infiltrates. A follow-up two-view chest x-ray is recommended.   Images were reviewed, interpreted, and dictated by Dr. Mas. Transcribed by Miranda Aviles PA-C.  This report was finalized on 11/28/2017 12:32 PM by Neel Mas DO.    Us Venous Doppler Lower  Extremity Bilateral (duplex)    Result Date: 11/30/2017  Narrative: PROCEDURE: US VENOUS DOPPLER LOWER EXTREMITY BILATERAL (DUPLEX)-  HISTORY: Swelling and tenderness of left lower extremity; E86.0-Dehydration; Z74.09-Other reduced mobility; I73.9-Peripheral vascular disease, unspecified  PROCEDURE: Multiple transverse and longitudinal scans were performed of the femoropopliteal deep venous system, with augmentation and compression maneuvers.  FINDINGS: Normal phasic flow was noted in the visualized deep venous system. No intraluminal increased echogenicity is noted to suggest thrombus. There is normal compression and augmentation of the venous structures. No abnormal venous collaterals are seen. No venous reflux is demonstrated.      Impression: No evidence of deep venous thrombosis.  This report was finalized on 11/30/2017 6:11 PM by Neel Mas DO.            Assessment/Plan    #1 critical limb ischemia: Patient is status post intervention to the left SFA.  Has got a very good posterior tibial pulse and dorsalis pedis pulse at this time.  The Dopplers are also quite loud.  Would continue to pursue medical therapy.    #2 diabetes mellitus: Needs appropriate control of his sugars which is been an issue with him on the longer and.    #3 confusion: Seems to be a lot improved this morning.    #4 chronic atrial fibrillation: Rate control seems to be appropriate.        Principal Problem:    Acute renal failure  Active Problems:    Chronic hepatitis C virus infection    Coronary artery disease involving native coronary artery of native heart without angina pectoris    Chronic atrial fibrillation    Essential hypertension    COPD (chronic obstructive pulmonary disease)    Peripheral vascular disease    Dehydration    Status post transmetatarsal amputation of foot, left            Jonathan Phillip Storm MD  12/01/17  7:29 AM

## 2017-12-01 NOTE — PROGRESS NOTES
"Hospitalist Progress Note.    LOS: 6 days    Patient Care Team:  Marilyn K Vermeesch, MD as PCP - General (Internal Medicine)    Chief Complaint:    Confusion, anemia f/u, Left lower extremity arterial occlusion     Subjective     Patient seen and examined this morning.  He denies any significant pain.  He denies any significant pain.  He remains confused.  He does know that he is going for surgery and that it's on his foot, but unable to tell me details of the surgery. He denies any chest pain, shortness of breath, headache or dizziness.   Dr. Real evaluated patient last night for BKA and he's kept NPO for possible BKA today.   An MRI of brain done overnight given persistent confusion state, however, it is negative.     Review of Systems:    The pertinent  ROS was done and it is noted above, rest  was negative.    Objective     Vital Signs  /77 (BP Location: Right leg, Patient Position: Lying)  Pulse 87  Temp 98 °F (36.7 °C) (Oral)   Resp 20  Ht 68\" (172.7 cm)  Wt 202 lb 9.6 oz (91.9 kg)  SpO2 93%  BMI 30.81 kg/m2      I/O this shift:  In: -   Out: 200 [Urine:200]    Intake/Output Summary (Last 24 hours) at 12/01/17 0913  Last data filed at 12/01/17 0819   Gross per 24 hour   Intake              410 ml   Output              550 ml   Net             -140 ml       Physical Exam:    General Appearance: alert, oriented x 1-2, no acute distress, Appears older than his stated age  HEENT: pupils round and reactive to light, oral mucosa dry, extra occular movements intact.  Neck: supple, no JVD, trachea midline  Lungs: Clear to Auscultation, unlabored breathing effort  Heart: RRR, normal S1 and S2, no S3, no rub  Abdomen: soft, non-tender, no palpable bladder, present bowel sounds to auscultation  Extremities: left lower extremity foot wound still appears necrotic, no significant discharge, malodorous, good pulses palpated, warm to touch, there is mild swelling at the left calf with erythema overlying the " area, aquilino's positive  Skin: bruising noted on his left flank, small bump which possibly is a lipoma or a hematoma   Neuro: normal speech and mental status, grossly non focal.     Results Review:      Results from last 7 days  Lab Units 12/01/17 0518 11/30/17  1153 11/29/17  0513  11/25/17  1357   SODIUM mmol/L 143 141 141  < > 136*   POTASSIUM mmol/L 3.8 3.0* 3.4*  < > 4.2   CHLORIDE mmol/L 113* 109* 112*  < > 104   CO2 mmol/L 18.0* 19.0* 17.0*  < > 17.0*   BUN mg/dL 7 5* 7  < > 30*   CREATININE mg/dL 0.80 0.70 0.70  < > 2.40*   CALCIUM mg/dL 8.6 8.6 8.8  < > 9.2   BILIRUBIN mg/dL  --   --  1.0  --  1.2   ALK PHOS U/L  --   --  104  --  117   ALT (SGPT) U/L  --   --  25  --  30   AST (SGOT) U/L  --   --  28  --  35   GLUCOSE mg/dL 102* 111* 90  < > 110*   < > = values in this interval not displayed.    Estimated Creatinine Clearance: 109.4 mL/min (by C-G formula based on Cr of 0.8).      Results from last 7 days  Lab Units 12/01/17  0518 11/30/17  1153 11/28/17  0548   MAGNESIUM mg/dL 1.5* 1.4* 1.2*               Results from last 7 days  Lab Units 12/01/17  0518 11/30/17  1153 11/29/17  2241 11/29/17  0513 11/28/17  1456 11/28/17  0548   WBC 10*3/mm3 4.79* 5.16  --  5.13 3.64* 4.14*   HEMOGLOBIN g/dL 9.5* 10.2* 7.5* 8.0* 8.0* 7.7*   PLATELETS 10*3/mm3 131 118*  --  133 133 155               Imaging Results (last 24 hours)     Procedure Component Value Units Date/Time    US Venous Doppler Lower Extremity Bilateral (duplex) [[id number]] Collected:  11/30/17 1810     Updated:  11/30/17 1813    Narrative:       PROCEDURE: US VENOUS DOPPLER LOWER EXTREMITY BILATERAL (DUPLEX)-     HISTORY: Swelling and tenderness of left lower extremity;  E86.0-Dehydration; Z74.09-Other reduced mobility; I73.9-Peripheral  vascular disease, unspecified     PROCEDURE: Multiple transverse and longitudinal scans were performed of  the femoropopliteal deep venous system, with augmentation and  compression maneuvers.     FINDINGS: Normal  phasic flow was noted in the visualized deep venous  system. No intraluminal increased echogenicity is noted to suggest  thrombus. There is normal compression and augmentation of the venous  structures. No abnormal venous collaterals are seen. No venous reflux is  demonstrated.       Impression:       No evidence of deep venous thrombosis.     This report was finalized on 11/30/2017 6:11 PM by Neel Mas DO.    MRI Brain Without Contrast [[id number]] Collected:  11/30/17 1812     Updated:  11/30/17 1851    Narrative:       FINAL REPORT    TECHNIQUE:  Multiplanar MR, without contrast administration    CLINICAL HISTORY:  CONFUSION.    COMPARISON:  11/28/2017    FINDINGS:  Diffusion sequences show no signal abnormalities to indicate acute infarct.    The brain parenchyma demonstrates no atrophy with chronic ischemic demyelination and mild ventriculomegaly. No edema or hemorrhage is seen.  Major vessel flow-voids are intact.      Impression:       No evidence of acute abnormality.    Authenticated by Len Blake MD on 11/30/2017 06:50:25 PM          aspirin 81 mg Oral Daily   atorvastatin 40 mg Oral Nightly   colchicine 0.6 mg Oral Daily   enoxaparin 40 mg Subcutaneous Q24H   ferrous sulfate 324 mg Oral Daily With Breakfast   hydroxychloroquine 200 mg Oral Daily   insulin aspart 0-7 Units Subcutaneous 4x Daily AC & at Bedtime   magnesium oxide 400 mg Oral Daily   metoprolol tartrate 25 mg Oral Q12H   nicotine 1 patch Transdermal Q24H   pantoprazole 40 mg Oral Q AM   piperacillin-tazobactam 3.375 g Intravenous Q8H   saccharomyces boulardii 250 mg Oral BID   sulfamethoxazole-trimethoprim 1 tablet Oral Q12H       Pharmacy to Dose Zosyn     sodium chloride 75 mL/hr Last Rate: 75 mL/hr (11/27/17 0958)       Medication Review:   Current Facility-Administered Medications   Medication Dose Route Frequency Provider Last Rate Last Dose   • acetaminophen (TYLENOL) tablet 650 mg  650 mg Oral Q4H PRN Liang Polanco MD   650 mg  at 11/28/17 2124   • aspirin EC tablet 81 mg  81 mg Oral Daily Jonathan Storm MD   81 mg at 11/30/17 0828   • atorvastatin (LIPITOR) tablet 40 mg  40 mg Oral Nightly Liang Polanco MD   40 mg at 11/30/17 2056   • colchicine tablet 0.6 mg  0.6 mg Oral Daily Liang Polanco MD   0.6 mg at 11/30/17 0828   • dextrose (D50W) solution 25 g  25 g Intravenous Q15 Min PRN Liang Polanco MD       • dextrose (GLUTOSE) oral gel 1 tube  1 tube Oral Q15 Min PRN Liang Polanco MD       • enoxaparin (LOVENOX) syringe 40 mg  40 mg Subcutaneous Q24H Liang Polanco MD   40 mg at 11/30/17 2057   • ferrous sulfate EC tablet 324 mg  324 mg Oral Daily With Breakfast Nahun Sandoval MD   324 mg at 11/30/17 0828   • glucagon (human recombinant) (GLUCAGEN DIAGNOSTIC) injection 1 mg  1 mg Subcutaneous Q15 Min PRN Liang Polanco MD       • HYDROcodone-acetaminophen (NORCO) 5-325 MG per tablet 1 tablet  1 tablet Oral BID PRN Liang Polanco MD   1 tablet at 11/29/17 0606   • HYDROcodone-acetaminophen (NORCO) 5-325 MG per tablet 1 tablet  1 tablet Oral Q4H PRN Jonathan Storm MD   1 tablet at 11/30/17 2057   • hydroxychloroquine (PLAQUENIL) tablet 200 mg  200 mg Oral Daily Liang Polanco MD   200 mg at 11/30/17 0828   • insulin aspart (novoLOG) injection 0-7 Units  0-7 Units Subcutaneous 4x Daily AC & at Bedtime Liang Polanco MD       • ipratropium-albuterol (DUO-NEB) nebulizer solution 3 mL  3 mL Nebulization Q4H PRN Sia Bartlett DO       • LORazepam (ATIVAN) tablet 1 mg  1 mg Oral Q4H PRN Benjamin Azul MD   1 mg at 11/30/17 2056   • magnesium oxide (MAGOX) tablet 400 mg  400 mg Oral Daily Nahun Sandoval MD   400 mg at 11/30/17 1644   • metoprolol tartrate (LOPRESSOR) tablet 25 mg  25 mg Oral Q12H Liang Polanco MD   25 mg at 12/01/17 0829   • nicotine (NICODERM CQ) 7 MG/24HR patch 1 patch  1 patch Transdermal Q24H Nahun Sandoval MD   1 patch at 11/30/17 1644   • ondansetron (ZOFRAN) tablet 4 mg  4 mg Oral Q6H PRN Liang Polanco MD        Or    • ondansetron ODT (ZOFRAN-ODT) disintegrating tablet 4 mg  4 mg Oral Q6H PRN Liang Polanco MD        Or   • ondansetron (ZOFRAN) injection 4 mg  4 mg Intravenous Q6H PRN Liang Polanco MD       • pantoprazole (PROTONIX) EC tablet 40 mg  40 mg Oral Q AM Liang Polanco MD   40 mg at 11/30/17 0549   • Pharmacy to Dose Zosyn   Does not apply Continuous PRN Nahun Sandoval MD       • piperacillin-tazobactam (ZOSYN) 3.375 g in iso-osmotic dextrose 50 ml (premix)  3.375 g Intravenous Q8H Nahun Sandoval MD   3.375 g at 12/01/17 0317   • saccharomyces boulardii (FLORASTOR) capsule 250 mg  250 mg Oral BID Nahun Sandoval MD   250 mg at 11/30/17 1716   • sodium chloride 0.9 % flush 1-10 mL  1-10 mL Intravenous PRN Liang Polanco MD       • sodium chloride 0.9 % infusion  75 mL/hr Intravenous Continuous Nahun Sandoval MD 75 mL/hr at 11/27/17 0958 75 mL/hr at 11/27/17 0958   • sulfamethoxazole-trimethoprim (BACTRIM DS,SEPTRA DS) 800-160 MG per tablet 160 mg  1 tablet Oral Q12H Nahun Sandoval MD   160 mg at 11/30/17 2057       Principal Problem:    Acute renal failure  Active Problems:    Chronic hepatitis C virus infection    Coronary artery disease involving native coronary artery of native heart without angina pectoris    Chronic atrial fibrillation    Essential hypertension    COPD (chronic obstructive pulmonary disease)    Peripheral vascular disease    Dehydration    Status post transmetatarsal amputation of foot, left    Assessment/Plan   1.  Acute metabolic encephalopathy, present on admission  2.  Hypotension secondary to severe dehydration, present on admission, resolved  3.  Acute renal failure secondary to #2, present on admission, now resolved  4.  Peripheral arterial disease s/p left SFA stent and intervention to the peroneal arteries on 6/20/2017.  4.  Paroxysmal atrial fibrillation/Flutter, not on anticoagulation due to GI bleed  5.  Coronary artery disease on medical therapy.  6.  Essential hypertension.  7.   COPD, stable.  8.  Chronic hepatitis C infection  9.  Rheumatoid arthritis.  10.  Acute worsening of chronic anemia, s/p 5 unit of PRBC  11. S/p TMA of left foot with infected wound, likely due to arterial occlusion, wound culture growing Escherichia coli and Stenotrophomonas maltophilia  12. Acute diarrhea, possibly due to refeeding, C. difficile negative, improved  14. Hypomagenesemia, due to diarrhea, being repleted  15. 7mm soft tissue nodule noted on CT chest, will need follow up CTs  16. Acute arterial occlusion at the SFA s/p intervention by Dr. Storm on 11/28  17. Cirrhosis of liver, likely due to chronic hepatitis C, will need referral to hepatology after discharge  18. History of recurrent bilateral DVT, previously on anticoagulation, now off due to history of GI bleed    The cause of his confusion remains unclear. MRI brain is negative. I have ordered RPR, B12 as well as TSH.   Dr. Campbell recommended a BKA as he felt that patient is unlikely have improvement in his wound even with further intervention. Dr. Real evaluated him last night and patient is scheduled for BKA this morning.   Magnesium being repleted as needed.   Remains on Bactrim and Zosyn for left foot wound. Can discontinue tomorrow.     PT/OT  Likely discharge to short term rehab.  I  discussed the details with the nursing staff.    Rest as ordered.    Nahun Sandoval MD  12/01/17  9:13 AM    Please note that portions of this note may have been completed with a voice recognition program. Efforts were made to edit the dictations, but occasionally words are mistranscribed.

## 2017-12-01 NOTE — ANESTHESIA PROCEDURE NOTES
Peripheral Block    Patient location during procedure: OR  Start time: 12/1/2017 10:39 AM  Stop time: 12/1/2017 10:50 AM  Reason for block: procedure for pain and primary anesthetic  Performed by  CRNA: ANASTASIA WADE  Assisted by: YASMINE DEXTER  Preanesthetic Checklist  Completed: patient identified, site marked, surgical consent, pre-op evaluation, timeout performed, IV checked, risks and benefits discussed and monitors and equipment checked  Prep:  Pt Position: supine  Sterile barriers:cap, gloves, gown, mask and sterile barriers  Prep: ChloraPrep and air dry 3 min  Patient monitoring: blood pressure monitoring, continuous pulse oximetry and EKG  Procedure  Sedation:yes  Performed under: MAC  Guidance:ultrasound guided  ULTRASOUND INTERPRETATION. Using ultrasound guidance a 21 G gauge needle was placed in close proximity to the nerve, at which point, under ultrasound guidance anesthetic was injected in the area of the nerve and spread of the anesthesia was seen on ultrasound in close proximity thereto.  There were no abnormalities seen on ultrasound; a digital image was taken; and the patient tolerated the procedure with no complications.   Laterality:left  Block Type:femoral  Injection Technique:single-shot  Needle Type:echogenic  Needle Gauge:21 G (21g b millard 4 in)  Resistance on Injection: none  Medications  Comment:Lidocaine 2% 12.5 ml + marcaine .5% 12.5 ml with decadron 5 mg  Local Injected:bupivacaine 0.5% with epinephrine and lidocaine 2% Local Amount Injected:26mL  Post Assessment  Injection Assessment: negative aspiration for heme, no paresthesia on injection and incremental injection  Patient Tolerance:comfortable throughout block  Complications:no

## 2017-12-01 NOTE — SIGNIFICANT NOTE
12/01/17 1050   Rehab Treatment   Discipline occupational therapist   Treatment Not Performed patient unavailable for treatment  (Per Nsg patient is in surgery; will follow up at later date as medically appropriate.)

## 2017-12-01 NOTE — ANESTHESIA PREPROCEDURE EVALUATION
Anesthesia Evaluation     Patient summary reviewed and Nursing notes reviewed   no history of anesthetic complications:  NPO Solid Status: > 8 hours  NPO Liquid Status: > 8 hours     Airway   Mallampati: I  TM distance: >3 FB  Neck ROM: full  Dental - normal exam   (+) poor dentition    Pulmonary - normal exam   (+) a smoker Current, COPD moderate, asthma, shortness of breath,   Cardiovascular - normal exam    PT is on anticoagulation therapy  Patient on routine beta blocker  Rhythm: regular  Rate: normal    (+) hypertension well controlled, CAD, cardiac stents Bare metal stent more than 12 months ago dysrhythmias Atrial Fib, Tachycardia, CHF, KEBEDE, PVD, DVT resolved, hyperlipidemia    ROS comment: Echo report in epic WN    Neuro/Psych  (+) CVA, tremors, numbness, psychiatric history Anxiety and Depression, poor historian.,    GI/Hepatic/Renal/Endo    (+) obesity (BMI 32),  GERD well controlled, PUD, hepatitis C, liver disease, renal disease ARF and CRI, diabetes mellitus type 2 poorly controlled,     Musculoskeletal     (+) arthralgias, back pain, chronic pain, gait problem,   Abdominal  - normal exam    Abdomen: soft.  Bowel sounds: normal.   Substance History   (+) drug use     OB/GYN negative ob/gyn ROS         Other   (+) arthritis     ROS/Med Hx Other: +Gout  Chronic anemia  Old ekg sr rad, rbbb  Lab reviewed hh 9/29  k 3.8 gluc 104  Hx of medical non compliance with lifBibasilar opacities may represent infiltrates. A follow-up  two-view chest x-ray is recommended.  estyle changes  cxr                             Anesthesia Plan    ASA 4 - emergent     MAC   (Risks and benefits discussed including risk of aspiration, recall and dental damage. All patient questions answered. Will continue with POC. Discussed risk with pt and family., pt extremely high intraop and postop risk for cv, resp, and neuro event.  ,preop femoral and popliteal blocks for popc)  intravenous induction   Anesthetic plan and risks  discussed with patient.

## 2017-12-01 NOTE — PLAN OF CARE
Problem: Perioperative Period (Adult)  Goal: Signs and Symptoms of Listed Potential Problems Will be Absent or Manageable (Perioperative Period)  Outcome: Ongoing (interventions implemented as appropriate)    12/01/17 1017   Perioperative Period   Problems Assessed (Perioperative Period) all   Problems Present (Perioperative Period) none

## 2017-12-01 NOTE — PLAN OF CARE
Problem: Patient Care Overview (Adult)  Goal: Plan of Care Review  Outcome: Ongoing (interventions implemented as appropriate)    12/01/17 8132   Outcome Evaluation   Outcome Summary/Follow up Plan pacu discharge criteria met, transfered to floor.

## 2017-12-01 NOTE — PLAN OF CARE
Problem: Perioperative Period (Adult)  Intervention: Promote Pulmonary Hygiene and Secretion Clearance    12/01/17 1332   Promote Aggressive Pulmonary Hygiene/Secretion Management   Cough And Deep Breathing other (see comments)  (does not attempt with encouragement.)       Intervention: Monitor/Manage Pain    12/01/17 1359   Safety Interventions   Medication Review/Management medications reviewed   Manage Acute Burn Pain   Pain Management Interventions pillow support       Intervention: Monitor/Manage Postoperative Bleeding    12/01/17 1359   Safety Interventions   Bleeding Management dressing monitored

## 2017-12-01 NOTE — ANESTHESIA PROCEDURE NOTES
Peripheral Block    Patient location during procedure: OR  Start time: 12/1/2017 10:51 AM  Stop time: 12/1/2017 10:54 AM  Reason for block: procedure for pain, post-op pain management and primary anesthetic  Performed by  CRNA: ANASTASIA WADE  Assisted by: YASMINE DEXTER  Preanesthetic Checklist  Completed: patient identified, site marked, surgical consent, pre-op evaluation, timeout performed, IV checked, risks and benefits discussed and monitors and equipment checked  Prep:  Pt Position: supine  Sterile barriers:cap, gloves, gown, mask and sterile barriers  Prep: ChloraPrep and air dry 3 min  Patient monitoring: blood pressure monitoring, continuous pulse oximetry and EKG  Procedure  Sedation:yes  Performed under: MAC  Guidance:ultrasound guided  ULTRASOUND INTERPRETATION. Using ultrasound guidance a gauge needle was placed in close proximity to the sciatic nerve, at which point, under ultrasound guidance anesthetic was injected in the area of the nerve and spread of the anesthesia was seen on ultrasound in close proximity thereto.  There were no abnormalities seen on ultrasound; a digital image was taken; and the patient tolerated the procedure with no complications. Images:still images not obtained    Laterality:left  Block Type:popliteal  Injection Technique:single-shotNeedle Gauge:18 G    Medications  Comment:Marcaine .5% 12.5 ml + lidocaine 2% 12.5 ml with decadron 5 mg  Local Injected:bupivacaine 0.5% with epinephrine and lidocaine 2% Local Amount Injected:26mL  Post Assessment  Injection Assessment: negative aspiration for heme, no paresthesia on injection and incremental injection  Patient Tolerance:comfortable throughout block  Complications:no

## 2017-12-01 NOTE — OP NOTE
PATIENT:    Vasquez Marie    DATE OF SURGERY:  11/25/2017 - 12/1/2017    PHYSICIAN:    Ross Real MD    REFERRING PHYSICIAN:  Vermeesch, Marilyn K, MD    YOB: 1960    PREOPERATIVE DIAGNOSIS:  Left foot nonhealing transmetatarsal amputation site    POSTOPERATIVE DIAGNOSIS:  Left foot nonhealing transmetatarsal amputation site    PROCEDURE:  Left below-knee amputation    ANESTHESIA:  General Anesthesia     OPERATIVE PROCEDURE:  The patient was taken to the operating room, placed in the supine position, and given MAC anesthesia and a block by the anesthesia department.  They were prepped and draped in the normal sterile fashion.  They did receive preoperative IV antibiotics.  The nursing staff did perform intraoperative timeout prior to the incision.    We proceeded with a left below-knee of dictation.  [place] incision was performed using a long posterior flap involving the gastrocnemius.  Dissection was continued deeply down to the tibia.  Lateral compartment musculature was then divided sharply.  Tibia was divided using a Gigli saw and a bone cutter was used to divide the fibula.  Neurovascular bundles were also clamped and divided and tied with Vicryl ties and the nerves themselves allowed to retract.  Sharply the posterior flap was then also created.  Hemostasis was controlled with minimal cautery and Vicryl suture ligatures.  The posterior flap was then debulked by removing the soleus muscle leaving the fascia intact.  The fibula was then further resected proximal to the level of the tibia.  The sharper edges of the tibia were then filed to a smooth surface.  Once satisfied with hemostasis the wound was then reapproximated in layers including the muscular layers and dermis using interrupted simple Vicryl suture.  Skin was closed with staple closure.  Dressings were applied.  There were no intraoperative complications.  Patient tolerated procedure well.    The patient was stable at  this point in time and subsequently transferred back to the recovery room in stable condition.       Ross Real MD  12/1/2017  1:30 PM

## 2017-12-02 LAB
GLUCOSE BLDC GLUCOMTR-MCNC: 157 MG/DL (ref 70–130)
GLUCOSE BLDC GLUCOMTR-MCNC: 168 MG/DL (ref 70–130)
GLUCOSE BLDC GLUCOMTR-MCNC: 210 MG/DL (ref 70–130)
GLUCOSE BLDC GLUCOMTR-MCNC: 233 MG/DL (ref 70–130)
MAGNESIUM SERPL-MCNC: 1.5 MG/DL (ref 1.6–2.3)

## 2017-12-02 PROCEDURE — 63710000001 INSULIN ASPART PER 5 UNITS: Performed by: INTERNAL MEDICINE

## 2017-12-02 PROCEDURE — 25010000002 MORPHINE PER 10 MG: Performed by: SURGERY

## 2017-12-02 PROCEDURE — 83735 ASSAY OF MAGNESIUM: CPT | Performed by: INTERNAL MEDICINE

## 2017-12-02 PROCEDURE — 82962 GLUCOSE BLOOD TEST: CPT

## 2017-12-02 PROCEDURE — 97164 PT RE-EVAL EST PLAN CARE: CPT | Performed by: PHYSICAL THERAPIST

## 2017-12-02 PROCEDURE — 99232 SBSQ HOSP IP/OBS MODERATE 35: CPT | Performed by: INTERNAL MEDICINE

## 2017-12-02 PROCEDURE — 25010000002 PIPERACILLIN SOD-TAZOBACTAM PER 1 G: Performed by: INTERNAL MEDICINE

## 2017-12-02 PROCEDURE — 25010000002 ENOXAPARIN PER 10 MG: Performed by: INTERNAL MEDICINE

## 2017-12-02 RX ORDER — LOPERAMIDE HYDROCHLORIDE 2 MG/1
2 CAPSULE ORAL 4 TIMES DAILY PRN
Status: DISCONTINUED | OUTPATIENT
Start: 2017-12-02 | End: 2017-12-06 | Stop reason: HOSPADM

## 2017-12-02 RX ADMIN — CLOPIDOGREL BISULFATE 75 MG: 75 TABLET ORAL at 09:00

## 2017-12-02 RX ADMIN — HYDROCODONE BITARTRATE AND ACETAMINOPHEN 1 TABLET: 5; 325 TABLET ORAL at 02:08

## 2017-12-02 RX ADMIN — SODIUM CHLORIDE 75 ML/HR: 9 INJECTION, SOLUTION INTRAVENOUS at 20:31

## 2017-12-02 RX ADMIN — HYDROCODONE BITARTRATE AND ACETAMINOPHEN 1 TABLET: 5; 325 TABLET ORAL at 06:24

## 2017-12-02 RX ADMIN — COLCHICINE 0.6 MG: 0.6 TABLET, FILM COATED ORAL at 09:01

## 2017-12-02 RX ADMIN — INSULIN ASPART 3 UNITS: 100 INJECTION, SOLUTION INTRAVENOUS; SUBCUTANEOUS at 12:42

## 2017-12-02 RX ADMIN — METOPROLOL TARTRATE 25 MG: 25 TABLET ORAL at 09:00

## 2017-12-02 RX ADMIN — Medication 250 MG: at 18:42

## 2017-12-02 RX ADMIN — PANTOPRAZOLE SODIUM 40 MG: 40 TABLET, DELAYED RELEASE ORAL at 05:39

## 2017-12-02 RX ADMIN — MORPHINE SULFATE 2 MG: 2 INJECTION, SOLUTION INTRAMUSCULAR; INTRAVENOUS at 05:39

## 2017-12-02 RX ADMIN — TAZOBACTAM SODIUM AND PIPERACILLIN SODIUM 3.38 G: 375; 3 INJECTION, SOLUTION INTRAVENOUS at 02:08

## 2017-12-02 RX ADMIN — LORAZEPAM 1 MG: 0.5 TABLET ORAL at 06:24

## 2017-12-02 RX ADMIN — Medication 250 MG: at 09:00

## 2017-12-02 RX ADMIN — NICOTINE 1 PATCH: 7 PATCH, EXTENDED RELEASE TRANSDERMAL at 16:22

## 2017-12-02 RX ADMIN — Medication 400 MG: at 09:00

## 2017-12-02 RX ADMIN — SULFAMETHOXAZOLE AND TRIMETHOPRIM 160 MG: 800; 160 TABLET ORAL at 09:00

## 2017-12-02 RX ADMIN — HYDROXYCHLOROQUINE SULFATE 200 MG: 200 TABLET, FILM COATED ORAL at 09:01

## 2017-12-02 RX ADMIN — TAZOBACTAM SODIUM AND PIPERACILLIN SODIUM 3.38 G: 375; 3 INJECTION, SOLUTION INTRAVENOUS at 10:55

## 2017-12-02 RX ADMIN — MORPHINE SULFATE 4 MG: 2 INJECTION, SOLUTION INTRAMUSCULAR; INTRAVENOUS at 14:22

## 2017-12-02 RX ADMIN — METOPROLOL TARTRATE 25 MG: 25 TABLET ORAL at 20:30

## 2017-12-02 RX ADMIN — ATORVASTATIN CALCIUM 40 MG: 40 TABLET, FILM COATED ORAL at 20:30

## 2017-12-02 RX ADMIN — HYDROCODONE BITARTRATE AND ACETAMINOPHEN 1 TABLET: 5; 325 TABLET ORAL at 12:41

## 2017-12-02 RX ADMIN — FERROUS SULFATE TAB EC 324 MG (65 MG FE EQUIVALENT) 324 MG: 324 (65 FE) TABLET DELAYED RESPONSE at 09:00

## 2017-12-02 RX ADMIN — ENOXAPARIN SODIUM 40 MG: 40 INJECTION SUBCUTANEOUS at 20:30

## 2017-12-02 RX ADMIN — ASPIRIN 81 MG: 81 TABLET, COATED ORAL at 09:01

## 2017-12-02 RX ADMIN — INSULIN ASPART 3 UNITS: 100 INJECTION, SOLUTION INTRAVENOUS; SUBCUTANEOUS at 06:33

## 2017-12-02 NOTE — PROGRESS NOTES
Nursing called me to bedside elizabethun 2100 tonight  to look at tremor that the patient had. He is out of any window for acute alcohol withdrawal. His sister was at bedside with a younger male family member. The patient's sister appeared under the influence of an unknown substance. While she was in the patient's room, he started having increased agitation, and right arm and leg movements, facial grimace, with mild unintentional resting tremor. He was given ativan as ordered. No acute tremor noted otherwise. I do not see any seizure like activity. Reassured family, as well as patient. Sister was crying in hallway concerned about her brother. Nursing supervisor spoke to family as well and provided reassurance. Sister wants to return tomorrow to speak to rounding physician.

## 2017-12-02 NOTE — PROGRESS NOTES
"Patient: Vasquez Marie  Procedure(s):  AMPUTATION BELOW KNEE  Anesthesia type: [unfilled]    Patient location: Med Surgical Floor  Last vitals: /72 (BP Location: Right arm, Patient Position: Lying)  Pulse 78  Temp 98.1 °F (36.7 °C) (Oral)   Resp 18  Ht 68\" (172.7 cm)  Wt 202 lb 8 oz (91.9 kg)  SpO2 99%  BMI 30.79 kg/m2  Level of consciousness: awake, alert and oriented    Post-anesthesia pain: adequate analgesia  Airway patency: patent  Respiratory: unassisted  Cardiovascular: stable and blood pressure at baseline  Hydration: euvolemic    Anesthetic complications: no     "

## 2017-12-02 NOTE — PLAN OF CARE
Problem: Renal Failure/Kidney Injury, Acute (Adult)  Goal: Signs and Symptoms of Listed Potential Problems Will be Absent or Manageable (Renal Failure/Kidney Injury, Acute)  Outcome: Ongoing (interventions implemented as appropriate)    12/02/17 0137   Renal Failure/Kidney Injury, Acute   Problems Assessed (Acute Renal Failure/Kidney Injury) infection;situational response   Problems Present (Acute Renal Failure/Kidney Injury) infection;situational response

## 2017-12-02 NOTE — NURSING NOTE
Pts sister came to visit.  Pt became very agitated and upset.   was called to evaluate pt (see MD note).  Before sister left she tried to obtain the keys to the patients home.  Since the patient is confused, we did not allow her to have the keys to his home.  This was explained in detail to the sister and her son.  They were very upset, but left without incident.

## 2017-12-02 NOTE — PROGRESS NOTES
"Hospitalist Progress Note.    LOS: 7 days    Patient Care Team:  Marilyn K Vermeesch, MD as PCP - General (Internal Medicine)    Chief Complaint:    Confusion, anemia f/u, Left lower extremity arterial occlusion     Subjective     Patient seen and examined this morning. He is alert and oriented to person and place but not time. He is complaining of mild pain in his left leg but denies any shortness of breath, headache, chest pain or palpitations.   Complaints of some diarrhea also, states he went three times since this morning.   Overnight events noted. Patient however does not recall any of what occurred. Does not remember his sister visiting him.   I asked him again this morning about history of alcohol use, he states that he could not afford to drink everyday and that the last time he drank heavily was many years ago.       Review of Systems:    The pertinent  ROS was done and it is noted above, rest  was negative.    Objective     Vital Signs  /82 (BP Location: Right arm, Patient Position: Lying)  Pulse 70  Temp 98 °F (36.7 °C) (Oral)   Resp 18  Ht 68\" (172.7 cm)  Wt 202 lb 8 oz (91.9 kg)  SpO2 100%  BMI 30.79 kg/m2           Intake/Output Summary (Last 24 hours) at 12/02/17 1145  Last data filed at 12/02/17 0546   Gross per 24 hour   Intake             1590 ml   Output             1450 ml   Net              140 ml       Physical Exam:    General Appearance: alert, oriented x 2, no acute distress, Appears older than his stated age  HEENT: pupils round and reactive to light, oral mucosa dry, extra occular movements intact.  Neck: supple, no JVD, trachea midline  Lungs: Clear to Auscultation, unlabored breathing effort  Heart: RRR, normal S1 and S2, no S3, no rub  Abdomen: soft, non-tender, no palpable bladder, present bowel sounds to auscultation  Extremities: left lower extremity  BKA stump wrapped in dressing, no drainage noted  Skin: bruising noted on his left flank, small bump which possibly is a " lipoma  Neuro: normal speech and mental status, grossly non focal.     Results Review:      Results from last 7 days  Lab Units 12/01/17  0518 11/30/17  1153 11/29/17  0513  11/25/17  1357   SODIUM mmol/L 143 141 141  < > 136*   POTASSIUM mmol/L 3.8 3.0* 3.4*  < > 4.2   CHLORIDE mmol/L 113* 109* 112*  < > 104   CO2 mmol/L 18.0* 19.0* 17.0*  < > 17.0*   BUN mg/dL 7 5* 7  < > 30*   CREATININE mg/dL 0.80 0.70 0.70  < > 2.40*   CALCIUM mg/dL 8.6 8.6 8.8  < > 9.2   BILIRUBIN mg/dL  --   --  1.0  --  1.2   ALK PHOS U/L  --   --  104  --  117   ALT (SGPT) U/L  --   --  25  --  30   AST (SGOT) U/L  --   --  28  --  35   GLUCOSE mg/dL 102* 111* 90  < > 110*   < > = values in this interval not displayed.    Estimated Creatinine Clearance: 109.4 mL/min (by C-G formula based on Cr of 0.8).      Results from last 7 days  Lab Units 12/01/17  0518 11/30/17  1153 11/28/17  0548   MAGNESIUM mg/dL 1.5* 1.4* 1.2*               Results from last 7 days  Lab Units 12/01/17  0518 11/30/17  1153 11/29/17  2241 11/29/17  0513 11/28/17  1456 11/28/17  0548   WBC 10*3/mm3 4.79* 5.16  --  5.13 3.64* 4.14*   HEMOGLOBIN g/dL 9.5* 10.2* 7.5* 8.0* 8.0* 7.7*   PLATELETS 10*3/mm3 131 118*  --  133 133 155               Imaging Results (last 24 hours)     ** No results found for the last 24 hours. **          aspirin 81 mg Oral Daily   atorvastatin 40 mg Oral Nightly   clopidogrel 75 mg Oral Daily   colchicine 0.6 mg Oral Daily   enoxaparin 40 mg Subcutaneous Q24H   ferrous sulfate 324 mg Oral Daily With Breakfast   hydroxychloroquine 200 mg Oral Daily   insulin aspart 0-7 Units Subcutaneous 4x Daily AC & at Bedtime   magnesium oxide 400 mg Oral Daily   metoprolol tartrate 25 mg Oral Q12H   nicotine 1 patch Transdermal Q24H   pantoprazole 40 mg Oral Q AM   piperacillin-tazobactam 3.375 g Intravenous Q8H   saccharomyces boulardii 250 mg Oral BID   sulfamethoxazole-trimethoprim 1 tablet Oral Q12H       Pharmacy to Dose Zosyn     sodium chloride 75  mL/hr Last Rate: 75 mL/hr (11/27/17 0958)       Medication Review:   Current Facility-Administered Medications   Medication Dose Route Frequency Provider Last Rate Last Dose   • acetaminophen (TYLENOL) tablet 650 mg  650 mg Oral Q4H PRN Liang Polanco MD   650 mg at 11/28/17 2124   • aspirin EC tablet 81 mg  81 mg Oral Daily Jonathan Storm MD   81 mg at 12/02/17 0901   • atorvastatin (LIPITOR) tablet 40 mg  40 mg Oral Nightly Liang Polanco MD   40 mg at 12/01/17 2207   • clopidogrel (PLAVIX) tablet 75 mg  75 mg Oral Daily Ross Real MD   75 mg at 12/02/17 0900   • colchicine tablet 0.6 mg  0.6 mg Oral Daily Liang Polanco MD   0.6 mg at 12/02/17 0901   • dextrose (D50W) solution 25 g  25 g Intravenous Q15 Min PRN Liang Polanco MD       • dextrose (GLUTOSE) oral gel 1 tube  1 tube Oral Q15 Min PRN Liang Polanco MD       • enoxaparin (LOVENOX) syringe 40 mg  40 mg Subcutaneous Q24H Liang Polanco MD   40 mg at 12/01/17 2207   • ferrous sulfate EC tablet 324 mg  324 mg Oral Daily With Breakfast Nahun Sandoval MD   324 mg at 12/02/17 0900   • glucagon (human recombinant) (GLUCAGEN DIAGNOSTIC) injection 1 mg  1 mg Subcutaneous Q15 Min PRN Liang Polanco MD       • HYDROcodone-acetaminophen (NORCO) 5-325 MG per tablet 1 tablet  1 tablet Oral BID PRN Liang Polanco MD   1 tablet at 11/29/17 0606   • HYDROcodone-acetaminophen (NORCO) 5-325 MG per tablet 1 tablet  1 tablet Oral Q4H PRN Jonathan Storm MD   1 tablet at 12/02/17 0624   • hydroxychloroquine (PLAQUENIL) tablet 200 mg  200 mg Oral Daily Liang Polanco MD   200 mg at 12/02/17 0901   • insulin aspart (novoLOG) injection 0-7 Units  0-7 Units Subcutaneous 4x Daily AC & at Bedtime Liang Polanco MD   3 Units at 12/02/17 0633   • ipratropium-albuterol (DUO-NEB) nebulizer solution 3 mL  3 mL Nebulization Q4H PRN Sia Bartlett DO       • LORazepam (ATIVAN) tablet 1 mg  1 mg Oral Q4H PRN Benjamin Azul MD   1 mg at 12/02/17 0624   • magnesium oxide (MAGOX)  tablet 400 mg  400 mg Oral Daily Nahun Sandoval MD   400 mg at 12/02/17 0900   • metoprolol tartrate (LOPRESSOR) tablet 25 mg  25 mg Oral Q12H Liang Polanco MD   25 mg at 12/02/17 0900   • morphine injection 2 mg  2 mg Intravenous Q1H PRN Ross Real MD   2 mg at 12/02/17 0539   • morphine injection 4 mg  4 mg Intravenous Q1H PRN Ross Real MD       • nicotine (NICODERM CQ) 7 MG/24HR patch 1 patch  1 patch Transdermal Q24H Nahun Sandoval MD   1 patch at 12/01/17 1620   • ondansetron (ZOFRAN) tablet 4 mg  4 mg Oral Q6H PRN Liang Polanco MD        Or   • ondansetron ODT (ZOFRAN-ODT) disintegrating tablet 4 mg  4 mg Oral Q6H PRN Liang Polanco MD        Or   • ondansetron (ZOFRAN) injection 4 mg  4 mg Intravenous Q6H PRN Liang Polanco MD       • pantoprazole (PROTONIX) EC tablet 40 mg  40 mg Oral Q AM Liang Polanco MD   40 mg at 12/02/17 0539   • Pharmacy to Dose Zosyn   Does not apply Continuous PRN Nahun Sandoval MD       • piperacillin-tazobactam (ZOSYN) 3.375 g in iso-osmotic dextrose 50 ml (premix)  3.375 g Intravenous Q8H Nahun Sandoval MD   3.375 g at 12/02/17 1055   • saccharomyces boulardii (FLORASTOR) capsule 250 mg  250 mg Oral BID Nahun Sandoval MD   250 mg at 12/02/17 0900   • sodium chloride 0.9 % flush 1-10 mL  1-10 mL Intravenous PRN Liang Polanco MD       • sodium chloride 0.9 % infusion  75 mL/hr Intravenous Continuous Nahun Sandoval MD 75 mL/hr at 11/27/17 0958     • sulfamethoxazole-trimethoprim (BACTRIM DS,SEPTRA DS) 800-160 MG per tablet 160 mg  1 tablet Oral Q12H Nahun Sandoval MD   160 mg at 12/02/17 0900     Facility-Administered Medications Ordered in Other Encounters   Medication Dose Route Frequency Provider Last Rate Last Dose   • bupivacaine (PF) (MARCAINE) 0.5 % injection    PRN Xavi Graves, CRNA   12.5 mL at 12/01/17 1054   • dexamethasone sodium phosphate injection    PRN Xavi Graves, CRNA   5 mg at 12/01/17 1054   • FentaNYL Citrate (PF) (SUBLIMAZE)  injection   Intravenous PRN Xavi Graves CRNA   50 mcg at 12/01/17 1033   • Ketamine HCl-Sodium Chloride 50-0.9 MG/5ML-% solution prefilled syringe    PRN Xavi Graves CRNA   20 mg at 12/01/17 1030   • lidocaine (XYLOCAINE) 2% injection    PRN Xavi Graves CRNA   12.5 mL at 12/01/17 1054   • midazolam (VERSED) injection   Intravenous PRN Xavi Graves CRNA   1 mg at 12/01/17 1101   • Phenylephrine HCl-NaCl (PF) syringe    PRN Xavi Graves CRNA   100 mcg at 12/01/17 1219   • piperacillin-tazobactam (ZOSYN) 3.375 g in iso-osmotic dextrose 50 ml (premix)    PRN Xavi Graves CRNA   3.375 g at 12/01/17 1026   • propofol (DIPRIVAN) infusion 10 mg/mL 100 mL   Intravenous Continuous PRN Xavi Graves CRNA   Stopped at 12/01/17 1312       Principal Problem:    Acute renal failure  Active Problems:    Chronic hepatitis C virus infection    Coronary artery disease involving native coronary artery of native heart without angina pectoris    Chronic atrial fibrillation    Essential hypertension    COPD (chronic obstructive pulmonary disease)    Peripheral vascular disease    Dehydration    Status post transmetatarsal amputation of foot, left    Assessment/Plan   1.  Acute metabolic encephalopathy, present on admission  2.  Hypotension secondary to severe dehydration, present on admission, resolved  3.  Acute renal failure secondary to #2, present on admission, now resolved  4.  Peripheral arterial disease s/p left SFA stent and intervention to the peroneal arteries on 6/20/2017.  4.  Paroxysmal atrial fibrillation/Flutter, not on anticoagulation due to GI bleed  5.  Coronary artery disease on medical therapy.  6.  Essential hypertension.  7.  COPD, stable.  8.  Chronic hepatitis C infection  9.  Rheumatoid arthritis.  10.  Acute worsening of chronic anemia, s/p 5 unit of PRBC  11. S/p TMA of left foot with infected wound, likely due to arterial occlusion, wound culture growing Escherichia coli and  Stenotrophomonas maltophilia now s/p BKA on 12/01  12. Acute diarrhea, possibly due to refeeding, C. difficile negative, improved  14. Hypomagenesemia, due to diarrhea, being repleted  15. 7mm soft tissue nodule noted on CT chest, will need follow up CTs  16. Acute arterial occlusion at the SFA s/p intervention by Dr. Storm on 11/28  17. Cirrhosis of liver, likely due to chronic hepatitis C, will need referral to hepatology after discharge  18. History of recurrent bilateral DVT, previously on anticoagulation, now off due to history of GI bleed  19. S/p Left BKA on 12/02 for nonhealing TMA of left foot     Patient is status post BKA of left lower extremity. Tolerated procedure well. Will stop antibiotics.   Patient's confusion waxing and waning. No organic causes identified thus far. B12 and TSH within normal range. Pending RPR. MRI of head done was also negative. Dr. Johns following. If no further improvement in mental status, will ask Dr. Johns to reevaluate.   C. Diff was negative. Will initiate Immodium.   C/w Lovenox for DVT ppx.   PT/OT  Likely discharge to short term rehab.  I  discussed the details with the nursing staff.    Rest as ordered.    Nahun Sandoval MD  12/02/17  11:45 AM    Please note that portions of this note may have been completed with a voice recognition program. Efforts were made to edit the dictations, but occasionally words are mistranscribed.

## 2017-12-02 NOTE — THERAPY RE-EVALUATION
Acute Care - Physical Therapy Initial Evaluation  [facility]     Patient Name: Vasquez Marie  : 1960  MRN: [id number]  Today's Date: 2017   Onset of Illness/Injury or Date of Surgery Date: 17  Date of Referral to PT: 17  Referring Physician: Dr. Sandoval      Admit Date: 2017     Visit Dx:    ICD-10-CM ICD-9-CM   1. Dehydration E86.0 276.51   2. Impaired functional mobility, balance, gait, and endurance Z74.09 V49.89   3. Peripheral vascular disease I73.9 443.9   4. Subacute osteomyelitis of left foot M86.272 730.07     Patient Active Problem List   Diagnosis   • Chronic hepatitis C virus infection   • Coronary artery disease involving native coronary artery of native heart without angina pectoris   • Chronic atrial fibrillation   • Essential hypertension   • Thrombocytopenia   • Depression with anxiety   • Gastroesophageal reflux disease with esophagitis   • Hyperlipidemia   • Tobacco abuse   • COPD (chronic obstructive pulmonary disease)   • Gout   • Congestive heart disease   • RLS (restless legs syndrome)   • Rheumatoid arthritis   • Typical atrial flutter   • Atrial flutter   • Cerebrovascular accident (CVA)   • Borderline diabetes   • Anemia   • Peripheral vascular disease   • Subacute osteomyelitis of left foot   • Liver mass   • Primary insomnia   • Dehydration   • Acute renal failure   • Status post transmetatarsal amputation of foot, left     Past Medical History:   Diagnosis Date   • Abdominal pain     History of epigastric abdominal tenderness. Differentials include peptic ulcer disease,   pancreatobiliary disease.   • Abnormal LFTs    • Anemia    • Anxiety    • Arthritis    • Asthma    • Atrial fibrillation    • CAD (coronary artery disease)    • Calf cramp    • CKD (chronic kidney disease)    • COPD (chronic obstructive pulmonary disease)    • CVA (cerebral infarction)    • Depression    • Diabetes mellitus    • DVT (deep venous thrombosis)    • Electrocution    •  Fatigue    • Hepatitis C    • History of allergy    • History of blood transfusion    • Hyperlipidemia    • Hypertension    • Leg pain    • Loss of appetite    • PUD (peptic ulcer disease)    • Stroke syndrome    • Tachycardia    • Thrombocytopenia    • Thrombophlebitis of deep femoral vein    • Vision problems      Past Surgical History:   Procedure Laterality Date   • AMPUTATION DIGIT Left 6/23/2017    Procedure: amputation of left foot third digit, left foot wound debridments and grafting;  Surgeon: Wolfgnag Campbell DPM;  Location: [facility] OR;  Service:    • ANKLE SURGERY     • CARDIAC CATHETERIZATION N/A 6/20/2017    Procedure: Peripheral angiography;  Surgeon: Jonathan Storm MD;  Location: [facility] CATH INVASIVE LOCATION;  Service:    • CARDIAC CATHETERIZATION N/A 6/20/2017    Procedure: Angioplasty-peripheral;  Surgeon: Jonathan Storm MD;  Location: [facility] CATH INVASIVE LOCATION;  Service:    • CARDIAC SURGERY     • CORONARY STENT PLACEMENT     • INCISION AND DRAINAGE FOOT Left 8/29/2017    Procedure: Incision and drainage/wound debridement of left foot, wound VAC application, skin graft application;  Surgeon: Wolfgang Campbell DPM;  Location: [facility] OR;  Service:    • TONSILLECTOMY     • TOTAL HIP ARTHROPLASTY Left    • TOTAL SHOULDER REPLACEMENT Left           PT ASSESSMENT (last 72 hours)      PT Evaluation       12/02/17 1350       Rehab Evaluation    Document Type re-evaluation  -JG     Subjective Information agree to therapy  -JG     Patient Effort, Rehab Treatment adequate  -JG     Symptoms Noted During/After Treatment fatigue  -JG     General Information    Patient Profile Review yes  -JG     Onset of Illness/Injury or Date of Surgery Date 11/25/17  -JG     Pertinent History Of Current Problem s/p left BKA  -JG     Precautions/Limitations fall precautions  -JG     Plans/Goals Discussed With patient;family;agreed upon  -JG     Risks Reviewed patient and family:;increased discomfort  -JG      Benefits Reviewed patient and family:;increase strength;increase independence  -JG     Clinical Impression    PT Diagnosis generalized weakness  -G     Patient/Family Goals Statement To increase strength and function  -     Criteria for Skilled Therapeutic Interventions Met yes;treatment indicated  -     Rehab Potential fair, will monitor progress closely  -     Pain Assessment    Pain Assessment 0-10  -JG     Pain Score 9  -JG     Pain Location Leg  -JG     Pain Orientation Left  -JG     Pain Intervention(s) Elevated  -JG     ROM (Range of Motion)    General ROM upper extremity range of motion deficits identified   shoulders  -JG     MMT (Manual Muscle Testing)    General MMT Assessment upper extremity strength deficits identified;lower extremity strength deficits identified  -G     Bed Mobility, Assessment/Treatment    Bed Mob, Supine to Sit, [place] contact guard assist  -     Transfer Assessment/Treatment    Transfers, Sit-Stand [place] minimum assist (75% patient effort)  -G     Transfers, Stand-Sit [place] minimum assist (75% patient effort)  -     Positioning and Restraints    Pre-Treatment Position in bed  -JG     Post Treatment Position bed   chair position  -       User Key  (r) = Recorded By, (t) = Taken By, (c) = Cosigned By    Initials Name Provider Type    ADENIKE Geiger PT Physical Therapist          Physical Therapy Education     Title: PT OT SLP Therapies (Done)     Topic: Physical Therapy (Done)     Point: Mobility training (Done)    Learning Progress Summary    Learner Readiness Method Response Comment Documented by Status   Patient Acceptance E JAYESH,NR pt instructed on safety with transfers  12/02/17 1450 Done    Acceptance E LOVELY MCCONNELL LACE education  11/28/17 1818 Done    Acceptance E VU PT benefits and POC.  11/27/17 1553 Done   Family Acceptance E JAYESH,NR pt instructed on safety with transfers  12/02/17 1450 Done               Point: Home exercise program  (Done)    Learning Progress Summary    Learner Readiness Method Response Comment Documented by Status   Patient Acceptance E VU,NR LACE education  11/28/17 1818 Done    Acceptance E VU PT benefits and POC.  11/27/17 1553 Done               Point: Precautions (Done)    Learning Progress Summary    Learner Readiness Method Response Comment Documented by Status   Patient Acceptance E VU,NR LACE education  11/28/17 1818 Done    Acceptance E VU PT benefits and POC.  11/27/17 1553 Done                      User Key     Initials Effective Dates Name Provider Type Discipline     04/06/17 -  Kemar Geiger, PT Physical Therapist PT     07/10/17 -  Deepti Wadsworth, RN Registered Nurse Nurse     11/01/17 -  Caro Christine, PT Student PT Student PT                PT Recommendation and Plan  Planned Therapy Interventions: balance training, bed mobility training, home exercise program, gait training, patient/family education, strengthening, transfer training  PT Frequency: daily  Plan of Care Review  Plan Of Care Reviewed With: patient, family  Progress:  (pt seen for re-evaluation today s/p left BKA)  Outcome Summary/Follow up Plan: Pt was seen for re-evaluation s/p left BKA. Pt able to stand with min assist using SW. Pt not able/willing to hop and transfer to chair today. Sit to stand done x 3. Pt returned to bed with bed in chair position.           IP PT Goals       12/02/17 1350 11/27/17 1554       Bed Mobility PT LTG    Bed Mobility PT LTG, Date Established 12/02/17  -JG 11/27/17  -JR (r) AH (t) JR (c)     Bed Mobility PT LTG, Time to Achieve  2 wks  -JR (r) AH (t) JR (c)     Bed Mobility PT LTG, Activity Type  all bed mobility  -JR (r) AH (t) JR (c)     Bed Mobility PT LTG, [place] Level  independent  -JR (r) AH (t) JR (c)     Bed Mobility PT LTG, Date Goal Reviewed 12/02/17  -JG      Bed Mobility PT LTG, Outcome goal ongoing  -G      Transfer Training PT LTG    Transfer Training PT LTG, Date Established   11/27/17  -JR (r) AH (t) JR (c)     Transfer Training PT LTG, Time to Achieve 2 wks  -JG 2 wks  -JR (r) AH (t) JR (c)     Transfer Training PT LTG, Activity Type all transfers  -JG bed to chair /chair to bed;sit to stand/stand to sit  -JR (r) AH (t) JR (c)     Transfer Training PT LTG, [place] Level contact guard assist  -JG conditional independence  -JR (r) AH (t) JR (c)     Transfer Training PT LTG, Assist Device walker, standard  -JG walker, rolling  -JR (r) AH (t) JR (c)     Transfer Training PT LTG, Additional Goal  following WB precautions (NWB LLE)  -JR (r) AH (t) JR (c)     Transfer Training PT  LTG, Date Goal Reviewed 12/02/17  -JG      Gait Training PT LTG    Gait Training Goal PT LTG, Date Established 12/02/17  -JG 11/27/17  -JR (r) VAHID (t) JR (c)     Gait Training Goal PT LTG, Time to Achieve 2 wks  -JG 2 wks  -JR (r) AH (t) JR (c)     Gait Training Goal PT LTG, [place] Level minimum assist (75% patient effort)  -JG contact guard assist  -JR (r) AH (t) JR (c)     Gait Training Goal PT LTG, Assist Device walker, standard  -JG walker, rolling  -JR (r) AH (t) JR (c)     Gait Training Goal PT LTG, Distance to Achieve 10 feet  -JG 10  -JR (r) AH (t) JR (c)     Gait Training Goal PT LTG, Additional Goal  with WB restrictions (NWB LLE)  -JR (r) AH (t) JR (c)     Gait Training Goal PT LTG, Date Goal Reviewed 12/02/17  -JG        User Key  (r) = Recorded By, (t) = Taken By, (c) = Cosigned By    Initials Name Provider Type    KIMBERLY Geiger, PT Physical Therapist    JR Sia Whaley, PT Physical Therapist    VAHID Christine, PT Student PT Student                Outcome Measures       12/02/17 4840          How much help from another person do you currently need...    Turning from your back to your side while in flat bed without using bedrails? 4  -JG      Moving from lying on back to sitting on the side of a flat bed without bedrails? 3  -JG      Moving to and from a bed to a chair (including a wheelchair)?  2  -JG      Standing up from a chair using your arms (e.g., wheelchair, bedside chair)? 3  -JG      Climbing 3-5 steps with a railing? 1  -JG      To walk in hospital room? 1  -JG      AM-PAC 6 Clicks Score 14  -JG      Functional Assessment    Outcome Measure Options AM-PAC 6 Clicks Basic Mobility (PT)  -JADENIKE        User Key  (r) = Recorded By, (t) = Taken By, (c) = Cosigned By    Initials Name Provider Type    KIMBERLY Geiger PT Physical Therapist           Time Calculation:         PT Charges       12/02/17 9153          Time Calculation    Start Time 1350  -JG      PT Received On 12/02/17  -KIMBERLY      PT Goal Re-Cert Due Date 12/16/17  -KIMBERLY        User Key  (r) = Recorded By, (t) = Taken By, (c) = Cosigned By    Initials Name Provider Type    KIMBERLY Geiger PT Physical Therapist          Therapy Charges for Today     Code Description Service Date Service Provider Modifiers Qty    69932033369 HC PT RE-EVAL ESTABLISHED PLAN 2 12/2/2017 Kemar Geiger PT GP 1          PT G-Codes  Outcome Measure Options: AM-PAC 6 Clicks Basic Mobility (PT)      Kemar Geiger PT  12/2/2017

## 2017-12-02 NOTE — PLAN OF CARE
Problem: Patient Care Overview (Adult)  Goal: Plan of Care Review  Outcome: Ongoing (interventions implemented as appropriate)    12/02/17 1350   Coping/Psychosocial Response Interventions   Plan Of Care Reviewed With patient;family   Patient Care Overview   Progress (pt seen for re-evaluation today s/p left BKA)   Outcome Evaluation   Outcome Summary/Follow up Plan Pt was seen for re-evaluation s/p left BKA. Pt able to stand with min assist using SW. Pt not able/willing to hop and transfer to chair today. Sit to stand done x 3. Pt returned to bed with bed in chair position.

## 2017-12-02 NOTE — PLAN OF CARE
Problem: Inpatient Physical Therapy  Goal: Bed Mobility Goal LTG- PT  Outcome: Ongoing (interventions implemented as appropriate)    11/27/17 1554 12/02/17 1350   Bed Mobility PT LTG   Bed Mobility PT LTG, Date Established --  12/02/17   Bed Mobility PT LTG, Time to Achieve 2 wks --    Bed Mobility PT LTG, Activity Type all bed mobility --    Bed Mobility PT LTG, [place] Level independent --    Bed Mobility PT LTG, Date Goal Reviewed --  12/02/17   Bed Mobility PT LTG, Outcome --  goal ongoing       Goal: Transfer Training Goal 1 LTG- PT  Outcome: Revised    12/02/17 1350   Transfer Training PT LTG   Transfer Training PT LTG, Time to Achieve 2 wks   Transfer Training PT LTG, Activity Type all transfers   Transfer Training PT LTG, [place] Level contact guard assist   Transfer Training PT LTG, Assist Device walker, standard   Transfer Training PT LTG, Date Goal Reviewed 12/02/17       Goal: Gait Training Goal LTG- PT  Outcome: Revised    12/02/17 1350   Gait Training PT LTG   Gait Training Goal PT LTG, Date Established 12/02/17   Gait Training Goal PT LTG, Time to Achieve 2 wks   Gait Training Goal PT LTG, [place] Level minimum assist (75% patient effort)   Gait Training Goal PT LTG, Assist Device walker, standard   Gait Training Goal PT LTG, Distance to Achieve 10 feet   Gait Training Goal PT LTG, Date Goal Reviewed 12/02/17

## 2017-12-02 NOTE — PLAN OF CARE
Problem: Pain, Acute (Adult)  Goal: Acceptable Pain Control/Comfort Level  Outcome: Ongoing (interventions implemented as appropriate)    12/02/17 0133   Pain, Acute (Adult)   Acceptable Pain Control/Comfort Level making progress toward outcome         Problem: Fall Risk (Adult)  Goal: Absence of Falls  Outcome: Ongoing (interventions implemented as appropriate)    12/02/17 0133   Fall Risk (Adult)   Absence of Falls making progress toward outcome         Problem: Wound, Traumatic, Nonburn (Adult)  Goal: Signs and Symptoms of Listed Potential Problems Will be Absent or Manageable (Wound, Traumatic, Nonburn)  Outcome: Ongoing (interventions implemented as appropriate)    12/02/17 0133   Wound, Traumatic, Nonburn   Problems Assessed (Wound) pain;wound complications;situational response   Problems Present (Wound) pain;wound complications;infection

## 2017-12-03 LAB
ABO GROUP BLD: NORMAL
ANION GAP SERPL CALCULATED.3IONS-SCNC: 14.8 MMOL/L
BASOPHILS # BLD AUTO: 0.01 10*3/MM3 (ref 0–0.2)
BASOPHILS NFR BLD AUTO: 0.3 % (ref 0–2.5)
BLD GP AB SCN SERPL QL: NEGATIVE
BUN BLD-MCNC: 13 MG/DL (ref 7–20)
BUN/CREAT SERPL: 14.4 (ref 6.3–21.9)
CALCIUM SPEC-SCNC: 8.2 MG/DL (ref 8.4–10.2)
CHLORIDE SERPL-SCNC: 112 MMOL/L (ref 98–107)
CO2 SERPL-SCNC: 20 MMOL/L (ref 26–30)
CREAT BLD-MCNC: 0.9 MG/DL (ref 0.6–1.3)
DEPRECATED RDW RBC AUTO: 55.5 FL (ref 37–54)
EOSINOPHIL # BLD AUTO: 0.01 10*3/MM3 (ref 0–0.7)
EOSINOPHIL NFR BLD AUTO: 0.3 % (ref 0–7)
ERYTHROCYTE [DISTWIDTH] IN BLOOD BY AUTOMATED COUNT: 16.2 % (ref 11.5–14.5)
GFR SERPL CREATININE-BSD FRML MDRD: 86 ML/MIN/1.73
GLUCOSE BLD-MCNC: 106 MG/DL (ref 74–98)
GLUCOSE BLDC GLUCOMTR-MCNC: 117 MG/DL (ref 70–130)
GLUCOSE BLDC GLUCOMTR-MCNC: 121 MG/DL (ref 70–130)
GLUCOSE BLDC GLUCOMTR-MCNC: 132 MG/DL (ref 70–130)
HCT VFR BLD AUTO: 22.7 % (ref 42–52)
HCT VFR BLD AUTO: 28 % (ref 42–52)
HGB BLD-MCNC: 7.3 G/DL (ref 14–18)
HGB BLD-MCNC: 8.9 G/DL (ref 14–18)
IMM GRANULOCYTES # BLD: 0.03 10*3/MM3 (ref 0–0.06)
IMM GRANULOCYTES NFR BLD: 0.8 % (ref 0–0.6)
LYMPHOCYTES # BLD AUTO: 0.81 10*3/MM3 (ref 0.6–3.4)
LYMPHOCYTES NFR BLD AUTO: 22.6 % (ref 10–50)
MAGNESIUM SERPL-MCNC: 1.4 MG/DL (ref 1.6–2.3)
MCH RBC QN AUTO: 30.3 PG (ref 27–31)
MCHC RBC AUTO-ENTMCNC: 32.2 G/DL (ref 30–37)
MCV RBC AUTO: 94.2 FL (ref 80–94)
MONOCYTES # BLD AUTO: 0.25 10*3/MM3 (ref 0–0.9)
MONOCYTES NFR BLD AUTO: 7 % (ref 0–12)
NEUTROPHILS # BLD AUTO: 2.47 10*3/MM3 (ref 2–6.9)
NEUTROPHILS NFR BLD AUTO: 69 % (ref 37–80)
NRBC BLD MANUAL-RTO: 0 /100 WBC (ref 0–0)
PLATELET # BLD AUTO: 115 10*3/MM3 (ref 130–400)
PMV BLD AUTO: 9.8 FL (ref 6–12)
POTASSIUM BLD-SCNC: 3.8 MMOL/L (ref 3.5–5.1)
RBC # BLD AUTO: 2.41 10*6/MM3 (ref 4.7–6.1)
RH BLD: POSITIVE
SODIUM BLD-SCNC: 143 MMOL/L (ref 137–145)
WBC NRBC COR # BLD: 3.58 10*3/MM3 (ref 4.8–10.8)

## 2017-12-03 PROCEDURE — 85014 HEMATOCRIT: CPT | Performed by: INTERNAL MEDICINE

## 2017-12-03 PROCEDURE — 86900 BLOOD TYPING SEROLOGIC ABO: CPT

## 2017-12-03 PROCEDURE — 86900 BLOOD TYPING SEROLOGIC ABO: CPT | Performed by: INTERNAL MEDICINE

## 2017-12-03 PROCEDURE — 83735 ASSAY OF MAGNESIUM: CPT | Performed by: INTERNAL MEDICINE

## 2017-12-03 PROCEDURE — 80048 BASIC METABOLIC PNL TOTAL CA: CPT | Performed by: INTERNAL MEDICINE

## 2017-12-03 PROCEDURE — 86850 RBC ANTIBODY SCREEN: CPT | Performed by: INTERNAL MEDICINE

## 2017-12-03 PROCEDURE — 99232 SBSQ HOSP IP/OBS MODERATE 35: CPT | Performed by: INTERNAL MEDICINE

## 2017-12-03 PROCEDURE — 85018 HEMOGLOBIN: CPT | Performed by: INTERNAL MEDICINE

## 2017-12-03 PROCEDURE — 82962 GLUCOSE BLOOD TEST: CPT

## 2017-12-03 PROCEDURE — 86920 COMPATIBILITY TEST SPIN: CPT

## 2017-12-03 PROCEDURE — 36430 TRANSFUSION BLD/BLD COMPNT: CPT

## 2017-12-03 PROCEDURE — P9016 RBC LEUKOCYTES REDUCED: HCPCS

## 2017-12-03 PROCEDURE — 86901 BLOOD TYPING SEROLOGIC RH(D): CPT | Performed by: INTERNAL MEDICINE

## 2017-12-03 PROCEDURE — 85025 COMPLETE CBC W/AUTO DIFF WBC: CPT | Performed by: INTERNAL MEDICINE

## 2017-12-03 PROCEDURE — 99024 POSTOP FOLLOW-UP VISIT: CPT | Performed by: SURGERY

## 2017-12-03 RX ORDER — GABAPENTIN 300 MG/1
300 CAPSULE ORAL EVERY 12 HOURS
Status: DISCONTINUED | OUTPATIENT
Start: 2017-12-03 | End: 2017-12-06 | Stop reason: HOSPADM

## 2017-12-03 RX ADMIN — HYDROCODONE BITARTRATE AND ACETAMINOPHEN 1 TABLET: 5; 325 TABLET ORAL at 00:22

## 2017-12-03 RX ADMIN — Medication 400 MG: at 08:39

## 2017-12-03 RX ADMIN — ATORVASTATIN CALCIUM 40 MG: 40 TABLET, FILM COATED ORAL at 22:23

## 2017-12-03 RX ADMIN — FERROUS SULFATE TAB EC 324 MG (65 MG FE EQUIVALENT) 324 MG: 324 (65 FE) TABLET DELAYED RESPONSE at 08:39

## 2017-12-03 RX ADMIN — PANTOPRAZOLE SODIUM 40 MG: 40 TABLET, DELAYED RELEASE ORAL at 06:03

## 2017-12-03 RX ADMIN — Medication 250 MG: at 08:39

## 2017-12-03 RX ADMIN — GABAPENTIN 300 MG: 300 CAPSULE ORAL at 22:23

## 2017-12-03 RX ADMIN — HYDROXYCHLOROQUINE SULFATE 200 MG: 200 TABLET, FILM COATED ORAL at 08:39

## 2017-12-03 RX ADMIN — METOPROLOL TARTRATE 25 MG: 25 TABLET ORAL at 22:23

## 2017-12-03 RX ADMIN — LORAZEPAM 1 MG: 0.5 TABLET ORAL at 13:37

## 2017-12-03 RX ADMIN — COLCHICINE 0.6 MG: 0.6 TABLET, FILM COATED ORAL at 08:39

## 2017-12-03 RX ADMIN — HYDROCODONE BITARTRATE AND ACETAMINOPHEN 1 TABLET: 5; 325 TABLET ORAL at 22:23

## 2017-12-03 RX ADMIN — ASPIRIN 81 MG: 81 TABLET, COATED ORAL at 08:39

## 2017-12-03 RX ADMIN — Medication 250 MG: at 17:15

## 2017-12-03 RX ADMIN — HYDROCODONE BITARTRATE AND ACETAMINOPHEN 1 TABLET: 5; 325 TABLET ORAL at 06:03

## 2017-12-03 RX ADMIN — CLOPIDOGREL BISULFATE 75 MG: 75 TABLET ORAL at 08:39

## 2017-12-03 RX ADMIN — GABAPENTIN 300 MG: 300 CAPSULE ORAL at 09:12

## 2017-12-03 RX ADMIN — HYDROCODONE BITARTRATE AND ACETAMINOPHEN 1 TABLET: 5; 325 TABLET ORAL at 13:37

## 2017-12-03 RX ADMIN — NICOTINE 1 PATCH: 7 PATCH, EXTENDED RELEASE TRANSDERMAL at 16:14

## 2017-12-03 RX ADMIN — METOPROLOL TARTRATE 25 MG: 25 TABLET ORAL at 08:39

## 2017-12-03 NOTE — PROGRESS NOTES
Continued Stay Note   [facility]     Patient Name: Vasquez Marie  MRN: [id number]  Today's Date: 12/3/2017    Admit Date: 11/25/2017          Discharge Plan       12/03/17 1304    Case Management/Social Work Plan    Plan Received call from Dr Sandoval pt has improved and should be ready for rehab.  Sia in PT notified to see today, new referral faxed to [facility], will need to follow up on Monday to make sure ready to start precert again.                Discharge Codes     None        Expected Discharge Date and Time     Expected Discharge Date Expected Discharge Time    Dec 5, 2017             Paty Marion

## 2017-12-03 NOTE — PLAN OF CARE
Problem: Renal Failure/Kidney Injury, Acute (Adult)  Goal: Signs and Symptoms of Listed Potential Problems Will be Absent or Manageable (Renal Failure/Kidney Injury, Acute)  Outcome: Ongoing (interventions implemented as appropriate)    Problem: Pain, Acute (Adult)  Goal: Acceptable Pain Control/Comfort Level  Outcome: Ongoing (interventions implemented as appropriate)    Problem: Fall Risk (Adult)  Goal: Absence of Falls  Outcome: Ongoing (interventions implemented as appropriate)    Problem: Wound, Traumatic, Nonburn (Adult)  Goal: Signs and Symptoms of Listed Potential Problems Will be Absent or Manageable (Wound, Traumatic, Nonburn)  Outcome: Ongoing (interventions implemented as appropriate)

## 2017-12-03 NOTE — DISCHARGE PLACEMENT REQUEST
"Short Term Rehab  Per Dr Sandoval can start precert again     Vasquez David (57 y.o. Male)     Date of Birth Social Security Number Address Home Phone MRN    1960  [address]    [facility] [address] [phone] [id number]    [employer] Mar[employer] Status          None Single       Admission Date Admission Type Admitting Provider Attending Provider Department, Room/Bed    17 Emergency Liang Polanco MD Majumder, Mosumi, MD [facility] MED SURG  3, 313/1    Discharge Date Discharge Disposition Discharge Destination                      Attending Provider: Nahun Sandoval MD     Allergies:  No Known Allergies    Isolation:  None   Infection:  Hepatitis C (17)   Code Status:  FULL    Ht:  68\" (172.7 cm)   Wt:  202 lb 12.8 oz (92 kg)    Admission Cmt:  None   Principal Problem:  Acute renal failure [N17.9]                 Active Insurance as of 2017     Primary Coverage     Payor Plan Insurance Group Employer/Plan Group    HUMANA MEDICARE REPLACEMENT HUMANA MEDICARE REPL [id number]     Payor Plan Address Payor Plan Phone Number Effective From Effective To    [address] [address] [phone] 2017     [place] [address]       Subscriber Name Subscriber Birth Date Member ID       VASQUEZ MARIE 1960 [id number]                 Emergency Contacts      (Rel.) Home Phone Work Phone Mobile Phone    Maral Marie (Sister) [phone] -- --               History & Physical      Liang Polanco MD at 2017  3:59 PM              [facility] HOSPITALIST   HISTORY AND PHYSICAL      Name:  Vasquez Marie   Age:  57 y.o.  Sex:  male  :  1960  MRN:  [id number]   Visit Number:  [id number]  Admission Date:  2017  Date Of Service:  17  Primary Care Physician:  Marilyn K. Vermeesch, MD    Chief Complaint:     Generalized weakness and altered mental status.    History Of Presenting " Illness:      This is a 57-year-old male with history of osteomyelitis of the left foot status post transmetatarsal amputation by Dr. Campbell, chronic atrial fibrillation, rheumatoid arthritis, diabetes and hypertension as well as peripheral arterial disease status post intervention by Dr. Storm was brought to the emergency room by family after they found him on the floor with poor hygienic conditions and confusion.  Patient was seen and examined in the emergency room at 4 PM today.  The history is obtained from the medical chart as well as the patient himself was currently alert and oriented ×3.      Patient states that he has not been eating and drinking well in the last several days and was complaining of generalized weakness.  He denies any fevers or chest pain.  He has also not been taking care of his left foot dressing.  When family went to his home he was found with several jars of urine around him and he was confused.  Apparently he did not know where he was.  He was subsequently brought to the emergency room by family.    Patient was evaluated in the emergency room.  His initial blood pressure was 69/41.  He was not tachycardic.  He was afebrile and was saturating at 98% on room air.  He was given 2 L of IV fluid boluses in the emergency room with improvement in his blood pressure to the 100 systolic.  He also improve with regards to his mental status and is currently alert and oriented ×3 and on sitting questions appropriately.  He claims that he has been taking care of himself and dressing his wounds regularly.  He also states that he has been following up with Dr. Campbell as scheduled.  His blood work done in the emergency room showed a creatinine of 0.4 with a baseline of 1.2.  His white count was 12.  He is currently being admitted to the medical floor for further evaluation and management.    Review Of Systems:     General ROS: Patient denies any fevers, chills or loss of consciousness. Complains of  generalized weakness.   Psychological ROS: No history of any hallucinations and delusions.  Ophthalmic ROS: No history of any diplopia or transient loss of vision.  ENT ROS: No history of sore throat, nasal congestion or ear pain.   Allergy and Immunology ROS: No history of rash or itching.  Hematological and Lymphatic ROS: No history of neck swelling or easy bleeding.  Endocrine ROS: No history of any recent unintentional weight gain or loss.  Respiratory ROS: No history of cough or shortness of breath.  Cardiovascular ROS: No history of chest pain or palpitations.  Gastrointestinal ROS: No history of nausea and vomiting.  Complains of chronic abdominal pain. No diarrhea.  Genito-Urinary ROS: No history of dysuria or hematuria.  Musculoskeletal ROS: Complains of chronic feet pain. Complains of chronic back pain.  Neurological ROS: Denies any focal weakness.  History of confusion.  Dermatological ROS: No history of any redness or pruritis.     Past Medical History:    Past Medical History:   Diagnosis Date   • Abdominal pain     History of epigastric abdominal tenderness. Differentials include peptic ulcer disease,   pancreatobiliary disease.   • Abnormal LFTs    • Anemia    • Anxiety    • Arthritis    • Asthma    • Atrial fibrillation    • CAD (coronary artery disease)    • Calf cramp    • CKD (chronic kidney disease)    • COPD (chronic obstructive pulmonary disease)    • CVA (cerebral infarction)    • Depression    • DVT (deep venous thrombosis)    • Electrocution    • Fatigue    • Hepatitis C    • History of allergy    • History of blood transfusion    • Hyperlipidemia    • Hypertension    • Leg pain    • Loss of appetite    • PUD (peptic ulcer disease)    • Stroke syndrome    • Tachycardia    • Thrombocytopenia    • Thrombophlebitis of deep femoral vein    • Vision problems        Past Surgical history:    Past Surgical History:   Procedure Laterality Date   • AMPUTATION DIGIT Left 6/23/2017    Procedure:  amputation of left foot third digit, left foot wound debridments and grafting;  Surgeon: Wolfgang Campbell DPM;  Location: [facility] OR;  Service:    • ANKLE SURGERY     • CARDIAC CATHETERIZATION N/A 6/20/2017    Procedure: Peripheral angiography;  Surgeon: Jonathan Storm MD;  Location: [facility] CATH INVASIVE LOCATION;  Service:    • CARDIAC CATHETERIZATION N/A 6/20/2017    Procedure: Angioplasty-peripheral;  Surgeon: Jonathan Storm MD;  Location: [facility] CATH INVASIVE LOCATION;  Service:    • CARDIAC SURGERY     • CORONARY STENT PLACEMENT     • INCISION AND DRAINAGE FOOT Left 8/29/2017    Procedure: Incision and drainage/wound debridement of left foot, wound VAC application, skin graft application;  Surgeon: Wolfgang Campbell DPM;  Location: [facility] OR;  Service:    • TONSILLECTOMY     • TOTAL HIP ARTHROPLASTY Left    • TOTAL SHOULDER REPLACEMENT Left        Social History:    Social History     Social History   • Marital status: Single     Spouse name: N/A   • Number of children: N/A   • Years of education: N/A     Occupational History   • Not on file.     Social History Main Topics   • Smoking status: Current Every Day Smoker     Packs/day: 0.50     Years: 30.00     Types: Cigarettes   • Smokeless tobacco: Not on file   • Alcohol use No   • Drug use: No   • Sexual activity: Defer     Other Topics Concern   • Not on file     Social History Narrative       Family History:    Family History   Problem Relation Age of Onset   • Breast cancer Mother    • Colon cancer Sister    • Heart attack Other    • Arthritis Other    • Diabetes Other    • Hypertension Other    • Kidney disease Other    • Stroke Other    • Heart disease Father      Allergies:      Review of patient's allergies indicates no known allergies.    Home Medications:    Prior to Admission Medications     Prescriptions Last Dose Informant Patient Reported? Taking?    albuterol (PROVENTIL HFA;VENTOLIN HFA) 108 (90 Base) MCG/ACT inhaler   No Yes     Inhale 2 puffs Every 4 (Four) Hours As Needed for Wheezing.    atorvastatin (LIPITOR) 40 MG tablet   No Yes    Take 1 tablet by mouth Every Night.    clopidogrel (PLAVIX) 75 MG tablet   No Yes    Take 1 tablet by mouth Daily.    DULoxetine (CYMBALTA) 60 MG capsule   No Yes    Take 1 capsule by mouth Daily.    hydroxychloroquine (PLAQUENIL) 200 MG tablet   No Yes    Take 1 tablet by mouth Daily.    losartan-hydrochlorothiazide (HYZAAR) 50-12.5 MG per tablet   No Yes    Take 1 tablet by mouth Daily.    metoprolol tartrate (LOPRESSOR) 25 MG tablet   No Yes    Take 1 tablet by mouth Every 12 (Twelve) Hours.    pantoprazole (PROTONIX) 40 MG EC tablet   No Yes    Take 1 tablet by mouth Daily.    traZODone (DESYREL) 50 MG tablet   No Yes    Take 1 tablet by mouth Every Night.    colchicine 0.6 MG tablet   No No    Take 1 tablet by mouth Daily.    gabapentin (NEURONTIN) 300 MG capsule   No No    Take 1 capsule by mouth tonight, then tomorrow take 1 capsule in the morning and 1 in the evening, then take 1 three times daily thereafter    gabapentin (NEURONTIN) 300 MG capsule   No No    Take 1 capsule by mouth 3 (Three) Times a Day.    guaifenesin (ROBITUSSIN) 100 MG/5ML liquid   No No    Take 10 mL by mouth 3 (Three) Times a Day As Needed for Cough or Congestion.    HYDROcodone-acetaminophen (NORCO) 5-325 MG per tablet   No No    Take 1 tablet by mouth 2 (Two) Times a Day As Needed for Moderate Pain .    Misc. Devices (COMMODE BEDSIDE) misc   No No    1 Device Every 6 (Six) Hours.    PHARMACY MEDS TO BED CONSULT   No No    Daily (Monday-Friday).    promethazine-dextromethorphan (PROMETHAZINE-DM) 6.25-15 MG/5ML syrup   No No    Take 5 mL by mouth At Night As Needed for Cough.             ED Medications:    Medications   sodium chloride 0.9 % flush 10 mL (not administered)   sodium chloride 0.9 % bolus 2,000 mL (2,000 mL Intravenous New Bag 11/25/17 1291)   magnesium sulfate 2g/50 mL (PREMIX) infusion (0 g Intravenous Stopped  11/25/17 1541)     Vital Signs:    Temp:  [98.1 °F (36.7 °C)] 98.1 °F (36.7 °C)  Heart Rate:  [74-95] 74  Resp:  [20] 20  BP: (69-96)/(41-77) 96/60    Last 3 weights    11/25/17  1319   Weight: 270 lb (122 kg)       Body mass index is 41.05 kg/(m^2).    Physical Exam:    General Appearance:  Alert and cooperative, not in any acute distress.   Head:  Atraumatic and normocephalic, without obvious abnormality.   Eyes:          PERRLA, conjunctivae and sclerae normal, no Icterus. No pallor. Extra-occular movements are within normal limits.   Ears:  Ears appear intact with no abnormalities noted.   Throat: No oral lesions, no thrush, oral mucosa moist.   Neck: Supple, trachea midline, no thyromegaly, no carotid bruit.   Back:   No kyphoscoliosis present. No tenderness to palpation,   range of motion normal.   Lungs:   Chest shape is normal. Breath sounds heard bilaterally equally.  No crackles or wheezing. No Pleural rub or bronchial breathing.   Heart:  Normal S1 and S2, no murmur, no gallop, no rub. No JVD.   Abdomen:   Normal bowel sounds, no masses, no organomegaly. Soft, non-tender, non-distended, no guarding, no rebound tenderness.   Extremities: Moves all extremities well, 1+ edema, no cyanosis, no clubbing.  Transmetatarsal amputation stump on the left side shows old crusted blood and bandage stuck to the stump.  No significant drainage or foul smell noted.  Chronic hyperpigmentation is noted in the legs as well as bilateral forearms.   Neurologic: Alert and oriented x 3. Moves all four limbs equally. No tremors. No facial asymetry.     Laboratory data:    I have reviewed the labs done in the emergency room.      Results from last 7 days  Lab Units 11/25/17  1357   SODIUM mmol/L 136*   POTASSIUM mmol/L 4.2   CHLORIDE mmol/L 104   CO2 mmol/L 17.0*   BUN mg/dL 30*   CREATININE mg/dL 2.40*   CALCIUM mg/dL 9.2   BILIRUBIN mg/dL 1.2   ALK PHOS U/L 117   ALT (SGPT) U/L 30   AST (SGOT) U/L 35   GLUCOSE mg/dL 110*        Results from last 7 days  Lab Units 11/25/17  1357   WBC 10*3/mm3 12.07*   HEMOGLOBIN g/dL 7.8*   HEMATOCRIT % 24.3*   PLATELETS 10*3/mm3 269           Results from last 7 days  Lab Units 11/25/17  1357   CK TOTAL U/L 202*   TROPONIN I ng/mL <0.012   CK MB INDEX % 2.6*                       Results from last 7 days  Lab Units 11/25/17  1539   COLOR UA  Mellisa*   GLUCOSE UA  Negative   KETONES UA  Negative   LEUKOCYTES UA  Trace*   PH, URINE  <=5.0   BILIRUBIN UA  Small (1+)*   UROBILINOGEN UA  1.0 E.U./dL         EKG:      Not done.    Radiology:    Imaging Results (last 72 hours)     Procedure Component Value Units Date/Time    XR Foot 3+ View Left [[id number]] Updated:  11/25/17 1432    XR Chest 2 View [[id number]] Updated:  11/25/17 1517        Left foot x-ray done in the emergency room was reviewed by me.  I do not see any significant inflammatory changes but an official report is currently pending.    Chest x-ray with PA and lateral views done in the emergency room was reviewed by me.  This shows chronic appearing fibrotic changes in the right lung field that is unchanged from his previous x-rays.    Active Problems:    Dehydration    Assessment:    1.  Acute metabolic encephalopathy, present on admission.  2.  Hypotension secondary to severe dehydration, present on admission.  3.  Acute renal failure secondary to #2, present on admission.  4.  Peripheral arterial disease s/p left SFA stent and intervention to the peroneal arteries on 6/20/2017.  4.  Paroxysmal atrial fibrillation.  5.  Coronary artery disease on medical therapy.  6.  Essential hypertension.  7.  COPD, stable.  8.  Chronic hepatitis C infection.  9.  Rheumatoid arthritis.    Plan:    Patient is currently being admitted to the medical floor with telemetry.  He has already received 2 L of normal saline in the emergency room with improvement in his blood pressures.  I will continue him on normal saline at 125 mL per hour.  At this time there is  no evidence of any active infection.  His ESR is however 120 possibly related to chronic inflammation in his left foot.  His lactic acid is within normal limits.  I will hold off on antibiotic therapy at this time.    We will consult wound care service for evaluation of his left foot.  We will also consult Dr. Campbell on Monday.  Evaluate the left foot for any underlying infection.  He will be placed on subcutaneous insulin protocol for coverage.  I have strongly advised the patient to be compliant with his medical treatment and follow-up care.  His home medications will be continued.  Further recommendations depend upon his clinical course as well as improvement in his renal function.    Liang Polanco MD  11/25/17  3:59 PM    Dictated utilizing Dragon dictation.     Electronically signed by Liang Polanco MD at 11/25/2017  5:23 PM        Hospital Medications (active)       Dose Frequency Start End    acetaminophen (TYLENOL) tablet 650 mg 650 mg Every 4 Hours PRN 11/25/2017     Sig - Route: Take 2 tablets by mouth Every 4 (Four) Hours As Needed for Mild Pain . - Oral    aspirin EC tablet 81 mg 81 mg Daily 11/30/2017     Sig - Route: Take 1 tablet by mouth Daily. - Oral    atorvastatin (LIPITOR) tablet 40 mg 40 mg Nightly 11/25/2017     Sig - Route: Take 1 tablet by mouth Every Night. - Oral    bupivacaine (PF) (MARCAINE) 0.5 % injection  As Needed 12/1/2017     Sig: As Needed.    clopidogrel (PLAVIX) tablet 75 mg 75 mg Daily 12/2/2017     Sig - Route: Take 1 tablet by mouth Daily. - Oral    colchicine tablet 0.6 mg 0.6 mg Daily 11/25/2017     Sig - Route: Take 1 tablet by mouth Daily. - Oral    dexamethasone sodium phosphate injection  As Needed 12/1/2017     Sig: As Needed.    dextrose (D50W) solution 25 g 25 g Every 15 Minutes PRN 11/25/2017     Sig - Route: Infuse 50 mL into a venous catheter Every 15 (Fifteen) Minutes As Needed for Low Blood Sugar (Blood Sugar Less Than 70, Patient Has IV Access - Unresponsive, NPO or  Unable To Safely Swallow). - Intravenous    dextrose (GLUTOSE) oral gel 1 tube 1 tube Every 15 Minutes PRN 11/25/2017     Sig - Route: Take 1 tube by mouth Every 15 (Fifteen) Minutes As Needed for Low Blood Sugar (Blood Sugar Less Than 70, Patient Alert, Is Not NPO & Can Safely Swallow). - Oral    FentaNYL Citrate (PF) (SUBLIMAZE) injection  As Needed 12/1/2017     Sig - Route: Infuse  into a venous catheter As Needed for Severe Pain . - Intravenous    ferrous sulfate EC tablet 324 mg 324 mg Daily With Breakfast 11/28/2017     Sig - Route: Take 1 tablet by mouth Daily With Breakfast. - Oral    gabapentin (NEURONTIN) capsule 300 mg 300 mg Every 12 Hours 12/3/2017     Sig - Route: Take 1 capsule by mouth Every 12 (Twelve) Hours. - Oral    glucagon (human recombinant) (GLUCAGEN DIAGNOSTIC) injection 1 mg 1 mg Every 15 Minutes PRN 11/25/2017     Sig - Route: Inject 1 mg under the skin Every 15 (Fifteen) Minutes As Needed (Blood Glucose Less Than 70 - Patient Without IV Access - Unresponsive, NPO or Unable To Safely Swallow). - Subcutaneous    HYDROcodone-acetaminophen (NORCO) 5-325 MG per tablet 1 tablet 1 tablet 2 Times Daily PRN 11/25/2017     Sig - Route: Take 1 tablet by mouth 2 (Two) Times a Day As Needed for Moderate Pain . - Oral    HYDROcodone-acetaminophen (NORCO) 5-325 MG per tablet 1 tablet 1 tablet Every 4 Hours PRN 11/29/2017 12/9/2017    Sig - Route: Take 1 tablet by mouth Every 4 (Four) Hours As Needed for Moderate Pain . - Oral    hydroxychloroquine (PLAQUENIL) tablet 200 mg 200 mg Daily 11/25/2017     Sig - Route: Take 1 tablet by mouth Daily. - Oral    insulin aspart (novoLOG) injection 0-7 Units 0-7 Units 4 Times Daily Before Meals & Nightly 11/25/2017     Sig - Route: Inject 0-7 Units under the skin 4 (Four) Times a Day Before Meals & at Bedtime. - Subcutaneous    ipratropium-albuterol (DUO-NEB) nebulizer solution 3 mL 3 mL Every 4 Hours PRN 11/27/2017     Sig - Route: Take 3 mL by nebulization Every  "4 (Four) Hours As Needed for Shortness of Air. - Nebulization    Ketamine HCl-Sodium Chloride 50-0.9 MG/5ML-% solution prefilled syringe  As Needed 12/1/2017     Sig: As Needed.    lidocaine (XYLOCAINE) 2% injection  As Needed 12/1/2017     Sig: As Needed.    loperamide (IMODIUM) capsule 2 mg 2 mg 4 Times Daily PRN 12/2/2017     Sig - Route: Take 1 capsule by mouth 4 (Four) Times a Day As Needed for Diarrhea. - Oral    LORazepam (ATIVAN) tablet 1 mg 1 mg Every 4 Hours PRN 11/25/2017 12/5/2017    Sig - Route: Take 2 tablets by mouth Every 4 (Four) Hours As Needed for Anxiety. - Oral    magnesium oxide (MAGOX) tablet 400 mg 400 mg Daily 11/30/2017     Sig - Route: Take 1 tablet by mouth Daily. - Oral    metoprolol tartrate (LOPRESSOR) tablet 25 mg 25 mg Every 12 Hours Scheduled 11/25/2017     Sig - Route: Take 1 tablet by mouth Every 12 (Twelve) Hours. - Oral    midazolam (VERSED) injection  As Needed 12/1/2017     Sig - Route: Infuse  into a venous catheter As Needed. - Intravenous    morphine injection 2 mg 2 mg Every 1 Hour PRN 12/1/2017 12/11/2017    Sig - Route: Infuse 1 mL into a venous catheter Every 1 (One) Hour As Needed for Moderate Pain  or Severe Pain . - Intravenous    morphine injection 4 mg 4 mg Every 1 Hour PRN 12/1/2017 12/11/2017    Sig - Route: Infuse 2 mL into a venous catheter Every 1 (One) Hour As Needed for Severe Pain . - Intravenous    nicotine (NICODERM CQ) 7 MG/24HR patch 1 patch 1 patch Every 24 Hours 11/30/2017     Sig - Route: Place 1 patch on the skin Daily. - Transdermal    ondansetron (ZOFRAN) injection 4 mg 4 mg Every 6 Hours PRN 11/25/2017     Sig - Route: Infuse 2 mL into a venous catheter Every 6 (Six) Hours As Needed for Nausea or Vomiting. - Intravenous    Linked Group 1:  \"Or\" Linked Group Details        ondansetron (ZOFRAN) tablet 4 mg 4 mg Every 6 Hours PRN 11/25/2017     Sig - Route: Take 1 tablet by mouth Every 6 (Six) Hours As Needed for Nausea or Vomiting. - Oral    Linked " "Group 1:  \"Or\" Linked Group Details        ondansetron ODT (ZOFRAN-ODT) disintegrating tablet 4 mg 4 mg Every 6 Hours PRN 11/25/2017     Sig - Route: Take 1 tablet by mouth Every 6 (Six) Hours As Needed for Nausea or Vomiting. - Oral    Linked Group 1:  \"Or\" Linked Group Details        pantoprazole (PROTONIX) EC tablet 40 mg 40 mg Every Early Morning 11/25/2017     Sig - Route: Take 1 tablet by mouth Every Morning. - Oral    Phenylephrine HCl-NaCl (PF) syringe  As Needed 12/1/2017     Sig: As Needed.    piperacillin-tazobactam (ZOSYN) 3.375 g in iso-osmotic dextrose 50 ml (premix)  As Needed 12/1/2017     Sig: As Needed.    propofol (DIPRIVAN) infusion 10 mg/mL 100 mL  Continuous PRN 12/1/2017     Sig - Route: Infuse  into a venous catheter Continuous As Needed. - Intravenous    saccharomyces boulardii (FLORASTOR) capsule 250 mg 250 mg 2 Times Daily 11/27/2017     Sig - Route: Take 1 capsule by mouth 2 (Two) Times a Day. - Oral    sodium chloride 0.9 % flush 1-10 mL 1-10 mL As Needed 11/25/2017     Sig - Route: Infuse 1-10 mL into a venous catheter As Needed for Line Care. - Intravenous    enoxaparin (LOVENOX) syringe 40 mg (Discontinued) 40 mg Every 24 Hours 11/25/2017 12/3/2017    Sig - Route: Inject 0.4 mL under the skin Daily. - Subcutaneous    sodium chloride 0.9 % infusion (Discontinued) 75 mL/hr Continuous 11/25/2017 12/3/2017    Sig - Route: Infuse 75 mL/hr into a venous catheter Continuous. - Intravenous          Operative/Procedure Notes (last 24 hours) (Notes from 12/2/2017  1:02 PM through 12/3/2017  1:02 PM)     No notes of this type exist for this encounter.           Physician Progress Notes (last 24 hours) (Notes from 12/2/2017  1:02 PM through 12/3/2017  1:02 PM)      Rosa Hawk MD at 12/3/2017  9:17 AM  Version 1 of 1              LOS: 8 days   Patient Care Team:  Marilyn K Vermeesch, MD as PCP - General (Internal Medicine)        Subjective  patient doing well, incision was wonderful.  No " drainage or redness.  Pain under control    Interval History:     Patient Complaints: none    History taken from: patient    Vital Signs  Temp:  [97.9 °F (36.6 °C)-98.5 °F (36.9 °C)] 98.4 °F (36.9 °C)  Heart Rate:  [65-81] 76  Resp:  [18-20] 20  BP: (105-144)/(72-98) 105/85    Physical Exam:     General Appearance:    Alert, cooperative, in no acute distress   Head:    Normocephalic, without obvious abnormality, atraumatic   Lungs:     Clear to auscultation,respirations regular, even and                  unlabored    Heart:    Regular rhythm and normal rate, normal S1 and S2, no            murmur, no gallop, no rub, no click   Abdomen:     Normal bowel sounds, no masses, no organomegaly, soft        non-tender, non-distended, no guarding, no rebound                tenderness   Extremities:   Moves all extremities well, no edema, no cyanosis, no             redness   Pulses:   Pulses palpable and equal bilaterally   Skin:   No bleeding, bruising or rash        Results Review:       Lab Results (last 24 hours)     Procedure Component Value Units Date/Time    POC Glucose Fingerstick [[id number]]  (Abnormal) Collected:  12/02/17 1116    Specimen:  Blood Updated:  12/02/17 1131     Glucose 210 (H) mg/dL       Serial Number: [id number]:  [id number]       POC Glucose Fingerstick [[id number]]  (Abnormal) Collected:  12/02/17 1604    Specimen:  Blood Updated:  12/02/17 1626     Glucose 157 (H) mg/dL       Serial Number: [id number]:  [id number]       Magnesium [[id number]]  (Abnormal) Collected:  12/02/17 1724    Specimen:  Blood Updated:  12/02/17 1748     Magnesium 1.5 (L) mg/dL     POC Glucose Fingerstick [[id number]]  (Abnormal) Collected:  12/02/17 2049    Specimen:  Blood Updated:  12/02/17 2130     Glucose 168 (H) mg/dL       Serial Number: [id number]:  [id number]       CBC & Differential [[id number]] Collected:  12/03/17 0557    Specimen:  Blood Updated:  12/03/17 0617    Narrative:       The following orders  were created for panel order CBC & Differential.  Procedure                               Abnormality         Status                     ---------                               -----------         ------                     CBC Auto Differential[[id number]]        Abnormal            Final result                 Please view results for these tests on the individual orders.    CBC Auto Differential [[id number]]  (Abnormal) Collected:  12/03/17 0557    Specimen:  Blood Updated:  12/03/17 0617     WBC 3.58 (L) 10*3/mm3      RBC 2.41 (L) 10*6/mm3      Hemoglobin 7.3 (C) g/dL      Hematocrit 22.7 (C) %      MCV 94.2 (H) fL      MCH 30.3 pg      MCHC 32.2 g/dL      RDW 16.2 (H) %      RDW-SD 55.5 (H) fl      MPV 9.8 fL      Platelets 115 (L) 10*3/mm3      Neutrophil % 69.0 %      Lymphocyte % 22.6 %      Monocyte % 7.0 %      Eosinophil % 0.3 %      Basophil % 0.3 %      Immature Grans % 0.8 (H) %      Neutrophils, Absolute 2.47 10*3/mm3      Lymphocytes, Absolute 0.81 10*3/mm3      Monocytes, Absolute 0.25 10*3/mm3      Eosinophils, Absolute 0.01 10*3/mm3      Basophils, Absolute 0.01 10*3/mm3      Immature Grans, Absolute 0.03 10*3/mm3      nRBC 0.0 /100 WBC     Basic Metabolic Panel [[id number]]  (Abnormal) Collected:  12/03/17 0557    Specimen:  Blood Updated:  12/03/17 0627     Glucose 106 (H) mg/dL      BUN 13 mg/dL      Creatinine 0.90 mg/dL      Sodium 143 mmol/L      Potassium 3.8 mmol/L      Chloride 112 (H) mmol/L      CO2 20.0 (L) mmol/L      Calcium 8.2 (L) mg/dL      eGFR Non African Amer 86 mL/min/1.73      BUN/Creatinine Ratio 14.4     Anion Gap 14.8 mmol/L     Narrative:       Abnormal estimated GFR should be followed by more specific studies to confirm end stage chronic renal disease. The equation used for calculation may not be accurate for patients less than 19 years old, greater than 70 years old, patients at extremes of weight, malnutrition, or with acute renal dysfunction.              Assessment/Plan      Principal Problem:    Acute renal failure  Active Problems:    Chronic hepatitis C virus infection    Coronary artery disease involving native coronary artery of native heart without angina pectoris    Chronic atrial fibrillation    Essential hypertension    COPD (chronic obstructive pulmonary disease)    Peripheral vascular disease    Dehydration    Status post transmetatarsal amputation of foot, left      Postop left BKA, doing well.  Continue pain control and elevation.      Rosa Hawk MD  12/03/17  9:17 AM       Electronically signed by Rosa Hawk MD at 12/3/2017  9:18 AM      Nahun Sandoval MD at 12/3/2017 11:50 AM  Version 2 of 2         Hospitalist Progress Note.    LOS: 8 days    Patient Care Team:  Marilyn K Vermeesch, MD as PCP - General (Internal Medicine)    Chief Complaint:    Confusion, anemia f/u, Left lower extremity arterial occlusion s/p intevention, s/p left BKA    Subjective   Patient seen and examined this morning he is alert and oriented to person, place and time.  He denies any new complaints other than some pain in his leg.  Denies any shortness of breath, chest pain, headache, dizziness, or palpitations.  His hemoglobin is notable at 7.3 this morning.    Prior patient's RN, patient was also noted to be slightly hypoxic on room air and is currently on 2 L of oxygen and saturating at 100%.    Patient was admitted on 11/25, for altered mental status and dehydration.  The etiology of his confusion was unclear at the time of admission.  His foot wound on admission did not appear to be infected, however, over the course of the hospitalization was noted to be more necrotic and there was concern for acute arterial occlusion. Arterial dopplers was done showing poor circulation, he was subsequently taken for angiography and had intervention by Dr. Storm to the SFA with good flow. However, Dr. Campbell felt that patient's wound was unlikely going to have good heeling due to patient's poor  "compliance with previous wound VACs and recommended a BKA which he felt would be better for his quality of life as well. BKA was done on 12/01. He was incidentally found to have cirrhosis of his liver which is likely due to chronic hepatitis C and previous alcohol abuse. His confusion has been waxing and waning. CT and MRI of brain did not reveal any significant findings. Hepatic encephalopathy has been ruled out. Dr. Johns was consulted and felt that's patient's confusion is related to anemia and hypotension.     Review of Systems:    The pertinent  ROS was done and it is noted above, rest  was negative.    Objective     Vital Signs  /79  Pulse 78  Temp 97.9 °F (36.6 °C) (Oral)   Resp 18  Ht 68\" (172.7 cm)  Wt 202 lb 12.8 oz (92 kg)  SpO2 100%  BMI 30.84 kg/m2      I/O this shift:  In: 300 [Blood:300]  Out: 200 [Urine:200]    Intake/Output Summary (Last 24 hours) at 12/03/17 1150  Last data filed at 12/03/17 1020   Gross per 24 hour   Intake             2693 ml   Output              950 ml   Net             1743 ml       Physical Exam:    General Appearance: alert, oriented x 2, no acute distress, Appears older than his stated age  HEENT: pupils round and reactive to light, oral mucosa dry, extra occular movements intact.  Neck: supple, no JVD, trachea midline  Lungs: Clear to Auscultation, unlabored breathing effort  Heart: RRR, normal S1 and S2, no S3, no rub  Abdomen: soft, non-tender, no palpable bladder, present bowel sounds to auscultation  Extremities: left lower extremity  BKA stump wrapped in dressing, no drainage noted  Skin: bruising noted on his left flank, small bump which possibly is a lipoma  Neuro: normal speech and mental status, grossly non focal.     Results Review:      Results from last 7 days  Lab Units 12/03/17  0557 12/01/17  0518 11/30/17  1153 11/29/17  0513   SODIUM mmol/L 143 143 141 141   POTASSIUM mmol/L 3.8 3.8 3.0* 3.4*   CHLORIDE mmol/L 112* 113* 109* 112*   CO2 mmol/L " 20.0* 18.0* 19.0* 17.0*   BUN mg/dL 13 7 5* 7   CREATININE mg/dL 0.90 0.80 0.70 0.70   CALCIUM mg/dL 8.2* 8.6 8.6 8.8   BILIRUBIN mg/dL  --   --   --  1.0   ALK PHOS U/L  --   --   --  104   ALT (SGPT) U/L  --   --   --  25   AST (SGOT) U/L  --   --   --  28   GLUCOSE mg/dL 106* 102* 111* 90       Estimated Creatinine Clearance: 97.3 mL/min (by C-G formula based on Cr of 0.9).      Results from last 7 days  Lab Units 12/02/17  1724 12/01/17  0518 11/30/17  1153   MAGNESIUM mg/dL 1.5* 1.5* 1.4*               Results from last 7 days  Lab Units 12/03/17  0557 12/01/17  0518 11/30/17  1153 11/29/17  2241 11/29/17  0513 11/28/17  1456   WBC 10*3/mm3 3.58* 4.79* 5.16  --  5.13 3.64*   HEMOGLOBIN g/dL 7.3* 9.5* 10.2* 7.5* 8.0* 8.0*   PLATELETS 10*3/mm3 115* 131 118*  --  133 133               Imaging Results (last 24 hours)     ** No results found for the last 24 hours. **          aspirin 81 mg Oral Daily   atorvastatin 40 mg Oral Nightly   clopidogrel 75 mg Oral Daily   colchicine 0.6 mg Oral Daily   ferrous sulfate 324 mg Oral Daily With Breakfast   gabapentin 300 mg Oral Q12H   hydroxychloroquine 200 mg Oral Daily   insulin aspart 0-7 Units Subcutaneous 4x Daily AC & at Bedtime   magnesium oxide 400 mg Oral Daily   metoprolol tartrate 25 mg Oral Q12H   nicotine 1 patch Transdermal Q24H   pantoprazole 40 mg Oral Q AM   saccharomyces boulardii 250 mg Oral BID          Medication Review:   Current Facility-Administered Medications   Medication Dose Route Frequency Provider Last Rate Last Dose   • acetaminophen (TYLENOL) tablet 650 mg  650 mg Oral Q4H PRN Liang Polanco MD   650 mg at 11/28/17 2124   • aspirin EC tablet 81 mg  81 mg Oral Daily Jonathan Storm MD   81 mg at 12/03/17 0839   • atorvastatin (LIPITOR) tablet 40 mg  40 mg Oral Nightly Liang Polanco MD   40 mg at 12/02/17 2030   • clopidogrel (PLAVIX) tablet 75 mg  75 mg Oral Daily Ross Real MD   75 mg at 12/03/17 0839   • colchicine tablet 0.6 mg   0.6 mg Oral Daily Liang Polanco MD   0.6 mg at 12/03/17 0839   • dextrose (D50W) solution 25 g  25 g Intravenous Q15 Min PRN Liang Polanco MD       • dextrose (GLUTOSE) oral gel 1 tube  1 tube Oral Q15 Min PRN Liang Polanco MD       • ferrous sulfate EC tablet 324 mg  324 mg Oral Daily With Breakfast Nahun Sandoval MD   324 mg at 12/03/17 0839   • gabapentin (NEURONTIN) capsule 300 mg  300 mg Oral Q12H Nahun Sandoval MD   300 mg at 12/03/17 0912   • glucagon (human recombinant) (GLUCAGEN DIAGNOSTIC) injection 1 mg  1 mg Subcutaneous Q15 Min PRN Liang Polanco MD       • HYDROcodone-acetaminophen (NORCO) 5-325 MG per tablet 1 tablet  1 tablet Oral BID PRN Liang Polanco MD   1 tablet at 11/29/17 0606   • HYDROcodone-acetaminophen (NORCO) 5-325 MG per tablet 1 tablet  1 tablet Oral Q4H PRN Jonathan Storm MD   1 tablet at 12/03/17 0603   • hydroxychloroquine (PLAQUENIL) tablet 200 mg  200 mg Oral Daily Liang Polanco MD   200 mg at 12/03/17 0839   • insulin aspart (novoLOG) injection 0-7 Units  0-7 Units Subcutaneous 4x Daily AC & at Bedtime Liang Polanco MD   3 Units at 12/02/17 1242   • ipratropium-albuterol (DUO-NEB) nebulizer solution 3 mL  3 mL Nebulization Q4H PRN Sia Bartlett DO       • loperamide (IMODIUM) capsule 2 mg  2 mg Oral 4x Daily PRN Nahun Sandoval MD       • LORazepam (ATIVAN) tablet 1 mg  1 mg Oral Q4H PRN Benjamin Azul MD   1 mg at 12/02/17 0624   • magnesium oxide (MAGOX) tablet 400 mg  400 mg Oral Daily Nahun Sandoval MD   400 mg at 12/03/17 0839   • metoprolol tartrate (LOPRESSOR) tablet 25 mg  25 mg Oral Q12H Liang Polanco MD   25 mg at 12/03/17 0839   • morphine injection 2 mg  2 mg Intravenous Q1H PRN Ross Real MD   2 mg at 12/02/17 0539   • morphine injection 4 mg  4 mg Intravenous Q1H PRN Ross Real MD   4 mg at 12/02/17 1422   • nicotine (NICODERM CQ) 7 MG/24HR patch 1 patch  1 patch Transdermal Q24H Nahun Sandoval MD   1 patch at 12/02/17 1622   • ondansetron  (ZOFRAN) tablet 4 mg  4 mg Oral Q6H PRN Liang Polanco MD        Or   • ondansetron ODT (ZOFRAN-ODT) disintegrating tablet 4 mg  4 mg Oral Q6H PRN Liang Polanco MD        Or   • ondansetron (ZOFRAN) injection 4 mg  4 mg Intravenous Q6H PRN Liang Polanco MD       • pantoprazole (PROTONIX) EC tablet 40 mg  40 mg Oral Q AM Liang Polanco MD   40 mg at 12/03/17 0603   • saccharomyces boulardii (FLORASTOR) capsule 250 mg  250 mg Oral BID Nahun Sandoval MD   250 mg at 12/03/17 0839   • sodium chloride 0.9 % flush 1-10 mL  1-10 mL Intravenous PRN Liang Polanco MD         Facility-Administered Medications Ordered in Other Encounters   Medication Dose Route Frequency Provider Last Rate Last Dose   • bupivacaine (PF) (MARCAINE) 0.5 % injection    PRN Xavi Graves CRNA   12.5 mL at 12/01/17 1054   • dexamethasone sodium phosphate injection    PRN Xavi Graves CRNA   5 mg at 12/01/17 1054   • FentaNYL Citrate (PF) (SUBLIMAZE) injection   Intravenous PRN Xavi Graves CRNA   50 mcg at 12/01/17 1033   • Ketamine HCl-Sodium Chloride 50-0.9 MG/5ML-% solution prefilled syringe    PRN Xavi Graves CRNA   20 mg at 12/01/17 1030   • lidocaine (XYLOCAINE) 2% injection    PRN Xavi Graves CRNA   12.5 mL at 12/01/17 1054   • midazolam (VERSED) injection   Intravenous PRN Xavi Graves CRNA   1 mg at 12/01/17 1101   • Phenylephrine HCl-NaCl (PF) syringe    PRN Xavi Graves CRNA   100 mcg at 12/01/17 1219   • piperacillin-tazobactam (ZOSYN) 3.375 g in iso-osmotic dextrose 50 ml (premix)    PRN Xavi Graves CRNA   3.375 g at 12/01/17 1026   • propofol (DIPRIVAN) infusion 10 mg/mL 100 mL   Intravenous Continuous PRN Xavi Graves CRNA   Stopped at 12/01/17 1312       Principal Problem:    Acute renal failure  Active Problems:    Chronic hepatitis C virus infection    Coronary artery disease involving native coronary artery of native heart without angina pectoris    Chronic atrial fibrillation    Essential  hypertension    COPD (chronic obstructive pulmonary disease)    Peripheral vascular disease    Dehydration    Status post transmetatarsal amputation of foot, left    Assessment/Plan   1.  Acute metabolic encephalopathy, present on admission  2.  Hypotension secondary to severe dehydration, present on admission, resolved  3.  Acute renal failure secondary to #2, present on admission, now resolved  4.  Peripheral arterial disease s/p left SFA stent and intervention to the peroneal arteries.  4.  Paroxysmal atrial fibrillation/Flutter, not on anticoagulation due to GI bleed  5.  Coronary artery disease on medical therapy.  6.  Essential hypertension.  7.  COPD, stable.  8.  Chronic hepatitis C infection  9.  Rheumatoid arthritis.  10.  Acute worsening of chronic anemia, s/p 5 unit of PRBCs, stool occult negative   11. S/p TMA of left foot with infected wound, likely due to arterial occlusion, wound culture growing Escherichia coli and Stenotrophomonas maltophilia now s/p BKA on 12/01  12. Acute diarrhea, possibly due to refeeding, C. difficile negative, improved  14. Hypomagenesemia, due to diarrhea, being repleted  15. 7mm soft tissue nodule noted on CT chest, will need follow up CTs  16. Acute arterial occlusion at the SFA s/p intervention by Dr. Storm on 11/28  17. Cirrhosis of liver, likely due to chronic hepatitis C, will need referral to hepatology after discharge  18. History of recurrent bilateral DVT, previously on anticoagulation, now off due to history of GI bleed  19. 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  His confusion is improving however he continues to wax and wane.  All workup for confusion as far negative.  Pending RPR.  Dr. Johns from neurology following.    We'll transfuse another 2 units this morning as his hemoglobin has dropped back down to 7.3 which is likely due to blood loss anemia from surgery.  No overt bleeding has been noted by  nursing.  His stool occult was negative.  Gabapentin restarted at a lower dose.  Patient's overall prognosis is extremely poor given her multiple medical comorbidities and functional decline.    C/w Lovenox for DVT ppx.   PT/OT    Spoke with Paty from social work to initiate preceptor which will be done tomorrow morning.  I  discussed the details with the nursing staff.    Rest as ordered.    Nahun Sandoval MD  12/03/17  11:50 AM    Please note that portions of this note may have been completed with a voice recognition program. Efforts were made to edit the dictations, but occasionally words are mistranscribed.     Electronically signed by Nahun Sandoval MD at 12/3/2017 12:04 PM      Nahun Sandoval MD at 12/3/2017 11:50 AM  Version 1 of 2         Hospitalist Progress Note.    LOS: 8 days    Patient Care Team:  Marilyn K Vermeesch, MD as PCP - General (Internal Medicine)    Chief Complaint:    Confusion, anemia f/u, Left lower extremity arterial occlusion s/p intevention, s/p left BKA    Subjective   Patient seen and examined this morning he is alert and oriented to person, place and time.  He denies any new complaints other than some pain in his leg.  Denies any shortness of breath, chest pain, headache, dizziness, or palpitations.  His hemoglobin is notable at 7.3 this morning.    Prior patient's RN, patient was also noted to be slightly hypoxic on room air and is currently on 2 L of oxygen and saturating at 100%.    Patient was admitted on 11/25, for altered mental status and dehydration.  The etiology of his confusion was unclear at the time of admission.  His foot wound on admission did not appear to be infected, however, over the course of the hospitalization was noted to be more necrotic and there was concern for acute arterial occlusion. Arterial dopplers was done showing poor circulation, he was subsequently taken for angiography and had intervention by Dr. Storm to the SFA with good flow. However,  "Dr. Campbell felt that patient's wound was unlikely going to have good heeling due to patient's poor compliance with previous wound VACs and recommended a BKA which he felt would be better for his quality of life as well. BKA was done on 12/01. He was incidentally found to have cirrhosis of his liver which is likely due to chronic hepatitis C and previous alcohol abuse. His confusion has been waxing and waning. CT and MRI of brain did not reveal any significant findings. Hepatic encephalopathy has been ruled out. Dr. Johns was consulted and felt that's patient's confusion is related to anemia and hypotension.     Review of Systems:    The pertinent  ROS was done and it is noted above, rest  was negative.    Objective     Vital Signs  /79  Pulse 78  Temp 97.9 °F (36.6 °C) (Oral)   Resp 18  Ht 68\" (172.7 cm)  Wt 202 lb 12.8 oz (92 kg)  SpO2 100%  BMI 30.84 kg/m2      I/O this shift:  In: 300 [Blood:300]  Out: 200 [Urine:200]    Intake/Output Summary (Last 24 hours) at 12/03/17 1150  Last data filed at 12/03/17 1020   Gross per 24 hour   Intake             2693 ml   Output              950 ml   Net             1743 ml       Physical Exam:    General Appearance: alert, oriented x 2, no acute distress, Appears older than his stated age  HEENT: pupils round and reactive to light, oral mucosa dry, extra occular movements intact.  Neck: supple, no JVD, trachea midline  Lungs: Clear to Auscultation, unlabored breathing effort  Heart: RRR, normal S1 and S2, no S3, no rub  Abdomen: soft, non-tender, no palpable bladder, present bowel sounds to auscultation  Extremities: left lower extremity  BKA stump wrapped in dressing, no drainage noted  Skin: bruising noted on his left flank, small bump which possibly is a lipoma  Neuro: normal speech and mental status, grossly non focal.     Results Review:      Results from last 7 days  Lab Units 12/03/17  0557 12/01/17  0518 11/30/17  1153 11/29/17  0513   SODIUM mmol/L 143 143 " 141 141   POTASSIUM mmol/L 3.8 3.8 3.0* 3.4*   CHLORIDE mmol/L 112* 113* 109* 112*   CO2 mmol/L 20.0* 18.0* 19.0* 17.0*   BUN mg/dL 13 7 5* 7   CREATININE mg/dL 0.90 0.80 0.70 0.70   CALCIUM mg/dL 8.2* 8.6 8.6 8.8   BILIRUBIN mg/dL  --   --   --  1.0   ALK PHOS U/L  --   --   --  104   ALT (SGPT) U/L  --   --   --  25   AST (SGOT) U/L  --   --   --  28   GLUCOSE mg/dL 106* 102* 111* 90       Estimated Creatinine Clearance: 97.3 mL/min (by C-G formula based on Cr of 0.9).      Results from last 7 days  Lab Units 12/02/17  1724 12/01/17  0518 11/30/17  1153   MAGNESIUM mg/dL 1.5* 1.5* 1.4*               Results from last 7 days  Lab Units 12/03/17  0557 12/01/17  0518 11/30/17  1153 11/29/17  2241 11/29/17  0513 11/28/17  1456   WBC 10*3/mm3 3.58* 4.79* 5.16  --  5.13 3.64*   HEMOGLOBIN g/dL 7.3* 9.5* 10.2* 7.5* 8.0* 8.0*   PLATELETS 10*3/mm3 115* 131 118*  --  133 133               Imaging Results (last 24 hours)     ** No results found for the last 24 hours. **          aspirin 81 mg Oral Daily   atorvastatin 40 mg Oral Nightly   clopidogrel 75 mg Oral Daily   colchicine 0.6 mg Oral Daily   ferrous sulfate 324 mg Oral Daily With Breakfast   gabapentin 300 mg Oral Q12H   hydroxychloroquine 200 mg Oral Daily   insulin aspart 0-7 Units Subcutaneous 4x Daily AC & at Bedtime   magnesium oxide 400 mg Oral Daily   metoprolol tartrate 25 mg Oral Q12H   nicotine 1 patch Transdermal Q24H   pantoprazole 40 mg Oral Q AM   saccharomyces boulardii 250 mg Oral BID          Medication Review:   Current Facility-Administered Medications   Medication Dose Route Frequency Provider Last Rate Last Dose   • acetaminophen (TYLENOL) tablet 650 mg  650 mg Oral Q4H PRN Liang Polanco MD   650 mg at 11/28/17 2124   • aspirin EC tablet 81 mg  81 mg Oral Daily Jonathan Storm MD   81 mg at 12/03/17 0839   • atorvastatin (LIPITOR) tablet 40 mg  40 mg Oral Nightly Liang Polanco MD   40 mg at 12/02/17 2030   • clopidogrel (PLAVIX) tablet 75  mg  75 mg Oral Daily Ross Real MD   75 mg at 12/03/17 0839   • colchicine tablet 0.6 mg  0.6 mg Oral Daily Liang Polanco MD   0.6 mg at 12/03/17 0839   • dextrose (D50W) solution 25 g  25 g Intravenous Q15 Min PRN Liang Polanco MD       • dextrose (GLUTOSE) oral gel 1 tube  1 tube Oral Q15 Min PRN Liang Polanco MD       • ferrous sulfate EC tablet 324 mg  324 mg Oral Daily With Breakfast Nahun Sandoval MD   324 mg at 12/03/17 0839   • gabapentin (NEURONTIN) capsule 300 mg  300 mg Oral Q12H Nahun Sandoval MD   300 mg at 12/03/17 0912   • glucagon (human recombinant) (GLUCAGEN DIAGNOSTIC) injection 1 mg  1 mg Subcutaneous Q15 Min PRN Liang Polanco MD       • HYDROcodone-acetaminophen (NORCO) 5-325 MG per tablet 1 tablet  1 tablet Oral BID PRN Liang Polanco MD   1 tablet at 11/29/17 0606   • HYDROcodone-acetaminophen (NORCO) 5-325 MG per tablet 1 tablet  1 tablet Oral Q4H PRN Jonathan Storm MD   1 tablet at 12/03/17 0603   • hydroxychloroquine (PLAQUENIL) tablet 200 mg  200 mg Oral Daily Liang Polanco MD   200 mg at 12/03/17 0839   • insulin aspart (novoLOG) injection 0-7 Units  0-7 Units Subcutaneous 4x Daily AC & at Bedtime Liang Polanco MD   3 Units at 12/02/17 1242   • ipratropium-albuterol (DUO-NEB) nebulizer solution 3 mL  3 mL Nebulization Q4H PRN Sia Bartlett DO       • loperamide (IMODIUM) capsule 2 mg  2 mg Oral 4x Daily PRN Nahun Sandoval MD       • LORazepam (ATIVAN) tablet 1 mg  1 mg Oral Q4H PRN Benjamin Azul MD   1 mg at 12/02/17 0624   • magnesium oxide (MAGOX) tablet 400 mg  400 mg Oral Daily Nahun Sandoval MD   400 mg at 12/03/17 0839   • metoprolol tartrate (LOPRESSOR) tablet 25 mg  25 mg Oral Q12H Liang Polanco MD   25 mg at 12/03/17 0839   • morphine injection 2 mg  2 mg Intravenous Q1H PRN Ross Real MD   2 mg at 12/02/17 0539   • morphine injection 4 mg  4 mg Intravenous Q1H PRN Ross Real MD   4 mg at 12/02/17 1422   • nicotine (NICODERM CQ) 7 MG/24HR patch 1  patch  1 patch Transdermal Q24H Nahun Sandoval MD   1 patch at 12/02/17 1622   • ondansetron (ZOFRAN) tablet 4 mg  4 mg Oral Q6H PRN Liang Polanco MD        Or   • ondansetron ODT (ZOFRAN-ODT) disintegrating tablet 4 mg  4 mg Oral Q6H PRN Liang Polanco MD        Or   • ondansetron (ZOFRAN) injection 4 mg  4 mg Intravenous Q6H PRN Liang Polanco MD       • pantoprazole (PROTONIX) EC tablet 40 mg  40 mg Oral Q AM Liang Polanco MD   40 mg at 12/03/17 0603   • saccharomyces boulardii (FLORASTOR) capsule 250 mg  250 mg Oral BID Nahun Sandoval MD   250 mg at 12/03/17 0839   • sodium chloride 0.9 % flush 1-10 mL  1-10 mL Intravenous PRN Liang Polanco MD         Facility-Administered Medications Ordered in Other Encounters   Medication Dose Route Frequency Provider Last Rate Last Dose   • bupivacaine (PF) (MARCAINE) 0.5 % injection    PRN Xavi Graves, CRNA   12.5 mL at 12/01/17 1054   • dexamethasone sodium phosphate injection    PRN Xavi Graves CRNA   5 mg at 12/01/17 1054   • FentaNYL Citrate (PF) (SUBLIMAZE) injection   Intravenous PRN Xavi Graves CRNA   50 mcg at 12/01/17 1033   • Ketamine HCl-Sodium Chloride 50-0.9 MG/5ML-% solution prefilled syringe    PRN Xavi Graves CRNA   20 mg at 12/01/17 1030   • lidocaine (XYLOCAINE) 2% injection    PRN Xavi Graves CRNA   12.5 mL at 12/01/17 1054   • midazolam (VERSED) injection   Intravenous PRN Xavi Graves CRNA   1 mg at 12/01/17 1101   • Phenylephrine HCl-NaCl (PF) syringe    PRN Xavi Graves CRNA   100 mcg at 12/01/17 1219   • piperacillin-tazobactam (ZOSYN) 3.375 g in iso-osmotic dextrose 50 ml (premix)    PRN Xavi Graves CRNA   3.375 g at 12/01/17 1026   • propofol (DIPRIVAN) infusion 10 mg/mL 100 mL   Intravenous Continuous PRN Xavi Graves, CRNA   Stopped at 12/01/17 1312       Principal Problem:    Acute renal failure  Active Problems:    Chronic hepatitis C virus infection    Coronary artery disease involving native  coronary artery of native heart without angina pectoris    Chronic atrial fibrillation    Essential hypertension    COPD (chronic obstructive pulmonary disease)    Peripheral vascular disease    Dehydration    Status post transmetatarsal amputation of foot, left    Assessment/Plan   1.  Acute metabolic encephalopathy, present on admission  2.  Hypotension secondary to severe dehydration, present on admission, resolved  3.  Acute renal failure secondary to #2, present on admission, now resolved  4.  Peripheral arterial disease s/p left SFA stent and intervention to the peroneal arteries.  4.  Paroxysmal atrial fibrillation/Flutter, not on anticoagulation due to GI bleed  5.  Coronary artery disease on medical therapy.  6.  Essential hypertension.  7.  COPD, stable.  8.  Chronic hepatitis C infection  9.  Rheumatoid arthritis.  10.  Acute worsening of chronic anemia, s/p 5 unit of PRBCs, stool occult negative   11. S/p TMA of left foot with infected wound, likely due to arterial occlusion, wound culture growing Escherichia coli and Stenotrophomonas maltophilia now s/p BKA on 12/01  12. Acute diarrhea, possibly due to refeeding, C. difficile negative, improved  14. Hypomagenesemia, due to diarrhea, being repleted  15. 7mm soft tissue nodule noted on CT chest, will need follow up CTs  16. Acute arterial occlusion at the SFA s/p intervention by Dr. Storm on 11/28  17. Cirrhosis of liver, likely due to chronic hepatitis C, will need referral to hepatology after discharge  18. History of recurrent bilateral DVT, previously on anticoagulation, now off due to history of GI bleed  19. 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  His confusion is improving however he continues to wax and wane.  All workup for confusion as far negative.  Pending RPR.  Dr. Johns from neurology following.    We'll transfuse another 2 units this morning as his hemoglobin has dropped back down to  7.3 which is likely due to blood loss anemia from surgery.  No overt bleeding has been noted by nursing.  His stool occult was negative.    Patient's overall prognosis is extremely poor given her multiple medical comorbidities and functional decline.    C/w Lovenox for DVT ppx.   PT/OT    Spoke with Paty from social work to initiate preceptor which will be done tomorrow morning.  I  discussed the details with the nursing staff.    Rest as ordered.    Nahun Sandoval MD  12/03/17  11:50 AM    Please note that portions of this note may have been completed with a voice recognition program. Efforts were made to edit the dictations, but occasionally words are mistranscribed.     Electronically signed by Nahun Sandoval MD at 12/3/2017 12:03 PM        Consult Notes (last 24 hours) (Notes from 12/2/2017  1:02 PM through 12/3/2017  1:02 PM)     No notes of this type exist for this encounter.           Physical Therapy Notes (last 24 hours) (Notes from 12/2/2017  1:02 PM through 12/3/2017  1:02 PM)      Kemar Geiger PT at 12/2/2017  2:53 PM  Version 1 of 1         Problem: Patient Care Overview (Adult)  Goal: Plan of Care Review  Outcome: Ongoing (interventions implemented as appropriate)    12/02/17 1350   Coping/Psychosocial Response Interventions   Plan Of Care Reviewed With patient;family   Patient Care Overview   Progress (pt seen for re-evaluation today s/p left BKA)   Outcome Evaluation   Outcome Summary/Follow up Plan Pt was seen for re-evaluation s/p left BKA. Pt able to stand with min assist using SW. Pt not able/willing to hop and transfer to chair today. Sit to stand done x 3. Pt returned to bed with bed in chair position.               Electronically signed by Kemar Geiger PT at 12/2/2017  2:53 PM      Kemar Geiger PT at 12/2/2017  2:57 PM  Version 1 of 1         Problem: Inpatient Physical Therapy  Goal: Bed Mobility Goal LTG- PT  Outcome: Ongoing (interventions implemented as appropriate)    11/27/17 2204  17 1350   Bed Mobility PT LTG   Bed Mobility PT LTG, Date Established --  17   Bed Mobility PT LTG, Time to Achieve 2 wks --    Bed Mobility PT LTG, Activity Type all bed mobility --    Bed Mobility PT LTG, [place] Level independent --    Bed Mobility PT LTG, Date Goal Reviewed --  17   Bed Mobility PT LTG, Outcome --  goal ongoing       Goal: Transfer Training Goal 1 LTG- PT  Outcome: Revised    17 1350   Transfer Training PT LTG   Transfer Training PT LTG, Time to Achieve 2 wks   Transfer Training PT LTG, Activity Type all transfers   Transfer Training PT LTG, [place] Level contact guard assist   Transfer Training PT LTG, Assist Device walker, standard   Transfer Training PT LTG, Date Goal Reviewed 17       Goal: Gait Training Goal LTG- PT  Outcome: Revised    17 1350   Gait Training PT LTG   Gait Training Goal PT LTG, Date Established 17   Gait Training Goal PT LTG, Time to Achieve 2 wks   Gait Training Goal PT LTG, [place] Level minimum assist (75% patient effort)   Gait Training Goal PT LTG, Assist Device walker, standard   Gait Training Goal PT LTG, Distance to Achieve 10 feet   Gait Training Goal PT LTG, Date Goal Reviewed 17              Electronically signed by Kemar Geiger PT at 2017  2:57 PM      Kemar Geiger PT at 2017  2:59 PM  Version 1 of 1         Acute Care - Physical Therapy Initial Evaluation  [facility]     Patient Name: Vasquez Marie  : 1960  MRN: [id number]  Today's Date: 2017   Onset of Illness/Injury or Date of Surgery Date: 17  Date of Referral to PT: 17  Referring Physician: Dr. Sandoval      Admit Date: 2017     Visit Dx:    ICD-10-CM ICD-9-CM   1. Dehydration E86.0 276.51   2. Impaired functional mobility, balance, gait, and endurance Z74.09 V49.89   3. Peripheral vascular disease I73.9 443.9   4. Subacute osteomyelitis of left foot M86.272 730.07     Patient Active Problem List    Diagnosis   • Chronic hepatitis C virus infection   • Coronary artery disease involving native coronary artery of native heart without angina pectoris   • Chronic atrial fibrillation   • Essential hypertension   • Thrombocytopenia   • Depression with anxiety   • Gastroesophageal reflux disease with esophagitis   • Hyperlipidemia   • Tobacco abuse   • COPD (chronic obstructive pulmonary disease)   • Gout   • Congestive heart disease   • RLS (restless legs syndrome)   • Rheumatoid arthritis   • Typical atrial flutter   • Atrial flutter   • Cerebrovascular accident (CVA)   • Borderline diabetes   • Anemia   • Peripheral vascular disease   • Subacute osteomyelitis of left foot   • Liver mass   • Primary insomnia   • Dehydration   • Acute renal failure   • Status post transmetatarsal amputation of foot, left     Past Medical History:   Diagnosis Date   • Abdominal pain     History of epigastric abdominal tenderness. Differentials include peptic ulcer disease,   pancreatobiliary disease.   • Abnormal LFTs    • Anemia    • Anxiety    • Arthritis    • Asthma    • Atrial fibrillation    • CAD (coronary artery disease)    • Calf cramp    • CKD (chronic kidney disease)    • COPD (chronic obstructive pulmonary disease)    • CVA (cerebral infarction)    • Depression    • Diabetes mellitus    • DVT (deep venous thrombosis)    • Electrocution    • Fatigue    • Hepatitis C    • History of allergy    • History of blood transfusion    • Hyperlipidemia    • Hypertension    • Leg pain    • Loss of appetite    • PUD (peptic ulcer disease)    • Stroke syndrome    • Tachycardia    • Thrombocytopenia    • Thrombophlebitis of deep femoral vein    • Vision problems      Past Surgical History:   Procedure Laterality Date   • AMPUTATION DIGIT Left 6/23/2017    Procedure: amputation of left foot third digit, left foot wound debridments and grafting;  Surgeon: Wolfgang Campbell DPM;  Location: [facility];  Service:    • ANKLE SURGERY     •  CARDIAC CATHETERIZATION N/A 6/20/2017    Procedure: Peripheral angiography;  Surgeon: Jonathan Storm MD;  Location: [facility] CATH INVASIVE LOCATION;  Service:    • CARDIAC CATHETERIZATION N/A 6/20/2017    Procedure: Angioplasty-peripheral;  Surgeon: Jonathan Storm MD;  Location: [facility] CATH INVASIVE LOCATION;  Service:    • CARDIAC SURGERY     • CORONARY STENT PLACEMENT     • INCISION AND DRAINAGE FOOT Left 8/29/2017    Procedure: Incision and drainage/wound debridement of left foot, wound VAC application, skin graft application;  Surgeon: Wolfgang Campbell DPM;  Location: [facility] OR;  Service:    • TONSILLECTOMY     • TOTAL HIP ARTHROPLASTY Left    • TOTAL SHOULDER REPLACEMENT Left           PT ASSESSMENT (last 72 hours)      PT Evaluation       12/02/17 1350       Rehab Evaluation    Document Type re-evaluation  -JG     Subjective Information agree to therapy  -JG     Patient Effort, Rehab Treatment adequate  -JG     Symptoms Noted During/After Treatment fatigue  -JG     General Information    Patient Profile Review yes  -JG     Onset of Illness/Injury or Date of Surgery Date 11/25/17  -JG     Pertinent History Of Current Problem s/p left BKA  -JG     Precautions/Limitations fall precautions  -JG     Plans/Goals Discussed With patient;family;agreed upon  -JG     Risks Reviewed patient and family:;increased discomfort  -JG     Benefits Reviewed patient and family:;increase strength;increase independence  -JG     Clinical Impression    PT Diagnosis generalized weakness  -JG     Patient/Family Goals Statement To increase strength and function  -JG     Criteria for Skilled Therapeutic Interventions Met yes;treatment indicated  -JG     Rehab Potential fair, will monitor progress closely  -JG     Pain Assessment    Pain Assessment 0-10  -JG     Pain Score 9  -JG     Pain Location Leg  -JG     Pain Orientation Left  -JG     Pain Intervention(s) Elevated  -JG     ROM (Range of Motion)    General ROM upper  extremity range of motion deficits identified   shoulders  -JG     MMT (Manual Muscle Testing)    General MMT Assessment upper extremity strength deficits identified;lower extremity strength deficits identified  -     Bed Mobility, Assessment/Treatment    Bed Mob, Supine to Sit, [place] contact guard assist  -G     Transfer Assessment/Treatment    Transfers, Sit-Stand [place] minimum assist (75% patient effort)  -J     Transfers, Stand-Sit [place] minimum assist (75% patient effort)  -     Positioning and Restraints    Pre-Treatment Position in bed  -JG     Post Treatment Position bed   chair position  -       User Key  (r) = Recorded By, (t) = Taken By, (c) = Cosigned By    Initials Name Provider Type    KIMBERLY Geiger PT Physical Therapist          Physical Therapy Education     Title: PT OT SLP Therapies (Done)     Topic: Physical Therapy (Done)     Point: Mobility training (Done)    Learning Progress Summary    Learner Readiness Method Response Comment Documented by Status   Patient Acceptance E VU,NR pt instructed on safety with transfers  12/02/17 1450 Done    Acceptance E VU,NR LACE education  11/28/17 1818 Done    Acceptance E VU PT benefits and POC.  11/27/17 1553 Done   Family Acceptance E VU,NR pt instructed on safety with transfers  12/02/17 1450 Done               Point: Home exercise program (Done)    Learning Progress Summary    Learner Readiness Method Response Comment Documented by Status   Patient Acceptance E VU,NR LACE education  11/28/17 1818 Done    Acceptance E VU PT benefits and POC.  11/27/17 1553 Done               Point: Precautions (Done)    Learning Progress Summary    Learner Readiness Method Response Comment Documented by Status   Patient Acceptance E VU,NR LACE education  11/28/17 1818 Done    Acceptance E VU PT benefits and POC.  11/27/17 1553 Done                      User Key     Initials Effective Dates Name Provider Type Discipline      04/06/17 -  Kemar Geiger, PT Physical Therapist PT     07/10/17 -  Deepti Wadsworth, RN Registered Nurse Nurse     11/01/17 -  Caro Christine, PT Student PT Student PT                PT Recommendation and Plan  Planned Therapy Interventions: balance training, bed mobility training, home exercise program, gait training, patient/family education, strengthening, transfer training  PT Frequency: daily  Plan of Care Review  Plan Of Care Reviewed With: patient, family  Progress:  (pt seen for re-evaluation today s/p left BKA)  Outcome Summary/Follow up Plan: Pt was seen for re-evaluation s/p left BKA. Pt able to stand with min assist using SW. Pt not able/willing to hop and transfer to chair today. Sit to stand done x 3. Pt returned to bed with bed in chair position.           IP PT Goals       12/02/17 1350 11/27/17 1554       Bed Mobility PT LTG    Bed Mobility PT LTG, Date Established 12/02/17  -JG 11/27/17  -JR (r) AH (t) JR (c)     Bed Mobility PT LTG, Time to Achieve  2 wks  -JR (r) AH (t) JR (c)     Bed Mobility PT LTG, Activity Type  all bed mobility  -JR (r) AH (t) JR (c)     Bed Mobility PT LTG, [place] Level  independent  -JR (r) AH (t) JR (c)     Bed Mobility PT LTG, Date Goal Reviewed 12/02/17  -JG      Bed Mobility PT LTG, Outcome goal ongoing  -JG      Transfer Training PT LTG    Transfer Training PT LTG, Date Established  11/27/17  -JR (r) AH (t) JR (c)     Transfer Training PT LTG, Time to Achieve 2 wks  -JG 2 wks  -JR (r) AH (t) JR (c)     Transfer Training PT LTG, Activity Type all transfers  -JG bed to chair /chair to bed;sit to stand/stand to sit  -JR (r) AH (t) JR (c)     Transfer Training PT LTG, [place] Level contact guard assist  -JG conditional independence  -JR (r) AH (t) JR (c)     Transfer Training PT LTG, Assist Device walker, standard  -JG walker, rolling  -JR (r) AH (t) JR (c)     Transfer Training PT LTG, Additional Goal  following WB precautions (NWB LLE)  - (r) VAHID ramirez) JR ayers)      Transfer Training PT  LTG, Date Goal Reviewed 12/02/17  -JG      Gait Training PT LTG    Gait Training Goal PT LTG, Date Established 12/02/17  -JG 11/27/17  - (r) VAHID (t)  (c)     Gait Training Goal PT LTG, Time to Achieve 2 wks  -JG 2 wks  - (r) VAHID (t)  (c)     Gait Training Goal PT LTG, [place] Level minimum assist (75% patient effort)  -JG contact guard assist  - (r) VAHID (t)  (c)     Gait Training Goal PT LTG, Assist Device walker, standard  -JG walker, rolling  - (r) VAHID (t)  (c)     Gait Training Goal PT LTG, Distance to Achieve 10 feet  -JG 10  - (r) VAHID (t)  (c)     Gait Training Goal PT LTG, Additional Goal  with WB restrictions (NWB LLE)  - (r) VAHID (t)  (c)     Gait Training Goal PT LTG, Date Goal Reviewed 12/02/17  -J        User Key  (r) = Recorded By, (t) = Taken By, (c) = Cosigned By    Initials Name Provider Type    KIMBERLY Geiger, PT Physical Therapist    JR Sia Whaley, PT Physical Therapist    VAHID Christine, PT Student PT Student                Outcome Measures       12/02/17 1350          How much help from another person do you currently need...    Turning from your back to your side while in flat bed without using bedrails? 4  -JG      Moving from lying on back to sitting on the side of a flat bed without bedrails? 3  -JG      Moving to and from a bed to a chair (including a wheelchair)? 2  -JG      Standing up from a chair using your arms (e.g., wheelchair, bedside chair)? 3  -JG      Climbing 3-5 steps with a railing? 1  -JG      To walk in hospital room? 1  -JG      AM-PAC 6 Clicks Score 14  -JG      Functional Assessment    Outcome Measure Options AM-PAC 6 Clicks Basic Mobility (PT)  -JG        User Key  (r) = Recorded By, (t) = Taken By, (c) = Cosigned By    Initials Name Provider Type    KIMBERLY Geiger PT Physical Therapist           Time Calculation:         PT Charges       12/02/17 1457          Time Calculation    Start Time 1350  -JG      PT Received On  12/02/17  -KIMBERLY      PT Goal Re-Cert Due Date 12/16/17  -KIMBERLY        User Key  (r) = Recorded By, (t) = Taken By, (c) = Cosigned By    Initials Name Provider Type    KIMBERLY Geiger PT Physical Therapist          Therapy Charges for Today     Code Description Service Date Service Provider Modifiers Qty    [id number] HC PT RE-EVAL ESTABLISHED PLAN 2 12/2/2017 Kemar Geiger PT GP 1          PT G-Codes  Outcome Measure Options: AM-PAC 6 Clicks Basic Mobility (PT)      Kemar Geiger PT  12/2/2017            Electronically signed by Kemar Geiger PT at 12/2/2017  2:59 PM        Occupational Therapy Notes (last 24 hours) (Notes from 12/2/2017  1:02 PM through 12/3/2017  1:02 PM)     No notes of this type exist for this encounter.

## 2017-12-03 NOTE — PROGRESS NOTES
Hospitalist Progress Note.    LOS: 8 days    Patient Care Team:  Marilyn K Vermeesch, MD as PCP - General (Internal Medicine)    Chief Complaint:    Confusion, anemia f/u, Left lower extremity arterial occlusion s/p intevention, s/p left BKA    Subjective   Patient seen and examined this morning he is alert and oriented to person, place and time.  He denies any new complaints other than some pain in his leg.  Denies any shortness of breath, chest pain, headache, dizziness, or palpitations.  His hemoglobin is notable at 7.3 this morning.    Prior patient's RN, patient was also noted to be slightly hypoxic on room air and is currently on 2 L of oxygen and saturating at 100%.    Patient was admitted on 11/25, for altered mental status and dehydration.  The etiology of his confusion was unclear at the time of admission.  His foot wound on admission did not appear to be infected, however, over the course of the hospitalization was noted to be more necrotic and there was concern for acute arterial occlusion. Arterial dopplers was done showing poor circulation, he was subsequently taken for angiography and had intervention by Dr. Storm to the SFA with good flow. However, Dr. Campbell felt that patient's wound was unlikely going to have good heeling due to patient's poor compliance with previous wound VACs and recommended a BKA which he felt would be better for his quality of life as well. BKA was done on 12/01. He was incidentally found to have cirrhosis of his liver which is likely due to chronic hepatitis C and previous alcohol abuse. His confusion has been waxing and waning. CT and MRI of brain did not reveal any significant findings. Hepatic encephalopathy has been ruled out. Dr. Johns was consulted and felt that's patient's confusion is related to anemia and hypotension.     Review of Systems:    The pertinent  ROS was done and it is noted above, rest  was negative.    Objective     Vital Signs  /79  Pulse 78  Temp  "97.9 °F (36.6 °C) (Oral)   Resp 18  Ht 68\" (172.7 cm)  Wt 202 lb 12.8 oz (92 kg)  SpO2 100%  BMI 30.84 kg/m2      I/O this shift:  In: 300 [Blood:300]  Out: 200 [Urine:200]    Intake/Output Summary (Last 24 hours) at 12/03/17 1150  Last data filed at 12/03/17 1020   Gross per 24 hour   Intake             2693 ml   Output              950 ml   Net             1743 ml       Physical Exam:    General Appearance: alert, oriented x 2, no acute distress, Appears older than his stated age  HEENT: pupils round and reactive to light, oral mucosa dry, extra occular movements intact.  Neck: supple, no JVD, trachea midline  Lungs: Clear to Auscultation, unlabored breathing effort  Heart: RRR, normal S1 and S2, no S3, no rub  Abdomen: soft, non-tender, no palpable bladder, present bowel sounds to auscultation  Extremities: left lower extremity  BKA stump wrapped in dressing, no drainage noted  Skin: bruising noted on his left flank, small bump which possibly is a lipoma  Neuro: normal speech and mental status, grossly non focal.     Results Review:      Results from last 7 days  Lab Units 12/03/17  0557 12/01/17  0518 11/30/17  1153 11/29/17  0513   SODIUM mmol/L 143 143 141 141   POTASSIUM mmol/L 3.8 3.8 3.0* 3.4*   CHLORIDE mmol/L 112* 113* 109* 112*   CO2 mmol/L 20.0* 18.0* 19.0* 17.0*   BUN mg/dL 13 7 5* 7   CREATININE mg/dL 0.90 0.80 0.70 0.70   CALCIUM mg/dL 8.2* 8.6 8.6 8.8   BILIRUBIN mg/dL  --   --   --  1.0   ALK PHOS U/L  --   --   --  104   ALT (SGPT) U/L  --   --   --  25   AST (SGOT) U/L  --   --   --  28   GLUCOSE mg/dL 106* 102* 111* 90       Estimated Creatinine Clearance: 97.3 mL/min (by C-G formula based on Cr of 0.9).      Results from last 7 days  Lab Units 12/02/17  1724 12/01/17  0518 11/30/17  1153   MAGNESIUM mg/dL 1.5* 1.5* 1.4*               Results from last 7 days  Lab Units 12/03/17  0557 12/01/17  0518 11/30/17  1153 11/29/17  2241 11/29/17  0513 11/28/17  1456   WBC 10*3/mm3 3.58* 4.79* 5.16  " --  5.13 3.64*   HEMOGLOBIN g/dL 7.3* 9.5* 10.2* 7.5* 8.0* 8.0*   PLATELETS 10*3/mm3 115* 131 118*  --  133 133               Imaging Results (last 24 hours)     ** No results found for the last 24 hours. **          aspirin 81 mg Oral Daily   atorvastatin 40 mg Oral Nightly   clopidogrel 75 mg Oral Daily   colchicine 0.6 mg Oral Daily   ferrous sulfate 324 mg Oral Daily With Breakfast   gabapentin 300 mg Oral Q12H   hydroxychloroquine 200 mg Oral Daily   insulin aspart 0-7 Units Subcutaneous 4x Daily AC & at Bedtime   magnesium oxide 400 mg Oral Daily   metoprolol tartrate 25 mg Oral Q12H   nicotine 1 patch Transdermal Q24H   pantoprazole 40 mg Oral Q AM   saccharomyces boulardii 250 mg Oral BID          Medication Review:   Current Facility-Administered Medications   Medication Dose Route Frequency Provider Last Rate Last Dose   • acetaminophen (TYLENOL) tablet 650 mg  650 mg Oral Q4H PRN Liang Polanco MD   650 mg at 11/28/17 2124   • aspirin EC tablet 81 mg  81 mg Oral Daily Jonathan Storm MD   81 mg at 12/03/17 0839   • atorvastatin (LIPITOR) tablet 40 mg  40 mg Oral Nightly Liang Polanco MD   40 mg at 12/02/17 2030   • clopidogrel (PLAVIX) tablet 75 mg  75 mg Oral Daily Ross Real MD   75 mg at 12/03/17 0839   • colchicine tablet 0.6 mg  0.6 mg Oral Daily Liang Polanco MD   0.6 mg at 12/03/17 0839   • dextrose (D50W) solution 25 g  25 g Intravenous Q15 Min PRN Liang Polanco MD       • dextrose (GLUTOSE) oral gel 1 tube  1 tube Oral Q15 Min PRN Liang Polanco MD       • ferrous sulfate EC tablet 324 mg  324 mg Oral Daily With Breakfast Nahun Sandoval MD   324 mg at 12/03/17 0839   • gabapentin (NEURONTIN) capsule 300 mg  300 mg Oral Q12H Nahun Sandoval MD   300 mg at 12/03/17 0912   • glucagon (human recombinant) (GLUCAGEN DIAGNOSTIC) injection 1 mg  1 mg Subcutaneous Q15 Min PRN Liang Polanco MD       • HYDROcodone-acetaminophen (NORCO) 5-325 MG per tablet 1 tablet  1 tablet Oral BID PRBETTY Tavera  MD Collin   1 tablet at 11/29/17 0606   • HYDROcodone-acetaminophen (NORCO) 5-325 MG per tablet 1 tablet  1 tablet Oral Q4H PRN Jonathan Storm MD   1 tablet at 12/03/17 0603   • hydroxychloroquine (PLAQUENIL) tablet 200 mg  200 mg Oral Daily Liang Polanco MD   200 mg at 12/03/17 0839   • insulin aspart (novoLOG) injection 0-7 Units  0-7 Units Subcutaneous 4x Daily AC & at Bedtime Liang Polanco MD   3 Units at 12/02/17 1242   • ipratropium-albuterol (DUO-NEB) nebulizer solution 3 mL  3 mL Nebulization Q4H PRN Sia Bartlett DO       • loperamide (IMODIUM) capsule 2 mg  2 mg Oral 4x Daily PRN Nahun Sandoval MD       • LORazepam (ATIVAN) tablet 1 mg  1 mg Oral Q4H PRN Benjamin Azul MD   1 mg at 12/02/17 0624   • magnesium oxide (MAGOX) tablet 400 mg  400 mg Oral Daily Nahun Sandoval MD   400 mg at 12/03/17 0839   • metoprolol tartrate (LOPRESSOR) tablet 25 mg  25 mg Oral Q12H Liang Polanco MD   25 mg at 12/03/17 0839   • morphine injection 2 mg  2 mg Intravenous Q1H PRN Ross Real MD   2 mg at 12/02/17 0539   • morphine injection 4 mg  4 mg Intravenous Q1H PRN Ross Real MD   4 mg at 12/02/17 1422   • nicotine (NICODERM CQ) 7 MG/24HR patch 1 patch  1 patch Transdermal Q24H Nahun Sandoval MD   1 patch at 12/02/17 1622   • ondansetron (ZOFRAN) tablet 4 mg  4 mg Oral Q6H PRN Liang Polanco MD        Or   • ondansetron ODT (ZOFRAN-ODT) disintegrating tablet 4 mg  4 mg Oral Q6H PRN Liang Polanco MD        Or   • ondansetron (ZOFRAN) injection 4 mg  4 mg Intravenous Q6H PRN Liang Polanco MD       • pantoprazole (PROTONIX) EC tablet 40 mg  40 mg Oral Q AM Liang Polanco MD   40 mg at 12/03/17 0603   • saccharomyces boulardii (FLORASTOR) capsule 250 mg  250 mg Oral BID Nahun Sandoval MD   250 mg at 12/03/17 0839   • sodium chloride 0.9 % flush 1-10 mL  1-10 mL Intravenous PRN Liang Polanco MD         Facility-Administered Medications Ordered in Other Encounters   Medication Dose Route Frequency Provider  Last Rate Last Dose   • bupivacaine (PF) (MARCAINE) 0.5 % injection    PRN Xavi Graves CRNA   12.5 mL at 12/01/17 1054   • dexamethasone sodium phosphate injection    PRN Xavi Graves CRNA   5 mg at 12/01/17 1054   • FentaNYL Citrate (PF) (SUBLIMAZE) injection   Intravenous PRN Xavi Graves CRNA   50 mcg at 12/01/17 1033   • Ketamine HCl-Sodium Chloride 50-0.9 MG/5ML-% solution prefilled syringe    PRN Xavi Graves CRNA   20 mg at 12/01/17 1030   • lidocaine (XYLOCAINE) 2% injection    PRN Xavi Graves CRNA   12.5 mL at 12/01/17 1054   • midazolam (VERSED) injection   Intravenous PRN Xavi Graves CRNA   1 mg at 12/01/17 1101   • Phenylephrine HCl-NaCl (PF) syringe    PRN Xavi Graves CRNA   100 mcg at 12/01/17 1219   • piperacillin-tazobactam (ZOSYN) 3.375 g in iso-osmotic dextrose 50 ml (premix)    PRN Xavi Graves CRNA   3.375 g at 12/01/17 1026   • propofol (DIPRIVAN) infusion 10 mg/mL 100 mL   Intravenous Continuous PRN Xavi Graves CRNA   Stopped at 12/01/17 1312       Principal Problem:    Acute renal failure  Active Problems:    Chronic hepatitis C virus infection    Coronary artery disease involving native coronary artery of native heart without angina pectoris    Chronic atrial fibrillation    Essential hypertension    COPD (chronic obstructive pulmonary disease)    Peripheral vascular disease    Dehydration    Status post transmetatarsal amputation of foot, left    Assessment/Plan   1.  Acute metabolic encephalopathy, present on admission  2.  Hypotension secondary to severe dehydration, present on admission, resolved  3.  Acute renal failure secondary to #2, present on admission, now resolved  4.  Peripheral arterial disease s/p left SFA stent and intervention to the peroneal arteries.  4.  Paroxysmal atrial fibrillation/Flutter, not on anticoagulation due to GI bleed  5.  Coronary artery disease on medical therapy.  6.  Essential hypertension.  7.  COPD,  stable.  8.  Chronic hepatitis C infection  9.  Rheumatoid arthritis.  10.  Acute worsening of chronic anemia, s/p 5 unit of PRBCs, stool occult negative   11. S/p TMA of left foot with infected wound, likely due to arterial occlusion, wound culture growing Escherichia coli and Stenotrophomonas maltophilia now s/p BKA on 12/01  12. Acute diarrhea, possibly due to refeeding, C. difficile negative, improved  14. Hypomagenesemia, due to diarrhea, being repleted  15. 7mm soft tissue nodule noted on CT chest, will need follow up CTs  16. Acute arterial occlusion at the SFA s/p intervention by Dr. Storm on 11/28  17. Cirrhosis of liver, likely due to chronic hepatitis C, will need referral to hepatology after discharge  18. History of recurrent bilateral DVT, previously on anticoagulation, now off due to history of GI bleed  19. 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  His confusion is improving however he continues to wax and wane.  All workup for confusion as far negative.  Pending RPR.  Dr. Jonhs from neurology following.    We'll transfuse another 2 units this morning as his hemoglobin has dropped back down to 7.3 which is likely due to blood loss anemia from surgery.  No overt bleeding has been noted by nursing.  His stool occult was negative.  Gabapentin restarted at a lower dose.  Patient's overall prognosis is extremely poor given her multiple medical comorbidities and functional decline.    C/w Lovenox for DVT ppx.   PT/OT    Spoke with Paty from social work to initiate preceptor which will be done tomorrow morning.  I  discussed the details with the nursing staff.    Rest as ordered.    Nahun Sandoval MD  12/03/17  11:50 AM    Please note that portions of this note may have been completed with a voice recognition program. Efforts were made to edit the dictations, but occasionally words are mistranscribed.

## 2017-12-03 NOTE — PROGRESS NOTES
LOS: 8 days   Patient Care Team:  Marilyn K Vermeesch, MD as PCP - General (Internal Medicine)        Subjective  patient doing well, incision was wonderful.  No drainage or redness.  Pain under control    Interval History:     Patient Complaints: none    History taken from: patient    Vital Signs  Temp:  [97.9 °F (36.6 °C)-98.5 °F (36.9 °C)] 98.4 °F (36.9 °C)  Heart Rate:  [65-81] 76  Resp:  [18-20] 20  BP: (105-144)/(72-98) 105/85    Physical Exam:     General Appearance:    Alert, cooperative, in no acute distress   Head:    Normocephalic, without obvious abnormality, atraumatic   Lungs:     Clear to auscultation,respirations regular, even and                  unlabored    Heart:    Regular rhythm and normal rate, normal S1 and S2, no            murmur, no gallop, no rub, no click   Abdomen:     Normal bowel sounds, no masses, no organomegaly, soft        non-tender, non-distended, no guarding, no rebound                tenderness   Extremities:   Moves all extremities well, no edema, no cyanosis, no             redness   Pulses:   Pulses palpable and equal bilaterally   Skin:   No bleeding, bruising or rash        Results Review:       Lab Results (last 24 hours)     Procedure Component Value Units Date/Time    POC Glucose Fingerstick [[id number]]  (Abnormal) Collected:  12/02/17 1116    Specimen:  Blood Updated:  12/02/17 1131     Glucose 210 (H) mg/dL       Serial Number: [id number]:  [id number]       POC Glucose Fingerstick [[id number]]  (Abnormal) Collected:  12/02/17 1604    Specimen:  Blood Updated:  12/02/17 1626     Glucose 157 (H) mg/dL       Serial Number: [id number]:  [id number]       Magnesium [[id number]]  (Abnormal) Collected:  12/02/17 1724    Specimen:  Blood Updated:  12/02/17 1748     Magnesium 1.5 (L) mg/dL     POC Glucose Fingerstick [[id number]]  (Abnormal) Collected:  12/02/17 2049    Specimen:  Blood Updated:  12/02/17 2130     Glucose 168 (H) mg/dL       Serial Number:  [id number]:  [id number]       CBC & Differential [[id number]] Collected:  12/03/17 0557    Specimen:  Blood Updated:  12/03/17 0617    Narrative:       The following orders were created for panel order CBC & Differential.  Procedure                               Abnormality         Status                     ---------                               -----------         ------                     CBC Auto Differential[[id number]]        Abnormal            Final result                 Please view results for these tests on the individual orders.    CBC Auto Differential [[id number]]  (Abnormal) Collected:  12/03/17 0557    Specimen:  Blood Updated:  12/03/17 0617     WBC 3.58 (L) 10*3/mm3      RBC 2.41 (L) 10*6/mm3      Hemoglobin 7.3 (C) g/dL      Hematocrit 22.7 (C) %      MCV 94.2 (H) fL      MCH 30.3 pg      MCHC 32.2 g/dL      RDW 16.2 (H) %      RDW-SD 55.5 (H) fl      MPV 9.8 fL      Platelets 115 (L) 10*3/mm3      Neutrophil % 69.0 %      Lymphocyte % 22.6 %      Monocyte % 7.0 %      Eosinophil % 0.3 %      Basophil % 0.3 %      Immature Grans % 0.8 (H) %      Neutrophils, Absolute 2.47 10*3/mm3      Lymphocytes, Absolute 0.81 10*3/mm3      Monocytes, Absolute 0.25 10*3/mm3      Eosinophils, Absolute 0.01 10*3/mm3      Basophils, Absolute 0.01 10*3/mm3      Immature Grans, Absolute 0.03 10*3/mm3      nRBC 0.0 /100 WBC     Basic Metabolic Panel [[id number]]  (Abnormal) Collected:  12/03/17 0557    Specimen:  Blood Updated:  12/03/17 0627     Glucose 106 (H) mg/dL      BUN 13 mg/dL      Creatinine 0.90 mg/dL      Sodium 143 mmol/L      Potassium 3.8 mmol/L      Chloride 112 (H) mmol/L      CO2 20.0 (L) mmol/L      Calcium 8.2 (L) mg/dL      eGFR Non African Amer 86 mL/min/1.73      BUN/Creatinine Ratio 14.4     Anion Gap 14.8 mmol/L     Narrative:       Abnormal estimated GFR should be followed by more specific studies to confirm end stage chronic renal disease. The equation used for calculation may not be  accurate for patients less than 19 years old, greater than 70 years old, patients at extremes of weight, malnutrition, or with acute renal dysfunction.              Assessment/Plan     Principal Problem:    Acute renal failure  Active Problems:    Chronic hepatitis C virus infection    Coronary artery disease involving native coronary artery of native heart without angina pectoris    Chronic atrial fibrillation    Essential hypertension    COPD (chronic obstructive pulmonary disease)    Peripheral vascular disease    Dehydration    Status post transmetatarsal amputation of foot, left      Postop left BKA, doing well.  Continue pain control and elevation.      Rosa Hawk MD  12/03/17  9:17 AM

## 2017-12-03 NOTE — PLAN OF CARE
Problem: Skin Integrity Impairment, Risk/Actual (Adult)  Goal: Skin Integrity/Wound Healing  Outcome: Ongoing (interventions implemented as appropriate)    12/03/17 1816   Skin Integrity Impairment, Risk/Actual (Adult)   Skin Integrity/Wound Healing making progress toward outcome

## 2017-12-04 LAB
ABO + RH BLD: NORMAL
ABO + RH BLD: NORMAL
ANION GAP SERPL CALCULATED.3IONS-SCNC: 14.3 MMOL/L
BASOPHILS # BLD AUTO: 0.02 10*3/MM3 (ref 0–0.2)
BASOPHILS NFR BLD AUTO: 0.5 % (ref 0–2.5)
BH BB BLOOD EXPIRATION DATE: NORMAL
BH BB BLOOD EXPIRATION DATE: NORMAL
BH BB BLOOD TYPE BARCODE: 5100
BH BB BLOOD TYPE BARCODE: 5100
BH BB DISPENSE STATUS: NORMAL
BH BB DISPENSE STATUS: NORMAL
BH BB PRODUCT CODE: NORMAL
BH BB PRODUCT CODE: NORMAL
BH BB UNIT NUMBER: NORMAL
BH BB UNIT NUMBER: NORMAL
BUN BLD-MCNC: 17 MG/DL (ref 7–20)
BUN/CREAT SERPL: 21.3 (ref 6.3–21.9)
CALCIUM SPEC-SCNC: 8.2 MG/DL (ref 8.4–10.2)
CHLORIDE SERPL-SCNC: 107 MMOL/L (ref 98–107)
CO2 SERPL-SCNC: 24 MMOL/L (ref 26–30)
CREAT BLD-MCNC: 0.8 MG/DL (ref 0.6–1.3)
CROSSMATCH INTERPRETATION: NORMAL
CROSSMATCH INTERPRETATION: NORMAL
DEPRECATED RDW RBC AUTO: 54.4 FL (ref 37–54)
EOSINOPHIL # BLD AUTO: 0.1 10*3/MM3 (ref 0–0.7)
EOSINOPHIL NFR BLD AUTO: 2.7 % (ref 0–7)
ERYTHROCYTE [DISTWIDTH] IN BLOOD BY AUTOMATED COUNT: 16.6 % (ref 11.5–14.5)
GFR SERPL CREATININE-BSD FRML MDRD: 99 ML/MIN/1.73
GLUCOSE BLD-MCNC: 101 MG/DL (ref 74–98)
GLUCOSE BLDC GLUCOMTR-MCNC: 126 MG/DL (ref 70–130)
GLUCOSE BLDC GLUCOMTR-MCNC: 129 MG/DL (ref 70–130)
GLUCOSE BLDC GLUCOMTR-MCNC: 163 MG/DL (ref 70–130)
HCT VFR BLD AUTO: 28.1 % (ref 42–52)
HGB BLD-MCNC: 9.1 G/DL (ref 14–18)
IMM GRANULOCYTES # BLD: 0.03 10*3/MM3 (ref 0–0.06)
IMM GRANULOCYTES NFR BLD: 0.8 % (ref 0–0.6)
LYMPHOCYTES # BLD AUTO: 0.85 10*3/MM3 (ref 0.6–3.4)
LYMPHOCYTES NFR BLD AUTO: 22.9 % (ref 10–50)
MCH RBC QN AUTO: 29.3 PG (ref 27–31)
MCHC RBC AUTO-ENTMCNC: 32.4 G/DL (ref 30–37)
MCV RBC AUTO: 90.4 FL (ref 80–94)
MONOCYTES # BLD AUTO: 0.24 10*3/MM3 (ref 0–0.9)
MONOCYTES NFR BLD AUTO: 6.5 % (ref 0–12)
NEUTROPHILS # BLD AUTO: 2.47 10*3/MM3 (ref 2–6.9)
NEUTROPHILS NFR BLD AUTO: 66.6 % (ref 37–80)
NRBC BLD MANUAL-RTO: 0 /100 WBC (ref 0–0)
PLATELET # BLD AUTO: 120 10*3/MM3 (ref 130–400)
PMV BLD AUTO: 10.1 FL (ref 6–12)
POTASSIUM BLD-SCNC: 4.3 MMOL/L (ref 3.5–5.1)
RBC # BLD AUTO: 3.11 10*6/MM3 (ref 4.7–6.1)
SODIUM BLD-SCNC: 141 MMOL/L (ref 137–145)
UNIT  ABO: NORMAL
UNIT  ABO: NORMAL
UNIT  RH: NORMAL
UNIT  RH: NORMAL
WBC NRBC COR # BLD: 3.71 10*3/MM3 (ref 4.8–10.8)

## 2017-12-04 PROCEDURE — 85025 COMPLETE CBC W/AUTO DIFF WBC: CPT | Performed by: INTERNAL MEDICINE

## 2017-12-04 PROCEDURE — 97116 GAIT TRAINING THERAPY: CPT

## 2017-12-04 PROCEDURE — 97110 THERAPEUTIC EXERCISES: CPT

## 2017-12-04 PROCEDURE — 63710000001 INSULIN ASPART PER 5 UNITS: Performed by: INTERNAL MEDICINE

## 2017-12-04 PROCEDURE — 80048 BASIC METABOLIC PNL TOTAL CA: CPT | Performed by: INTERNAL MEDICINE

## 2017-12-04 PROCEDURE — 97168 OT RE-EVAL EST PLAN CARE: CPT

## 2017-12-04 PROCEDURE — 99232 SBSQ HOSP IP/OBS MODERATE 35: CPT | Performed by: FAMILY MEDICINE

## 2017-12-04 PROCEDURE — 82962 GLUCOSE BLOOD TEST: CPT

## 2017-12-04 RX ADMIN — HYDROCODONE BITARTRATE AND ACETAMINOPHEN 1 TABLET: 5; 325 TABLET ORAL at 16:31

## 2017-12-04 RX ADMIN — GABAPENTIN 300 MG: 300 CAPSULE ORAL at 22:08

## 2017-12-04 RX ADMIN — Medication 10 ML: at 10:00

## 2017-12-04 RX ADMIN — NICOTINE 1 PATCH: 7 PATCH, EXTENDED RELEASE TRANSDERMAL at 16:32

## 2017-12-04 RX ADMIN — HYDROCODONE BITARTRATE AND ACETAMINOPHEN 1 TABLET: 5; 325 TABLET ORAL at 06:13

## 2017-12-04 RX ADMIN — Medication 400 MG: at 10:00

## 2017-12-04 RX ADMIN — GABAPENTIN 300 MG: 300 CAPSULE ORAL at 10:01

## 2017-12-04 RX ADMIN — ATORVASTATIN CALCIUM 40 MG: 40 TABLET, FILM COATED ORAL at 22:09

## 2017-12-04 RX ADMIN — HYDROCODONE BITARTRATE AND ACETAMINOPHEN 1 TABLET: 5; 325 TABLET ORAL at 22:09

## 2017-12-04 RX ADMIN — Medication 250 MG: at 18:47

## 2017-12-04 RX ADMIN — COLCHICINE 0.6 MG: 0.6 TABLET, FILM COATED ORAL at 10:00

## 2017-12-04 RX ADMIN — Medication 250 MG: at 10:00

## 2017-12-04 RX ADMIN — HYDROXYCHLOROQUINE SULFATE 200 MG: 200 TABLET, FILM COATED ORAL at 10:00

## 2017-12-04 RX ADMIN — METOPROLOL TARTRATE 25 MG: 25 TABLET ORAL at 10:00

## 2017-12-04 RX ADMIN — CLOPIDOGREL BISULFATE 75 MG: 75 TABLET ORAL at 10:00

## 2017-12-04 RX ADMIN — FERROUS SULFATE TAB EC 324 MG (65 MG FE EQUIVALENT) 324 MG: 324 (65 FE) TABLET DELAYED RESPONSE at 09:00

## 2017-12-04 RX ADMIN — ASPIRIN 81 MG: 81 TABLET, COATED ORAL at 10:00

## 2017-12-04 RX ADMIN — METOPROLOL TARTRATE 25 MG: 25 TABLET ORAL at 22:08

## 2017-12-04 RX ADMIN — PANTOPRAZOLE SODIUM 40 MG: 40 TABLET, DELAYED RELEASE ORAL at 06:13

## 2017-12-04 RX ADMIN — INSULIN ASPART 2 UNITS: 100 INJECTION, SOLUTION INTRAVENOUS; SUBCUTANEOUS at 18:00

## 2017-12-04 NOTE — ANESTHESIA POSTPROCEDURE EVALUATION
Patient: Vasquez Marie    Procedure Summary     Date Anesthesia Start Anesthesia Stop Room / Location    12/01/17 1026   [facility]       Procedure Diagnosis Surgeon Provider    AMPUTATION BELOW KNEE (Left Leg Lower) Subacute osteomyelitis of left foot  (Subacute osteomyelitis of left foot [M86.272]) MD Xavi Velazco CRNA          Anesthesia Type: MAC  Last vitals  BP   150/80 (12/04/17 0700)   Temp   98 °F (36.7 °C) (12/04/17 0700)   Pulse   69 (12/04/17 0700)   Resp   20 (12/04/17 0700)     SpO2   100 % (12/04/17 0700)     Post Anesthesia Care and Evaluation    Patient location during evaluation: PACU  Patient participation: complete - patient participated  Level of consciousness: awake  Pain score: 0  Pain management: adequate  Airway patency: patent  Anesthetic complications: No anesthetic complications  PONV Status: none  Cardiovascular status: acceptable  Respiratory status: acceptable  Hydration status: acceptable    Comments: vsss resp spont, reflexes intact, responsive, report given to pacu nurse

## 2017-12-04 NOTE — THERAPY TREATMENT NOTE
Acute Care - Physical Therapy Treatment Note   [facility]     Patient Name: Vasquez Marie  : 1960  MRN: [id number]  Today's Date: 2017  Onset of Illness/Injury or Date of Surgery Date: 17  Date of Referral to PT: 17  Referring Physician: Dr. Sandoval    Admit Date: 2017    Visit Dx:    ICD-10-CM ICD-9-CM   1. Dehydration E86.0 276.51   2. Impaired functional mobility, balance, gait, and endurance Z74.09 V49.89   3. Peripheral vascular disease I73.9 443.9   4. Subacute osteomyelitis of left foot M86.272 730.07     Patient Active Problem List   Diagnosis   • Chronic hepatitis C virus infection   • Coronary artery disease involving native coronary artery of native heart without angina pectoris   • Chronic atrial fibrillation   • Essential hypertension   • Thrombocytopenia   • Depression with anxiety   • Gastroesophageal reflux disease with esophagitis   • Hyperlipidemia   • Tobacco abuse   • COPD (chronic obstructive pulmonary disease)   • Gout   • Congestive heart disease   • RLS (restless legs syndrome)   • Rheumatoid arthritis   • Typical atrial flutter   • Atrial flutter   • Cerebrovascular accident (CVA)   • Borderline diabetes   • Anemia   • Peripheral vascular disease   • Subacute osteomyelitis of left foot   • Liver mass   • Primary insomnia   • Dehydration   • Acute renal failure   • Status post transmetatarsal amputation of foot, left               Adult Rehabilitation Note       17 1040          Rehab Assessment/Intervention    Discipline physical therapy assistant  -      Document Type therapy note (daily note)  -RM      Subjective Information agree to therapy;complains of;weakness  -RM      Patient Effort, Rehab Treatment adequate  -RM      Symptoms Noted During/After Treatment fatigue  -RM      Precautions/Limitations fall precautions  -RM      Recorded by [RM] Dipak Scott, PTA      Transfer Assessment/Treatment    Transfers, Sit-Stand [place] minimum  assist (75% patient effort)  -RM      Transfers, Stand-Sit [place] minimum assist (75% patient effort);2 person assist required  -RM      Transfers, Sit-Stand-Sit, Assist Device standard walker  -RM      Recorded by [] Dipak Scott PTA      Gait Assessment/Treatment    Gait, [place] Level minimum assist (75% patient effort);2 person assist required  -RM      Gait, Assistive Device standard walker  -RM      Gait, Distance (Feet) 3  -RM      Gait, Gait Pattern Analysis 2-point gait  -RM      Gait, Gait Deviations decreased heel strike;right:;knee buckling;toe-to-floor clearance decreased;weight-shifting ability decreased  -RM      Gait, Safety Issues loses balance backward;weight-shifting ability decreased;sequencing ability decreased;supplemental O2  -RM      Gait, Impairments impaired balance;strength decreased  -RM      Recorded by [] Dipak Scott PTA      Therapy Exercises    Right Lower Extremity AROM:;15 reps;quad sets;heel slides;hip extension   hip ext graded resistance  -RM      Left Lower Extremity AROM:;15 reps;quad sets;SAQ   graded resistance SAQ  -RM      Recorded by [] Dipak Scott PTA      Positioning and Restraints    Pre-Treatment Position --   sitting EOB working with OT  -RM      Post Treatment Position chair  -RM      In Chair reclined;call light within reach;encouraged to call for assist;exit alarm on;R heel elevated;LLE elevated  -RM      Recorded by [] Dipak Scott PTA        User Key  (r) = Recorded By, (t) = Taken By, (c) = Cosigned By    Initials Name Effective Dates     Dipak Scott PTA 10/26/16 -                 IP PT Goals       12/04/17 1136 12/02/17 1350 11/27/17 1554    Bed Mobility PT LTG    Bed Mobility PT LTG, Date Established  12/02/17  -JG 11/27/17  -JR (r) VAHID (t)  (c)    Bed Mobility PT LTG, Time to Achieve   2 wks  -JR (r) VAHID (t)  (c)    Bed Mobility PT LTG, Activity Type   all bed mobility  -JR (r) AH (t) JR (c)    Bed Mobility  PT LTG, [place] Level   independent  -JR (r) AH (t) JR (c)    Bed Mobility PT LTG, Date Goal Reviewed  12/02/17  -JG     Bed Mobility PT LTG, Outcome  goal ongoing  -JG     Transfer Training PT LTG    Transfer Training PT LTG, Date Established   11/27/17  -JR (r) AH (t) JR (c)    Transfer Training PT LTG, Time to Achieve  2 wks  -JG 2 wks  -JR (r) AH (t) JR (c)    Transfer Training PT LTG, Activity Type  all transfers  -JG bed to chair /chair to bed;sit to stand/stand to sit  -JR (r) AH (t) JR (c)    Transfer Training PT LTG, [place] Level  contact guard assist  -JG conditional independence  -JR (r) AH (t) JR (c)    Transfer Training PT LTG, Assist Device  walker, standard  -JG walker, rolling  -JR (r) AH (t) JR (c)    Transfer Training PT LTG, Additional Goal   following WB precautions (NWB LLE)  -JR (r) AH (t) JR (c)    Transfer Training PT  LTG, Date Goal Reviewed  12/02/17  -JG     Transfer Training PT LTG, Outcome goal ongoing  -RM      Gait Training PT LTG    Gait Training Goal PT LTG, Date Established  12/02/17  -JG 11/27/17  -JR (r) AH (t) JR (c)    Gait Training Goal PT LTG, Time to Achieve  2 wks  -JG 2 wks  -JR (r) AH (t) JR (c)    Gait Training Goal PT LTG, [place] Level  minimum assist (75% patient effort)  -JG contact guard assist  -JR (r) AH (t) JR (c)    Gait Training Goal PT LTG, Assist Device  walker, standard  -JG walker, rolling  -JR (r) AH (t) JR (c)    Gait Training Goal PT LTG, Distance to Achieve  10 feet  -JG 10  -JR (r) AH (t) JR (c)    Gait Training Goal PT LTG, Additional Goal   with WB restrictions (NWB LLE)  -JR (r) AH (t) JR (c)    Gait Training Goal PT LTG, Date Goal Reviewed  12/02/17  -JG     Gait Training Goal PT LTG, Outcome goal ongoing  -RM        User Key  (r) = Recorded By, (t) = Taken By, (c) = Cosigned By    Initials Name Provider Type    KIMBERLY Geiger, PT Physical Therapist    JR Sia Whaley, PT Physical Therapist    RM Dipak Scott, PTA Physical Therapy  Assistant     Caro Christine, PT Student PT Student          Physical Therapy Education     Title: PT OT SLP Therapies (Done)     Topic: Physical Therapy (Done)     Point: Mobility training (Done)    Learning Progress Summary    Learner Readiness Method Response Comment Documented by Status   Patient Acceptance E VU,NR pt instructed on safety with transfers  12/02/17 1450 Done    Acceptance E VU,NR LACE education  11/28/17 1818 Done    Acceptance E VU PT benefits and POC.  11/27/17 1553 Done   Family Acceptance E VU,NR pt instructed on safety with transfers  12/02/17 1450 Done               Point: Home exercise program (Done)    Learning Progress Summary    Learner Readiness Method Response Comment Documented by Status   Patient Acceptance E,D VU,NR   12/04/17 1136 Done    Acceptance E VU,NR LACE education  11/28/17 1818 Done    Acceptance E VU PT benefits and POC.  11/27/17 1553 Done               Point: Precautions (Done)    Learning Progress Summary    Learner Readiness Method Response Comment Documented by Status   Patient Acceptance E VU,NR LACE education  11/28/17 1818 Done    Acceptance E VU PT benefits and POC.  11/27/17 1553 Done                      User Key     Initials Effective Dates Name Provider Type Discipline     04/06/17 -  Kemar Geiger, PT Physical Therapist PT     10/26/16 -  Dipak Scott, PTA Physical Therapy Assistant PT     07/10/17 -  Deepti Wadsworth, RN Registered Nurse Nurse     11/01/17 -  Caro Christine, PT Student PT Student PT                    PT Recommendation and Plan  Planned Therapy Interventions: balance training, bed mobility training, home exercise program, gait training, patient/family education, strengthening, transfer training  PT Frequency: daily  Plan of Care Review  Plan Of Care Reviewed With: patient  Progress: improving  Outcome Summary/Follow up Plan: Pt presented with improved activity tolerance with pt able to transfer as well as able to  perform hop to gait x 3' from bed to chair. Pt was also able to tolerate ther ex with B LE this treatment. Pt very motivated to progress. See flowsheet for details.            Outcome Measures       12/04/17 1040 12/02/17 1350       How much help from another person do you currently need...    Turning from your back to your side while in flat bed without using bedrails? 4  -RM 4  -JG     Moving from lying on back to sitting on the side of a flat bed without bedrails? 3  -RM 3  -JG     Moving to and from a bed to a chair (including a wheelchair)? 2  -RM 2  -JG     Standing up from a chair using your arms (e.g., wheelchair, bedside chair)? 3  -RM 3  -JG     Climbing 3-5 steps with a railing? 1  -RM 1  -JG     To walk in hospital room? 2  -RM 1  -JG     AM-PAC 6 Clicks Score 15  -RM 14  -JG     Functional Assessment    Outcome Measure Options AM-PAC 6 Clicks Basic Mobility (PT)  -RM AM-PAC 6 Clicks Basic Mobility (PT)  -JG       User Key  (r) = Recorded By, (t) = Taken By, (c) = Cosigned By    Initials Name Provider Type     Kemar Geiger, PT Physical Therapist     Dipak Scott PTA Physical Therapy Assistant           Time Calculation:         PT Charges       12/04/17 1142          Time Calculation    Start Time 1040  -RM      PT Received On 12/04/17  -      PT Goal Re-Cert Due Date 12/16/17  -      Time Calculation- PT    Total Timed Code Minutes- PT 42 minute(s)  -        User Key  (r) = Recorded By, (t) = Taken By, (c) = Cosigned By    Initials Name Provider Type     Dipak Scott PTA Physical Therapy Assistant          Therapy Charges for Today     Code Description Service Date Service Provider Modifiers Qty    12060858501 HC GAIT TRAINING EA 15 MIN 12/4/2017 Dipak Scott PTA GP 1    95069907694 HC PT THER PROC EA 15 MIN 12/4/2017 Dipak Scott PTA GP 1          PT G-Codes  Outcome Measure Options: AM-PAC 6 Clicks Basic Mobility (PT)    Dipak Scott PTA  12/4/2017

## 2017-12-04 NOTE — PROGRESS NOTES
[facility]    Name:  Vasquez Marie   Age:  57 y.o.  Sex:  male  :  1960  MRN:  [id number]   Visit Number:  [id number]  Admission Date:  2017  Date Of Service:  17  Primary Care Physician:  Marilyn K. Vermeesch, MD     LOS: 9 days :      Chief Complaint:  Confusion. Follow up left BKA        Subjective / Interval History:   The patient is a 57 yr old man admitted on  with altered mental status, dehydration, hypovolemia without sepsis. He has been hydrated and transfused blood. Dr Johns saw the patient and felt confusion was related to anemia and hypotension. CT head and MRI head without acute abnormalities. The patient has since improved after treatment provided. On admission, he had recurrent foot wound, necrotic, with concern for arterial occlusion. Arterial dopplers showed poor circulation, he was then subsequently taken for angiography and intervention by Dr Storm to SFA with good flow. Dr Campbell still felt unlikely for the wound to heal, with poor patient compliance, previous wound vav and recommended left BKA. Also, patient had cirrhosis of liver with chronic hepatitis C and previous history of alcohol abuse.     He was seen this morning, sitting up in bed. He was alert to month, president, place, name but did get confused as to the year. He was cooperative and pleasant. He denied chest pain, shortness of breath, abdominal pain.    Review of Systems:     General ROS: Patient denies any fevers, chills or loss of consciousness. Confusion improved.  Ophthalmic ROS: Denies any diplopia or transient loss of vision.  ENT ROS: Denies sore throat, nasal congestion or ear pain.   Respiratory ROS: Denies cough or shortness of breath.  Cardiovascular ROS: Denies chest pain or palpitations. No history of exertional chest pain.   Gastrointestinal ROS: Denies nausea and vomiting. Denies any abdominal pain. No diarrhea.  Genito-Urinary ROS: Denies  dysuria or hematuria.  Musculoskeletal ROS: Denies back pain. No muscle pain. No calf pain.  Neurological ROS: Denies any focal weakness. No loss of consciousness. Denies any numbness. Denies neck pain.   Dermatological ROS: Denies any redness or pruritis.    Vital Signs:    Temp:  [97.8 °F (36.6 °C)-98.5 °F (36.9 °C)] 98.5 °F (36.9 °C)  Heart Rate:  [69-84] 69  Resp:  [18-20] 20  BP: (122-150)/(65-91) 142/78    Intake and output:    I/O last 3 completed shifts:  In: 2210 [I.V.:1000; Blood:1210]  Out: 1025 [Urine:1025]  I/O this shift:  In: 480 [P.O.:480]  Out: 300 [Urine:300]    Physical Examination:    General Appearance:    Alert and cooperative, not in any acute distress.   Head:    Atraumatic and normocephalic, without obvious abnormality.   Eyes:            PERRLA, conjunctivae and sclerae normal, no Icterus. No pallor. Extra-occular movements are within normal limits.   Throat:   No oral lesions, no thrush, oral mucosa moist.   Neck:   Supple, trachea midline, no thyromegaly, no carotid bruit.   Lungs:     Chest shape is normal. Breath sounds heard bilaterally equally.  No crackles or wheezing. No Pleural rub or bronchial breathing.    Heart:    Normal S1 and S2, no murmur, no gallop, no rub. No JVD   Abdomen:     Normal bowel sounds, no masses, no organomegaly. Soft        non-tender, non-distended, no guarding, no rebound                tenderness   Extremities:   Moves all extremities well, no edema, no cyanosis, no             clubbing   Skin:   No bleeding, bruising or rash. Left foot wound dressed, waiting to be changed and nursing agreed to call me    Neurologic:   Cranial nerves 2 - 12 grossly intact, sensation intact, Motor power is normal right leg, bilateral arms    Laboratory results:    Lab Results (last 24 hours)     Procedure Component Value Units Date/Time    POC Glucose Fingerstick [[id number]]  (Normal) Collected:  12/03/17 1622    Specimen:  Blood Updated:  12/03/17 1700     Glucose 121  mg/dL       Serial Number: [id number]:  [id number]       POC Glucose Fingerstick [[id number]]  (Abnormal) Collected:  12/03/17 2032    Specimen:  Blood Updated:  12/03/17 2106     Glucose 132 (H) mg/dL       Serial Number: [id number]:  [id number]       Hemoglobin & Hematocrit, Blood [[id number]]  (Abnormal) Collected:  12/03/17 2159    Specimen:  Blood Updated:  12/03/17 2211     Hemoglobin 8.9 (L) g/dL      Hematocrit 28.0 (L) %     Basic Metabolic Panel [[id number]]  (Abnormal) Collected:  12/04/17 0554    Specimen:  Blood Updated:  12/04/17 0637     Glucose 101 (H) mg/dL      BUN 17 mg/dL      Creatinine 0.80 mg/dL      Sodium 141 mmol/L      Potassium 4.3 mmol/L      Chloride 107 mmol/L      CO2 24.0 (L) mmol/L      Calcium 8.2 (L) mg/dL      eGFR Non African Amer 99 mL/min/1.73      BUN/Creatinine Ratio 21.3     Anion Gap 14.3 mmol/L     Narrative:       Abnormal estimated GFR should be followed by more specific studies to confirm end stage chronic renal disease. The equation used for calculation may not be accurate for patients less than 19 years old, greater than 70 years old, patients at extremes of weight, malnutrition, or with acute renal dysfunction.    CBC & Differential [[id number]] Collected:  12/04/17 0647    Specimen:  Blood Updated:  12/04/17 0702    Narrative:       The following orders were created for panel order CBC & Differential.  Procedure                               Abnormality         Status                     ---------                               -----------         ------                     CBC Auto Differential[[id number]]        Abnormal            Final result                 Please view results for these tests on the individual orders.    CBC Auto Differential [[id number]]  (Abnormal) Collected:  12/04/17 0647    Specimen:  Blood Updated:  12/04/17 0702     WBC 3.71 (L) 10*3/mm3      RBC 3.11 (L) 10*6/mm3      Hemoglobin 9.1 (L) g/dL      Hematocrit 28.1 (L) %      MCV 90.4 fL       MCH 29.3 pg      MCHC 32.4 g/dL      RDW 16.6 (H) %      RDW-SD 54.4 (H) fl      MPV 10.1 fL      Platelets 120 (L) 10*3/mm3      Neutrophil % 66.6 %      Lymphocyte % 22.9 %      Monocyte % 6.5 %      Eosinophil % 2.7 %      Basophil % 0.5 %      Immature Grans % 0.8 (H) %      Neutrophils, Absolute 2.47 10*3/mm3      Lymphocytes, Absolute 0.85 10*3/mm3      Monocytes, Absolute 0.24 10*3/mm3      Eosinophils, Absolute 0.10 10*3/mm3      Basophils, Absolute 0.02 10*3/mm3      Immature Grans, Absolute 0.03 10*3/mm3      nRBC 0.0 /100 WBC     POC Glucose Fingerstick [[id number]]  (Normal) Collected:  12/04/17 1030    Specimen:  Blood Updated:  12/04/17 1055     Glucose 126 mg/dL       Serial Number: [id number]:  [id number]             I have reviewed the patient's laboratory results.    Radiology results:    Imaging Results (last 24 hours)     ** No results found for the last 24 hours. **          I have reviewed the patient's radiology reports.    Medication Review:     I have reviewed the patients active and prn medications.     Assessment:    1.  Acute metabolic encephalopathy, present on admission, improved, related to hypotension and anemia prior to discharge  2.  Hypotension secondary to severe dehydration, present on admission, resolved without sepsis  3.  Acute renal failure secondary to #2, present on admission, resolved  4.  Peripheral arterial disease s/p left SFA stent and intervention to the peroneal arteries  4.  Paroxysmal atrial fibrillation/Flutter, not on anticoagulation due to recurrent GI bleed plus patient noncompliance, with history of cirrhosis as well   5.  Coronary artery disease on medical therapy, continued   6.  Essential hypertension  7.  COPD, stable  8.  Chronic hepatitis C infection  9.  Rheumatoid arthritis with chronic pain  10.  Acute worsening of chronic anemia, s/p 5 unit of PRBCs, stool occult negative   11. S/p TMA of left foot with infected wound, likely due to arterial  occlusion, wound culture growing Escherichia coli and Stenotrophomonas maltophilia now s/p BKA on 12/01  12. Acute diarrhea, possibly due to refeeding, C. difficile negative, improved  14. Hypomagenesemia, due to diarrhea, being repleted  15. 7mm soft tissue nodule noted on CT chest, will need follow up CTs  16. Acute arterial occlusion at the SFA s/p intervention by Dr. Storm on 11/28  17. Cirrhosis of liver, likely due to chronic hepatitis C, will need referral to hepatology after discharge  18. History of recurrent bilateral DVT, previously on anticoagulation, now off due to history of GI bleed  19. S/p Left BKA 12/02 for nonhealing TMA of left foot          Plan:  His confusion has improved. RPR is still pending. He has been transfused with improvement in confusion and blood pressure. Continue daily labs and titrate medications accordingly. Will transfuse to keep hemoglobin 8.    Continue lovenox, protonix. He is stable for transfer to nursing facility for rehab once a bed becomes available.     Continue plavix, statin, metoprolol, losartan/hctz.     Medication risks and benefits were discussed in detail. Patient reported satisfaction with care delivered and treatment plan.     Sia Bartlett,   12/04/17  3:50 PM

## 2017-12-04 NOTE — PLAN OF CARE
Problem: Pain, Acute (Adult)  Goal: Acceptable Pain Control/Comfort Level  Outcome: Ongoing (interventions implemented as appropriate)    Problem: Patient Care Overview (Adult)  Goal: Plan of Care Review  Outcome: Ongoing (interventions implemented as appropriate)    12/04/17 1853   Coping/Psychosocial Response Interventions   Plan Of Care Reviewed With patient       Goal: Adult Individualization and Mutuality  Outcome: Ongoing (interventions implemented as appropriate)  Goal: Discharge Needs Assessment  Outcome: Ongoing (interventions implemented as appropriate)    Problem: Fall Risk (Adult)  Goal: Absence of Falls  Outcome: Ongoing (interventions implemented as appropriate)    Problem: Wound, Traumatic, Nonburn (Adult)  Goal: Signs and Symptoms of Listed Potential Problems Will be Absent or Manageable (Wound, Traumatic, Nonburn)  Outcome: Ongoing (interventions implemented as appropriate)    Problem: Perioperative Period (Adult)  Goal: Signs and Symptoms of Listed Potential Problems Will be Absent or Manageable (Perioperative Period)  Outcome: Ongoing (interventions implemented as appropriate)    Problem: Skin Integrity Impairment, Risk/Actual (Adult)  Goal: Identify Related Risk Factors and Signs and Symptoms  Outcome: Ongoing (interventions implemented as appropriate)  Goal: Skin Integrity/Wound Healing  Outcome: Ongoing (interventions implemented as appropriate)

## 2017-12-04 NOTE — DISCHARGE PLACEMENT REQUEST
"  Sonali Nagel RN  [phone]      Vasquez Marie (57 y.o. Male)     Date of Birth Social Security Number Address Home Phone MRN    1960  [id number]    [facility] [id number] [phone] [id number]    Mandaeism Marital Status          None Single       Admission Date Admission Type Admitting Provider Attending Provider Department, Room/Bed    11/25/17 Emergency Liang Polanco MD Gandee, Julie G, DO [facility] MED SURG  3, 313/1    Discharge Date Discharge Disposition Discharge Destination                      Attending Provider: Sia Bartlett DO     Allergies:  No Known Allergies    Isolation:  None   Infection:  Hepatitis C (11/28/17)   Code Status:  FULL    Ht:  68\" (172.7 cm)   Wt:  203 lb (92.1 kg)    Admission Cmt:  None   Principal Problem:  Acute renal failure [N17.9]                 Active Insurance as of 11/25/2017     Primary Coverage     Payor Plan Insurance Group Employer/Plan Group    HUMANA MEDICARE REPLACEMENT HUMANA MEDICARE REPL [id number]     Payor Plan Address Payor Plan Phone Number Effective From Effective To    [address] [address] [phone] 1/1/2017     [place] [address]       Subscriber Name Subscriber Birth Date Member ID       VASQUEZ MARIE 1960 [id number]                 Emergency Contacts      (Rel.) Home Phone Work Phone Mobile Phone    Maral Marie (Sister) [phone] -- --            Hospital Medications (active)       Dose Frequency Start End    acetaminophen (TYLENOL) tablet 650 mg 650 mg Every 4 Hours PRN 11/25/2017     Sig - Route: Take 2 tablets by mouth Every 4 (Four) Hours As Needed for Mild Pain . - Oral    aspirin EC tablet 81 mg 81 mg Daily 11/30/2017     Sig - Route: Take 1 tablet by mouth Daily. - Oral    atorvastatin (LIPITOR) tablet 40 mg 40 mg Nightly 11/25/2017     Sig - Route: Take 1 tablet by mouth Every Night. - Oral    clopidogrel (PLAVIX) tablet 75 mg 75 mg Daily 12/2/2017     Sig - Route: " Take 1 tablet by mouth Daily. - Oral    colchicine tablet 0.6 mg 0.6 mg Daily 11/25/2017     Sig - Route: Take 1 tablet by mouth Daily. - Oral    dextrose (D50W) solution 25 g 25 g Every 15 Minutes PRN 11/25/2017     Sig - Route: Infuse 50 mL into a venous catheter Every 15 (Fifteen) Minutes As Needed for Low Blood Sugar (Blood Sugar Less Than 70, Patient Has IV Access - Unresponsive, NPO or Unable To Safely Swallow). - Intravenous    dextrose (GLUTOSE) oral gel 1 tube 1 tube Every 15 Minutes PRN 11/25/2017     Sig - Route: Take 1 tube by mouth Every 15 (Fifteen) Minutes As Needed for Low Blood Sugar (Blood Sugar Less Than 70, Patient Alert, Is Not NPO & Can Safely Swallow). - Oral    ferrous sulfate EC tablet 324 mg 324 mg Daily With Breakfast 11/28/2017     Sig - Route: Take 1 tablet by mouth Daily With Breakfast. - Oral    gabapentin (NEURONTIN) capsule 300 mg 300 mg Every 12 Hours 12/3/2017     Sig - Route: Take 1 capsule by mouth Every 12 (Twelve) Hours. - Oral    glucagon (human recombinant) (GLUCAGEN DIAGNOSTIC) injection 1 mg 1 mg Every 15 Minutes PRN 11/25/2017     Sig - Route: Inject 1 mg under the skin Every 15 (Fifteen) Minutes As Needed (Blood Glucose Less Than 70 - Patient Without IV Access - Unresponsive, NPO or Unable To Safely Swallow). - Subcutaneous    HYDROcodone-acetaminophen (NORCO) 5-325 MG per tablet 1 tablet 1 tablet 2 Times Daily PRN 11/25/2017     Sig - Route: Take 1 tablet by mouth 2 (Two) Times a Day As Needed for Moderate Pain . - Oral    HYDROcodone-acetaminophen (NORCO) 5-325 MG per tablet 1 tablet 1 tablet Every 4 Hours PRN 11/29/2017 12/9/2017    Sig - Route: Take 1 tablet by mouth Every 4 (Four) Hours As Needed for Moderate Pain . - Oral    hydroxychloroquine (PLAQUENIL) tablet 200 mg 200 mg Daily 11/25/2017     Sig - Route: Take 1 tablet by mouth Daily. - Oral    insulin aspart (novoLOG) injection 0-7 Units 0-7 Units 4 Times Daily Before Meals & Nightly 11/25/2017     Sig - Route:  "Inject 0-7 Units under the skin 4 (Four) Times a Day Before Meals & at Bedtime. - Subcutaneous    ipratropium-albuterol (DUO-NEB) nebulizer solution 3 mL 3 mL Every 4 Hours PRN 11/27/2017     Sig - Route: Take 3 mL by nebulization Every 4 (Four) Hours As Needed for Shortness of Air. - Nebulization    loperamide (IMODIUM) capsule 2 mg 2 mg 4 Times Daily PRN 12/2/2017     Sig - Route: Take 1 capsule by mouth 4 (Four) Times a Day As Needed for Diarrhea. - Oral    LORazepam (ATIVAN) tablet 1 mg 1 mg Every 4 Hours PRN 11/25/2017 12/5/2017    Sig - Route: Take 2 tablets by mouth Every 4 (Four) Hours As Needed for Anxiety. - Oral    magnesium oxide (MAGOX) tablet 400 mg 400 mg Daily 11/30/2017     Sig - Route: Take 1 tablet by mouth Daily. - Oral    metoprolol tartrate (LOPRESSOR) tablet 25 mg 25 mg Every 12 Hours Scheduled 11/25/2017     Sig - Route: Take 1 tablet by mouth Every 12 (Twelve) Hours. - Oral    morphine injection 2 mg 2 mg Every 1 Hour PRN 12/1/2017 12/11/2017    Sig - Route: Infuse 1 mL into a venous catheter Every 1 (One) Hour As Needed for Moderate Pain  or Severe Pain . - Intravenous    morphine injection 4 mg 4 mg Every 1 Hour PRN 12/1/2017 12/11/2017    Sig - Route: Infuse 2 mL into a venous catheter Every 1 (One) Hour As Needed for Severe Pain . - Intravenous    nicotine (NICODERM CQ) 7 MG/24HR patch 1 patch 1 patch Every 24 Hours 11/30/2017     Sig - Route: Place 1 patch on the skin Daily. - Transdermal    ondansetron (ZOFRAN) injection 4 mg 4 mg Every 6 Hours PRN 11/25/2017     Sig - Route: Infuse 2 mL into a venous catheter Every 6 (Six) Hours As Needed for Nausea or Vomiting. - Intravenous    Linked Group 1:  \"Or\" Linked Group Details        ondansetron (ZOFRAN) tablet 4 mg 4 mg Every 6 Hours PRN 11/25/2017     Sig - Route: Take 1 tablet by mouth Every 6 (Six) Hours As Needed for Nausea or Vomiting. - Oral    Linked Group 1:  \"Or\" Linked Group Details        ondansetron ODT (ZOFRAN-ODT) " "disintegrating tablet 4 mg 4 mg Every 6 Hours PRN 11/25/2017     Sig - Route: Take 1 tablet by mouth Every 6 (Six) Hours As Needed for Nausea or Vomiting. - Oral    Linked Group 1:  \"Or\" Linked Group Details        pantoprazole (PROTONIX) EC tablet 40 mg 40 mg Every Early Morning 11/25/2017     Sig - Route: Take 1 tablet by mouth Every Morning. - Oral    saccharomyces boulardii (FLORASTOR) capsule 250 mg 250 mg 2 Times Daily 11/27/2017     Sig - Route: Take 1 capsule by mouth 2 (Two) Times a Day. - Oral    sodium chloride 0.9 % flush 1-10 mL 1-10 mL As Needed 11/25/2017     Sig - Route: Infuse 1-10 mL into a venous catheter As Needed for Line Care. - Intravenous    bupivacaine (PF) (MARCAINE) 0.5 % injection (Discontinued)  As Needed 12/1/2017 12/4/2017    Sig: As Needed.    Reason for Discontinue: Anesthesia Stop    dexamethasone sodium phosphate injection (Discontinued)  As Needed 12/1/2017 12/4/2017    Sig: As Needed.    Reason for Discontinue: Anesthesia Stop    FentaNYL Citrate (PF) (SUBLIMAZE) injection (Discontinued)  As Needed 12/1/2017 12/4/2017    Sig - Route: Infuse  into a venous catheter As Needed for Severe Pain . - Intravenous    Reason for Discontinue: Anesthesia Stop    Ketamine HCl-Sodium Chloride 50-0.9 MG/5ML-% solution prefilled syringe (Discontinued)  As Needed 12/1/2017 12/4/2017    Sig: As Needed.    Reason for Discontinue: Anesthesia Stop    lidocaine (XYLOCAINE) 2% injection (Discontinued)  As Needed 12/1/2017 12/4/2017    Sig: As Needed.    Reason for Discontinue: Anesthesia Stop    midazolam (VERSED) injection (Discontinued)  As Needed 12/1/2017 12/4/2017    Sig - Route: Infuse  into a venous catheter As Needed. - Intravenous    Reason for Discontinue: Anesthesia Stop    Phenylephrine HCl-NaCl (PF) syringe (Discontinued)  As Needed 12/1/2017 12/4/2017    Sig: As Needed.    Reason for Discontinue: Anesthesia Stop    piperacillin-tazobactam (ZOSYN) 3.375 g in iso-osmotic dextrose 50 ml " (premix) (Discontinued)  As Needed 12/1/2017 12/4/2017    Sig: As Needed.    Reason for Discontinue: Anesthesia Stop    propofol (DIPRIVAN) infusion 10 mg/mL 100 mL (Discontinued)  Continuous PRN 12/1/2017 12/4/2017    Sig - Route: Infuse  into a venous catheter Continuous As Needed. - Intravenous    Reason for Discontinue: Anesthesia Stop          Physician Progress Notes (last 24 hours) (Notes from 12/3/2017 12:52 PM through 12/4/2017 12:52 PM)     No notes of this type exist for this encounter.           Physical Therapy Notes (last 24 hours) (Notes from 12/3/2017 12:52 PM through 12/4/2017 12:52 PM)      Harika Lei PTA at 12/3/2017  2:06 PM  Version 1 of 1         Tx with held per nsg d/t pt receiving blood. Therapy to f/u with pt tomorrow     Electronically signed by Harika Lei PTA at 12/3/2017  2:07 PM      Dipak Scott PTA at 12/4/2017 11:40 AM  Version 1 of 1         Problem: Patient Care Overview (Adult)  Goal: Plan of Care Review  Outcome: Ongoing (interventions implemented as appropriate)    12/04/17 1136   Coping/Psychosocial Response Interventions   Plan Of Care Reviewed With patient   Patient Care Overview   Progress improving   Outcome Evaluation   Outcome Summary/Follow up Plan Pt presented with improved activity tolerance with pt able to transfer as well as able to perform hop to gait x 3' from bed to chair. Pt was also able to tolerate ther ex with B LE this treatment. Pt very motivated to progress. See flowsheet for details.          Problem: Inpatient Physical Therapy  Goal: Transfer Training Goal 1 LTG- PT  Outcome: Ongoing (interventions implemented as appropriate)    11/27/17 1554 12/02/17 1350 12/04/17 1136   Transfer Training PT LTG   Transfer Training PT LTG, Date Established 11/27/17 --  --    Transfer Training PT LTG, Time to Achieve --  2 wks --    Transfer Training PT LTG, Activity Type --  all transfers --    Transfer Training PT LTG, [place] Level --  contact  guard assist --    Transfer Training PT LTG, Assist Device --  walker, standard --    Transfer Training PT LTG, Additional Goal following WB precautions (NWB LLE) --  --    Transfer Training PT LTG, Date Goal Reviewed --  17 --    Transfer Training PT LTG, Outcome --  --  goal ongoing       Goal: Gait Training Goal LTG- PT  Outcome: Ongoing (interventions implemented as appropriate)    17 1554 17 1350 17 1136   Gait Training PT LTG   Gait Training Goal PT LTG, Date Established --  17 --    Gait Training Goal PT LTG, Time to Achieve --  2 wks --    Gait Training Goal PT LTG, [place] Level --  minimum assist (75% patient effort) --    Gait Training Goal PT LTG, Assist Device --  walker, standard --    Gait Training Goal PT LTG, Distance to Achieve --  10 feet --    Gait Training Goal PT LTG, Additional Goal with WB restrictions (NWB LLE) --  --    Gait Training Goal PT LTG, Date Goal Reviewed --  17 --    Gait Training Goal PT LTG, Outcome --  --  goal ongoing              Electronically signed by Dipak Scott PTA at 2017 11:40 AM      Dipak Scott PTA at 2017 11:44 AM  Version 1 of 1         Acute Care - Physical Therapy Treatment Note  BRITTANI Espinal     Patient Name: Vasquez Marie  : 1960  MRN: [id number]  Today's Date: 2017  Onset of Illness/Injury or Date of Surgery Date: 17  Date of Referral to PT: 17  Referring Physician: Dr. Sandoval    Admit Date: 2017    Visit Dx:    ICD-10-CM ICD-9-CM   1. Dehydration E86.0 276.51   2. Impaired functional mobility, balance, gait, and endurance Z74.09 V49.89   3. Peripheral vascular disease I73.9 443.9   4. Subacute osteomyelitis of left foot M86.272 730.07     Patient Active Problem List   Diagnosis   • Chronic hepatitis C virus infection   • Coronary artery disease involving native coronary artery of native heart without angina pectoris   • Chronic atrial fibrillation   •  Essential hypertension   • Thrombocytopenia   • Depression with anxiety   • Gastroesophageal reflux disease with esophagitis   • Hyperlipidemia   • Tobacco abuse   • COPD (chronic obstructive pulmonary disease)   • Gout   • Congestive heart disease   • RLS (restless legs syndrome)   • Rheumatoid arthritis   • Typical atrial flutter   • Atrial flutter   • Cerebrovascular accident (CVA)   • Borderline diabetes   • Anemia   • Peripheral vascular disease   • Subacute osteomyelitis of left foot   • Liver mass   • Primary insomnia   • Dehydration   • Acute renal failure   • Status post transmetatarsal amputation of foot, left               Adult Rehabilitation Note       12/04/17 1040          Rehab Assessment/Intervention    Discipline physical therapy assistant  -      Document Type therapy note (daily note)  -RM      Subjective Information agree to therapy;complains of;weakness  -RM      Patient Effort, Rehab Treatment adequate  -RM      Symptoms Noted During/After Treatment fatigue  -RM      Precautions/Limitations fall precautions  -RM      Recorded by [RM] Dipak Scott, PTA      Transfer Assessment/Treatment    Transfers, Sit-Stand [place] minimum assist (75% patient effort)  -RM      Transfers, Stand-Sit [place] minimum assist (75% patient effort);2 person assist required  -RM      Transfers, Sit-Stand-Sit, Assist Device standard walker  -RM      Recorded by [RM] Dipak Scott, PTA      Gait Assessment/Treatment    Gait, [place] Level minimum assist (75% patient effort);2 person assist required  -RM      Gait, Assistive Device standard walker  -RM      Gait, Distance (Feet) 3  -RM      Gait, Gait Pattern Analysis 2-point gait  -RM      Gait, Gait Deviations decreased heel strike;right:;knee buckling;toe-to-floor clearance decreased;weight-shifting ability decreased  -RM      Gait, Safety Issues loses balance backward;weight-shifting ability decreased;sequencing ability decreased;supplemental  O2  -RM      Gait, Impairments impaired balance;strength decreased  -RM      Recorded by [RM] Dipak Scott PTA      Therapy Exercises    Right Lower Extremity AROM:;15 reps;quad sets;heel slides;hip extension   hip ext graded resistance  -RM      Left Lower Extremity AROM:;15 reps;quad sets;SAQ   graded resistance SAQ  -RM      Recorded by [RM] Dipak Scott PTA      Positioning and Restraints    Pre-Treatment Position --   sitting EOB working with OT  -RM      Post Treatment Position chair  -RM      In Chair reclined;call light within reach;encouraged to call for assist;exit alarm on;R heel elevated;LLE elevated  -RM      Recorded by [RM] Dipak Scott PTA        User Key  (r) = Recorded By, (t) = Taken By, (c) = Cosigned By    Initials Name Effective Dates     Dipak Scott, SANYA 10/26/16 -                 IP PT Goals       12/04/17 1136 12/02/17 1350 11/27/17 1554    Bed Mobility PT LTG    Bed Mobility PT LTG, Date Established  12/02/17  -JG 11/27/17  -JR (r) AH (t) JR (c)    Bed Mobility PT LTG, Time to Achieve   2 wks  -JR (r) AH (t) JR (c)    Bed Mobility PT LTG, Activity Type   all bed mobility  -JR (r) AH (t) JR (c)    Bed Mobility PT LTG, [place] Level   independent  -JR (r) AH (t) JR (c)    Bed Mobility PT LTG, Date Goal Reviewed  12/02/17  -JG     Bed Mobility PT LTG, Outcome  goal ongoing  -JG     Transfer Training PT LTG    Transfer Training PT LTG, Date Established   11/27/17  -JR (r) AH (t) JR (c)    Transfer Training PT LTG, Time to Achieve  2 wks  -JG 2 wks  -JR (r) AH (t) JR (c)    Transfer Training PT LTG, Activity Type  all transfers  -JG bed to chair /chair to bed;sit to stand/stand to sit  -JR (r) AH (t) JR (c)    Transfer Training PT LTG, [place] Level  contact guard assist  -JG conditional independence  -JR (r) AH (t) JR (c)    Transfer Training PT LTG, Assist Device  walker, standard  -JG walker, rolling  - (r) VAHID (t)  (c)    Transfer Training PT LTG,  Additional Goal   following WB precautions (NWB LLE)  -JR (r) VAHID (t)  (c)    Transfer Training PT  LTG, Date Goal Reviewed  12/02/17  -JG     Transfer Training PT LTG, Outcome goal ongoing  -RM      Gait Training PT LTG    Gait Training Goal PT LTG, Date Established  12/02/17  -JG 11/27/17  -JR (r) VAHID (t)  (c)    Gait Training Goal PT LTG, Time to Achieve  2 wks  -JG 2 wks  -JR (r) VAHID (t)  (c)    Gait Training Goal PT LTG, [place] Level  minimum assist (75% patient effort)  -JG contact guard assist  -JR (r) VAHID (t)  (c)    Gait Training Goal PT LTG, Assist Device  walker, standard  -JG walker, rolling  -JR (r) VAHID (t)  (c)    Gait Training Goal PT LTG, Distance to Achieve  10 feet  -JG 10  -JR (r) VAHID (t) JR (c)    Gait Training Goal PT LTG, Additional Goal   with WB restrictions (NWB LLE)  -JR (r) VAHID (t) JR (c)    Gait Training Goal PT LTG, Date Goal Reviewed  12/02/17  -JG     Gait Training Goal PT LTG, Outcome goal ongoing  -RM        User Key  (r) = Recorded By, (t) = Taken By, (c) = Cosigned By    Initials Name Provider Type    JG Kemar Geiger, PT Physical Therapist    JR Sia Whaley, PT Physical Therapist    RM Dipak Scott, PTA Physical Therapy Assistant     Caro Christine, PT Student PT Student          Physical Therapy Education     Title: PT OT SLP Therapies (Done)     Topic: Physical Therapy (Done)     Point: Mobility training (Done)    Learning Progress Summary    Learner Readiness Method Response Comment Documented by Status   Patient Acceptance E VU,NR pt instructed on safety with transfers  12/02/17 1450 Done    Acceptance E VU,NR LACE education  11/28/17 1818 Done    Acceptance E VU PT benefits and POC.  11/27/17 1553 Done   Family Acceptance E VU,NR pt instructed on safety with transfers  12/02/17 1450 Done               Point: Home exercise program (Done)    Learning Progress Summary    Learner Readiness Method Response Comment Documented by Status   Patient Acceptance E,D  VU,NR   12/04/17 1136 Done    Acceptance E VU,NR LACE education  11/28/17 1818 Done    Acceptance E VU PT benefits and POC.  11/27/17 1553 Done               Point: Precautions (Done)    Learning Progress Summary    Learner Readiness Method Response Comment Documented by Status   Patient Acceptance E VU,NR LACE education  11/28/17 1818 Done    Acceptance E VU PT benefits and POC.  11/27/17 1553 Done                      User Key     Initials Effective Dates Name Provider Type Discipline     04/06/17 -  Kemar Geiger, PT Physical Therapist PT     10/26/16 -  Dipak Scott, PTA Physical Therapy Assistant PT     07/10/17 -  Deepti Wadsworth, RN Registered Nurse Nurse     11/01/17 -  Caro Christine, PT Student PT Student PT                    PT Recommendation and Plan  Planned Therapy Interventions: balance training, bed mobility training, home exercise program, gait training, patient/family education, strengthening, transfer training  PT Frequency: daily  Plan of Care Review  Plan Of Care Reviewed With: patient  Progress: improving  Outcome Summary/Follow up Plan: Pt presented with improved activity tolerance with pt able to transfer as well as able to perform hop to gait x 3' from bed to chair. Pt was also able to tolerate ther ex with B LE this treatment. Pt very motivated to progress. See flowsheet for details.            Outcome Measures       12/04/17 1040 12/02/17 1350       How much help from another person do you currently need...    Turning from your back to your side while in flat bed without using bedrails? 4  -RM 4  -JG     Moving from lying on back to sitting on the side of a flat bed without bedrails? 3  -RM 3  -JG     Moving to and from a bed to a chair (including a wheelchair)? 2  -RM 2  -JG     Standing up from a chair using your arms (e.g., wheelchair, bedside chair)? 3  -RM 3  -JG     Climbing 3-5 steps with a railing? 1  -RM 1  -JG     To walk in hospital room? 2  -RM 1  -JG      AM-PAC 6 Clicks Score 15  -RM 14  -JG     Functional Assessment    Outcome Measure Options AM-PAC 6 Clicks Basic Mobility (PT)  -RM AM-PAC 6 Clicks Basic Mobility (PT)  -JG       User Key  (r) = Recorded By, (t) = Taken By, (c) = Cosigned By    Initials Name Provider Type    JG Kemar Geiger, PT Physical Therapist    RM Dipak Scott PTA Physical Therapy Assistant           Time Calculation:         PT Charges       12/04/17 1142          Time Calculation    Start Time 1040  -RM      PT Received On 12/04/17  -RM      PT Goal Re-Cert Due Date 12/16/17  -RM      Time Calculation- PT    Total Timed Code Minutes- PT 42 minute(s)  -RM        User Key  (r) = Recorded By, (t) = Taken By, (c) = Cosigned By    Initials Name Provider Type    TREY Scott PTA Physical Therapy Assistant          Therapy Charges for Today     Code Description Service Date Service Provider Modifiers Qty    86329476662 HC GAIT TRAINING EA 15 MIN 12/4/2017 Dipak Scott PTA GP 1    78193959050 HC PT THER PROC EA 15 MIN 12/4/2017 Dipak Scott PTA GP 1          PT G-Codes  Outcome Measure Options: AM-PAC 6 Clicks Basic Mobility (PT)    Dipak Scott PTA  12/4/2017            Electronically signed by Dipak Scott PTA at 12/4/2017 11:44 AM        Occupational Therapy Notes (last 24 hours) (Notes from 12/3/2017 12:52 PM through 12/4/2017 12:52 PM)     No notes of this type exist for this encounter.

## 2017-12-04 NOTE — THERAPY RE-EVALUATION
Acute Care - Occupational Therapy Re-Evaluation   [facility]     Patient Name: Vasquez Marie  : 1960  MRN: [id number]  Today's Date: 2017  Onset of Illness/Injury or Date of Surgery Date: 17  Date of Referral to OT: 17  Referring Physician: Dr. Sandoval    Admit Date: 2017       ICD-10-CM ICD-9-CM   1. Dehydration E86.0 276.51   2. Impaired functional mobility, balance, gait, and endurance Z74.09 V49.89   3. Peripheral vascular disease I73.9 443.9   4. Subacute osteomyelitis of left foot M86.272 730.07     Patient Active Problem List   Diagnosis   • Chronic hepatitis C virus infection   • Coronary artery disease involving native coronary artery of native heart without angina pectoris   • Chronic atrial fibrillation   • Essential hypertension   • Thrombocytopenia   • Depression with anxiety   • Gastroesophageal reflux disease with esophagitis   • Hyperlipidemia   • Tobacco abuse   • COPD (chronic obstructive pulmonary disease)   • Gout   • Congestive heart disease   • RLS (restless legs syndrome)   • Rheumatoid arthritis   • Typical atrial flutter   • Atrial flutter   • Cerebrovascular accident (CVA)   • Borderline diabetes   • Anemia   • Peripheral vascular disease   • Subacute osteomyelitis of left foot   • Liver mass   • Primary insomnia   • Dehydration   • Acute renal failure   • Status post transmetatarsal amputation of foot, left     Past Medical History:   Diagnosis Date   • Abdominal pain     History of epigastric abdominal tenderness. Differentials include peptic ulcer disease,   pancreatobiliary disease.   • Abnormal LFTs    • Anemia    • Anxiety    • Arthritis    • Asthma    • Atrial fibrillation    • CAD (coronary artery disease)    • Calf cramp    • CKD (chronic kidney disease)    • COPD (chronic obstructive pulmonary disease)    • CVA (cerebral infarction)    • Depression    • Diabetes mellitus    • DVT (deep venous thrombosis)    • Electrocution    • Fatigue    •  Hepatitis C    • History of allergy    • History of blood transfusion    • Hyperlipidemia    • Hypertension    • Leg pain    • Loss of appetite    • PUD (peptic ulcer disease)    • Stroke syndrome    • Tachycardia    • Thrombocytopenia    • Thrombophlebitis of deep femoral vein    • Vision problems      Past Surgical History:   Procedure Laterality Date   • AMPUTATION DIGIT Left 6/23/2017    Procedure: amputation of left foot third digit, left foot wound debridments and grafting;  Surgeon: Wolfgang Campbell DPM;  Location: [facility] OR;  Service:    • ANKLE SURGERY     • BELOW KNEE AMPUTATION Left 12/1/2017    Procedure: AMPUTATION BELOW KNEE;  Surgeon: Ross Real MD;  Location: [facility] OR;  Service:    • CARDIAC CATHETERIZATION N/A 6/20/2017    Procedure: Peripheral angiography;  Surgeon: Jonathan Storm MD;  Location: [facility] CATH INVASIVE LOCATION;  Service:    • CARDIAC CATHETERIZATION N/A 6/20/2017    Procedure: Angioplasty-peripheral;  Surgeon: Jonathan Storm MD;  Location: [facility] CATH INVASIVE LOCATION;  Service:    • CARDIAC SURGERY     • CORONARY STENT PLACEMENT     • INCISION AND DRAINAGE FOOT Left 8/29/2017    Procedure: Incision and drainage/wound debridement of left foot, wound VAC application, skin graft application;  Surgeon: Wolfgang Campbell DPM;  Location: [facility] OR;  Service:    • TONSILLECTOMY     • TOTAL HIP ARTHROPLASTY Left    • TOTAL SHOULDER REPLACEMENT Left           OT ASSESSMENT FLOWSHEET (last 72 hours)      OT Evaluation       12/04/17 1040 12/04/17 1035 12/04/17 0400 12/04/17 0000 12/03/17 2030    Rehab Evaluation    Document Type therapy note (daily note)  -RM re-evaluation  -SD       Subjective Information agree to therapy;complains of;weakness  -RM agree to therapy;complains of;pain  -SD       Patient Effort, Rehab Treatment adequate  -RM good  -SD       Symptoms Noted During/After Treatment fatigue  -RM fatigue  -SD       General Information    General Observations   Supine in bed  -SD       Pertinent History Of Current Problem  Patient seen for OT re-eval following L BKA  -SD       Precautions/Limitations fall precautions  -RM fall precautions;oxygen therapy device and L/min  -SD       Prior Level of Function  independent:;all household mobility;ADL's  -SD       Muscle Tone Assessment    Muscle Tone Assessment   LLE  -TN LLE  -TN LLE  -TN    LLE Muscle Tone Assessment   other (see comments)   BKA  -TN other (see comments)   BKA  -TN other (see comments)   BKA  -TN    Bed Mobility, Assessment/Treatment    Bed Mobility, Assistive Device  bed rails;head of bed elevated  -SD       Bed Mob, Supine to Sit, [place]  contact guard assist  -SD       Bed Mobility, Safety Issues  decreased use of arms for pushing/pulling;decreased use of legs for bridging/pushing;impaired trunk control for bed mobility  -SD       Bed Mobility, Impairments  strength decreased;impaired balance  -SD       Transfer Assessment/Treatment    Transfers, Bed-Chair [place]  minimum assist (75% patient effort)  -SD       Transfers, Bed-Chair-Bed, Assist Device  rolling walker  -SD       Transfers, Sit-Stand [place] minimum assist (75% patient effort)  -RM minimum assist (75% patient effort)  -SD       Transfers, Stand-Sit [place] minimum assist (75% patient effort);2 person assist required  -RM minimum assist (75% patient effort)  -SD       Transfers, Sit-Stand-Sit, Assist Device standard walker  -RM rolling walker  -SD       Transfer, Safety Issues  balance decreased during turns;sequencing ability decreased;step length decreased  -SD       Transfer, Impairments  strength decreased;impaired balance;coordination impaired  -SD       Functional Mobility    Functional Mobility- [place]. Level  minimum assist (75% patient effort)  -SD       Functional Mobility- Device  rolling walker  -SD       Functional Mobility-Distance (Feet)  3  -SD       Functional Mobility- Safety Issues  balance decreased during  turns;sequencing ability decreased  -SD       Upper Body Bathing Assessment/Training    UB Bathing Assess/Train, [place] Level  contact guard assist  -SD       Lower Body Bathing Assessment/Training    LB Bathing Assess/Train, [place] Level  moderate assist (50% patient effort)  -SD       Upper Body Dressing Assessment/Training    UB Dressing Assess/Train, [place]  contact guard assist  -SD       Lower Body Dressing Assessment/Training    LB Dressing Assess/Train, [place]  moderate assist (50% patient effort)  -SD       Toileting Assessment/Training    Toileting Assess/Train, [place] Level  moderate assist (50% patient effort)  -SD       Grooming Assessment/Training    Grooming Assess/Train, [place] Level  set up required  -SD       Therapy Exercises    Right Lower Extremity AROM:;15 reps;quad sets;heel slides;hip extension   hip ext graded resistance  -RM        Left Lower Extremity AROM:;15 reps;quad sets;SAQ   graded resistance SAQ  -RM        Bilateral Upper Extremity  AAROM:;15 reps;shoulder abduction/adduction;shoulder extension/flexion;elbow flexion/extension  -SD       BUE Resistance  theraband  -SD       Positioning and Restraints    Pre-Treatment Position --   sitting EOB working with OT  -RM in bed  -SD       Post Treatment Position chair  -RM chair  -SD       In Chair reclined;call light within reach;encouraged to call for assist;exit alarm on;R heel elevated;LLE elevated  -RM sitting;call light within reach;encouraged to call for assist;with PT  -SD         12/02/17 1350                Rehab Evaluation    Document Type re-evaluation  -JG        Subjective Information agree to therapy  -JG        Patient Effort, Rehab Treatment adequate  -JG        Symptoms Noted During/After Treatment fatigue  -JG        General Information    Patient Profile Review yes  -JG        Onset of Illness/Injury or Date of Surgery Date 11/25/17  -JG        Pertinent History Of Current Problem s/p left BKA   -JG        Precautions/Limitations fall precautions  -JG        Plans/Goals Discussed With patient;family;agreed upon  -JG        Risks Reviewed patient and family:;increased discomfort  -JG        Benefits Reviewed patient and family:;increase strength;increase independence  -JG        Pain Assessment    Pain Assessment 0-10  -JG        Pain Score 9  -JG        Pain Location Leg  -JG        Pain Orientation Left  -JG        Pain Intervention(s) Elevated  -JG        ROM (Range of Motion)    General ROM upper extremity range of motion deficits identified   shoulders  -JG        MMT (Manual Muscle Testing)    General MMT Assessment upper extremity strength deficits identified;lower extremity strength deficits identified  -JG        Bed Mobility, Assessment/Treatment    Bed Mob, Supine to Sit, [place] contact guard assist  -JG        Transfer Assessment/Treatment    Transfers, Sit-Stand [place] minimum assist (75% patient effort)  -JG        Transfers, Stand-Sit [place] minimum assist (75% patient effort)  -JG        Positioning and Restraints    Pre-Treatment Position in bed  -JG        Post Treatment Position bed   chair position  -JG          User Key  (r) = Recorded By, (t) = Taken By, (c) = Cosigned By    Initials Name Effective Dates    J Kemar Geiger, PT 04/06/17 -     TN Cary Whitaker, RN 10/26/16 -     RM Dipak Scott, PTA 10/26/16 -     CADE Olmos OT 06/30/17 -            Occupational Therapy Education     Title: PT OT SLP Therapies (Done)     Topic: Occupational Therapy (Done)     Point: ADL training (Done)    Description: Instruct learner(s) on proper safety adaptation and remediation techniques during self care or transfers.   Instruct in proper use of assistive devices.    Learning Progress Summary    Learner Readiness Method Response Comment Documented by Status   Patient Acceptance E VU Benefits of OT and OT POC SD 12/04/17 1316 Done    Acceptance E VU,NR LACE education   11/28/17 1818 Done    Acceptance E VU Role of OT/POC  11/27/17 1538 Done               Point: Home exercise program (Done)    Description: Instruct learner(s) on appropriate technique for monitoring, assisting and/or progressing therapeutic exercises/activities.    Learning Progress Summary    Learner Readiness Method Response Comment Documented by Status   Patient Acceptance E VU,NR LACE education  11/28/17 1818 Done               Point: Precautions (Done)    Description: Instruct learner(s) on prescribed precautions during self-care and functional transfers.    Learning Progress Summary    Learner Readiness Method Response Comment Documented by Status   Patient Acceptance E VU,NR LACE education  11/28/17 1818 Done                      User Key     Initials Effective Dates Name Provider Type Discipline     10/26/16 -  Nury Sullivan Occupational Therapist OT    SD 06/30/17 -  Elsy Olmos OT Occupational Therapist OT     07/10/17 -  Deepti Wadsworth RN Registered Nurse Nurse                  OT Recommendation and Plan  Anticipated Discharge Disposition: inpatient rehabilitation facility  Planned Therapy Interventions: ADL retraining, strengthening, transfer training  Therapy Frequency: 3-5 times/wk  Plan of Care Review  Plan Of Care Reviewed With: patient  Progress: improving  Outcome Summary/Follow up Plan: OT re-eval completed this date. Patient continues to present deficits in strength, endurance, balance, mobility and ADL performance following L BKA. Patient requires min A for functional transfers and completed 3' functional mobility EOB to chair this date using RW. Cont OT POC.           OT Goals       12/04/17 1309 11/27/17 1541       Bed Mobility OT LTG    Bed Mobility OT LTG, Date Established 12/04/17  -SD 11/27/17  -     Bed Mobility OT LTG, Time to Achieve 2 wks  -SD by discharge  -     Bed Mobility OT LTG, Activity Type supine to sit/sit to supine  -SD supine to sit/sit to supine   -AH     Bed Mobility OT LTG, [place] Level conditional independence  -SD conditional independence  -AH     Bed Mobility OT LTG, Assist Device bed rails  -SD bed rails  -AH     Bed Mobility OT LTG, Outcome goal ongoing  -SD goal ongoing  -     Transfer Training OT LTG    Transfer Training OT LTG, Date Established 12/04/17  -SD 11/27/17  -     Transfer Training OT LTG, Time to Achieve 2 wks  -SD by discharge  -     Transfer Training OT LTG, Activity Type sit to stand/stand to sit;toilet  -SD sit to stand/stand to sit;toilet  -     Transfer Training OT LTG, [place] Level contact guard assist  -SD contact guard assist  -     Transfer Training OT LTG, Assist Device walker, rolling  -SD walker, standard  -     Transfer Training OT LTG, Outcome goal ongoing  -SD goal ongoing  -     Strength OT LTG    Strength Goal OT LTG, Date Established 12/04/17  -SD 11/27/17  -     Strength Goal OT LTG, Time to Achieve 2 wks  -SD by discharge  -     Strength Goal OT LTG, Measure to Achieve Patient will perform 20 reps UB ther ex using red theraband in order to increase strength and endurance.  -SD      Strength Goal OT LTG, Functional Goal  Pt will perform 15 reps BUE strengthening ex using theraband for resistance.  -     Strength Goal OT LTG, Outcome goal ongoing  -SD goal ongoing  -     LB Dressing OT LTG    LB Dressing Goal OT LTG, Date Established 12/04/17  -SD 11/27/17  -     LB Dressing Goal OT LTG, Time to Achieve 2 wks  -SD by discharge  -     LB Dressing Goal OT LTG, [place] Level minimum assist (75% patient effort)  -SD contact guard assist  -AH     LB Dressing Goal OT LTG, Adaptive Equipment sock-aid;reacher  -SD      LB Dressing Goal OT LTG, Outcome goal ongoing  -SD goal ongoing  -     Functional Mobility OT LTG    Functional Mobility Goal OT LTG, Date Established 12/04/17  -SD      Functional Mobility Goal OT LTG, Time to Achieve 2 wks  -SD      Functional Mobility Goal OT LTG,  [place] Level contact guard  -SD      Functional Mobility Goal OT LTG, Assist Device rolling walker  -SD      Functional Mobility Goal OT LTG, Distance to Achieve to the bathroom  -SD      Functional Mobility Goal OT LTG, Outcome goal ongoing  -SD        User Key  (r) = Recorded By, (t) = Taken By, (c) = Cosigned By    Initials Name Provider Type     Nury Sullivan Occupational Therapist    CADE Olmos, OT Occupational Therapist                Outcome Measures       12/04/17 1040 12/04/17 1035 12/02/17 1350    How much help from another person do you currently need...    Turning from your back to your side while in flat bed without using bedrails? 4  -RM  4  -JG    Moving from lying on back to sitting on the side of a flat bed without bedrails? 3  -RM  3  -JG    Moving to and from a bed to a chair (including a wheelchair)? 2  -RM  2  -JG    Standing up from a chair using your arms (e.g., wheelchair, bedside chair)? 3  -RM  3  -JG    Climbing 3-5 steps with a railing? 1  -RM  1  -JG    To walk in hospital room? 2  -RM  1  -JG    AM-PAC 6 Clicks Score 15  -RM  14  -JG    How much help from another is currently needed...    Putting on and taking off regular lower body clothing?  2  -SD     Bathing (including washing, rinsing, and drying)  2  -SD     Toileting (which includes using toilet bed pan or urinal)  2  -SD     Putting on and taking off regular upper body clothing  3  -SD     Taking care of personal grooming (such as brushing teeth)  4  -SD     Eating meals  4  -SD     Score  17  -SD     Functional Assessment    Outcome Measure Options AM-PAC 6 Clicks Basic Mobility (PT)  -RM AM-PAC 6 Clicks Daily Activity (OT)  -SD AM-PAC 6 Clicks Basic Mobility (PT)  -JG      User Key  (r) = Recorded By, (t) = Taken By, (c) = Cosigned By    Initials Name Provider Type    KIMBERLY Geiger, PT Physical Therapist    TREY Scott, PTA Physical Therapy Assistant    CADE Olmos, OT Occupational Therapist           Time Calculation:   OT Start Time: 1035    Therapy Charges for Today     Code Description Service Date Service Provider Modifiers Qty    [id number]  OT RE-EVAL 2 12/4/2017 Elsy Olmos OT GO 1               Elsy Olmos OT  12/4/2017

## 2017-12-04 NOTE — PLAN OF CARE
Problem: Patient Care Overview (Adult)  Goal: Plan of Care Review  Outcome: Ongoing (interventions implemented as appropriate)    12/04/17 1136   Coping/Psychosocial Response Interventions   Plan Of Care Reviewed With patient   Patient Care Overview   Progress improving   Outcome Evaluation   Outcome Summary/Follow up Plan Pt presented with improved activity tolerance with pt able to transfer as well as able to perform hop to gait x 3' from bed to chair. Pt was also able to tolerate ther ex with B LE this treatment. Pt very motivated to progress. See flowsheet for details.          Problem: Inpatient Physical Therapy  Goal: Transfer Training Goal 1 LTG- PT  Outcome: Ongoing (interventions implemented as appropriate)    11/27/17 1554 12/02/17 1350 12/04/17 1136   Transfer Training PT LTG   Transfer Training PT LTG, Date Established 11/27/17 --  --    Transfer Training PT LTG, Time to Achieve --  2 wks --    Transfer Training PT LTG, Activity Type --  all transfers --    Transfer Training PT LTG, [place] Level --  contact guard assist --    Transfer Training PT LTG, Assist Device --  walker, standard --    Transfer Training PT LTG, Additional Goal following WB precautions (NWB LLE) --  --    Transfer Training PT LTG, Date Goal Reviewed --  12/02/17 --    Transfer Training PT LTG, Outcome --  --  goal ongoing       Goal: Gait Training Goal LTG- PT  Outcome: Ongoing (interventions implemented as appropriate)    11/27/17 1554 12/02/17 1350 12/04/17 1136   Gait Training PT LTG   Gait Training Goal PT LTG, Date Established --  12/02/17 --    Gait Training Goal PT LTG, Time to Achieve --  2 wks --    Gait Training Goal PT LTG, [place] Level --  minimum assist (75% patient effort) --    Gait Training Goal PT LTG, Assist Device --  walker, standard --    Gait Training Goal PT LTG, Distance to Achieve --  10 feet --    Gait Training Goal PT LTG, Additional Goal with WB restrictions (NWB LLE) --  --    Gait Training  Goal PT LTG, Date Goal Reviewed --  12/02/17 --    Gait Training Goal PT LTG, Outcome --  --  goal ongoing

## 2017-12-04 NOTE — PLAN OF CARE
Problem: Perioperative Period (Adult)  Goal: Signs and Symptoms of Listed Potential Problems Will be Absent or Manageable (Perioperative Period)  Outcome: Ongoing (interventions implemented as appropriate)    12/01/17 1017   Perioperative Period   Problems Assessed (Perioperative Period) all   Problems Present (Perioperative Period) none         Problem: Skin Integrity Impairment, Risk/Actual (Adult)  Goal: Identify Related Risk Factors and Signs and Symptoms  Outcome: Ongoing (interventions implemented as appropriate)    12/04/17 0420   Skin Integrity Impairment, Risk/Actual   Skin Integrity Impairment, Risk/Actual: Related Risk Factors age extremes;cognitive impairment   Signs and Symptoms (Skin Integrity Impairment) other (see comments)  (SURGICAL INCISION)       Goal: Skin Integrity/Wound Healing  Outcome: Ongoing (interventions implemented as appropriate)    12/04/17 0420   Skin Integrity Impairment, Risk/Actual (Adult)   Skin Integrity/Wound Healing making progress toward outcome

## 2017-12-04 NOTE — PLAN OF CARE
Problem: Patient Care Overview (Adult)  Goal: Plan of Care Review  Outcome: Ongoing (interventions implemented as appropriate)    12/04/17 1309   Coping/Psychosocial Response Interventions   Plan Of Care Reviewed With patient   Patient Care Overview   Progress improving   Outcome Evaluation   Outcome Summary/Follow up Plan OT re-eval completed this date. Patient continues to present deficits in strength, endurance, balance, mobility and ADL performance following L BKA. Patient requires min A for functional transfers and completed 3' functional mobility EOB to chair this date using RW. Cont OT POC.          Problem: Inpatient Occupational Therapy  Goal: Bed Mobility Goal LTG- OT  Outcome: Ongoing (interventions implemented as appropriate)    12/04/17 1309   Bed Mobility OT LTG   Bed Mobility OT LTG, Date Established 12/04/17   Bed Mobility OT LTG, Time to Achieve 2 wks   Bed Mobility OT LTG, Activity Type supine to sit/sit to supine   Bed Mobility OT LTG, [place] Level conditional independence   Bed Mobility OT LTG, Assist Device bed rails   Bed Mobility OT LTG, Outcome goal ongoing       Goal: Transfer Training Goal 1 LTG- OT  Outcome: Revised    12/04/17 1309   Transfer Training OT LTG   Transfer Training OT LTG, Date Established 12/04/17   Transfer Training OT LTG, Time to Achieve 2 wks   Transfer Training OT LTG, Activity Type sit to stand/stand to sit;toilet   Transfer Training OT LTG, [place] Level contact guard assist   Transfer Training OT LTG, Assist Device walker, rolling   Transfer Training OT LTG, Outcome goal ongoing       Goal: Strength Goal LTG- OT  Outcome: Revised    12/04/17 1309   Strength OT LTG   Strength Goal OT LTG, Date Established 12/04/17   Strength Goal OT LTG, Time to Achieve 2 wks   Strength Goal OT LTG, Measure to Achieve Patient will perform 20 reps UB ther ex using red theraband in order to increase strength and endurance.   Strength Goal OT LTG, Outcome goal ongoing        Goal: LB Dressing Goal LTG- OT  Outcome: Revised    12/04/17 1309   LB Dressing OT LTG   LB Dressing Goal OT LTG, Date Established 12/04/17   LB Dressing Goal OT LTG, Time to Achieve 2 wks   LB Dressing Goal OT LTG, [place] Level minimum assist (75% patient effort)   LB Dressing Goal OT LTG, Adaptive Equipment sock-aid;reacher   LB Dressing Goal OT LTG, Outcome goal ongoing       Goal: Functional Mobility Goal LTG- OT  Outcome: Ongoing (interventions implemented as appropriate)    12/04/17 1309   Functional Mobility OT LTG   Functional Mobility Goal OT LTG, Date Established 12/04/17   Functional Mobility Goal OT LTG, Time to Achieve 2 wks   Functional Mobility Goal OT LTG, [place] Level contact guard   Functional Mobility Goal OT LTG, Assist Device rolling walker   Functional Mobility Goal OT LTG, Distance to Achieve to the bathroom   Functional Mobility Goal OT LTG, Outcome goal ongoing

## 2017-12-04 NOTE — PROGRESS NOTES
Continued Stay Note  BRITTANI Espinal     Patient Name: Vasquez Marie  MRN: [id number]  Today's Date: 12/4/2017    Admit Date: 11/25/2017          Discharge Plan       12/04/17 1254    Case Management/Social Work Plan    Additional Comments resent clinicals  to [facility] rehab for  updated request for precert               Discharge Codes     None        Expected Discharge Date and Time     Expected Discharge Date Expected Discharge Time    Dec 5, 2017             Sonali Acuña [place] is working on an updated precert   Director of nursing is requesting onsite visitation that will done to marrow 12/05

## 2017-12-05 LAB
ANION GAP SERPL CALCULATED.3IONS-SCNC: 12.6 MMOL/L
BASOPHILS # BLD AUTO: 0.02 10*3/MM3 (ref 0–0.2)
BASOPHILS NFR BLD AUTO: 0.4 % (ref 0–2.5)
BUN BLD-MCNC: 16 MG/DL (ref 7–20)
BUN/CREAT SERPL: 20 (ref 6.3–21.9)
CALCIUM SPEC-SCNC: 8.4 MG/DL (ref 8.4–10.2)
CHLORIDE SERPL-SCNC: 104 MMOL/L (ref 98–107)
CO2 SERPL-SCNC: 28 MMOL/L (ref 26–30)
CREAT BLD-MCNC: 0.8 MG/DL (ref 0.6–1.3)
DEPRECATED RDW RBC AUTO: 53.1 FL (ref 37–54)
EOSINOPHIL # BLD AUTO: 0.43 10*3/MM3 (ref 0–0.7)
EOSINOPHIL NFR BLD AUTO: 8.4 % (ref 0–7)
ERYTHROCYTE [DISTWIDTH] IN BLOOD BY AUTOMATED COUNT: 16 % (ref 11.5–14.5)
GFR SERPL CREATININE-BSD FRML MDRD: 99 ML/MIN/1.73
GLUCOSE BLD-MCNC: 115 MG/DL (ref 74–98)
GLUCOSE BLDC GLUCOMTR-MCNC: 110 MG/DL (ref 70–130)
GLUCOSE BLDC GLUCOMTR-MCNC: 139 MG/DL (ref 70–130)
GLUCOSE BLDC GLUCOMTR-MCNC: 167 MG/DL (ref 70–130)
GLUCOSE BLDC GLUCOMTR-MCNC: 198 MG/DL (ref 70–130)
HCT VFR BLD AUTO: 29.1 % (ref 42–52)
HGB BLD-MCNC: 9.6 G/DL (ref 14–18)
IMM GRANULOCYTES # BLD: 0.03 10*3/MM3 (ref 0–0.06)
IMM GRANULOCYTES NFR BLD: 0.6 % (ref 0–0.6)
LYMPHOCYTES # BLD AUTO: 1.04 10*3/MM3 (ref 0.6–3.4)
LYMPHOCYTES NFR BLD AUTO: 20.2 % (ref 10–50)
MCH RBC QN AUTO: 30 PG (ref 27–31)
MCHC RBC AUTO-ENTMCNC: 33 G/DL (ref 30–37)
MCV RBC AUTO: 90.9 FL (ref 80–94)
MONOCYTES # BLD AUTO: 0.31 10*3/MM3 (ref 0–0.9)
MONOCYTES NFR BLD AUTO: 6 % (ref 0–12)
NEUTROPHILS # BLD AUTO: 3.31 10*3/MM3 (ref 2–6.9)
NEUTROPHILS NFR BLD AUTO: 64.4 % (ref 37–80)
NRBC BLD MANUAL-RTO: 0 /100 WBC (ref 0–0)
PLATELET # BLD AUTO: 148 10*3/MM3 (ref 130–400)
PMV BLD AUTO: 10.3 FL (ref 6–12)
POTASSIUM BLD-SCNC: 3.6 MMOL/L (ref 3.5–5.1)
RBC # BLD AUTO: 3.2 10*6/MM3 (ref 4.7–6.1)
SODIUM BLD-SCNC: 141 MMOL/L (ref 137–145)
WBC NRBC COR # BLD: 5.14 10*3/MM3 (ref 4.8–10.8)

## 2017-12-05 PROCEDURE — 63710000001 INSULIN ASPART PER 5 UNITS: Performed by: INTERNAL MEDICINE

## 2017-12-05 PROCEDURE — 82962 GLUCOSE BLOOD TEST: CPT

## 2017-12-05 PROCEDURE — 97530 THERAPEUTIC ACTIVITIES: CPT

## 2017-12-05 PROCEDURE — 97116 GAIT TRAINING THERAPY: CPT

## 2017-12-05 PROCEDURE — 97110 THERAPEUTIC EXERCISES: CPT

## 2017-12-05 PROCEDURE — 85025 COMPLETE CBC W/AUTO DIFF WBC: CPT | Performed by: FAMILY MEDICINE

## 2017-12-05 PROCEDURE — 99024 POSTOP FOLLOW-UP VISIT: CPT | Performed by: SURGERY

## 2017-12-05 PROCEDURE — 80048 BASIC METABOLIC PNL TOTAL CA: CPT | Performed by: FAMILY MEDICINE

## 2017-12-05 PROCEDURE — 99232 SBSQ HOSP IP/OBS MODERATE 35: CPT | Performed by: FAMILY MEDICINE

## 2017-12-05 RX ADMIN — Medication 10 ML: at 21:39

## 2017-12-05 RX ADMIN — Medication 250 MG: at 18:58

## 2017-12-05 RX ADMIN — NICOTINE 1 PATCH: 7 PATCH, EXTENDED RELEASE TRANSDERMAL at 18:55

## 2017-12-05 RX ADMIN — GABAPENTIN 300 MG: 300 CAPSULE ORAL at 09:58

## 2017-12-05 RX ADMIN — CLOPIDOGREL BISULFATE 75 MG: 75 TABLET ORAL at 09:57

## 2017-12-05 RX ADMIN — HYDROCODONE BITARTRATE AND ACETAMINOPHEN 1 TABLET: 5; 325 TABLET ORAL at 21:39

## 2017-12-05 RX ADMIN — HYDROCODONE BITARTRATE AND ACETAMINOPHEN 1 TABLET: 5; 325 TABLET ORAL at 04:30

## 2017-12-05 RX ADMIN — PANTOPRAZOLE SODIUM 40 MG: 40 TABLET, DELAYED RELEASE ORAL at 06:45

## 2017-12-05 RX ADMIN — FERROUS SULFATE TAB EC 324 MG (65 MG FE EQUIVALENT) 324 MG: 324 (65 FE) TABLET DELAYED RESPONSE at 08:58

## 2017-12-05 RX ADMIN — Medication 250 MG: at 10:00

## 2017-12-05 RX ADMIN — Medication 10 ML: at 09:56

## 2017-12-05 RX ADMIN — METOPROLOL TARTRATE 25 MG: 25 TABLET ORAL at 09:58

## 2017-12-05 RX ADMIN — Medication 400 MG: at 09:58

## 2017-12-05 RX ADMIN — GABAPENTIN 300 MG: 300 CAPSULE ORAL at 21:39

## 2017-12-05 RX ADMIN — METOPROLOL TARTRATE 25 MG: 25 TABLET ORAL at 21:38

## 2017-12-05 RX ADMIN — INSULIN ASPART 2 UNITS: 100 INJECTION, SOLUTION INTRAVENOUS; SUBCUTANEOUS at 18:30

## 2017-12-05 RX ADMIN — ATORVASTATIN CALCIUM 40 MG: 40 TABLET, FILM COATED ORAL at 21:38

## 2017-12-05 RX ADMIN — ASPIRIN 81 MG: 81 TABLET, COATED ORAL at 09:58

## 2017-12-05 RX ADMIN — HYDROXYCHLOROQUINE SULFATE 200 MG: 200 TABLET, FILM COATED ORAL at 09:57

## 2017-12-05 RX ADMIN — HYDROCODONE BITARTRATE AND ACETAMINOPHEN 1 TABLET: 5; 325 TABLET ORAL at 10:57

## 2017-12-05 RX ADMIN — COLCHICINE 0.6 MG: 0.6 TABLET, FILM COATED ORAL at 09:57

## 2017-12-05 RX ADMIN — INSULIN ASPART 2 UNITS: 100 INJECTION, SOLUTION INTRAVENOUS; SUBCUTANEOUS at 06:45

## 2017-12-05 NOTE — PLAN OF CARE
Problem: Patient Care Overview (Adult)  Goal: Plan of Care Review  Outcome: Ongoing (interventions implemented as appropriate)    12/05/17 1550   Coping/Psychosocial Response Interventions   Plan Of Care Reviewed With patient   Patient Care Overview   Progress improving   Outcome Evaluation   Outcome Summary/Follow up Plan Pt completed increased activity this date with pt able to perform hop to gait as well as progression of ther ex. See flowsheet for details.          Problem: Inpatient Physical Therapy  Goal: Bed Mobility Goal LTG- PT  Outcome: Ongoing (interventions implemented as appropriate)    11/27/17 1554 12/02/17 1350 12/05/17 1550   Bed Mobility PT LTG   Bed Mobility PT LTG, Date Established --  12/02/17 --    Bed Mobility PT LTG, Time to Achieve 2 wks --  --    Bed Mobility PT LTG, Activity Type all bed mobility --  --    Bed Mobility PT LTG, [place] Level independent --  --    Bed Mobility PT LTG, Date Goal Reviewed --  12/02/17 --    Bed Mobility PT LTG, Outcome --  --  goal partially met       Goal: Transfer Training Goal 1 LTG- PT  Outcome: Ongoing (interventions implemented as appropriate)    11/27/17 1554 12/02/17 1350 12/05/17 1550   Transfer Training PT LTG   Transfer Training PT LTG, Date Established 11/27/17 --  --    Transfer Training PT LTG, Time to Achieve --  2 wks --    Transfer Training PT LTG, Activity Type --  all transfers --    Transfer Training PT LTG, [place] Level --  contact guard assist --    Transfer Training PT LTG, Assist Device --  walker, standard --    Transfer Training PT LTG, Additional Goal following WB precautions (NWB LLE) --  --    Transfer Training PT LTG, Date Goal Reviewed --  12/02/17 --    Transfer Training PT LTG, Outcome --  --  goal partially met       Goal: Gait Training Goal LTG- PT  Outcome: Ongoing (interventions implemented as appropriate)    11/27/17 1554 12/02/17 1350 12/05/17 1550   Gait Training PT LTG   Gait Training Goal PT LTG, Date  Established --  12/02/17 --    Gait Training Goal PT LTG, Time to Achieve --  2 wks --    Gait Training Goal PT LTG, [place] Level --  minimum assist (75% patient effort) --    Gait Training Goal PT LTG, Assist Device --  walker, standard --    Gait Training Goal PT LTG, Distance to Achieve --  10 feet --    Gait Training Goal PT LTG, Additional Goal with WB restrictions (NWB LLE) --  --    Gait Training Goal PT LTG, Date Goal Reviewed --  12/02/17 --    Gait Training Goal PT LTG, Outcome --  --  goal ongoing

## 2017-12-05 NOTE — PLAN OF CARE
Problem: Patient Care Overview (Adult)  Goal: Plan of Care Review  Outcome: Ongoing (interventions implemented as appropriate)    12/05/17 1521   Coping/Psychosocial Response Interventions   Plan Of Care Reviewed With patient   Patient Care Overview   Progress improving   Outcome Evaluation   Outcome Summary/Follow up Plan OT tx completed. Patient completed bed mob with cond ind, followed by 10 sit to stands at EOB using RW with min A for safety and sequencing. Patient completed UB ther ex using red therband. Cont OT POC.          Problem: Inpatient Occupational Therapy  Goal: Bed Mobility Goal LTG- OT  Outcome: Outcome(s) achieved Date Met:  12/05/17 12/04/17 1309 12/05/17 1521   Bed Mobility OT LTG   Bed Mobility OT LTG, Date Established 12/04/17 --    Bed Mobility OT LTG, Time to Achieve 2 wks --    Bed Mobility OT LTG, Activity Type supine to sit/sit to supine --    Bed Mobility OT LTG, [place] Level conditional independence --    Bed Mobility OT LTG, Assist Device bed rails --    Bed Mobility OT LTG, Date Goal Reviewed --  12/05/17   Bed Mobility OT LTG, Outcome --  goal met       Goal: Transfer Training Goal 1 LTG- OT  Outcome: Ongoing (interventions implemented as appropriate)    12/04/17 1309 12/05/17 1521   Transfer Training OT LTG   Transfer Training OT LTG, Date Established 12/04/17 --    Transfer Training OT LTG, Time to Achieve 2 wks --    Transfer Training OT LTG, Activity Type sit to stand/stand to sit;toilet --    Transfer Training OT LTG, [place] Level contact guard assist --    Transfer Training OT LTG, Assist Device walker, rolling --    Transfer Training OT LTG, Date Goal Reviewed --  12/05/17   Transfer Training OT LTG, Outcome --  goal ongoing       Goal: Strength Goal LTG- OT  Outcome: Ongoing (interventions implemented as appropriate)    11/27/17 1541 12/04/17 1309 12/05/17 1521   Strength OT LTG   Strength Goal OT LTG, Date Established --  12/04/17 --    Strength Goal OT LTG,  Time to Achieve --  2 wks --    Strength Goal OT LTG, Measure to Achieve --  Patient will perform 20 reps UB ther ex using red theraband in order to increase strength and endurance. --    Strength Goal OT LTG, Functional Goal Pt will perform 15 reps BUE strengthening ex using theraband for resistance. --  --    Strength Goal OT LTG, Date Goal Reviewed --  --  12/05/17   Strength Goal OT LTG, Outcome --  --  goal ongoing       Goal: LB Dressing Goal LTG- OT  Outcome: Ongoing (interventions implemented as appropriate)    12/04/17 1309 12/05/17 1521   LB Dressing OT LTG   LB Dressing Goal OT LTG, Date Established 12/04/17 --    LB Dressing Goal OT LTG, Time to Achieve 2 wks --    LB Dressing Goal OT LTG, [place] Level minimum assist (75% patient effort) --    LB Dressing Goal OT LTG, Adaptive Equipment sock-aid;reacher --    LB Dressing Goal OT LTG, Date Goal Reviewed --  12/05/17   LB Dressing Goal OT LTG, Outcome --  goal ongoing       Goal: Functional Mobility Goal LTG- OT  Outcome: Ongoing (interventions implemented as appropriate)    12/04/17 1309 12/05/17 1521   Functional Mobility OT LTG   Functional Mobility Goal OT LTG, Date Established 12/04/17 --    Functional Mobility Goal OT LTG, Time to Achieve 2 wks --    Functional Mobility Goal OT LTG, [place] Level contact guard --    Functional Mobility Goal OT LTG, Assist Device rolling walker --    Functional Mobility Goal OT LTG, Distance to Achieve to the bathroom --    Functional Mobility Goal OT LTG, Date Goal Reviewed --  12/05/17   Functional Mobility Goal OT LTG, Outcome --  goal ongoing

## 2017-12-05 NOTE — PROGRESS NOTES
LOS: 10 days   Patient Care Team:  Marilyn K Vermeesch, MD as PCP - General (Internal Medicine)           HPI: Shouldn't without complaints relating to his leg.  Good pain control    ROS:    Vital Signs  Temp:  [97.9 °F (36.6 °C)-98.4 °F (36.9 °C)] 98 °F (36.7 °C)  Heart Rate:  [69-79] 71  Resp:  [16-20] 18  BP: (119-139)/(68-85) 120/78    Physical Exam:     General Appearance:  Alert and cooperative, not in any acute distress.   Musculoskeletal/ Extremities:   Left below-knee agitation was examined.  Wound is clean dry and intact.  No evidence of infection.  Healing nicely.  Patient is keeping the leg straight.       Results Review:       Lab Results (last 24 hours)     Procedure Component Value Units Date/Time    POC Glucose Fingerstick [[id number]]  (Abnormal) Collected:  12/04/17 1626    Specimen:  Blood Updated:  12/04/17 1640     Glucose 163 (H) mg/dL       Serial Number: [id number]:  [id number]       POC Glucose Fingerstick [[id number]]  (Normal) Collected:  12/04/17 2101    Specimen:  Blood Updated:  12/04/17 2210     Glucose 129 mg/dL       Serial Number: [id number]:  [id number]       CBC & Differential [[id number]] Collected:  12/05/17 0518    Specimen:  Blood Updated:  12/05/17 0619    Narrative:       The following orders were created for panel order CBC & Differential.  Procedure                               Abnormality         Status                     ---------                               -----------         ------                     CBC Auto Differential[[id number]]        Abnormal            Final result                 Please view results for these tests on the individual orders.    CBC Auto Differential [[id number]]  (Abnormal) Collected:  12/05/17 0518    Specimen:  Blood Updated:  12/05/17 0619     WBC 5.14 10*3/mm3      RBC 3.20 (L) 10*6/mm3      Hemoglobin 9.6 (L) g/dL      Hematocrit 29.1 (L) %      MCV 90.9 fL      MCH 30.0 pg      MCHC 33.0 g/dL      RDW 16.0 (H) %      RDW-SD  53.1 fl      MPV 10.3 fL      Platelets 148 10*3/mm3      Neutrophil % 64.4 %      Lymphocyte % 20.2 %      Monocyte % 6.0 %      Eosinophil % 8.4 (H) %      Basophil % 0.4 %      Immature Grans % 0.6 %      Neutrophils, Absolute 3.31 10*3/mm3      Lymphocytes, Absolute 1.04 10*3/mm3      Monocytes, Absolute 0.31 10*3/mm3      Eosinophils, Absolute 0.43 10*3/mm3      Basophils, Absolute 0.02 10*3/mm3      Immature Grans, Absolute 0.03 10*3/mm3      nRBC 0.0 /100 WBC     Basic Metabolic Panel [[id number]]  (Abnormal) Collected:  12/05/17 0518    Specimen:  Blood Updated:  12/05/17 0629     Glucose 115 (H) mg/dL      BUN 16 mg/dL      Creatinine 0.80 mg/dL      Sodium 141 mmol/L      Potassium 3.6 mmol/L      Chloride 104 mmol/L      CO2 28.0 mmol/L      Calcium 8.4 mg/dL      eGFR Non African Amer 99 mL/min/1.73      BUN/Creatinine Ratio 20.0     Anion Gap 12.6 mmol/L     Narrative:       Abnormal estimated GFR should be followed by more specific studies to confirm end stage chronic renal disease. The equation used for calculation may not be accurate for patients less than 19 years old, greater than 70 years old, patients at extremes of weight, malnutrition, or with acute renal dysfunction.    POC Glucose Fingerstick [[id number]]  (Abnormal) Collected:  12/05/17 0616    Specimen:  Blood Updated:  12/05/17 0631     Glucose 167 (H) mg/dL       Serial Number: [id number]:  [id number]       POC Glucose Fingerstick [[id number]]  (Abnormal) Collected:  12/05/17 1119    Specimen:  Blood Updated:  12/05/17 1141     Glucose 139 (H) mg/dL       Serial Number: [id number]:  [id number]                 Assessment/Plan : Patient is postoperative after a left below-knee amputation postoperative day #4.  Wound is healing nicely and he is doing his exercises.  He can go to rehabilitation as soon as he is able to.  He can follow-up with me for staple removal sometime next week.    Principal Problem:    Acute renal failure  Active  Problems:    Chronic hepatitis C virus infection    Coronary artery disease involving native coronary artery of native heart without angina pectoris    Chronic atrial fibrillation    Essential hypertension    COPD (chronic obstructive pulmonary disease)    Peripheral vascular disease    Dehydration    Status post transmetatarsal amputation of foot, left        Ross Real MD  12/05/17  3:35 PM

## 2017-12-05 NOTE — THERAPY TREATMENT NOTE
Acute Care - Occupational Therapy Treatment Note   [facility]     Patient Name: Vasquez Marie  : 1960  MRN: [id number]  Today's Date: 2017  Onset of Illness/Injury or Date of Surgery Date: 17  Date of Referral to OT: 17  Referring Physician: Dr. Sandoval      Admit Date: 2017    Visit Dx:     ICD-10-CM ICD-9-CM   1. Dehydration E86.0 276.51   2. Impaired functional mobility, balance, gait, and endurance Z74.09 V49.89   3. Peripheral vascular disease I73.9 443.9   4. Subacute osteomyelitis of left foot M86.272 730.07     Patient Active Problem List   Diagnosis   • Chronic hepatitis C virus infection   • Coronary artery disease involving native coronary artery of native heart without angina pectoris   • Chronic atrial fibrillation   • Essential hypertension   • Thrombocytopenia   • Depression with anxiety   • Gastroesophageal reflux disease with esophagitis   • Hyperlipidemia   • Tobacco abuse   • COPD (chronic obstructive pulmonary disease)   • Gout   • Congestive heart disease   • RLS (restless legs syndrome)   • Rheumatoid arthritis   • Typical atrial flutter   • Atrial flutter   • Cerebrovascular accident (CVA)   • Borderline diabetes   • Anemia   • Peripheral vascular disease   • Subacute osteomyelitis of left foot   • Liver mass   • Primary insomnia   • Dehydration   • Acute renal failure   • Status post transmetatarsal amputation of foot, left             Adult Rehabilitation Note       17 1121 17 1040       Rehab Assessment/Intervention    Discipline occupational therapist  -SD physical therapy assistant  -RM     Document Type therapy note (daily note)  -SD therapy note (daily note)  -RM     Subjective Information agree to therapy;complains of;pain  -SD agree to therapy;complains of;weakness  -RM     Patient Effort, Rehab Treatment good  -SD adequate  -RM     Symptoms Noted During/After Treatment fatigue  -SD fatigue  -RM     Precautions/Limitations fall  precautions  -SD fall precautions  -RM     Recorded by [SD] Elsy Olmos OT [RM] Dipak Scott PTA     Pain Assessment    Pain Assessment 0-10  -SD      Pain Score 8  -SD      Post Pain Score 8  -SD      Pain Type Chronic pain  -SD      Pain Location Back  -SD      Pain Intervention(s) Repositioned  -SD      Response to Interventions Tolerated  -SD      Recorded by [SD] Elsy Olmos OT      Bed Mobility, Assessment/Treatment    Bed Mobility, Assistive Device bed rails;head of bed elevated  -SD      Bed Mob, Supine to Sit, [place] conditional independence  -SD      Bed Mob, Sit to Supine, [place] conditional independence  -SD      Recorded by [SD] Elsy Olmos OT      Transfer Assessment/Treatment    Transfers, Sit-Stand [place] minimum assist (75% patient effort);verbal cues required  -SD minimum assist (75% patient effort)  -RM     Transfers, Stand-Sit [place] minimum assist (75% patient effort);verbal cues required  -SD minimum assist (75% patient effort);2 person assist required  -RM     Transfers, Sit-Stand-Sit, Assist Device rolling walker  -SD standard walker  -RM     Transfer, Comment x10 at EOB with VCs for sequ  -SD      Recorded by [SD] Elsy Olmos OT [RM] Dipak Scott PTA     Gait Assessment/Treatment    Gait, [place] Level  minimum assist (75% patient effort);2 person assist required  -RM     Gait, Assistive Device  standard walker  -RM     Gait, Distance (Feet)  3  -RM     Gait, Gait Pattern Analysis  2-point gait  -RM     Gait, Gait Deviations  decreased heel strike;right:;knee buckling;toe-to-floor clearance decreased;weight-shifting ability decreased  -RM     Gait, Safety Issues  loses balance backward;weight-shifting ability decreased;sequencing ability decreased;supplemental O2  -RM     Gait, Impairments  impaired balance;strength decreased  -RM     Recorded by  [RM] Dipak Scott PTA     Toileting Assessment/Training    Toileting Assess/Train,  Assistive Device urinal  -SD      Toileting Assess/Train, Position edge of bed  -SD      Toileting Assess/Train, Indepen Level set up required  -SD      Recorded by [SD] Elsy Olmos OT      Therapy Exercises    Right Lower Extremity  AROM:;15 reps;quad sets;heel slides;hip extension   hip ext graded resistance  -RM     Left Lower Extremity  AROM:;15 reps;quad sets;SAQ   graded resistance SAQ  -RM     Bilateral Upper Extremity AROM:;15 reps  -SD      BUE Resistance theraband  -SD      Recorded by [SD] Elsy Olmos, OT [RM] Dipak Scott PTA     Positioning and Restraints    Pre-Treatment Position in bed  -SD --   sitting EOB working with OT  -RM     Post Treatment Position bed  -SD chair  -RM     In Bed sitting EOB;call light within reach;encouraged to call for assist;exit alarm on  -SD      In Chair  reclined;call light within reach;encouraged to call for assist;exit alarm on;R heel elevated;LLE elevated  -RM     Recorded by [SD] Elsy Olmos OT [RM] Dipak Scott PTA       User Key  (r) = Recorded By, (t) = Taken By, (c) = Cosigned By    Initials Name Effective Dates    RM Dipak Scott, PTA 10/26/16 -     SD Elsy Olmos OT 06/30/17 -                 OT Goals       12/05/17 1521 12/04/17 1309 11/27/17 1541    Bed Mobility OT LTG    Bed Mobility OT LTG, Date Established  12/04/17  -SD 11/27/17  -    Bed Mobility OT LTG, Time to Achieve  2 wks  -SD by discharge  -    Bed Mobility OT LTG, Activity Type  supine to sit/sit to supine  -SD supine to sit/sit to supine  -    Bed Mobility OT LTG, [place] Level  conditional independence  -SD conditional independence  -    Bed Mobility OT LTG, Assist Device  bed rails  -SD bed rails  -    Bed Mobility OT LTG, Date Goal Reviewed 12/05/17  -SD      Bed Mobility OT LTG, Outcome goal met  -SD goal ongoing  -SD goal ongoing  -    Transfer Training OT LTG    Transfer Training OT LTG, Date Established  12/04/17  -SD 11/27/17  -     Transfer Training OT LTG, Time to Achieve  2 wks  -SD by discharge  -    Transfer Training OT LTG, Activity Type  sit to stand/stand to sit;toilet  -SD sit to stand/stand to sit;toilet  -    Transfer Training OT LTG, [place] Level  contact guard assist  -SD contact guard assist  -    Transfer Training OT LTG, Assist Device  walker, rolling  -SD walker, standard  -    Transfer Training OT LTG, Date Goal Reviewed 12/05/17  -SD      Transfer Training OT LTG, Outcome goal ongoing  -SD goal ongoing  -SD goal ongoing  -    Strength OT LTG    Strength Goal OT LTG, Date Established  12/04/17  -SD 11/27/17  -    Strength Goal OT LTG, Time to Achieve  2 wks  -SD by discharge  -    Strength Goal OT LTG, Measure to Achieve  Patient will perform 20 reps UB ther ex using red theraband in order to increase strength and endurance.  -SD     Strength Goal OT LTG, Functional Goal   Pt will perform 15 reps BUE strengthening ex using theraband for resistance.  -    Strength Goal OT LTG, Date Goal Reviewed 12/05/17  -SD      Strength Goal OT LTG, Outcome goal ongoing  -SD goal ongoing  -SD goal ongoing  -    LB Dressing OT LTG    LB Dressing Goal OT LTG, Date Established  12/04/17  -SD 11/27/17  -    LB Dressing Goal OT LTG, Time to Achieve  2 wks  -SD by discharge  -    LB Dressing Goal OT LTG, [place] Level  minimum assist (75% patient effort)  -SD contact guard assist  -    LB Dressing Goal OT LTG, Adaptive Equipment  sock-aid;reacher  -SD     LB Dressing Goal OT LTG, Date Goal Reviewed 12/05/17  -SD      LB Dressing Goal OT LTG, Outcome goal ongoing  -SD goal ongoing  -SD goal ongoing  -    Functional Mobility OT LTG    Functional Mobility Goal OT LTG, Date Established  12/04/17  -SD     Functional Mobility Goal OT LTG, Time to Achieve  2 wks  -SD     Functional Mobility Goal OT LTG, [place] Level  contact guard  -SD     Functional Mobility Goal OT LTG, Assist Device  rolling walker  -SD      Functional Mobility Goal OT LTG, Distance to Achieve  to the bathroom  -SD     Functional Mobility Goal OT LTG, Date Goal Reviewed 12/05/17  -SD      Functional Mobility Goal OT LTG, Outcome goal ongoing  -SD goal ongoing  -SD       User Key  (r) = Recorded By, (t) = Taken By, (c) = Cosigned By    Initials Name Provider Type     Nury Sullivan Occupational Therapist    CADE Olmos OT Occupational Therapist          Occupational Therapy Education     Title: PT OT SLP Therapies (Active)     Topic: Occupational Therapy (Active)     Point: ADL training (Active)    Description: Instruct learner(s) on proper safety adaptation and remediation techniques during self care or transfers.   Instruct in proper use of assistive devices.    Learning Progress Summary    Learner Readiness Method Response Comment Documented by Status   Patient Acceptance E NR Safety/sequencing during functional transfers using RW. SD 12/05/17 1525 Active    Acceptance E VU  MM 12/04/17 1859 Done    Acceptance E VU Benefits of OT and OT POC SD 12/04/17 1316 Done    Acceptance E VU,NR LACE education  11/28/17 1818 Done    Acceptance E VU Role of OT/POC  11/27/17 1538 Done               Point: Home exercise program (Done)    Description: Instruct learner(s) on appropriate technique for monitoring, assisting and/or progressing therapeutic exercises/activities.    Learning Progress Summary    Learner Readiness Method Response Comment Documented by Status   Patient Acceptance E VU  MM 12/04/17 1859 Done    Acceptance E VU,NR LACE education  11/28/17 1818 Done               Point: Precautions (Done)    Description: Instruct learner(s) on prescribed precautions during self-care and functional transfers.    Learning Progress Summary    Learner Readiness Method Response Comment Documented by Status   Patient Acceptance E VU  MM 12/04/17 1859 Done    Acceptance E VU,NR LACE education  11/28/17 1818 Done                      User Key      Initials Effective Dates Name Provider Type Discipline     10/26/16 -  Nury Sullivan Occupational Therapist OT    MM 10/26/16 -  Apurva Raygoza, RN Registered Nurse Nurse    SD 06/30/17 -  Elsy Olmos OT Occupational Therapist OT    CF 07/10/17 -  Deepti Wadsworth RN Registered Nurse Nurse                  OT Recommendation and Plan  Anticipated Discharge Disposition: inpatient rehabilitation facility  Planned Therapy Interventions: ADL retraining, strengthening, transfer training  Therapy Frequency: 3-5 times/wk  Plan of Care Review  Plan Of Care Reviewed With: patient  Progress: improving  Outcome Summary/Follow up Plan: OT tx completed. Patient completed bed mob with cond ind, followed by 10 sit to stands at EOB using RW with min A for safety and sequencing. Patient completed UB ther ex using red therband. Cont OT POC.         Outcome Measures       12/05/17 1121 12/04/17 1040 12/04/17 1035    How much help from another person do you currently need...    Turning from your back to your side while in flat bed without using bedrails?  4  -RM     Moving from lying on back to sitting on the side of a flat bed without bedrails?  3  -RM     Moving to and from a bed to a chair (including a wheelchair)?  2  -RM     Standing up from a chair using your arms (e.g., wheelchair, bedside chair)?  3  -RM     Climbing 3-5 steps with a railing?  1  -RM     To walk in hospital room?  2  -RM     AM-PAC 6 Clicks Score  15  -RM     How much help from another is currently needed...    Putting on and taking off regular lower body clothing? 2  -SD  2  -SD    Bathing (including washing, rinsing, and drying) 2  -SD  2  -SD    Toileting (which includes using toilet bed pan or urinal) 2  -SD  2  -SD    Putting on and taking off regular upper body clothing 4  -SD  3  -SD    Taking care of personal grooming (such as brushing teeth) 4  -SD  4  -SD    Eating meals 4  -SD  4  -SD    Score 18  -SD  17  -SD    Functional Assessment     Outcome Measure Options AM-PAC 6 Clicks Daily Activity (OT)  -SD AM-PAC 6 Clicks Basic Mobility (PT)  -RM AM-PAC 6 Clicks Daily Activity (OT)  -SD      User Key  (r) = Recorded By, (t) = Taken By, (c) = Cosigned By    Initials Name Provider Type     Dipak Scott, PTA Physical Therapy Assistant    SD Elsy [place], OT Occupational Therapist           Time Calculation:         Time Calculation- OT       12/05/17 1527          Time Calculation- OT    OT Start Time 1121  -SD      Total Timed Code Minutes- OT 26 minute(s)  -SD      OT Received On 12/05/17  -SD      OT Goal Re-Cert Due Date 12/14/17  -SD        User Key  (r) = Recorded By, (t) = Taken By, (c) = Cosigned By    Initials Name Provider Type    SD Elsy [place], OT Occupational Therapist           Therapy Charges for Today     Code Description Service Date Service Provider Modifiers Qty    [id number]  OT RE-EVAL 2 12/4/2017 Elsy [place], OT GO 1    [id number]  OT THERAPEUTIC ACT EA 15 MIN 12/5/2017 Elsy [place], OT GO 1    [id number]  OT THER PROC EA 15 MIN 12/5/2017 Elsy [place], OT GO 1               Elsy [place], OT  12/5/2017

## 2017-12-05 NOTE — PLAN OF CARE
Problem: Pain, Acute (Adult)  Goal: Acceptable Pain Control/Comfort Level  Outcome: Ongoing (interventions implemented as appropriate)    Problem: Patient Care Overview (Adult)  Goal: Plan of Care Review  Outcome: Ongoing (interventions implemented as appropriate)    12/05/17 8160   Coping/Psychosocial Response Interventions   Plan Of Care Reviewed With patient   Patient Care Overview   Progress progress toward functional goals as expected       Goal: Adult Individualization and Mutuality  Outcome: Ongoing (interventions implemented as appropriate)  Goal: Discharge Needs Assessment  Outcome: Ongoing (interventions implemented as appropriate)    Problem: Fall Risk (Adult)  Goal: Absence of Falls  Outcome: Ongoing (interventions implemented as appropriate)    Problem: Wound, Traumatic, Nonburn (Adult)  Goal: Signs and Symptoms of Listed Potential Problems Will be Absent or Manageable (Wound, Traumatic, Nonburn)  Outcome: Ongoing (interventions implemented as appropriate)    Problem: Perioperative Period (Adult)  Goal: Signs and Symptoms of Listed Potential Problems Will be Absent or Manageable (Perioperative Period)  Outcome: Ongoing (interventions implemented as appropriate)    Problem: Skin Integrity Impairment, Risk/Actual (Adult)  Goal: Identify Related Risk Factors and Signs and Symptoms  Outcome: Ongoing (interventions implemented as appropriate)  Goal: Skin Integrity/Wound Healing  Outcome: Ongoing (interventions implemented as appropriate)

## 2017-12-05 NOTE — PLAN OF CARE
Problem: Perioperative Period (Adult)  Goal: Signs and Symptoms of Listed Potential Problems Will be Absent or Manageable (Perioperative Period)  Outcome: Ongoing (interventions implemented as appropriate)    12/01/17 1017 12/04/17 1853   Perioperative Period   Problems Assessed (Perioperative Period) --  all   Problems Present (Perioperative Period) none --          Problem: Skin Integrity Impairment, Risk/Actual (Adult)  Goal: Identify Related Risk Factors and Signs and Symptoms  Outcome: Ongoing (interventions implemented as appropriate)    12/05/17 0752   Skin Integrity Impairment, Risk/Actual   Skin Integrity Impairment, Risk/Actual: Related Risk Factors infection/disease process;age extremes   Signs and Symptoms (Skin Integrity Impairment) edema       Goal: Skin Integrity/Wound Healing  Outcome: Ongoing (interventions implemented as appropriate)    12/04/17 1853   Skin Integrity Impairment, Risk/Actual (Adult)   Skin Integrity/Wound Healing making progress toward outcome

## 2017-12-05 NOTE — PROGRESS NOTES
[facility]    Name:  Vasquez Marie   Age:  57 y.o.  Sex:  male  :  1960  MRN:  [id number]   Visit Number:  [id number]  Admission Date:  2017  Date Of Service:  17  Primary Care Physician:  Marilyn K. Vermeesch, MD     LOS: 10 days :      Chief Complaint:  Confusion. Follow up left BKA and anemia        Subjective / Interval History:   The patient was seen earlier today, this morning. He was sitting up in bed. He denies chest pain, shortness of breath, abdominal pain. No acute events occurred. Pain controlled.    No bleeding identified.       Review of Systems:     General ROS: Patient denies any fevers, chills or loss of consciousness. Confusion still  improved.  Ophthalmic ROS: Denies any diplopia or transient loss of vision.  ENT ROS: Denies sore throat, nasal congestion or ear pain.   Respiratory ROS: Denies cough or shortness of breath.  Cardiovascular ROS: Denies chest pain or palpitations. No history of exertional chest pain.   Gastrointestinal ROS: Denies nausea and vomiting. Denies any abdominal pain. No diarrhea.  Genito-Urinary ROS: Denies dysuria or hematuria.  Musculoskeletal ROS: Denies back pain. No muscle pain. No calf pain.  Neurological ROS: Denies any focal weakness. No loss of consciousness. Denies any numbness. Denies neck pain.   Dermatological ROS: Denies any redness or pruritis.    Vital Signs:    Temp:  [97.9 °F (36.6 °C)-98.2 °F (36.8 °C)] 98 °F (36.7 °C)  Heart Rate:  [69-79] 71  Resp:  [16-20] 18  BP: (120-139)/(68-85) 120/78    Intake and output:    I/O last 3 completed shifts:  In: 480 [P.O.:480]  Out: 1400 [Urine:1400]  I/O this shift:  In: 480 [P.O.:480]  Out: -     Physical Examination:    General Appearance:    Alert and cooperative, not in any acute distress. Sitting up in bed.   Head:    Atraumatic and normocephalic, without obvious abnormality.   Eyes:            PERRLA, conjunctivae and sclerae normal, no  Icterus. No pallor. Extra-occular movements are within normal limits.   Throat:   No oral lesions, no thrush, oral mucosa moist.   Neck:   Supple, trachea midline, no thyromegaly, no carotid bruit.   Lungs:     Chest shape is normal. Breath sounds heard bilaterally equally.  No crackles or wheezing. No Pleural rub or bronchial breathing.    Heart:    Normal S1 and S2, no murmur, no gallop, no rub. No JVD   Abdomen:     Normal bowel sounds, no masses, no organomegaly. Soft        non-tender, non-distended, no guarding, no rebound                tenderness   Extremities:   Moves all extremities well, no edema, no cyanosis, no             clubbing   Skin:   No bleeding, bruising or rash. Left foot wound with staples in place clean, dry, intact and healing well    Neurologic:   Cranial nerves 2 - 12 grossly intact, sensation intact, Motor power is normal right leg, bilateral arms    Laboratory results:    Lab Results (last 24 hours)     Procedure Component Value Units Date/Time    POC Glucose Fingerstick [[id number]]  (Normal) Collected:  12/04/17 2101    Specimen:  Blood Updated:  12/04/17 2210     Glucose 129 mg/dL       Serial Number: [id number]:  [id number]       CBC & Differential [[id number]] Collected:  12/05/17 0518    Specimen:  Blood Updated:  12/05/17 0619    Narrative:       The following orders were created for panel order CBC & Differential.  Procedure                               Abnormality         Status                     ---------                               -----------         ------                     CBC Auto Differential[[id number]]        Abnormal            Final result                 Please view results for these tests on the individual orders.    CBC Auto Differential [[id number]]  (Abnormal) Collected:  12/05/17 0518    Specimen:  Blood Updated:  12/05/17 0619     WBC 5.14 10*3/mm3      RBC 3.20 (L) 10*6/mm3      Hemoglobin 9.6 (L) g/dL      Hematocrit 29.1 (L) %      MCV 90.9 fL       MCH 30.0 pg      MCHC 33.0 g/dL      RDW 16.0 (H) %      RDW-SD 53.1 fl      MPV 10.3 fL      Platelets 148 10*3/mm3      Neutrophil % 64.4 %      Lymphocyte % 20.2 %      Monocyte % 6.0 %      Eosinophil % 8.4 (H) %      Basophil % 0.4 %      Immature Grans % 0.6 %      Neutrophils, Absolute 3.31 10*3/mm3      Lymphocytes, Absolute 1.04 10*3/mm3      Monocytes, Absolute 0.31 10*3/mm3      Eosinophils, Absolute 0.43 10*3/mm3      Basophils, Absolute 0.02 10*3/mm3      Immature Grans, Absolute 0.03 10*3/mm3      nRBC 0.0 /100 WBC     Basic Metabolic Panel [[id number]]  (Abnormal) Collected:  12/05/17 0518    Specimen:  Blood Updated:  12/05/17 0629     Glucose 115 (H) mg/dL      BUN 16 mg/dL      Creatinine 0.80 mg/dL      Sodium 141 mmol/L      Potassium 3.6 mmol/L      Chloride 104 mmol/L      CO2 28.0 mmol/L      Calcium 8.4 mg/dL      eGFR Non African Amer 99 mL/min/1.73      BUN/Creatinine Ratio 20.0     Anion Gap 12.6 mmol/L     Narrative:       Abnormal estimated GFR should be followed by more specific studies to confirm end stage chronic renal disease. The equation used for calculation may not be accurate for patients less than 19 years old, greater than 70 years old, patients at extremes of weight, malnutrition, or with acute renal dysfunction.    POC Glucose Fingerstick [[id number]]  (Abnormal) Collected:  12/05/17 0616    Specimen:  Blood Updated:  12/05/17 0631     Glucose 167 (H) mg/dL       Serial Number: [id number]:  [id number]       POC Glucose Fingerstick [[id number]]  (Abnormal) Collected:  12/05/17 1119    Specimen:  Blood Updated:  12/05/17 1141     Glucose 139 (H) mg/dL       Serial Number: [id number]:  [id number]             I have reviewed the patient's laboratory results.    Radiology results:    Imaging Results (last 24 hours)     ** No results found for the last 24 hours. **          I have reviewed the patient's radiology reports.    Medication Review:     I have reviewed the patients  active and prn medications.     Assessment:  The patient is a 57 yr old man admitted on 11-25 with altered mental status, dehydration, hypovolemia without sepsis. He has been hydrated and transfused blood. Dr Johns saw the patient and felt confusion was related to anemia and hypotension. CT head and MRI head without acute abnormalities. The patient has since improved after treatment provided. On admission, he had recurrent foot wound, necrotic, with concern for arterial occlusion. Arterial dopplers showed poor circulation, he was then subsequently taken for angiography and intervention by Dr Storm to SFA with good flow. Dr Campbell still felt unlikely for the wound to heal, with poor patient compliance, previous wound vav and recommended left BKA. Also, patient had cirrhosis of liver with chronic hepatitis C and previous history of alcohol abuse.       1.  Acute metabolic encephalopathy, present on admission, improved, related to hypotension and anemia prior to discharge  2.  Hypotension secondary to severe dehydration, present on admission, resolved without sepsis  3.  Acute renal failure secondary to #2, present on admission, resolved  4.  Peripheral arterial disease s/p left SFA stent and intervention to the peroneal arteries  4.  Paroxysmal atrial fibrillation/Flutter, not on anticoagulation due to recurrent GI bleed plus patient noncompliance, with history of cirrhosis as well   5.  Coronary artery disease on medical therapy, continued   6.  Essential hypertension  7.  COPD, stable  8.  Chronic hepatitis C infection  9.  Rheumatoid arthritis with chronic pain  10.  Acute worsening of chronic anemia, s/p 5 unit of PRBCs, stool occult negative   11. S/p TMA of left foot with infected wound, likely due to arterial occlusion, wound culture growing Escherichia coli and Stenotrophomonas maltophilia now s/p BKA on 12/01  12. Acute diarrhea, possibly due to refeeding, C. difficile negative, improved  14. Hypomagenesemia, due  to diarrhea, being repleted  15. 7mm soft tissue nodule noted on CT chest, will need follow up CTs  16. Acute arterial occlusion at the SFA s/p intervention by Dr. Storm on 11/28  17. Cirrhosis of liver, likely due to chronic hepatitis C, will need referral to hepatology after discharge  18. History of recurrent bilateral DVT, previously on anticoagulation, now off due to history of GI bleed  19. S/p Left BKA 12/02 for nonhealing TMA of left foot          Plan:  Confusion is stable improved after transfusion of last two units PRBC. The patient has history of recurrent GI bleeding, and during each previous  attempt to give lovenox during this stay, he dropped hemoglobin to 7 with hypotension, confusion, likely recurrent bleeding. No bleeding source identified. As long as he doesn't receive lovenox, his bleeding has remained stable. He did have some post op blood loss as well. He has received FIVE units PRBC during stay and now stable. Continue aspirin and plavix for now.     PT consulted. Increase ambulation.     Continue plavix, statin, asa, metoprolol, losartan. He is stable for transfer to nursing facility once insurance precertified and facility accepts.       Medication risks and benefits were discussed in detail. Patient reported satisfaction with care delivered and treatment plan.     Sia Bartlett DO  12/05/17  4:41 PM

## 2017-12-05 NOTE — PROGRESS NOTES
Continued Stay Note  BRITTANI Espinal     Patient Name: Vasquez Marie  MRN: [id number]  Today's Date: 12/5/2017    Admit Date: 11/25/2017          Discharge Plan       12/05/17 1414    Case Management/Social Work Plan    Additional Comments [facility]  of this               Discharge Codes     None        Expected Discharge Date and Time     Expected Discharge Date Expected Discharge Time    Dec 5, 2017             Sonali Nagel

## 2017-12-05 NOTE — THERAPY TREATMENT NOTE
Acute Care - Physical Therapy Treatment Note   [facility]     Patient Name: Vasquez Marie  : 1960  MRN: [id number]  Today's Date: 2017  Onset of Illness/Injury or Date of Surgery Date: 17  Date of Referral to PT: 17  Referring Physician: Dr. Sandoval    Admit Date: 2017    Visit Dx:    ICD-10-CM ICD-9-CM   1. Dehydration E86.0 276.51   2. Impaired functional mobility, balance, gait, and endurance Z74.09 V49.89   3. Peripheral vascular disease I73.9 443.9   4. Subacute osteomyelitis of left foot M86.272 730.07     Patient Active Problem List   Diagnosis   • Chronic hepatitis C virus infection   • Coronary artery disease involving native coronary artery of native heart without angina pectoris   • Chronic atrial fibrillation   • Essential hypertension   • Thrombocytopenia   • Depression with anxiety   • Gastroesophageal reflux disease with esophagitis   • Hyperlipidemia   • Tobacco abuse   • COPD (chronic obstructive pulmonary disease)   • Gout   • Congestive heart disease   • RLS (restless legs syndrome)   • Rheumatoid arthritis   • Typical atrial flutter   • Atrial flutter   • Cerebrovascular accident (CVA)   • Borderline diabetes   • Anemia   • Peripheral vascular disease   • Subacute osteomyelitis of left foot   • Liver mass   • Primary insomnia   • Dehydration   • Acute renal failure   • Status post transmetatarsal amputation of foot, left               Adult Rehabilitation Note       17 1504 17 1121 17 1040    Rehab Assessment/Intervention    Discipline physical therapy assistant  -RM occupational therapist  -SD physical therapy assistant  -RM    Document Type therapy note (daily note)  -RM therapy note (daily note)  -SD therapy note (daily note)  -RM    Subjective Information agree to therapy;complains of;weakness  -RM agree to therapy;complains of;pain  -SD agree to therapy;complains of;weakness  -RM    Patient Effort, Rehab Treatment good  -RM good  -SD  adequate  -RM    Symptoms Noted During/After Treatment  fatigue  -SD fatigue  -RM    Precautions/Limitations  fall precautions  -SD fall precautions  -RM    Specific Treatment Considerations L AKA   -RM      Recorded by [RM] Dipak Scott PTA [SD] Elsy Olmos OT [RM] Dipak Scott PTA    Pain Assessment    Pain Assessment --   Did not express pain  -RM 0-10  -SD     Pain Score  8  -SD     Post Pain Score  8  -SD     Pain Type  Chronic pain  -SD     Pain Location  Back  -SD     Pain Intervention(s)  Repositioned  -SD     Response to Interventions  Tolerated  -SD     Recorded by [RM] Dipak Scott PTA [SD] Elsy Olmos OT     Bed Mobility, Assessment/Treatment    Bed Mobility, Assistive Device bed rails;head of bed elevated  -RM bed rails;head of bed elevated  -SD     Bed Mob, Supine to Sit, [place] conditional independence  -RM conditional independence  -SD     Bed Mob, Sit to Supine, [place] conditional independence  -RM conditional independence  -SD     Bed Mobility, Safety Issues decreased use of legs for bridging/pushing  -RM      Bed Mobility, Impairments impaired balance  -RM      Recorded by [RM] Dipak Scott PTA [SD] Elsy Olmos OT     Transfer Assessment/Treatment    Transfers, Sit-Stand [place] contact guard assist;verbal cues required  -RM minimum assist (75% patient effort);verbal cues required  -SD minimum assist (75% patient effort)  -RM    Transfers, Stand-Sit [place] contact guard assist;verbal cues required   for safety  -RM minimum assist (75% patient effort);verbal cues required  -SD minimum assist (75% patient effort);2 person assist required  -RM    Transfers, Sit-Stand-Sit, Assist Device standard walker  -RM rolling walker  -SD standard walker  -RM    Transfer, Comment  x10 at EOB with VCs for sequ  -SD     Recorded by [RM] Dipak Scott PTA [SD] Elsy Olmos OT [RM] Dipak Scott PTA    Gait Assessment/Treatment    Gait,  [place] Level minimum assist (75% patient effort);2 person assist required  -RM  minimum assist (75% patient effort);2 person assist required  -RM    Gait, Assistive Device standard walker  -RM  standard walker  -RM    Gait, Distance (Feet) 8  -RM  3  -RM    Gait, Gait Pattern Analysis 2-point gait  -RM  2-point gait  -RM    Gait, Gait Deviations   decreased heel strike;right:;knee buckling;toe-to-floor clearance decreased;weight-shifting ability decreased  -RM    Gait, Safety Issues balance decreased during turns  -RM  loses balance backward;weight-shifting ability decreased;sequencing ability decreased;supplemental O2  -RM    Gait, Impairments impaired balance;strength decreased;pain  -RM  impaired balance;strength decreased  -RM    Recorded by [RM] Dipak Scott PTA  [RM] Dipak Scott PTA    Toileting Assessment/Training    Toileting Assess/Train, Assistive Device  urinal  -SD     Toileting Assess/Train, Position  edge of bed  -SD     Toileting Assess/Train, Indepen Level  set up required  -SD     Recorded by  [SD] Elsy Olmos OT     Therapy Exercises    Right Lower Extremity AROM:;20 reps;SLR   sit to stand x 10  -RM  AROM:;15 reps;quad sets;heel slides;hip extension   hip ext graded resistance  -RM    Left Lower Extremity AROM:;20 reps;SLR;SAQ   SAQ graded reisitance with knee extension  -RM  AROM:;15 reps;quad sets;SAQ   graded resistance SAQ  -RM    Bilateral Upper Extremity  AROM:;15 reps  -SD     BUE Resistance  theraband  -SD     Recorded by [RM] Dipak Scott PTA [SD] Elsy Olmos OT [RM] Dipak Scott PTA    Positioning and Restraints    Pre-Treatment Position in bed  -RM in bed  -SD --   sitting EOB working with OT  -RM    Post Treatment Position bed  -RM bed  -SD chair  -RM    In Bed fowlers;call light within reach;encouraged to call for assist;exit alarm on;notified nsg  -RM sitting EOB;call light within reach;encouraged to call for assist;exit alarm on  -SD     In Chair    reclined;call light within reach;encouraged to call for assist;exit alarm on;R heel elevated;LLE elevated  -RM    Recorded by [RM] Dipak Scott, PTA [SD] Elsy Olmos, OT [RM] Dipak Scott, SANYA      User Key  (r) = Recorded By, (t) = Taken By, (c) = Cosigned By    Initials Name Effective Dates    RM Dipak Scott, PTA 10/26/16 -     SD Elsy Olmos, OT 06/30/17 -                 IP PT Goals       12/05/17 1550 12/04/17 1136 12/02/17 1350    Bed Mobility PT LTG    Bed Mobility PT LTG, Date Established   12/02/17  -JG    Bed Mobility PT LTG, Date Goal Reviewed   12/02/17  -JG    Bed Mobility PT LTG, Outcome goal partially met  -RM  goal ongoing  -JG    Transfer Training PT LTG    Transfer Training PT LTG, Time to Achieve   2 wks  -JG    Transfer Training PT LTG, Activity Type   all transfers  -JG    Transfer Training PT LTG, [place] Level   contact guard assist  -JG    Transfer Training PT LTG, Assist Device   walker, standard  -JG    Transfer Training PT  LTG, Date Goal Reviewed   12/02/17  -JG    Transfer Training PT LTG, Outcome goal partially met  -RM goal ongoing  -RM     Gait Training PT LTG    Gait Training Goal PT LTG, Date Established   12/02/17  -JG    Gait Training Goal PT LTG, Time to Achieve   2 wks  -JG    Gait Training Goal PT LTG, [place] Level   minimum assist (75% patient effort)  -JG    Gait Training Goal PT LTG, Assist Device   walker, standard  -JG    Gait Training Goal PT LTG, Distance to Achieve   10 feet  -JG    Gait Training Goal PT LTG, Date Goal Reviewed   12/02/17  -JG    Gait Training Goal PT LTG, Outcome goal ongoing  -RM goal ongoing  -RM       11/27/17 1554          Bed Mobility PT LTG    Bed Mobility PT LTG, Date Established 11/27/17  -JR (r) AH (t) JR (c)      Bed Mobility PT LTG, Time to Achieve 2 wks  -JR (r) AH (t) JR (c)      Bed Mobility PT LTG, Activity Type all bed mobility  -JR (r) AH (t) JR (c)      Bed Mobility PT LTG, [place] Level  independent  -JR (r) AH (t) JR (c)      Transfer Training PT LTG    Transfer Training PT LTG, Date Established 11/27/17  -JR (r) AH (t) JR (c)      Transfer Training PT LTG, Time to Achieve 2 wks  -JR (r) AH (t) JR (c)      Transfer Training PT LTG, Activity Type bed to chair /chair to bed;sit to stand/stand to sit  -JR (r) AH (t) JR (c)      Transfer Training PT LTG, [place] Level conditional independence  -JR (r) AH (t) JR (c)      Transfer Training PT LTG, Assist Device walker, rolling  -JR (r) AH (t) JR (c)      Transfer Training PT LTG, Additional Goal following WB precautions (NWB LLE)  -JR (r) AH (t) JR (c)      Gait Training PT LTG    Gait Training Goal PT LTG, Date Established 11/27/17  -JR (r) AH (t) JR (c)      Gait Training Goal PT LTG, Time to Achieve 2 wks  -JR (r) AH (t) JR (c)      Gait Training Goal PT LTG, [place] Level contact guard assist  -JR (r) AH (t) JR (c)      Gait Training Goal PT LTG, Assist Device walker, rolling  -JR (r) AH (t) JR (c)      Gait Training Goal PT LTG, Distance to Achieve 10  -JR (r) AH (t) JR (c)      Gait Training Goal PT LTG, Additional Goal with WB restrictions (NWB LLE)  -JR (r) AH (t) JR (c)        User Key  (r) = Recorded By, (t) = Taken By, (c) = Cosigned By    Initials Name Provider Type    J Kemar Geiger, PT Physical Therapist    JR Sia Whaley, PT Physical Therapist    RM Dipak Scott, PTA Physical Therapy Assistant    VAHID Christine, PT Student PT Student          Physical Therapy Education     Title: PT OT SLP Therapies (Active)     Topic: Physical Therapy (Done)     Point: Mobility training (Done)    Learning Progress Summary    Learner Readiness Method Response Comment Documented by Status   Patient Acceptance E,D LOVELY MCCONNELL   12/05/17 1550 Done    Acceptance E JAYESH  MM 12/04/17 1859 Done    Acceptance E LOVELY MCCONNELL pt instructed on safety with transfers J 12/02/17 1450 Done    Acceptance E VU,NR LACE education CF 11/28/17 1819 Done    Acceptance E VU PT  benefits and POC.  11/27/17 1553 Done   Family Acceptance E VU,NR pt instructed on safety with transfers  12/02/17 1450 Done               Point: Home exercise program (Done)    Learning Progress Summary    Learner Readiness Method Response Comment Documented by Status   Patient Acceptance E,D VU,NR   12/05/17 1550 Done    Acceptance E VU   12/04/17 1859 Done    Acceptance E,D VU,NR   12/04/17 1136 Done    Acceptance E VU,NR LACE education  11/28/17 1818 Done    Acceptance E VU PT benefits and POC.  11/27/17 1553 Done               Point: Precautions (Done)    Learning Progress Summary    Learner Readiness Method Response Comment Documented by Status   Patient Acceptance E VU   12/04/17 1859 Done    Acceptance E VU,NR LACE education  11/28/17 1818 Done    Acceptance E VU PT benefits and POC.  11/27/17 1553 Done                      User Key     Initials Effective Dates Name Provider Type Discipline     04/06/17 -  Kemar Geiger, PT Physical Therapist PT     10/26/16 -  Apurva Raygoza, RN Registered Nurse Nurse     10/26/16 -  Dipak Scott, PTA Physical Therapy Assistant PT     07/10/17 -  Deepti Wadsworth, RN Registered Nurse Nurse     11/01/17 -  Caro Christine, PT Student PT Student PT                    PT Recommendation and Plan  Planned Therapy Interventions: balance training, bed mobility training, home exercise program, gait training, patient/family education, strengthening, transfer training  PT Frequency: daily  Plan of Care Review  Plan Of Care Reviewed With: patient  Progress: improving  Outcome Summary/Follow up Plan: Pt completed increased activity this date with pt able to perform hop to gait as well as progression of ther ex. See flowsheet for details.            Outcome Measures       12/05/17 1504 12/05/17 1121 12/04/17 1040    How much help from another person do you currently need...    Turning from your back to your side while in flat bed without using bedrails? 4   -RM  4  -RM    Moving from lying on back to sitting on the side of a flat bed without bedrails? 4  -RM  3  -RM    Moving to and from a bed to a chair (including a wheelchair)? 3  -RM  2  -RM    Standing up from a chair using your arms (e.g., wheelchair, bedside chair)? 3  -RM  3  -RM    Climbing 3-5 steps with a railing? 1  -RM  1  -RM    To walk in hospital room? 2  -RM  2  -RM    AM-PAC 6 Clicks Score 17  -RM  15  -RM    How much help from another is currently needed...    Putting on and taking off regular lower body clothing?  2  -SD     Bathing (including washing, rinsing, and drying)  2  -SD     Toileting (which includes using toilet bed pan or urinal)  2  -SD     Putting on and taking off regular upper body clothing  4  -SD     Taking care of personal grooming (such as brushing teeth)  4  -SD     Eating meals  4  -SD     Score  18  -SD     Functional Assessment    Outcome Measure Options AM-PAC 6 Clicks Basic Mobility (PT)  -RM AM-PAC 6 Clicks Daily Activity (OT)  -SD AM-PAC 6 Clicks Basic Mobility (PT)  -RM      12/04/17 1035          How much help from another is currently needed...    Putting on and taking off regular lower body clothing? 2  -SD      Bathing (including washing, rinsing, and drying) 2  -SD      Toileting (which includes using toilet bed pan or urinal) 2  -SD      Putting on and taking off regular upper body clothing 3  -SD      Taking care of personal grooming (such as brushing teeth) 4  -SD      Eating meals 4  -SD      Score 17  -SD      Functional Assessment    Outcome [facility] ([facility]  -SD        User Key  (r) = Recorded By, (t) = Taken By, (c) = Cosigned By    Initials Name Provider Type     Dipak Scott PTA Physical Therapy Assistant    CADE Olmos OT Occupational Therapist           Time Calculation:         PT Charges       12/05/17 6572          Time Calculation    Start Time 1504  -RM      PT Received On 12/05/17  -RM      PT Goal  Re-Cert Due Date 12/16/17  -RM      Time Calculation- PT    Total Timed Code Minutes- PT 34 minute(s)  -RM        User Key  (r) = Recorded By, (t) = Taken By, (c) = Cosigned By    Initials Name Provider Type    TREY Scott PTA Physical Therapy Assistant          Therapy Charges for Today     Code Description Service Date Service Provider Modifiers Qty    74687121556 HC GAIT TRAINING EA 15 MIN 12/4/2017 Dipak Scott, SANYA GP 1    67419849118 HC PT THER PROC EA 15 MIN 12/4/2017 Dipak Scott, SANYA GP 1    73064520783 HC GAIT TRAINING EA 15 MIN 12/5/2017 Dipak Scott, SANYA GP 1    15835978433 HC PT THER PROC EA 15 MIN 12/5/2017 Dipak Scott, SANYA GP 1          PT G-Codes  Outcome Measure Options: AM-PAC 6 Clicks Basic Mobility (PT)    Dipak Scott PTA  12/5/2017

## 2017-12-06 VITALS
HEIGHT: 68 IN | HEART RATE: 72 BPM | RESPIRATION RATE: 18 BRPM | SYSTOLIC BLOOD PRESSURE: 131 MMHG | WEIGHT: 193.9 LBS | DIASTOLIC BLOOD PRESSURE: 71 MMHG | TEMPERATURE: 98.3 F | BODY MASS INDEX: 29.39 KG/M2 | OXYGEN SATURATION: 97 %

## 2017-12-06 LAB
GLUCOSE BLDC GLUCOMTR-MCNC: 101 MG/DL (ref 70–130)
GLUCOSE BLDC GLUCOMTR-MCNC: 105 MG/DL (ref 70–130)
RPR SER QL: NORMAL

## 2017-12-06 PROCEDURE — 99238 HOSP IP/OBS DSCHRG MGMT 30/<: CPT | Performed by: FAMILY MEDICINE

## 2017-12-06 PROCEDURE — 82962 GLUCOSE BLOOD TEST: CPT

## 2017-12-06 PROCEDURE — 97110 THERAPEUTIC EXERCISES: CPT

## 2017-12-06 PROCEDURE — 97530 THERAPEUTIC ACTIVITIES: CPT

## 2017-12-06 PROCEDURE — 97116 GAIT TRAINING THERAPY: CPT

## 2017-12-06 RX ORDER — IPRATROPIUM BROMIDE AND ALBUTEROL SULFATE 2.5; .5 MG/3ML; MG/3ML
3 SOLUTION RESPIRATORY (INHALATION) EVERY 4 HOURS PRN
Qty: 360 ML
Start: 2017-12-06 | End: 2018-02-08

## 2017-12-06 RX ORDER — HYDROCODONE BITARTRATE AND ACETAMINOPHEN 5; 325 MG/1; MG/1
1 TABLET ORAL EVERY 4 HOURS PRN
Qty: 18 TABLET | Refills: 0 | Status: SHIPPED | OUTPATIENT
Start: 2017-12-06 | End: 2017-12-09

## 2017-12-06 RX ORDER — FERROUS SULFATE TAB EC 324 MG (65 MG FE EQUIVALENT) 324 (65 FE) MG
324 TABLET DELAYED RESPONSE ORAL
Qty: 30 TABLET
Start: 2017-12-07 | End: 2018-02-08

## 2017-12-06 RX ORDER — GABAPENTIN 300 MG/1
300 CAPSULE ORAL EVERY 12 HOURS
Qty: 6 CAPSULE | Refills: 0 | Status: SHIPPED | OUTPATIENT
Start: 2017-12-06 | End: 2018-02-09 | Stop reason: SDUPTHER

## 2017-12-06 RX ORDER — GABAPENTIN 300 MG/1
300 CAPSULE ORAL EVERY 12 HOURS
Start: 2017-12-06 | End: 2017-12-06

## 2017-12-06 RX ORDER — LORAZEPAM 1 MG/1
1 TABLET ORAL 2 TIMES DAILY PRN
Qty: 6 TABLET | Refills: 0 | Status: SHIPPED | OUTPATIENT
Start: 2017-12-06 | End: 2017-12-09

## 2017-12-06 RX ORDER — LOPERAMIDE HYDROCHLORIDE 2 MG/1
2 CAPSULE ORAL 4 TIMES DAILY PRN
Start: 2017-12-06 | End: 2018-02-08

## 2017-12-06 RX ORDER — TRAZODONE HYDROCHLORIDE 50 MG/1
50 TABLET ORAL NIGHTLY
Qty: 30 TABLET | Refills: 1
Start: 2017-12-06 | End: 2018-02-08 | Stop reason: SDUPTHER

## 2017-12-06 RX ORDER — ASPIRIN 81 MG/1
81 TABLET ORAL DAILY
Start: 2017-12-07

## 2017-12-06 RX ORDER — SACCHAROMYCES BOULARDII 250 MG
250 CAPSULE ORAL 2 TIMES DAILY
Start: 2017-12-06 | End: 2018-02-08

## 2017-12-06 RX ORDER — ONDANSETRON 4 MG/1
4 TABLET, FILM COATED ORAL EVERY 6 HOURS PRN
Start: 2017-12-06 | End: 2018-02-08

## 2017-12-06 RX ORDER — ACETAMINOPHEN 325 MG/1
650 TABLET ORAL EVERY 4 HOURS PRN
Start: 2017-12-06 | End: 2018-02-08

## 2017-12-06 RX ADMIN — NICOTINE 1 PATCH: 7 PATCH, EXTENDED RELEASE TRANSDERMAL at 16:41

## 2017-12-06 RX ADMIN — HYDROCODONE BITARTRATE AND ACETAMINOPHEN 1 TABLET: 5; 325 TABLET ORAL at 16:29

## 2017-12-06 RX ADMIN — Medication 250 MG: at 10:00

## 2017-12-06 RX ADMIN — FERROUS SULFATE TAB EC 324 MG (65 MG FE EQUIVALENT) 324 MG: 324 (65 FE) TABLET DELAYED RESPONSE at 09:00

## 2017-12-06 RX ADMIN — ASPIRIN 81 MG: 81 TABLET, COATED ORAL at 10:00

## 2017-12-06 RX ADMIN — ACETAMINOPHEN 650 MG: 325 TABLET, FILM COATED ORAL at 06:11

## 2017-12-06 RX ADMIN — GABAPENTIN 300 MG: 300 CAPSULE ORAL at 10:00

## 2017-12-06 RX ADMIN — HYDROCODONE BITARTRATE AND ACETAMINOPHEN 1 TABLET: 5; 325 TABLET ORAL at 10:25

## 2017-12-06 RX ADMIN — CLOPIDOGREL BISULFATE 75 MG: 75 TABLET ORAL at 10:00

## 2017-12-06 RX ADMIN — Medication 10 ML: at 10:24

## 2017-12-06 RX ADMIN — HYDROXYCHLOROQUINE SULFATE 200 MG: 200 TABLET, FILM COATED ORAL at 10:00

## 2017-12-06 RX ADMIN — PANTOPRAZOLE SODIUM 40 MG: 40 TABLET, DELAYED RELEASE ORAL at 06:36

## 2017-12-06 RX ADMIN — Medication 400 MG: at 10:00

## 2017-12-06 RX ADMIN — COLCHICINE 0.6 MG: 0.6 TABLET, FILM COATED ORAL at 10:00

## 2017-12-06 RX ADMIN — METOPROLOL TARTRATE 25 MG: 25 TABLET ORAL at 10:00

## 2017-12-06 NOTE — PLAN OF CARE
Problem: Perioperative Period (Adult)  Goal: Signs and Symptoms of Listed Potential Problems Will be Absent or Manageable (Perioperative Period)  Outcome: Ongoing (interventions implemented as appropriate)    12/05/17 1530   Perioperative Period   Problems Assessed (Perioperative Period) all   Problems Present (Perioperative Period) none         Problem: Skin Integrity Impairment, Risk/Actual (Adult)  Goal: Identify Related Risk Factors and Signs and Symptoms  Outcome: Ongoing (interventions implemented as appropriate)    12/05/17 0752   Skin Integrity Impairment, Risk/Actual   Skin Integrity Impairment, Risk/Actual: Related Risk Factors infection/disease process;age extremes   Signs and Symptoms (Skin Integrity Impairment) edema       Goal: Skin Integrity/Wound Healing  Outcome: Ongoing (interventions implemented as appropriate)    12/05/17 1530   Skin Integrity Impairment, Risk/Actual (Adult)   Skin Integrity/Wound Healing making progress toward outcome

## 2017-12-06 NOTE — PLAN OF CARE
Problem: Patient Care Overview (Adult)  Goal: Plan of Care Review  Outcome: Ongoing (interventions implemented as appropriate)    12/06/17 1634   Coping/Psychosocial Response Interventions   Plan Of Care Reviewed With patient   Patient Care Overview   Progress improving   Outcome Evaluation   Outcome Summary/Follow up Plan Pt presented with improved activity tolerance with increased gait distance as well as tolerance of ther ex. See flowsheet for details.          Problem: Inpatient Physical Therapy  Goal: Bed Mobility Goal LTG- PT  Outcome: Ongoing (interventions implemented as appropriate)    11/27/17 1554 12/02/17 1350 12/06/17 1634   Bed Mobility PT LTG   Bed Mobility PT LTG, Date Established --  12/02/17 --    Bed Mobility PT LTG, Time to Achieve 2 wks --  --    Bed Mobility PT LTG, Activity Type all bed mobility --  --    Bed Mobility PT LTG, [place] Level independent --  --    Bed Mobility PT LTG, Date Goal Reviewed --  12/02/17 --    Bed Mobility PT LTG, Outcome --  --  goal partially met       Goal: Transfer Training Goal 1 LTG- PT  Outcome: Ongoing (interventions implemented as appropriate)    11/27/17 1554 12/02/17 1350 12/06/17 1634   Transfer Training PT LTG   Transfer Training PT LTG, Date Established 11/27/17 --  --    Transfer Training PT LTG, Time to Achieve --  2 wks --    Transfer Training PT LTG, Activity Type --  all transfers --    Transfer Training PT LTG, [place] Level --  contact guard assist --    Transfer Training PT LTG, Assist Device --  walker, standard --    Transfer Training PT LTG, Additional Goal following WB precautions (NWB LLE) --  --    Transfer Training PT LTG, Date Goal Reviewed --  12/02/17 --    Transfer Training PT LTG, Outcome --  --  goal ongoing       Goal: Gait Training Goal LTG- PT  Outcome: Ongoing (interventions implemented as appropriate)    11/27/17 1554 12/02/17 1350 12/06/17 1634   Gait Training PT LTG   Gait Training Goal PT LTG, Date Established --   12/02/17 --    Gait Training Goal PT LTG, Time to Achieve --  2 wks --    Gait Training Goal PT LTG, [place] Level --  minimum assist (75% patient effort) --    Gait Training Goal PT LTG, Assist Device --  walker, standard --    Gait Training Goal PT LTG, Distance to Achieve --  10 feet --    Gait Training Goal PT LTG, Additional Goal with WB restrictions (NWB LLE) --  --    Gait Training Goal PT LTG, Date Goal Reviewed --  12/02/17 --    Gait Training Goal PT LTG, Outcome --  --  goal ongoing

## 2017-12-06 NOTE — PROGRESS NOTES
Case Management Discharge Note    Final Note: Discharged to [facility]     Discharge Placement     Facility/Agency Request Status Selected? Address Phone Number Fax Number    [facility] Pending - Request Sent     [address], [place] [address] [phone]        Sonali Nagel 12/4/2017 12:53    Please start precert                    Ambulance: [facility]    Discharge Codes: 03  Discharged/transferred to skilled nursing facility (SNF) with Medicare certification in anticipation of skilled care

## 2017-12-06 NOTE — SIGNIFICANT NOTE
12/06/17 1335   Rehab Treatment   Discipline occupational therapist   Treatment Not Performed patient/family declined treatment  (Patient preparing to DC to rehab)

## 2017-12-06 NOTE — PLAN OF CARE
Problem: Pain, Acute (Adult)  Goal: Acceptable Pain Control/Comfort Level  Outcome: Ongoing (interventions implemented as appropriate)    Problem: Patient Care Overview (Adult)  Goal: Plan of Care Review  Outcome: Ongoing (interventions implemented as appropriate)    12/06/17 1258   Coping/Psychosocial Response Interventions   Plan Of Care Reviewed With patient       Goal: Adult Individualization and Mutuality  Outcome: Ongoing (interventions implemented as appropriate)  Goal: Discharge Needs Assessment  Outcome: Ongoing (interventions implemented as appropriate)    Problem: Fall Risk (Adult)  Goal: Absence of Falls  Outcome: Ongoing (interventions implemented as appropriate)    Problem: Wound, Traumatic, Nonburn (Adult)  Goal: Signs and Symptoms of Listed Potential Problems Will be Absent or Manageable (Wound, Traumatic, Nonburn)  Outcome: Ongoing (interventions implemented as appropriate)    Problem: Perioperative Period (Adult)  Goal: Signs and Symptoms of Listed Potential Problems Will be Absent or Manageable (Perioperative Period)  Outcome: Ongoing (interventions implemented as appropriate)    Problem: Skin Integrity Impairment, Risk/Actual (Adult)  Goal: Identify Related Risk Factors and Signs and Symptoms  Outcome: Ongoing (interventions implemented as appropriate)  Goal: Skin Integrity/Wound Healing  Outcome: Ongoing (interventions implemented as appropriate)

## 2017-12-06 NOTE — THERAPY TREATMENT NOTE
Acute Care - Physical Therapy Treatment Note   [facility]     Patient Name: Vasquez Marie  : 1960  MRN: [id number]  Today's Date: 2017  Onset of Illness/Injury or Date of Surgery Date: 17  Date of Referral to PT: 17  Referring Physician: Dr. Sandoval    Admit Date: 2017    Visit Dx:    ICD-10-CM ICD-9-CM   1. Dehydration E86.0 276.51   2. Impaired functional mobility, balance, gait, and endurance Z74.09 V49.89   3. Peripheral vascular disease I73.9 443.9   4. Subacute osteomyelitis of left foot M86.272 730.07   5. Diabetes due to underlying condition w [facility] complications E08.8 249.90     Patient Active Problem List   Diagnosis   • Chronic hepatitis C virus infection   • Coronary artery disease involving native coronary artery of native heart without angina pectoris   • Chronic atrial fibrillation   • Essential hypertension   • Thrombocytopenia   • Depression with anxiety   • Gastroesophageal reflux disease with esophagitis   • Hyperlipidemia   • Tobacco abuse   • COPD (chronic obstructive pulmonary disease)   • Gout   • Congestive heart disease   • RLS (restless legs syndrome)   • Rheumatoid arthritis   • Typical atrial flutter   • Atrial flutter   • Cerebrovascular accident (CVA)   • Borderline diabetes   • Anemia   • Peripheral vascular disease   • Subacute osteomyelitis of left foot   • Liver mass   • Primary insomnia   • Dehydration   • Acute renal failure   • Status post transmetatarsal amputation of foot, left               Adult Rehabilitation Note       17 1011 17 1504 17 1121    Rehab Assessment/Intervention    Discipline physical therapy assistant  -RM physical therapy assistant  -RM occupational therapist  -SD    Document Type therapy note (daily note)  -RM therapy note (daily note)  -RM therapy note (daily note)  -SD    Subjective Information agree to therapy;complains of;weakness;pain  -RM agree to therapy;complains of;weakness  -RM agree to  therapy;complains of;pain  -SD    Patient Effort, Rehab Treatment good  -RM good  -RM good  -SD    Symptoms Noted During/After Treatment   fatigue  -SD    Precautions/Limitations   fall precautions  -SD    Specific Treatment Considerations L BKA  -RM L BKA   -RM     Recorded by [RM] Dipak Scott PTA [RM] Dipak Scott PTA [SD] Elsy Amarilis, OT    Pain Assessment    Pain Assessment 0-10  -RM --   Did not express pain  -RM 0-10  -SD    Pain Score 8  -RM  8  -SD    Post Pain Score 8  -RM  8  -SD    Pain Type Acute pain;Surgical pain  -RM  Chronic pain  -SD    Pain Location Leg  -RM  Back  -SD    Pain Orientation Left  -RM      Pain Intervention(s) Repositioned  -RM  Repositioned  -SD    Response to Interventions tolerated   -RM  Tolerated  -SD    Recorded by [RM] Dipak Scott PTA [RM] Dipak Scott PTA [SD] Elsy Olmos, OT    Bed Mobility, Assessment/Treatment    Bed Mobility, Assistive Device bed rails;head of bed elevated  -RM bed rails;head of bed elevated  -RM bed rails;head of bed elevated  -SD    Bed Mob, Supine to Sit, [place] conditional independence  -RM conditional independence  -RM conditional independence  -SD    Bed Mob, Sit to Supine, [place]  conditional independence  -RM conditional independence  -SD    Bed Mobility, Safety Issues decreased use of legs for bridging/pushing  -RM decreased use of legs for bridging/pushing  -RM     Bed Mobility, Impairments impaired balance;strength decreased  -RM impaired balance  -RM     Recorded by [RM] Dipak Scott PTA [RM] Dipak Scott PTA [SD] Elsy Olmos, OT    Transfer Assessment/Treatment    Transfers, Sit-Stand [place] contact guard assist  -RM contact guard assist;verbal cues required  -RM minimum assist (75% patient effort);verbal cues required  -SD    Transfers, Stand-Sit [place] contact guard assist;verbal cues required  -RM contact guard assist;verbal cues required   for safety  -RM minimum assist  (75% patient effort);verbal cues required  -SD    Transfers, Sit-Stand-Sit, Assist Device standard walker  -RM standard walker  -RM rolling walker  -SD    Transfer, Comment   x10 at EOB with VCs for sequ  -SD    Recorded by [RM] Dipak Scott PTA [RM] Dipak Scott PTA [SD] Elsy Olmos, OT    Gait Assessment/Treatment    Gait, [place] Level minimum assist (75% patient effort);2 person assist required  -RM minimum assist (75% patient effort);2 person assist required  -RM     Gait, Assistive Device standard walker  -RM standard walker  -RM     Gait, Distance (Feet) 8   2x8'  -RM 8  -RM     Gait, Gait Pattern Analysis 2-point gait  -RM 2-point gait  -RM     Gait, Safety Issues balance decreased during turns  -RM balance decreased during turns  -RM     Gait, Impairments impaired balance;strength decreased;pain  -RM impaired balance;strength decreased;pain  -RM     Recorded by [RM] Dipak Scott PTA [RM] Dipak Scott PTA     Toileting Assessment/Training    Toileting Assess/Train, Assistive Device   urinal  -SD    Toileting Assess/Train, Position   edge of bed  -SD    Toileting Assess/Train, Indepen Level   set up required  -SD    Recorded by   [SD] Elsy Olmos OT    Therapy Exercises    Right Lower Extremity  AROM:;20 reps;SLR   sit to stand x 10  -RM     Left Lower Extremity AROM:;15 reps;SLR;SAQ   SAQ with graded resistance  -RM AROM:;20 reps;SLR;SAQ   SAQ graded reisitance with knee extension  -RM     Bilateral Upper Extremity   AROM:;15 reps  -SD    BUE Resistance   theraband  -SD    Recorded by [RM] Dipak Scott PTA [RM] Dipak Scott PTA [SD] Elsy Olmos, OT    Positioning and Restraints    Pre-Treatment Position in bed  -RM in bed  -RM in bed  -SD    Post Treatment Position chair  -RM bed  -RM bed  -SD    In Bed  fowlers;call light within reach;encouraged to call for assist;exit alarm on;notified nsg  -RM sitting EOB;call light within reach;encouraged to call for  assist;exit alarm on  -SD    In Chair reclined;call light within reach;encouraged to call for assist;exit alarm on;notified nsg  -RM      Recorded by [RM] Dipak Scott PTA [RM] Dipak Scott PTA [SD] Elsy Olmos OT      12/04/17 1040          Rehab Assessment/Intervention    Discipline physical therapy assistant  -RM      Document Type therapy note (daily note)  -RM      Subjective Information agree to therapy;complains of;weakness  -RM      Patient Effort, Rehab Treatment adequate  -RM      Symptoms Noted During/After Treatment fatigue  -RM      Precautions/Limitations fall precautions  -RM      Recorded by [RM] Dipak Scott PTA      Transfer Assessment/Treatment    Transfers, Sit-Stand [place] minimum assist (75% patient effort)  -RM      Transfers, Stand-Sit [place] minimum assist (75% patient effort);2 person assist required  -RM      Transfers, Sit-Stand-Sit, Assist Device standard walker  -RM      Recorded by [RM] Dipak Scott PTA      Gait Assessment/Treatment    Gait, [place] Level minimum assist (75% patient effort);2 person assist required  -RM      Gait, Assistive Device standard walker  -RM      Gait, Distance (Feet) 3  -RM      Gait, Gait Pattern Analysis 2-point gait  -RM      Gait, Gait Deviations decreased heel strike;right:;knee buckling;toe-to-floor clearance decreased;weight-shifting ability decreased  -RM      Gait, Safety Issues loses balance backward;weight-shifting ability decreased;sequencing ability decreased;supplemental O2  -RM      Gait, Impairments impaired balance;strength decreased  -RM      Recorded by [RM] Dipak Scott PTA      Therapy Exercises    Right Lower Extremity AROM:;15 reps;quad sets;heel slides;hip extension   hip ext graded resistance  -RM      Left Lower Extremity AROM:;15 reps;quad sets;SAQ   graded resistance SAQ  -RM      Recorded by [RM] Dipak Scott PTA      Positioning and Restraints    Pre-Treatment Position --    sitting EOB working with OT  -RM      Post Treatment Position chair  -RM      In Chair reclined;call light within reach;encouraged to call for assist;exit alarm on;R heel elevated;LLE elevated  -RM      Recorded by [RM] Dipak Scott, PTA        User Key  (r) = Recorded By, (t) = Taken By, (c) = Cosigned By    Initials Name Effective Dates    RM Dipak Scott, PTA 10/26/16 -     SD Elsy [place], OT 06/30/17 -                 IP PT Goals       12/06/17 1634 12/05/17 1550 12/04/17 1136    Bed Mobility PT LTG    Bed Mobility PT LTG, Outcome goal partially met  -RM goal partially met  -RM     Transfer Training PT LTG    Transfer Training PT LTG, Outcome goal ongoing  -RM goal partially met  -RM goal ongoing  -RM    Gait Training PT LTG    Gait Training Goal PT LTG, Outcome goal ongoing  -RM goal ongoing  -RM goal ongoing  -RM      12/02/17 1350 11/27/17 1554       Bed Mobility PT LTG    Bed Mobility PT LTG, Date Established 12/02/17  -JG 11/27/17  -JR (r) AH (t) JR (c)     Bed Mobility PT LTG, Time to Achieve  2 wks  -JR (r) AH (t) JR (c)     Bed Mobility PT LTG, Activity Type  all bed mobility  -JR (r) AH (t) JR (c)     Bed Mobility PT LTG, [place] Level  independent  -JR (r) AH (t) JR (c)     Bed Mobility PT LTG, Date Goal Reviewed 12/02/17  -JG      Bed Mobility PT LTG, Outcome goal ongoing  -JG      Transfer Training PT LTG    Transfer Training PT LTG, Date Established  11/27/17  -JR (r) AH (t) JR (c)     Transfer Training PT LTG, Time to Achieve 2 wks  -JG 2 wks  -JR (r) AH (t) JR (c)     Transfer Training PT LTG, Activity Type all transfers  -JG bed to chair /chair to bed;sit to stand/stand to sit  -JR (r) AH (t) JR (c)     Transfer Training PT LTG, [place] contact guard assist  -JG conditional independence  -JR (r) AH (t) JR (c)     Transfer Training PT LTG, Assist Device walker, standard  -JG walker, rolling  - (r) VAHID (t)  (c)     Transfer Training PT LTG, Additional Goal   following WB precautions (NWB LLE)  - (r) VAHID (t)  (c)     Transfer Training PT  LTG, Date Goal Reviewed 12/02/17  -JG      Gait Training PT LTG    Gait Training Goal PT LTG, Date Established 12/02/17  -JG 11/27/17  - (r) VAHID (t)  (c)     Gait Training Goal PT LTG, Time to Achieve 2 wks  -JG 2 wks  - (r) VAHID (t)  (c)     Gait Training Goal PT LTG, [place] Level minimum assist (75% patient effort)  - contact guard assist  - (r) VAHID (t)  (c)     Gait Training Goal PT LTG, Assist Device walker, standard  -G walker, rolling  - (r) VAHID (t)  (c)     Gait Training Goal PT LTG, Distance to Achieve 10 feet  -JG 10  - (r) VAHID (t)  (c)     Gait Training Goal PT LTG, Additional Goal  with WB restrictions (NWB LLE)  - (r) VAHID (t)  (c)     Gait Training Goal PT LTG, Date Goal Reviewed 12/02/17  -JG        User Key  (r) = Recorded By, (t) = Taken By, (c) = Cosigned By    Initials Name Provider Type     Kemar Geiger, PT Physical Therapist    JR Sia Whaley, PT Physical Therapist     Dipak Scott, PTA Physical Therapy Assistant     Caro Christine, PT Student PT Student          Physical Therapy Education     Title: PT OT SLP Therapies (Done)     Topic: Physical Therapy (Done)     Point: Mobility training (Done)    Learning Progress Summary    Learner Readiness Method Response Comment Documented by Status   Patient Acceptance E,D VU,NR   12/06/17 1634 Done    Acceptance E,D VU,NR   12/05/17 1550 Done    Acceptance E VU  MM 12/04/17 1859 Done    Acceptance E VU,NR pt instructed on safety with transfers  12/02/17 1450 Done    Acceptance E VU,NR LACE education  11/28/17 1818 Done    Acceptance E VU PT benefits and POC.  11/27/17 1553 Done   Family Acceptance E VU,NR pt instructed on safety with transfers  12/02/17 1450 Done               Point: Home exercise program (Done)    Learning Progress Summary    Learner Readiness Method Response Comment Documented by Status   Patient Acceptance E,D  VU,NR   12/06/17 1634 Done    Acceptance E,D VU,NR   12/05/17 1550 Done    Acceptance E VU   12/04/17 1859 Done    Acceptance E,D VU,NR   12/04/17 1136 Done    Acceptance E VU,NR LACE education  11/28/17 1818 Done    Acceptance E VU PT benefits and POC.  11/27/17 1553 Done               Point: Precautions (Resolved)    Learning Progress Summary    Learner Readiness Method Response Comment Documented by Status   Patient Acceptance E VU   12/04/17 1859 Done    Acceptance E VU,NR LACE education  11/28/17 1818 Done    Acceptance E VU PT benefits and POC.  11/27/17 1553 Done                      User Key     Initials Effective Dates Name Provider Type Discipline     04/06/17 -  Kemar Geiger, PT Physical Therapist PT     10/26/16 -  Apurva Raygoza, RN Registered Nurse Nurse     10/26/16 -  Dipak Scott, PTA Physical Therapy Assistant PT     07/10/17 -  Deepti Wadsworth, RN Registered Nurse Nurse     11/01/17 -  Caro Christine, PT Student PT Student PT                    PT Recommendation and Plan  Planned Therapy Interventions: balance training, bed mobility training, home exercise program, gait training, patient/family education, strengthening, transfer training  PT Frequency: daily  Plan of Care Review  Plan Of Care Reviewed With: patient  Progress: improving  Outcome Summary/Follow up Plan: Pt presented with improved activity tolerance with increased gait distance as well as tolerance of ther ex. See flowsheet for details.            Outcome Measures       12/05/17 1504 12/05/17 1121 12/04/17 1040    How much help from another person do you currently need...    Turning from your back to your side while in flat bed without using bedrails? 4  -RM  4  -RM    Moving from lying on back to sitting on the side of a flat bed without bedrails? 4  -RM  3  -RM    Moving to and from a bed to a chair (including a wheelchair)? 3  -RM  2  -RM    Standing up from a chair using your arms (e.g., wheelchair,  bedside chair)? 3  -RM  3  -RM    Climbing 3-5 steps with a railing? 1  -RM  1  -RM    To walk in hospital room? 2  -RM  2  -RM    AM-PAC 6 Clicks Score 17  -RM  15  -RM    How much help from another is currently needed...    Putting on and taking off regular lower body clothing?  2  -SD     Bathing (including washing, rinsing, and drying)  2  -SD     Toileting (which includes using toilet bed pan or urinal)  2  -SD     Putting on and taking off regular upper body clothing  4  -SD     Taking care of personal grooming (such as brushing teeth)  4  -SD     Eating meals  4  -SD     Score  18  -SD     Functional Assessment    Outcome Measure Options AM-PAC 6 Clicks Basic Mobility (PT)  -RM AM-PAC 6 Clicks Daily Activity (OT)  -SD AM-PAC 6 Clicks Basic Mobility (PT)  -RM      12/04/17 1035          How much help from another is currently needed...    Putting on and taking off regular lower body clothing? 2  -SD      Bathing (including washing, rinsing, and drying) 2  -SD      Toileting (which includes using toilet bed pan or urinal) 2  -SD      Putting on and taking off regular upper body clothing 3  -SD      Taking care of personal grooming (such as brushing teeth) 4  -SD      Eating meals 4  -SD      Score 17  -SD      Functional Assessment    Outcome Measure Options AM-PAC 6 Clicks Daily Activity (OT)  -SD        User Key  (r) = Recorded By, (t) = Taken By, (c) = Cosigned By    Initials Name Provider Type    TREY Scott PTA Physical Therapy Assistant    CADE Olmos, OT Occupational Therapist           Time Calculation:         PT Charges       12/06/17 1639          Time Calculation    Start Time 1011  -RM      PT Received On 12/06/17  -RM      PT Goal Re-Cert Due Date 12/16/17  -RM      Time Calculation- PT    Total Timed Code Minutes- PT 39 minute(s)  -RM        User Key  (r) = Recorded By, (t) = Taken By, (c) = Cosigned By    Initials Name Provider Type    TREY Scott PTA Physical Therapy  Assistant          Therapy Charges for Today     Code Description Service Date Service Provider Modifiers Qty    44734136484 HC GAIT TRAINING EA 15 MIN 12/5/2017 Dipak Scott, PTA GP 1    98443697363 HC PT THER PROC EA 15 MIN 12/5/2017 Dipak Scott, PTA GP 1    54884239275 HC GAIT TRAINING EA 15 MIN 12/6/2017 Dipak Scott, PTA GP 1    36867969512 HC PT THERAPEUTIC ACT EA 15 MIN 12/6/2017 Dipak Scott, PTA GP 1    53756637878 HC PT THER PROC EA 15 MIN 12/6/2017 Dipak Scott, PTA GP 1          PT G-Codes  Outcome Measure Options: AM-PAC 6 Clicks Basic Mobility (PT)    Dipak Scott PTA  12/6/2017

## 2017-12-06 NOTE — DISCHARGE SUMMARY
[facility]   DISCHARGE SUMMARY      Name:  Vasquez Marie   Age:  57 y.o.  Sex:  male  :  1960  MRN:  [id number]   Visit Number:  [id number]  Primary Care Physician:  Marilyn K. Vermeesch, MD  Date of Discharge:  2017  Admission Date:  2017    Discharge Diagnosis:      1.  Acute metabolic encephalopathy, present on admission, improved, related to hypotension and anemia prior to discharge  2.  Hypotension secondary to severe dehydration, present on admission, resolved without sepsis  3.  Acute renal failure secondary to #2, present on admission, resolved  4.  Peripheral arterial disease s/p left SFA stent and intervention to the peroneal arteries  4.  Paroxysmal atrial fibrillation/Flutter, not on anticoagulation due to recurrent GI bleed plus patient noncompliance, with history of cirrhosis as well   5.  Coronary artery disease on medical therapy, continued   6.  Essential hypertension  7.  COPD, stable  8.  Chronic hepatitis C infection  9.  Rheumatoid arthritis with chronic pain  10.  Acute worsening of chronic anemia, s/p 5 unit of PRBCs, stool occult negative   11. S/p TMA of left foot with infected wound, likely due to arterial occlusion, wound culture growing Escherichia coli and Stenotrophomonas maltophilia now s/p BKA on , completed antibiotics  12. Acute diarrhea, possibly due to refeeding, C. difficile negative, improved  14. Hypomagenesemia, due to diarrhea, being repleted  15. 7mm soft tissue nodule noted on CT chest, will need follow up CTs further outpatient.   16. Acute arterial occlusion at the SFA s/p intervention by Dr. Storm on   17. Cirrhosis of liver, likely due to chronic hepatitis C, will need referral to hepatology after discharge  18. History of recurrent bilateral DVT, previously on anticoagulation, now off due to history of GI bleed  19. 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 Acute on chronic blood loss  anemia, post 5 unit PRBC      History of Present Illness/Hospital Course:  The patient is a 57 yr old man admitted on 11-25 with history of osteomyelitis of the left foot post transmetatarsal amputation by Dr Campbell, found to have acute altered mental status, dehydration, hypovolemia without sepsis. He has been hydrated and transfused blood. Dr Johns saw the patient and felt confusion was related to anemia and hypotension. CT head and MRI head without acute abnormalities. The patient has since improved after treatment provided. On admission, he had recurrent foot wound, necrotic, with concern for arterial occlusion. Arterial dopplers showed poor circulation, he was then subsequently taken for angiography and intervention by Dr Storm to SFA with good flow. Dr Campbell still felt unlikely for the wound to heal, with poor patient compliance, previous wound vav and recommended left BKA. Also, patient had cirrhosis of liver with chronic hepatitis C and previous history of alcohol abuse.     Blood pressure is normalized. Acute renal failure has resolved. His hemoglobin remained stable now for four days after stopping lovenox. He has received 5 units of packed red blood cells. His confusion improved after he was transfused blood and hydrated. He has remained stable for several days now awaiting rehab placement.     He has received PT and increasing ambulation with assistance. His BKA left site is well appearing. Dr Real, surgeon, wants him to be seen next week. He is continued on plavix, statin, asa, metoprolol, losartan. He is stable for transfer to nursing facility today since insurance and facility have accepted now.          Medication risks and benefits were discussed in detail. Patient reported satisfaction with care delivered and treatment plan.    Outpatient follow up:  Repeat CT chest to evaluate lung nodule  Hepatology consultation for chronic cirrhosis  Follow up surgeon will be scheduled      Consults:     Consults      Date and Time Order Name Status Description    11/30/2017 1724 Inpatient Consult to General Surgery Completed     11/28/2017 1544 Inpatient Consult to Neurology      11/28/2017 1403 Inpatient Consult to Cardiology      11/28/2017 1300 Inpatient Consult to Cardiology      11/26/2017 1340 Inpatient Consult to Podiatry Completed           Procedures Performed:    Procedure(s):  AMPUTATION BELOW KNEE         Vital Signs:    Temp:  [98.1 °F (36.7 °C)-99.8 °F (37.7 °C)] 98.3 °F (36.8 °C)  Heart Rate:  [68-78] 72  Resp:  [18-20] 18  BP: (113-135)/(64-91) 131/71    Physical Exam:      General Appearance:    Alert, cooperative, in no acute distress   Head:    Normocephalic, without obvious abnormality, atraumatic   Eyes:            Lids and lashes normal, conjunctivae and sclerae normal, no   icterus, no pallor, corneas clear, PERRLA   Ears:    Ears appear intact with no abnormalities noted   Throat:   No oral lesions, no thrush, oral mucosa moist   Neck:   No adenopathy, supple, trachea midline, no thyromegaly, no   carotid bruit, no JVD   Back:     No kyphosis present, no scoliosis present, no skin lesions,      erythema or scars, no tenderness to percussion or                   palpation,   range of motion normal   Lungs:     Clear to auscultation,respirations regular, even and                  unlabored    Heart:    Regular rhythm and normal rate, normal S1 and S2, no            murmur, no gallop, no rub, no click   Chest Wall:    No abnormalities observed   Abdomen:     Normal bowel sounds, no masses, no organomegaly, soft        non-tender, non-distended, no guarding, no rebound                tenderness   Rectal:     Deferred   Extremities:   Moves all extremities well with left BKA site intact, no edema, no cyanosis, no redness   Pulses:   Pulses palpable and equal bilaterally; staples in place clean dry and intact    Skin:   No bleeding, with stable echymoses/chronic peripheral vascular bruising arms without   rash   Lymph nodes:   No palpable adenopathy   Neurologic:   Cranial nerves 2 - 12 grossly intact, sensation intact, DTR       present and equal bilaterally           Pertinent Lab Results:     Lab Results (all)     Procedure Component Value Units Date/Time    CBC & Differential [[id number]] Collected:  11/25/17 1357    Specimen:  Blood Updated:  11/25/17 1415    Narrative:       The following orders were created for panel order CBC & Differential.  Procedure                               Abnormality         Status                     ---------                               -----------         ------                     CBC Auto Differential[[id number]]        Abnormal            Final result                 Please view results for these tests on the individual orders.    CBC Auto Differential [[id number]]  (Abnormal) Collected:  11/25/17 1357    Specimen:  Blood Updated:  11/25/17 1415     WBC 12.07 (H) 10*3/mm3      RBC 2.60 (L) 10*6/mm3      Hemoglobin 7.8 (C) g/dL      Hematocrit 24.3 (L) %      MCV 93.5 fL      MCH 30.0 pg      MCHC 32.1 g/dL      RDW 15.9 (H) %      RDW-SD 54.6 (H) fl      MPV 9.6 fL      Platelets 269 10*3/mm3      Neutrophil % 56.3 %      Lymphocyte % 20.4 %      Monocyte % 7.5 %      Eosinophil % 14.7 (H) %      Basophil % 0.6 %      Immature Grans % 0.5 %      Neutrophils, Absolute 6.80 10*3/mm3      Lymphocytes, Absolute 2.46 10*3/mm3      Monocytes, Absolute 0.91 (H) 10*3/mm3      Eosinophils, Absolute 1.77 (H) 10*3/mm3      Basophils, Absolute 0.07 10*3/mm3      Immature Grans, Absolute 0.06 10*3/mm3      nRBC 0.0 /100 WBC     Comprehensive Metabolic Panel [[id number]]  (Abnormal) Collected:  11/25/17 1357    Specimen:  Blood Updated:  11/25/17 1428     Glucose 110 (H) mg/dL      BUN 30 (H) mg/dL      Creatinine 2.40 (H) mg/dL      Sodium 136 (L) mmol/L      Potassium 4.2 mmol/L      Chloride 104 mmol/L      CO2 17.0 (L) mmol/L      Calcium 9.2 mg/dL      Total Protein 8.1 g/dL      Albumin  3.60 g/dL      ALT (SGPT) 30 U/L      AST (SGOT) 35 U/L      Alkaline Phosphatase 117 U/L      Total Bilirubin 1.2 mg/dL      eGFR Non African Amer 28 (L) mL/min/1.73      Globulin 4.5 gm/dL      A/G Ratio 0.8 (L) g/dL      BUN/Creatinine Ratio 12.5     Anion Gap 19.2 mmol/L     Narrative:       Abnormal estimated GFR should be followed by more specific studies to confirm end stage chronic renal disease. The equation used for calculation may not be accurate for patients less than 19 years old, greater than 70 years old, patients at extremes of weight, malnutrition, or with acute renal dysfunction.    Troponin [[id number]]  (Normal) Collected:  11/25/17 1357    Specimen:  Blood Updated:  11/25/17 1428     Troponin I <0.012 ng/mL     Narrative:       Normal Patient Upper Reference Limit (URL) (99th Percentile)=0.03 ng/mL   Non-AMI Illness Reference Limit=0.03-0.11 ng/mL   AMI Confirmation=0.12 ng/mL and above    C-reactive Protein [[id number]]  (Abnormal) Collected:  11/25/17 1357    Specimen:  Blood Updated:  11/25/17 1428     C-Reactive Protein 6.90 (H) mg/dL     Magnesium [[id number]]  (Abnormal) Collected:  11/25/17 1357    Specimen:  Blood Updated:  11/25/17 1428     Magnesium 1.4 (L) mg/dL     CK Total & CKMB [[id number]]  (Abnormal) Collected:  11/25/17 1357    Specimen:  Blood Updated:  11/25/17 1434     CKMB 5.31 (C) ng/mL      Creatine Kinase 202 (H) U/L     CK-MB Index [[id number]]  (Abnormal) Collected:  11/25/17 1357    Specimen:  Blood Updated:  11/25/17 1434     CK-MB Index 2.6 (H) %     Douglas Draw [[id number]] Collected:  11/25/17 1356    Specimen:  Blood Updated:  11/25/17 1501    Narrative:       The following orders were created for panel order Douglas Draw.  Procedure                               Abnormality         Status                     ---------                               -----------         ------                     Light Blue Top[[id number]]                                   Final result                Lavender Top[[id number]]                                     Final result               Gold Top - SST[[id number]]                                   Final result               Green Top (No Gel)[[id number]]                               Final result                 Please view results for these tests on the individual orders.    Light Blue Top [[id number]] Collected:  11/25/17 1356    Specimen:  Blood Updated:  11/25/17 1501     Extra Tube hold for add-on      Auto resulted       Lavender Top [[id number]] Collected:  11/25/17 1357    Specimen:  Blood Updated:  11/25/17 1501     Extra Tube hold for add-on      Auto resulted       Gold Top - SST [[id number]] Collected:  11/25/17 1356    Specimen:  Blood Updated:  11/25/17 1501     Extra Tube Hold for add-ons.      Auto resulted.       Green Top (No Gel) [[id number]] Collected:  11/25/17 1356    Specimen:  Blood Updated:  11/25/17 1501     Extra Tube Hold for add-ons.      Auto resulted.       Sedimentation Rate [[id number]]  (Abnormal) Collected:  11/25/17 1357    Specimen:  Blood Updated:  11/25/17 1504     Sed Rate 120 (H) mm/hr     Lactic Acid, Plasma [[id number]]  (Normal) Collected:  11/25/17 1546    Specimen:  Blood Updated:  11/25/17 1557     Lactate 1.5 mmol/L     Urinalysis With / Culture If Indicated - Urine, Clean Catch [[id number]]  (Abnormal) Collected:  11/25/17 1539    Specimen:  Urine from Urine, Clean Catch Updated:  11/25/17 1557     Color, UA Mellisa (A)     Appearance, UA Clear     pH, UA <=5.0     Specific Gravity, UA >=1.030     Glucose, UA Negative     Ketones, UA Negative     Bilirubin, UA Small (1+) (A)     Blood, UA Negative     Protein, UA 30 mg/dL (1+) (A)     Leuk Esterase, UA Trace (A)     Nitrite, UA Negative     Urobilinogen, UA 1.0 E.U./dL    Urinalysis, Microscopic Only - Urine, Clean Catch [[id number]]  (Abnormal) Collected:  11/25/17 1539    Specimen:  Urine from Urine, Clean Catch Updated:  11/25/17 1603     RBC, UA 0-2 (A) /HPF       WBC, UA 0-2 (A) /HPF      Bacteria, UA Trace (A) /HPF      Squamous Epithelial Cells, UA 0-2 /HPF      Hyaline Casts, UA 3-6 /LPF      Amorphous Crystals, UA Small/1+ /HPF      Methodology Manual Light Microscopy    POC Glucose Fingerstick [[id number]]  (Normal) Collected:  11/25/17 1737    Specimen:  Blood Updated:  11/25/17 1741     Glucose 108 mg/dL       Serial Number: [id number]:  [id number]       POC Glucose Fingerstick [[id number]]  (Abnormal) Collected:  11/25/17 2026    Specimen:  Blood Updated:  11/25/17 2030     Glucose 151 (H) mg/dL       Serial Number: [id number]:  [id number]       POC Glucose Fingerstick [[id number]]  (Normal) Collected:  11/26/17 0613    Specimen:  Blood Updated:  11/26/17 0620     Glucose 113 mg/dL       Serial Number: [id number]:  [id number]       Basic Metabolic Panel [[id number]]  (Abnormal) Collected:  11/26/17 0500    Specimen:  Blood Updated:  11/26/17 0625     Glucose 109 (H) mg/dL      BUN 26 (H) mg/dL      Creatinine 1.50 (H) mg/dL      Sodium 141 mmol/L      Potassium 3.4 (L) mmol/L      Chloride 112 (H) mmol/L      CO2 17.0 (L) mmol/L      Calcium 8.5 mg/dL      eGFR Non African Amer 48 (L) mL/min/1.73      BUN/Creatinine Ratio 17.3     Anion Gap 15.4 mmol/L     Narrative:       Abnormal estimated GFR should be followed by more specific studies to confirm end stage chronic renal disease. The equation used for calculation may not be accurate for patients less than 19 years old, greater than 70 years old, patients at extremes of weight, malnutrition, or with acute renal dysfunction.    CBC Auto Differential [[id number]]  (Abnormal) Collected:  11/26/17 0500    Specimen:  Blood Updated:  11/26/17 0641     WBC 4.74 (L) 10*3/mm3      RBC 2.41 (L) 10*6/mm3      Hemoglobin 7.3 (C) g/dL      Hematocrit 22.5 (C) %      MCV 93.4 fL      MCH 30.3 pg      MCHC 32.4 g/dL      RDW 15.8 (H) %      RDW-SD 53.3 fl      MPV 9.9 fL      Platelets 188 10*3/mm3      Neutrophil % 47.3 %       Lymphocyte % 25.5 %      Monocyte % 7.6 %      Eosinophil % 18.8 (H) %      Basophil % 0.4 %      Immature Grans % 0.4 %      Neutrophils, Absolute 2.24 10*3/mm3      Lymphocytes, Absolute 1.21 10*3/mm3      Monocytes, Absolute 0.36 10*3/mm3      Eosinophils, Absolute 0.89 (H) 10*3/mm3      Basophils, Absolute 0.02 10*3/mm3      Immature Grans, Absolute 0.02 10*3/mm3      nRBC 0.0 /100 WBC     POC Glucose Fingerstick [[id number]]  (Abnormal) Collected:  11/26/17 1144    Specimen:  Blood Updated:  11/26/17 1150     Glucose 161 (H) mg/dL       Serial Number: [id number]:  [id number]       POC Glucose Fingerstick [[id number]]  (Normal) Collected:  11/26/17 1601    Specimen:  Blood Updated:  11/26/17 1605     Glucose 128 mg/dL       Serial Number: [id number]:  [id number]       POC Glucose Fingerstick [[id number]]  (Abnormal) Collected:  11/26/17 2035    Specimen:  Blood Updated:  11/26/17 2120     Glucose 141 (H) mg/dL       Serial Number: [id number]:  [id number]       POC Glucose Fingerstick [[id number]]  (Abnormal) Collected:  11/27/17 0616    Specimen:  Blood Updated:  11/27/17 0620     Glucose 154 (H) mg/dL       Serial Number: [id number]:  [id number]       CBC Auto Differential [[id number]]  (Abnormal) Collected:  11/27/17 0515    Specimen:  Blood Updated:  11/27/17 0624     WBC 3.62 (L) 10*3/mm3      RBC 2.61 (L) 10*6/mm3      Hemoglobin 7.9 (C) g/dL      Hematocrit 24.8 (L) %      MCV 95.0 (H) fL      MCH 30.3 pg      MCHC 31.9 g/dL      RDW 15.9 (H) %      RDW-SD 54.3 (H) fl      MPV 9.6 fL      Platelets 166 10*3/mm3      Neutrophil % 51.1 %      Lymphocyte % 21.8 %      Monocyte % 8.3 %      Eosinophil % 17.4 (H) %      Basophil % 0.6 %      Immature Grans % 0.8 (H) %      Neutrophils, Absolute 1.85 (L) 10*3/mm3      Lymphocytes, Absolute 0.79 10*3/mm3      Monocytes, Absolute 0.30 10*3/mm3      Eosinophils, Absolute 0.63 10*3/mm3      Basophils, Absolute 0.02 10*3/mm3      Immature Grans,  Absolute 0.03 10*3/mm3      nRBC 0.0 /100 WBC     CBC & Differential [[id number]] Collected:  11/27/17 0515    Specimen:  Blood Updated:  11/27/17 0624    Narrative:       The following orders were created for panel order CBC & Differential.  Procedure                               Abnormality         Status                     ---------                               -----------         ------                     CBC Auto Differential[[id number]]        Abnormal            Final result                 Please view results for these tests on the individual orders.    Basic Metabolic Panel [[id number]]  (Abnormal) Collected:  11/27/17 0515    Specimen:  Blood Updated:  11/27/17 0633     Glucose 123 (H) mg/dL      BUN 14 mg/dL      Creatinine 0.90 mg/dL      Sodium 146 (H) mmol/L      Potassium 3.6 mmol/L      Chloride 117 (H) mmol/L      CO2 17.0 (L) mmol/L      Calcium 8.6 mg/dL      eGFR Non African Amer 86 mL/min/1.73      BUN/Creatinine Ratio 15.6     Anion Gap 15.6 mmol/L     Narrative:       Abnormal estimated GFR should be followed by more specific studies to confirm end stage chronic renal disease. The equation used for calculation may not be accurate for patients less than 19 years old, greater than 70 years old, patients at extremes of weight, malnutrition, or with acute renal dysfunction.    POC Glucose Fingerstick [[id number]]  (Normal) Collected:  11/27/17 1108    Specimen:  Blood Updated:  11/27/17 1116     Glucose 127 mg/dL       Serial Number: [id number]:  [id number]       Clostridium Difficile Toxin, PCR - Stool, Per Rectum [[id number]]  (Normal) Collected:  11/27/17 1312    Specimen:  Stool from Per Rectum Updated:  11/27/17 1601     C. Difficile Toxins by PCR Not Detected    Fecal Leukocytes - Stool, Per Rectum [[id number]]  (Normal) Collected:  11/27/17 1312    Specimen:  Stool from Per Rectum Updated:  11/27/17 1627     Fecal Leukocytes No WBC's Seen    POC Glucose Fingerstick [[id number]]  (Normal)  Collected:  11/27/17 1557    Specimen:  Blood Updated:  11/27/17 1630     Glucose 104 mg/dL       Serial Number: [id number]:  [id number]       POC Glucose Fingerstick [[id number]]  (Normal) Collected:  11/27/17 2052    Specimen:  Blood Updated:  11/27/17 2101     Glucose 98 mg/dL       Serial Number: [id number]:  [id number]       CBC (No Diff) [[id number]]  (Abnormal) Collected:  11/28/17 0548    Specimen:  Blood Updated:  11/28/17 0622     WBC 4.14 (L) 10*3/mm3      RBC 2.52 (L) 10*6/mm3      Hemoglobin 7.7 (C) g/dL      Hematocrit 23.8 (C) %      MCV 94.4 (H) fL      MCH 30.6 pg      MCHC 32.4 g/dL      RDW 15.9 (H) %      RDW-SD 53.4 fl      MPV 9.5 fL      Platelets 155 10*3/mm3     Basic Metabolic Panel [[id number]]  (Abnormal) Collected:  11/28/17 0548    Specimen:  Blood Updated:  11/28/17 0626     Glucose 92 mg/dL      BUN 9 mg/dL      Creatinine 0.80 mg/dL      Sodium 144 mmol/L      Potassium 3.5 mmol/L      Chloride 114 (H) mmol/L      CO2 18.0 (L) mmol/L      Calcium 8.8 mg/dL      eGFR Non African Amer 99 mL/min/1.73      BUN/Creatinine Ratio 11.3     Anion Gap 15.5 mmol/L     Narrative:       Abnormal estimated GFR should be followed by more specific studies to confirm end stage chronic renal disease. The equation used for calculation may not be accurate for patients less than 19 years old, greater than 70 years old, patients at extremes of weight, malnutrition, or with acute renal dysfunction.    POC Glucose Fingerstick [[id number]]  (Normal) Collected:  11/28/17 0628    Specimen:  Blood Updated:  11/28/17 0636     Glucose 97 mg/dL       Serial Number: [id number]:  [id number]       Magnesium [[id number]]  (Abnormal) Collected:  11/28/17 0548    Specimen:  Blood Updated:  11/28/17 0639     Magnesium 1.2 (C) mg/dL     Iron Profile [[id number]]  (Abnormal) Collected:  11/26/17 0500    Specimen:  Blood Updated:  11/28/17 0804     Iron 35 (L) mcg/dL      TIBC 301 mcg/dL      Iron Saturation 12 %      Ferritin [[id number]]  (Normal) Collected:  11/26/17 0500    Specimen:  Blood Updated:  11/28/17 0804     Ferritin 104.00 ng/mL     Wound Culture - Wound, Foot, Left [[id number]]  (Abnormal)  (Susceptibility) Collected:  11/25/17 1732    Specimen:  Wound from Foot, Left Updated:  11/28/17 0919     Wound Culture --      Heavy growth (4+) Escherichia coli (A)      Identification and MYRA to follow.           Stenotrophomonas maltophilia (A)    Susceptibility      Escherichia coli     MYRA     Amikacin <=16 ug/ml Susceptible     Ampicillin <=8 ug/ml Susceptible     Ampicillin + Sulbactam <=8/4 ug/ml Susceptible     Aztreonam <=4 ug/ml Susceptible     Cefazolin <=8 ug/ml Susceptible     Cefepime <=4 ug/ml Susceptible     Cefotaxime <=2 ug/ml Susceptible     Cefoxitin <=8 ug/ml Susceptible     Ceftazidime <=1 ug/ml Susceptible     Ceftriaxone <=8 ug/ml Susceptible     Cefuroxime sodium <=4 ug/ml Susceptible     Ciprofloxacin <=1 ug/ml Susceptible     Doripenem <=0.5 ug/ml Susceptible     Ertapenem <=1 ug/ml Susceptible     Gentamicin <=4 ug/ml Susceptible     Levofloxacin <=2 ug/ml Susceptible     Meropenem <=1 ug/ml Susceptible     Piperacillin + Tazobactam <=16 ug/ml Susceptible     Tetracycline <=4 ug/ml Susceptible     Tigecycline <=1 ug/ml Susceptible     Tobramycin <=4 ug/ml Susceptible     Trimethoprim + Sulfamethoxazole <=2/38 ug/ml Susceptible                Susceptibility      Stenotrophomonas maltophilia     MYRA     Ceftazidime >16 ug/ml Resistant     Levofloxacin <=2 ug/ml Susceptible     Trimethoprim + Sulfamethoxazole <=2/38 ug/ml Susceptible                    POC Glucose Fingerstick [[id number]]  (Abnormal) Collected:  11/28/17 1104    Specimen:  Blood Updated:  11/28/17 1153     Glucose 144 (H) mg/dL       Serial Number: [id number]:  [id number]       CBC & Differential [[id number]] Collected:  11/28/17 1456    Specimen:  Blood Updated:  11/28/17 1520    Narrative:       The following orders were created  for panel order CBC & Differential.  Procedure                               Abnormality         Status                     ---------                               -----------         ------                     CBC Auto Differential[[id number]]        Abnormal            Final result                 Please view results for these tests on the individual orders.    CBC Auto Differential [[id number]]  (Abnormal) Collected:  11/28/17 1456    Specimen:  Blood Updated:  11/28/17 1520     WBC 3.64 (L) 10*3/mm3      RBC 2.68 (L) 10*6/mm3      Hemoglobin 8.0 (L) g/dL      Hematocrit 24.7 (L) %      MCV 92.2 fL      MCH 29.9 pg      MCHC 32.4 g/dL      RDW 16.3 (H) %      RDW-SD 52.3 fl      MPV 9.5 fL      Platelets 133 10*3/mm3      Neutrophil % 61.0 %      Lymphocyte % 19.8 %      Monocyte % 9.1 %      Eosinophil % 9.3 (H) %      Basophil % 0.3 %      Immature Grans % 0.5 %      Neutrophils, Absolute 2.22 10*3/mm3      Lymphocytes, Absolute 0.72 10*3/mm3      Monocytes, Absolute 0.33 10*3/mm3      Eosinophils, Absolute 0.34 10*3/mm3      Basophils, Absolute 0.01 10*3/mm3      Immature Grans, Absolute 0.02 10*3/mm3      nRBC 0.0 /100 WBC     Ammonia [[id number]]  (Normal) Collected:  11/28/17 1455    Specimen:  Blood Updated:  11/28/17 1537     Ammonia 18 umol/L     POC Glucose Fingerstick [[id number]]  (Normal) Collected:  11/28/17 1629    Specimen:  Blood Updated:  11/28/17 1811     Glucose 100 mg/dL       Serial Number: [id number]:  [id number]       Urinalysis With / Culture If Indicated - Urine, Clean Catch [[id number]]  (Normal) Collected:  11/28/17 1912    Specimen:  Urine from Urine, Clean Catch Updated:  11/28/17 1950     Color, UA Yellow     Appearance, UA Clear     pH, UA 5.5     Specific Gravity, UA 1.025     Glucose, UA Negative     Ketones, UA Negative     Bilirubin, UA Negative     Blood, UA Negative     Protein, UA Negative     Leuk Esterase, UA Negative     Nitrite, UA Negative     Urobilinogen, UA 0.2  E.U./dL    Narrative:       Urine microscopic not indicated.    POC Glucose Fingerstick [[id number]]  (Normal) Collected:  11/28/17 2047    Specimen:  Blood Updated:  11/28/17 2106     Glucose 108 mg/dL       Serial Number: [id number]:  [id number]       Occult Blood X 1, Stool - Stool, Per Rectum [[id number]]  (Normal) Collected:  11/28/17 2147    Specimen:  Stool from Per Rectum Updated:  11/28/17 2226     Fecal Occult Blood Negative    Comprehensive Metabolic Panel [[id number]]  (Abnormal) Collected:  11/29/17 0513    Specimen:  Blood Updated:  11/29/17 0604     Glucose 90 mg/dL      BUN 7 mg/dL      Creatinine 0.70 mg/dL      Sodium 141 mmol/L      Potassium 3.4 (L) mmol/L      Chloride 112 (H) mmol/L      CO2 17.0 (L) mmol/L      Calcium 8.8 mg/dL      Total Protein 7.5 g/dL      Albumin 3.20 (L) g/dL      ALT (SGPT) 25 U/L      AST (SGOT) 28 U/L      Alkaline Phosphatase 104 U/L      Total Bilirubin 1.0 mg/dL      eGFR Non African Amer 115 mL/min/1.73      Globulin 4.3 gm/dL      A/G Ratio 0.7 (L) g/dL      BUN/Creatinine Ratio 10.0     Anion Gap 15.4 mmol/L     Narrative:       Abnormal estimated GFR should be followed by more specific studies to confirm end stage chronic renal disease. The equation used for calculation may not be accurate for patients less than 19 years old, greater than 70 years old, patients at extremes of weight, malnutrition, or with acute renal dysfunction.    CBC & Differential [[id number]] Collected:  11/29/17 0513    Specimen:  Blood Updated:  11/29/17 0618    Narrative:       The following orders were created for panel order CBC & Differential.  Procedure                               Abnormality         Status                     ---------                               -----------         ------                     CBC Auto Differential[[id number]]        Abnormal            Final result                 Please view results for these tests on the individual orders.    CBC Auto  Differential [[id number]]  (Abnormal) Collected:  11/29/17 0513    Specimen:  Blood Updated:  11/29/17 0618     WBC 5.13 10*3/mm3      RBC 2.59 (L) 10*6/mm3      Hemoglobin 8.0 (L) g/dL      Hematocrit 24.9 (L) %      MCV 96.1 (H) fL      MCH 30.9 pg      MCHC 32.1 g/dL      RDW 16.4 (H) %      RDW-SD 55.1 (H) fl      MPV 9.6 fL      Platelets 133 10*3/mm3      Neutrophil % 68.0 %      Lymphocyte % 14.6 %      Monocyte % 8.4 %      Eosinophil % 7.8 (H) %      Basophil % 0.6 %      Immature Grans % 0.6 %      Neutrophils, Absolute 3.49 10*3/mm3      Lymphocytes, Absolute 0.75 10*3/mm3      Monocytes, Absolute 0.43 10*3/mm3      Eosinophils, Absolute 0.40 10*3/mm3      Basophils, Absolute 0.03 10*3/mm3      Immature Grans, Absolute 0.03 10*3/mm3      nRBC 0.0 /100 WBC     POC Glucose Fingerstick [[id number]]  (Normal) Collected:  11/29/17 0603    Specimen:  Blood Updated:  11/29/17 0620     Glucose 98 mg/dL       Serial Number: [id number]:  [id number]       POC Glucose Fingerstick [[id number]]  (Normal) Collected:  11/29/17 1233    Specimen:  Blood Updated:  11/29/17 1240     Glucose 102 mg/dL       Serial Number: [id number]:  [id number]       Hepatitis B & C Profile [[id number]]  (Abnormal) Collected:  11/28/17 0548    Specimen:  Blood Updated:  11/29/17 1518     Hepatitis B Surface Ag Negative     Hep B E Ag Negative     Hep B Core IgM Positive (A)     Hep B Core Total Ab Negative     Hep B E Ab Negative     Hep B S Ab Non Reactive                    Non Reactive: Inconsistent with immunity,                              less than 10 mIU/mL                Reactive:     Consistent with immunity,                              greater than 9.9 mIU/mL        Hepatitis C Ab >11.0 (H) s/co ratio     Narrative:       Performed at:  [address] [place] [address]  [id number]  : Candelario Young PhD, Phone:  [phone]    Comment: [[id number]] Collected:  11/28/17 0548    Specimen:  Blood  Updated:  11/29/17 1518     Comment Comment      Strong reactive antibody screen (s/c ratio >10.9) is consistent  with past or present HCV infection.  Follow-up testing by HCV,  Quantitative, Real time PCR (#[id number]) is recommended to determine  viral load/diagnosis of current HCV infection.       Narrative:       Performed at:  [address] [place] [address]  [id number]  : Candelario Young PhD, Phone:  [phone]    POC Glucose Fingerstick [[id number]]  (Normal) Collected:  11/29/17 1559    Specimen:  Blood Updated:  11/29/17 1631     Glucose 99 mg/dL       Serial Number: [id number]:  [id number]       POC Glucose Fingerstick [[id number]]  (Normal) Collected:  11/29/17 2032    Specimen:  Blood Updated:  11/29/17 2041     Glucose 104 mg/dL       Serial Number: [id number]:  [id number]       Hemoglobin & Hematocrit, Blood [[id number]]  (Abnormal) Collected:  11/29/17 2241    Specimen:  Blood Updated:  11/29/17 2251     Hemoglobin 7.5 (C) g/dL      Hematocrit 23.5 (C) %     POC Glucose Fingerstick [[id number]]  (Normal) Collected:  11/30/17 0626    Specimen:  Blood Updated:  11/30/17 0645     Glucose 108 mg/dL       Serial Number: [id number]:  [id number]       POC Glucose Fingerstick [[id number]]  (Normal) Collected:  11/30/17 1107    Specimen:  Blood Updated:  11/30/17 1126     Glucose 108 mg/dL       Serial Number: [id number]:  [id number]       CBC & Differential [[id number]] Collected:  11/30/17 1153    Specimen:  Blood Updated:  11/30/17 1207    Narrative:       The following orders were created for panel order CBC & Differential.  Procedure                               Abnormality         Status                     ---------                               -----------         ------                     CBC Auto Differential[[id number]]        Abnormal            Final result                 Please view results for these tests on the individual orders.    CBC Auto Differential  [[id number]]  (Abnormal) Collected:  11/30/17 1153    Specimen:  Blood Updated:  11/30/17 1207     WBC 5.16 10*3/mm3      RBC 3.33 (L) 10*6/mm3      Hemoglobin 10.2 (L) g/dL      Hematocrit 30.3 (L) %      MCV 91.0 fL      MCH 30.6 pg      MCHC 33.7 g/dL      RDW 16.0 (H) %      RDW-SD 51.7 fl      MPV 9.5 fL      Platelets 118 (L) 10*3/mm3      Neutrophil % 67.0 %      Lymphocyte % 16.1 %      Monocyte % 6.8 %      Eosinophil % 9.1 (H) %      Basophil % 0.4 %      Immature Grans % 0.6 %      Neutrophils, Absolute 3.46 10*3/mm3      Lymphocytes, Absolute 0.83 10*3/mm3      Monocytes, Absolute 0.35 10*3/mm3      Eosinophils, Absolute 0.47 10*3/mm3      Basophils, Absolute 0.02 10*3/mm3      Immature Grans, Absolute 0.03 10*3/mm3      nRBC 0.0 /100 WBC     Basic Metabolic Panel [[id number]]  (Abnormal) Collected:  11/30/17 1153    Specimen:  Blood Updated:  11/30/17 1227     Glucose 111 (H) mg/dL      BUN 5 (L) mg/dL      Creatinine 0.70 mg/dL      Sodium 141 mmol/L      Potassium 3.0 (L) mmol/L      Chloride 109 (H) mmol/L      CO2 19.0 (L) mmol/L      Calcium 8.6 mg/dL      eGFR Non African Amer 115 mL/min/1.73      BUN/Creatinine Ratio 7.1     Anion Gap 16.0 mmol/L     Narrative:       Abnormal estimated GFR should be followed by more specific studies to confirm end stage chronic renal disease. The equation used for calculation may not be accurate for patients less than 19 years old, greater than 70 years old, patients at extremes of weight, malnutrition, or with acute renal dysfunction.    Magnesium [[id number]]  (Abnormal) Collected:  11/30/17 1153    Specimen:  Blood Updated:  11/30/17 1227     Magnesium 1.4 (L) mg/dL     Blood Culture - Blood, [[id number]]  (Normal) Collected:  11/25/17 1440    Specimen:  Blood from Arm, Left Updated:  11/30/17 1501     Blood Culture No growth at 5 days    Blood Culture - Blood, [[id number]]  (Normal) Collected:  11/25/17 1448    Specimen:  Blood from Arm, Left Updated:  11/30/17  1501     Blood Culture No growth at 5 days    POC Glucose Fingerstick [[id number]]  (Normal) Collected:  11/30/17 1558    Specimen:  Blood Updated:  11/30/17 1635     Glucose 121 mg/dL       Serial Number: [id number]:  [id number]       POC Glucose Fingerstick [[id number]]  (Abnormal) Collected:  11/30/17 2103    Specimen:  Blood Updated:  11/30/17 2131     Glucose 150 (H) mg/dL       Serial Number: [id number]:  [id number]       CBC & Differential [[id number]] Collected:  12/01/17 0518    Specimen:  Blood Updated:  12/01/17 0543    Narrative:       The following orders were created for panel order CBC & Differential.  Procedure                               Abnormality         Status                     ---------                               -----------         ------                     CBC Auto Differential[[id number]]        Abnormal            Final result                 Please view results for these tests on the individual orders.    CBC Auto Differential [[id number]]  (Abnormal) Collected:  12/01/17 0518    Specimen:  Blood Updated:  12/01/17 0543     WBC 4.79 (L) 10*3/mm3      RBC 3.23 (L) 10*6/mm3      Hemoglobin 9.5 (L) g/dL      Hematocrit 29.6 (L) %      MCV 91.6 fL      MCH 29.4 pg      MCHC 32.1 g/dL      RDW 16.2 (H) %      RDW-SD 53.3 fl      MPV 10.0 fL      Platelets 131 10*3/mm3      Neutrophil % 64.3 %      Lymphocyte % 17.3 %      Monocyte % 6.7 %      Eosinophil % 10.9 (H) %      Basophil % 0.4 %      Immature Grans % 0.4 %      Neutrophils, Absolute 3.08 10*3/mm3      Lymphocytes, Absolute 0.83 10*3/mm3      Monocytes, Absolute 0.32 10*3/mm3      Eosinophils, Absolute 0.52 10*3/mm3      Basophils, Absolute 0.02 10*3/mm3      Immature Grans, Absolute 0.02 10*3/mm3      nRBC 0.0 /100 WBC     Magnesium [[id number]]  (Abnormal) Collected:  12/01/17 0518    Specimen:  Blood Updated:  12/01/17 0556     Magnesium 1.5 (L) mg/dL     Basic Metabolic Panel [[id number]]  (Abnormal) Collected:  12/01/17  0518    Specimen:  Blood Updated:  12/01/17 0558     Glucose 102 (H) mg/dL      BUN 7 mg/dL      Creatinine 0.80 mg/dL      Sodium 143 mmol/L      Potassium 3.8 mmol/L      Chloride 113 (H) mmol/L      CO2 18.0 (L) mmol/L      Calcium 8.6 mg/dL      eGFR Non African Amer 99 mL/min/1.73      BUN/Creatinine Ratio 8.8     Anion Gap 15.8 mmol/L     Narrative:       Abnormal estimated GFR should be followed by more specific studies to confirm end stage chronic renal disease. The equation used for calculation may not be accurate for patients less than 19 years old, greater than 70 years old, patients at extremes of weight, malnutrition, or with acute renal dysfunction.    POC Glucose Fingerstick [[id number]]  (Normal) Collected:  12/01/17 0632    Specimen:  Blood Updated:  12/01/17 0646     Glucose 104 mg/dL       Serial Number: [id number]:  [id number]       Vitamin B12 [[id number]]  (Normal) Collected:  12/01/17 0518    Specimen:  Blood Updated:  12/01/17 1029     Vitamin B-12 621 pg/mL     TSH [[id number]]  (Normal) Collected:  12/01/17 0518    Specimen:  Blood Updated:  12/01/17 1057     TSH 4.190 mIU/mL     Stool Culture - Stool, Per Rectum [[id number]] Collected:  11/27/17 1312    Specimen:  Stool from Per Rectum Updated:  12/01/17 1216     Stool Culture No Salmonella, Shigella, Campylobacter, E coli O157:H7, or Yersinia isolated    POC Glucose Fingerstick [[id number]]  (Normal) Collected:  12/01/17 1409    Specimen:  Blood Updated:  12/01/17 1420     Glucose 114 mg/dL       Serial Number: [id number]:  [id number]       POC Glucose Fingerstick [[id number]]  (Abnormal) Collected:  12/01/17 1546    Specimen:  Blood Updated:  12/01/17 1610     Glucose 146 (H) mg/dL       Serial Number: [id number]:  [id number]       POC Glucose Fingerstick [[id number]]  (Abnormal) Collected:  12/01/17 2042    Specimen:  Blood Updated:  12/01/17 2046     Glucose 252 (H) mg/dL       Serial Number: [id number]:  [id number]       POC  Glucose Fingerstick [[id number]]  (Abnormal) Collected:  12/02/17 0611    Specimen:  Blood Updated:  12/02/17 0630     Glucose 233 (H) mg/dL       Serial Number: [id number]:  [id number]       POC Glucose Fingerstick [[id number]]  (Abnormal) Collected:  12/02/17 1116    Specimen:  Blood Updated:  12/02/17 1131     Glucose 210 (H) mg/dL       Serial Number: [id number]:  [id number]       POC Glucose Fingerstick [[id number]]  (Abnormal) Collected:  12/02/17 1604    Specimen:  Blood Updated:  12/02/17 1626     Glucose 157 (H) mg/dL       Serial Number: [id number]:  [id number]       Magnesium [[id number]]  (Abnormal) Collected:  12/02/17 1724    Specimen:  Blood Updated:  12/02/17 1748     Magnesium 1.5 (L) mg/dL     POC Glucose Fingerstick [[id number]]  (Abnormal) Collected:  12/02/17 2049    Specimen:  Blood Updated:  12/02/17 2130     Glucose 168 (H) mg/dL       Serial Number: [id number]:  [id number]       CBC & Differential [[id number]] Collected:  12/03/17 0557    Specimen:  Blood Updated:  12/03/17 0617    Narrative:       The following orders were created for panel order CBC & Differential.  Procedure                               Abnormality         Status                     ---------                               -----------         ------                     CBC Auto Differential[[id number]]        Abnormal            Final result                 Please view results for these tests on the individual orders.    CBC Auto Differential [[id number]]  (Abnormal) Collected:  12/03/17 0557    Specimen:  Blood Updated:  12/03/17 0617     WBC 3.58 (L) 10*3/mm3      RBC 2.41 (L) 10*6/mm3      Hemoglobin 7.3 (C) g/dL      Hematocrit 22.7 (C) %      MCV 94.2 (H) fL      MCH 30.3 pg      MCHC 32.2 g/dL      RDW 16.2 (H) %      RDW-SD 55.5 (H) fl      MPV 9.8 fL      Platelets 115 (L) 10*3/mm3      Neutrophil % 69.0 %      Lymphocyte % 22.6 %      Monocyte % 7.0 %      Eosinophil % 0.3 %      Basophil % 0.3 %       Immature Grans % 0.8 (H) %      Neutrophils, Absolute 2.47 10*3/mm3      Lymphocytes, Absolute 0.81 10*3/mm3      Monocytes, Absolute 0.25 10*3/mm3      Eosinophils, Absolute 0.01 10*3/mm3      Basophils, Absolute 0.01 10*3/mm3      Immature Grans, Absolute 0.03 10*3/mm3      nRBC 0.0 /100 WBC     Basic Metabolic Panel [[id number]]  (Abnormal) Collected:  12/03/17 0557    Specimen:  Blood Updated:  12/03/17 0627     Glucose 106 (H) mg/dL      BUN 13 mg/dL      Creatinine 0.90 mg/dL      Sodium 143 mmol/L      Potassium 3.8 mmol/L      Chloride 112 (H) mmol/L      CO2 20.0 (L) mmol/L      Calcium 8.2 (L) mg/dL      eGFR Non African Amer 86 mL/min/1.73      BUN/Creatinine Ratio 14.4     Anion Gap 14.8 mmol/L     Narrative:       Abnormal estimated GFR should be followed by more specific studies to confirm end stage chronic renal disease. The equation used for calculation may not be accurate for patients less than 19 years old, greater than 70 years old, patients at extremes of weight, malnutrition, or with acute renal dysfunction.    POC Glucose Fingerstick [[id number]]  (Normal) Collected:  12/03/17 1139    Specimen:  Blood Updated:  12/03/17 1148     Glucose 117 mg/dL       Serial Number: [id number]:  [id number]       Magnesium [[id number]]  (Abnormal) Collected:  12/03/17 0557    Specimen:  Blood Updated:  12/03/17 1223     Magnesium 1.4 (L) mg/dL     POC Glucose Fingerstick [[id number]]  (Normal) Collected:  12/03/17 1622    Specimen:  Blood Updated:  12/03/17 1700     Glucose 121 mg/dL       Serial Number: [id number]:  [id number]       POC Glucose Fingerstick [[id number]]  (Abnormal) Collected:  12/03/17 2032    Specimen:  Blood Updated:  12/03/17 2106     Glucose 132 (H) mg/dL       Serial Number: [id number]:  [id number]       Hemoglobin & Hematocrit, Blood [[id number]]  (Abnormal) Collected:  12/03/17 2159    Specimen:  Blood Updated:  12/03/17 2211     Hemoglobin 8.9 (L) g/dL      Hematocrit 28.0 (L) %      Basic Metabolic Panel [[id number]]  (Abnormal) Collected:  12/04/17 0554    Specimen:  Blood Updated:  12/04/17 0637     Glucose 101 (H) mg/dL      BUN 17 mg/dL      Creatinine 0.80 mg/dL      Sodium 141 mmol/L      Potassium 4.3 mmol/L      Chloride 107 mmol/L      CO2 24.0 (L) mmol/L      Calcium 8.2 (L) mg/dL      eGFR Non African Amer 99 mL/min/1.73      BUN/Creatinine Ratio 21.3     Anion Gap 14.3 mmol/L     Narrative:       Abnormal estimated GFR should be followed by more specific studies to confirm end stage chronic renal disease. The equation used for calculation may not be accurate for patients less than 19 years old, greater than 70 years old, patients at extremes of weight, malnutrition, or with acute renal dysfunction.    CBC & Differential [[id number]] Collected:  12/04/17 0647    Specimen:  Blood Updated:  12/04/17 0702    Narrative:       The following orders were created for panel order CBC & Differential.  Procedure                               Abnormality         Status                     ---------                               -----------         ------                     CBC Auto Differential[[id number]]        Abnormal            Final result                 Please view results for these tests on the individual orders.    CBC Auto Differential [[id number]]  (Abnormal) Collected:  12/04/17 0647    Specimen:  Blood Updated:  12/04/17 0702     WBC 3.71 (L) 10*3/mm3      RBC 3.11 (L) 10*6/mm3      Hemoglobin 9.1 (L) g/dL      Hematocrit 28.1 (L) %      MCV 90.4 fL      MCH 29.3 pg      MCHC 32.4 g/dL      RDW 16.6 (H) %      RDW-SD 54.4 (H) fl      MPV 10.1 fL      Platelets 120 (L) 10*3/mm3      Neutrophil % 66.6 %      Lymphocyte % 22.9 %      Monocyte % 6.5 %      Eosinophil % 2.7 %      Basophil % 0.5 %      Immature Grans % 0.8 (H) %      Neutrophils, Absolute 2.47 10*3/mm3      Lymphocytes, Absolute 0.85 10*3/mm3      Monocytes, Absolute 0.24 10*3/mm3      Eosinophils, Absolute 0.10  10*3/mm3      Basophils, Absolute 0.02 10*3/mm3      Immature Grans, Absolute 0.03 10*3/mm3      nRBC 0.0 /100 WBC     POC Glucose Fingerstick [[id number]]  (Normal) Collected:  12/04/17 1030    Specimen:  Blood Updated:  12/04/17 1055     Glucose 126 mg/dL       Serial Number: [id number]:  [id number]       POC Glucose Fingerstick [[id number]]  (Abnormal) Collected:  12/04/17 1626    Specimen:  Blood Updated:  12/04/17 1640     Glucose 163 (H) mg/dL       Serial Number: [id number]:  [id number]       POC Glucose Fingerstick [[id number]]  (Normal) Collected:  12/04/17 2101    Specimen:  Blood Updated:  12/04/17 2210     Glucose 129 mg/dL       Serial Number: [id number]:  [id number]       CBC & Differential [[id number]] Collected:  12/05/17 0518    Specimen:  Blood Updated:  12/05/17 0619    Narrative:       The following orders were created for panel order CBC & Differential.  Procedure                               Abnormality         Status                     ---------                               -----------         ------                     CBC Auto Differential[[id number]]        Abnormal            Final result                 Please view results for these tests on the individual orders.    CBC Auto Differential [[id number]]  (Abnormal) Collected:  12/05/17 0518    Specimen:  Blood Updated:  12/05/17 0619     WBC 5.14 10*3/mm3      RBC 3.20 (L) 10*6/mm3      Hemoglobin 9.6 (L) g/dL      Hematocrit 29.1 (L) %      MCV 90.9 fL      MCH 30.0 pg      MCHC 33.0 g/dL      RDW 16.0 (H) %      RDW-SD 53.1 fl      MPV 10.3 fL      Platelets 148 10*3/mm3      Neutrophil % 64.4 %      Lymphocyte % 20.2 %      Monocyte % 6.0 %      Eosinophil % 8.4 (H) %      Basophil % 0.4 %      Immature Grans % 0.6 %      Neutrophils, Absolute 3.31 10*3/mm3      Lymphocytes, Absolute 1.04 10*3/mm3      Monocytes, Absolute 0.31 10*3/mm3      Eosinophils, Absolute 0.43 10*3/mm3      Basophils, Absolute 0.02 10*3/mm3      Immature  Grans, Absolute 0.03 10*3/mm3      nRBC 0.0 /100 WBC     Basic Metabolic Panel [[id number]]  (Abnormal) Collected:  12/05/17 0518    Specimen:  Blood Updated:  12/05/17 0629     Glucose 115 (H) mg/dL      BUN 16 mg/dL      Creatinine 0.80 mg/dL      Sodium 141 mmol/L      Potassium 3.6 mmol/L      Chloride 104 mmol/L      CO2 28.0 mmol/L      Calcium 8.4 mg/dL      eGFR Non African Amer 99 mL/min/1.73      BUN/Creatinine Ratio 20.0     Anion Gap 12.6 mmol/L     Narrative:       Abnormal estimated GFR should be followed by more specific studies to confirm end stage chronic renal disease. The equation used for calculation may not be accurate for patients less than 19 years old, greater than 70 years old, patients at extremes of weight, malnutrition, or with acute renal dysfunction.    POC Glucose Fingerstick [[id number]]  (Abnormal) Collected:  12/05/17 0616    Specimen:  Blood Updated:  12/05/17 0631     Glucose 167 (H) mg/dL       Serial Number: [id number]:  [id number]       POC Glucose Fingerstick [[id number]]  (Abnormal) Collected:  12/05/17 1119    Specimen:  Blood Updated:  12/05/17 1141     Glucose 139 (H) mg/dL       Serial Number: [id number]:  [id number]       POC Glucose Fingerstick [[id number]]  (Abnormal) Collected:  12/05/17 1658    Specimen:  Blood Updated:  12/05/17 1736     Glucose 198 (H) mg/dL       Serial Number: [id number]:  [id number]       POC Glucose Fingerstick [[id number]]  (Normal) Collected:  12/05/17 2052    Specimen:  Blood Updated:  12/05/17 2106     Glucose 110 mg/dL       Serial Number: [id number]:  [id number]       POC Glucose Fingerstick [[id number]]  (Normal) Collected:  12/06/17 0617    Specimen:  Blood Updated:  12/06/17 0627     Glucose 101 mg/dL       Serial Number: [id number]:  [id number]       RPR [[id number]]  (Normal) Collected:  12/01/17 0518    Specimen:  Blood Updated:  12/06/17 1110     RPR Non-Reactive          Pertinent Radiology Results:    Imaging Results  (all)     Procedure Component Value Units Date/Time    XR Foot 3+ View Left [[id number]] Collected:  11/26/17 0844     Updated:  11/26/17 0848    Narrative:       Three-view left foot     HISTORY: Left foot wound     FINDINGS: 3 views of the left foot were obtained. Comparison is made to  the prior exam dated June 19, 2017. Interval postoperative changes are  seen from transmetatarsal amputation. Postoperative changes are seen in  the fifth proximal metatarsal and at the ankle. Degenerative changes are  noted.       Impression:       Interval postoperative changes from transmetatarsal  amputation.     No definite acute bony abnormality.     This report was finalized on 11/26/2017 8:45 AM by Anjel Amador MD.    XR Chest 2 View [[id number]] Collected:  11/26/17 0846     Updated:  11/26/17 0849    Narrative:       PROCEDURE: XR CHEST 2 VW-     HISTORY: cough     COMPARISON: April 7, 2017.      FINDINGS: The heart is normal in size. The mediastinum is unremarkable.  There is a medial right lung base opacity that may represent atelectasis  or pneumonia. Small bilateral pleural effusions are noted. There is no  pneumothorax. The bony thorax is intact.           Impression:       Medial right lung base atelectasis or pneumonia.     Small bilateral pleural effusions.        This report was finalized on 11/26/2017 8:46 AM by Anjel Amador MD.    CT Abdomen Pelvis Without Contrast [[id number]] Collected:  11/27/17 1825     Updated:  11/27/17 1826    Narrative:       FINAL REPORT    TECHNIQUE:  Axial images were obtained using computed tomography through the abdomen and pelvis without contrast. MPR Reconstruction in the coronal plane was performed.This study was performed with techniques to keep radiation doses as low as reasonably achievable,   (ALARA). Individualized dose reduction techniques using automated exposure control or adjustment of mA and/or kV according to the patient''''''''s size were employed.    CLINICAL  HISTORY:  s/p fall with multiple bruises on the back, low hemoglobin, rule  out retroperitoneal bleed    FINDINGS:  There is no hydronephrosis, nephrolithiasis, perinephric fluid or hydroureter. There are no stones in the bladder. No retroperitoneal hematoma. No free fluid.    The liver has a nodular contour. No biliary obstruction. No bowel obstruction. No free air. No evidence of acute bony abnormality. There are old posterior rib fractures. No evidence of an acute displaced rib fracture.    There are nonspecific ground glass opacities in the visualized lung bases. There is at least one 7 mm soft tissue nodule in the right lung base. There is airspace disease in the posterior left lung base that could be atelectasis.      Impression:       1. No evidence of retroperitoneal hematoma or free fluid.    2. The liver is cirrhotic.    3. Nonspecific ground glass opacities in the lung bases with a 7 mm soft tissue pulmonary nodule. Followup is recommended.    Authenticated by Len Blake MD on 11/27/2017 06:25:33 PM    XR Chest 1 View [[id number]] Collected:  11/28/17 1220     Updated:  11/28/17 1234    Narrative:       PROCEDURE: XR CHEST 1 VW-     HISTORY: nausea, vomiting, atelectasis, rule out developing pneumonia;  E86.0-Dehydration; Z74.09-Other reduced mobility     COMPARISON: CT scan dated of the abdomen and pelvis 1 day prior.     FINDINGS: The heart is normal in size. The mediastinum is unremarkable.  There are chronic interstitial changes. Bibasilar opacities may  represent infiltrates. A follow-up two-view chest x-ray is recommended.  There is no pneumothorax.  There are no acute osseous abnormalities.           Impression:       Bibasilar opacities may represent infiltrates. A follow-up  two-view chest x-ray is recommended.        Images were reviewed, interpreted, and dictated by Dr. Mas.  Transcribed by Miranda Aviles PA-C.     This report was finalized on 11/28/2017 12:32 PM by Nele Mas DO.     CT Head Without Contrast [[id number]] Collected:  11/28/17 1624     Updated:  11/28/17 1632    Narrative:       PROCEDURE: CT HEAD WO CONTRAST-     HISTORY: Confusion; E86.0-Dehydration; Z74.09-Other reduced mobility     COMPARISON: June 23, 2017.     TECHNIQUE: Multiple axial CT images were performed from the foramen  magnum to the vertex without enhancement.      FINDINGS: There is no CT evidence of hemorrhage. There is no mass, mass  effect or midline shift.  No hydrocephalus, mild cerebral atrophy. The  paranasal sinuses are clear. Bone windows reveal no acute osseous  abnormalities. Atherosclerosis is noted.       Impression:       No acute intracranial process.             797.85 mGy.cm          This study was performed with techniques to keep radiation doses as low  as reasonably achievable (ALARA). Individualized dose reduction  techniques using automated exposure control or adjustment of mA and/or  kV according to the patient size were employed.         This report was finalized on 11/28/2017 4:26 PM by Neel Mas DO.    US Arterial Doppler Lower Extremity Left [[id number]] Collected:  11/28/17 1630     Updated:  11/28/17 1634    Narrative:       PROCEDURE: US ARTERIAL DOPPLER LOWER EXTREMITY  LEFT-     HISTORY: non healing wound; E86.0-Dehydration; Z74.09-Other reduced  mobility     PROCEDURE: Segmental pressures with Doppler waveform evaluation of the  lower extremity on the left.     FINDINGS:      LEFT LOWER EXTREMITY.      Velocities cm/sec :      CFA: 43.7  SFA Mid: 21.5   SFA Dist: 23.2  POP: 39.0  PT: 48.7  JULI: 21.6  CASSIE: Unable to be obtained  Waveforms are monophasic.          Impression:       Diminished flow and monophasic waveforms indicative of  peripheral vascular disease.     This report was finalized on 11/28/2017 4:32 PM by Neel Mas DO.    US Venous Doppler Lower Extremity Bilateral (duplex) [[id number]] Collected:  11/30/17 1810     Updated:  11/30/17 1813    Narrative:        PROCEDURE: US VENOUS DOPPLER LOWER EXTREMITY BILATERAL (DUPLEX)-     HISTORY: Swelling and tenderness of left lower extremity;  E86.0-Dehydration; Z74.09-Other reduced mobility; I73.9-Peripheral  vascular disease, unspecified     PROCEDURE: Multiple transverse and longitudinal scans were performed of  the femoropopliteal deep venous system, with augmentation and  compression maneuvers.     FINDINGS: Normal phasic flow was noted in the visualized deep venous  system. No intraluminal increased echogenicity is noted to suggest  thrombus. There is normal compression and augmentation of the venous  structures. No abnormal venous collaterals are seen. No venous reflux is  demonstrated.       Impression:       No evidence of deep venous thrombosis.     This report was finalized on 11/30/2017 6:11 PM by Neel Mas DO.    MRI Brain Without Contrast [[id number]] Collected:  11/30/17 1812     Updated:  11/30/17 1851    Narrative:       FINAL REPORT    TECHNIQUE:  Multiplanar MR, without contrast administration    CLINICAL HISTORY:  CONFUSION.    COMPARISON:  11/28/2017    FINDINGS:  Diffusion sequences show no signal abnormalities to indicate acute infarct.    The brain parenchyma demonstrates no atrophy with chronic ischemic demyelination and mild ventriculomegaly. No edema or hemorrhage is seen.  Major vessel flow-voids are intact.      Impression:       No evidence of acute abnormality.    Authenticated by Len Blake MD on 11/30/2017 06:50:25 PM    SCANNED - IMAGING [[id number]] Resulted:  11/25/17      Updated:  12/06/17 0721          Condition on Discharge:  Stable        Code status during the hospital stay:        Discharge Disposition:  Skilled Nursing Facility (DC - External)    Discharge Medication:     Vasquez Marie   [facility] Medication Instructions MARYURI:[id number]    Printed on:12/06/17 1240   Medication Information                      acetaminophen (TYLENOL) 325 MG tablet  Take 2 tablets by mouth Every  4 (Four) Hours As Needed for Mild Pain .             albuterol (PROVENTIL HFA;VENTOLIN HFA) 108 (90 Base) MCG/ACT inhaler  Inhale 2 puffs Every 4 (Four) Hours As Needed for Wheezing.             aspirin 81 MG EC tablet  Take 1 tablet by mouth Daily.             atorvastatin (LIPITOR) 40 MG tablet  Take 1 tablet by mouth Every Night.             clopidogrel (PLAVIX) 75 MG tablet  Take 1 tablet by mouth Daily.             colchicine 0.6 MG tablet  Take 1 tablet by mouth Daily.             ferrous sulfate 324 (65 Fe) MG tablet delayed-release EC tablet  Take 1 tablet by mouth Daily With Breakfast.             gabapentin (NEURONTIN) 300 MG capsule  Take 1 capsule by mouth Every 12 (Twelve) Hours.             HYDROcodone-acetaminophen (NORCO) 5-325 MG per tablet  Take 1 tablet by mouth Every 4 (Four) Hours As Needed for Moderate Pain  for up to 3 days.             hydroxychloroquine (PLAQUENIL) 200 MG tablet  Take 1 tablet by mouth Daily.             insulin aspart (novoLOG) 100 UNIT/ML injection  Inject 0-7 Units under the skin 4 (Four) Times a Day Before Meals & at Bedtime.             ipratropium-albuterol (DUO-NEB) 0.5-2.5 mg/mL nebulizer  Take 3 mL by nebulization Every 4 (Four) Hours As Needed for Shortness of Air.             loperamide (IMODIUM) 2 MG capsule  Take 1 capsule by mouth 4 (Four) Times a Day As Needed for Diarrhea.             LORazepam (ATIVAN) 1 MG tablet  Take 1 tablet by mouth 2 (Two) Times a Day As Needed for Anxiety for up to 3 days.             magnesium oxide (MAGOX) 400 (241.3 Mg) MG tablet tablet  Take 1 tablet by mouth Daily.             metoprolol tartrate (LOPRESSOR) 25 MG tablet  Take 1 tablet by mouth Every 12 (Twelve) Hours.             Misc. Devices (COMMODE BEDSIDE) misc  1 Device Every 6 (Six) Hours.             ondansetron (ZOFRAN) 4 MG tablet  Take 1 tablet by mouth Every 6 (Six) Hours As Needed for Nausea or Vomiting.             pantoprazole (PROTONIX) 40 MG EC tablet  Take 1  tablet by mouth Daily.             saccharomyces boulardii (FLORASTOR) 250 MG capsule  Take 1 capsule by mouth 2 (Two) Times a Day.             traZODone (DESYREL) 50 MG tablet  Take 1 tablet by mouth Every Night.                 Discharge Diet:     Diet Instructions     Diet: Consistent Carbohydrate, Cardiac; Thin       Discharge Diet:   Consistent Carbohydrate  Cardiac      Fluid Consistency:  Thin                 Activity at Discharge:     Activity Instructions     Activity as Tolerated       With walker assistance and fall precautions                 Follow-up Appointments:    No future appointments.  Additional Instructions for the Follow-ups that You Need to Schedule     Discharge Follow-up with Specialty: Dr Real next week for staple removal; 1 Week    As directed    Specialty:  Dr Real next week for staple removal   Follow Up:  1 Week                 Test Results Pending at Discharge:           Sia Bartlett DO  12/06/17  12:40 PM      Medication risks and benefits were discussed in great detail. The patient reported being satisfied with the current treatment plan and the care delivered while hospitalized.     Time:  I spent 22 minutes preparing discharge counseling and teaching.

## 2017-12-07 NOTE — THERAPY DISCHARGE NOTE
Acute Care - Occupational Therapy Discharge Summary  [facility]     Patient Name: Vasquez Marie  : 1960  MRN: [id number]    Today's Date: 2017  Onset of Illness/Injury or Date of Surgery Date: 17    Date of Referral to OT: 17  Referring Physician: Dr. Sandoval      Admit Date: 2017        OT Recommendation and Plan    Visit Dx:    ICD-10-CM ICD-9-CM   1. Dehydration E86.0 276.51   2. Impaired functional mobility, balance, gait, and endurance Z74.09 V49.89   3. Peripheral vascular disease I73.9 443.9   4. Subacute osteomyelitis of left foot M86.272 730.07   5. Diabetes due to underlying condition w unsp complications E08.8 249.90                     OT Goals       17 1521 17 1309 17 1541    Bed Mobility OT LTG    Bed Mobility OT LTG, Date Established  17  -SD 17  -    Bed Mobility OT LTG, Time to Achieve  2 wks  -SD by discharge  -    Bed Mobility OT LTG, Activity Type  supine to sit/sit to supine  -SD supine to sit/sit to supine  -    Bed Mobility OT LTG, [place] Level  conditional independence  -SD conditional independence  -    Bed Mobility OT LTG, Assist Device  bed rails  -SD bed rails  -    Bed Mobility OT LTG, Date Goal Reviewed 17  -SD      Bed Mobility OT LTG, Outcome goal met  -SD goal ongoing  -SD goal ongoing  -    Transfer Training OT LTG    Transfer Training OT LTG, Date Established  17  -SD 17  -    Transfer Training OT LTG, Time to Achieve  2 wks  -SD by discharge  -    Transfer Training OT LTG, Activity Type  sit to stand/stand to sit;toilet  -SD sit to stand/stand to sit;toilet  -    Transfer Training OT LTG, [place] Level  contact guard assist  -SD contact guard assist  -    Transfer Training OT LTG, Assist Device  walker, rolling  -SD walker, standard  -AH    Transfer Training OT LTG, Date Goal Reviewed 17  -SD      Transfer Training OT LTG, Outcome goal ongoing  -SD goal  ongoing  -SD goal ongoing  -    Strength OT LTG    Strength Goal OT LTG, Date Established  12/04/17  -SD 11/27/17  -    Strength Goal OT LTG, Time to Achieve  2 wks  -SD by discharge  -    Strength Goal OT LTG, Measure to Achieve  Patient will perform 20 reps UB ther ex using red theraband in order to increase strength and endurance.  -SD     Strength Goal OT LTG, Functional Goal   Pt will perform 15 reps BUE strengthening ex using theraband for resistance.  -    Strength Goal OT LTG, Date Goal Reviewed 12/05/17  -SD      Strength Goal OT LTG, Outcome goal ongoing  -SD goal ongoing  -SD goal ongoing  -    LB Dressing OT LTG    LB Dressing Goal OT LTG, Date Established  12/04/17  -SD 11/27/17  -    LB Dressing Goal OT LTG, Time to Achieve  2 wks  -SD by discharge  -    LB Dressing Goal OT LTG, [place] Level  minimum assist (75% patient effort)  -SD contact guard assist  -    LB Dressing Goal OT LTG, Adaptive Equipment  sock-aid;reacher  -SD     LB Dressing Goal OT LTG, Date Goal Reviewed 12/05/17  -SD      LB Dressing Goal OT LTG, Outcome goal ongoing  -SD goal ongoing  -SD goal ongoing  -    Functional Mobility OT LTG    Functional Mobility Goal OT LTG, Date Established  12/04/17  -SD     Functional Mobility Goal OT LTG, Time to Achieve  2 wks  -SD     Functional Mobility Goal OT LTG, [place] Level  contact guard  -SD     Functional Mobility Goal OT LTG, Assist Device  rolling walker  -SD     Functional Mobility Goal OT LTG, Distance to Achieve  to the bathroom  -SD     Functional Mobility Goal OT LTG, Date Goal Reviewed 12/05/17  -SD      Functional Mobility Goal OT LTG, Outcome goal ongoing  -SD goal ongoing  -SD       User Key  (r) = Recorded By, (t) = Taken By, (c) = Cosigned By    Initials Name Provider Type    VAHID Sullivan Occupational Therapist    CADE Olmos OT Occupational Therapist                Outcome Measures       12/05/17 1504 12/05/17 1121       How much help  from another person do you currently need...    Turning from your back to your side while in flat bed without using bedrails? 4  -RM      Moving from lying on back to sitting on the side of a flat bed without bedrails? 4  -RM      Moving to and from a bed to a chair (including a wheelchair)? 3  -RM      Standing up from a chair using your arms (e.g., wheelchair, bedside chair)? 3  -RM      Climbing 3-5 steps with a railing? 1  -RM      To walk in hospital room? 2  -RM      AM-PAC 6 Clicks Score 17  -RM      How much help from another is currently needed...    Putting on and taking off regular lower body clothing?  2  -SD     Bathing (including washing, rinsing, and drying)  2  -SD     Toileting (which includes using toilet bed pan or urinal)  2  -SD     Putting on and taking off regular upper body clothing  4  -SD     Taking care of personal grooming (such as brushing teeth)  4  -SD     Eating meals  4  -SD     Score  18  -SD     Functional Assessment    Outcome Measure Options AM-PAC 6 Clicks Basic Mobility (PT)  -RM AM-PAC 6 Clicks Daily Activity (OT)  -SD       User Key  (r) = Recorded By, (t) = Taken By, (c) = Cosigned By    Initials Name Provider Type    TREY Scott, PTA Physical Therapy Assistant    CADE Olmos OT Occupational Therapist              OT Discharge Summary  Anticipated Discharge Disposition: inpatient rehabilitation facility  Reason for Discharge: Discharge from facility  Outcomes Achieved: Refer to plan of care for updates on goals achieved  Discharge Destination: Inpatient rehabilitation facility      Elsy Olmos OT  12/7/2017

## 2017-12-13 NOTE — TELEPHONE ENCOUNTER
Patient is calling to request a call from Dr. Vermeesch. He seemed confused. 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 He states that if no one wants to talk to him that he can get another doctor. Patient is currently in rehab (not sure which one). His phone  before he could finishing explaining what that he is needing. Patient was offered an appointment, but he said he didn't know if Dr. Vermeesch needed to see him.

## 2017-12-14 ENCOUNTER — OFFICE VISIT (OUTPATIENT)
Dept: SURGERY | Facility: CLINIC | Age: 57
End: 2017-12-14

## 2017-12-14 VITALS
OXYGEN SATURATION: 97 % | SYSTOLIC BLOOD PRESSURE: 128 MMHG | HEIGHT: 68 IN | TEMPERATURE: 98.2 F | HEART RATE: 70 BPM | WEIGHT: 194 LBS | BODY MASS INDEX: 29.4 KG/M2 | DIASTOLIC BLOOD PRESSURE: 70 MMHG

## 2017-12-14 DIAGNOSIS — Z48.89 POSTOPERATIVE VISIT: Primary | ICD-10-CM

## 2017-12-14 PROCEDURE — 99024 POSTOP FOLLOW-UP VISIT: CPT | Performed by: SURGERY

## 2017-12-14 NOTE — PROGRESS NOTES
"Patient: Vasquez Marie    YOB: 1960    Date: 12/14/2017    Primary Care Provider: Marilyn K. Vermeesch, MD    Chief Complaint:   Chief Complaint   Patient presents with   • Post-op     Post op amputation of leg       History: The patient is in the office for a post op visit after his recent amputation of his left leg.  He denies any problems after the procedure.   He does note that he has phantom pain in his foot and leg.    The following portions of the patient's history were reviewed and updated as appropriate: allergies, current medications, past family history, past medical history, past social history, past surgical history and problem list.      Review of Systems   Constitutional: Negative for chills, fatigue and fever.   Respiratory: Negative for cough.    Cardiovascular: Negative for chest pain.   Gastrointestinal: Negative for abdominal pain, diarrhea, nausea and vomiting.   Skin: Positive for wound.       Vital Signs  /70  Pulse 70  Temp 98.2 °F (36.8 °C) (Temporal Artery )   Ht 172.7 cm (67.99\")  Wt 88 kg (194 lb 0.1 oz)  SpO2 97%  BMI 29.51 kg/m2    Allergies:  No Known Allergies    Medications:    Current Outpatient Prescriptions:   •  acetaminophen (TYLENOL) 325 MG tablet, Take 2 tablets by mouth Every 4 (Four) Hours As Needed for Mild Pain ., Disp: , Rfl:   •  albuterol (PROVENTIL HFA;VENTOLIN HFA) 108 (90 Base) MCG/ACT inhaler, Inhale 2 puffs Every 4 (Four) Hours As Needed for Wheezing., Disp: 18 g, Rfl: 3  •  aspirin 81 MG EC tablet, Take 1 tablet by mouth Daily., Disp: , Rfl:   •  atorvastatin (LIPITOR) 40 MG tablet, Take 1 tablet by mouth Every Night., Disp: 30 tablet, Rfl: 11  •  clopidogrel (PLAVIX) 75 MG tablet, Take 1 tablet by mouth Daily., Disp: 30 tablet, Rfl: 3  •  colchicine 0.6 MG tablet, Take 1 tablet by mouth Daily., Disp: 30 tablet, Rfl: 0  •  ferrous sulfate 324 (65 Fe) MG tablet delayed-release EC tablet, Take 1 tablet by mouth Daily With Breakfast., " Disp: 30 tablet, Rfl:   •  gabapentin (NEURONTIN) 300 MG capsule, Take 1 capsule by mouth Every 12 (Twelve) Hours., Disp: 6 capsule, Rfl: 0  •  hydroxychloroquine (PLAQUENIL) 200 MG tablet, Take 1 tablet by mouth Daily., Disp: 30 tablet, Rfl: 5  •  insulin aspart (novoLOG) 100 UNIT/ML injection, Inject 0-7 Units under the skin 4 (Four) Times a Day Before Meals & at Bedtime., Disp: , Rfl: 12  •  ipratropium-albuterol (DUO-NEB) 0.5-2.5 mg/mL nebulizer, Take 3 mL by nebulization Every 4 (Four) Hours As Needed for Shortness of Air., Disp: 360 mL, Rfl:   •  loperamide (IMODIUM) 2 MG capsule, Take 1 capsule by mouth 4 (Four) Times a Day As Needed for Diarrhea., Disp: , Rfl:   •  magnesium oxide (MAGOX) 400 (241.3 Mg) MG tablet tablet, Take 1 tablet by mouth Daily., Disp: 30 each, Rfl:   •  metoprolol tartrate (LOPRESSOR) 25 MG tablet, Take 1 tablet by mouth Every 12 (Twelve) Hours., Disp: , Rfl:   •  Misc. Devices (COMMODE BEDSIDE) misc, 1 Device Every 6 (Six) Hours., Disp: 1 each, Rfl: 0  •  ondansetron (ZOFRAN) 4 MG tablet, Take 1 tablet by mouth Every 6 (Six) Hours As Needed for Nausea or Vomiting., Disp: , Rfl:   •  pantoprazole (PROTONIX) 40 MG EC tablet, Take 1 tablet by mouth Daily., Disp: 30 tablet, Rfl: 5  •  saccharomyces boulardii (FLORASTOR) 250 MG capsule, Take 1 capsule by mouth 2 (Two) Times a Day., Disp: , Rfl:   •  traZODone (DESYREL) 50 MG tablet, Take 1 tablet by mouth Every Night., Disp: 30 tablet, Rfl: 1    Physical Exam:   General Appearance:    Alert, cooperative, in no acute distress   Skin:   .   Wound is healing nicely.  Staples were removed.     Results Review:   None     Assessment/Plan     1. Postoperative visit      Overall healing nicely.  We'll refer for prosthetic evaluation.  Follow-up with me as needed.    Electronically signed by Ross Real MD  12/14/17    Scribed for Ross Real MD by Michelle Milligan. 12/14/2017  1:11 PM

## 2017-12-15 ENCOUNTER — TELEPHONE (OUTPATIENT)
Dept: INTERNAL MEDICINE | Facility: CLINIC | Age: 57
End: 2017-12-15

## 2017-12-15 RX ORDER — TRAZODONE HYDROCHLORIDE 50 MG/1
TABLET ORAL
Qty: 30 TABLET | Refills: 1 | OUTPATIENT
Start: 2017-12-15

## 2017-12-15 NOTE — TELEPHONE ENCOUNTER
Spoke with Pt, states he is currently in rehab, unsure of when he will be getting released. Pt states he would like to speak with Dr. Vermeesch. Advised Pt I could make an appt for him. Pt states he is unsure when he will be able to come in. Advised him to give us a call for an appt when he is available. Pt wants to know if Tylenol #3 can be sent in for him. Advised Pt that this is controlled substance and we need to see him first but that I would let Dr. Vermeesch know.

## 2017-12-15 NOTE — TELEPHONE ENCOUNTER
Patient is in a rehab facility from having his leg amputated.  Would like to speak with you about his situation.       Please return call to pt.

## 2017-12-18 ENCOUNTER — OFFICE VISIT (OUTPATIENT)
Dept: INTERNAL MEDICINE | Facility: CLINIC | Age: 57
End: 2017-12-18

## 2017-12-18 VITALS
SYSTOLIC BLOOD PRESSURE: 128 MMHG | TEMPERATURE: 97.4 F | OXYGEN SATURATION: 98 % | HEART RATE: 75 BPM | DIASTOLIC BLOOD PRESSURE: 68 MMHG

## 2017-12-18 DIAGNOSIS — Z53.29 LEFT AGAINST MEDICAL ADVICE: Primary | ICD-10-CM

## 2017-12-18 DIAGNOSIS — G54.6 PHANTOM LIMB PAIN (HCC): ICD-10-CM

## 2017-12-18 DIAGNOSIS — Z89.512 S/P BKA (BELOW KNEE AMPUTATION), LEFT (HCC): ICD-10-CM

## 2017-12-18 PROCEDURE — 99213 OFFICE O/P EST LOW 20 MIN: CPT | Performed by: NURSE PRACTITIONER

## 2017-12-18 RX ORDER — ALBUTEROL SULFATE 90 UG/1
AEROSOL, METERED RESPIRATORY (INHALATION)
Qty: 18 G | Refills: 3 | Status: SHIPPED | OUTPATIENT
Start: 2017-12-18

## 2017-12-18 NOTE — PROGRESS NOTES
Chief Complaint / Reason:      Chief Complaint   Patient presents with   • Abstract     Pt would like advice on taking care of leg wound. Pt states he left rehab.       Subjective     HPI  Patient presents today to discuss leg wound care.  He states that [facility] because they were talking down to him and he felt like he was not getting the care that he needed and that what they were doing he could do at home.  He states he has been exercising and doing the exercises they had taught him.  He also has the below the knee amputation wrapped in Kerlix and I requested to visualize the stump.  His stump is very edematous and tender.  The incision is approximated and Steri-Strips were intact.  No signs and symptoms of infection were noted and patient denies fever.  He is accompanied by his nephew who states that he will be taking him back to [facility] so he can have help caring for himself.  His nephew works in surgery at [employer] and seems to be knowledgeable in regards to patient's medical needs.  Patient is currently in a wheelchair and does not have the stump sleeve.  Mr. Marie indicates that he is leaving the office and has an appointment to get fitted for the stump sock.  He states that he has been having pain and the Neurontin has not been helping.  He states that he has been having a lot of phantom pain.  He states that the Neurontin has not been helping.  He denies chest pain, shortness of breath or heart palpitations.  History taken from: patient    PMH/FH/Social History were reviewed and updated appropriately in the electronic medical record.     Review of Systems:   Review of Systems   Constitutional: Positive for activity change and fatigue.   HENT: Negative.    Respiratory: Negative.  Negative for shortness of breath.    Cardiovascular: Positive for leg swelling. Negative for chest pain and palpitations.   Gastrointestinal: Negative.    Musculoskeletal:  Positive for arthralgias, back pain, gait problem, joint swelling and myalgias.   Skin:        Incision site left BKA    Neurological: Positive for weakness.   Psychiatric/Behavioral: Positive for dysphoric mood and sleep disturbance.   All other systems reviewed and are negative.    All other systems were reviewed and are negative.  Exceptions are noted in the subjective or above.      Objective     Vital Signs  Vitals:    12/18/17 1025   BP: 128/68   Pulse: 75   Temp: 97.4 °F (36.3 °C)   SpO2: 98%       There is no height or weight on file to calculate BMI.    Physical Exam   Constitutional: He is oriented to person, place, and time. He appears well-developed and well-nourished.   Cardiovascular: Normal rate, regular rhythm, normal heart sounds and intact distal pulses.    Pulmonary/Chest: Effort normal and breath sounds normal. He exhibits no tenderness.   Abdominal: Soft. Bowel sounds are normal.   Musculoskeletal: He exhibits edema and tenderness.   Left BKA swollen and tender.  Incision clean dry and intact with Steri-Strips still intact.  See photos.   Neurological: He is alert and oriented to person, place, and time.   Skin: Skin is warm and dry. There is erythema.        Psychiatric: He has a normal mood and affect. His behavior is normal. Judgment and thought content normal.   Nursing note and vitals reviewed.       Results Review:    I reviewed the patient's previous clinical results.       Medication Review:     Current Outpatient Prescriptions:   •  acetaminophen (TYLENOL) 325 MG tablet, Take 2 tablets by mouth Every 4 (Four) Hours As Needed for Mild Pain ., Disp: , Rfl:   •  aspirin 81 MG EC tablet, Take 1 tablet by mouth Daily., Disp: , Rfl:   •  atorvastatin (LIPITOR) 40 MG tablet, Take 1 tablet by mouth Every Night., Disp: 30 tablet, Rfl: 11  •  clopidogrel (PLAVIX) 75 MG tablet, Take 1 tablet by mouth Daily., Disp: 30 tablet, Rfl: 3  •  colchicine 0.6 MG tablet, Take 1 tablet by mouth Daily., Disp:  30 tablet, Rfl: 0  •  ferrous sulfate 324 (65 Fe) MG tablet delayed-release EC tablet, Take 1 tablet by mouth Daily With Breakfast., Disp: 30 tablet, Rfl:   •  gabapentin (NEURONTIN) 300 MG capsule, Take 1 capsule by mouth Every 12 (Twelve) Hours., Disp: 6 capsule, Rfl: 0  •  hydroxychloroquine (PLAQUENIL) 200 MG tablet, Take 1 tablet by mouth Daily., Disp: 30 tablet, Rfl: 5  •  insulin aspart (novoLOG) 100 UNIT/ML injection, Inject 0-7 Units under the skin 4 (Four) Times a Day Before Meals & at Bedtime., Disp: , Rfl: 12  •  ipratropium-albuterol (DUO-NEB) 0.5-2.5 mg/mL nebulizer, Take 3 mL by nebulization Every 4 (Four) Hours As Needed for Shortness of Air., Disp: 360 mL, Rfl:   •  loperamide (IMODIUM) 2 MG capsule, Take 1 capsule by mouth 4 (Four) Times a Day As Needed for Diarrhea., Disp: , Rfl:   •  magnesium oxide (MAGOX) 400 (241.3 Mg) MG tablet tablet, Take 1 tablet by mouth Daily., Disp: 30 each, Rfl:   •  metoprolol tartrate (LOPRESSOR) 25 MG tablet, Take 1 tablet by mouth 2 (Two) Times a Day., Disp: 60 tablet, Rfl: 5  •  Misc. Devices (COMMODE BEDSIDE) misc, 1 Device Every 6 (Six) Hours., Disp: 1 each, Rfl: 0  •  ondansetron (ZOFRAN) 4 MG tablet, Take 1 tablet by mouth Every 6 (Six) Hours As Needed for Nausea or Vomiting., Disp: , Rfl:   •  pantoprazole (PROTONIX) 40 MG EC tablet, Take 1 tablet by mouth Daily., Disp: 30 tablet, Rfl: 5  •  saccharomyces boulardii (FLORASTOR) 250 MG capsule, Take 1 capsule by mouth 2 (Two) Times a Day., Disp: , Rfl:   •  traZODone (DESYREL) 50 MG tablet, Take 1 tablet by mouth Every Night., Disp: 30 tablet, Rfl: 1  •  VENTOLIN  (90 Base) MCG/ACT inhaler, INHALE 2 PUFFS EVERY 4 HOURS AS NEEDED FOR WHEEZING, Disp: 18 g, Rfl: 3    Assessment/Plan   Vasquez was seen today for abstract.    Diagnoses and all orders for this visit:    Left against medical advice  Discussed with patient the importance of rehabilitation and recommend that he get the help he needs to maintain  independence and improved mobility.  S/P BKA (below knee amputation), left  Discussed home care instructions with patient and nephew.  Recommend patient improve stump appearance by elevation and properly wrapping.  Discussed signs and symptoms of infection and when to contact office.  Recommend patient use good hand hygiene and avoid scratching or touching area to prevent complications to stump.  Phantom limb pain   recommend patient take Tylenol and Neurontin as prescribed.  Advised patient to contact office if symptoms do not improve or if they worsen.  Return in about 4 weeks (around 1/15/2018), or if symptoms worsen or fail to improve.    Amanda Simon, APRN  12/18/2017

## 2017-12-19 ENCOUNTER — TELEPHONE (OUTPATIENT)
Dept: INTERNAL MEDICINE | Facility: CLINIC | Age: 57
End: 2017-12-19

## 2017-12-19 NOTE — TELEPHONE ENCOUNTER
I SPOKE WITH THE PATIENT AND HE SAID I COULD TALK WITH HIS NIECE     PATIENTS MARITZA CALLED INQUIRING ABOUT HER UNCLES MEDICATION. I SPOKE WITH DR.VERMEESCH SHE ADVISED ME TO GET THE PHARMACY INFORMATION FOR THEM AND SHE WOULD CALL IN GABAPENTIN 300 MG TO TAKE BEFORE BED. SHE ASKED ME TO LET THE FAMILY KNOW THAT THE PATIENT COULD NOT DRINK ALCOHOL OR DO ANY DRUGS WHILE TAKING THIS MEDICATION.     THE PHARMACY IS [facility] PHONE NUMBER [phone].     THE PATIENT WILL BE WITH FAMILY AT LEAST UNTIL AFTER CHRISTMAS MAYBE UNTIL AFTER THE NEW YEAR. PATIENTS MARITZA WAS CONCERNED ABOUT PATIENTS PAIN. I ADVISED HER THAT THERE WAS NOTHING WE COULD DO THAT CONTROLLED SUBSTANCES COULD NOT BE CALLED IN THAT YOU HAD TO  A SCRIPT FOR THAT. SHE SEEMED TO UNDERSTAND THAT PAIN MEDICATION WAS NOT AN OPTION FOR THE PATIENT.     PATIENTS MARITZA WOULD LIKE FOR YOU TO CALL HER SHE HAD A COUPLE OF OTHER QUESTIONS.

## 2018-01-04 ENCOUNTER — TELEPHONE (OUTPATIENT)
Dept: INTERNAL MEDICINE | Facility: CLINIC | Age: 58
End: 2018-01-04

## 2018-02-08 ENCOUNTER — OFFICE VISIT (OUTPATIENT)
Dept: INTERNAL MEDICINE | Facility: CLINIC | Age: 58
End: 2018-02-08

## 2018-02-08 VITALS
OXYGEN SATURATION: 94 % | HEART RATE: 69 BPM | SYSTOLIC BLOOD PRESSURE: 130 MMHG | TEMPERATURE: 98 F | DIASTOLIC BLOOD PRESSURE: 68 MMHG

## 2018-02-08 DIAGNOSIS — J43.8 OTHER EMPHYSEMA (HCC): ICD-10-CM

## 2018-02-08 DIAGNOSIS — K21.00 GASTROESOPHAGEAL REFLUX DISEASE WITH ESOPHAGITIS: Chronic | ICD-10-CM

## 2018-02-08 DIAGNOSIS — I10 ESSENTIAL HYPERTENSION: Primary | ICD-10-CM

## 2018-02-08 DIAGNOSIS — M1A.0290 CHRONIC GOUT OF ELBOW, UNSPECIFIED CAUSE, UNSPECIFIED LATERALITY: ICD-10-CM

## 2018-02-08 DIAGNOSIS — F41.8 DEPRESSION WITH ANXIETY: ICD-10-CM

## 2018-02-08 DIAGNOSIS — L08.9 SKIN INFECTION: ICD-10-CM

## 2018-02-08 DIAGNOSIS — I73.9 PERIPHERAL VASCULAR DISEASE (HCC): ICD-10-CM

## 2018-02-08 DIAGNOSIS — E78.2 MIXED HYPERLIPIDEMIA: ICD-10-CM

## 2018-02-08 PROBLEM — E86.0 DEHYDRATION: Status: RESOLVED | Noted: 2017-11-25 | Resolved: 2018-02-08

## 2018-02-08 PROBLEM — N17.9 ACUTE RENAL FAILURE (HCC): Status: RESOLVED | Noted: 2017-11-25 | Resolved: 2018-02-08

## 2018-02-08 PROBLEM — M86.272 SUBACUTE OSTEOMYELITIS OF LEFT FOOT (HCC): Status: RESOLVED | Noted: 2017-08-25 | Resolved: 2018-02-08

## 2018-02-08 PROBLEM — Z89.432 STATUS POST TRANSMETATARSAL AMPUTATION OF FOOT, LEFT (HCC): Status: RESOLVED | Noted: 2017-11-25 | Resolved: 2018-02-08

## 2018-02-08 PROCEDURE — 99214 OFFICE O/P EST MOD 30 MIN: CPT | Performed by: INTERNAL MEDICINE

## 2018-02-08 RX ORDER — TRAZODONE HYDROCHLORIDE 50 MG/1
50 TABLET ORAL NIGHTLY
Qty: 30 TABLET | Refills: 5 | Status: SHIPPED | OUTPATIENT
Start: 2018-02-08 | End: 2018-06-26 | Stop reason: SDUPTHER

## 2018-02-08 RX ORDER — NAPROXEN 375 MG/1
375 TABLET ORAL 2 TIMES DAILY PRN
COMMUNITY
End: 2018-02-08

## 2018-02-08 RX ORDER — HYDROXYCHLOROQUINE SULFATE 200 MG/1
200 TABLET, FILM COATED ORAL DAILY
Qty: 30 TABLET | Refills: 5 | Status: SHIPPED | OUTPATIENT
Start: 2018-02-08 | End: 2018-04-25 | Stop reason: SDUPTHER

## 2018-02-08 RX ORDER — NAPROXEN 375 MG/1
375 TABLET ORAL 2 TIMES DAILY WITH MEALS
Qty: 60 TABLET | Refills: 3 | Status: SHIPPED | OUTPATIENT
Start: 2018-02-08 | End: 2018-04-30 | Stop reason: SDUPTHER

## 2018-02-08 RX ORDER — CEPHALEXIN 500 MG/1
500 CAPSULE ORAL 3 TIMES DAILY
Qty: 21 CAPSULE | Refills: 0 | Status: SHIPPED | OUTPATIENT
Start: 2018-02-08 | End: 2018-07-31

## 2018-02-08 RX ORDER — COLCHICINE 0.6 MG/1
0.6 TABLET ORAL DAILY
Qty: 30 TABLET | Refills: 5 | Status: SHIPPED | OUTPATIENT
Start: 2018-02-08

## 2018-02-08 RX ORDER — ATORVASTATIN CALCIUM 40 MG/1
40 TABLET, FILM COATED ORAL NIGHTLY
Qty: 30 TABLET | Refills: 5 | Status: SHIPPED | OUTPATIENT
Start: 2018-02-08

## 2018-02-08 RX ORDER — PANTOPRAZOLE SODIUM 40 MG/1
40 TABLET, DELAYED RELEASE ORAL DAILY
Qty: 30 TABLET | Refills: 5 | Status: SHIPPED | OUTPATIENT
Start: 2018-02-08 | End: 2018-07-31

## 2018-02-08 RX ORDER — CLOPIDOGREL BISULFATE 75 MG/1
75 TABLET ORAL DAILY
Qty: 30 TABLET | Refills: 5 | Status: SHIPPED | OUTPATIENT
Start: 2018-02-08

## 2018-02-08 NOTE — PROGRESS NOTES
Chief Complaint   Patient presents with   • Follow-up     for GERD, gout, HLD, and amputation of foot. Pt would like spot on ring finger on left hand looked at.      Subjective   Vasquez Davidson Marie is a 57 y.o. male.     HPI Comments: Here for follow up of CAD, Afib, HTN, HLD, COPD, GERD, Hep C, RA, and left BKA.   Since his last visit with NP, he fell and was in hospital in [facility].  Says he had a dislocated left shoulder.  After the hospital, he was admitted to a rehab.  He was there for about 20 days and did PT and OT.  He feels much better overall.  His stump has shrunk down some and he currently has stump sock in place.  He will be measured next TH for new left lower extremity artificial limb.  He has info to be fitted for LLE appliance with company in [place].    States he has not been drinking or smoking pot since January 8th.  He is smoking about 3 cigarettes a day.   He is complaining of a mass on left 4th ring finger that came up about 2 weeks ago.    No CP, SOB.  Some edema in RLE.  He denies cough or shortness of breath.  His GERD is controlled.   He is not currently using his nebulizer, states his breathing is doing well.  He does have diffuse arthralgias from underlying rheumatoid arthritis.  He is currently not on any medications for his arthritis.  Patient states that the only medication he was discharged home on from rehabilitation was metoprolol.           The following portions of the patient's history were reviewed and updated as appropriate: allergies, current medications, past family history, past medical history, past social history, past surgical history and problem list.    Review of Systems   Constitutional: Negative for appetite change.   Respiratory: Negative for shortness of breath.    Cardiovascular: Positive for leg swelling. Negative for chest pain and palpitations.   Musculoskeletal: Positive for arthralgias and gait problem.   Skin:        Left fourth finger nodule that is  painful   Psychiatric/Behavioral: Negative for dysphoric mood. The patient is not nervous/anxious.    All other systems reviewed and are negative.      Objective   /68  Pulse 69  Temp 98 °F (36.7 °C)  SpO2 94%  There is no height or weight on file to calculate BMI.  Physical Exam   Constitutional: He is oriented to person, place, and time. He appears well-developed and well-nourished.   Pleasant gentleman in no apparent distress, ambulates with use of a wheelchair   HENT:   Head: Normocephalic and atraumatic.   Mouth/Throat: Oropharynx is clear and moist.   Eyes: Conjunctivae and EOM are normal. Pupils are equal, round, and reactive to light.   Neck: Normal range of motion. Neck supple. No thyromegaly present.   Cardiovascular: Normal rate, regular rhythm and normal heart sounds.    No murmur heard.  Pulmonary/Chest: Effort normal and breath sounds normal. No respiratory distress.   Musculoskeletal: He exhibits no edema.   Left fourth finger with approximate 1.5 cm nodular lesion that is soft and consistent with abscess.  Area was cleansed with Betadine and alcohol swab, then 25-gauge needle was used to obtain specimen for culture    Left lower extremity: Stump is well healed with no erythema or warmth, no edema noted   Lymphadenopathy:     He has no cervical adenopathy.   Neurological: He is alert and oriented to person, place, and time. No cranial nerve deficit.   Psychiatric: He has a normal mood and affect. Judgment normal.   Nursing note and vitals reviewed.      Assessment/Plan   Vasquez Banksbrook is here today and the following problems have been addressed:      Vasquez was seen today for follow-up.    Diagnoses and all orders for this visit:    Essential hypertension    Mixed hyperlipidemia    Other emphysema    Gastroesophageal reflux disease with esophagitis    Depression with anxiety    Skin infection  -     Wound Culture - Aspirate, Finger; Future    Other orders  -     cephalexin (KEFLEX) 500  MG capsule; Take 1 capsule by mouth 3 (Three) Times a Day.  -     naproxen (NAPROSYN) 375 MG tablet; Take 1 tablet by mouth 2 (Two) Times a Day With Meals.  Follow heart healthy/low salt diet  Avoid processed foods  Monitor blood pressure as discussed  Exercise as tolerated up to 30 minutes 5 days per week  Take all medications as prescribed  Wound cx sent from left ring finger abscess  Med refills given today including gabapentin  Lengthy discussion regarding need to stay off alcohol and marijuana    RTC 2 mo    Please note that portions of this note were completed with a voice recognition program.  Efforts were made to edit dictation, but occasionally words are mistranscribed.

## 2018-02-09 RX ORDER — GABAPENTIN 300 MG/1
300 CAPSULE ORAL 2 TIMES DAILY
Qty: 60 CAPSULE | Refills: 2 | OUTPATIENT
Start: 2018-02-09 | End: 2018-07-31

## 2018-02-13 LAB
BACTERIA SPEC AEROBE CULT: NORMAL
BACTERIA SPEC ANAEROBE CULT: NORMAL
BACTERIA SPEC CULT: NORMAL
BACTERIA SPEC CULT: NORMAL

## 2018-02-14 ENCOUNTER — TELEPHONE (OUTPATIENT)
Dept: INTERNAL MEDICINE | Facility: CLINIC | Age: 58
End: 2018-02-14

## 2018-02-14 NOTE — TELEPHONE ENCOUNTER
Patient states he needs to speak with nurse that [employer] needs to verify that patient is supposed to be in wheelchair. They will not transport him with out verification. Their number is [phone]

## 2018-02-16 ENCOUNTER — TELEPHONE (OUTPATIENT)
Dept: INTERNAL MEDICINE | Facility: CLINIC | Age: 58
End: 2018-02-16

## 2018-02-16 RX ORDER — METHYLPREDNISOLONE 4 MG/1
TABLET ORAL
Qty: 21 EACH | Refills: 0 | Status: SHIPPED | OUTPATIENT
Start: 2018-02-16 | End: 2018-07-31

## 2018-02-16 NOTE — TELEPHONE ENCOUNTER
Patient states he is having swelling in his hands. He wants to know if he can get something called in to help him.

## 2018-02-16 NOTE — TELEPHONE ENCOUNTER
Please call patient and ask him if he is having a gout flare or rheumatoid arthritis flare or if he is just having some mild swelling in his hands.

## 2018-03-14 ENCOUNTER — TELEPHONE (OUTPATIENT)
Dept: INTERNAL MEDICINE | Facility: CLINIC | Age: 58
End: 2018-03-14

## 2018-03-15 NOTE — TELEPHONE ENCOUNTER
Pt states he's having a difficult time with his new leg, is requesting medication to help with the pain.

## 2018-03-15 NOTE — TELEPHONE ENCOUNTER
Patient was given gabapentin to take twice a day at last office visit.  I currently recommend that he take Tylenol extra strength 2 tablets 3 times daily.  Is he currently in any physical therapy or does he have any help with use of his new artificial limb?  He needs to learn how to use this and ambulate with it to prevent significant pain in the stump area.

## 2018-03-20 NOTE — TELEPHONE ENCOUNTER
Spoke with Pt, advised him that Dr. Vermeesch recommends extra strength Tylenol Extra Strength 2 tablets 3 times daily. Pt laughed then hung up the phone.

## 2018-04-04 ENCOUNTER — TELEPHONE (OUTPATIENT)
Dept: INTERNAL MEDICINE | Facility: CLINIC | Age: 58
End: 2018-04-04

## 2018-04-04 DIAGNOSIS — Z89.612 HISTORY OF LEFT LOWER EXTREMITY AMPUTATION (HCC): Primary | ICD-10-CM

## 2018-04-04 NOTE — TELEPHONE ENCOUNTER
Last I spoke with Pt he was attending rehab to help with his artifical limb, I assumed this was set up be someone else.     Would you like to place PT/OT referral for [facility]?

## 2018-04-04 NOTE — TELEPHONE ENCOUNTER
PATIENT IS GOING TO BE SEEING [facility]. THEY CALLED AND SAID THEY AREN'T SURE WHAT THEY ARE GOING TO BE SEEING HIM FOR AS THE PATIENT SHOWED UP AT THEIR OFFICE AND WASN'T SURE WHAT HE NEEDED TO SEE THEM FOR. HE IS GOING TO NEED A REFERRAL ON FILE FOR HIS INSURANCE. IF WE CAN GET ONE PUT IN, WE WILL SEND IT OVER SO THAT THEY CAN START TREATING THE PATIENT.

## 2018-04-16 ENCOUNTER — TELEPHONE (OUTPATIENT)
Dept: INTERNAL MEDICINE | Facility: CLINIC | Age: 58
End: 2018-04-16

## 2018-04-19 ENCOUNTER — TELEPHONE (OUTPATIENT)
Dept: INTERNAL MEDICINE | Facility: CLINIC | Age: 58
End: 2018-04-19

## 2018-04-19 DIAGNOSIS — Z89.619 HX OF LEG AMPUTATION (HCC): ICD-10-CM

## 2018-04-19 DIAGNOSIS — R23.8 SKIN BREAKDOWN: Primary | ICD-10-CM

## 2018-04-23 DIAGNOSIS — M05.731 RHEUMATOID ARTHRITIS INVOLVING RIGHT WRIST WITH POSITIVE RHEUMATOID FACTOR (HCC): Primary | ICD-10-CM

## 2018-04-23 DIAGNOSIS — Z89.512 S/P BKA (BELOW KNEE AMPUTATION) UNILATERAL, LEFT (HCC): ICD-10-CM

## 2018-04-25 RX ORDER — HYDROXYCHLOROQUINE SULFATE 200 MG/1
200 TABLET, FILM COATED ORAL DAILY
Qty: 30 TABLET | Refills: 5 | Status: SHIPPED | OUTPATIENT
Start: 2018-04-25

## 2018-04-30 RX ORDER — NAPROXEN 375 MG/1
375 TABLET ORAL 2 TIMES DAILY WITH MEALS
Qty: 60 TABLET | Refills: 3 | Status: SHIPPED | OUTPATIENT
Start: 2018-04-30 | End: 2018-09-10 | Stop reason: SDUPTHER

## 2018-05-04 ENCOUNTER — TELEPHONE (OUTPATIENT)
Dept: INTERNAL MEDICINE | Facility: CLINIC | Age: 58
End: 2018-05-04

## 2018-06-26 RX ORDER — TRAZODONE HYDROCHLORIDE 50 MG/1
50 TABLET ORAL NIGHTLY
Qty: 30 TABLET | Refills: 2 | Status: SHIPPED | OUTPATIENT
Start: 2018-06-26

## 2018-07-31 ENCOUNTER — OFFICE VISIT (OUTPATIENT)
Dept: INTERNAL MEDICINE | Facility: CLINIC | Age: 58
End: 2018-07-31

## 2018-07-31 VITALS
TEMPERATURE: 97.5 F | HEART RATE: 91 BPM | WEIGHT: 197 LBS | DIASTOLIC BLOOD PRESSURE: 84 MMHG | OXYGEN SATURATION: 98 % | BODY MASS INDEX: 29.86 KG/M2 | SYSTOLIC BLOOD PRESSURE: 132 MMHG | HEIGHT: 68 IN

## 2018-07-31 DIAGNOSIS — R25.2 LEG CRAMPING: ICD-10-CM

## 2018-07-31 DIAGNOSIS — M05.79 RHEUMATOID ARTHRITIS INVOLVING MULTIPLE SITES WITH POSITIVE RHEUMATOID FACTOR (HCC): Primary | ICD-10-CM

## 2018-07-31 DIAGNOSIS — R22.9 MULTIPLE SKIN NODULES: ICD-10-CM

## 2018-07-31 DIAGNOSIS — S31.109A WOUND OF ABDOMEN: ICD-10-CM

## 2018-07-31 DIAGNOSIS — F41.8 DEPRESSION WITH ANXIETY: ICD-10-CM

## 2018-07-31 PROCEDURE — 99214 OFFICE O/P EST MOD 30 MIN: CPT | Performed by: PHYSICIAN ASSISTANT

## 2018-07-31 RX ORDER — DULOXETIN HYDROCHLORIDE 30 MG/1
30 CAPSULE, DELAYED RELEASE ORAL DAILY
Qty: 30 CAPSULE | Refills: 2 | Status: SHIPPED | OUTPATIENT
Start: 2018-07-31

## 2018-07-31 RX ORDER — MUPIROCIN CALCIUM 20 MG/G
CREAM TOPICAL 3 TIMES DAILY
Qty: 15 G | Refills: 1 | Status: SHIPPED | OUTPATIENT
Start: 2018-07-31

## 2018-08-01 NOTE — PROGRESS NOTES
"Subjective     Chief Complaint: leg cramps, wound    History of Present Illness     Vasquez Marie is a 57 y.o. male presenting with multiple complaints today.  Patient states he has 2 lumps to his abdomen that have developed over the past month or so.  States one of his friends said it may have been an ingrown hair so he took a knife to it.  It bled some but there was no pus.  States the area was never red or raised.  Now it is beginning to be red and look a little infected.  He has not touched the other area.    He also complains of leg cramping bilaterally, occasional \"pins and needles\" feelings.  He states he used to be on gabapentin for this.  Patient recently had amputation of his left lower extremity.  He is currently in physical therapy for this but states it is hard to do therapy when he is in so much pain.  He is scheduled with pain management for his first visit in 2 weeks.    Patient also states he has been feeling very depressed.  He is upset that he is in pain and unable to work.    The following portions of the patient's history were reviewed and updated as appropriate: current medications, allergies, PMH.    Review of Systems   Constitutional: Negative for appetite change, chills, fatigue, fever and unexpected weight change.   HENT: Negative for congestion, ear pain, hearing loss, nosebleeds, sinus pressure, sore throat, tinnitus and trouble swallowing.    Eyes: Negative for photophobia, discharge and visual disturbance.   Respiratory: Negative for cough, chest tightness, shortness of breath and wheezing.    Cardiovascular: Positive for leg swelling. Negative for chest pain and palpitations.   Gastrointestinal: Negative for abdominal distention, abdominal pain, blood in stool, constipation, diarrhea, nausea and vomiting.   Endocrine: Negative for cold intolerance, heat intolerance, polydipsia, polyphagia and polyuria.   Genitourinary: Negative for difficulty urinating, dysuria, flank pain, " "frequency, hematuria and urgency.   Musculoskeletal: Positive for arthralgias, back pain, gait problem, joint swelling and myalgias. Negative for neck pain and neck stiffness.   Skin: Positive for wound. Negative for color change, pallor and rash.   Allergic/Immunologic: Negative for environmental allergies, food allergies and immunocompromised state.   Neurological: Negative for dizziness, weakness, numbness and headaches.   Hematological: Negative for adenopathy. Does not bruise/bleed easily.   Psychiatric/Behavioral: Positive for dysphoric mood and sleep disturbance. Negative for hallucinations and suicidal ideas. The patient is not nervous/anxious.        Objective     Vitals:    07/31/18 1529   BP: 132/84   Pulse: 91   Temp: 97.5 °F (36.4 °C)   SpO2: 98%   Weight: 89.4 kg (197 lb)   Height: 172.7 cm (67.99\")       Physical Exam   Constitutional: Appears well-developed and well-nourished.   Mouth/Throat: Oropharynx is clear and moist.   Eyes: EOM are normal. Pupils are equal, round, and reactive to light.   Neck: Normal range of motion. Neck supple.   Cardiovascular: Normal rate, regular rhythm.  Pulmonary/Chest: Effort normal and breath sounds normal.   Abdominal: Soft. Bowel sounds are normal. Possible rheumatoid nodule to LUQ.  Musculoskeletal: Prothesis to lower left leg.  Lymphadenopathy: No cervical adenopathy noted.   Neurological: Alert and oriented to person, place, and time.   Skin: Small 1-2 cm wound to mid-abdomen.   Psychiatric: Depressed affect.    Assessment/Plan     Diagnoses and all orders for this visit:    Rheumatoid arthritis involving multiple sites with positive rheumatoid factor (CMS/[facility])  -     CBC & Differential  -     Sedimentation Rate  -     C-reactive protein    Leg cramping  -     CBC & Differential  -     Comprehensive Metabolic Panel  -     Vitamin B12  -     Sedimentation Rate  -     C-reactive protein    Wound of abdomen  -     mupirocin (BACTROBAN) 2 % cream; Apply  topically " to the appropriate area as directed 3 (Three) Times a Day. To abdominal wound.    Multiple skin nodules  Concerned these may be abdominal nodules due to his worsening rheumatoid arthritis.  Differential to also include lipoma.    Depression with anxiety  -     DULoxetine (CYMBALTA) 30 MG capsule; Take 1 capsule by mouth Daily.     Patient had previously been on 60 mg of Cymbalta.  Unsure of why it was stopped, but may benefit him as far as pain and depression.  We will start back at 30 mg and increase to 60 mg if tolerating well.    Patient to follow up in 2 weeks with Dr. Vermeesch.    Rhona Bunn PA-C  07/31/2018         Please note that portions of this note were completed with a voice recognition program. Efforts were made to edit dictation, but occasionally words are mistranscribed.

## 2018-09-04 ENCOUNTER — HOSPITAL ENCOUNTER (EMERGENCY)
Facility: HOSPITAL | Age: 58
Discharge: HOME | End: 2018-09-04
Attending: EMERGENCY MEDICINE | Admitting: EMERGENCY MEDICINE

## 2018-09-04 ENCOUNTER — APPOINTMENT (OUTPATIENT)
Dept: CT IMAGING | Facility: HOSPITAL | Age: 58
End: 2018-09-04

## 2018-09-04 ENCOUNTER — TELEPHONE (OUTPATIENT)
Dept: INTERNAL MEDICINE | Facility: CLINIC | Age: 58
End: 2018-09-04

## 2018-09-04 VITALS
WEIGHT: 220 LBS | BODY MASS INDEX: 33.34 KG/M2 | HEART RATE: 78 BPM | TEMPERATURE: 97.9 F | HEIGHT: 68 IN | OXYGEN SATURATION: 99 % | SYSTOLIC BLOOD PRESSURE: 157 MMHG | RESPIRATION RATE: 18 BRPM | DIASTOLIC BLOOD PRESSURE: 93 MMHG

## 2018-09-04 DIAGNOSIS — R16.0 LIVER MASS: Primary | ICD-10-CM

## 2018-09-04 DIAGNOSIS — R16.1 SPLEEN ENLARGED: ICD-10-CM

## 2018-09-04 DIAGNOSIS — D61.818 PANCYTOPENIA (HCC): ICD-10-CM

## 2018-09-04 LAB
ALBUMIN SERPL-MCNC: 3.6 G/DL (ref 3.5–5)
ALBUMIN/GLOB SERPL: 0.8 G/DL (ref 1–2)
ALP SERPL-CCNC: 179 U/L (ref 38–126)
ALT SERPL W P-5'-P-CCNC: 31 U/L (ref 13–69)
ANION GAP SERPL CALCULATED.3IONS-SCNC: 16.3 MMOL/L (ref 10–20)
AST SERPL-CCNC: 139 U/L (ref 15–46)
BASOPHILS # BLD AUTO: 0.02 10*3/MM3 (ref 0–0.2)
BASOPHILS NFR BLD AUTO: 0.7 % (ref 0–2.5)
BILIRUB SERPL-MCNC: 2.4 MG/DL (ref 0.2–1.3)
BILIRUB UR QL STRIP: ABNORMAL
BUN BLD-MCNC: 16 MG/DL (ref 7–20)
BUN/CREAT SERPL: 17.8 (ref 6.3–21.9)
CALCIUM SPEC-SCNC: 9.1 MG/DL (ref 8.4–10.2)
CHLORIDE SERPL-SCNC: 113 MMOL/L (ref 98–107)
CLARITY UR: CLEAR
CO2 SERPL-SCNC: 15 MMOL/L (ref 26–30)
COLOR UR: ABNORMAL
CREAT BLD-MCNC: 0.9 MG/DL (ref 0.6–1.3)
DEPRECATED RDW RBC AUTO: 58.5 FL (ref 37–54)
EOSINOPHIL # BLD AUTO: 0.64 10*3/MM3 (ref 0–0.7)
EOSINOPHIL NFR BLD AUTO: 20.8 % (ref 0–7)
ERYTHROCYTE [DISTWIDTH] IN BLOOD BY AUTOMATED COUNT: 15.8 % (ref 11.5–14.5)
GFR SERPL CREATININE-BSD FRML MDRD: 86 ML/MIN/1.73
GLOBULIN UR ELPH-MCNC: 4.6 GM/DL
GLUCOSE BLD-MCNC: 78 MG/DL (ref 74–98)
GLUCOSE UR STRIP-MCNC: NEGATIVE MG/DL
HCT VFR BLD AUTO: 31.4 % (ref 42–52)
HGB BLD-MCNC: 10.3 G/DL (ref 14–18)
HGB UR QL STRIP.AUTO: NEGATIVE
IMM GRANULOCYTES # BLD: 0.01 10*3/MM3 (ref 0–0.06)
IMM GRANULOCYTES NFR BLD: 0.3 % (ref 0–0.6)
KETONES UR QL STRIP: NEGATIVE
LEUKOCYTE ESTERASE UR QL STRIP.AUTO: NEGATIVE
LIPASE SERPL-CCNC: 80 U/L (ref 23–300)
LYMPHOCYTES # BLD AUTO: 0.58 10*3/MM3 (ref 0.6–3.4)
LYMPHOCYTES NFR BLD AUTO: 18.9 % (ref 10–50)
MCH RBC QN AUTO: 33.4 PG (ref 27–31)
MCHC RBC AUTO-ENTMCNC: 32.8 G/DL (ref 30–37)
MCV RBC AUTO: 101.9 FL (ref 80–94)
MONOCYTES # BLD AUTO: 0.2 10*3/MM3 (ref 0–0.9)
MONOCYTES NFR BLD AUTO: 6.5 % (ref 0–12)
NEUTROPHILS # BLD AUTO: 1.62 10*3/MM3 (ref 2–6.9)
NEUTROPHILS NFR BLD AUTO: 52.8 % (ref 37–80)
NITRITE UR QL STRIP: NEGATIVE
NRBC BLD MANUAL-RTO: 0 /100 WBC (ref 0–0)
PH UR STRIP.AUTO: 5.5 [PH] (ref 5–8)
PLATELET # BLD AUTO: 85 10*3/MM3 (ref 130–400)
PMV BLD AUTO: 11.4 FL (ref 6–12)
POTASSIUM BLD-SCNC: 4.3 MMOL/L (ref 3.5–5.1)
PROT SERPL-MCNC: 8.2 G/DL (ref 6.3–8.2)
PROT UR QL STRIP: NEGATIVE
RBC # BLD AUTO: 3.08 10*6/MM3 (ref 4.7–6.1)
SODIUM BLD-SCNC: 140 MMOL/L (ref 137–145)
SP GR UR STRIP: 1.02 (ref 1–1.03)
UROBILINOGEN UR QL STRIP: ABNORMAL
WBC NRBC COR # BLD: 3.07 10*3/MM3 (ref 4.8–10.8)

## 2018-09-04 PROCEDURE — 25010000002 KETOROLAC TROMETHAMINE PER 15 MG: Performed by: NURSE PRACTITIONER

## 2018-09-04 PROCEDURE — 25010000002 IOPAMIDOL 61 % SOLUTION: Performed by: EMERGENCY MEDICINE

## 2018-09-04 PROCEDURE — 83690 ASSAY OF LIPASE: CPT | Performed by: NURSE PRACTITIONER

## 2018-09-04 PROCEDURE — 99284 EMERGENCY DEPT VISIT MOD MDM: CPT

## 2018-09-04 PROCEDURE — 85025 COMPLETE CBC W/AUTO DIFF WBC: CPT | Performed by: NURSE PRACTITIONER

## 2018-09-04 PROCEDURE — 80053 COMPREHEN METABOLIC PANEL: CPT | Performed by: NURSE PRACTITIONER

## 2018-09-04 PROCEDURE — 74177 CT ABD & PELVIS W/CONTRAST: CPT

## 2018-09-04 PROCEDURE — 96374 THER/PROPH/DIAG INJ IV PUSH: CPT

## 2018-09-04 PROCEDURE — 96375 TX/PRO/DX INJ NEW DRUG ADDON: CPT

## 2018-09-04 PROCEDURE — 25010000002 ONDANSETRON PER 1 MG: Performed by: NURSE PRACTITIONER

## 2018-09-04 PROCEDURE — 81003 URINALYSIS AUTO W/O SCOPE: CPT | Performed by: NURSE PRACTITIONER

## 2018-09-04 RX ORDER — ONDANSETRON 2 MG/ML
4 INJECTION INTRAMUSCULAR; INTRAVENOUS ONCE
Status: COMPLETED | OUTPATIENT
Start: 2018-09-04 | End: 2018-09-04

## 2018-09-04 RX ORDER — KETOROLAC TROMETHAMINE 30 MG/ML
30 INJECTION, SOLUTION INTRAMUSCULAR; INTRAVENOUS ONCE
Status: COMPLETED | OUTPATIENT
Start: 2018-09-04 | End: 2018-09-04

## 2018-09-04 RX ORDER — SODIUM CHLORIDE 0.9 % (FLUSH) 0.9 %
10 SYRINGE (ML) INJECTION AS NEEDED
Status: DISCONTINUED | OUTPATIENT
Start: 2018-09-04 | End: 2018-09-04 | Stop reason: HOSPADM

## 2018-09-04 RX ADMIN — KETOROLAC TROMETHAMINE 30 MG: 30 INJECTION, SOLUTION INTRAMUSCULAR; INTRAVENOUS at 13:15

## 2018-09-04 RX ADMIN — IOPAMIDOL 100 ML: 612 INJECTION, SOLUTION INTRAVENOUS at 14:37

## 2018-09-04 RX ADMIN — ONDANSETRON 4 MG: 2 INJECTION INTRAMUSCULAR; INTRAVENOUS at 13:15

## 2018-09-04 RX ADMIN — SODIUM CHLORIDE 1000 ML: 9 INJECTION, SOLUTION INTRAVENOUS at 13:14

## 2018-09-04 NOTE — ED PROVIDER NOTES
Subjective   History of Present Illness  This is is a 57 year old male who comes in today complaining of left upper quad pain. He reports he has a hernia and it has been hurting for the past 4 days and getting worse. He says he has been unable to eat but can drink fluids. He denies any fever or chills. He denies vomiting.   Review of Systems   Constitutional: Negative.    HENT: Negative.    Eyes: Negative.    Respiratory: Negative.    Cardiovascular: Negative.    Gastrointestinal: Positive for abdominal pain.   Endocrine: Negative.    Genitourinary: Negative.    Musculoskeletal: Negative.    Skin: Negative.    Allergic/Immunologic: Negative.    Neurological: Negative.    Hematological: Negative.    Psychiatric/Behavioral: Negative.    All other systems reviewed and are negative.      Past Medical History:   Diagnosis Date   • Abdominal pain     History of epigastric abdominal tenderness. Differentials include peptic ulcer disease,   pancreatobiliary disease.   • Abnormal LFTs    • Anemia    • Anxiety    • Arthritis    • Asthma    • Atrial fibrillation (CMS/HCC)    • CAD (coronary artery disease)    • Calf cramp    • CKD (chronic kidney disease)    • COPD (chronic obstructive pulmonary disease) (CMS/HCC)    • CVA (cerebral infarction)    • Depression    • Diabetes mellitus (CMS/HCC)    • DVT (deep venous thrombosis) (CMS/HCC)    • Electrocution    • Fatigue    • Hepatitis C    • History of allergy    • History of blood transfusion    • Hyperlipidemia    • Hypertension    • Leg pain    • Loss of appetite    • PUD (peptic ulcer disease)    • Stroke syndrome (CMS/HCC)    • Tachycardia    • Thrombocytopenia (CMS/HCC)    • Thrombophlebitis of deep femoral vein (CMS/HCC)    • Vision problems        No Known Allergies    Past Surgical History:   Procedure Laterality Date   • AMPUTATION DIGIT Left 6/23/2017    Procedure: amputation of left foot third digit, left foot wound debridments and grafting;  Surgeon: Wolfgang Campbell,  ADRIANO;  Location: [facility] OR;  Service:    • ANKLE SURGERY     • BELOW KNEE AMPUTATION Left 12/1/2017    Procedure: AMPUTATION BELOW KNEE;  Surgeon: Ross Real MD;  Location: [facility] OR;  Service:    • CARDIAC CATHETERIZATION N/A 6/20/2017    Procedure: Peripheral angiography;  Surgeon: Jonathan Storm MD;  Location: [facility] CATH INVASIVE LOCATION;  Service:    • CARDIAC CATHETERIZATION N/A 6/20/2017    Procedure: Angioplasty-peripheral;  Surgeon: Jonathan Storm MD;  Location: [facility] CATH INVASIVE LOCATION;  Service:    • CARDIAC CATHETERIZATION N/A 11/29/2017    Procedure: Peripheral angiography;  Surgeon: Jonathan Storm MD;  Location: [facility] CATH INVASIVE LOCATION;  Service:    • CARDIAC CATHETERIZATION Left 11/29/2017    Procedure: Angioplasty L SFA;  Surgeon: Jonathan Storm MD;  Location: [facility] CATH INVASIVE LOCATION;  Service:    • CARDIAC SURGERY     • CORONARY STENT PLACEMENT     • INCISION AND DRAINAGE FOOT Left 8/29/2017    Procedure: Incision and drainage/wound debridement of left foot, wound VAC application, skin graft application;  Surgeon: Wolfgang Campbell DPM;  Location: [facility] OR;  Service:    • INTERVENTIONAL RADIOLOGY PROCEDURE N/A 11/29/2017    Procedure: Abdominal Aortagram with Runoff;  Surgeon: Jonathan Storm MD;  Location: [facility] CATH INVASIVE LOCATION;  Service:    • TONSILLECTOMY     • TOTAL HIP ARTHROPLASTY Left    • TOTAL SHOULDER REPLACEMENT Left        Family History   Problem Relation Age of Onset   • Breast cancer Mother    • Colon cancer Sister    • Heart attack Other    • Arthritis Other    • Diabetes Other    • Hypertension Other    • Kidney disease Other    • Stroke Other    • Heart disease Father        Social History     Social History   • Marital status: Single     Social History Main Topics   • Smoking status: Current Every Day Smoker     Packs/day: 0.50     Years: 30.00     Types: Cigarettes   • Alcohol use No   • Drug use: No   •  Sexual activity: Defer     Other Topics Concern   • Not on file           Objective   Physical Exam   Constitutional: He appears well-developed and well-nourished.   Nursing note and vitals reviewed.   GEN: No acute distress  Head: Normocephalic, atraumatic  Eyes: Pupils equal round reactive to light  ENT: Posterior pharynx normal in appearance, oral mucosa is moist  Chest: Nontender to palpation  Cardiovascular: Regular rate  Lungs: Clear to auscultation bilaterally  Abdomen: Soft, tender left upper quad hernia felt but unable to reduce. guarding , nondistended, no peritoneal signs  Extremities: No edema, normal appearance  Neuro: GCS 15  Psych: Mood and affect are appropriate      Procedures           ED Course                  MDM  Number of Diagnoses or Management Options     Amount and/or Complexity of Data Reviewed  Clinical lab tests: ordered and reviewed  Tests in the radiology section of CPT®: ordered and reviewed  Review and summarize past medical records: yes  Discuss the patient with other providers: yes    Risk of Complications, Morbidity, and/or Mortality  Presenting problems: moderate  Diagnostic procedures: moderate  Management options: moderate          Final diagnoses:   Liver mass   Pancytopenia (CMS/HCC)   Spleen enlarged            Robert, Rema ELIZABETH, APRN  09/04/18 1501

## 2018-09-04 NOTE — TELEPHONE ENCOUNTER
Pt said the he was at ER, the people at the hosp.? Said she (Dr. Vermeesch) needed to get with them re: what they found in records

## 2018-09-05 NOTE — TELEPHONE ENCOUNTER
Patient has mass in the liver that needs to be evaluated.patient was seen at end of July and was supposed to have follow up appointment but never made appt.  Please call him and tell him to make follow-up appointment for further evaluation.

## 2018-09-11 RX ORDER — NAPROXEN 375 MG/1
375 TABLET ORAL 2 TIMES DAILY WITH MEALS
Qty: 60 TABLET | Refills: 3 | Status: SHIPPED | OUTPATIENT
Start: 2018-09-11

## 2018-10-18 ENCOUNTER — TELEPHONE (OUTPATIENT)
Dept: SURGERY | Facility: CLINIC | Age: 58
End: 2018-10-18

## 2018-10-18 NOTE — TELEPHONE ENCOUNTER
Patient no showed for appointment . Called patient no answer had to leave message for patient to call office.

## 2018-10-18 NOTE — TELEPHONE ENCOUNTER
Patient no showed for appointment with Dr Mc. Called patient and left message. Called emergency contact patient sister she said if she sees him she will tell him to call office. No show letter mailed to patient.

## 2018-11-06 ENCOUNTER — TELEPHONE (OUTPATIENT)
Dept: SURGERY | Facility: CLINIC | Age: 58
End: 2018-11-06

## 2018-11-14 ENCOUNTER — APPOINTMENT (OUTPATIENT)
Dept: GENERAL RADIOLOGY | Facility: HOSPITAL | Age: 58
End: 2018-11-14

## 2018-11-14 ENCOUNTER — HOSPITAL ENCOUNTER (EMERGENCY)
Facility: HOSPITAL | Age: 58
Discharge: ANOTHER HOSPITAL | End: 2018-11-14
Attending: EMERGENCY MEDICINE | Admitting: EMERGENCY MEDICINE

## 2018-11-14 VITALS
RESPIRATION RATE: 26 BRPM | HEART RATE: 73 BPM | DIASTOLIC BLOOD PRESSURE: 64 MMHG | WEIGHT: 209.44 LBS | TEMPERATURE: 94 F | SYSTOLIC BLOOD PRESSURE: 101 MMHG | HEIGHT: 69 IN | BODY MASS INDEX: 31.02 KG/M2 | OXYGEN SATURATION: 97 %

## 2018-11-14 DIAGNOSIS — IMO0002: Primary | ICD-10-CM

## 2018-11-14 LAB
ALBUMIN SERPL-MCNC: 3.9 G/DL (ref 3.5–5)
ALBUMIN/GLOB SERPL: 0.8 G/DL (ref 1–2)
ALP SERPL-CCNC: 193 U/L (ref 38–126)
ALT SERPL W P-5'-P-CCNC: 39 U/L (ref 13–69)
AMMONIA BLD-SCNC: 179 UMOL/L (ref 9–30)
ANION GAP SERPL CALCULATED.3IONS-SCNC: 33.2 MMOL/L (ref 10–20)
ANISOCYTOSIS BLD QL: NORMAL
AST SERPL-CCNC: 460 U/L (ref 15–46)
ATMOSPHERIC PRESS: 742 MMHG
BASE EXCESS BLDV CALC-SCNC: -25.3 MMOL/L (ref 0–2)
BASOPHILS # BLD MANUAL: 0.09 10*3/MM3 (ref 0–0.2)
BASOPHILS NFR BLD AUTO: 1 % (ref 0–2.5)
BDY SITE: ABNORMAL
BILIRUB SERPL-MCNC: 8.7 MG/DL (ref 0.2–1.3)
BUN BLD-MCNC: 49 MG/DL (ref 7–20)
BUN/CREAT SERPL: 14.8 (ref 6.3–21.9)
CALCIUM SPEC-SCNC: 9.7 MG/DL (ref 8.4–10.2)
CHLORIDE SERPL-SCNC: 111 MMOL/L (ref 98–107)
CO2 SERPL-SCNC: 5 MMOL/L (ref 26–30)
CREAT BLD-MCNC: 3.3 MG/DL (ref 0.6–1.3)
D-LACTATE SERPL-SCNC: 18.3 MMOL/L (ref 0.5–2)
DEPRECATED RDW RBC AUTO: 80.3 FL (ref 37–54)
EOSINOPHIL # BLD MANUAL: 0.47 10*3/MM3 (ref 0–0.7)
EOSINOPHIL NFR BLD MANUAL: 5 % (ref 0–7)
ERYTHROCYTE [DISTWIDTH] IN BLOOD BY AUTOMATED COUNT: 23.7 % (ref 11.5–14.5)
GFR SERPL CREATININE-BSD FRML MDRD: 19 ML/MIN/1.73
GLOBULIN UR ELPH-MCNC: 5.1 GM/DL
GLUCOSE BLD-MCNC: 35 MG/DL (ref 74–98)
GLUCOSE BLDC GLUCOMTR-MCNC: 95 MG/DL (ref 70–130)
HCO3 BLDV-SCNC: 4.6 MMOL/L (ref 22–28)
HCT VFR BLD AUTO: 30.7 % (ref 42–52)
HGB BLD-MCNC: 9 G/DL (ref 14–18)
HOLD SPECIMEN: NORMAL
HOROWITZ INDEX BLD+IHG-RTO: 21 %
HYPOCHROMIA BLD QL: NORMAL
LIPASE SERPL-CCNC: 264 U/L (ref 23–300)
LYMPHOCYTES # BLD MANUAL: 1.7 10*3/MM3 (ref 0.6–3.4)
LYMPHOCYTES NFR BLD MANUAL: 18 % (ref 10–50)
LYMPHOCYTES NFR BLD MANUAL: 4 % (ref 0–12)
MCH RBC QN AUTO: 28 PG (ref 27–31)
MCHC RBC AUTO-ENTMCNC: 29.3 G/DL (ref 30–37)
MCV RBC AUTO: 95.6 FL (ref 80–94)
METAMYELOCYTES NFR BLD MANUAL: 2 % (ref 0–0)
MODALITY: ABNORMAL
MONOCYTES # BLD AUTO: 0.38 10*3/MM3 (ref 0–0.9)
NEUTROPHILS # BLD AUTO: 6.51 10*3/MM3 (ref 2–6.9)
NEUTROPHILS NFR BLD MANUAL: 68 % (ref 37–80)
NEUTS BAND NFR BLD MANUAL: 1 % (ref 0–6)
NRBC SPEC MANUAL: 1 /100 WBC (ref 0–0)
PCO2 BLDV: 20 MM HG (ref 40–50)
PH BLDV: 6.97 PH UNITS (ref 7.32–7.42)
PLAT MORPH BLD: NORMAL
PLATELET # BLD AUTO: 146 10*3/MM3 (ref 130–400)
PMV BLD AUTO: 9.8 FL (ref 6–12)
PO2 BLDV: 58.3 MM HG (ref 30–50)
POTASSIUM BLD-SCNC: 5.2 MMOL/L (ref 3.5–5.1)
PROT SERPL-MCNC: 9 G/DL (ref 6.3–8.2)
RBC # BLD AUTO: 3.21 10*6/MM3 (ref 4.7–6.1)
RBC MORPH BLD: NORMAL
SAO2 % BLDCOV: 70.6 % (ref 45–75)
SCAN SLIDE: NORMAL
SMALL PLATELETS BLD QL SMEAR: ADEQUATE
SODIUM BLD-SCNC: 144 MMOL/L (ref 137–145)
TROPONIN I SERPL-MCNC: 1.21 NG/ML (ref 0–0.03)
VARIANT LYMPHS NFR BLD MANUAL: 1 % (ref 0–0)
VENTILATOR MODE: ABNORMAL
WBC MORPH BLD: NORMAL
WBC NRBC COR # BLD: 9.44 10*3/MM3 (ref 4.8–10.8)

## 2018-11-14 PROCEDURE — 93005 ELECTROCARDIOGRAM TRACING: CPT | Performed by: EMERGENCY MEDICINE

## 2018-11-14 PROCEDURE — 84484 ASSAY OF TROPONIN QUANT: CPT | Performed by: EMERGENCY MEDICINE

## 2018-11-14 PROCEDURE — 96360 HYDRATION IV INFUSION INIT: CPT

## 2018-11-14 PROCEDURE — 82140 ASSAY OF AMMONIA: CPT | Performed by: EMERGENCY MEDICINE

## 2018-11-14 PROCEDURE — 82962 GLUCOSE BLOOD TEST: CPT

## 2018-11-14 PROCEDURE — 87040 BLOOD CULTURE FOR BACTERIA: CPT | Performed by: EMERGENCY MEDICINE

## 2018-11-14 PROCEDURE — 85025 COMPLETE CBC W/AUTO DIFF WBC: CPT | Performed by: EMERGENCY MEDICINE

## 2018-11-14 PROCEDURE — 80053 COMPREHEN METABOLIC PANEL: CPT | Performed by: EMERGENCY MEDICINE

## 2018-11-14 PROCEDURE — 99285 EMERGENCY DEPT VISIT HI MDM: CPT

## 2018-11-14 PROCEDURE — 83605 ASSAY OF LACTIC ACID: CPT | Performed by: EMERGENCY MEDICINE

## 2018-11-14 PROCEDURE — 83690 ASSAY OF LIPASE: CPT | Performed by: EMERGENCY MEDICINE

## 2018-11-14 PROCEDURE — 85007 BL SMEAR W/DIFF WBC COUNT: CPT | Performed by: EMERGENCY MEDICINE

## 2018-11-14 PROCEDURE — 71045 X-RAY EXAM CHEST 1 VIEW: CPT

## 2018-11-14 PROCEDURE — 82805 BLOOD GASES W/O2 SATURATION: CPT

## 2018-11-14 RX ORDER — LACTULOSE 20 G/30ML
20 SOLUTION ORAL DAILY
Status: DISCONTINUED | OUTPATIENT
Start: 2018-11-14 | End: 2018-11-14

## 2018-11-14 RX ORDER — DEXTROSE MONOHYDRATE 25 G/50ML
50 INJECTION, SOLUTION INTRAVENOUS
Status: DISCONTINUED | OUTPATIENT
Start: 2018-11-14 | End: 2018-11-14 | Stop reason: HOSPADM

## 2018-11-14 RX ORDER — SODIUM CHLORIDE 0.9 % (FLUSH) 0.9 %
10 SYRINGE (ML) INJECTION AS NEEDED
Status: DISCONTINUED | OUTPATIENT
Start: 2018-11-14 | End: 2018-11-14 | Stop reason: HOSPADM

## 2018-11-14 RX ORDER — ASPIRIN 300 MG/1
300 SUPPOSITORY RECTAL ONCE
Status: COMPLETED | OUTPATIENT
Start: 2018-11-14 | End: 2018-11-14

## 2018-11-14 RX ORDER — DEXTROSE AND SODIUM CHLORIDE 5; .45 G/100ML; G/100ML
75 INJECTION, SOLUTION INTRAVENOUS CONTINUOUS
Status: DISCONTINUED | OUTPATIENT
Start: 2018-11-14 | End: 2018-11-14 | Stop reason: HOSPADM

## 2018-11-14 RX ADMIN — DEXTROSE AND SODIUM CHLORIDE 75 ML/HR: 5; 450 INJECTION, SOLUTION INTRAVENOUS at 16:45

## 2018-11-14 RX ADMIN — ASPIRIN 300 MG: 300 SUPPOSITORY RECTAL at 16:36

## 2018-11-14 RX ADMIN — WATER: 100 IRRIGANT IRRIGATION at 16:36

## 2018-11-14 RX ADMIN — SODIUM CHLORIDE 500 ML: 9 INJECTION, SOLUTION INTRAVENOUS at 14:59

## 2018-11-14 RX ADMIN — DEXTROSE MONOHYDRATE 50 ML: 25 INJECTION, SOLUTION INTRAVENOUS at 15:40

## 2018-11-14 NOTE — ED NOTES
Report to Sulema LAMBERT at [facility] care [place]     Baudilio Garcia RN  11/14/18 [id number]

## 2018-11-14 NOTE — ED PROVIDER NOTES
Subjective   History of Present Illness   58M w/ hx of COPD, inoperable liver CA on hospice care BIB EMS from home hospice for further evaluation of altered mental status. Per report, pt was found to be confused today. No other specific symptoms. History from patient is very limited but pt denies any current pain or other complaints. Per EMS, there was some dried blood on the floor from unknown source. Attempted to call nurse, but no answer.     Review of Systems   Unable to perform ROS: Mental status change       Past Medical History:   Diagnosis Date   • Abdominal pain     History of epigastric abdominal tenderness. Differentials include peptic ulcer disease,   pancreatobiliary disease.   • Abnormal LFTs    • Anemia    • Anxiety    • Arthritis    • Asthma    • Atrial fibrillation (CMS/HCC)    • CAD (coronary artery disease)    • Calf cramp    • CKD (chronic kidney disease)    • COPD (chronic obstructive pulmonary disease) (CMS/HCC)    • CVA (cerebral infarction)    • Depression    • Diabetes mellitus (CMS/HCC)    • DVT (deep venous thrombosis) (CMS/HCC)    • Electrocution    • Fatigue    • Hepatitis C    • History of allergy    • History of blood transfusion    • Hyperlipidemia    • Hypertension    • Leg pain    • Loss of appetite    • PUD (peptic ulcer disease)    • Stroke syndrome    • Tachycardia    • Thrombocytopenia (CMS/HCC)    • Thrombophlebitis of deep femoral vein (CMS/HCC)    • Vision problems        No Known Allergies    Past Surgical History:   Procedure Laterality Date   • ANKLE SURGERY     • CARDIAC SURGERY     • CORONARY STENT PLACEMENT     • TONSILLECTOMY     • TOTAL HIP ARTHROPLASTY Left    • TOTAL SHOULDER REPLACEMENT Left        Family History   Problem Relation Age of Onset   • Breast cancer Mother    • Colon cancer Sister    • Heart attack Other    • Arthritis Other    • Diabetes Other    • Hypertension Other    • Kidney disease Other    • Stroke Other    • Heart disease Father        Social  History     Socioeconomic History   • Marital status: Single     Spouse name: Not on file   • Number of children: Not on file   • Years of education: Not on file   • Highest education level: Not on file   Tobacco Use   • Smoking status: Current Every Day Smoker     Packs/day: 0.50     Years: 30.00     Pack years: 15.00     Types: Cigarettes   Substance and Sexual Activity   • Alcohol use: No   • Drug use: No   • Sexual activity: Defer           Objective   Physical Exam   Constitutional: He appears well-developed and well-nourished. He appears ill. No distress.   HENT:   Head: Normocephalic.   Mouth/Throat: Oropharynx is clear and moist.   Eyes: Conjunctivae are normal. Scleral icterus is present.   Neck: Normal range of motion. Neck supple. No tracheal deviation present.   Cardiovascular: Normal rate, regular rhythm, normal heart sounds and intact distal pulses. Exam reveals no gallop and no friction rub.   No murmur heard.  Pulmonary/Chest: Effort normal and breath sounds normal. No stridor. No respiratory distress. He has no wheezes. He has no rales. He exhibits no tenderness.   Abdominal: Soft. He exhibits distension. He exhibits no mass. There is tenderness. There is no rebound and no guarding.   Musculoskeletal: He exhibits edema. He exhibits no deformity.   L BKA   Neurological: He is alert. He is disoriented.   Refuses to cooperate with exam enough to test for asterixis   Skin: Skin is warm and dry. Capillary refill takes less than 2 seconds. No rash noted. He is not diaphoretic. No erythema. No pallor.   jaundice   Nursing note and vitals reviewed.      Critical Care  Performed by: Adrian Palmer MD  Authorized by: Adrian Palmer MD     Critical care provider statement:     Critical care time (minutes):  75    Critical care time was exclusive of:  Teaching time and separately billable procedures and treating other patients    Critical care was necessary to treat or prevent imminent or  life-threatening deterioration of the following conditions:  CNS failure or compromise, dehydration, hepatic failure and renal failure    Critical care was time spent personally by me on the following activities:  Discussions with consultants, development of treatment plan with patient or surrogate, evaluation of patient's response to treatment, examination of patient, obtaining history from patient or surrogate, review of old charts, re-evaluation of patient's condition, ordering and review of radiographic studies, ordering and review of laboratory studies and ordering and performing treatments and interventions                 ED Course                  MDM   58M here w/ confusion in setting of end-stage liver disease.  Hypothermia, tachypnea but O2 sat normal and vital signs otherwise stable.  He does have distention of his abdomen with tenderness in his upper abdomen and palpable mass in his R upper quadrant.  He is not oriented to place or time.  I suspect he likely has hyperammonemia versus acute infection versus other.  He has a most form which states that he is okay with CPR and limited medical interventions but not with intubation, feeding tube, nor IV fluids.  At this time, he does not seem to be able to make appropriate decisions for himself, so I will respect the most form, send labs, and reassess.    2:43 PM discussed with the director of our hospice care unit who stated that because his most form is not signed by a physician, it is invalid.  Given this, we will hang IV fluids and continue care as the patient does not seem to have capacity to make appropriate decisions for himself.  He is pulling at lines and somewhat aggressive so he hasn't placed in 2 restraints.    3:52 PM Labs show evidence of end organ dysfunction with elevated troponin, hypoglycemia, elevated creatinine, bicarbonate of 5, ammonia 179 and a lactic acid of 18.3.  Given his multiple medical problems, I have alert very low suspicion  that he will survive this hospitalization.  Furthermore, he does have a terminal disease.  Given his poor outlook, I again discussed him with his sister.  She felt comfortable with not performing CPR or intubation on the patient if it should come to that.  She did want him to be made comfortable.  She is attempting to come to the hospital tonight.  Wear now contacting the [facility] regarding potential transfer to inpatient hospice for end of life care.  The sister was also comfortable with this plan.    4:18 PM given the patient's likely prognosis and sister's agreement to change his status, the patient has been accepted at the [facility] for further management.  We will help arrange transportation there.    Final diagnoses:   Multi-organ failure with liver failure (CMS/HCC)            Adrian Palmer MD  11/14/18 9032

## 2018-11-14 NOTE — ED NOTES
[facility] on-call for Dr. Gomez called Dr. Palmer. Transferred at this time.      Dunia Moses  11/14/18 [id number]

## 2018-11-14 NOTE — ED NOTES
Dispatch called at this time for non-emergent transfer to compassionate care.      Dunia Moses  11/14/18 9647

## 2018-11-19 LAB — BACTERIA SPEC AEROBE CULT: NORMAL

## 2019-03-14 ENCOUNTER — TELEPHONE (OUTPATIENT)
Dept: INTERNAL MEDICINE | Facility: CLINIC | Age: 59
End: 2019-03-14

## 2020-10-02 NOTE — PROGRESS NOTES
"Continued Stay Note  [facility]     Patient Name: Vasquez Marie  MRN: [id number]  Today's Date: 6/26/2017    Admit Date: 6/19/2017          Discharge Plan       06/26/17 1449    Case Management/Social Work Plan    Plan PT going home today, will need Home Health.  Called to Emily at [facility] and will not take pt back.  Stated pt  was non compliant and has actually thrown the nurses out when they visit.  Has taken him as pt 3 different times but cannot take back this time.  Pt informed, states yes he did get mad at them.  Did not deny the accusation.  States has had [facility] before and would take them  if they would take him.  Pt instructed must be cooperative with the  staff when they visit.  Pt replied , \"OK\".  Called to Yolette at [facility] and referral  faxed/                Discharge Codes     None        Expected Discharge Date and Time     Expected Discharge Date Expected Discharge Time    Jun 26, 2017             Paty Marion    " [Negative] : Neurological [Petechiae] : no petechiae [Ecchymoses] : no ecchymoses [Hematochezia] : no hematochezia [Hematuria] : no hematuria

## 2023-01-28 NOTE — ANESTHESIA POSTPROCEDURE EVALUATION
Patient: Vasquez Marie    Procedure Summary     Date Anesthesia Start Anesthesia Stop Room / Location    08/29/17 1701   [facility] /  [facility]       Procedure Diagnosis Surgeon Provider    Incision and drainage/wound debridement of left foot, wound VAC application, skin graft application (Left Foot) Subacute osteomyelitis of left foot  (Subacute osteomyelitis of left foot [M86.272]) ADRIANO Mckeon CRNA          Anesthesia Type: MAC  Last vitals  BP        Temp        Pulse       Resp        SpO2          Post Anesthesia Care and Evaluation    Patient location during evaluation: PACU  Patient participation: complete - patient participated  Level of consciousness: awake  Pain score: 0  Pain management: adequate  Airway patency: patent  Anesthetic complications: No anesthetic complications  PONV Status: none  Cardiovascular status: acceptable  Respiratory status: acceptable and face mask  Hydration status: acceptable    Comments: vsss resp spont, reflexes intact, responsive, report given to pacu nurse       28-Jan-2023 23:23

## 2024-03-27 NOTE — TELEPHONE ENCOUNTER
Cholesterol is too high. We should check a CT cardiac score which is a free test to look to see if cholesterol is building up in her heart arteries.     WBC is slightly which can be a normal variant but we should do a rpt in 1 mo to see if this improves    K slightly elevated. This commonly occurs if red blood cells are damaged during the blood draw. Will rpt in 1 mo     All other labs nml    Please tell nurse that patient did not come to his follow-up appointment.  I am not willing to give him more gabapentin or pain medication as he was noncompliant with how to take these medications.  In addition his urine drug screen at the hospital d  id have marijuana.  I realize that he was becoming depressed the first time I met him, and I wanted to address that at follow-up visit however he never returned for this discussion.  My thought was to start Cymbalta on him as this would help his   aishwarya pain, however he never showed up for a follow-up visit.  Please ask her to try to continue seeing him to help with wound care.  Also ask her to encourage him to come in to see me for follow-up and please make a follow-up visit for him. details

## 2024-07-24 NOTE — PROGRESS NOTES
PROGRESS NOTE        Date of Admission: 6/19/2017  Length of Stay: 0  Primary Care Physician: Marilyn K. Vermeesch, MD    Subjective   Chief Complaint:  Left lower extremity cellulitis follow up  HPI:  This is a 57-year-old male who is admitted for left lower extremity cellulitis and a gangrenous ischemic third digit.  Patient had developed pain in his left lower extremity almost 2 months ago.  He was recently admitted to [facility] in [place] for atrial fib with RVR were at that time they recommended wound care to his left lower extremity and done an arterial there which was read as normal.  He was placed on Xarelto at [facility] and discharged home.  After that he had to be admitted at the [facility] for an upper GI bleed.  He did undergo an upper endoscopy which showed portal hypertensive gastropathy and duodenitis  And duodenal angiodysplasia.  His blood thinners were supposed to be held according to the records however patient had Xartelto at home but recently stopped taking it himself due to bruising.  His Xarelto has been discontinued as well as aspirin per recommendations of cardiology.  He is to continue Plavix only.  He did get 1 unit of packed red blood cells yesterday.  He denies any evidence of GI bleed at this time he denies any hematemesis, melanotic stools, or hematochezia.  Also been no report of any black tarry stools.  He was seen and evaluated by Dr. Storm with cardiology for his peripheral vascular disease.  He had an arterial duplex done in the emergency room which did show occlusion of the distal superficial femoral artery with reconstitution of flow at the popliteal artery.  Patient did undergo intervention of his SFA and popliteal arteries.  Dr. Campbell with podiatry has been consulted where he will possibly need amputation of third digit if not more.  Dr. Campbell did see and evaluate the patient and will likely plan for surgery on Friday.  He does have a liver mass that  is concerning for malignancy for which did set him up with the liver clinic however I don't think that he is followed up I will discuss this with the patient.  He is also noted to have cirrhosis of the liver with a history of alcohol abuse and hepatitis C.  her into the patient he has an appointment in August with the liver clinic.  He has seen them once since his discharge from [facility] in April.  Will defer further workup for his cirrhosis and liver mass to the GI clinic.  Patient was seen today.  He is sitting up on the bedside.  He did have a little bit of confusion earlier today but is currently awake and alert.  He does have a history of alcohol abuse for which she is on withdrawal protocol.  Aside for some minor confusion earlier I do not see any further evidence of withdrawal but we will continue to monitor closely.    Review Of Systems:   Review of Systems   General ROS: Patient denies any fevers, chills or loss of consciousness. Complains of generalized weakness.   Psychological ROS: Denies any hallucinations and delusions.  Ophthalmic ROS: Denies any diplopia or transient loss of vision.  ENT ROS: Denies sore throat, nasal congestion or ear pain.   Allergy and Immunology ROS: Denies rash or itching.  Hematological and Lymphatic ROS: Denies neck swelling or easy bleeding.  Endocrine ROS: Denies any recent unintentional weight gain or loss.  Breast ROS: Denies any pain or swelling.  Respiratory ROS: Denies cough or shortness of breath.   Cardiovascular ROS: Denies chest pain or palpitations. No history of exertional chest pain.   Gastrointestinal ROS: Denies nausea and vomiting. Denies any abdominal pain. No diarrhea.  Genito-Urinary ROS: Denies dysuria or hematuria.  Musculoskeletal ROS: Denies back pain. No muscle pain. No calf pain.   Neurological ROS: Denies any focal weakness. No loss of consciousness. Denies any numbness. Denies neck pain.   Dermatological ROS: Denies any redness or  pruritis.    Objective      Temp:  [97.2 °F (36.2 °C)-98.5 °F (36.9 °C)] 97.9 °F (36.6 °C)  Heart Rate:  [70-78] 78  Resp:  [16-18] 18  BP: (131-166)/(77-89) 136/83  Physical Exam    General Appearance:  Alert and cooperative, not in any acute distress.   Head:  Atraumatic and normocephalic, without obvious abnormality.   Eyes:          PERRLA, conjunctivae and sclerae normal, no Icterus. No pallor. Extra-occular movements are within normal limits.   Ears:  Ears appear intact with no abnormalities noted.   Throat: No oral lesions, no thrush, oral mucosa moist.   Neck: Supple, trachea midline, no thyromegaly, no carotid bruit.   Back:   No kyphoscoliosis present. No tenderness to palpation,   range of motion normal.   Lungs:   Chest shape is normal. Breath sounds heard bilaterally equally.  No crackles or wheezing. No Pleural rub or bronchial breathing.   Heart:  Normal S1 and S2, no murmur, no gallop, no rub. No JVD   Abdomen:   Normal bowel sounds, no masses, no organomegaly. Soft     non-tender, non-distended, no guarding, no rebound                tenderness   Extremities: Moves all extremities well, no edema, no cyanosis, no clubbing.   Pulses: Pulses palpable and equal bilaterally   Skin: No bleeding, bruising or rash   Lymph nodes: No palpable adenopathy   Neurologic:    Psychiatric/Behavior:     Cranial nerves 2 - 12 grossly intact, sensation intact, Motor power is normal and equal bilaterally.  Mood normal, behavior normal       Results Review:    I have reviewed the labs, radiology results and diagnostic studies.      Results from last 7 days  Lab Units 06/21/17  0510   WBC 10*3/mm3 7.00   HEMOGLOBIN g/dL 9.2*   PLATELETS 10*3/mm3 184       Results from last 7 days  Lab Units 06/21/17  0510   SODIUM mmol/L 143   POTASSIUM mmol/L 3.5   TOTAL CO2 mmol/L 27.0   CREATININE mg/dL 1.50*   GLUCOSE mg/dL 140*       Culture Data:    Radiology Data:   Cardiology Data:    I have reviewed the  medications.    Assessment/Plan     Assessment and Plan:  1. Left lower extremity cellulitis: Patient will be started on IV antibiotic in the form of Zosyn and Vancomycin.  He was on Clindamycin however until we have cultures clinda does not provide the best MRSA coverage.  May de escalate if we can get a tissue culture during amputation.  2. Dry Gangrenous ischemic third digit of left lower extremity-Will get MRI to rule out osteomyelitis. Dr. Campbell with podiatry has been consulted.  3.  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  Dr. Campbell consulted.    4. Anemia-likely secondary acute blood loss. Patient was recently admitted to  at the end of May, where he underwent an upper endoscopy for an upper GI bleed. Patient did get 1 unit of packed red blood cells yesterday.  Hemoglobin is stable.  He denies any evidence of overt bleeding.  His anticoagulation and aspirin have been discontinued.  He will continue Plavix only.  He is supposed to follow-up with the GI/liver clinic at the [facility] for possible capsule endoscopy.  He has an appointment in August.  5. Alcohol abuse-patient has a history of alcohol abuse.   is on alcohol withdrawal protocol.  6. PVD- Dr. Storm with cardiology has been consulted for further evaluation.  He did go down today for intervention.  He did open up the SFA and popliteal.  He does have a pulse at this time.  7. Hypertension- Continue home meds  8. Atrial flutter- Anticoagulation stopped due to recent GI bleed and current drop in hemoglobin.  9. Coronary artery disease status post stent placement-continue medical management  10. Nicotine abuse-smoking cessation counseling  11.  Liver mass noted at  concerning for malignancy .  Patient was instructed to schedule an appt for Hepatitis C and to follow up liver mass.  An MRI was done at  but pending at the time of discharge.  Will try to get this report to evaluate.  Patient will need to  follow-up in the liver clinic at  as previously documented from the discharge because he will need further evaluation for a liver mass, hepatitis C as well as a capsule endoscopy if they have recommended.  He has an appt August 2nd.  He has already seen them once since his discharge from  but does not know if the liver mass is malignant.  He has to go back on August 2nd.    12.  Cirrhosis of liver in a patient with history of alcohol abuse and hepatitis C-  Appt at liver clinic at  August 2nd.    13.  Borderline Diabetes with A1C 6 noted in May at   14.  COPD- No evidence of exacerbation  15.   Obesity  16.  Rheumatoid arthritis  17.  Chronic kidney disease CKD III  18.  Mild Acute renal failure-  improving.      DVT prophylaxis: SCDs due to recent bleed and current drop in Hgb.    Discharge Planning:   Mallory Orozco, SHADI 06/21/17 1:39 PM       Detail Level: Detailed

## 2024-12-06 NOTE — DISCHARGE PLACEMENT REQUEST
"Pt  Needs skilled nursing for wound care and wound vac  Pt will be on oral antibiotics by 17        Vasquez Marie (56 y.o. Male)     Date of Birth Social Security Number Address Home Phone MRN    1960  [address]    [facility] [address] [phone] [id number]    Mu-ism Marital Status          None Single       Admission Date Admission Type Admitting Provider Attending Provider Department, Room/Bed    17 Emergency Murali Garber DO Gaspar, Daniel F., MD [facility] MED SURG  4, 432/    Discharge Date Discharge Disposition Discharge Destination                      Attending Provider: Alen Potter MD     Allergies:  No Known Allergies    Isolation:  None   Infection:  None   Code Status:  FULL    Ht:  68\" (172.7 cm)   Wt:  199 lb 14.4 oz (90.7 kg)    Admission Cmt:  None   Principal Problem:  Subacute osteomyelitis of left foot [M86.272]                 Active Insurance as of 2017     Primary Coverage     Payor Plan Insurance Group Employer/Plan Group    HUMANA MEDICARE REPLACEMENT HUMANA MEDICARE REPL [id number]     Payor Plan Address Payor Plan Phone Number Effective From Effective To    [address] [address] [phone] 2017     [place] [address]       Subscriber Name Subscriber Birth Date Member ID       VASQUEZ MARIE 1960 [id number]                 Emergency Contacts      (Rel.) Home Phone Work Phone Mobile Phone    Maral Marie (Sister) [phone] -- --               History & Physical      Murali Garber DO at 2017 10:33 PM              [facility] HOSPITALIST   HISTORY AND PHYSICAL      Name:  Vasquez Marie   Age:  56 y.o.  Sex:  male  :  1960  MRN:  [id number]   Visit Number:  [id number]  Admission Date:  2017  Date Of Service:  17  Primary Care Physician:  Marilyn K. Vermeesch, MD    History Obtained From:    patient    Chief Complaint:     Left foot pain " Patient called here after just speaking with Caroline Harris RN about his medications.  Patient very upset on phone.  States new psychiatrist will not fill his medication until he sees him on 12/19/24.  Patient asking if Dr. Castillo will fill his buspar and duloxetine as he is out.  Patient very upset, yelling on phone, not understanding why no one will fill his medication.  Tried to talk with patient calmly but patient very upset.  Patient states he does not want to have to go to emergency room just to get his medication.  Patient hung up on writer.  Please advise       History Of Presenting Illness:      Patient is a 56-year-old male who developed a wound in his foot in June.  He was admitted here for cellulitis of the left foot and developed osteomyelitis.  He had intervention on the left SFA by Dr. Storm with good resultant flow.  Dr. Campbell saw the patient and did amputation of the left third digit.  He also did a left foot skin graft.  Culture grew out Enterobacter cloacae.  Dr. Rodriguez noted he had osteomyelitis and had recommended 6 weeks of ertapenem and a PICC line in her note from her office, however the patient claims he did not get this.  He was on oral antibiotics as an outpatient.  This was doxycycline.  He does have borderline diabetes.  He did worsen so Dr. Campbell sent him to  where the patient claims he had a transmetatarsal amputation about a couple weeks ago.  He had a wound VAC placed after that.  However he has been noncompliant with the wound VAC, with home health nurse frequently following and finding the wound VAC in the trash.  Home health nurse discharged him due to noncompliance.  He apparently frequently was drinking beer when they came.  He adamantly denies that he is an alcoholic.  On review of the chart he has missed multiple appointments with his primary care physician as well as Dr. Campbell.  He was last seen by home health on Monday who wrapped the foot and he did not take the dressing off since then.  There is now redness ascending up his leg and he claims there is 9/10 pain in the left leg.  Specifically in the foot, nothing is making it better, any touch to it makes it worse.  There is purulent malodorous drainage from the foot, and dressings are adhered to the wounds.  Patient denies any chest pressure, shortness breath, nausea, vomiting, or diarrhea.  He also has fever and chills.    Review Of Systems:     General ROS: positive for  - chills and fever  Psychological ROS: negative  Ophthalmic ROS: negative  ENT ROS: negative  Allergy and  Immunology ROS: negative  Hematological and Lymphatic ROS: negative  Endocrine ROS: negative  Breast ROS: negative  Respiratory ROS: negative  Cardiovascular ROS: negative  Gastrointestinal ROS: negative  Genito-Urinary ROS: negative  Musculoskeletal ROS: positive for - pain in foot - left  Neurological ROS: negative  Dermatological ROS: positive for skin lesion changes  Left foot     Past Medical History:    Coronary artery disease, osteomyelitis, atrial fibrillation, GI bleed, peripheral arterial disease, hypertension, gout, RA, DVT several years ago, CVA, hep C, cirrhosis, left occluded SFA, chronic kidney disease, COPD    Past Surgical history:    Cardiac stent, EGD, cardioversion 2017, ankle surgery, transmetatarsal amputation, left total hip arthroplasty, left total shoulder replacement      Social History:    Every day smoker since he was a teenager, does drink beer frequently, but claims he has never had withdrawal and it is not a problem.  He occasionally drinks liquor as well but is not drank that for 3 weeks.  He is to use drugs when he was younger including speed.  He denies any IV drug history.    Family History:    Mother had breast cancer and rheumatoid arthritis, colon cancer in his sister, father  of heart disease    Allergies:      Review of patient's allergies indicates no known allergies.    Home Medications:    Prior to Admission Medications     Prescriptions Last Dose Informant Patient Reported? Taking?    albuterol (PROVENTIL HFA;VENTOLIN HFA) 108 (90 BASE) MCG/ACT inhaler   Yes No    Inhale 2 puffs Every 4 (Four) Hours As Needed for Wheezing.    amiodarone (PACERONE) 200 MG tablet   No No    Take 1 tablet by mouth Daily.    atorvastatin (LIPITOR) 40 MG tablet   No No    Take 1 tablet by mouth Every Night.    clopidogrel (PLAVIX) 75 MG tablet   No No    Take 1 tablet by mouth Daily.    colchicine 0.6 MG tablet   No No    Take 1 tablet by mouth Daily.    collagenase (SANTYL) 250  UNIT/GM ointment   No No    Apply  topically Daily.    DULoxetine (CYMBALTA) 30 MG capsule   No No    Take 1 capsule by mouth Daily.    hydroxychloroquine (PLAQUENIL) 200 MG tablet   No No    Take 1 tablet by mouth Daily.    losartan-hydrochlorothiazide (HYZAAR) 50-12.5 MG per tablet   No No    Take 1 tablet by mouth Daily.    metoprolol tartrate (LOPRESSOR) 25 MG tablet   No No    Take 1 tablet by mouth Every 12 (Twelve) Hours.    Patient taking differently:  Take 25 mg by mouth 2 (Two) Times a Day.    pantoprazole (PROTONIX) 40 MG EC tablet   No No    Take 1 tablet by mouth Daily.             Hospital Scheduled Meds:      [START ON 8/26/2017] albuterol 2.5 mg Nebulization Q6H - RT   [START ON 8/26/2017] amiodarone 200 mg Oral Q24H   atorvastatin 40 mg Oral Nightly   [START ON 8/26/2017] clopidogrel 75 mg Oral Daily   [START ON 8/26/2017] colchicine 0.6 mg Oral Daily   [START ON 8/26/2017] DULoxetine 30 mg Oral Daily   [START ON 8/26/2017] enoxaparin 40 mg Subcutaneous Daily   ertapenem 1 g Intravenous Q24H   [START ON 8/26/2017] hydroxychloroquine 200 mg Oral Daily   [START ON 8/26/2017] losartan-hydrochlorothiazide 1 tablet Oral Daily   [START ON 8/26/2017] metoprolol tartrate 25 mg Oral BID   [START ON 8/26/2017] pantoprazole 40 mg Oral Daily         Pharmacy to dose vancomycin         Vital Signs:    Temp:  [97.9 °F (36.6 °C)-98.6 °F (37 °C)] 98.2 °F (36.8 °C)  Heart Rate:  [53-80] 80  Resp:  [16-22] 22  BP: (133-149)/(68-90) 133/81    Last 3 weights    08/25/17  1812 08/25/17 2127   Weight: 230 lb (104 kg) 230 lb (104 kg)       Body mass index is 34.97 kg/(m^2).    Physical Exam:      General Appearance:    Alert, cooperative, in no acute distress   Head:    Normocephalic, without obvious abnormality, atraumatic   Eyes:            Lids and lashes normal, conjunctivae and sclerae normal, no   icterus, no pallor, corneas clear, PERRLA   Ears:    Ears appear intact with no abnormalities noted   Throat:   No oral  lesions, no thrush, oral mucosa moist   Neck:   No adenopathy, supple, trachea midline, no thyromegaly, no   carotid bruit, no JVD   Back:     No kyphosis present, no scoliosis present, no skin lesions,      erythema or scars, no tenderness to percussion or                   palpation,   range of motion normal   Lungs:     Clear to auscultation,respirations regular, even and                  unlabored    Heart:    Regular rhythm and normal rate, normal S1 and S2, no            murmur, no gallop, no rub, no click   Chest Wall:    No abnormalities observed   Abdomen:     Normal bowel sounds, no masses, no organomegaly, soft        non-tender, non-distended, no guarding, no rebound                tenderness   Rectal:     Deferred   Extremities:   Moves all extremities well, Redness in the left leg from the knee down.  TMA noted on the left with open wounds which are draining and malodorous.     Pulses:   Pulses palpable and equal bilaterally   Skin:   No bleeding, bruising or rash   Lymph nodes:   No palpable adenopathy   Neurologic:   Cranial nerves 2 - 12 grossly intact, sensation intact, DTR       present and equal bilaterally             Telemetry:  A. fib rate controlled    I have personally looked at  telemetry strips.    Labs:    Results from last 7 days  Lab Units 08/25/17  1848   LACTATE mmol/L 1.9   WBC 10*3/mm3 8.62   HEMOGLOBIN g/dL 9.7*   HEMATOCRIT % 29.3*   MCV fL 93.6   MCHC g/dL 33.1   PLATELETS 10*3/mm3 257           Results from last 7 days  Lab Units 08/25/17  1848   SODIUM mmol/L 137   POTASSIUM mmol/L 4.3   CHLORIDE mmol/L 100   CO2 mmol/L 22.0*   BUN mg/dL 13   CREATININE mg/dL 1.20   EGFR IF NONAFRICN AM mL/min/1.73 62   CALCIUM mg/dL 9.7   GLUCOSE mg/dL 123*   ALBUMIN g/dL 3.80   BILIRUBIN mg/dL 1.3   ALK PHOS U/L 160*   AST (SGOT) U/L 82*   ALT (SGPT) U/L 25   Estimated Creatinine Clearance: 78.4 mL/min (by C-G formula based on Cr of 1.2).  No results found for: AMMONIA          Lab Results    Component Value Date    HGBA1C 6.20 (H) 04/08/2017     Lab Results   Component Value Date    TSH 2.250 04/07/2017     No results found for: PREGTESTUR, PREGSERUM, HCG, HCGQUANT  Pain Management Panel     Pain Management Panel Latest Ref Rng & Units 6/20/2017    AMPHETAMINES SCREEN, URINE Negative Negative    BARBITURATES SCREEN Negative Negative    BENZODIAZEPINE SCREEN, URINE Negative Positive(A)    BUPRENORPHINE Negative Negative    COCAINE SCREEN, URINE Negative Negative    METHADONE SCREEN, URINE Negative Negative    METHAMPHETAMINE UR Negative Negative                          Radiology:    Imaging Results (last 7 days)     Procedure Component Value Units Date/Time    MRI Foot Left Without Contrast [[id number]] Collected:  08/25/17 1954     Updated:  08/25/17 1956    Narrative:       FINAL REPORT    CLINICAL HISTORY:  LOOKING FOR OSTEO. RECENT AMPUTATION AND DELAYED WOUND HEALING INCREASE IN PAIN TO FOOT AND LEG    FINDINGS:  Multiplanar multisequence imaging of the foot was performed without the intravenous administration of contrast. Images are degraded by motion artifact limiting evaluation. There postsurgical changes from recent midfoot amputation. There is prominent soft   tissue swelling without discrete drainable fluid collection to suggest abscess. There is bone marrow edema of the third metatarsal. Cannot exclude osteomyelitis.    Impression:    Cellulitis post amputation. Cannot exclude third metatarsal osteomyelitis. Given the limitation by motion artifact, recommend three-phase bone scan      Impression:       Authenticated by Latrell Clemente MD on 08/25/2017 07:54:38 PM    MRI Tibia Fibula Left Without Contrast [[id number]] Collected:  08/25/17 1956     Updated:  08/1958    Narrative:       FINAL REPORT    CLINICAL HISTORY:  INCREASE PAIN, RECENT AMPUTATION OF LEFT FOOT WITH NEW PAIN TO TIBAL TUBEROSITY 3 DAYS.    FINDINGS:  Multiplanar multisequence imaging of the leg was performed without  the intravenous administration of contrast. Images are degraded by motion artifact.    There are postsurgical changes at the tibiotalar joint. 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 Remaining bone marrow signal is within normal limits. There is no acute fracture or or cortical erosion. There is no drainable tissue fluid collection.    Impression:    Postsurgical and chronic changes but no acute abnormality      Impression:       Authenticated by Latrell Clemente MD on 2017 07:56:41 PM          Assessment:    1.  Osteomyelitis of left foot status post TMA, with apparent wound infection and left leg cellulitis  2.  Chronic atrial fibrillation  3.  Rheumatoid arthritis  4.  Borderline diabetes mellitus type 2  5.  Hypertension  6.  History coronary artery disease with stents  7.  Noncompliance  8.  Possible history of alcoholism  9.  Peripheral arterial disease status post intervention by Dr. Storm  10.  Hepatitis C  11.  Possible cirrhosis.  Plan:     We'll have wound care see the patient.  Consult Dr. Campbell.  Placed on vancomycin and ertapenem.  Morphine for pain.  Place him on insulin sliding scale.  Also will place him on alcohol withdrawal protocol and give him a banana bag.  Anticipate this patient will need a BKA in the near future.  We'll see what Dr. Campbell advises.  We will hold his Xarelto at the present time in view of possible surgery.  Place him on subcutaneous Lovenox.  Continue with his home medications.  Further recommendations will depend on the clinical course.    Murali Garber DO  17  10:34 PM     Electronically signed by Murali Garber DO at 2017 10:48 PM      Liang Polanco MD at 2017  2:55 PM                [facility]    [facility]    Name:  Vasquez Marie   Age:  56 y.o.  Sex:  male  :  1960  MRN:  [id number]   Visit Number:  [id number]  Admission Date:  2017  Date Of Service:  17  Primary Care Physician:   Marilyn K. Vermeesch, MD     LOS: 4 days :  Patient Care Team:  Marilyn K Vermeesch, MD as PCP - General (Internal Medicine):    Chief Complaint:      Follow-up of left transmetatarsal amputation stump infection.    Subjective / Interval History:     Patient is currently sitting up on the bed and is comfortable at rest.  He was transferred to the regular floor from the telemetry floor yesterday.  He was also seen by Dr. Campbell wear scheduled him for wound debridement to be done today.  Dr. Campbell feels that the patient may need wound debridement and wound VAC placement without any further amputation at this time.  He has stressed medical compliance to the patient.    Patient was admitted on 8/25/2017 from the emergency room with foul-smelling discharge from the left transmetatarsal amputation stump.  He was seen by his primary care physician and was subsequently sent to the emergency room.  Patient was recently admitted to [facility] where he underwent his transmetatarsal amputation and was subsequently sent home with wound VAC.  Patient was receiving home health for wound care but was subsequently discharged from their service.  Unfortunately, he has not been very compliant with his wound care and has been admitted with ongoing infection.  He has been started on IV antibiotic therapy with vancomycin and ertapenem since admission.  He has been afebrile.  Denies any chest pain or shortness of breath.  He does leave alone.  During his last admission here in June 2017, he was seen by Dr. Storm for his peripheral arterial disease and underwent peripheral angiogram and percutaneous intervention to his left leg on 6/20/2017.      Review of Systems:     General ROS: Patient denies any fevers, chills or loss of consciousness.  Respiratory ROS: Denies cough or shortness of breath.  Cardiovascular ROS: Denies chest pain or palpitations. No history of exertional chest pain.  Gastrointestinal ROS: Denies nausea and vomiting.  Denies any abdominal pain. No diarrhea.  Neurological ROS: Denies any focal weakness. No loss of consciousness. Denies any numbness.     Vital Signs:    Temp:  [97.5 °F (36.4 °C)-98.2 °F (36.8 °C)] 98.2 °F (36.8 °C)  Heart Rate:  [63-70] 64  Resp:  [16-18] 16  BP: (123-152)/(62-77) 123/66    Intake and output:    I/O last 3 completed shifts:  In: 3144 [P.O.:1520; I.V.:1124; IV Piggyback:500]  Out: 3450 [Urine:3450]  I/O this shift:  In: -   Out: 500 [Urine:500]    Physical Examination:    General Appearance:  Alert and cooperative, not in any acute distress.   Head:  Atraumatic and normocephalic, without obvious abnormality.   Eyes:          PERRLA, conjunctivae and sclerae normal, no Icterus. No pallor. Extra-occular movements are within normal limits.   Neck: Supple, trachea midline, no thyromegaly, no carotid bruit.   Lungs:   Chest shape is normal. Breath sounds heard bilaterally equally.  No crackles or wheezing. No Pleural rub or bronchial breathing.   Heart:  Normal S1 and S2, no murmur, no gallop, no rub. No JVD   Abdomen:   Normal bowel sounds, no masses, no organomegaly. Soft       non-tender, obese, no guarding, no rebound tenderness.   Extremities: Moves all extremities well, 1+ edema, no cyanosis, no            clubbing.  Surgical bandage is noted on the left foot with the left transmetatarsal amputation.   Skin: No bleeding, bruising or rash.   Neurologic: Awake, alert and oriented times 3. Moves all 4 extremities equally.   Laboratory results:      Results from last 7 days  Lab Units 08/29/17  0604 08/26/17  0651 08/25/17  1848   SODIUM mmol/L 139 140 137   POTASSIUM mmol/L 4.4 3.6 4.3   CHLORIDE mmol/L 110* 107 100   CO2 mmol/L 19.0* 20.0* 22.0*   BUN mg/dL 10 13 13   CREATININE mg/dL 1.20 1.40* 1.20   CALCIUM mg/dL 8.8 8.9 9.7   BILIRUBIN mg/dL  --   --  1.3   ALK PHOS U/L  --   --  160*   ALT (SGPT) U/L  --   --  25   AST (SGOT) U/L  --   --  82*   GLUCOSE mg/dL 87 130* 123*       Results from last  7 days  Lab Units 08/29/17  0604 08/26/17  0651 08/25/17  1848   WBC 10*3/mm3 3.65* 5.57 8.62   HEMOGLOBIN g/dL 8.3* 9.1* 9.7*   HEMATOCRIT % 27.0* 28.7* 29.3*   PLATELETS 10*3/mm3 136 176 257               Results from last 7 days  Lab Units 08/25/17  2128   WOUNDCX  Heavy growth (4+) Alcaligenes species*       I have reviewed the patient's laboratory results.    Radiology results:    Imaging Results (last 24 hours)     Procedure Component Value Units Date/Time    SCANNED - IMAGING [[id number]] Resulted:  08/25/17      Updated:  08/29/17 1430        Medication Review:     I have reviewed the patients active and prn medications.     Principal Problem:    Subacute osteomyelitis of left foot  Active Problems:    Chronic hepatitis C virus infection    Coronary artery disease involving native coronary artery of native heart without angina pectoris    Chronic atrial fibrillation    Essential hypertension    Depression with anxiety    Gastroesophageal reflux disease with esophagitis    COPD (chronic obstructive pulmonary disease)    Rheumatoid arthritis    Peripheral vascular disease    Liver mass    Assessment:    1.  Acute infection and cellulitis of the left transmetatarsal amputation stump, present on admission.  2.  Peripheral arterial disease s/p left SFA stent and intervention to the peroneal arteries on 6/20/2017.  3.  Paroxysmal atrial fibrillation.  4.  Coronary artery disease on medical therapy.  5.  Essential hypertension.  6.  COPD, stable.  7.  Chronic hepatitis C infection.    Plan:    Patient is currently on IV antibiotic therapy with ertapenem and vancomycin for his left foot amputation stump cellulitis.  His dressing has been changed.  Wound cultures are growing Alcaligenes species which are sensitive to carbopenems and Rocephin.  I will continue the current antibiotic regimen until wound debridement and will switch him over to Rocephin from tomorrow.  He will be continued on his home medications  including antiplatelet agents.  MRI of the foot done in the emergency room showed cellulitis but was not certain about osteomyelitis especially due to motion artifact.  I discussed options of going to short-term rehabilitation for wound care and wound VAC and he is open to that idea.  Further recommendations depend upon his clinical course.    Liang Polanco MD  17  2:55 PM    Portions of this note may have been completed with Dragon, a voice recognition program. Not all errors in transcription may have been detected prior to signing.       Electronically signed by Liang Polanco MD at 2017  4:00 PM      Wolfgang Campbell DPM at 2017  4:38 PM          H&P reviewed. The patient was examined and there are no changes to the H&P.     Electronically signed by Wolfgang Campbell DPM at 2017  4:38 PM    Source Note                   [facility]    PROGRESS NOTE    Name:  Vasquez Marie   Age:  56 y.o.  Sex:  male  :  1960  MRN:  [id number]   Visit Number:  [id number]  Admission Date:  2017  Date Of Service:  17  Primary Care Physician:  Marilyn K. Vermeesch, MD     LOS: 4 days :  Patient Care Team:  Marilyn K Vermeesch, MD as PCP - General (Internal Medicine):    Chief Complaint:      Follow-up of left transmetatarsal amputation stump infection.    Subjective / Interval History:     Patient is currently sitting up on the bed and is comfortable at rest.  He was transferred to the regular floor from the telemetry floor yesterday.  He was also seen by Dr. Campbell wear scheduled him for wound debridement to be done today.  Dr. Campbell feels that the patient may need wound debridement and wound VAC placement without any further amputation at this time.  He has stressed medical compliance to the patient.    Patient was admitted on 2017 from the emergency room with foul-smelling discharge from the left transmetatarsal amputation stump.  He was seen by his primary care  physician and was subsequently sent to the emergency room.  Patient was recently admitted to [facility] where he underwent his transmetatarsal amputation and was subsequently sent home with wound VAC.  Patient was receiving home health for wound care but was subsequently discharged from their service.  Unfortunately, he has not been very compliant with his wound care and has been admitted with ongoing infection.  He has been started on IV antibiotic therapy with vancomycin and ertapenem since admission.  He has been afebrile.  Denies any chest pain or shortness of breath.  He does leave alone.  During his last admission here in June 2017, he was seen by Dr. Storm for his peripheral arterial disease and underwent peripheral angiogram and percutaneous intervention to his left leg on 6/20/2017.      Review of Systems:     General ROS: Patient denies any fevers, chills or loss of consciousness.  Respiratory ROS: Denies cough or shortness of breath.  Cardiovascular ROS: Denies chest pain or palpitations. No history of exertional chest pain.  Gastrointestinal ROS: Denies nausea and vomiting. Denies any abdominal pain. No diarrhea.  Neurological ROS: Denies any focal weakness. No loss of consciousness. Denies any numbness.     Vital Signs:    Temp:  [97.5 °F (36.4 °C)-98.2 °F (36.8 °C)] 98.2 °F (36.8 °C)  Heart Rate:  [63-70] 64  Resp:  [16-18] 16  BP: (123-152)/(62-77) 123/66    Intake and output:    I/O last 3 completed shifts:  In: 3144 [P.O.:1520; I.V.:1124; IV Piggyback:500]  Out: 3450 [Urine:3450]  I/O this shift:  In: -   Out: 500 [Urine:500]    Physical Examination:    General Appearance:  Alert and cooperative, not in any acute distress.   Head:  Atraumatic and normocephalic, without obvious abnormality.   Eyes:          PERRLA, conjunctivae and sclerae normal, no Icterus. No pallor. Extra-occular movements are within normal limits.   Neck: Supple, trachea midline, no thyromegaly, no carotid bruit.   Lungs:    Chest shape is normal. Breath sounds heard bilaterally equally.  No crackles or wheezing. No Pleural rub or bronchial breathing.   Heart:  Normal S1 and S2, no murmur, no gallop, no rub. No JVD   Abdomen:   Normal bowel sounds, no masses, no organomegaly. Soft       non-tender, obese, no guarding, no rebound tenderness.   Extremities: Moves all extremities well, 1+ edema, no cyanosis, no            clubbing.  Surgical bandage is noted on the left foot with the left transmetatarsal amputation.   Skin: No bleeding, bruising or rash.   Neurologic: Awake, alert and oriented times 3. Moves all 4 extremities equally.   Laboratory results:      Results from last 7 days  Lab Units 08/29/17  0604 08/26/17  0651 08/25/17  1848   SODIUM mmol/L 139 140 137   POTASSIUM mmol/L 4.4 3.6 4.3   CHLORIDE mmol/L 110* 107 100   CO2 mmol/L 19.0* 20.0* 22.0*   BUN mg/dL 10 13 13   CREATININE mg/dL 1.20 1.40* 1.20   CALCIUM mg/dL 8.8 8.9 9.7   BILIRUBIN mg/dL  --   --  1.3   ALK PHOS U/L  --   --  160*   ALT (SGPT) U/L  --   --  25   AST (SGOT) U/L  --   --  82*   GLUCOSE mg/dL 87 130* 123*       Results from last 7 days  Lab Units 08/29/17  0604 08/26/17  0651 08/25/17  1848   WBC 10*3/mm3 3.65* 5.57 8.62   HEMOGLOBIN g/dL 8.3* 9.1* 9.7*   HEMATOCRIT % 27.0* 28.7* 29.3*   PLATELETS 10*3/mm3 136 176 257               Results from last 7 days  Lab Units 08/25/17  2128   WOUNDCX  Heavy growth (4+) Alcaligenes species*       I have reviewed the patient's laboratory results.    Radiology results:    Imaging Results (last 24 hours)     Procedure Component Value Units Date/Time    SCANNED - IMAGING [[id number]] Resulted:  08/25/17      Updated:  08/29/17 1430        Medication Review:     I have reviewed the patients active and prn medications.     Principal Problem:    Subacute osteomyelitis of left foot  Active Problems:    Chronic hepatitis C virus infection    Coronary artery disease involving native coronary artery of native heart without  angina pectoris    Chronic atrial fibrillation    Essential hypertension    Depression with anxiety    Gastroesophageal reflux disease with esophagitis    COPD (chronic obstructive pulmonary disease)    Rheumatoid arthritis    Peripheral vascular disease    Liver mass    Assessment:    1.  Acute infection and cellulitis of the left transmetatarsal amputation stump, present on admission.  2.  Peripheral arterial disease s/p left SFA stent and intervention to the peroneal arteries on 6/20/2017.  3.  Paroxysmal atrial fibrillation.  4.  Coronary artery disease on medical therapy.  5.  Essential hypertension.  6.  COPD, stable.  7.  Chronic hepatitis C infection.    Plan:    Patient is currently on IV antibiotic therapy with ertapenem and vancomycin for his left foot amputation stump cellulitis.  His dressing has been changed.  Wound cultures are growing Alcaligenes species which are sensitive to carbopenems and Rocephin.  I will continue the current antibiotic regimen until wound debridement and will switch him over to Rocephin from tomorrow.  He will be continued on his home medications including antiplatelet agents.  MRI of the foot done in the emergency room showed cellulitis but was not certain about osteomyelitis especially due to motion artifact.  I discussed options of going to short-term rehabilitation for wound care and wound VAC and he is open to that idea.  Further recommendations depend upon his clinical course.    Liang Polanco MD  08/29/17  2:55 PM    Portions of this note may have been completed with Dragon, a voice recognition program. Not all errors in transcription may have been detected prior to signing.        Electronically signed by Liang Polanco MD at 8/29/2017  4:00 PM                 Physician Progress Notes (last 24 hours) (Notes from 9/7/2017  4:48 PM through 9/8/2017  4:48 PM)      Wolfgang Campbell DPM at 9/7/2017  6:05 PM  Version 1 of 1             Patient Care Team:  Marilyn K Vermeesch, MD  as PCP - General (Internal Medicine)    Chief complaint left foot wound status post open TMA    Subjective .   56-year-old noncompliant poorly controlled diabetic male now over 1 week status post debridement of left foot wound with grafting and wound VAC application.  I was originally told the patient was going to be discharged to a facility and then it changed to him being discharged home and now I was told on Monday he was being discharged to a facility again and would be discharged to stay however it's now Thursday and he is still in the hospital.  He states that he thinks he's waiting on some type of insurance approval or to hear back from someone.  He denies any constitutional symptoms.  Says his wound VAC was changed yesterday.  Says he was told it looks better.      Review of Systems  All systems were reviewed and negative except for chief complaint.    History  Past Medical History:   Diagnosis Date   • Abdominal pain     History of epigastric abdominal tenderness. Differentials include peptic ulcer disease,   pancreatobiliary disease.   • Abnormal LFTs    • Anemia    • Anxiety    • Arthritis    • Asthma    • Atrial fibrillation    • CAD (coronary artery disease)    • Calf cramp    • CKD (chronic kidney disease)    • COPD (chronic obstructive pulmonary disease)    • CVA (cerebral infarction)    • Depression    • DVT (deep venous thrombosis)    • Electrocution    • Fatigue    • Hepatitis C    • History of allergy    • History of blood transfusion    • Hyperlipidemia    • Hypertension    • Leg pain    • Loss of appetite    • PUD (peptic ulcer disease)    • Stroke syndrome    • Tachycardia    • Thrombocytopenia    • Thrombophlebitis of deep femoral vein    • Vision problems    , Past Surgical History:   Procedure Laterality Date   • AMPUTATION DIGIT Left 6/23/2017    Procedure: amputation of left foot third digit, left foot wound debridments and grafting;  Surgeon: Wolfgang Campbell DPM;  Location: [facility] OR;   Service:    • ANKLE SURGERY     • CARDIAC CATHETERIZATION N/A 6/20/2017    Procedure: Peripheral angiography;  Surgeon: Jonathan Storm MD;  Location: [facility] CATH INVASIVE LOCATION;  Service:    • CARDIAC CATHETERIZATION N/A 6/20/2017    Procedure: Angioplasty-peripheral;  Surgeon: Jonathan Storm MD;  Location: [facility] CATH INVASIVE LOCATION;  Service:    • CARDIAC SURGERY     • CORONARY STENT PLACEMENT     • INCISION AND DRAINAGE FOOT Left 8/29/2017    Procedure: Incision and drainage/wound debridement of left foot, wound VAC application, skin graft application;  Surgeon: Wolfgang Campbell DPM;  Location: [facility] OR;  Service:    • TONSILLECTOMY     • TOTAL HIP ARTHROPLASTY Left    • TOTAL SHOULDER REPLACEMENT Left    , Family History   Problem Relation Age of Onset   • Breast cancer Mother    • Colon cancer Sister    • Heart attack Other    • Arthritis Other    • Diabetes Other    • Hypertension Other    • Kidney disease Other    • Stroke Other    • Heart disease Father    , Social History   Substance Use Topics   • Smoking status: Current Every Day Smoker     Packs/day: 0.50     Years: 30.00     Types: Cigarettes   • Smokeless tobacco: None   • Alcohol use No   , Prescriptions Prior to Admission   Medication Sig Dispense Refill Last Dose   • albuterol (PROVENTIL HFA;VENTOLIN HFA) 108 (90 BASE) MCG/ACT inhaler Inhale 2 puffs Every 4 (Four) Hours As Needed for Wheezing.   Unknown at Unknown time   • amiodarone (PACERONE) 200 MG tablet Take 1 tablet by mouth Daily. 30 tablet 11 8/24/2017   • atorvastatin (LIPITOR) 40 MG tablet Take 1 tablet by mouth Every Night. 30 tablet 11 8/24/2017   • clopidogrel (PLAVIX) 75 MG tablet Take 1 tablet by mouth Daily. (Patient not taking: Reported on 8/28/2017) 30 tablet 3 Not Taking at Unknown time   • colchicine 0.6 MG tablet Take 1 tablet by mouth Daily. 30 tablet 0 8/24/2017   • collagenase (SANTYL) 250 UNIT/GM ointment Apply  topically Daily. 90 g 0 Unknown at  "Unknown time   • DULoxetine (CYMBALTA) 30 MG capsule Take 1 capsule by mouth Daily. 30 capsule 3 8/25/2017   • hydroxychloroquine (PLAQUENIL) 200 MG tablet Take 1 tablet by mouth Daily. 30 tablet 5 8/24/2017   • losartan-hydrochlorothiazide (HYZAAR) 50-12.5 MG per tablet Take 1 tablet by mouth Daily. 30 tablet 3 8/24/2017   • metoprolol tartrate (LOPRESSOR) 25 MG tablet Take 1 tablet by mouth Every 12 (Twelve) Hours. (Patient taking differently: Take 25 mg by mouth 2 (Two) Times a Day.) 60 tablet 11 8/24/2017   • pantoprazole (PROTONIX) 40 MG EC tablet Take 1 tablet by mouth Daily. 30 tablet 5 8/25/2017   , Scheduled Meds:    albuterol 2.5 mg Nebulization Q6H - RT   amiodarone 200 mg Oral Q24H   atorvastatin 40 mg Oral Nightly   ceftriaxone 2 g Intravenous Q24H   clopidogrel 75 mg Oral Daily   colchicine 0.6 mg Oral Daily   DULoxetine 30 mg Oral Daily   enoxaparin 40 mg Subcutaneous Daily   hydroxychloroquine 200 mg Oral Daily   lactobacillus acidophilus 1 capsule Oral Daily   losartan-hydrochlorothiazide 1 tablet Oral Daily   metoprolol tartrate 25 mg Oral BID   nicotine 1 patch Transdermal Q24H   pantoprazole 40 mg Oral Q AM   Pharmacy Meds to Bed Consult  Does not apply Daily   , Continuous Infusions:   , PRN Meds:  •  acetaminophen  •  HYDROcodone-acetaminophen  •  loperamide  •  LORazepam  •  ondansetron  •  sodium chloride  •  sodium chloride  •  Insert peripheral IV **AND** sodium chloride  •  traZODone and Allergies:  Review of patient's allergies indicates no known allergies.    Objective     Vital Signs   /66 (BP Location: Left arm, Patient Position: Sitting)  Pulse 66  Temp 97.9 °F (36.6 °C) (Oral)   Resp 20  Ht 68\" (172.7 cm)  Wt 215 lb 14.4 oz (97.9 kg)  SpO2 98%  BMI 32.83 kg/m2    Physical Exam:  AAO ×3, NAD, AF VSS  PERRLA, cranial nerves II through XII intact  Abdomen, obese soft and nontender    His left TMA site is now 100% granular.  The wound still measures 9-10 cm wide by 4-5 cm " tall.  There is no exposed bone.  Wound base itself is 100% granular now.  There is Adaptic and the wound base over the grafts applied last week.  Pulses are palpable to the left foot.  Capillary refill time to the stump is brisk, no new wounds or signs of infection.  No drainage.  No odor.       Results Review: Labs reviewed.      Assessment/Plan     Principal Problem:    Subacute osteomyelitis of left foot  Active Problems:    Chronic hepatitis C virus infection    Coronary artery disease involving native coronary artery of native heart without angina pectoris    Chronic atrial fibrillation    Essential hypertension    Depression with anxiety    Gastroesophageal reflux disease with esophagitis    COPD (chronic obstructive pulmonary disease)    Rheumatoid arthritis    Peripheral vascular disease    Liver mass  It seems as if the patient still waiting on some type of placement.  I changed his wound VAC this evening with a black sponge.  I redressed the left foot.  We will continue 3 times a week VAC changes.  The patient's wound now seems to be fairly amenable to grafting and wound VAC therapy so I would plan to proceed with this rather than further amputation of the foot.  I think at this time discharging him on by mouth antibiotics probably acceptable.  I'll continue to follow along while on the hospital.  He can weight-bear to the left foot on his heel only with his boot or Darco wedge shoe.  He will follow up in wound care upon discharge.          Wolfgang Campbell DPM  17  6:06 PM         Electronically signed by Wolfgang Campbell DPM at 2017  6:09 PM      Alen Potter MD at 2017  3:18 PM  Version 1 of 1               [facility]    PROGRESS NOTE    Name:  Vasquez Marie   Age:  56 y.o.  Sex:  male  :  1960  MRN:  [id number]   Visit Number:  [id number]  Admission Date:  2017  Date Of Service:  17  Primary Care Physician:  Marilyn K. Vermeesch,  MD     LOS: 14 days :  Patient Care Team:  Marilyn K Vermeesch, MD as PCP - General (Internal Medicine):    Chief Complaint:      -Weakness    Subjective / Interval History:     -Overall doing well, currently awaiting placement.  No current symptoms today.  Foot pain is tolerable.  He is working with PT.  Dr. Campbell has changed his wound vac and redressed his foot and to continue three times a week VAC changes. No current soa, cp, n/v or abdominal pain.     I have reviewed labs/imaging/records from this hospitalization, including ER staff and admitting/attending physicians H/P's and progress notes to establish a comprehensive understanding of this patient's clinical hospital course, as well as to establish a transition of care appropriately.    Vital Signs:    Temp:  [97.8 °F (36.6 °C)-97.9 °F (36.6 °C)] 97.9 °F (36.6 °C)  Heart Rate:  [66-81] 81  Resp:  [18-20] 18  BP: (118-152)/(58-79) 152/58    Intake and output:    Intake/Output       09/07/17 0700 - 09/08/17 0659 09/08/17 0700 - 09/09/17 0659    Intake (ml) 1280 580    Output (ml) 2140 200    Net (ml) -860 380    Last Weight 199 lb 14.4 oz (90.7 kg) --          Physical Examination:    General Appearance:  Alert and cooperative, not in any acute distress.   Head:  Atraumatic and normocephalic, without obvious abnormality.   Eyes:      PERRLA, Extra-occular movements are within normal limits.   Lungs:   Chest shape is normal. Breath sounds heard bilaterally equally.  No crackles or wheezing.   Heart:  Normal S1 and S2, no murmur, no gallop, no rub.   Abdomen:   Normal bowel sounds,  Soft non-tender, non-distended, no guarding, no rebound tenderness   Extremities: No edema                     Laboratory results:      Results from last 7 days  Lab Units 09/08/17  0550 09/07/17  0530 09/06/17  0513   SODIUM mmol/L 141 140 141   POTASSIUM mmol/L 3.8 3.6 3.8   CHLORIDE mmol/L 108* 107 107   CO2 mmol/L 23.0* 22.0* 25.0*   BUN mg/dL 30* 33* 37*   CREATININE mg/dL 1.00  1.10 1.20   CALCIUM mg/dL 9.1 9.1 8.8   GLUCOSE mg/dL 102* 95 115*       Results from last 7 days  Lab Units 09/08/17  0550 09/07/17  0530 09/06/17  0513   WBC 10*3/mm3 3.40* 3.37* 3.34*   HEMOGLOBIN g/dL 7.8* 8.2* 8.8*   HEMATOCRIT % 25.0* 26.5* 27.9*   PLATELETS 10*3/mm3 100* 108* 101*   MONOCYTES % %  --  6.1  --                I have reviewed the patient's laboratory results.    Radiology results:    Imaging Results (last 24 hours)     ** No results found for the last 24 hours. **          I have reviewed the patient's radiology reports.    Medication Review:     I have reviewed the patients active and prn medications.       Assessment/Plan:  1.  Acute infection and cellulitis of the left transmetatarsal amputation stump, present on admission.  2.  Peripheral arterial disease s/p left SFA stent and intervention to the peroneal arteries on 6/20/2017.  3.  Paroxysmal atrial fibrillation.  4.  Coronary artery disease on medical therapy.  5.  Essential hypertension.  6.  COPD, stable.  7.  Chronic hepatitis C infection.  8.  Rheumatoid arthritis.    Patient is currently on IV antibiotic therapy with ceftriaxone 2 g daily.  He will be continued on wound VAC therapy.   Dr. Campbell changed his wound VAC this evening with a black sponge and redressed the left foot.   He is to continue 3 times a week VAC changes. Wound culture obtained during the debridement has grown Providencia rettgeri which is sensitive to ceftriaxone.  Case management services are currently working to get him to short-term rehabilitation facility for continued wound care, awaiting placement.      Alen Potter MD  09/08/17  3:18 PM               Electronically signed by Alen Potter MD at 9/8/2017  3:29 PM        Consult Notes (last 24 hours) (Notes from 9/7/2017  4:48 PM through 9/8/2017  4:48 PM)     No notes of this type exist for this encounter.           Physical Therapy Notes (last 24 hours) (Notes from 9/7/2017  4:48 PM through 9/8/2017   4:48 PM)      Dipak Scott PTA at 2017  4:32 PM  Version 1 of 1         Problem: Patient Care Overview (Adult)  Goal: Plan of Care Review  Outcome: Ongoing (interventions implemented as appropriate)    17 1630   Coping/Psychosocial Response Interventions   Plan Of Care Reviewed With patient   Patient Care Overview   Progress improving   Outcome Evaluation   Outcome Summary/Follow up Plan See flowsheet for details. Pt with new weightbaring staus per Dr. Burch progressed ambulation distance and improved balance with activity.          Problem: Inpatient Physical Therapy  Goal: Transfer Training Goal 1 LTG- PT  Outcome: Ongoing (interventions implemented as appropriate)  Goal: Gait Training Goal LTG- PT  Outcome: Ongoing (interventions implemented as appropriate)    17 1330 17 1630   Gait Training PT LTG   Gait Training Goal PT LTG, Date Established 17 --    Gait Training Goal PT LTG, Time to Achieve 2 wks --    Gait Training Goal PT LTG, [place] contact guard assist --    Gait Training Goal PT LTG, Assist Device walker, rolling --    Gait Training Goal PT LTG, Distance to Achieve 30 --    Gait Training Goal PT LTG, Additional Goal NWB L LE --    Gait Training Goal PT LTG, Outcome --  goal ongoing              Electronically signed by Dipak Scott PTA at 2017  4:32 PM      Dipak Scott PTA at 2017  4:33 PM  Version 1 of 1         Acute Care - Physical Therapy Treatment Note  BRITTANI Espinal     Patient Name: Vasquez Marei  : 1960  MRN: [id number]  Today's Date: 2017  Onset of Illness/Injury or Date of Surgery Date: 17  Date of Referral to PT: 17  Referring Physician: Adrian    Admit Date: 2017    Visit Dx:    ICD-10-CM ICD-9-CM   1. Subacute osteomyelitis of left foot M86.272 730.07   2. Impaired functional mobility, balance, gait, and endurance Z74.09 V49.89   3. Coronary artery disease involving native coronary artery of native  heart without angina pectoris I25.10 414.01   4. Essential hypertension I10 401.9   5. Gastroesophageal reflux disease with esophagitis K21.0 530.11   6. Other emphysema J43.8 492.8   7. Peripheral vascular disease I73.9 443.9     Patient Active Problem List   Diagnosis   • Chronic hepatitis C virus infection   • Coronary artery disease involving native coronary artery of native heart without angina pectoris   • Chronic atrial fibrillation   • Essential hypertension   • Thrombocytopenia   • Depression with anxiety   • Gastroesophageal reflux disease with esophagitis   • Hyperlipidemia   • Tobacco abuse   • COPD (chronic obstructive pulmonary disease)   • Gout   • Congestive heart disease   • RLS (restless legs syndrome)   • Rheumatoid arthritis   • Typical atrial flutter   • Atrial flutter   • Cerebrovascular accident (CVA)   • Borderline diabetes   • Dry gangrene   • Anemia   • Peripheral vascular disease   • Subacute osteomyelitis of left foot   • Liver mass               Adult Rehabilitation Note       09/08/17 0901          Rehab Assessment/Intervention    Discipline physical therapy assistant  -RM      Document Type therapy note (daily note)  -RM      Subjective Information agree to therapy;complains of;pain  -RM      Patient Effort, Rehab Treatment adequate  -RM      Symptoms Noted During/After Treatment none  -RM      Precautions/Limitations --   WBAT on L heel with Darco shoe or boot.   -RM      Recorded by [RM] Dipak Scott PTA      Pain Assessment    Pain Assessment 0-10  -RM      Pain Score 8  -RM      Post Pain Score 8  -RM      Pain Type Acute pain;Surgical pain  -RM      Pain Location Foot  -RM      Pain Orientation Left  -RM      Pain Intervention(s) Repositioned;Ambulation/increased activity  -RM      Recorded by [RM] Dipak Scott PTA      Mobility Assessment/Training    Left Lower Extremity Weight-Bearing weight-bearing as tolerated   with Darco Shoe   -RM      Recorded by [RM] Dipak NICOLE  SANYA Sctot      Bed Mobility, Assessment/Treatment    Bed Mobility, Assistive Device bed rails;head of bed elevated  -RM      Bed Mob, Supine to Sit, [place] conditional independence  -RM      Recorded by [] Dipak Scott PTA      Transfer Assessment/Treatment    Transfers, Sit-Stand [place] stand by assist;contact guard assist  -RM      Transfers, Stand-Sit [place] stand by assist;contact guard assist  -RM      Transfers, Sit-Stand-Sit, Assist Device rolling walker  -RM      Transfer, Maintain Weight Bearing Status able to maintain weight bearing status  -RM      Transfer, Safety Issues balance decreased during turns;step length decreased;weight-shifting ability decreased  -RM      Transfer, Impairments strength decreased;impaired balance;pain  -RM      Recorded by [] Dipak Scott PTA      Gait Assessment/Treatment    Gait, [place] Level stand by assist;contact guard assist  -RM      Gait, Assistive Device rolling walker  -RM      Gait, Distance (Feet) 44  -RM      Gait, Gait Pattern Analysis swing-to gait  -RM      Gait, Gait Deviations antalgic  -RM      Gait, Maintain Weight Bearing Status able to maintain weight bearing status  -RM      Gait, Safety Issues weight-shifting ability decreased  -RM      Gait, Impairments strength decreased;impaired balance;pain  -RM      Recorded by [RM] Dipak Scott PTA      Positioning and Restraints    Pre-Treatment Position in bed  -RM      Post Treatment Position chair  -RM      In Chair reclined;call light within reach;encouraged to call for assist;notified nsg  -RM      Recorded by [RM] Dipak Scott PTA        User Key  (r) = Recorded By, (t) = Taken By, (c) = Cosigned By    Initials Name Effective Dates     Dipak Scott PTA 10/26/16 -                 IP PT Goals       09/08/17 1630 09/06/17 1601 09/05/17 1559    Transfer Training PT LTG    Transfer Training PT LTG, Outcome goal ongoing  -RM  goal ongoing  -RM    Gait  Training PT LTG    Gait Training Goal PT LTG, Outcome goal ongoing  -RM      Strength Goal PT LTG    Strength Goal PT LTG, Outcome   goal ongoing  -RM    Wound Care PT STG    Wound Care PT STG 1, Outcome  goal ongoing  -RM       09/04/17 1711 09/04/17 1305 09/03/17 1601    Transfer Training PT LTG    Transfer Training PT LTG, Outcome  goal ongoing  -RM goal ongoing  -JR    Gait Training PT LTG    Gait Training Goal PT LTG, Outcome  goal ongoing  -RM goal ongoing  -JR    Strength Goal PT LTG    Strength Goal PT LTG, Outcome  goal ongoing  -RM goal ongoing  -JR    Wound Care PT STG    Wound Care PT STG 1, Outcome goal ongoing  -RM        09/01/17 1435 08/30/17 1330       Transfer Training PT LTG    Transfer Training PT LTG, Date Established  08/30/17  -LM     Transfer Training PT LTG, Time to Achieve  2 wks  -LM     Transfer Training PT LTG, Activity Type  sit to stand/stand to sit;bed to chair /chair to bed  -LM     Transfer Training PT LTG, [place] Level  supervision required  -LM     Transfer Training PT LTG, Assist Device  walker, rolling  -LM     Transfer Training PT LTG, Additional Goal  NWB L LE  -LM     Transfer Training PT LTG, Outcome  goal ongoing  -LM     Gait Training PT LTG    Gait Training Goal PT LTG, Date Established  08/30/17  -LM     Gait Training Goal PT LTG, Time to Achieve  2 wks  -LM     Gait Training Goal PT LTG, [place] Level  contact guard assist  -LM     Gait Training Goal PT LTG, Assist Device  walker, rolling  -LM     Gait Training Goal PT LTG, Distance to Achieve  30  -LM     Gait Training Goal PT LTG, Additional Goal  NWB L LE  -LM     Gait Training Goal PT LTG, Outcome  goal ongoing  -LM     Strength Goal PT LTG    Strength Goal PT LTG, Date Established  08/30/17  -LM     Strength Goal PT LTG, Time to Achieve  2 wks  -LM     Strength Goal PT LTG, Measure to Achieve  Patient will perform ther ex x 15 reps to improve functional strength for mobility.  -LM     Strength Goal PT  LTG, Outcome  goal ongoing  -LM     Wound Care PT STG    Wound Care PT STG 1, Date Established 09/01/17  -LM      Wound Care PT STG 1, Time to Achieve 2 wks  -LM      Wound Care PT STG 1, Location L TMA site  -LM      Wound Care PT STG 1, No S&S of Infection yes  -LM      Wound Care PT STG 1, Decrease Wound Size Decrease wound dimensions by 0.5 cm in all measures.  -LM      Wound Care PT STG 1, Outcome goal ongoing  -LM        User Key  (r) = Recorded By, (t) = Taken By, (c) = Cosigned By    Initials Name Provider Type    JR Sia Whaley, PT Physical Therapist    LM Cristal Martinez, PT Physical Therapist     Dipak Scott, PTA Physical Therapy Assistant          Physical Therapy Education     Title: PT OT SLP Therapies (Done)     Topic: Physical Therapy (Done)     Point: Mobility training (Done)    Learning Progress Summary    Learner Readiness Method Response Comment Documented by Status   Patient Acceptance E,D JAYESH,DU   09/08/17 1630 Done    Acceptance E,D JAYESH,NR   09/05/17 1558 Done    Acceptance E,D VU,NR   09/04/17 1305 Done    Acceptance E VU Purpose of PT/POC;  importance of NWB L LE for wound healing; proper use of RW for safe transfers/ambulation.  08/30/17 1330 Done               Point: Home exercise program (Done)    Learning Progress Summary    Learner Readiness Method Response Comment Documented by Status   Patient Acceptance E,D JAYESH,NR   09/04/17 1305 Done    Acceptance E VU Purpose of PT/POC;  importance of NWB L LE for wound healing; proper use of RW for safe transfers/ambulation.  08/30/17 1330 Done               Point: Precautions (Done)    Learning Progress Summary    Learner Readiness Method Response Comment Documented by Status   Patient Acceptance E,D JAYESH,NR   09/04/17 1305 Done    Acceptance E VU Importance of maintaining NWB on LLE JR 09/03/17 1559 Done    Acceptance E VU Purpose of wound vac dressing change.  09/01/17 1434 Done    Acceptance E VU Purpose of PT/POC;  importance  of NWB L LE for wound healing; proper use of RW for safe transfers/ambulation.  08/30/17 1330 Done                      User Key     Initials Effective Dates Name Provider Type Discipline     10/26/16 -  Sia Whaley, PT Physical Therapist PT     10/26/16 -  Cristal Martinez, PT Physical Therapist PT     10/26/16 -  Dipak Scott PTA Physical Therapy Assistant PT                    PT Recommendation and Plan  Anticipated Discharge Disposition: inpatient rehabilitation facility  Planned Therapy Interventions: balance training, bed mobility training, gait training, home exercise program, patient/family education, strengthening, transfer training  PT Frequency: daily  Plan of Care Review  Plan Of Care Reviewed With: patient  Progress: improving  Outcome Summary/Follow up Plan: See flowsheet for details. Pt with new weightbaring staus per Dr. Burch progressed ambulation distance and improved balance with activity.            Outcome Measures       09/08/17 0901          How much help from another person do you currently need...    Turning from your back to your side while in flat bed without using bedrails? 4  -RM      Moving from lying on back to sitting on the side of a flat bed without bedrails? 4  -RM      Moving to and from a bed to a chair (including a wheelchair)? 3  -RM      Standing up from a chair using your arms (e.g., wheelchair, bedside chair)? 4  -RM      Climbing 3-5 steps with a railing? 2  -RM      To walk in hospital room? 3  -RM      AM-PAC 6 Clicks Score 20  -RM      Functional Assessment    Outcome Measure Options AM-PAC 6 Clicks Basic Mobility (PT)  -RM        User Key  (r) = Recorded By, (t) = Taken By, (c) = Cosigned By    Initials Name Provider Type     Dipak Scott PTA Physical Therapy Assistant           Time Calculation:         PT Charges       09/08/17 1633          Time Calculation    Start Time 0901  -RM      PT Received On 09/08/17  -RM      PT Goal Re-Cert Due Date 09/11/17   -RM      Time Calculation- PT    Total Timed Code Minutes- PT 18 minute(s)  -RM        User Key  (r) = Recorded By, (t) = Taken By, (c) = Cosigned By    Initials Name Provider Type     Dipak Scott PTA Physical Therapy Assistant          Therapy Charges for Today     Code Description Service Date Service Provider Modifiers Qty    [id number] HC GAIT TRAINING EA 15 MIN 9/8/2017 Dipak Scott PTA GP 1          PT G-Codes  Outcome Measure Options: AM-PAC 6 Clicks Basic Mobility (PT)    Dipak Scott PTA  9/8/2017            Electronically signed by Dipak Scott PTA at 9/8/2017  4:33 PM

## 2025-01-14 NOTE — TELEPHONE ENCOUNTER
PATIENT REQUESTING TO SPEAK TO MAURICE.   Gentle Skin Care Counseling: I recommended use a gentle skin cleanser when washing the skin. I also recommended application of a moisturizer twice daily. Products with fragrances, preservatives and dyes should be avoided. Detail Level: Generalized

## (undated) DEVICE — 4-PORT MANIFOLD: Brand: NEPTUNE 2

## (undated) DEVICE — VIOLET BRAIDED (POLYGLACTIN 910), SYNTHETIC ABSORBABLE SUTURE: Brand: COATED VICRYL

## (undated) DEVICE — GLV SURG SENSICARE GREEN W/ALOE PF LF 8 STRL

## (undated) DEVICE — BLD CLIP UNIV SURG GRY

## (undated) DEVICE — SUT VIC 4/0 SH 27IN J415H

## (undated) DEVICE — SUT VIC 2/0 CT2 27IN J269H

## (undated) DEVICE — BNDG ELAS ELITE V/CLOSE 4IN 5YD LF STRL

## (undated) DEVICE — SOL IRR NACL 0.9PCT 3000ML

## (undated) DEVICE — BLD SAW GIGLI 51CM

## (undated) DEVICE — PTA BALLOON DILATATION CATHETER: Brand: MUSTANG™

## (undated) DEVICE — 3M™ STERI-STRIP™ REINFORCED ADHESIVE SKIN CLOSURES, R1547, 1/2 IN X 4 IN (12 MM X 100 MM), 6 STRIPS/ENVELOPE: Brand: 3M™ STERI-STRIP™

## (undated) DEVICE — DRSNG WND GZ CURAD OIL EMULSION 3X3IN STRL

## (undated) DEVICE — BNDG ELAS DYNAFLX W/CLIP 4IN 5YD LF

## (undated) DEVICE — GOWN,SIRUS,NON REINFRCD,LARGE,SET IN SL: Brand: MEDLINE

## (undated) DEVICE — SI AVANTI+ 6F STD W/GW  NO OBT: Brand: AVANTI

## (undated) DEVICE — SPONGE,LAP,SUPER ABSORBENT,18"X18",5/PK: Brand: MEDLINE

## (undated) DEVICE — RADIFOCUS GLIDEWIRE ADVANTAGE GUIDEWIRE: Brand: GLIDEWIRE ADVANTAGE

## (undated) DEVICE — BANDAGE,GAUZE,BULKEE II,4.5"X4.1YD,STRL: Brand: MEDLINE

## (undated) DEVICE — NDL ART WING 18G 7CM

## (undated) DEVICE — SMARTGOWN SURGICAL GOWN, 2XL: Brand: CONVERTORS

## (undated) DEVICE — ST IRR CYSTO W/SPK 77IN LF

## (undated) DEVICE — SUT VIC 2/0 CT1 27IN J259H

## (undated) DEVICE — BALN PTA LUTONIX DRUGCOAT LP .035 5F 6X150MM

## (undated) DEVICE — PTA BALLOON DILATATION CATHETER: Brand: STERLING® SL

## (undated) DEVICE — SUT VIC 0 CT 36IN J958H

## (undated) DEVICE — ANGIOGRAPHIC CATHETER: Brand: IMAGER™ II

## (undated) DEVICE — SPNG GZ WOVN 4X4IN 12PLY 10/BX STRL

## (undated) DEVICE — INFLATION DEVICE: Brand: ENCORE™ 26

## (undated) DEVICE — QUICK-CROSS™ SUPPORT CATHETER: Brand: QUICK-CROSS™

## (undated) DEVICE — GLV SURG BIOGEL SENSR LTX PF SZ7.5

## (undated) DEVICE — HOOK LOCK LATEX FREE ELASTIC BANDAGE D/L 6INX10YD

## (undated) DEVICE — BNDG ELAS ELITE V/CLOSE 6IN 5YD LF STRL

## (undated) DEVICE — Device

## (undated) DEVICE — NON-ADHERENT STRIPS,OIL EMULSION: Brand: CURITY

## (undated) DEVICE — ANGIO-SEAL VIP VASCULAR CLOSURE DEVICE: Brand: ANGIO-SEAL

## (undated) DEVICE — ANGIOGRAPHIC CATHETER: Brand: EXPO™

## (undated) DEVICE — DEV CLS VASC MYNX 6FTO 7FR

## (undated) DEVICE — SUT SILK 2/0 SUTUPAK TIES 24IN SA75H

## (undated) DEVICE — BNDG ELAS MATRX V/CLS 4IN 5YD LF

## (undated) DEVICE — BNDG ESMARK 4IN 9FT LF STRL BLU

## (undated) DEVICE — SUT SILK 0 TIES 30IN A306H

## (undated) DEVICE — FLEXIBLE YANKAUER,MEDIUM TIP, NO VACUUM CONTROL: Brand: ARGYLE

## (undated) DEVICE — ELASTIC BANDAGE WITH REMOVABLE CLIP: Brand: CURITY

## (undated) DEVICE — PROXIMATE SKIN STAPLERS (35 WIDE) CONTAINS 35 STAINLESS STEEL STAPLES (FIXED HEAD): Brand: PROXIMATE

## (undated) DEVICE — PK EXTRM LOWR 20

## (undated) DEVICE — OCCLUSIVE GAUZE STRIP,3% BISMUTH TRIBROMOPHENATE IN PETROLATUM BLEND: Brand: XEROFORM

## (undated) DEVICE — GLV SURG SENSICARE ORTHO PF LF 7.5 STRL

## (undated) DEVICE — PAD GRND REM POLYHESIVE A/ DISP

## (undated) DEVICE — BNDG ELAS MATRX V/CLS 6IN 5YD LF

## (undated) DEVICE — 1000 S-DRAPE TOWEL DRAPE 10/BX: Brand: STERI-DRAPE™

## (undated) DEVICE — DISPOSABLE TOURNIQUET CUFF SINGLE BLADDER, SINGLE PORT AND LUER LOCK CONNECTOR: Brand: COLOR CUFF

## (undated) DEVICE — DRSNG ADAPTIC 3X8

## (undated) DEVICE — SUT VIC 0 CT 18IN VIL J752D

## (undated) DEVICE — SPNG LAP 18X18IN LF STRL PK/5

## (undated) DEVICE — SI AVANTI+ 7F STD W/GW  NO OBT: Brand: AVANTI

## (undated) DEVICE — SUT SILK 2/0 SH 30IN K833H

## (undated) DEVICE — SYS COMPR FEMOSTOP GLD W/PUMP LF

## (undated) DEVICE — SUT ETHLN 3/0 FS1 663G